# Patient Record
Sex: MALE | Race: WHITE | HISPANIC OR LATINO | Employment: OTHER | URBAN - METROPOLITAN AREA
[De-identification: names, ages, dates, MRNs, and addresses within clinical notes are randomized per-mention and may not be internally consistent; named-entity substitution may affect disease eponyms.]

---

## 2017-01-03 ENCOUNTER — TRANSFERRED RECORDS (OUTPATIENT)
Dept: CARDIOLOGY | Facility: CLINIC | Age: 72
End: 2017-01-03

## 2017-01-13 DIAGNOSIS — I25.10 CORONARY ARTERY DISEASE INVOLVING NATIVE CORONARY ARTERY OF NATIVE HEART WITHOUT ANGINA PECTORIS: ICD-10-CM

## 2017-01-13 DIAGNOSIS — I48.20 CHRONIC ATRIAL FIBRILLATION (H): Primary | ICD-10-CM

## 2017-01-13 DIAGNOSIS — I48.91 ATRIAL FIBRILLATION (H): ICD-10-CM

## 2017-01-13 RX ORDER — RAMIPRIL 2.5 MG/1
2.5 CAPSULE ORAL DAILY
Qty: 90 CAPSULE | Refills: 3 | Status: SHIPPED | OUTPATIENT
Start: 2017-01-13 | End: 2017-12-05

## 2017-01-13 RX ORDER — METOPROLOL SUCCINATE 100 MG/1
100 TABLET, EXTENDED RELEASE ORAL DAILY
Qty: 90 TABLET | Refills: 3 | Status: SHIPPED | OUTPATIENT
Start: 2017-01-13 | End: 2017-12-05

## 2017-11-28 ENCOUNTER — HOSPITAL ENCOUNTER (OUTPATIENT)
Dept: CARDIOLOGY | Facility: CLINIC | Age: 72
Discharge: HOME | End: 2017-11-28
Attending: INTERNAL MEDICINE | Admitting: INTERNAL MEDICINE
Payer: MEDICARE

## 2017-11-28 DIAGNOSIS — I25.10 CORONARY ARTERY DISEASE INVOLVING NATIVE CORONARY ARTERY OF NATIVE HEART WITHOUT ANGINA PECTORIS: ICD-10-CM

## 2017-11-28 DIAGNOSIS — E78.5 HYPERLIPIDEMIA LDL GOAL <100: ICD-10-CM

## 2017-11-28 DIAGNOSIS — I42.9 PRIMARY CARDIOMYOPATHY (H): ICD-10-CM

## 2017-11-28 DIAGNOSIS — I48.20 CHRONIC ATRIAL FIBRILLATION (H): ICD-10-CM

## 2017-11-28 LAB
CHOLEST SERPL-MCNC: 144 MG/DL
HDLC SERPL-MCNC: 44 MG/DL
LDLC SERPL CALC-MCNC: 77 MG/DL
NONHDLC SERPL-MCNC: 100 MG/DL
TRIGL SERPL-MCNC: 114 MG/DL

## 2017-11-28 PROCEDURE — 40000264 ECHO COMPLETE WITH LUMASON

## 2017-11-28 PROCEDURE — 25500064 ZZH RX 255 OP 636: Performed by: INTERNAL MEDICINE

## 2017-11-28 PROCEDURE — 36415 COLL VENOUS BLD VENIPUNCTURE: CPT | Performed by: INTERNAL MEDICINE

## 2017-11-28 PROCEDURE — 80061 LIPID PANEL: CPT | Performed by: INTERNAL MEDICINE

## 2017-11-28 PROCEDURE — 93306 TTE W/DOPPLER COMPLETE: CPT | Mod: 26 | Performed by: INTERNAL MEDICINE

## 2017-11-28 RX ADMIN — SULFUR HEXAFLUORIDE 2 ML: KIT at 08:14

## 2017-11-29 ENCOUNTER — PRE VISIT (OUTPATIENT)
Dept: CARDIOLOGY | Facility: CLINIC | Age: 72
End: 2017-11-29

## 2017-12-01 ENCOUNTER — CARE COORDINATION (OUTPATIENT)
Dept: CARDIOLOGY | Facility: CLINIC | Age: 72
End: 2017-12-01

## 2017-12-01 ENCOUNTER — OFFICE VISIT (OUTPATIENT)
Dept: CARDIOLOGY | Facility: CLINIC | Age: 72
End: 2017-12-01
Attending: INTERNAL MEDICINE
Payer: COMMERCIAL

## 2017-12-01 VITALS
HEART RATE: 72 BPM | SYSTOLIC BLOOD PRESSURE: 118 MMHG | BODY MASS INDEX: 37.63 KG/M2 | DIASTOLIC BLOOD PRESSURE: 78 MMHG | HEIGHT: 76 IN | WEIGHT: 309 LBS

## 2017-12-01 DIAGNOSIS — I42.9 PRIMARY CARDIOMYOPATHY (H): ICD-10-CM

## 2017-12-01 DIAGNOSIS — E78.5 HYPERLIPIDEMIA LDL GOAL <100: ICD-10-CM

## 2017-12-01 DIAGNOSIS — I48.20 CHRONIC ATRIAL FIBRILLATION (H): ICD-10-CM

## 2017-12-01 DIAGNOSIS — I25.10 CORONARY ARTERY DISEASE INVOLVING NATIVE CORONARY ARTERY OF NATIVE HEART WITHOUT ANGINA PECTORIS: ICD-10-CM

## 2017-12-01 PROCEDURE — 99213 OFFICE O/P EST LOW 20 MIN: CPT | Performed by: INTERNAL MEDICINE

## 2017-12-01 NOTE — LETTER
12/1/2017    Paresh Rodriguez MD  [facility]   [address]  [facility] [address]    RE: Ti Nickerson       Dear Colleague,    I had the pleasure of seeing Ti Nickerson in the [facility].    Mr. Nickerson is a delightful 72-year-old gentleman with history of idiopathic cardiomyopathy, previous ejection fraction of 40%, chronic atrial fibrillation who comes in feeling well today.  He is asymptomatic from a cardiac standpoint.  He has no chest pain, chest pressure, shortness of breath, heart racing, palpitations.  He has occasional lower extremity edema if he has been standing for too long a time, but it always readily clears by the next day.      Mr. Nickerson's echocardiogram shows stable moderate aortic root and ascending aorta enlargement.  His echocardiogram shows normal ejection fraction and a controlled atrial fibrillation.  He is on appropriate medical therapy.        He is wishing advice on new primary care is Dr. Rodriguez has retired and left no recommendations.  We will phone him with subsequent recommendations.  I will follow up with Mr. Nickerson in 1 year's time.  His cholesterol and blood pressure are at goal.  We talked mostly about him needing to lose weight.  We set a target goal of 250-260, perhaps joining a club and working out 5 times per week with decreasing his caloric intake and following a Mediterranean diet.      Again, thank you for allowing me to participate in the care of your patient.      Sincerely,    SUNNY WITT MD     [facility]

## 2017-12-01 NOTE — PROGRESS NOTES
Patient advised to establish care with a primary doctor.      appointment 12/8/2017  [facility] [place] -  [address] [place] [address]  9:30 am  Luis Carlos Lopez MD      Patient updated with this information. Allison Yin

## 2017-12-01 NOTE — MR AVS SNAPSHOT
After Visit Summary   12/1/2017    Ti Nickerson    MRN: [id number]           Patient Information     Date Of Birth          1945        Visit Information        Provider Department      12/1/2017 7:45 AM Devin Quarles MD [facility]        Today's Diagnoses     Hyperlipidemia LDL goal <100        Primary cardiomyopathy (H)        Coronary artery disease involving native coronary artery of native heart without angina pectoris        Chronic atrial fibrillation (H)           Follow-ups after your visit        Additional Services     Follow-Up with Cardiologist                 Your next 10 appointments already scheduled     Dec 08, 2017  9:30 AM CST   [facility] Visit with Quin Lopez MD   [facility]    [address] [address] [phone]           Bring a current list of meds and any records pertaining to this visit. For Physicals, please bring immunization records and any forms needing to be filled out. Please arrive 10 minutes early to complete paperwork.              Future tests that were ordered for you today     Open Future Orders        Priority Expected Expires Ordered    Follow-Up with Cardiologist Routine 12/1/2018 12/2/2018 12/1/2017            Who to contact     If you have questions or need follow up information about today's clinic visit or your schedule please contact [facility] directly at [phone].  Normal or non-critical lab and imaging results will be communicated to you by MyChart, letter or phone within 4 business days after the clinic has received the results. If you do not hear from us within 7 days, please contact the clinic through MyChart or phone. If you have a critical or abnormal lab result, we will notify you by phone as soon as possible.  Submit refill requests through [employer] or call your pharmacy and they will  "forward the refill request to us. Please allow 3 business days for your refill to be completed.          Additional Information About Your Visit        MyChart Information     Soundwave lets you send messages to your doctor, view your test results, renew your prescriptions, schedule appointments and more. To sign up, go to [email or web address] . Click on \"Log in\" on the left side of the screen, which will take you to the Welcome page. Then click on \"Sign up Now\" on the right side of the page.     You will be asked to enter the access code listed below, as well as some personal information. Please follow the directions to create your username and password.     Your access code is: [email or web address]  Expires: 3/1/2018  8:57 AM     Your access code will  in 90 days. If you need help or a new code, please call your [facility] clinic or [phone].        Care EveryWhere ID     This is your Care EveryWhere ID. This could be used by other organizations to access your [facility] medical records  [id number]        Your Vitals Were     Pulse Height BMI (Body Mass Index)             72 1.93 m (6' 4\") 37.61 kg/m2          Blood Pressure from Last 3 Encounters:   17 118/78   16 122/64   09/21/15 112/80    Weight from Last 3 Encounters:   17 (!) 140.2 kg (309 lb)   16 (!) 141.1 kg (311 lb)   09/21/15 (!) 139.6 kg (307 lb 11.2 oz)              We Performed the Following     Follow-Up with Cardiologist        Primary Care Provider Office Phone # Fax #    Paresh Rodriguez [phone]       [facility] INTERNAL MEDIC [address] [address]        Equal Access to Services     [facility] : Johnny jones Soestefani, waaxda luqadaha, qaybta kaalmada adelakeshiayaally, brenda fletcher. So [facility] [phone].    ATENCIÓN: Si habla español, tiene a arthur disposición servicios gratuitos de asistencia lingüística. Llame al [phone].    We comply with applicable " federal civil rights laws and [place] laws. We do not discriminate on the basis of race, color, national origin, age, disability, sex, sexual orientation, or gender identity.            Thank you!     Thank you for choosing [facility]  for your care. Our goal is always to provide you with excellent care. Hearing back from our patients is one way we can continue to improve our services. Please take a few minutes to complete the written survey that you may receive in the mail after your visit with us. Thank you!             Your Updated Medication List - Protect others around you: Learn how to safely use, store and throw away your medicines at www.disposemymeds.org.          This list is accurate as of: 12/1/17  8:57 AM.  Always use your most recent med list.                   Brand Name Dispense Instructions for use Diagnosis    apixaban ANTICOAGULANT 5 MG tablet    ELIQUIS    180 tablet    Take 1 tablet (5 mg) by mouth 2 times daily    Chronic atrial fibrillation (H)       metoprolol 100 MG 24 hr tablet    TOPROL-XL    90 tablet    Take 1 tablet (100 mg) by mouth daily    Atrial fibrillation (H)       ramipril 2.5 MG capsule    ALTACE    90 capsule    Take 1 capsule (2.5 mg) by mouth daily    Coronary artery disease involving native coronary artery of native heart without angina pectoris       rosuvastatin 10 MG tablet    CRESTOR    90 tablet    Take 1 tablet (10 mg) by mouth daily    Hyperlipidemia LDL goal <100

## 2017-12-01 NOTE — PROGRESS NOTES
Mr. Allen is a delightful 72-year-old gentleman with history of idiopathic cardiomyopathy, previous ejection fraction of 40%, chronic atrial fibrillation who comes in feeling well today.  He is asymptomatic from a cardiac standpoint.  He has no chest pain, chest pressure, shortness of breath, heart racing, palpitations.  He has occasional lower extremity edema if he has been standing for too long a time, but it always readily clears by the next day.      Mr. Allen's echocardiogram shows stable moderate aortic root and ascending aorta enlargement.  His echocardiogram shows normal ejection fraction and a controlled atrial fibrillation.  He is on appropriate medical therapy.        He is wishing advice on new primary care is Dr. Rodriguez has retired and left no recommendations.  We will phone him with subsequent recommendations.  I will follow up with Mr. Allen in 1 year's time.  His cholesterol and blood pressure are at goal.  We talked mostly about him needing to lose weight.  We set a target goal of 250-260, perhaps joining a club and working out 5 times per week with decreasing his caloric intake and following a Mediterranean diet.         SUNNY WITT MD [facility]             D: 2017 08:15   T: 2017 10:35   MT: JOHNNIE      Name:     JOHN ALLEN   MRN:      [id number]        Account:      [id number]   :      1945           Service Date: 2017      Document: [id number]

## 2017-12-05 DIAGNOSIS — E78.5 HYPERLIPIDEMIA LDL GOAL <100: ICD-10-CM

## 2017-12-05 DIAGNOSIS — I48.20 CHRONIC ATRIAL FIBRILLATION (H): ICD-10-CM

## 2017-12-05 DIAGNOSIS — I48.91 ATRIAL FIBRILLATION (H): ICD-10-CM

## 2017-12-05 DIAGNOSIS — I25.10 CORONARY ARTERY DISEASE INVOLVING NATIVE CORONARY ARTERY OF NATIVE HEART WITHOUT ANGINA PECTORIS: ICD-10-CM

## 2017-12-05 RX ORDER — METOPROLOL SUCCINATE 100 MG/1
100 TABLET, EXTENDED RELEASE ORAL DAILY
Qty: 90 TABLET | Refills: 3 | Status: SHIPPED | OUTPATIENT
Start: 2017-12-05 | End: 2018-11-30

## 2017-12-05 RX ORDER — ROSUVASTATIN CALCIUM 10 MG/1
10 TABLET, COATED ORAL DAILY
Qty: 90 TABLET | Refills: 3 | Status: SHIPPED | OUTPATIENT
Start: 2017-12-05 | End: 2018-11-30

## 2017-12-05 RX ORDER — RAMIPRIL 2.5 MG/1
2.5 CAPSULE ORAL DAILY
Qty: 90 CAPSULE | Refills: 3 | Status: SHIPPED | OUTPATIENT
Start: 2017-12-05 | End: 2018-11-30

## 2017-12-08 ENCOUNTER — OFFICE VISIT (OUTPATIENT)
Dept: FAMILY MEDICINE | Facility: CLINIC | Age: 72
End: 2017-12-08
Payer: COMMERCIAL

## 2017-12-08 VITALS
HEIGHT: 76 IN | WEIGHT: 310 LBS | DIASTOLIC BLOOD PRESSURE: 82 MMHG | RESPIRATION RATE: 14 BRPM | TEMPERATURE: 97.1 F | HEART RATE: 86 BPM | SYSTOLIC BLOOD PRESSURE: 113 MMHG | OXYGEN SATURATION: 95 % | BODY MASS INDEX: 37.75 KG/M2

## 2017-12-08 DIAGNOSIS — Z83.3 FAMILY HISTORY OF DIABETES MELLITUS: ICD-10-CM

## 2017-12-08 DIAGNOSIS — Z23 NEEDS FLU SHOT: ICD-10-CM

## 2017-12-08 DIAGNOSIS — E66.812 CLASS 2 OBESITY WITH BODY MASS INDEX (BMI) OF 37.0 TO 37.9 IN ADULT, UNSPECIFIED OBESITY TYPE, UNSPECIFIED WHETHER SERIOUS COMORBIDITY PRESENT: ICD-10-CM

## 2017-12-08 DIAGNOSIS — R73.01 IMPAIRED FASTING GLUCOSE: ICD-10-CM

## 2017-12-08 DIAGNOSIS — I48.20 CHRONIC ATRIAL FIBRILLATION (H): ICD-10-CM

## 2017-12-08 DIAGNOSIS — I42.9 IDIOPATHIC CARDIOMYOPATHY (H): ICD-10-CM

## 2017-12-08 DIAGNOSIS — Z12.5 SCREENING FOR PROSTATE CANCER: ICD-10-CM

## 2017-12-08 DIAGNOSIS — E78.2 MIXED HYPERLIPIDEMIA: ICD-10-CM

## 2017-12-08 DIAGNOSIS — Z76.89 ENCOUNTER TO ESTABLISH CARE WITH NEW DOCTOR: Primary | ICD-10-CM

## 2017-12-08 DIAGNOSIS — Z13.29 SCREENING FOR THYROID DISORDER: ICD-10-CM

## 2017-12-08 LAB
ALBUMIN SERPL-MCNC: 3.8 G/DL (ref 3.4–5)
ALP SERPL-CCNC: 60 U/L (ref 40–150)
ALT SERPL W P-5'-P-CCNC: 31 U/L (ref 0–70)
ANION GAP SERPL CALCULATED.3IONS-SCNC: 11 MMOL/L (ref 3–14)
AST SERPL W P-5'-P-CCNC: 25 U/L (ref 0–45)
BASOPHILS # BLD AUTO: 0 10E9/L (ref 0–0.2)
BASOPHILS NFR BLD AUTO: 0.3 %
BILIRUB SERPL-MCNC: 1.2 MG/DL (ref 0.2–1.3)
BUN SERPL-MCNC: 21 MG/DL (ref 7–30)
CALCIUM SERPL-MCNC: 8.9 MG/DL (ref 8.5–10.1)
CHLORIDE SERPL-SCNC: 107 MMOL/L (ref 94–109)
CO2 SERPL-SCNC: 22 MMOL/L (ref 20–32)
CREAT SERPL-MCNC: 1.1 MG/DL (ref 0.66–1.25)
DIFFERENTIAL METHOD BLD: NORMAL
EOSINOPHIL # BLD AUTO: 0.2 10E9/L (ref 0–0.7)
EOSINOPHIL NFR BLD AUTO: 2.5 %
ERYTHROCYTE [DISTWIDTH] IN BLOOD BY AUTOMATED COUNT: 13 % (ref 10–15)
GFR SERPL CREATININE-BSD FRML MDRD: 66 ML/MIN/1.7M2
GLUCOSE SERPL-MCNC: 106 MG/DL (ref 70–99)
HBA1C MFR BLD: 5.6 % (ref 4.3–6)
HCT VFR BLD AUTO: 47.1 % (ref 40–53)
HGB BLD-MCNC: 16.2 G/DL (ref 13.3–17.7)
LYMPHOCYTES # BLD AUTO: 1.6 10E9/L (ref 0.8–5.3)
LYMPHOCYTES NFR BLD AUTO: 21.7 %
MCH RBC QN AUTO: 32.7 PG (ref 26.5–33)
MCHC RBC AUTO-ENTMCNC: 34.4 G/DL (ref 31.5–36.5)
MCV RBC AUTO: 95 FL (ref 78–100)
MONOCYTES # BLD AUTO: 0.6 10E9/L (ref 0–1.3)
MONOCYTES NFR BLD AUTO: 7.3 %
NEUTROPHILS # BLD AUTO: 5.1 10E9/L (ref 1.6–8.3)
NEUTROPHILS NFR BLD AUTO: 68.2 %
PLATELET # BLD AUTO: 210 10E9/L (ref 150–450)
POTASSIUM SERPL-SCNC: 4.3 MMOL/L (ref 3.4–5.3)
PROT SERPL-MCNC: 7.4 G/DL (ref 6.8–8.8)
PSA SERPL-ACNC: 2.98 UG/L (ref 0–4)
RBC # BLD AUTO: 4.96 10E12/L (ref 4.4–5.9)
SODIUM SERPL-SCNC: 140 MMOL/L (ref 133–144)
T4 FREE SERPL-MCNC: 0.93 NG/DL (ref 0.76–1.46)
TSH SERPL DL<=0.005 MIU/L-ACNC: 4.07 MU/L (ref 0.4–4)
WBC # BLD AUTO: 7.5 10E9/L (ref 4–11)

## 2017-12-08 PROCEDURE — 99204 OFFICE O/P NEW MOD 45 MIN: CPT | Mod: 25 | Performed by: INTERNAL MEDICINE

## 2017-12-08 PROCEDURE — 90662 IIV NO PRSV INCREASED AG IM: CPT | Performed by: INTERNAL MEDICINE

## 2017-12-08 PROCEDURE — 80050 GENERAL HEALTH PANEL: CPT | Performed by: INTERNAL MEDICINE

## 2017-12-08 PROCEDURE — 36415 COLL VENOUS BLD VENIPUNCTURE: CPT | Performed by: INTERNAL MEDICINE

## 2017-12-08 PROCEDURE — 83036 HEMOGLOBIN GLYCOSYLATED A1C: CPT | Performed by: INTERNAL MEDICINE

## 2017-12-08 PROCEDURE — G0103 PSA SCREENING: HCPCS | Performed by: INTERNAL MEDICINE

## 2017-12-08 PROCEDURE — 84439 ASSAY OF FREE THYROXINE: CPT | Performed by: INTERNAL MEDICINE

## 2017-12-08 PROCEDURE — G0008 ADMIN INFLUENZA VIRUS VAC: HCPCS | Performed by: INTERNAL MEDICINE

## 2017-12-08 NOTE — PROGRESS NOTES
Chief Complaint:     New Patient/Transfer of Care  Establish care    HPI:   Patient Ti Nickerson is a very pleasant 72 year old male with history of atrial fibrillation who presents to Internal Medicine clinic today to establish care and for follow up of multiple concerns. Regarding the patient's atrial fibrilaltion, the patient reports that he is currently followed by outpatient cardiology specialist clinic and is compliant with his cardiac medications including metoprolol and Eliquis. Regarding the patient's hyperlipidemia, the patient is compliant with his Crestor cholesterol medication. Regarding the patient's hypertension, the patient's BP is currently well controlled on the Ramipril medication. Patient denies any current chest pain, headaches, fever or chills. Patient is also due for a routine flu vaccine at this time.           Current Medications:     Current Outpatient Prescriptions   Medication Sig Dispense Refill     rosuvastatin (CRESTOR) 10 MG tablet Take 1 tablet (10 mg) by mouth daily 90 tablet 3     metoprolol (TOPROL-XL) 100 MG 24 hr tablet Take 1 tablet (100 mg) by mouth daily 90 tablet 3     ramipril (ALTACE) 2.5 MG capsule Take 1 capsule (2.5 mg) by mouth daily 90 capsule 3     apixaban ANTICOAGULANT (ELIQUIS) 5 MG tablet Take 1 tablet (5 mg) by mouth 2 times daily 180 tablet 3         Allergies:    No Known Allergies         Past Medical History:     Past Medical History:   Diagnosis Date     Atrial fibrillation, unspecified 9/18/2015     CAD (coronary artery disease) 9/18/2015     Esophageal reflux 9/18/2015     History of cardioversion     [id number]     Idiopathic cardiomyopathy (H) 9/18/2015     Mixed hyperlipidemia 9/18/2015     Sleep apnea 9/18/2015         Past Surgical History:   History reviewed. No pertinent surgical history.      Family Medical History:     Family History   Problem Relation Age of Onset     Arrhythmia Mother      a-fib     Arrhythmia Father      a-fib  "        Social History:     Social History     Social History     Marital status:      Spouse name: N/A     Number of children: N/A     Years of education: N/A     Occupational History     Not on file.     Social History Main Topics     Smoking status: Never Smoker     Smokeless tobacco: Never Used     Alcohol use 0.0 oz/week     0 Standard drinks or equivalent per week      Comment: 3 drinks week     Drug use: No     Sexual activity: Yes     Partners: Female     Other Topics Concern     Caffeine Concern No     2 coffee in am, occas. soda     Sleep Concern No     Stress Concern No     Weight Concern Yes     Special Diet No     Exercise No     walking 3-4 days week     Seat Belt Yes     Social History Narrative           Review of System:     Constitutional: Negative for fever or chills  Skin: Negative for rashes  Ears/Nose/Throat: Negative for nasal congestion, sore throat  Respiratory: No shortness of breath, dyspnea on exertion, cough, or hemoptysis  Cardiovascular: Negative for chest pain. Positive for atrial fibrilaltion  Gastrointestinal: Negative for nausea, vomiting  Genitourinary: Negative for dysuria, hematuria  Musculoskeletal: Negative for myalgias  Neurologic: Negative for headaches  Psychiatric: Negative for depression, anxiety  Hematologic/Lymphatic/Immunologic: Negative  Endocrine: Positive for chronic obesity and history of impaired fasting glucose  Behavioral: Negative for tobacco use       Physical Exam:   /82  Pulse 86  Temp 97.1  F (36.2  C) (Oral)  Resp 14  Ht 6' 4\" (1.93 m)  Wt (!) 310 lb (140.6 kg)  SpO2 95%  BMI 37.73 kg/m2    GENERAL: healthy, alert and no distress  EYES: eyes grossly normal to inspection, and conjunctivae and sclerae normal  HENT: Normocephalic atraumatic. Nose and mouth without ulcers or lesions  NECK: supple  RESP: lungs clear to auscultation   CV: irregularly irregular, heart rate is currently well controlled  LYMPH: no peripheral edema   ABDOMEN: " obese  MS: no gross musculoskeletal defects noted  SKIN: no suspicious lesions or rashes  NEURO: Alert & Oriented x 3.   PSYCH: mentation appears normal, affect normal        Diagnostic Test Results:     Diagnostic Test Results:  Results for orders placed or performed in visit on 12/08/17   CBC with platelets and differential   Result Value Ref Range    WBC 7.5 4.0 - 11.0 10e9/L    RBC Count 4.96 4.4 - 5.9 10e12/L    Hemoglobin 16.2 13.3 - 17.7 g/dL    Hematocrit 47.1 40.0 - 53.0 %    MCV 95 78 - 100 fl    MCH 32.7 26.5 - 33.0 pg    MCHC 34.4 31.5 - 36.5 g/dL    RDW 13.0 10.0 - 15.0 %    Platelet Count 210 150 - 450 10e9/L    Diff Method Automated Method     % Neutrophils 68.2 %    % Lymphocytes 21.7 %    % Monocytes 7.3 %    % Eosinophils 2.5 %    % Basophils 0.3 %    Absolute Neutrophil 5.1 1.6 - 8.3 10e9/L    Absolute Lymphocytes 1.6 0.8 - 5.3 10e9/L    Absolute Monocytes 0.6 0.0 - 1.3 10e9/L    Absolute Eosinophils 0.2 0.0 - 0.7 10e9/L    Absolute Basophils 0.0 0.0 - 0.2 10e9/L   Hemoglobin A1c   Result Value Ref Range    Hemoglobin A1C 5.6 4.3 - 6.0 %       ASSESSMENT/PLAN:       (Z76.89) Encounter to establish care with new doctor  (primary encounter diagnosis)  (I48.2) Chronic atrial fibrillation (H)  Comment: Patient's heart rate is currently well controlled.   Plan: I have ordered labs for CBC with platelets and differential, Comprehensive metabolic panel (BMP + Alb, Alk Phos, ALT, AST, Total. Bili, TP) in clinic today.      (Z23) Needs flu shot  Comment: Patient is due for a flu vaccine.  Plan: I have ordered FLU VACCINE, INCREASED ANTIGEN, PRESV FREE in clinic today.      (I42.8) Idiopathic cardiomyopathy (H)  Comment: No signs of acute on chronic CHF exacerbation at this time.  Plan: continue outpatient cardiology clinic follow up. Continue current cardiac medications.      (E78.2) Mixed hyperlipidemia  Comment: Patient is compliant with his Crestor medication.  Plan: Continue Crestor medication for  hyperlipidemia treatment.      (Z12.5) Screening for prostate cancer  Comment: Patient is due for PSA lab for prostate cancer screening.  Plan: I have ordered lab for PSA, screen      (Z13.29) Screening for thyroid disorder  Comment: Patient is due for thyroid disorder screening.  Plan: I have ordered lab for TSH with free T4 reflex      (E66.9,  Z68.37) Class 2 obesity with body mass index (BMI) of 37.0 to 37.9 in adult, unspecified obesity type, unspecified whether serious comorbidity present  Comment: chronic obesity  Plan: I have advised patient to start a new weight loss program through diet and exercise going forward.      (R73.01) Impaired fasting glucose  (Z83.3) Family history of diabetes mellitus  Comment: Patient is due for Hgb a1c lab to follow up on previous diagnosis of impaired fasting glucose.  Plan: I have ordered lab for Hemoglobin A1c.            Follow Up Plan:     Patient is instructed to return to Internal Medicine clinic for follow-up visit in 1 year or sooner as needed.        Quin Lopez MD  Internal Medicine  [facility]

## 2017-12-08 NOTE — LETTER
[address] [facility]  [address]  [place]  [address]  Tel: [phone]    December 11, 2017    Ti Durandnemaribel  [address] [facility]  [place] [address]        Dear Mr. Nickerson,    Your recent lab results are looking ok.    If you have any further questions or problems, please contact our office.      Sincerely,    Luis Carlos Lopez MD/chandler           Results for orders placed or performed in visit on 12/08/17   CBC with platelets and differential   Result Value Ref Range    WBC 7.5 4.0 - 11.0 10e9/L    RBC Count 4.96 4.4 - 5.9 10e12/L    Hemoglobin 16.2 13.3 - 17.7 g/dL    Hematocrit 47.1 40.0 - 53.0 %    MCV 95 78 - 100 fl    MCH 32.7 26.5 - 33.0 pg    MCHC 34.4 31.5 - 36.5 g/dL    RDW 13.0 10.0 - 15.0 %    Platelet Count 210 150 - 450 10e9/L    Diff Method Automated Method     % Neutrophils 68.2 %    % Lymphocytes 21.7 %    % Monocytes 7.3 %    % Eosinophils 2.5 %    % Basophils 0.3 %    Absolute Neutrophil 5.1 1.6 - 8.3 10e9/L    Absolute Lymphocytes 1.6 0.8 - 5.3 10e9/L    Absolute Monocytes 0.6 0.0 - 1.3 10e9/L    Absolute Eosinophils 0.2 0.0 - 0.7 10e9/L    Absolute Basophils 0.0 0.0 - 0.2 10e9/L   Comprehensive metabolic panel (BMP + Alb, Alk Phos, ALT, AST, Total. Bili, TP)   Result Value Ref Range    Sodium 140 133 - 144 mmol/L    Potassium 4.3 3.4 - 5.3 mmol/L    Chloride 107 94 - 109 mmol/L    Carbon Dioxide 22 20 - 32 mmol/L    Anion Gap 11 3 - 14 mmol/L    Glucose 106 (H) 70 - 99 mg/dL    Urea Nitrogen 21 7 - 30 mg/dL    Creatinine 1.10 0.66 - 1.25 mg/dL    GFR Estimate 66 >60 mL/min/1.7m2    GFR Estimate If Black 80 >60 mL/min/1.7m2    Calcium 8.9 8.5 - 10.1 mg/dL    Bilirubin Total 1.2 0.2 - 1.3 mg/dL    Albumin 3.8 3.4 - 5.0 g/dL    Protein Total 7.4 6.8 - 8.8 g/dL    Alkaline Phosphatase 60 40 - 150 U/L    ALT 31 0 - 70 U/L    AST 25 0 - 45 U/L   TSH with free T4 reflex   Result Value Ref Range    TSH 4.07 (H) 0.40 - 4.00 mU/L   PSA, screen   Result Value Ref Range    PSA 2.98  0 - 4 ug/L   Hemoglobin A1c   Result Value Ref Range    Hemoglobin A1C 5.6 4.3 - 6.0 %   T4 free   Result Value Ref Range    T4 Free 0.93 0.76 - 1.46 ng/dL

## 2017-12-08 NOTE — MR AVS SNAPSHOT
After Visit Summary   12/8/2017    Ti Nickerson    MRN: [id number]           Patient Information     Date Of Birth          1945        Visit Information        Provider Department      12/8/2017 9:30 AM Quin Lopez MD [facility]        Today's Diagnoses     Encounter to establish care with new doctor    -  1    Needs flu shot        Chronic atrial fibrillation (H)        Idiopathic cardiomyopathy (H)        Mixed hyperlipidemia        Screening for prostate cancer        Screening for thyroid disorder        Class 2 obesity with body mass index (BMI) of 37.0 to 37.9 in adult, unspecified obesity type, unspecified whether serious comorbidity present        Family history of diabetes mellitus        Impaired fasting glucose           Follow-ups after your visit        Follow-up notes from your care team     Return in about 1 year (around 12/8/2018).      Who to contact     If you have questions or need follow up information about today's clinic visit or your schedule please contact [facility] directly at [phone].  Normal or non-critical lab and imaging results will be communicated to you by MyChart, letter or phone within 4 business days after the clinic has received the results. If you do not hear from us within 7 days, please contact the clinic through [employer] or phone. If you have a critical or abnormal lab result, we will notify you by phone as soon as possible.  Submit refill requests through [employer] or call your pharmacy and they will forward the refill request to us. Please allow 3 business days for your refill to be completed.          Additional Information About Your Visit        Care EveryWhere ID     This is your Care EveryWhere ID. This could be used by other organizations to access your [facility] medical records  [id number]        Your Vitals Were     Pulse Temperature Respirations Height Pulse Oximetry BMI (Body Mass Index)    86 97.1  F  "(36.2  C) (Oral) 14 6' 4\" (1.93 m) 95% 37.73 kg/m2       Blood Pressure from Last 3 Encounters:   12/08/17 113/82   12/01/17 118/78   11/28/16 122/64    Weight from Last 3 Encounters:   12/08/17 (!) 310 lb (140.6 kg)   12/01/17 (!) 309 lb (140.2 kg)   11/28/16 (!) 311 lb (141.1 kg)              We Performed the Following     CBC with platelets and differential     Comprehensive metabolic panel (BMP + Alb, Alk Phos, ALT, AST, Total. Bili, TP)     FLU VACCINE, INCREASED ANTIGEN, PRESV FREE     Hemoglobin A1c     PSA, screen     TSH with free T4 reflex        Primary Care Provider Office Phone # Fax #    Quin Luis Carlos Lopez [phone] [address]  [place] [address]        Equal Access to Services     [facility] : Hadii guerline collinso Soestefani, waaxda luqadaha, qaybta kaalmada adelakeshiayaally, brenda sen . So [facility] [phone].    ATENCIÓN: Si sangeeta moran, tiene a arthur disposición servicios gratuitos de asistencia lingüística. Llame al [phone].    We comply with applicable federal civil rights laws and [place] laws. We do not discriminate on the basis of race, color, national origin, age, disability, sex, sexual orientation, or gender identity.            Thank you!     Thank you for choosing [facility]  for your care. Our goal is always to provide you with excellent care. Hearing back from our patients is one way we can continue to improve our services. Please take a few minutes to complete the written survey that you may receive in the mail after your visit with us. Thank you!             Your Updated Medication List - Protect others around you: Learn how to safely use, store and throw away your medicines at www.disposemymeds.org.          This list is accurate as of: 12/8/17  2:52 PM.  Always use your most recent med list.                   Brand Name Dispense Instructions for use Diagnosis    apixaban ANTICOAGULANT 5 MG tablet    ELIQUIS    " 180 tablet    Take 1 tablet (5 mg) by mouth 2 times daily    Chronic atrial fibrillation (H)       metoprolol 100 MG 24 hr tablet    TOPROL-XL    90 tablet    Take 1 tablet (100 mg) by mouth daily    Atrial fibrillation (H)       ramipril 2.5 MG capsule    ALTACE    90 capsule    Take 1 capsule (2.5 mg) by mouth daily    Coronary artery disease involving native coronary artery of native heart without angina pectoris       rosuvastatin 10 MG tablet    CRESTOR    90 tablet    Take 1 tablet (10 mg) by mouth daily    Hyperlipidemia LDL goal <100

## 2017-12-08 NOTE — NURSING NOTE
"Chief Complaint   Patient presents with     Establish Care       Initial /82  Pulse 86  Temp 97.1  F (36.2  C) (Oral)  Resp 14  Ht 6' 4\" (1.93 m)  Wt (!) 310 lb (140.6 kg)  SpO2 95%  BMI 37.73 kg/m2 Estimated body mass index is 37.73 kg/(m^2) as calculated from the following:    Height as of this encounter: 6' 4\" (1.93 m).    Weight as of this encounter: 310 lb (140.6 kg).  BP completed using cuff size: large    Health Maintenance that is potentially due pending provider review:  Health Maintenance Due   Topic Date Due     HF ACTION PLAN Q3 YR  1945     ALT Q1 YR  11/11/1947     HEPATITIS C SCREENING  11/11/1964     ADVANCE DIRECTIVE PLANNING Q5 YRS  11/11/2001     FALL RISK ASSESSMENT  11/11/2011     PNEUMOCOCCAL (1 of 2 - PCV13) 11/11/2011     BMP Q6 MOS  06/11/2012     CBC Q1 YR  12/11/2012     INFLUENZA VACCINE (SYSTEM ASSIGNED)  09/01/2017         Fall risk done-flu vaccine ordered-pneumo 13 ordered  "

## 2018-07-07 ENCOUNTER — HOSPITAL ENCOUNTER (EMERGENCY)
Facility: CLINIC | Age: 73
Discharge: HOME | End: 2018-07-07
Attending: EMERGENCY MEDICINE | Admitting: EMERGENCY MEDICINE
Payer: MEDICARE

## 2018-07-07 VITALS
OXYGEN SATURATION: 96 % | BODY MASS INDEX: 35.43 KG/M2 | HEIGHT: 75 IN | TEMPERATURE: 97.5 F | WEIGHT: 285 LBS | DIASTOLIC BLOOD PRESSURE: 75 MMHG | HEART RATE: 83 BPM | SYSTOLIC BLOOD PRESSURE: 107 MMHG | RESPIRATION RATE: 16 BRPM

## 2018-07-07 DIAGNOSIS — R04.0 EPISTAXIS: ICD-10-CM

## 2018-07-07 PROCEDURE — 27210182 ZZH KIT NASAL PACKING

## 2018-07-07 PROCEDURE — A9270 NON-COVERED ITEM OR SERVICE: HCPCS | Mod: GY | Performed by: EMERGENCY MEDICINE

## 2018-07-07 PROCEDURE — 25000132 ZZH RX MED GY IP 250 OP 250 PS 637: Mod: GY | Performed by: EMERGENCY MEDICINE

## 2018-07-07 PROCEDURE — 25000125 ZZHC RX 250: Performed by: EMERGENCY MEDICINE

## 2018-07-07 PROCEDURE — 30903 CONTROL OF NOSEBLEED: CPT | Mod: LT

## 2018-07-07 PROCEDURE — 99284 EMERGENCY DEPT VISIT MOD MDM: CPT | Mod: 25

## 2018-07-07 RX ORDER — CEPHALEXIN 500 MG/1
500 CAPSULE ORAL 3 TIMES DAILY
Qty: 21 CAPSULE | Refills: 0 | Status: SHIPPED | OUTPATIENT
Start: 2018-07-07 | End: 2018-07-14

## 2018-07-07 RX ORDER — CEPHALEXIN 500 MG/1
500 CAPSULE ORAL ONCE
Status: COMPLETED | OUTPATIENT
Start: 2018-07-07 | End: 2018-07-07

## 2018-07-07 RX ORDER — TRANEXAMIC ACID 100 MG/ML
500 INJECTION, SOLUTION INTRAVENOUS ONCE
Status: COMPLETED | OUTPATIENT
Start: 2018-07-07 | End: 2018-07-07

## 2018-07-07 RX ADMIN — TRANEXAMIC ACID: 100 INJECTION, SOLUTION INTRAVENOUS at 06:08

## 2018-07-07 RX ADMIN — CEPHALEXIN 500 MG: 500 CAPSULE ORAL at 06:52

## 2018-07-07 ASSESSMENT — ENCOUNTER SYMPTOMS: HEADACHES: 0

## 2018-07-07 NOTE — ED NOTES
Bed: ED01  Expected date: 7/7/18  Expected time: 5:30 AM  Means of arrival: Ambulance  Comments:  [place] M2 72M nose bleed; controlled     Solitario Patterson RN  07/07/18 0539

## 2018-07-07 NOTE — ED NOTES
Patient has afrin and Packing in place, states it feels like it has stopped for now, no nausea reported.  Patient states he can't feel it running down the back of his throat anymore.

## 2018-07-07 NOTE — ED AVS SNAPSHOT
"  Emergency Department    [address] [facility] [address]    Phone:  [phone]    Fax:  [phone]                                       Ti Nickerson   MRN: [id number]    Department:   Emergency Department   Date of Visit:  7/7/2018           Patient Information     Date Of Birth          1945        Your diagnoses for this visit were:     Epistaxis        You were seen by Faustino Cuellar MD.      Follow-up Information     Schedule an appointment as soon as possible for a visit with Nico Mueller MD.    Specialty:  Otolaryngology    Contact information:    [facility]  [address] [address] [phone]          Follow up with  Emergency Department.    Specialty:  EMERGENCY MEDICINE    Why:  If symptoms worsen    Contact information:    [address] [facility] [id number] [phone]        Discharge Instructions       Discharge Instructions  Epistaxis    Today you were seen for a nosebleed.   Nosebleeds (the medical term is \"epistaxis\") are very common. Almost every person has had at least one in their lifetime.  Although the amount of blood loss can appear dramatic, nosebleeds rarely cause serious problems.  The most common causes are dry air or nose picking, but they also are common in people who have allergies, high blood pressure, or are on blood thinners (such as Coumadin, Aspirin or Plavix). If you or your child gets a nosebleed, the important thing is to know how to take care of it. With the right self-care, most nosebleeds will stop on their own.    Generally, every Emergency Department visit should have a follow-up clinic visit with either a primary or a specialty clinic/provider. Please follow-up as instructed by your emergency provider today.    Return to the Emergency Department if:    Your nose is bleeding a very large amount of blood and you are unable to stop it.    You get very pale, faint, or " tired.    You cannot get the bleeding to stop after following these instructions.    Treatment:    Your provider may tell you to use a decongestant nose spray, like Afrin  (oxymetazoline), in both nostrils in the morning and at night for the next three days. Do not use this medicine for more than three days at a time.  If you do, it will cause nasal congestion.     Use a moisturizer. A small amount of Vaseline  to the inside of your nostrils for moisture before bed is one option. There are nasal sprays available over-the-counter for this purpose as well.  Using a humidifier in your bedroom or home will help as well when the air is dry.    For the next three days, do not blow your nose or put anything in your nose. You may sniffle, or dab the outside of your nose.    Do not bend with your head below your waist for the next three days. Do not lift anything so heavy that you have to strain.     If you received nasal packing, please do not remove the packing until seen by an Ear, Nose, and Throat (ENT) specialist.  If antibiotics have been given with the packing, please take as directed.    If your nose starts to bleed again:    Blow your nose to get rid of some of the clots that have formed inside your nostrils. This may increase the bleeding temporarily, but that is okay.    Spray decongestant nose spray (like Afrin ) into both nostrils to constrict the blood vessels.    Sit or stand while bending forward slightly at the waist. Do not lie down or tilt your head back. This may cause you to swallow blood and can lead to vomiting.     the soft part of BOTH nostrils at the bottom of your nose and squeeze your nose closed for at least 5 minutes (for children) or 10 to 15 minutes (for adults). Use a clock to time yourself. Do not release the pressure every so often to check whether the bleeding has stopped. Many people hurt their chances of stopping the bleeding by releasing the pressure too soon or too often.    If you  follow the steps outlined above, and your nose continues to bleed, repeat all the steps once more. Apply pressure for a total of at least 30 minutes. If you continue to bleed even then, seek medical attention.  If you were given a prescription for medicine here today, be sure to read all of the information (including the package insert) that comes with your prescription.  This will include important information about the medicine, its side effects, and any warnings that you need to know about.  The pharmacist who fills the prescription can provide more information and answer questions you may have about the medicine.  If you have questions or concerns that the pharmacist cannot address, please call or return to the Emergency Department.   Remember that you can always come back to the Emergency Department if you are not able to see your regular provider in the amount of time listed above, if you get any new symptoms, or if there is anything that worries you.      24 Hour Appointment Hotline       To make an appointment at any [facility], call [phone] ([phone]). If you don't have a family doctor or clinic, we will help you find one. [facility] clinics are conveniently located to serve the needs of you and your family.             Review of your medicines      START taking        Dose / Directions Last dose taken    cephALEXin 500 MG capsule   Commonly known as:  KEFLEX   Dose:  500 mg   Quantity:  21 capsule        Take 1 capsule (500 mg) by mouth 3 times daily for 7 days   Refills:  0          Our records show that you are taking the medicines listed below. If these are incorrect, please call your family doctor or clinic.        Dose / Directions Last dose taken    apixaban ANTICOAGULANT 5 MG tablet   Commonly known as:  ELIQUIS   Dose:  5 mg   Quantity:  180 tablet        Take 1 tablet (5 mg) by mouth 2 times daily   Refills:  3        metoprolol succinate 100 MG 24 hr tablet   Commonly known as:   TOPROL-XL   Dose:  100 mg   Quantity:  90 tablet        Take 1 tablet (100 mg) by mouth daily   Refills:  3        ramipril 2.5 MG capsule   Commonly known as:  ALTACE   Dose:  2.5 mg   Quantity:  90 capsule        Take 1 capsule (2.5 mg) by mouth daily   Refills:  3        rosuvastatin 10 MG tablet   Commonly known as:  CRESTOR   Dose:  10 mg   Quantity:  90 tablet        Take 1 tablet (10 mg) by mouth daily   Refills:  3                Prescriptions were sent or printed at these locations (1 Prescription)                   Other Prescriptions                Printed at Department/Unit printer (1 of 1)         cephALEXin (KEFLEX) 500 MG capsule                Orders Needing Specimen Collection     None      Pending Results     No orders found from 7/5/2018 to 7/8/2018.            Pending Culture Results     No orders found from 7/5/2018 to 7/8/2018.            Pending Results Instructions     If you had any lab results that were not finalized at the time of your Discharge, you can call the ED Lab Result RN at [phone]. You will be contacted by this team for any positive Lab results or changes in treatment. The nurses are available 7 days a week from 10A to 6:30P.  You can leave a message 24 hours per day and they will return your call.        Test Results From Your Hospital Stay               Clinical Quality Measure: Blood Pressure Screening     Your blood pressure was checked while you were in the emergency department today. The last reading we obtained was  BP: 107/75 . Please read the guidelines below about what these numbers mean and what you should do about them.  If your systolic blood pressure (the top number) is less than 120 and your diastolic blood pressure (the bottom number) is less than 80, then your blood pressure is normal. There is nothing more that you need to do about it.  If your systolic blood pressure (the top number) is 120-139 or your diastolic blood pressure (the bottom number) is 80-89,  "your blood pressure may be higher than it should be. You should have your blood pressure rechecked within a year by a primary care provider.  If your systolic blood pressure (the top number) is 140 or greater or your diastolic blood pressure (the bottom number) is 90 or greater, you may have high blood pressure. High blood pressure is treatable, but if left untreated over time it can put you at risk for heart attack, stroke, or kidney failure. You should have your blood pressure rechecked by a primary care provider within the next 4 weeks.  If your provider in the emergency department today gave you specific instructions to follow-up with your doctor or provider even sooner than that, you should follow that instruction and not wait for up to 4 weeks for your follow-up visit.        Thank you for choosing [facility]       Thank you for choosing [facility] for your care. Our goal is always to provide you with excellent care. Hearing back from our patients is one way we can continue to improve our services. Please take a few minutes to complete the written survey that you may receive in the mail after you visit with us. Thank you!        Exo Information     Exo lets you send messages to your doctor, view your test results, renew your prescriptions, schedule appointments and more. To sign up, go to [email or web address] . Click on \"Log in\" on the left side of the screen, which will take you to the Welcome page. Then click on \"Sign up Now\" on the right side of the page.     You will be asked to enter the access code listed below, as well as some personal information. Please follow the directions to create your username and password.     Your access code is: [email or web address]  Expires: 10/5/2018  6:45 AM     Your access code will  in 90 days. If you need help or a new code, please call your [facility] clinic or [phone].        Care EveryWhere ID     This is your Care EveryWhere ID. This could be used by other " organizations to access your [place] medical records  [id number]        Equal Access to Services     [place] : Johnny Hernandez, braeden harrison, brenda laird. So [facility] [phone].    ATENCIÓN: Si habla español, tiene a arthur disposición servicios gratuitos de asistencia lingüística. Llame al [phone].    We comply with applicable federal civil rights laws and [place] laws. We do not discriminate on the basis of race, color, national origin, age, disability, sex, sexual orientation, or gender identity.            After Visit Summary       This is your record. Keep this with you and show to your community pharmacist(s) and doctor(s) at your next visit.

## 2018-07-07 NOTE — ED AVS SNAPSHOT
Emergency Department    [address] [place] [address]    Phone:  [phone]    Fax:  [phone]                                       Ti Nickerson   MRN: [id number]    Department:   Emergency Department   Date of Visit:  7/7/2018           After Visit Summary Signature Page     I have received my discharge instructions, and my questions have been answered. I have discussed any challenges I see with this plan with the nurse or doctor.    ..........................................................................................................................................  Patient/Patient Representative Signature      ..........................................................................................................................................  Patient Representative Print Name and Relationship to Patient    ..................................................               ................................................  Date                                            Time    ..........................................................................................................................................  Reviewed by Signature/Title    ...................................................              ..............................................  Date                                                            Time

## 2018-07-07 NOTE — ED PROVIDER NOTES
"  History     Chief Complaint:    Epistaxis     HPI   Ti Nickerson is a 72 year old male with a history of atrial fibrillation for which he takes Eliquis who presents to the ED via EMS for evaluation of epistaxis. The patient reports that his nose started bleeding about an hour prior to presenting to the ED. While on the way to the ED, EMS packed his nose with gauze, clamped his nose, and put an ice pack on his nose, as it was streaming with blood upon their arrival. He denies any trauma to his nose or headache. The patient has no other complaints at this time.    Allergies:  The patient has no known drug allergies.     Medications:    Eliquis  Toprol  Altace  Crestor     Past Medical History:    A-Fib  CAD  Esophageal reflux  Cardioversion  Idiopathic cardiomyopathy  Hyperlipidemia  Sleep apnea    Past Surgical History:    The patient does not have any pertinent past surgical history.    Family History:    Mother: A-fib  Father: A-fib    Social History:  Negative for tobacco use.  Positive for alcohol use.   Marital Status:   [2]       Review of Systems   HENT: Positive for nosebleeds.    Neurological: Negative for headaches.   All other systems reviewed and are negative.        Physical Exam   First Vitals:  BP: (!) 140/92  Pulse: 83  Temp: 97.5  F (36.4  C)  Resp: 18  Height: 190.5 cm (6' 3\")  Weight: 129.3 kg (285 lb)  SpO2: 98 %      Physical Exam  Nursing note and vitals reviewed.  Constitutional:  Oriented to person, place, and time. Cooperative.   HENT:   Nose:    Active bleeding from the anterior septum of the left nare.  Mouth/Throat:   Mucous membranes are normal.   Eyes:    Conjunctivae normal and EOM are normal.      Pupils are equal, round, and reactive to light.   Neck:    Trachea normal.   Cardiovascular:  Normal rate, regular rhythm, normal heart sounds and normal pulses. No murmur heard.  Pulmonary/Chest:  Effort normal and breath sounds normal.   Abdominal:   Soft. Normal appearance " and bowel sounds are normal.      There is no tenderness.      There is no rebound and no CVA tenderness.   Musculoskeletal:  Extremities atraumatic x 4.   Lymphadenopathy:  No cervical adenopathy.   Neurological:   Alert and oriented to person, place, and time. Normal strength.      No cranial nerve deficit or sensory deficit. GCS eye subscore is 4. GCS verbal subscore is 5. GCS motor subscore is 6.   Skin:    Skin is intact. No rash noted.   Psychiatric:   Normal mood and affect.      Emergency Department Course     Procedures:  PROCEDURE: Anterior Nasal Packing  PROCEDURE NOTE: The patient's left nare was prepped with Afrin and Lidocaine.  The patient's epistaxis could not be stopped with pressure, silver nitrate cauterization, or pledgets soaked in TXA. I subsequently packed his nose with a 5.5 cm Rapid Rhino. The patient tolerated the procedure well and there were no complications.  He was observed in the emergency department following the procedure and had no recurrence of his bleeding.  PATIENT STATUS:  Patient tolerated the procedure well. There were no complications.        Interventions:  0608 Cyklokapron 500 mg spray  Medications   tranexamic acid (CYKLOKAPRON) spray 500 mg ( Left nostril Given 7/7/18 0608)           Emergency Department Course:  Nursing notes and vitals reviewed. (0539) I performed an exam of the patient as documented above.     0546: Afrin and topical lidocaine administered.     Nasal packing procedure was performed per procedure not above.     Findings and plan explained to the Patient. Patient discharged home with instructions regarding supportive care, medications, and reasons to return. The importance of close follow-up was reviewed. The patient was prescribed Keflex    I personally reviewed the laboratory results with the Patient and answered all related questions prior to discharge.         Impression & Plan    Medical Decision Making:  This is a 72-year-old male on Eliquis who  came in for further evaluation of epistaxis.  I was able to visualize the source along the anterior septal region of the left nare.  Unfortunately, I was able to control the bleeding with silver nitrate cauterization or packing with TXA.  Therefore I subsequently placed a 5.5 cm Rapid Rhino, which subsequently controlled the bleeding.  He was provided Keflex here orally, and I will send him home with a prescription for Keflex as well.  He is to follow-up with the ENT physician I am providing to him.  He should return here with any concerns or worsening symptoms and specifically ongoing or uncontrolled bleeding.    Diagnosis:    ICD-10-CM    1. Epistaxis R04.0        Disposition:  Discharged to home    Discharge Medications:  New Prescriptions    CEPHALEXIN (KEFLEX) 500 MG CAPSULE    Take 1 capsule (500 mg) by mouth 3 times daily for 7 days     Scribe Disclosure:  I,  Davi Gavin, am serving as a scribe on 7/7/2018 at 5:39 AM to personally document services performed by Faustino Cuellar MD based on my observations and the provider's statements to me.          Davi Gavin  7/7/2018    EMERGENCY DEPARTMENT       Faustino Cuellar MD  07/07/18 0605

## 2018-07-09 ENCOUNTER — TELEPHONE (OUTPATIENT)
Dept: CARDIOLOGY | Facility: CLINIC | Age: 73
End: 2018-07-09

## 2018-07-09 NOTE — TELEPHONE ENCOUNTER
"Patient called to report he was in the ED this weekend (7/7/18 in [facility]) for a nose bleed.  The bleed is coming from the anterior septal region of the left nare. The physician was unable to stop the bleed with Silver Nitrate but was able to get the bleed to subside with a Rapid Rhino.  Patient was started on Keflex and referred to ENT. Patient is on eliquis for chronic afib.     Today the patient was seen by ENT.  ENT found that the anterior septum was still \"oozing\".  He recommended the patient hold the eliquis for for minimum of 5 days.  The patient has a planned trip to [place] leaving Sunday. He stated the ENT though if he resumed the eliqus by Friday 2nd dose he should be fine for his planned trip. The patient is unsure he should go to [place] until the bleed has completed and eliquis has been resumed for about a week or so.  Will speak with Dr. Quarles on his recommendations on the eliquis hold/restart.   "

## 2018-07-09 NOTE — TELEPHONE ENCOUNTER
Spoke to the patient. He is going to hold until 2nd dose on Friday.  He goes back to the ENT Friday to make sure the bleeding has stopped. He will call back at that time and we will discuss resuming eliquis or scheduling a watchman. Patient states his understanding.

## 2018-07-09 NOTE — TELEPHONE ENCOUNTER
Spoke to Dr. Quarles. He stated it is ok to hold the eliquis until Friday second dose. He stated just to watch for more bleeding and if it does return to schedule the patient for a watchman.      Called patient back and left a message to call back with the direct number.

## 2018-07-16 ENCOUNTER — TRANSFERRED RECORDS (OUTPATIENT)
Dept: HEALTH INFORMATION MANAGEMENT | Facility: CLINIC | Age: 73
End: 2018-07-16

## 2018-07-17 ENCOUNTER — TELEPHONE (OUTPATIENT)
Dept: CARDIOLOGY | Facility: CLINIC | Age: 73
End: 2018-07-17

## 2018-07-17 NOTE — TELEPHONE ENCOUNTER
ENT states OK to resume Eliquis.  His nosebleed is completely gone.  Will resume now.  Patient instructed to keep us posted regarding further epistaxis.  He maybe possible Watchman candidate per Dr. Quarles.

## 2018-09-24 ENCOUNTER — CARE COORDINATION (OUTPATIENT)
Dept: CARDIOLOGY | Facility: CLINIC | Age: 73
End: 2018-09-24

## 2018-09-24 DIAGNOSIS — E78.2 MIXED HYPERLIPIDEMIA: Primary | ICD-10-CM

## 2018-09-24 NOTE — PROGRESS NOTES
annual follow up - patient needs lab orders for his lipids. Orders placed in epic. Patient advised to call scheduling and set up labs prior to his annual visit. Patient agreeable to plan. Allison Yin

## 2018-11-28 ENCOUNTER — OFFICE VISIT (OUTPATIENT)
Dept: CARDIOLOGY | Facility: CLINIC | Age: 73
End: 2018-11-28
Attending: INTERNAL MEDICINE
Payer: COMMERCIAL

## 2018-11-28 VITALS
DIASTOLIC BLOOD PRESSURE: 88 MMHG | SYSTOLIC BLOOD PRESSURE: 127 MMHG | BODY MASS INDEX: 36 KG/M2 | HEART RATE: 76 BPM | WEIGHT: 295.6 LBS | HEIGHT: 76 IN

## 2018-11-28 DIAGNOSIS — I42.9 PRIMARY CARDIOMYOPATHY (H): ICD-10-CM

## 2018-11-28 DIAGNOSIS — I25.10 CORONARY ARTERY DISEASE INVOLVING NATIVE CORONARY ARTERY OF NATIVE HEART WITHOUT ANGINA PECTORIS: ICD-10-CM

## 2018-11-28 DIAGNOSIS — E78.5 HYPERLIPIDEMIA LDL GOAL <100: ICD-10-CM

## 2018-11-28 DIAGNOSIS — E78.2 MIXED HYPERLIPIDEMIA: ICD-10-CM

## 2018-11-28 DIAGNOSIS — I48.20 CHRONIC ATRIAL FIBRILLATION (H): ICD-10-CM

## 2018-11-28 LAB
ALT SERPL W P-5'-P-CCNC: <5 U/L (ref 5–30)
CHOLEST SERPL-MCNC: 131 MG/DL
HDLC SERPL-MCNC: 43 MG/DL
LDLC SERPL CALC-MCNC: 69 MG/DL
NONHDLC SERPL-MCNC: 88 MG/DL
TRIGL SERPL-MCNC: 97 MG/DL

## 2018-11-28 PROCEDURE — 80061 LIPID PANEL: CPT | Performed by: INTERNAL MEDICINE

## 2018-11-28 PROCEDURE — 99214 OFFICE O/P EST MOD 30 MIN: CPT | Performed by: INTERNAL MEDICINE

## 2018-11-28 PROCEDURE — 36415 COLL VENOUS BLD VENIPUNCTURE: CPT | Performed by: INTERNAL MEDICINE

## 2018-11-28 PROCEDURE — 84460 ALANINE AMINO (ALT) (SGPT): CPT | Performed by: INTERNAL MEDICINE

## 2018-11-28 NOTE — PROGRESS NOTES
HPI and Plan:   See dictation    Orders Placed This Encounter   Procedures     Lipid Profile     No orders of the defined types were placed in this encounter.    There are no discontinued medications.      Encounter Diagnoses   Name Primary?     Hyperlipidemia LDL goal <100      Primary cardiomyopathy (H)      Coronary artery disease involving native coronary artery of native heart without angina pectoris      Chronic atrial fibrillation (H)        CURRENT MEDICATIONS:  Current Outpatient Prescriptions   Medication Sig Dispense Refill     apixaban ANTICOAGULANT (ELIQUIS) 5 MG tablet Take 1 tablet (5 mg) by mouth 2 times daily 180 tablet 3     metoprolol (TOPROL-XL) 100 MG 24 hr tablet Take 1 tablet (100 mg) by mouth daily 90 tablet 3     ramipril (ALTACE) 2.5 MG capsule Take 1 capsule (2.5 mg) by mouth daily 90 capsule 3     rosuvastatin (CRESTOR) 10 MG tablet Take 1 tablet (10 mg) by mouth daily 90 tablet 3       ALLERGIES   No Known Allergies    PAST MEDICAL HISTORY:  Past Medical History:   Diagnosis Date     Atrial fibrillation, unspecified 9/18/2015     CAD (coronary artery disease) 9/18/2015     Esophageal reflux 9/18/2015     History of cardioversion     [id number]     Idiopathic cardiomyopathy (H) 9/18/2015     Mixed hyperlipidemia 9/18/2015     Sleep apnea 9/18/2015       PAST SURGICAL HISTORY:  No past surgical history on file.    FAMILY HISTORY:  Family History   Problem Relation Age of Onset     Arrhythmia Mother      a-fib     Arrhythmia Father      a-fib       SOCIAL HISTORY:  Social History     Social History     Marital status:      Spouse name: N/A     Number of children: N/A     Years of education: N/A     Social History Main Topics     Smoking status: Never Smoker     Smokeless tobacco: Never Used     Alcohol use 0.0 oz/week     0 Standard drinks or equivalent per week      Comment: 3 drinks week     Drug use: No     Sexual activity: Yes     Partners: Female     Other Topics Concern      "Caffeine Concern No     2 coffee in am, occas. soda     Sleep Concern No     Stress Concern No     Weight Concern Yes     Special Diet No     Exercise No     walking 3-4 days week     Seat Belt Yes     Social History Narrative       Review of Systems:  Skin:  Negative     Eyes:  Positive for glasses  ENT:  Negative nasal congestion  Respiratory:  Positive for dyspnea on exertion  Cardiovascular:  Negative;chest pain;palpitations;syncope or near-syncope;cyanosis;edema;exercise intolerance Positive for  Gastroenterology: Negative    Genitourinary:  Negative    Musculoskeletal:  Positive for joint stiffness  Neurologic:  Positive for numbness or tingling of hands;numbness or tingling of feet  Psychiatric:  Positive for depression  Heme/Lymph/Imm:  Positive for allergies  Endocrine:  Negative      Physical Exam:  Vitals: /88  Pulse 76  Ht 1.93 m (6' 4\")  Wt 134.1 kg (295 lb 9.6 oz)  BMI 35.98 kg/m2    Constitutional:  cooperative, alert and oriented, well developed, well nourished, in no acute distress morbidly obese      Skin:  warm and dry to the touch, no apparent skin lesions or masses noted          Head:  normocephalic, no masses or lesions        Eyes:           Lymph:      ENT:  no pallor or cyanosis, dentition good        Neck:           Respiratory:  normal breath sounds, clear to auscultation, normal A-P diameter, normal symmetry, normal respiratory excursion, no use of accessory muscles         Cardiac:   irregularly irregular rhythm   no presence of murmur          pulses full and equal, no bruits auscultated                                        GI:  abdomen soft, non-tender, BS normoactive, no mass, no HSM, no bruits        Extremities and Muscular Skeletal:  no deformities, clubbing, cyanosis, erythema observed              Neurological:           Psych:           Recent Lab Results:  LIPID RESULTS:  Lab Results   Component Value Date    CHOL 144 11/28/2017    HDL 44 11/28/2017    LDL 77 " 11/28/2017    TRIG 114 11/28/2017    CHOLHDLRATIO 2.8 09/17/2015       LIVER ENZYME RESULTS:  Lab Results   Component Value Date    AST 25 12/08/2017    ALT 31 12/08/2017       CBC RESULTS:  Lab Results   Component Value Date    WBC 7.5 12/08/2017    RBC 4.96 12/08/2017    HGB 16.2 12/08/2017    HCT 47.1 12/08/2017    MCV 95 12/08/2017    MCH 32.7 12/08/2017    MCHC 34.4 12/08/2017    RDW 13.0 12/08/2017     12/08/2017       BMP RESULTS:  Lab Results   Component Value Date     12/08/2017    POTASSIUM 4.3 12/08/2017    CHLORIDE 107 12/08/2017    CO2 22 12/08/2017    ANIONGAP 11 12/08/2017     (H) 12/08/2017    BUN 21 12/08/2017    CR 1.10 12/08/2017    GFRESTIMATED 66 12/08/2017    GFRESTBLACK 80 12/08/2017    DOMINICK 8.9 12/08/2017        A1C RESULTS:  Lab Results   Component Value Date    A1C 5.6 12/08/2017       INR RESULTS:  Lab Results   Component Value Date    INR 2.3 06/17/2013    INR 2.4 05/08/2013           CC  Devin Quarles MD  [address]  [place] [id number]

## 2018-11-28 NOTE — PROGRESS NOTES
Service Date: 2018      CARDIOLOGY CLINIC NOTE      HISTORY OF PRESENT ILLNESS:  Mr. Allen is a very pleasant 73-year-old gentleman who I have been following for over a decade with history of chronic atrial fibrillation, previous history of cardiomyopathy and minimal coronary artery disease, as performed by angiography in .  He has sleep apnea, hypertension, obesity and is here for an annual followup.  His echocardiogram from 1 year ago showed no evidence of LV dysfunction.  His EF was normal.  He continues to be on metoprolol, ramipril and rosuvastatin without any problems.  He did have some epistaxis earlier this year requiring a vein cauterization, coming temporarily off Eliquis.  He has had no recurrences.  He is asymptomatic.  He walks approximately 5 times a week and getting in roughly 9000-10,000 steps per day.  [place] frequent [place] and he just returned from [place] in October and plans on going back down there again in January.      ASSESSMENT AND PLAN:  I congratulated Mr. Allen on his weight loss, 15 pounds over the past year, and recommended he continue.  His blood pressure and heart rate are controlled.  We will check a fasting lipid profile from today.  We also have not assessed his coronary status in over 10 years and therefore given his risk factors, I think it reasonable to do that.  We will plan on doing that next year.  I will see him in 1 year's time.        ADDENDUM:  Mr. Allen would prefer to have followup changed to April, so will change it to 2020.      Devin Quarles MD, [facility]         DEVIN QUARLES MD [facility]             D: 2018   T: 2018   MT: BRENDA      Name:     JOHN ALLEN   MRN:      [id number]        Account:      [id number]   :      1945           Service Date: 2018      Document: [id number]

## 2018-11-28 NOTE — LETTER
11/28/2018      Quin Lopez MD  [address]  [facility] [address]      RE: Ti Allen       Dear Colleague,    I had the pleasure of seeing Ti Allen in the [facility].    Service Date: 11/28/2018      CARDIOLOGY CLINIC NOTE      HISTORY OF PRESENT ILLNESS:  Mr. Allen is a very pleasant 73-year-old gentleman who I have been following for over a decade with history of chronic atrial fibrillation, previous history of cardiomyopathy and minimal coronary artery disease, as performed by angiography in 2007.  He has sleep apnea, hypertension, obesity and is here for an annual followup.  His echocardiogram from 1 year ago showed no evidence of LV dysfunction.  His EF was normal.  He continues to be on metoprolol, ramipril and rosuvastatin without any problems.  He did have some epistaxis earlier this year requiring a vein cauterization, coming temporarily off Eliquis.  He has had no recurrences.  He is asymptomatic.  He walks approximately 5 times a week and getting in roughly 9000-10,000 steps per day.  They frequent [place] and he just returned from [place] in October and plans on going back down there again in January.      ASSESSMENT AND PLAN:  I congratulated Mr. Allen on his weight loss, 15 pounds over the past year, and recommended he continue.  His blood pressure and heart rate are controlled.  We will check a fasting lipid profile from today.  We also have not assessed his coronary status in over 10 years and therefore given his risk factors, I think it reasonable to do that.  We will plan on doing that next year.  I will see him in 1 year's time.        ADDENDUM:  Mr. Allen would prefer to have followup changed to April, so will change it to 04/2020.      Devin Witt MD, [facility]         DEVIN WITT MD [facility]             D: 11/28/2018   T: 11/28/2018   MT: BRENDA      Name:     TI ALLEN   MRN:      [id number]        Account:       [id number]   :      1945           Service Date: 2018      Document: [id number]         Outpatient Encounter Prescriptions as of 2018   Medication Sig Dispense Refill     [DISCONTINUED] apixaban ANTICOAGULANT (ELIQUIS) 5 MG tablet Take 1 tablet (5 mg) by mouth 2 times daily 180 tablet 3     [DISCONTINUED] metoprolol (TOPROL-XL) 100 MG 24 hr tablet Take 1 tablet (100 mg) by mouth daily 90 tablet 3     [DISCONTINUED] ramipril (ALTACE) 2.5 MG capsule Take 1 capsule (2.5 mg) by mouth daily 90 capsule 3     [DISCONTINUED] rosuvastatin (CRESTOR) 10 MG tablet Take 1 tablet (10 mg) by mouth daily 90 tablet 3     No facility-administered encounter medications on file as of 2018.        Again, thank you for allowing me to participate in the care of your patient.      Sincerely,    SUNNY WITT MD     [facility]

## 2018-11-28 NOTE — MR AVS SNAPSHOT
After Visit Summary   11/28/2018    Ti Nickerson    MRN: [id number]           Patient Information     Date Of Birth          1945        Visit Information        Provider Department      11/28/2018 11:15 AM Devin Quarles MD [facility]        Today's Diagnoses     Hyperlipidemia LDL goal <100        Primary cardiomyopathy (H)        Coronary artery disease involving native coronary artery of native heart without angina pectoris        Chronic atrial fibrillation (H)           Follow-ups after your visit        Additional Services     Follow-Up with Cardiologist                 Future tests that were ordered for you today     Open Future Orders        Priority Expected Expires Ordered    NM Lexiscan stress test (nuc card) Routine 12/5/2018 11/28/2019 11/28/2018    Follow-Up with Cardiologist Routine 11/28/2019 11/29/2019 11/28/2018    Lipid Profile Routine 11/28/2018 11/28/2019 11/28/2018            Who to contact     If you have questions or need follow up information about today's clinic visit or your schedule please contact [facility] directly at [phone].  Normal or non-critical lab and imaging results will be communicated to you by M[employer], letter or phone within 4 business days after the clinic has received the results. If you do not hear from us within 7 days, please contact the clinic through [email or web address] or phone. If you have a critical or abnormal lab result, we will notify you by phone as soon as possible.  Submit refill requests through [employer] or call your pharmacy and they will forward the refill request to us. Please allow 3 business days for your refill to be completed.          Additional Information About Your Visit        Magoosh[employer] Information     [employer] gives you secure access to your electronic health record. If you see a primary care provider, you can also send messages to your care team  "and make appointments. If you have questions, please call your primary care clinic.  If you do not have a primary care provider, please call [phone] and they will assist you.        Care EveryWhere ID     This is your Care EveryWhere ID. This could be used by other organizations to access your [facility] medical records  [id number]        Your Vitals Were     Pulse Height BMI (Body Mass Index)             76 1.93 m (6' 4\") 35.98 kg/m2          Blood Pressure from Last 3 Encounters:   11/28/18 127/88   07/07/18 107/75   12/08/17 113/82    Weight from Last 3 Encounters:   11/28/18 134.1 kg (295 lb 9.6 oz)   07/07/18 129.3 kg (285 lb)   12/08/17 (!) 140.6 kg (310 lb)              We Performed the Following     Follow-Up with Cardiologist        Primary Care Provider Office Phone # Fax #    Quin Luis Carlos Lopez [phone] [address] [address]        Equal Access to Services     [facility]: Hadii aad ku hadasho Soestefani, waaxda luqadaha, qaybta kaalmada adeluke, brenda sen . So [facility] [phone].    ATENCIÓN: Si habla español, tiene a arthur disposición servicios gratuitos de asistencia lingüística. Constantine al [phone].    We comply with applicable federal civil rights laws and [place] laws. We do not discriminate on the basis of race, color, national origin, age, disability, sex, sexual orientation, or gender identity.            Thank you!     Thank you for choosing [facility]  for your care. Our goal is always to provide you with excellent care. Hearing back from our patients is one way we can continue to improve our services. Please take a few minutes to complete the written survey that you may receive in the mail after your visit with us. Thank you!             Your Updated Medication List - Protect others around you: Learn how to safely use, store and throw away your medicines at " www.disposemymeds.org.          This list is accurate as of 11/28/18 11:33 AM.  Always use your most recent med list.                   Brand Name Dispense Instructions for use Diagnosis    apixaban ANTICOAGULANT 5 MG tablet    ELIQUIS    180 tablet    Take 1 tablet (5 mg) by mouth 2 times daily    Chronic atrial fibrillation (H)       metoprolol succinate 100 MG 24 hr tablet    TOPROL-XL    90 tablet    Take 1 tablet (100 mg) by mouth daily    Atrial fibrillation (H)       ramipril 2.5 MG capsule    ALTACE    90 capsule    Take 1 capsule (2.5 mg) by mouth daily    Coronary artery disease involving native coronary artery of native heart without angina pectoris       rosuvastatin 10 MG tablet    CRESTOR    90 tablet    Take 1 tablet (10 mg) by mouth daily    Hyperlipidemia LDL goal <100

## 2018-11-28 NOTE — LETTER
11/28/2018    Quin Lopez MD  [address] [facility] [address]  [facility] [address]    RE: Ti Nickerson       Dear Colleague,    I had the pleasure of seeing Ti Nickerson in the [facility].    HPI and Plan:   See dictation    Orders Placed This Encounter   Procedures     Lipid Profile     No orders of the defined types were placed in this encounter.    There are no discontinued medications.      Encounter Diagnoses   Name Primary?     Hyperlipidemia LDL goal <100      Primary cardiomyopathy (H)      Coronary artery disease involving native coronary artery of native heart without angina pectoris      Chronic atrial fibrillation (H)        CURRENT MEDICATIONS:  Current Outpatient Prescriptions   Medication Sig Dispense Refill     apixaban ANTICOAGULANT (ELIQUIS) 5 MG tablet Take 1 tablet (5 mg) by mouth 2 times daily 180 tablet 3     metoprolol (TOPROL-XL) 100 MG 24 hr tablet Take 1 tablet (100 mg) by mouth daily 90 tablet 3     ramipril (ALTACE) 2.5 MG capsule Take 1 capsule (2.5 mg) by mouth daily 90 capsule 3     rosuvastatin (CRESTOR) 10 MG tablet Take 1 tablet (10 mg) by mouth daily 90 tablet 3       ALLERGIES   No Known Allergies    PAST MEDICAL HISTORY:  Past Medical History:   Diagnosis Date     Atrial fibrillation, unspecified 9/18/2015     CAD (coronary artery disease) 9/18/2015     Esophageal reflux 9/18/2015     History of cardioversion     [id number]     Idiopathic cardiomyopathy (H) 9/18/2015     Mixed hyperlipidemia 9/18/2015     Sleep apnea 9/18/2015       PAST SURGICAL HISTORY:  No past surgical history on file.    FAMILY HISTORY:  Family History   Problem Relation Age of Onset     Arrhythmia Mother      a-fib     Arrhythmia Father      a-fib       SOCIAL HISTORY:  Social History     Social History     Marital status:      Spouse name: N/A     Number of children: N/A     Years of education: N/A     Social History Main Topics     Smoking status: Never Smoker      "Smokeless tobacco: Never Used     Alcohol use 0.0 oz/week     0 Standard drinks or equivalent per week      Comment: 3 drinks week     Drug use: No     Sexual activity: Yes     Partners: Female     Other Topics Concern     Caffeine Concern No     2 coffee in am, occas. soda     Sleep Concern No     Stress Concern No     Weight Concern Yes     Special Diet No     Exercise No     walking 3-4 days week     Seat Belt Yes     Social History Narrative       Review of Systems:  Skin:  Negative     Eyes:  Positive for glasses  ENT:  Negative nasal congestion  Respiratory:  Positive for dyspnea on exertion  Cardiovascular:  Negative;chest pain;palpitations;syncope or near-syncope;cyanosis;edema;exercise intolerance Positive for  Gastroenterology: Negative    Genitourinary:  Negative    Musculoskeletal:  Positive for joint stiffness  Neurologic:  Positive for numbness or tingling of hands;numbness or tingling of feet  Psychiatric:  Positive for depression  Heme/Lymph/Imm:  Positive for allergies  Endocrine:  Negative      Physical Exam:  Vitals: /88  Pulse 76  Ht 1.93 m (6' 4\")  Wt 134.1 kg (295 lb 9.6 oz)  BMI 35.98 kg/m2    Constitutional:  cooperative, alert and oriented, well developed, well nourished, in no acute distress morbidly obese      Skin:  warm and dry to the touch, no apparent skin lesions or masses noted          Head:  normocephalic, no masses or lesions        Eyes:           Lymph:      ENT:  no pallor or cyanosis, dentition good        Neck:           Respiratory:  normal breath sounds, clear to auscultation, normal A-P diameter, normal symmetry, normal respiratory excursion, no use of accessory muscles         Cardiac:   irregularly irregular rhythm   no presence of murmur          pulses full and equal, no bruits auscultated                                        GI:  abdomen soft, non-tender, BS normoactive, no mass, no HSM, no bruits        Extremities and Muscular Skeletal:  no deformities, " clubbing, cyanosis, erythema observed              Neurological:           Psych:           Recent Lab Results:  LIPID RESULTS:  Lab Results   Component Value Date    CHOL 144 11/28/2017    HDL 44 11/28/2017    LDL 77 11/28/2017    TRIG 114 11/28/2017    CHOLHDLRATIO 2.8 09/17/2015       LIVER ENZYME RESULTS:  Lab Results   Component Value Date    AST 25 12/08/2017    ALT 31 12/08/2017       CBC RESULTS:  Lab Results   Component Value Date    WBC 7.5 12/08/2017    RBC 4.96 12/08/2017    HGB 16.2 12/08/2017    HCT 47.1 12/08/2017    MCV 95 12/08/2017    MCH 32.7 12/08/2017    MCHC 34.4 12/08/2017    RDW 13.0 12/08/2017     12/08/2017       BMP RESULTS:  Lab Results   Component Value Date     12/08/2017    POTASSIUM 4.3 12/08/2017    CHLORIDE 107 12/08/2017    CO2 22 12/08/2017    ANIONGAP 11 12/08/2017     (H) 12/08/2017    BUN 21 12/08/2017    CR 1.10 12/08/2017    GFRESTIMATED 66 12/08/2017    GFRESTBLACK 80 12/08/2017    DOMINICK 8.9 12/08/2017        A1C RESULTS:  Lab Results   Component Value Date    A1C 5.6 12/08/2017       INR RESULTS:  Lab Results   Component Value Date    INR 2.3 06/17/2013    INR 2.4 05/08/2013           CC  Devin Witt MD  [address]  [place] [id number]                  Thank you for allowing me to participate in the care of your patient.      Sincerely,     DEVIN WITT MD     [facility]    cc:   Devin Witt MD  [address]  [place] [id number]

## 2018-11-30 DIAGNOSIS — I48.91 ATRIAL FIBRILLATION (H): ICD-10-CM

## 2018-11-30 DIAGNOSIS — E78.5 HYPERLIPIDEMIA LDL GOAL <100: ICD-10-CM

## 2018-11-30 DIAGNOSIS — I25.10 CORONARY ARTERY DISEASE INVOLVING NATIVE CORONARY ARTERY OF NATIVE HEART WITHOUT ANGINA PECTORIS: ICD-10-CM

## 2018-11-30 DIAGNOSIS — I48.20 CHRONIC ATRIAL FIBRILLATION (H): ICD-10-CM

## 2018-11-30 RX ORDER — ROSUVASTATIN CALCIUM 10 MG/1
10 TABLET, COATED ORAL DAILY
Qty: 90 TABLET | Refills: 5 | Status: SHIPPED | OUTPATIENT
Start: 2018-11-30 | End: 2019-12-30 | Stop reason: DRUGHIGH

## 2018-11-30 RX ORDER — RAMIPRIL 2.5 MG/1
2.5 CAPSULE ORAL DAILY
Qty: 90 CAPSULE | Refills: 5 | Status: SHIPPED | OUTPATIENT
Start: 2018-11-30 | End: 2019-10-23

## 2018-11-30 RX ORDER — METOPROLOL SUCCINATE 100 MG/1
100 TABLET, EXTENDED RELEASE ORAL DAILY
Qty: 90 TABLET | Refills: 5 | Status: SHIPPED | OUTPATIENT
Start: 2018-11-30 | End: 2019-04-15

## 2018-12-05 ENCOUNTER — CARE COORDINATION (OUTPATIENT)
Dept: CARDIOLOGY | Facility: CLINIC | Age: 73
End: 2018-12-05

## 2018-12-05 NOTE — PROGRESS NOTES
Called patient and left a VM informing him of normal lipid results.  Congratulated him with results and instructed him to continue his Crestor 10 mg daily.

## 2018-12-13 ENCOUNTER — HOSPITAL ENCOUNTER (OUTPATIENT)
Dept: CARDIOLOGY | Facility: CLINIC | Age: 73
Discharge: HOME | End: 2018-12-13
Attending: INTERNAL MEDICINE | Admitting: INTERNAL MEDICINE
Payer: MEDICARE

## 2018-12-13 DIAGNOSIS — E78.5 HYPERLIPIDEMIA LDL GOAL <100: ICD-10-CM

## 2018-12-13 DIAGNOSIS — I25.10 CORONARY ARTERY DISEASE INVOLVING NATIVE CORONARY ARTERY OF NATIVE HEART WITHOUT ANGINA PECTORIS: ICD-10-CM

## 2018-12-13 DIAGNOSIS — I42.9 PRIMARY CARDIOMYOPATHY (H): ICD-10-CM

## 2018-12-13 DIAGNOSIS — I48.20 CHRONIC ATRIAL FIBRILLATION (H): ICD-10-CM

## 2018-12-13 PROCEDURE — 93016 CV STRESS TEST SUPVJ ONLY: CPT | Performed by: INTERNAL MEDICINE

## 2018-12-13 PROCEDURE — 25000128 H RX IP 250 OP 636: Performed by: INTERNAL MEDICINE

## 2018-12-13 PROCEDURE — 78452 HT MUSCLE IMAGE SPECT MULT: CPT | Mod: 26 | Performed by: INTERNAL MEDICINE

## 2018-12-13 PROCEDURE — 93018 CV STRESS TEST I&R ONLY: CPT | Performed by: INTERNAL MEDICINE

## 2018-12-13 PROCEDURE — A9502 TC99M TETROFOSMIN: HCPCS | Performed by: INTERNAL MEDICINE

## 2018-12-13 PROCEDURE — 34300033 ZZH RX 343: Performed by: INTERNAL MEDICINE

## 2018-12-13 PROCEDURE — 93017 CV STRESS TEST TRACING ONLY: CPT

## 2018-12-13 RX ORDER — ACYCLOVIR 200 MG/1
0-1 CAPSULE ORAL
Status: DISCONTINUED | OUTPATIENT
Start: 2018-12-13 | End: 2018-12-14 | Stop reason: HOSPADM

## 2018-12-13 RX ORDER — AMINOPHYLLINE 25 MG/ML
50-100 INJECTION, SOLUTION INTRAVENOUS
Status: DISCONTINUED | OUTPATIENT
Start: 2018-12-13 | End: 2018-12-14 | Stop reason: HOSPADM

## 2018-12-13 RX ORDER — REGADENOSON 0.08 MG/ML
0.4 INJECTION, SOLUTION INTRAVENOUS ONCE
Status: COMPLETED | OUTPATIENT
Start: 2018-12-13 | End: 2018-12-13

## 2018-12-13 RX ORDER — ALBUTEROL SULFATE 90 UG/1
2 AEROSOL, METERED RESPIRATORY (INHALATION) EVERY 5 MIN PRN
Status: DISCONTINUED | OUTPATIENT
Start: 2018-12-13 | End: 2018-12-14 | Stop reason: HOSPADM

## 2018-12-13 RX ADMIN — REGADENOSON 0.4 MG: 0.08 INJECTION, SOLUTION INTRAVENOUS at 10:29

## 2018-12-13 RX ADMIN — TETROFOSMIN 29.1 MCI.: 1.38 INJECTION, POWDER, LYOPHILIZED, FOR SOLUTION INTRAVENOUS at 10:31

## 2018-12-13 RX ADMIN — TETROFOSMIN 9 MCI.: 1.38 INJECTION, POWDER, LYOPHILIZED, FOR SOLUTION INTRAVENOUS at 08:49

## 2018-12-18 ENCOUNTER — CARE COORDINATION (OUTPATIENT)
Dept: CARDIOLOGY | Facility: CLINIC | Age: 73
End: 2018-12-18

## 2018-12-18 NOTE — PROGRESS NOTES
Called patient with results of nuclear stress test results.  No ischemia or infarction.  EKG showing atrial fib.  On Eliquis.

## 2019-04-15 DIAGNOSIS — I48.91 ATRIAL FIBRILLATION (H): ICD-10-CM

## 2019-04-15 RX ORDER — METOPROLOL SUCCINATE 100 MG/1
100 TABLET, EXTENDED RELEASE ORAL DAILY
Qty: 90 TABLET | Refills: 3 | Status: SHIPPED | OUTPATIENT
Start: 2019-04-15 | End: 2019-10-23

## 2019-07-29 ENCOUNTER — OFFICE VISIT (OUTPATIENT)
Dept: FAMILY MEDICINE | Facility: CLINIC | Age: 74
End: 2019-07-29
Payer: MEDICARE

## 2019-07-29 VITALS
BODY MASS INDEX: 35.67 KG/M2 | WEIGHT: 293 LBS | SYSTOLIC BLOOD PRESSURE: 124 MMHG | TEMPERATURE: 98.2 F | OXYGEN SATURATION: 94 % | HEART RATE: 64 BPM | DIASTOLIC BLOOD PRESSURE: 82 MMHG

## 2019-07-29 DIAGNOSIS — E78.2 MIXED HYPERLIPIDEMIA: ICD-10-CM

## 2019-07-29 DIAGNOSIS — I71.20 THORACIC AORTIC ANEURYSM WITHOUT RUPTURE (H): ICD-10-CM

## 2019-07-29 DIAGNOSIS — I42.9 IDIOPATHIC CARDIOMYOPATHY (H): ICD-10-CM

## 2019-07-29 DIAGNOSIS — I25.10 CORONARY ARTERY DISEASE INVOLVING NATIVE CORONARY ARTERY OF NATIVE HEART WITHOUT ANGINA PECTORIS: ICD-10-CM

## 2019-07-29 DIAGNOSIS — I48.20 CHRONIC ATRIAL FIBRILLATION (H): ICD-10-CM

## 2019-07-29 DIAGNOSIS — N40.1 BENIGN PROSTATIC HYPERPLASIA WITH NOCTURIA: ICD-10-CM

## 2019-07-29 DIAGNOSIS — E66.01 MORBID OBESITY (H): ICD-10-CM

## 2019-07-29 DIAGNOSIS — I10 ESSENTIAL HYPERTENSION: ICD-10-CM

## 2019-07-29 DIAGNOSIS — Z23 NEED FOR VACCINATION: ICD-10-CM

## 2019-07-29 DIAGNOSIS — R79.89 ELEVATED SERUM CREATININE: ICD-10-CM

## 2019-07-29 DIAGNOSIS — Z00.00 ROUTINE GENERAL MEDICAL EXAMINATION AT A HEALTH CARE FACILITY: Primary | ICD-10-CM

## 2019-07-29 DIAGNOSIS — G47.33 OBSTRUCTIVE SLEEP APNEA SYNDROME: ICD-10-CM

## 2019-07-29 DIAGNOSIS — I83.93 VARICOSE VEINS OF BOTH LOWER EXTREMITIES, UNSPECIFIED WHETHER COMPLICATED: ICD-10-CM

## 2019-07-29 DIAGNOSIS — D12.6 TUBULAR ADENOMA OF COLON: ICD-10-CM

## 2019-07-29 DIAGNOSIS — R35.1 BENIGN PROSTATIC HYPERPLASIA WITH NOCTURIA: ICD-10-CM

## 2019-07-29 LAB
ALBUMIN SERPL-MCNC: 3.9 G/DL (ref 3.4–5)
ALP SERPL-CCNC: 65 U/L (ref 40–150)
ALT SERPL W P-5'-P-CCNC: 29 U/L (ref 0–70)
ANION GAP SERPL CALCULATED.3IONS-SCNC: 8 MMOL/L (ref 3–14)
AST SERPL W P-5'-P-CCNC: 24 U/L (ref 0–45)
BILIRUB SERPL-MCNC: 1.2 MG/DL (ref 0.2–1.3)
BUN SERPL-MCNC: 30 MG/DL (ref 7–30)
CALCIUM SERPL-MCNC: 8.9 MG/DL (ref 8.5–10.1)
CHLORIDE SERPL-SCNC: 109 MMOL/L (ref 94–109)
CHOLEST SERPL-MCNC: 97 MG/DL
CO2 SERPL-SCNC: 21 MMOL/L (ref 20–32)
CREAT SERPL-MCNC: 1.33 MG/DL (ref 0.66–1.25)
ERYTHROCYTE [DISTWIDTH] IN BLOOD BY AUTOMATED COUNT: 13.3 % (ref 10–15)
GFR SERPL CREATININE-BSD FRML MDRD: 53 ML/MIN/{1.73_M2}
GLUCOSE SERPL-MCNC: 109 MG/DL (ref 70–99)
HCT VFR BLD AUTO: 44.7 % (ref 40–53)
HDLC SERPL-MCNC: 41 MG/DL
HGB BLD-MCNC: 15 G/DL (ref 13.3–17.7)
LDLC SERPL CALC-MCNC: 42 MG/DL
MCH RBC QN AUTO: 31.8 PG (ref 26.5–33)
MCHC RBC AUTO-ENTMCNC: 33.6 G/DL (ref 31.5–36.5)
MCV RBC AUTO: 95 FL (ref 78–100)
NONHDLC SERPL-MCNC: 56 MG/DL
PLATELET # BLD AUTO: 188 10E9/L (ref 150–450)
POTASSIUM SERPL-SCNC: 4.2 MMOL/L (ref 3.4–5.3)
PROT SERPL-MCNC: 7.4 G/DL (ref 6.8–8.8)
RBC # BLD AUTO: 4.72 10E12/L (ref 4.4–5.9)
SODIUM SERPL-SCNC: 138 MMOL/L (ref 133–144)
T4 FREE SERPL-MCNC: 1.01 NG/DL (ref 0.76–1.46)
TRIGL SERPL-MCNC: 69 MG/DL
TSH SERPL DL<=0.005 MIU/L-ACNC: 4.94 MU/L (ref 0.4–4)
WBC # BLD AUTO: 6 10E9/L (ref 4–11)

## 2019-07-29 PROCEDURE — 85027 COMPLETE CBC AUTOMATED: CPT | Performed by: INTERNAL MEDICINE

## 2019-07-29 PROCEDURE — 90670 PCV13 VACCINE IM: CPT | Performed by: INTERNAL MEDICINE

## 2019-07-29 PROCEDURE — 36415 COLL VENOUS BLD VENIPUNCTURE: CPT | Performed by: INTERNAL MEDICINE

## 2019-07-29 PROCEDURE — 99213 OFFICE O/P EST LOW 20 MIN: CPT | Mod: 25 | Performed by: INTERNAL MEDICINE

## 2019-07-29 PROCEDURE — 80061 LIPID PANEL: CPT | Performed by: INTERNAL MEDICINE

## 2019-07-29 PROCEDURE — G0472 HEP C SCREEN HIGH RISK/OTHER: HCPCS | Performed by: INTERNAL MEDICINE

## 2019-07-29 PROCEDURE — 80053 COMPREHEN METABOLIC PANEL: CPT | Performed by: INTERNAL MEDICINE

## 2019-07-29 PROCEDURE — 84439 ASSAY OF FREE THYROXINE: CPT | Performed by: INTERNAL MEDICINE

## 2019-07-29 PROCEDURE — G0439 PPPS, SUBSEQ VISIT: HCPCS | Performed by: INTERNAL MEDICINE

## 2019-07-29 PROCEDURE — 84443 ASSAY THYROID STIM HORMONE: CPT | Performed by: INTERNAL MEDICINE

## 2019-07-29 PROCEDURE — G0009 ADMIN PNEUMOCOCCAL VACCINE: HCPCS | Performed by: INTERNAL MEDICINE

## 2019-07-29 RX ORDER — TAMSULOSIN HYDROCHLORIDE 0.4 MG/1
0.4 CAPSULE ORAL DAILY
Qty: 90 CAPSULE | Refills: 3 | Status: SHIPPED | OUTPATIENT
Start: 2019-07-29 | End: 2019-09-17 | Stop reason: SINTOL

## 2019-07-29 ASSESSMENT — ACTIVITIES OF DAILY LIVING (ADL): CURRENT_FUNCTION: NO ASSISTANCE NEEDED

## 2019-07-29 NOTE — PROGRESS NOTES
SUBJECTIVE:   Ti Nickerson is a 73 year old male who presents for Preventive Visit.    This is a new patient to me.  He presents for a physical.  Overall he is doing well but does have a few issues.    The patient has varicose veins which are not particularly bothersome but wonders if they need to be treated.    He has nocturia with a stream that can vary.  No burning or blood or discharge.  No issues during the day.    He has a bunion on his right foot that does not bother him.  He also gets stiff after sitting but he is fine after walking a few steps.    The patient is retired, has 3 grown kids and a grandkids.  He does exercise.  His weight is a significant issue.  He has sleep apnea and has not been using his CPAP.               Past Medical History:      Past Medical History:   Diagnosis Date     Atrial fibrillation, unspecified 9/18/2015     CAD (coronary artery disease) 09/18/2015    minimal by angio 2007, fu nuc est nl 2018     Esophageal reflux 9/18/2015     Essential hypertension      History of cardioversion     [id number]     Idiopathic cardiomyopathy (H) 09/18/2015    fu echo nl ef, nl nuclear est 11/18, Dr. Quarles     Mixed hyperlipidemia 9/18/2015     Sleep apnea 09/18/2015    does not use cpap as of 2019     Thoracic aortic aneurysm (H)     sinus of valsalva 4.5 and asc aorta 4.5 in 2017     Tubular adenoma of colon 01/2017    fu 3 years             Past Surgical History:      Past Surgical History:   Procedure Laterality Date     APPENDECTOMY  1964             Social History:     Social History     Socioeconomic History     Marital status:      Spouse name: Not on file     Number of children: 3     Years of education: Not on file     Highest education level: Not on file   Occupational History     Occupation: retired cpa   Social Needs     Financial resource strain: Not on file     Food insecurity:     Worry: Not on file     Inability: Not on file     Transportation needs:     Medical:  Not on file     Non-medical: Not on file   Tobacco Use     Smoking status: Never Smoker     Smokeless tobacco: Never Used   Substance and Sexual Activity     Alcohol use: Yes     Alcohol/week: 0.0 oz     Comment: 3 drinks week     Drug use: No     Sexual activity: Yes     Partners: Female   Lifestyle     Physical activity:     Days per week: Not on file     Minutes per session: Not on file     Stress: Not on file   Relationships     Social connections:     Talks on phone: Not on file     Gets together: Not on file     Attends Hinduism service: Not on file     Active member of club or organization: Not on file     Attends meetings of clubs or organizations: Not on file     Relationship status: Not on file     Intimate partner violence:     Fear of current or ex partner: Not on file     Emotionally abused: Not on file     Physically abused: Not on file     Forced sexual activity: Not on file   Other Topics Concern      Service Not Asked     Blood Transfusions Not Asked     Caffeine Concern No     Comment: 2 coffee in am, occas. soda     Occupational Exposure Not Asked     Hobby Hazards Not Asked     Sleep Concern No     Stress Concern No     Weight Concern Yes     Special Diet No     Back Care Not Asked     Exercise No     Comment: walking 3-4 days week     Bike Helmet Not Asked     Seat Belt Yes     Self-Exams Not Asked     Parent/sibling w/ CABG, MI or angioplasty before 65F 55M? Not Asked   Social History Narrative     Not on file             Family History:   reviewed         Allergies:   No Known Allergies          Medications:     Current Outpatient Medications   Medication Sig Dispense Refill     apixaban ANTICOAGULANT (ELIQUIS) 5 MG tablet Take 1 tablet (5 mg) by mouth 2 times daily 180 tablet 5     ASPIRIN NOT PRESCRIBED (INTENTIONAL) Please choose reason not prescribed, below       metoprolol succinate ER (TOPROL-XL) 100 MG 24 hr tablet Take 1 tablet (100 mg) by mouth daily 90 tablet 3     ramipril  (ALTACE) 2.5 MG capsule Take 1 capsule (2.5 mg) by mouth daily 90 capsule 5     rosuvastatin (CRESTOR) 10 MG tablet Take 1 tablet (10 mg) by mouth daily 90 tablet 5     tamsulosin (FLOMAX) 0.4 MG capsule Take 1 capsule (0.4 mg) by mouth daily 90 capsule 3               Review of Systems:   The 10 point Review of Systems is negative other than noted in the HPI           Physical Exam:   Blood pressure 124/82, pulse 64, temperature 98.2  F (36.8  C), temperature source Tympanic, weight 132.9 kg (293 lb), SpO2 94 %.    Exam:  Constitutional: healthy appearing, alert and in no distress  Heent: Normocephalic. Head without obvious masses or lesions. PERRLDC, EOMI. Mouth exam within normal limits: tongue, mucous membranes, posterior pharynx all normal, no lesions or abnormalities seen.  Tm's and canals within normal limits bilaterally. Neck supple, no nuchal rigidity or masses. No supraclavicular, or cervical adenopathy. Thyroid symmetric, no masses.  Cardiovascular: irregular rate and rhythm, no murmer, rub or gallops.  JVP not elevated, no edema.  Carotids within normal limits bilaterally, no bruits.  Respiratory: Normal respiratory effort.  Lungs clear, normal flow, no wheezing or crackles.  Breasts: Normal bilaterally.  No masses or lesions.  Nipples within normal limites.  No axillary lesions or nodes.  Gastrointestinal: Normal active bowel sounds.   Soft, not tender, no masses, guarding or rebound.  No hepatosplenomegaly.   Genitourinary: Rectal hard to feel, mod benign prostatic hypertrophy, smooth  Musculoskeletal: extremities normal, no gross deformities noted.  Skin: no suspicious lesions or rashes   Neurologic: Mental status within normal limits.  Speech fluent.  No gross motor abnormalities and gait intact.  Psychiatric: mentation appears normal and affect normal.  Has prominent vv of legs bilat, bunion right great toe         Data:   Labs sent        Assessment:   1. Normal complete physical exam  2. Benign  "prostatic hypertrophy with noct, will try flomax  3. Obesity,l discussed with patient weight loss  4. Bunion, no treatment  5. Vv, for now nothing, d/w patient risk and benefit of treatment  6. Cmyop, follow up echo normal ef  7. asc aortic aneurysm, monitor  8. Hypertension, controlled  9. Cad, minimal  10. Elevated cholesterol on statin  11. Chronic afib, on noac, rate fine  12. Randall, to follow up sleep clinic  13. Prior elevated tsh, follow up today  14. Health care maintenance  15. Colon polyp, follow up 2020         Plan:   prevnar  shingrix at pharm  Exercise, diet and weight loss  flomax 0.4mg, call if side effects  Sleep clinic  Weight loss  Follow up cards  Call if problems  Up to date colon      Tyrell Ybarra M.D.                Are you in the first 12 months of your Medicare coverage?  No    Healthy Habits:     In general, how would you rate your overall health?  Very good    Frequency of exercise:  4-5 days/week    Duration of exercise:  30-45 minutes    Do you usually eat at least 4 servings of fruit and vegetables a day, include whole grains    & fiber and avoid regularly eating high fat or \"junk\" foods?  Yes    Taking medications regularly:  Yes    Barriers to taking medications:  None    Medication side effects:  None    Ability to successfully perform activities of daily living:  No assistance needed    Home Safety:  No safety concerns identified    Hearing Impairment:  No hearing concerns    In the past 6 months, have you been bothered by leaking of urine?  No    In general, how would you rate your overall mental or emotional health?  Good      PHQ-2 Total Score: 0    Additional concerns today:  Yes    Do you feel safe in your environment? Yes    Do you have a Health Care Directive? No: Advance care planning reviewed with patient; information given to patient to review.      Fall risk  Fallen 2 or more times in the past year?: No  Any fall with injury in the past year?: No    Cognitive Screening "   1) Repeat 3 items (Leader, Season, Table)    2) Clock draw: NORMAL  3) 3 item recall: Recalls 3 objects  Results: 3 items recalled: COGNITIVE IMPAIRMENT LESS LIKELY    Mini-CogTM Copyright S Swathi. Licensed by the author for use in [facility]; reprinted with permission (jennifer@[facility]). All rights reserved.      Do you have sleep apnea, excessive snoring or daytime drowsiness?: yes , sleep apnea  Reviewed and updated as needed this visit by clinical staff         Reviewed and updated as needed this visit by Provider        Social History     Tobacco Use     Smoking status: Never Smoker     Smokeless tobacco: Never Used   Substance Use Topics     Alcohol use: Yes     Alcohol/week: 0.0 oz     Comment: 3 drinks week     If you drink alcohol do you typically have >3 drinks per day or >7 drinks per week? No    No flowsheet data found.            Current providers sharing in care for this patient include: cards  Patient Care Team:  Quin Lopez MD as PCP - General (Internal Medicine)  Quin Lopez MD as Assigned PCP    The following health maintenance items are reviewed in Epic and correct as of today:  Health Maintenance   Topic Date Due     HF ACTION PLAN  1945     HEPATITIS C SCREENING  1945     ADVANCE CARE PLANNING  1945     ZOSTER IMMUNIZATION (1 of 2) 11/11/1996     MEDICARE ANNUAL WELLNESS VISIT  11/11/2011     PNEUMOCOCCAL IMMUNIZATION 65+ LOW/MEDIUM RISK (1 of 2 - PCV13) 11/11/2011     BMP  06/08/2018     FALL RISK ASSESSMENT  12/08/2018     CBC  12/08/2018     PHQ-2  01/01/2019     INFLUENZA VACCINE (1) 09/01/2019     ALT  11/28/2019     LIPID  11/28/2019     DTAP/TDAP/TD IMMUNIZATION (2 - Td) 11/09/2022     COLONOSCOPY  01/03/2027     AORTIC ANEURYSM SCREENING (SYSTEM ASSIGNED)  Completed     IPV IMMUNIZATION  Aged Out     MENINGITIS IMMUNIZATION  Aged Out           Review of Systems      OBJECTIVE:   There were no vitals taken for this visit. Estimated body mass  "index is 35.98 kg/m  as calculated from the following:    Height as of 11/28/18: 1.93 m (6' 4\").    Weight as of 11/28/18: 134.1 kg (295 lb 9.6 oz).  Physical Exam          ASSESSMENT / PLAN:       End of Life Planning:  Patient currently has an advanced directive: none but working on it    COUNSELING:  Reviewed preventive health counseling, as reflected in patient instructions       Regular exercise       Healthy diet/nutrition    Estimated body mass index is 35.98 kg/m  as calculated from the following:    Height as of 11/28/18: 1.93 m (6' 4\").    Weight as of 11/28/18: 134.1 kg (295 lb 9.6 oz).    Weight management plan: Discussed healthy diet and exercise guidelines     reports that he has never smoked. He has never used smokeless tobacco.      Appropriate preventive services were discussed with this patient, including applicable screening as appropriate for cardiovascular disease, diabetes, osteopenia/osteoporosis, and glaucoma.  As appropriate for age/gender, discussed screening for colorectal cancer, prostate cancer, breast cancer, and cervical cancer. Checklist reviewing preventive services available has been given to the patient.    Reviewed patients plan of care and provided an AVS. The Basic Care Plan (routine screening as documented in Health Maintenance) for Ti meets the Care Plan requirement. This Care Plan has been established and reviewed with the Patient.    Counseling Resources:  ATP IV Guidelines  Pooled Cohorts Equation Calculator  Breast Cancer Risk Calculator  FRAX Risk Assessment  ICSI Preventive Guidelines  Dietary Guidelines for Americans, 2010  USDA's MyPlate  ASA Prophylaxis  Lung CA Screening    Tyrell Ybarra MD  [facility]    Identified Health Risks:  "

## 2019-07-29 NOTE — PATIENT INSTRUCTIONS
I would recommend getting the new shingles shot called shingrix, but I would do it at your pharmacy as they can check with the insurance company to see if it is paid for.    Tyrell Ybarra M.D.

## 2019-07-30 PROBLEM — R79.89 ELEVATED TSH: Status: ACTIVE | Noted: 2018-01-01

## 2019-07-30 LAB — HCV AB SERPL QL IA: NONREACTIVE

## 2019-07-30 NOTE — RESULT ENCOUNTER NOTE
Mr. Nickerson,    It was nice to meet you.  You should be able to review your test results which for the most part look very good.    Your CBC your complete blood count is normal with no signs of blood disorders.  Your sugar test for diabetes is normal.  Your blood salts, liver tests, proteins, and cholesterol are all super.    Your TSH or thyroid test is just slightly above normal.  The T4 free is normal so I do not believe there are any issues or concerns and we can check this next year.  There is nothing you need to do about this.    Your kidney test called the creatinine is just slightly above normal.  This test can fluctuate and I am not concerned.  However, to be prudent I would like to repeat it in 2 months.  We can check your urine at that time as well.  Please remember to call in 2 months or send a message to let us know you are coming in.  You do not need to fast or see me for this and I will send you the results.    I am happy to bring you this overall excellent report.  If you have any questions let me know.    Tyrell Ybarra M.D.

## 2019-07-31 ENCOUNTER — TRANSFERRED RECORDS (OUTPATIENT)
Dept: HEALTH INFORMATION MANAGEMENT | Facility: CLINIC | Age: 74
End: 2019-07-31

## 2019-08-05 ENCOUNTER — TRANSFERRED RECORDS (OUTPATIENT)
Dept: HEALTH INFORMATION MANAGEMENT | Facility: CLINIC | Age: 74
End: 2019-08-05

## 2019-08-21 ENCOUNTER — TRANSFERRED RECORDS (OUTPATIENT)
Dept: HEALTH INFORMATION MANAGEMENT | Facility: CLINIC | Age: 74
End: 2019-08-21

## 2019-09-17 ENCOUNTER — OFFICE VISIT (OUTPATIENT)
Dept: FAMILY MEDICINE | Facility: CLINIC | Age: 74
End: 2019-09-17
Payer: MEDICARE

## 2019-09-17 VITALS
HEIGHT: 76 IN | HEART RATE: 66 BPM | OXYGEN SATURATION: 96 % | SYSTOLIC BLOOD PRESSURE: 120 MMHG | TEMPERATURE: 98.2 F | DIASTOLIC BLOOD PRESSURE: 77 MMHG | WEIGHT: 295 LBS | BODY MASS INDEX: 35.92 KG/M2

## 2019-09-17 DIAGNOSIS — R31.0 GROSS HEMATURIA: Primary | ICD-10-CM

## 2019-09-17 LAB
ALBUMIN UR-MCNC: NEGATIVE MG/DL
APPEARANCE UR: CLEAR
BILIRUB UR QL STRIP: NEGATIVE
COLOR UR AUTO: YELLOW
ERYTHROCYTE [DISTWIDTH] IN BLOOD BY AUTOMATED COUNT: 13.1 % (ref 10–15)
GLUCOSE UR STRIP-MCNC: NEGATIVE MG/DL
HCT VFR BLD AUTO: 43.1 % (ref 40–53)
HGB BLD-MCNC: 14.5 G/DL (ref 13.3–17.7)
HGB UR QL STRIP: ABNORMAL
KETONES UR STRIP-MCNC: NEGATIVE MG/DL
LEUKOCYTE ESTERASE UR QL STRIP: NEGATIVE
MCH RBC QN AUTO: 32.4 PG (ref 26.5–33)
MCHC RBC AUTO-ENTMCNC: 33.6 G/DL (ref 31.5–36.5)
MCV RBC AUTO: 96 FL (ref 78–100)
NITRATE UR QL: NEGATIVE
NON-SQ EPI CELLS #/AREA URNS LPF: ABNORMAL /LPF
PH UR STRIP: 5 PH (ref 5–7)
PLATELET # BLD AUTO: 188 10E9/L (ref 150–450)
RBC # BLD AUTO: 4.47 10E12/L (ref 4.4–5.9)
RBC #/AREA URNS AUTO: ABNORMAL /HPF
SOURCE: ABNORMAL
SP GR UR STRIP: 1.02 (ref 1–1.03)
UROBILINOGEN UR STRIP-ACNC: 0.2 EU/DL (ref 0.2–1)
WBC # BLD AUTO: 7.3 10E9/L (ref 4–11)
WBC #/AREA URNS AUTO: ABNORMAL /HPF

## 2019-09-17 PROCEDURE — 81001 URINALYSIS AUTO W/SCOPE: CPT | Performed by: INTERNAL MEDICINE

## 2019-09-17 PROCEDURE — 85027 COMPLETE CBC AUTOMATED: CPT | Performed by: INTERNAL MEDICINE

## 2019-09-17 PROCEDURE — 36415 COLL VENOUS BLD VENIPUNCTURE: CPT | Performed by: INTERNAL MEDICINE

## 2019-09-17 PROCEDURE — 80048 BASIC METABOLIC PNL TOTAL CA: CPT | Performed by: INTERNAL MEDICINE

## 2019-09-17 PROCEDURE — 99214 OFFICE O/P EST MOD 30 MIN: CPT | Performed by: INTERNAL MEDICINE

## 2019-09-17 ASSESSMENT — MIFFLIN-ST. JEOR: SCORE: 2189.61

## 2019-09-17 NOTE — PATIENT INSTRUCTIONS
(R31.0) Gross hematuria  (primary encounter diagnosis)  Comment: urinalysis reviewed and confirms bleeding in the urine.  We will assess for possible causes including kidney stones, infection and malignancy.  I would recommend a CT abdomen and pelvis with contrast.  We'll check basic metabolic panel and complete blood counts and follow up consultation recommended in urology.  Continue Eliquis   Plan: UA reflex to Microscopic and Culture, Urine         Microscopic, Basic metabolic panel, CBC with         platelets, CT Abdomen Pelvis w Contrast,         UROLOGY ADULT REFERRAL

## 2019-09-17 NOTE — PROGRESS NOTES
"[facility]  CLINIC PROGRESS NOTE    Subjective:  Hematuria    Ti Nickerson first noticed blood in the urine last night at 5 pm and again in the evening.  He urinated about 3-4 times last night and ultimately the bleeding ceased.  No pain with urination.  No fevers, but one week ago he wasn't feeling well and had to quit golfing in the middle of the round.  No weight loss or weight gain.  No night sweats.  Takes Eliquis for stroke prevention.  He has taken motrin in the past, but not recently.     Past medical history, medications, allergies, social history, family history reviewed and updated in [facility] as of 9/17/2019 .    ROS  CONSTITUTIONAL: no fatigue, no unexpected change in weight  SKIN: no worrisome rashes, no worrisome moles, no worrisome lesions  EYES: no acute vision problems or changes  ENT: no ear problems, no mouth problems, no throat problems  RESP: no significant cough, occasional shortness of breath - using CPAP  CV: no chest pain, no palpitations, no new or worsening peripheral edema  GI: no nausea, no vomiting, no constipation, no diarrhea  : no frequency, no dysuria, - had been taking flomax but quit this one week ago as it wasn't helping much  MS: no claudication, no myalgias, no joint aches  PSYCHIATRIC: no changes in mood or affect      Objective:  Vitals  /77 (BP Location: Right arm, Cuff Size: Adult Large)   Pulse 66   Temp 98.2  F (36.8  C) (Oral)   Ht 1.93 m (6' 4\")   Wt 133.8 kg (295 lb)   SpO2 96%   BMI 35.91 kg/m    GEN: Alert Oriented x3 NAD  HEENT: Atraumatic, normocephalic, neck supple, no thyromegaly, negative cervical adenopathy  TM: TM bilaterally pearly and grey with normal light reflex  CV: RRR no murmurs or rubs  PULM: CTA no wheezes or crackles  ABD: Soft, nontender nondistended, no hepatosplenomegally  SKIN: No visible skin lesion or ulcerations  EXT:   Trace edema bilateral lower extremities  NEURO: Gait and station deferred, No focal neurologic " deficits  PSYCH: Mood good, affect mood congruent    No images are attached to the encounter.    No results found for this or any previous visit (from the past 24 hour(s)).    Assessment/Plan:  Patient Instructions   (R31.0) Gross hematuria  (primary encounter diagnosis)  Comment: urinalysis reviewed and confirms bleeding in the urine.  We will assess for possible causes including kidney stones, infection and malignancy.  I would recommend a CT abdomen and pelvis with contrast.  We'll check basic metabolic panel and complete blood counts and follow up consultation recommended in urology.  Continue Eliquis   Plan: UA reflex to Microscopic and Culture, Urine         Microscopic, Basic metabolic panel, CBC with         platelets, CT Abdomen Pelvis w Contrast,         UROLOGY ADULT REFERRAL               Follow up in urology    Disclaimer: This note consists of symbols derived from keyboarding, dictation and/or voice recognition software. As a result, there may be errors in the script that have gone undetected. Please consider this when interpreting information found in this chart.    Nico Retana MD  [phone]

## 2019-09-18 LAB
ANION GAP SERPL CALCULATED.3IONS-SCNC: 6 MMOL/L (ref 3–14)
BUN SERPL-MCNC: 27 MG/DL (ref 7–30)
CALCIUM SERPL-MCNC: 9.2 MG/DL (ref 8.5–10.1)
CHLORIDE SERPL-SCNC: 109 MMOL/L (ref 94–109)
CO2 SERPL-SCNC: 24 MMOL/L (ref 20–32)
CREAT SERPL-MCNC: 1.43 MG/DL (ref 0.66–1.25)
GFR SERPL CREATININE-BSD FRML MDRD: 48 ML/MIN/{1.73_M2}
GLUCOSE SERPL-MCNC: 88 MG/DL (ref 70–99)
POTASSIUM SERPL-SCNC: 4.6 MMOL/L (ref 3.4–5.3)
SODIUM SERPL-SCNC: 139 MMOL/L (ref 133–144)

## 2019-09-18 NOTE — RESULT ENCOUNTER NOTE
Abrahan Luke,    I have had the opportunity to review your recent results and an interpretation is as follows:  Your complete blood counts returned within normal limits      Sincerely,  Nico Retana MD

## 2019-09-21 NOTE — RESULT ENCOUNTER NOTE
Abrahan Luke,    I have had the opportunity to review your recent results and an interpretation is as follows:  Your follow up basic metabolic panel shows worsening renal function and I would recommend CT as we discussed and consider nephrology follow up in addition to urology  Follow-up pending CT scan     Sincerely,  Nico Retana MD

## 2019-09-24 ENCOUNTER — TELEPHONE (OUTPATIENT)
Dept: FAMILY MEDICINE | Facility: CLINIC | Age: 74
End: 2019-09-24

## 2019-09-24 ENCOUNTER — OFFICE VISIT (OUTPATIENT)
Dept: FAMILY MEDICINE | Facility: CLINIC | Age: 74
End: 2019-09-24
Payer: MEDICARE

## 2019-09-24 ENCOUNTER — HOSPITAL ENCOUNTER (OUTPATIENT)
Dept: CT IMAGING | Facility: CLINIC | Age: 74
Discharge: HOME | End: 2019-09-24
Attending: INTERNAL MEDICINE | Admitting: INTERNAL MEDICINE
Payer: MEDICARE

## 2019-09-24 VITALS
SYSTOLIC BLOOD PRESSURE: 126 MMHG | DIASTOLIC BLOOD PRESSURE: 84 MMHG | OXYGEN SATURATION: 94 % | HEART RATE: 78 BPM | HEIGHT: 76 IN | WEIGHT: 285 LBS | TEMPERATURE: 98.7 F | BODY MASS INDEX: 34.7 KG/M2

## 2019-09-24 DIAGNOSIS — N28.89 RENAL MASS: Primary | ICD-10-CM

## 2019-09-24 DIAGNOSIS — R31.9 HEMATURIA: Primary | ICD-10-CM

## 2019-09-24 DIAGNOSIS — R31.0 GROSS HEMATURIA: ICD-10-CM

## 2019-09-24 LAB — RADIOLOGIST FLAGS: NORMAL

## 2019-09-24 PROCEDURE — 25000128 H RX IP 250 OP 636: Performed by: INTERNAL MEDICINE

## 2019-09-24 PROCEDURE — 74178 CT ABD&PLV WO CNTR FLWD CNTR: CPT

## 2019-09-24 PROCEDURE — 99213 OFFICE O/P EST LOW 20 MIN: CPT | Performed by: INTERNAL MEDICINE

## 2019-09-24 PROCEDURE — 25000125 ZZHC RX 250: Performed by: INTERNAL MEDICINE

## 2019-09-24 RX ORDER — IOPAMIDOL 755 MG/ML
100 INJECTION, SOLUTION INTRAVASCULAR ONCE
Status: COMPLETED | OUTPATIENT
Start: 2019-09-24 | End: 2019-09-24

## 2019-09-24 RX ADMIN — IOPAMIDOL 100 ML: 755 INJECTION, SOLUTION INTRAVENOUS at 07:40

## 2019-09-24 RX ADMIN — SODIUM CHLORIDE 80 ML: 9 INJECTION, SOLUTION INTRAVENOUS at 07:40

## 2019-09-24 ASSESSMENT — MIFFLIN-ST. JEOR: SCORE: 2144.25

## 2019-09-24 NOTE — PATIENT INSTRUCTIONS
(N28.89) Renal mass  (primary encounter diagnosis)  Comment: We will plan for follow up in urology tomorrow.   Referral also placed  Plan: UROLOGY ADULT REFERRAL

## 2019-09-24 NOTE — PROGRESS NOTES
"[facility]  CLINIC PROGRESS NOTE    Subjective:  Renal mass    Ti Nickerson presented to the clinic today to review his recent MRI findings of renal mass.  He presented accompanied by his wife.  He had many questions which were answered to the best of my abilities.    Past medical history, medications, allergies, social history, family history reviewed and updated in [facility] as of 10/13/2019 .    ROS  CONSTITUTIONAL: no fatigue, no unexpected change in weight  SKIN: no worrisome rashes, no worrisome moles, no worrisome lesions  EYES: no acute vision problems or changes  ENT: no ear problems, no mouth problems, no throat problems  RESP: no significant cough, no shortness of breath  CV: no chest pain, no palpitations, no new or worsening peripheral edema  GI: no nausea, no vomiting, no constipation, no diarrhea  : urinary bleeding as above   MS: no claudication, no myalgias, no joint aches  PSYCHIATRIC: no changes in mood or affect      Objective:  Vitals  /84 (BP Location: Right arm, Patient Position: Sitting, Cuff Size: Adult Large)   Pulse 78   Temp 98.7  F (37.1  C) (Tympanic)   Ht 1.93 m (6' 4\")   Wt 129.3 kg (285 lb)   SpO2 94%   BMI 34.69 kg/m    GEN: Alert Oriented x3 NAD  PSYCH: Mood good, affect mood congruent    No images are attached to the encounter.    No results found for this or any previous visit (from the past 24 hour(s)).    Assessment/Plan:  Patient Instructions   (N28.89) Renal mass  (primary encounter diagnosis)  Comment: We will plan for follow up in urology tomorrow.   Referral also placed  Plan: UROLOGY ADULT REFERRAL               Follow up in urology    15 minutes spent with patient.  Over 50% of time counseling      Disclaimer: This note consists of symbols derived from keyboarding, dictation and/or voice recognition software. As a result, there may be errors in the script that have gone undetected. Please consider this when interpreting information found in this " chart.    Nico Retana MD  [phone]

## 2019-09-24 NOTE — TELEPHONE ENCOUNTER
Can we call Ti Nickerson and let him know that I would like to review his results with him today - if able to come into the clinic we can review the scan in person, but if not also OK as he will be seeing urology tomorrow.  I tried calling, but no answer    Nico Retana MD, MD

## 2019-09-25 ENCOUNTER — OFFICE VISIT (OUTPATIENT)
Dept: UROLOGY | Facility: CLINIC | Age: 74
End: 2019-09-25
Attending: INTERNAL MEDICINE
Payer: MEDICARE

## 2019-09-25 VITALS
SYSTOLIC BLOOD PRESSURE: 130 MMHG | HEART RATE: 78 BPM | HEIGHT: 76 IN | WEIGHT: 295 LBS | DIASTOLIC BLOOD PRESSURE: 80 MMHG | BODY MASS INDEX: 35.92 KG/M2

## 2019-09-25 DIAGNOSIS — I82.220 NEOPLASM OF RIGHT KIDNEY WITH THROMBUS OF INFERIOR VENA CAVA (H): ICD-10-CM

## 2019-09-25 DIAGNOSIS — E66.01 MORBID OBESITY (H): ICD-10-CM

## 2019-09-25 DIAGNOSIS — R31.0 GROSS HEMATURIA: ICD-10-CM

## 2019-09-25 DIAGNOSIS — C64.1 MALIGNANT NEOPLASM OF KIDNEY EXCLUDING RENAL PELVIS, RIGHT (H): Primary | ICD-10-CM

## 2019-09-25 DIAGNOSIS — D49.511 NEOPLASM OF RIGHT KIDNEY WITH THROMBUS OF INFERIOR VENA CAVA (H): ICD-10-CM

## 2019-09-25 DIAGNOSIS — N28.89 RIGHT RENAL MASS: Primary | ICD-10-CM

## 2019-09-25 LAB
ALBUMIN UR-MCNC: NEGATIVE MG/DL
APPEARANCE UR: CLEAR
BILIRUB UR QL STRIP: NEGATIVE
COLOR UR AUTO: YELLOW
GLUCOSE UR STRIP-MCNC: NEGATIVE MG/DL
HGB UR QL STRIP: ABNORMAL
KETONES UR STRIP-MCNC: NEGATIVE MG/DL
LEUKOCYTE ESTERASE UR QL STRIP: NEGATIVE
NITRATE UR QL: NEGATIVE
PH UR STRIP: 5 PH (ref 5–7)
SOURCE: ABNORMAL
SP GR UR STRIP: 1.02 (ref 1–1.03)
UROBILINOGEN UR STRIP-ACNC: 0.2 EU/DL (ref 0.2–1)

## 2019-09-25 PROCEDURE — 99205 OFFICE O/P NEW HI 60 MIN: CPT | Performed by: UROLOGY

## 2019-09-25 PROCEDURE — 81003 URINALYSIS AUTO W/O SCOPE: CPT | Performed by: UROLOGY

## 2019-09-25 RX ORDER — DIAZEPAM 10 MG
10 TABLET ORAL EVERY 6 HOURS PRN
Qty: 1 TABLET | Refills: 0 | Status: SHIPPED | OUTPATIENT
Start: 2019-09-25 | End: 2019-10-29

## 2019-09-25 ASSESSMENT — MIFFLIN-ST. JEOR: SCORE: 2189.61

## 2019-09-25 NOTE — RESULT ENCOUNTER NOTE
Hi Edgar,    Here is the result from the CT scan we discussed today.  Please call with any questions     Sincerely,  Nico Retana MD

## 2019-09-25 NOTE — LETTER
9/25/2019       RE: Ti Nickerson  [address]  [place] [address]     Dear Colleague,    Thank you for referring your patient, Ti Nickerson, to the [facility] [place] at [facility]. Please see a copy of my visit note below.    History: It is a great pleasure to see this very pleasant 73-year-old gentleman in initial consultation today at the request of Dr. Nico Retana  He is presented with gross painless hematuria which has occurred over the last 2 to 3 weeks.  There has been no pain.  He is on anticoagulants with Eliquis as a result of atrial fibrillation, does have a history of idiopathic cardiomyopathy and some dilatation also of the thoracic aorta.  He did have a cardioversion in 2007.  He is considerably obese with a weight of 295pounds.  CT scan has been performed with and without contrast  CT ABDOMEN AND PELVIS UROGRAM   9/24/2019 8:01 AM      HISTORY: Gross hematuria.     TECHNIQUE: CT of the abdomen and pelvis was performed without and  following the administration of 100 mL Isovue-370. 3-D reconstructions  of the images were performed. Radiation dose for this scan was reduced  using automated exposure control, adjustment of the mA and/or kV  according to patient size, or iterative reconstruction technique.     COMPARISON: None.     FINDINGS: There is a multilobulated enhancing mass arising from the  upper and midpole of the right kidney. Maximum in craniocaudad  dimensions of this mass are approximately 10.4 x 8.2 cm. The mass  appears to obliterate the intrarenal collecting system of the upper  pole of the right kidney. There is urothelial thickening of the right  renal pelvis and proximal right ureter. There appear to be 2 right  renal veins. The upper right renal vein is distended and appears to  have some hypodensity within it. This is suspicious for tumor  thrombus. There are multiple prominent veins about  the right kidney  coursing in the retroperitoneal fat.     There is a 4.9 cm cyst at the upper pole of the left kidney. There is  no left hydronephrosis. On the postcontrast enhanced images, the intra  and extrarenal collecting systems on the left side appear  unremarkable.     The prostate is enlarged and exudes mass effect at the base of the  bladder. The bladder otherwise appears grossly unremarkable.     There is no pathologic abdominal or pelvic lymph adenopathy based on  size criteria.     The remaining solid organs in the abdomen appear grossly unremarkable.     There are scattered colonic diverticuli. These are most concentrated  in the sigmoid colon. The bowel otherwise appears grossly  unremarkable.                                                                      IMPRESSION: Right renal mass as described above. This would be  worrisome for renal cell carcinoma. 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 Recommend urology consultation.     [Consider Follow Up: Right renal mass]     This report will be copied to the [facility] to ensure a  provider acknowledges the finding.      LAURA ROUSE MD    Past Medical History:   Diagnosis Date     Atrial fibrillation, unspecified 9/18/2015     CAD (coronary artery disease) 09/18/2015    minimal by angio 2007, fu nuc est nl 2018     Elevated TSH 2018     Esophageal reflux 9/18/2015     Essential hypertension      History of cardioversion     [id number]     Idiopathic cardiomyopathy (H) 09/18/2015    fu echo nl ef, nl nuclear est 11/18, Dr. Quarles     Mixed hyperlipidemia 9/18/2015     Mumps      Sleep apnea 09/18/2015    does not use cpap as of 2019     Thoracic aortic aneurysm (H)     sinus of valsalva 4.5 and asc aorta 4.5 in 2017     Tubular adenoma of colon 01/2017    fu 3 years       Past Surgical History:   Procedure Laterality Date     APPENDECTOMY  1964       Family History   Problem Relation Age of Onset     Arrhythmia Mother          a-fib     Cerebrovascular Disease Mother      Arrhythmia Father         a-fib     Colon Cancer Father      Diabetes Father      Cerebrovascular Disease Father      No Known Problems Brother        Social History     Socioeconomic History     Marital status:      Spouse name: Not on file     Number of children: 3     Years of education: Not on file     Highest education level: Not on file   Occupational History     Occupation: retired cpa   Social Needs     Financial resource strain: Not on file     Food insecurity:     Worry: Not on file     Inability: Not on file     Transportation needs:     Medical: Not on file     Non-medical: Not on file   Tobacco Use     Smoking status: Never Smoker     Smokeless tobacco: Never Used   Substance and Sexual Activity     Alcohol use: Yes     Alcohol/week: 0.0 standard drinks     Comment: 3 drinks week     Drug use: No     Sexual activity: Yes     Partners: Female   Lifestyle     Physical activity:     Days per week: Not on file     Minutes per session: Not on file     Stress: Not on file   Relationships     Social connections:     Talks on phone: Not on file     Gets together: Not on file     Attends Denominational service: Not on file     Active member of club or organization: Not on file     Attends meetings of clubs or organizations: Not on file     Relationship status: Not on file     Intimate partner violence:     Fear of current or ex partner: Not on file     Emotionally abused: Not on file     Physically abused: Not on file     Forced sexual activity: Not on file   Other Topics Concern      Service Not Asked     Blood Transfusions Not Asked     Caffeine Concern No     Comment: 2 coffee in am, occas. soda     Occupational Exposure Not Asked     Hobby Hazards Not Asked     Sleep Concern No     Stress Concern No     Weight Concern Yes     Special Diet No     Back Care Not Asked     Exercise No     Comment: walking 3-4 days week     Bike Helmet Not Asked      Seat Belt Yes     Self-Exams Not Asked     Parent/sibling w/ CABG, MI or angioplasty before 65F 55M? Not Asked   Social History Narrative     Not on file       Current Outpatient Medications   Medication Sig Dispense Refill     apixaban ANTICOAGULANT (ELIQUIS) 5 MG tablet Take 1 tablet (5 mg) by mouth 2 times daily 180 tablet 5     ASPIRIN NOT PRESCRIBED (INTENTIONAL) Please choose reason not prescribed, below       diazepam (VALIUM) 10 MG tablet Take 1 tablet (10 mg) by mouth every 6 hours as needed 1 tablet 0     metoprolol succinate ER (TOPROL-XL) 100 MG 24 hr tablet Take 1 tablet (100 mg) by mouth daily 90 tablet 3     ramipril (ALTACE) 2.5 MG capsule Take 1 capsule (2.5 mg) by mouth daily 90 capsule 5     rosuvastatin (CRESTOR) 10 MG tablet Take 1 tablet (10 mg) by mouth daily 90 tablet 5       Review Of Systems:  Skin: negative  Eyes: negative  Ears/Nose/Throat: negative  Respiratory: No shortness of breath, dyspnea on exertion, cough, or hemoptysis  Cardiovascular: Idiopathic cardiomyopathy.  Atrial fibrillation.  Dilatation of thoracic aorta.  Gastrointestinal: colon polyps.  Obesity  Genitourinary: hematuria  Musculoskeletal: negative  Neurologic: negative  Psychiatric: negative  Hematologic/Lymphatic/Immunologic: negative  Endocrine: negative    Results for SELENENEBILLIE JC (MRN [id number]) as of 9/25/2019 14:53   Ref. Range 12/8/2017 10:16 7/29/2019 08:28 9/17/2019 15:10   Creatinine Latest Ref Range: 0.66 - 1.25 mg/dL 1.10 1.33 (H) 1.43 (H)     Results for SELENENEBILLIE JC (MRN [id number]) as of 9/25/2019 14:53   Ref. Range 9/17/2019 15:10   WBC Latest Ref Range: 4.0 - 11.0 10e9/L 7.3   Hemoglobin Latest Ref Range: 13.3 - 17.7 g/dL 14.5   Hematocrit Latest Ref Range: 40.0 - 53.0 % 43.1   Platelet Count Latest Ref Range: 150 - 450 10e9/L 188   RBC Count Latest Ref Range: 4.4 - 5.9 10e12/L 4.47   MCV Latest Ref Range: 78 - 100 fl 96   MCH Latest Ref Range: 26.5 - 33.0 pg 32.4   MCHC Latest Ref Range: 31.5  "- 36.5 g/dL 33.6   RDW Latest Ref Range: 10.0 - 15.0 % 13.1   1990  Exam:  /80   Pulse 78   Ht 1.93 m (6' 4\")   Wt 133.8 kg (295 lb)   BMI 35.91 kg/m       General Impression: Very pleasant gentleman in no acute distress, well-oriented in time place and person.    Mental status.  Anxious    HEENT: There is no clinical evidence of jaundice on examination of conjunctiva.  Extraocular eye movements are unremarkable.  Mucous membranes are also unremarkable    Skin: Skin is otherwise normal to examination    Lymph Nodes: There is no cervical inguinal lymphadenopathy    Respiratory System: The respiratory cycle is normal and normal to both percussion and auscultation    Cardiovascular: There is no murmurs heard, however the pulse is irregular, peripheral pulses are palpable with there is mild peripheral pitting edema    Abdominal: The abdomen is considerably obese, there are no palpable masses, there are no areas of tenderness or guarding or rebound, there is no evidence of inguinal hernia    Extremities: Extremities are otherwise unremarkable    Back and Flank: There is no significant flank tenderness there is no evidence of kyphosis, scoliosis or lordosis and there is no tenderness in either renal angle    Genital: Both testes have descended into the scrotum.  The scrotum is otherwise unremarkable.  Penis is uncircumcised with normal size meatus    Rectal: Not performed    Neurologic: There are no focal abnormal clinical neurological signs and central, peripheral nervous systems    Impression: I went over the lab studies and radiological studies very carefully discussed in detail with the patient and his wife today.  There is a very large mass on the upper pole of the right kidney which is consistent with a renal cell cancer but it is extending into the inferior vena cava.  The precise extent at the moment is not completely clear about whether this is extending just below the diaphragm or above the " diaphragm.  He will need an MRI of the inferior vena cava in order to determine the exact extent of the disease in the inferior vena cava.  I do not see evidence however so far of any other metastatic disease i.e. in lymph nodes or other viscera.    However we will need to do a CT scan of the chest to determine if there is any evidence of metastatic disease in the lungs.  I then had an extended discussion with the patient and his wife about therapeutic management.  He is going to need a major open surgery after careful medical evaluation and after discontinuing oral anticoagulants for at least 5 days prior to surgery.  This will involve a major abdominal incision after minimum for removal of the kidney and possibly extended incision into the chest if the tumor in the inferior vena cava is extended above the diaphragm.  If the tumor is below the diaphragm only then this can likely be managed through an abdominal incision of the with the likely assistance of vascular surgeons.  If the tumor is extending above the diaphragm and possibly even into the heart, then the assistance of a cardiovascular surgeon would be needed with the patient on bypass and possibly also with the patient being cooled.  I did talk to him about these types of operations in detail and clearly this is a very major operation with significant potential complications that if there is no evidence of disease beyond the kidney and the inferior vena cava is a very good chance of this be completely curative.  What ever the situation very close follow-up will be required and we did have a discussion to about treatment of metastatic renal cell cancer some of the new modalities for treatment which have been encouraging including targeted therapy but this clearly would be in the hands of a medical oncologist should this situation arise.    First episode to do a CT scan of the chest and an MRI scan of the inferior vena cava, and I have had discussions with my  "colleague Dr. Russ about the entire situation    I did have a extensive and entire discussion with the patient and his wife in detail about all these issues.  I answered many questions    Plan: CT scan with and without of the chest.  MRI scan of the inferior vena cava.  Ongoing discussion and visit with Dr. Mariano Russ in the very near future to plan management depending on the results of the above tests.    Time: 60 minutes.  Greater than 50% in discussion and consultation  This is an exceedingly complicated issue as outlined above  \"This dictation was performed with voice recognition software and may contain errors,  omissions and inadvertent word substitution.\"      Again, thank you for allowing me to participate in the care of your patient.      Sincerely,    Jamal Gary MD      "

## 2019-09-25 NOTE — PROGRESS NOTES
History: It is a great pleasure to see this very pleasant 73-year-old gentleman in initial consultation today at the request of Dr. Nico Retana  He is presented with gross painless hematuria which has occurred over the last 2 to 3 weeks.  There has been no pain.  He is on anticoagulants with Eliquis as a result of atrial fibrillation, does have a history of idiopathic cardiomyopathy and some dilatation also of the thoracic aorta.  He did have a cardioversion in 2007.  He is considerably obese with a weight of 295pounds.  CT scan has been performed with and without contrast  CT ABDOMEN AND PELVIS UROGRAM   9/24/2019 8:01 AM      HISTORY: Gross hematuria.     TECHNIQUE: CT of the abdomen and pelvis was performed without and  following the administration of 100 mL Isovue-370. 3-D reconstructions  of the images were performed. Radiation dose for this scan was reduced  using automated exposure control, adjustment of the mA and/or kV  according to patient size, or iterative reconstruction technique.     COMPARISON: None.     FINDINGS: There is a multilobulated enhancing mass arising from the  upper and midpole of the right kidney. Maximum in craniocaudad  dimensions of this mass are approximately 10.4 x 8.2 cm. The mass  appears to obliterate the intrarenal collecting system of the upper  pole of the right kidney. There is urothelial thickening of the right  renal pelvis and proximal right ureter. There appear to be 2 right  renal veins. The upper right renal vein is distended and appears to  have some hypodensity within it. This is suspicious for tumor  thrombus. There are multiple prominent veins about the right kidney  coursing in the retroperitoneal fat.     There is a 4.9 cm cyst at the upper pole of the left kidney. There is  no left hydronephrosis. On the postcontrast enhanced images, the intra  and extrarenal collecting systems on the left side appear  unremarkable.     The prostate is enlarged and exudes mass  effect at the base of the  bladder. The bladder otherwise appears grossly unremarkable.     There is no pathologic abdominal or pelvic lymph adenopathy based on  size criteria.     The remaining solid organs in the abdomen appear grossly unremarkable.     There are scattered colonic diverticuli. These are most concentrated  in the sigmoid colon. The bowel otherwise appears grossly  unremarkable.                                                                      IMPRESSION: Right renal mass as described above. This would be  worrisome for renal cell carcinoma. 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 Recommend urology consultation.     [Consider Follow Up: Right renal mass]     This report will be copied to the [facility] to ensure a  provider acknowledges the finding.      LAURA ROUSE MD    Past Medical History:   Diagnosis Date     Atrial fibrillation, unspecified 9/18/2015     CAD (coronary artery disease) 09/18/2015    minimal by angio 2007, fu nuc est nl 2018     Elevated TSH 2018     Esophageal reflux 9/18/2015     Essential hypertension      History of cardioversion     [id number]     Idiopathic cardiomyopathy (H) 09/18/2015    fu echo nl ef, nl nuclear est 11/18, Dr. Quarles     Mixed hyperlipidemia 9/18/2015     Mumps      Sleep apnea 09/18/2015    does not use cpap as of 2019     Thoracic aortic aneurysm (H)     sinus of valsalva 4.5 and asc aorta 4.5 in 2017     Tubular adenoma of colon 01/2017    fu 3 years       Past Surgical History:   Procedure Laterality Date     APPENDECTOMY  1964       Family History   Problem Relation Age of Onset     Arrhythmia Mother         a-fib     Cerebrovascular Disease Mother      Arrhythmia Father         a-fib     Colon Cancer Father      Diabetes Father      Cerebrovascular Disease Father      No Known Problems Brother        Social History     Socioeconomic History     Marital status:      Spouse name: Not on file     Number of  children: 3     Years of education: Not on file     Highest education level: Not on file   Occupational History     Occupation: retired cpa   Social Needs     Financial resource strain: Not on file     Food insecurity:     Worry: Not on file     Inability: Not on file     Transportation needs:     Medical: Not on file     Non-medical: Not on file   Tobacco Use     Smoking status: Never Smoker     Smokeless tobacco: Never Used   Substance and Sexual Activity     Alcohol use: Yes     Alcohol/week: 0.0 standard drinks     Comment: 3 drinks week     Drug use: No     Sexual activity: Yes     Partners: Female   Lifestyle     Physical activity:     Days per week: Not on file     Minutes per session: Not on file     Stress: Not on file   Relationships     Social connections:     Talks on phone: Not on file     Gets together: Not on file     Attends Taoism service: Not on file     Active member of club or organization: Not on file     Attends meetings of clubs or organizations: Not on file     Relationship status: Not on file     Intimate partner violence:     Fear of current or ex partner: Not on file     Emotionally abused: Not on file     Physically abused: Not on file     Forced sexual activity: Not on file   Other Topics Concern      Service Not Asked     Blood Transfusions Not Asked     Caffeine Concern No     Comment: 2 coffee in am, occas. soda     Occupational Exposure Not Asked     Hobby Hazards Not Asked     Sleep Concern No     Stress Concern No     Weight Concern Yes     Special Diet No     Back Care Not Asked     Exercise No     Comment: walking 3-4 days week     Bike Helmet Not Asked     Seat Belt Yes     Self-Exams Not Asked     Parent/sibling w/ CABG, MI or angioplasty before 65F 55M? Not Asked   Social History Narrative     Not on file       Current Outpatient Medications   Medication Sig Dispense Refill     apixaban ANTICOAGULANT (ELIQUIS) 5 MG tablet Take 1 tablet (5 mg) by mouth 2 times daily  "180 tablet 5     ASPIRIN NOT PRESCRIBED (INTENTIONAL) Please choose reason not prescribed, below       diazepam (VALIUM) 10 MG tablet Take 1 tablet (10 mg) by mouth every 6 hours as needed 1 tablet 0     metoprolol succinate ER (TOPROL-XL) 100 MG 24 hr tablet Take 1 tablet (100 mg) by mouth daily 90 tablet 3     ramipril (ALTACE) 2.5 MG capsule Take 1 capsule (2.5 mg) by mouth daily 90 capsule 5     rosuvastatin (CRESTOR) 10 MG tablet Take 1 tablet (10 mg) by mouth daily 90 tablet 5       Review Of Systems:  Skin: negative  Eyes: negative  Ears/Nose/Throat: negative  Respiratory: No shortness of breath, dyspnea on exertion, cough, or hemoptysis  Cardiovascular: Idiopathic cardiomyopathy.  Atrial fibrillation.  Dilatation of thoracic aorta.  Gastrointestinal: colon polyps.  Obesity  Genitourinary: hematuria  Musculoskeletal: negative  Neurologic: negative  Psychiatric: negative  Hematologic/Lymphatic/Immunologic: negative  Endocrine: negative    Results for BILLIE ALLEN (MRN [id number]) as of 9/25/2019 14:53   Ref. Range 12/8/2017 10:16 7/29/2019 08:28 9/17/2019 15:10   Creatinine Latest Ref Range: 0.66 - 1.25 mg/dL 1.10 1.33 (H) 1.43 (H)     Results for BILLIE ALLEN (MRN [id number]) as of 9/25/2019 14:53   Ref. Range 9/17/2019 15:10   WBC Latest Ref Range: 4.0 - 11.0 10e9/L 7.3   Hemoglobin Latest Ref Range: 13.3 - 17.7 g/dL 14.5   Hematocrit Latest Ref Range: 40.0 - 53.0 % 43.1   Platelet Count Latest Ref Range: 150 - 450 10e9/L 188   RBC Count Latest Ref Range: 4.4 - 5.9 10e12/L 4.47   MCV Latest Ref Range: 78 - 100 fl 96   MCH Latest Ref Range: 26.5 - 33.0 pg 32.4   MCHC Latest Ref Range: 31.5 - 36.5 g/dL 33.6   RDW Latest Ref Range: 10.0 - 15.0 % 13.1   1990  Exam:  /80   Pulse 78   Ht 1.93 m (6' 4\")   Wt 133.8 kg (295 lb)   BMI 35.91 kg/m      General Impression: Very pleasant gentleman in no acute distress, well-oriented in time place and person.    Mental status.  Anxious    HEENT: There is " no clinical evidence of jaundice on examination of conjunctiva.  Extraocular eye movements are unremarkable.  Mucous membranes are also unremarkable    Skin: Skin is otherwise normal to examination    Lymph Nodes: There is no cervical inguinal lymphadenopathy    Respiratory System: The respiratory cycle is normal and normal to both percussion and auscultation    Cardiovascular: There is no murmurs heard, however the pulse is irregular, peripheral pulses are palpable with there is mild peripheral pitting edema    Abdominal: The abdomen is considerably obese, there are no palpable masses, there are no areas of tenderness or guarding or rebound, there is no evidence of inguinal hernia    Extremities: Extremities are otherwise unremarkable    Back and Flank: There is no significant flank tenderness there is no evidence of kyphosis, scoliosis or lordosis and there is no tenderness in either renal angle    Genital: Both testes have descended into the scrotum.  The scrotum is otherwise unremarkable.  Penis is uncircumcised with normal size meatus    Rectal: Not performed    Neurologic: There are no focal abnormal clinical neurological signs and central, peripheral nervous systems    Impression: I went over the lab studies and radiological studies very carefully discussed in detail with the patient and his wife today.  There is a very large mass on the upper pole of the right kidney which is consistent with a renal cell cancer but it is extending into the inferior vena cava.  The precise extent at the moment is not completely clear about whether this is extending just below the diaphragm or above the diaphragm.  He will need an MRI of the inferior vena cava in order to determine the exact extent of the disease in the inferior vena cava.  I do not see evidence however so far of any other metastatic disease i.e. in lymph nodes or other viscera.    However we will need to do a CT scan of the chest to determine if there is any  evidence of metastatic disease in the lungs.  I then had an extended discussion with the patient and his wife about therapeutic management.  He is going to need a major open surgery after careful medical evaluation and after discontinuing oral anticoagulants for at least 5 days prior to surgery.  This will involve a major abdominal incision after minimum for removal of the kidney and possibly extended incision into the chest if the tumor in the inferior vena cava is extended above the diaphragm.  If the tumor is below the diaphragm only then this can likely be managed through an abdominal incision of the with the likely assistance of vascular surgeons.  If the tumor is extending above the diaphragm and possibly even into the heart, then the assistance of a cardiovascular surgeon would be needed with the patient on bypass and possibly also with the patient being cooled.  I did talk to him about these types of operations in detail and clearly this is a very major operation with significant potential complications that if there is no evidence of disease beyond the kidney and the inferior vena cava is a very good chance of this be completely curative.  What ever the situation very close follow-up will be required and we did have a discussion to about treatment of metastatic renal cell cancer some of the new modalities for treatment which have been encouraging including targeted therapy but this clearly would be in the hands of a medical oncologist should this situation arise.    First episode to do a CT scan of the chest and an MRI scan of the inferior vena cava, and I have had discussions with my colleague Dr. Russ about the entire situation    I did have a extensive and entire discussion with the patient and his wife in detail about all these issues.  I answered many questions    Plan: CT scan with and without of the chest.  MRI scan of the inferior vena cava.  Ongoing discussion and visit with Dr. Mraiano Russ in the  "very near future to plan management depending on the results of the above tests.    Time: 60 minutes.  Greater than 50% in discussion and consultation  This is an exceedingly complicated issue as outlined above  \"This dictation was performed with voice recognition software and may contain errors,  omissions and inadvertent word substitution.\"    "

## 2019-10-02 ENCOUNTER — HEALTH MAINTENANCE LETTER (OUTPATIENT)
Age: 74
End: 2019-10-02

## 2019-10-02 ENCOUNTER — DOCUMENTATION ONLY (OUTPATIENT)
Dept: UROLOGY | Facility: CLINIC | Age: 74
End: 2019-10-02

## 2019-10-02 NOTE — NURSING NOTE
Patient's wife called to say that Mr. Pang is cancelling consult with Dr. Russ and will be having surgery at [facility].

## 2019-10-07 ENCOUNTER — TRANSFERRED RECORDS (OUTPATIENT)
Dept: HEALTH INFORMATION MANAGEMENT | Facility: CLINIC | Age: 74
End: 2019-10-07

## 2019-10-23 ENCOUNTER — TELEPHONE (OUTPATIENT)
Dept: CARDIOLOGY | Facility: CLINIC | Age: 74
End: 2019-10-23

## 2019-10-23 DIAGNOSIS — I25.10 CORONARY ARTERY DISEASE INVOLVING NATIVE CORONARY ARTERY OF NATIVE HEART WITHOUT ANGINA PECTORIS: ICD-10-CM

## 2019-10-23 DIAGNOSIS — E78.5 HYPERLIPIDEMIA LDL GOAL <100: Primary | ICD-10-CM

## 2019-10-23 DIAGNOSIS — I48.91 ATRIAL FIBRILLATION (H): ICD-10-CM

## 2019-10-23 RX ORDER — METOPROLOL SUCCINATE 100 MG/1
100 TABLET, EXTENDED RELEASE ORAL DAILY
Qty: 90 TABLET | Refills: 3 | COMMUNITY
Start: 2019-10-23 | End: 2020-02-24

## 2019-10-23 NOTE — TELEPHONE ENCOUNTER
Pt called to update us to changes after nephrectomy at [facility]. His BP medications were changed, I reviewed this with patient. He is no longer taking his Ramipril and only 50mg of Toprol XL. Pt states he was on the medications per Dr. Quarles and wanted his input. When asked if he has been checking his blood pressure, pt denies and does not have a way to check it at home. I recommended pt to check it at a drug store or even purchase an automatic cuff for at home use. Pt to call back with blood pressure readings. Pt also interested in seeing Dr. Quarles for his annual follow-up. Appt scheduled. Beverly Roberson RN

## 2019-10-29 ENCOUNTER — OFFICE VISIT (OUTPATIENT)
Dept: FAMILY MEDICINE | Facility: CLINIC | Age: 74
End: 2019-10-29
Payer: MEDICARE

## 2019-10-29 VITALS
SYSTOLIC BLOOD PRESSURE: 100 MMHG | OXYGEN SATURATION: 96 % | DIASTOLIC BLOOD PRESSURE: 68 MMHG | BODY MASS INDEX: 33.85 KG/M2 | HEIGHT: 76 IN | TEMPERATURE: 97.2 F | WEIGHT: 278 LBS | HEART RATE: 80 BPM

## 2019-10-29 DIAGNOSIS — C64.1 RENAL CELL CARCINOMA, RIGHT (H): ICD-10-CM

## 2019-10-29 DIAGNOSIS — I10 ESSENTIAL HYPERTENSION: ICD-10-CM

## 2019-10-29 DIAGNOSIS — G47.33 OBSTRUCTIVE SLEEP APNEA SYNDROME: Primary | ICD-10-CM

## 2019-10-29 LAB
ERYTHROCYTE [DISTWIDTH] IN BLOOD BY AUTOMATED COUNT: 13.7 % (ref 10–15)
HCT VFR BLD AUTO: 38.3 % (ref 40–53)
HGB BLD-MCNC: 12.3 G/DL (ref 13.3–17.7)
MCH RBC QN AUTO: 32 PG (ref 26.5–33)
MCHC RBC AUTO-ENTMCNC: 32.1 G/DL (ref 31.5–36.5)
MCV RBC AUTO: 100 FL (ref 78–100)
PLATELET # BLD AUTO: 346 10E9/L (ref 150–450)
RBC # BLD AUTO: 3.84 10E12/L (ref 4.4–5.9)
WBC # BLD AUTO: 7.9 10E9/L (ref 4–11)

## 2019-10-29 PROCEDURE — G0008 ADMIN INFLUENZA VIRUS VAC: HCPCS | Performed by: INTERNAL MEDICINE

## 2019-10-29 PROCEDURE — 80048 BASIC METABOLIC PNL TOTAL CA: CPT | Performed by: INTERNAL MEDICINE

## 2019-10-29 PROCEDURE — 36415 COLL VENOUS BLD VENIPUNCTURE: CPT | Performed by: INTERNAL MEDICINE

## 2019-10-29 PROCEDURE — 85027 COMPLETE CBC AUTOMATED: CPT | Performed by: INTERNAL MEDICINE

## 2019-10-29 PROCEDURE — 99214 OFFICE O/P EST MOD 30 MIN: CPT | Mod: 25 | Performed by: INTERNAL MEDICINE

## 2019-10-29 PROCEDURE — 90662 IIV NO PRSV INCREASED AG IM: CPT | Performed by: INTERNAL MEDICINE

## 2019-10-29 ASSESSMENT — MIFFLIN-ST. JEOR: SCORE: 2112.5

## 2019-10-29 NOTE — PATIENT INSTRUCTIONS
(G47.33) Obstructive sleep apnea syndrome  (primary encounter diagnosis)  Comment: I would recommend a follow up in sleep dentistry -     [email or web address]    [place] OFFICE ADDRESS:  [address], [address]  [place] [address]    [place] OFFICE ADDRESS:  [address] [place] [address]    PHONE: [phone]  FAX: [phone]  EMAIL: [email or web address]    Plan:     (I10) Essential hypertension  Comment: blood pressure is stable off of ramipril, but still low.  We will continue metoprolol at lower dose and follow up in December with cardiology to review need for ramipril.  Plan:     (C64.1) Renal cell carcinoma, right (H)  Comment: s/p nephrectomy.  Continue with follow up in urology.  We will check your renal function and hemoglobin today and continue to maintain hydration 8 x 8oz per day  Plan: CBC with platelets, Basic metabolic panel

## 2019-10-29 NOTE — PROGRESS NOTES
"[facility]  CLINIC PROGRESS NOTE    Subjective:  Kidney mass   Ti Nickerson recently had surgery for removal of kindey mass.  He is urinating now, but notices increased urinary frequency.  He notes strange pains.  Bowels are working as well and has been taking stool softeners on occasion.  He did take oxycodone once after surgery and then quit and pain has been well controlled.  Obstructive sleep apnea   He has difficulty tolerating his CPAP since the hospital as he feels claustrophobia.        Past medical history, medications, allergies, social history, family history reviewed and updated in [facility] as of 10/29/2019 .    ROS  CONSTITUTIONAL: no fatigue, no unexpected change in weight  SKIN: no worrisome rashes, no worrisome moles, no worrisome lesions  EYES: no acute vision problems or changes  ENT: no ear problems, no mouth problems, no throat problems  RESP: no significant cough, no shortness of breath  CV: no chest pain, no palpitations, no new or worsening peripheral edema  GI: no nausea, no vomiting, no constipation, no diarrhea  : as above   MS: no claudication, no myalgias, no joint aches  PSYCHIATRIC: feels that his state of mind is improved following the surgeyr      Objective:  Vitals  /68 (BP Location: Left arm, Patient Position: Sitting, Cuff Size: Adult Regular)   Pulse 80   Temp 97.2  F (36.2  C) (Tympanic)   Ht 1.93 m (6' 4\")   Wt 126.1 kg (278 lb)   SpO2 96%   BMI 33.84 kg/m    GEN: Alert Oriented x3 NAD  HEENT: Atraumatic, normocephalic, neck supple, no thyromegaly, negative cervical adenopathy  TM: TM bilaterally pearly and grey with normal light reflex  CV: RRR no murmurs or rubs  PULM: CTA no wheezes or crackles  ABD: Soft, nontender nondistended, no hepatosplenomegally  SKIN: No visible skin lesion or ulcerations  EXT:  No edema bilateral lower extremities  NEURO: Gait and station deferred, No focal neurologic deficits  PSYCH: Mood good, affect mood congruent    No " images are attached to the encounter.    No results found for this or any previous visit (from the past 24 hour(s)).    Assessment/Plan:  Patient Instructions   (G47.33) Obstructive sleep apnea syndrome  (primary encounter diagnosis)  Comment: I would recommend a follow up in sleep dentistry -     [email or web address]    [place] OFFICE ADDRESS:  [address], [address]  [place] [address]    [place] OFFICE ADDRESS:  [address] [place] [address]    PHONE: [phone]  FAX: [phone]  EMAIL: [email or web address]    Plan:     (I10) Essential hypertension  Comment: blood pressure is stable off of ramipril, but still low.  We will continue metoprolol at lower dose and follow up in December with cardiology to review need for ramipril.  Plan:     (C64.1) Renal cell carcinoma, right (H)  Comment: s/p nephrectomy.  Continue with follow up in urology.  We will check your renal function and hemoglobin today and continue to maintain hydration 8 x 8oz per day  Plan: CBC with platelets, Basic metabolic panel               Follow up in 3 months    Disclaimer: This note consists of symbols derived from keyboarding, dictation and/or voice recognition software. As a result, there may be errors in the script that have gone undetected. Please consider this when interpreting information found in this chart.    Nico Retana MD  [phone]

## 2019-10-30 LAB
ANION GAP SERPL CALCULATED.3IONS-SCNC: 8 MMOL/L (ref 3–14)
BUN SERPL-MCNC: 23 MG/DL (ref 7–30)
CALCIUM SERPL-MCNC: 8.5 MG/DL (ref 8.5–10.1)
CHLORIDE SERPL-SCNC: 105 MMOL/L (ref 94–109)
CO2 SERPL-SCNC: 24 MMOL/L (ref 20–32)
CREAT SERPL-MCNC: 1.56 MG/DL (ref 0.66–1.25)
GFR SERPL CREATININE-BSD FRML MDRD: 43 ML/MIN/{1.73_M2}
GLUCOSE SERPL-MCNC: 102 MG/DL (ref 70–99)
POTASSIUM SERPL-SCNC: 4.3 MMOL/L (ref 3.4–5.3)
SODIUM SERPL-SCNC: 137 MMOL/L (ref 133–144)

## 2019-10-31 ENCOUNTER — MYC MEDICAL ADVICE (OUTPATIENT)
Dept: FAMILY MEDICINE | Facility: CLINIC | Age: 74
End: 2019-10-31

## 2019-10-31 DIAGNOSIS — C64.1 RENAL CELL CARCINOMA, RIGHT (H): Primary | ICD-10-CM

## 2019-10-31 DIAGNOSIS — N18.30 CKD (CHRONIC KIDNEY DISEASE) STAGE 3, GFR 30-59 ML/MIN (H): ICD-10-CM

## 2019-10-31 NOTE — RESULT ENCOUNTER NOTE
Abrahan Luke,    I have had the opportunity to review your recent results and an interpretation is as follows:  Your basic metabolic panel and complete blood counts show slightly worsening renal function and hemoglobin     Sincerely,  Nico Retana MD

## 2019-10-31 NOTE — TELEPHONE ENCOUNTER
Dr. Retana, Pt asking for further instruction on if any follow up needed.  Looks like note may have been cut off.    Please advise      Here is your note to pt  Notes recorded by Nico Retana MD on 10/31/2019 at 11:51 AM CDT  Abrahan Luke,    I have had the opportunity to review your recent results and an interpretation is as follows:  Your basic metabolic panel and complete blood counts show slightly worsening renal function and hemoglobin     Sincerely,  Nico Retana MD

## 2019-11-01 NOTE — TELEPHONE ENCOUNTER
I spoke to Ti and informed him that his hemoglobin had actually improved as compared to postoperatively.  His glomerular filtration rate was a little bit worse as compared to what it was on the day of discharge, but relatively stable.  I explained that he likely had some lingering kidney injury following the surgery.  And that this may be a potential long-term concern.  I recommended that we recheck his labs again towards the end of the month and advised him to avoid any nephrotoxic medications including avoidance of nonsteroidal anti-inflammatories.  He will maintain hydration as best he can and continue to improve his nutrition    Nico Retana MD, MD

## 2019-12-17 DIAGNOSIS — E78.5 HYPERLIPIDEMIA LDL GOAL <100: ICD-10-CM

## 2019-12-17 DIAGNOSIS — I25.10 CORONARY ARTERY DISEASE INVOLVING NATIVE CORONARY ARTERY OF NATIVE HEART WITHOUT ANGINA PECTORIS: ICD-10-CM

## 2019-12-17 LAB
ANION GAP SERPL CALCULATED.3IONS-SCNC: 9.5 MMOL/L (ref 6–17)
BUN SERPL-MCNC: 29 MG/DL (ref 7–30)
CALCIUM SERPL-MCNC: 9.1 MG/DL (ref 8.5–10.5)
CHLORIDE SERPL-SCNC: 105 MMOL/L (ref 98–107)
CHOLEST SERPL-MCNC: 119 MG/DL
CO2 SERPL-SCNC: 28 MMOL/L (ref 23–29)
CREAT SERPL-MCNC: 1.68 MG/DL (ref 0.7–1.3)
GFR SERPL CREATININE-BSD FRML MDRD: 40 ML/MIN/{1.73_M2}
GLUCOSE SERPL-MCNC: 109 MG/DL (ref 70–105)
HDLC SERPL-MCNC: 46 MG/DL
LDLC SERPL CALC-MCNC: 51 MG/DL
NONHDLC SERPL-MCNC: 73 MG/DL
POTASSIUM SERPL-SCNC: 4.5 MMOL/L (ref 3.5–5.1)
SODIUM SERPL-SCNC: 138 MMOL/L (ref 136–145)
TRIGL SERPL-MCNC: 110 MG/DL

## 2019-12-17 PROCEDURE — 80048 BASIC METABOLIC PNL TOTAL CA: CPT | Performed by: INTERNAL MEDICINE

## 2019-12-17 PROCEDURE — 36415 COLL VENOUS BLD VENIPUNCTURE: CPT | Performed by: INTERNAL MEDICINE

## 2019-12-17 PROCEDURE — 80061 LIPID PANEL: CPT | Performed by: INTERNAL MEDICINE

## 2019-12-19 ENCOUNTER — OFFICE VISIT (OUTPATIENT)
Dept: CARDIOLOGY | Facility: CLINIC | Age: 74
End: 2019-12-19
Payer: MEDICARE

## 2019-12-19 VITALS
HEART RATE: 85 BPM | HEIGHT: 76 IN | SYSTOLIC BLOOD PRESSURE: 113 MMHG | DIASTOLIC BLOOD PRESSURE: 75 MMHG | BODY MASS INDEX: 35.56 KG/M2 | WEIGHT: 292 LBS

## 2019-12-19 DIAGNOSIS — I25.10 CORONARY ARTERY DISEASE INVOLVING NATIVE CORONARY ARTERY OF NATIVE HEART WITHOUT ANGINA PECTORIS: ICD-10-CM

## 2019-12-19 DIAGNOSIS — E78.5 HYPERLIPIDEMIA LDL GOAL <100: Primary | ICD-10-CM

## 2019-12-19 DIAGNOSIS — I42.9 PRIMARY CARDIOMYOPATHY (H): ICD-10-CM

## 2019-12-19 DIAGNOSIS — I48.19 PERSISTENT ATRIAL FIBRILLATION (H): ICD-10-CM

## 2019-12-19 PROCEDURE — 99214 OFFICE O/P EST MOD 30 MIN: CPT | Performed by: INTERNAL MEDICINE

## 2019-12-19 RX ORDER — TAMSULOSIN HYDROCHLORIDE 0.4 MG/1
0.4 CAPSULE ORAL DAILY
COMMUNITY
End: 2020-07-24

## 2019-12-19 RX ORDER — ROSUVASTATIN CALCIUM 5 MG/1
5 TABLET, COATED ORAL DAILY
Qty: 90 TABLET | Refills: 3 | Status: SHIPPED | OUTPATIENT
Start: 2019-12-19 | End: 2020-10-29

## 2019-12-19 ASSESSMENT — MIFFLIN-ST. JEOR: SCORE: 2171

## 2019-12-19 NOTE — PROGRESS NOTES
HPI and Plan:     Mr. Nickerson is a pleasant 74-year-old gentleman with history of idiopathic cardiomyopathy chronic atrial fibrillation.  He was diagnosed with right renal cell carcinoma and underwent nephrectomy at [facility] in 10/9/2019.  Because of low blood pressure postoperatively his metoprolol was cut in half and his Ramapril was discontinued.  He has had worsening GFR over the past 5 to 6 months.  Of time.  He denies any shortness of breath chest pain chest pressure or edema.  He has not had a echocardiogram in 2 years time.  He plans to leave to [place] in January.    I have recommended a follow-up echocardiogram he is due.  I recommended increasing his metoprolol back to 100 mg daily given his resting tachycardia.  He also has a significant history of idiopathic cardiomyopathy so higher dose beta-blocker would be better.  I am fine with leaving him off the ramipril prior tickly with his increasing renal insufficiency.  The renal insufficiency is unclear etiology and therefore I am decreasing his Crestor to 5 mg daily and ask him to follow-up with a BMP with Dr. Russell in 1 week's time.    No orders of the defined types were placed in this encounter.    Orders Placed This Encounter   Medications     tamsulosin (FLOMAX) 0.4 MG capsule     Sig: Take 0.4 mg by mouth daily     rosuvastatin (CRESTOR) 5 MG tablet     Sig: Take 1 tablet (5 mg) by mouth daily     Dispense:  90 tablet     Refill:  3     There are no discontinued medications.      Encounter Diagnoses   Name Primary?     Hyperlipidemia LDL goal <100 Yes     Coronary artery disease involving native coronary artery of native heart without angina pectoris      Persistent atrial fibrillation      Primary cardiomyopathy (H)        CURRENT MEDICATIONS:  Current Outpatient Medications   Medication Sig Dispense Refill     apixaban ANTICOAGULANT (ELIQUIS) 5 MG tablet Take 1 tablet (5 mg) by mouth 2 times daily 180 tablet 5     metoprolol succinate ER  (TOPROL-XL) 100 MG 24 hr tablet Take 100 mg by mouth daily 90 tablet 3     rosuvastatin (CRESTOR) 10 MG tablet Take 1 tablet (10 mg) by mouth daily 90 tablet 5     rosuvastatin (CRESTOR) 5 MG tablet Take 1 tablet (5 mg) by mouth daily 90 tablet 3     tamsulosin (FLOMAX) 0.4 MG capsule Take 0.4 mg by mouth daily       ASPIRIN NOT PRESCRIBED (INTENTIONAL) Please choose reason not prescribed, below (Patient not taking: Reported on 10/29/2019)         ALLERGIES     Allergies   Allergen Reactions     Oxycodone        PAST MEDICAL HISTORY:  Past Medical History:   Diagnosis Date     Atrial fibrillation, unspecified 9/18/2015     CAD (coronary artery disease) 09/18/2015    minimal by angio 2007, fu nuc est nl 2018     Elevated TSH 2018     Esophageal reflux 9/18/2015     Essential hypertension      History of cardioversion     [id number]     Idiopathic cardiomyopathy (H) 09/18/2015    fu echo nl ef, nl nuclear est 11/18, Dr. Quarles     Mixed hyperlipidemia 9/18/2015     Mumps      Sleep apnea 09/18/2015    does not use cpap as of 2019     Thoracic aortic aneurysm (H)     sinus of valsalva 4.5 and asc aorta 4.5 in 2017     Tubular adenoma of colon 01/2017    fu 3 years       PAST SURGICAL HISTORY:  Past Surgical History:   Procedure Laterality Date     APPENDECTOMY  1964       FAMILY HISTORY:  Family History   Problem Relation Age of Onset     Arrhythmia Mother         a-fib     Cerebrovascular Disease Mother      Arrhythmia Father         a-fib     Colon Cancer Father      Diabetes Father      Cerebrovascular Disease Father      No Known Problems Brother        SOCIAL HISTORY:  Social History     Socioeconomic History     Marital status:      Spouse name: None     Number of children: 3     Years of education: None     Highest education level: None   Occupational History     Occupation: retired cpa   Social Needs     Financial resource strain: None     Food insecurity:     Worry: None     Inability: None      "Transportation needs:     Medical: None     Non-medical: None   Tobacco Use     Smoking status: Never Smoker     Smokeless tobacco: Never Used   Substance and Sexual Activity     Alcohol use: Yes     Alcohol/week: 0.0 standard drinks     Comment: 3 drinks week     Drug use: No     Sexual activity: Yes     Partners: Female   Lifestyle     Physical activity:     Days per week: None     Minutes per session: None     Stress: None   Relationships     Social connections:     Talks on phone: None     Gets together: None     Attends Protestant service: None     Active member of club or organization: None     Attends meetings of clubs or organizations: None     Relationship status: None     Intimate partner violence:     Fear of current or ex partner: None     Emotionally abused: None     Physically abused: None     Forced sexual activity: None   Other Topics Concern      Service Not Asked     Blood Transfusions Not Asked     Caffeine Concern No     Comment: 2 coffee in am, occas. soda     Occupational Exposure Not Asked     Hobby Hazards Not Asked     Sleep Concern No     Stress Concern No     Weight Concern Yes     Special Diet No     Back Care Not Asked     Exercise No     Comment: walking 3-4 days week     Bike Helmet Not Asked     Seat Belt Yes     Self-Exams Not Asked     Parent/sibling w/ CABG, MI or angioplasty before 65F 55M? Not Asked   Social History Narrative     None       Review of Systems:  Skin:  Negative     Eyes:  Positive for glasses  ENT:  Negative    Respiratory:  Negative    Cardiovascular:    fatigue;Positive for;lightheadedness  Gastroenterology: Negative    Genitourinary:  not assessed    Musculoskeletal:  Positive for joint stiffness  Neurologic:  Positive for numbness or tingling of hands;numbness or tingling of feet  Psychiatric:  Positive for depression  Heme/Lymph/Imm:  Positive for allergies  Endocrine:  Negative      Physical Exam:  Vitals: /75   Pulse 85   Ht 1.93 m (6' 4\")   " Wt 132.5 kg (292 lb)   BMI 35.54 kg/m      Constitutional:  cooperative, alert and oriented, well developed, well nourished, in no acute distress morbidly obese      Skin:  warm and dry to the touch, no apparent skin lesions or masses noted          Head:  normocephalic, no masses or lesions        Eyes:           Lymph:      ENT:  no pallor or cyanosis, dentition good        Neck:           Respiratory:  normal breath sounds, clear to auscultation, normal A-P diameter, normal symmetry, normal respiratory excursion, no use of accessory muscles         Cardiac:   irregularly irregular rhythm   no presence of murmur          pulses full and equal, no bruits auscultated                                        GI:  abdomen soft, non-tender, BS normoactive, no mass, no HSM, no bruits        Extremities and Muscular Skeletal:  no deformities, clubbing, cyanosis, erythema observed              Neurological:           Psych:         Recent Lab Results:  LIPID RESULTS:  Lab Results   Component Value Date    CHOL 119 12/17/2019    HDL 46 12/17/2019    LDL 51 12/17/2019    TRIG 110 12/17/2019    CHOLHDLRATIO 2.8 09/17/2015       LIVER ENZYME RESULTS:  Lab Results   Component Value Date    AST 24 07/29/2019    ALT 29 07/29/2019       CBC RESULTS:  Lab Results   Component Value Date    WBC 7.9 10/29/2019    RBC 3.84 (L) 10/29/2019    HGB 12.3 (L) 10/29/2019    HCT 38.3 (L) 10/29/2019     10/29/2019    MCH 32.0 10/29/2019    MCHC 32.1 10/29/2019    RDW 13.7 10/29/2019     10/29/2019       BMP RESULTS:  Lab Results   Component Value Date     12/17/2019    POTASSIUM 4.5 12/17/2019    CHLORIDE 105 12/17/2019    CO2 28 12/17/2019    ANIONGAP 9.5 12/17/2019     (H) 12/17/2019    BUN 29 12/17/2019    CR 1.68 (H) 12/17/2019    GFRESTIMATED 40 (L) 12/17/2019    GFRESTBLACK 49 (L) 12/17/2019    DOMINICK 9.1 12/17/2019        A1C RESULTS:  Lab Results   Component Value Date    A1C 5.6 12/08/2017       INR  RESULTS:  Lab Results   Component Value Date    INR 2.3 06/17/2013    INR 2.4 05/08/2013           CC  No referring provider defined for this encounter.

## 2019-12-19 NOTE — LETTER
12/19/2019    Tyrell Ybarra MD  [address]  [place] [address]    RE: [place]       Dear Colleague,    I had the pleasure of seeing Ti Nickerson in the [facility].    HPI and Plan:     Mr. Nickerson is a pleasant 74-year-old gentleman with history of idiopathic cardiomyopathy chronic atrial fibrillation.  He was diagnosed with right renal cell carcinoma and underwent nephrectomy at [facility] in 10/9/2019.  Because of low blood pressure postoperatively his metoprolol was cut in half and his Ramapril was discontinued.  He has had worsening GFR over the past 5 to 6 months.  Of time.  He denies any shortness of breath chest pain chest pressure or edema.  He has not had a echocardiogram in 2 years time.  He plans to leave to [place] in January.    I have recommended a follow-up echocardiogram he is due.  I recommended increasing his metoprolol back to 100 mg daily given his resting tachycardia.  He also has a significant history of idiopathic cardiomyopathy so higher dose beta-blocker would be better.  I am fine with leaving him off the ramipril prior tickly with his increasing renal insufficiency.  The renal insufficiency is unclear etiology and therefore I am decreasing his Crestor to 5 mg daily and ask him to follow-up with a BMP with Dr. Russell in 1 week's time.    No orders of the defined types were placed in this encounter.    Orders Placed This Encounter   Medications     tamsulosin (FLOMAX) 0.4 MG capsule     Sig: Take 0.4 mg by mouth daily     rosuvastatin (CRESTOR) 5 MG tablet     Sig: Take 1 tablet (5 mg) by mouth daily     Dispense:  90 tablet     Refill:  3     There are no discontinued medications.      Encounter Diagnoses   Name Primary?     Hyperlipidemia LDL goal <100 Yes     Coronary artery disease involving native coronary artery of native heart without angina pectoris      Persistent atrial fibrillation      Primary cardiomyopathy (H)         CURRENT MEDICATIONS:  Current Outpatient Medications   Medication Sig Dispense Refill     apixaban ANTICOAGULANT (ELIQUIS) 5 MG tablet Take 1 tablet (5 mg) by mouth 2 times daily 180 tablet 5     metoprolol succinate ER (TOPROL-XL) 100 MG 24 hr tablet Take 100 mg by mouth daily 90 tablet 3     rosuvastatin (CRESTOR) 10 MG tablet Take 1 tablet (10 mg) by mouth daily 90 tablet 5     rosuvastatin (CRESTOR) 5 MG tablet Take 1 tablet (5 mg) by mouth daily 90 tablet 3     tamsulosin (FLOMAX) 0.4 MG capsule Take 0.4 mg by mouth daily       ASPIRIN NOT PRESCRIBED (INTENTIONAL) Please choose reason not prescribed, below (Patient not taking: Reported on 10/29/2019)         ALLERGIES     Allergies   Allergen Reactions     Oxycodone        PAST MEDICAL HISTORY:  Past Medical History:   Diagnosis Date     Atrial fibrillation, unspecified 9/18/2015     CAD (coronary artery disease) 09/18/2015    minimal by angio 2007, fu nuc est nl 2018     Elevated TSH 2018     Esophageal reflux 9/18/2015     Essential hypertension      History of cardioversion     [id number]     Idiopathic cardiomyopathy (H) 09/18/2015    fu echo nl ef, nl nuclear est 11/18, Dr. Quarles     Mixed hyperlipidemia 9/18/2015     Mumps      Sleep apnea 09/18/2015    does not use cpap as of 2019     Thoracic aortic aneurysm (H)     sinus of valsalva 4.5 and asc aorta 4.5 in 2017     Tubular adenoma of colon 01/2017    fu 3 years       PAST SURGICAL HISTORY:  Past Surgical History:   Procedure Laterality Date     APPENDECTOMY  1964       FAMILY HISTORY:  Family History   Problem Relation Age of Onset     Arrhythmia Mother         a-fib     Cerebrovascular Disease Mother      Arrhythmia Father         a-fib     Colon Cancer Father      Diabetes Father      Cerebrovascular Disease Father      No Known Problems Brother        SOCIAL HISTORY:  Social History     Socioeconomic History     Marital status:      Spouse name: None     Number of children: 3     Years  of education: None     Highest education level: None   Occupational History     Occupation: retired cpa   Social Needs     Financial resource strain: None     Food insecurity:     Worry: None     Inability: None     Transportation needs:     Medical: None     Non-medical: None   Tobacco Use     Smoking status: Never Smoker     Smokeless tobacco: Never Used   Substance and Sexual Activity     Alcohol use: Yes     Alcohol/week: 0.0 standard drinks     Comment: 3 drinks week     Drug use: No     Sexual activity: Yes     Partners: Female   Lifestyle     Physical activity:     Days per week: None     Minutes per session: None     Stress: None   Relationships     Social connections:     Talks on phone: None     Gets together: None     Attends Restorationist service: None     Active member of club or organization: None     Attends meetings of clubs or organizations: None     Relationship status: None     Intimate partner violence:     Fear of current or ex partner: None     Emotionally abused: None     Physically abused: None     Forced sexual activity: None   Other Topics Concern      Service Not Asked     Blood Transfusions Not Asked     Caffeine Concern No     Comment: 2 coffee in am, occas. soda     Occupational Exposure Not Asked     Hobby Hazards Not Asked     Sleep Concern No     Stress Concern No     Weight Concern Yes     Special Diet No     Back Care Not Asked     Exercise No     Comment: walking 3-4 days week     Bike Helmet Not Asked     Seat Belt Yes     Self-Exams Not Asked     Parent/sibling w/ CABG, MI or angioplasty before 65F 55M? Not Asked   Social History Narrative     None       Review of Systems:  Skin:  Negative     Eyes:  Positive for glasses  ENT:  Negative    Respiratory:  Negative    Cardiovascular:    fatigue;Positive for;lightheadedness  Gastroenterology: Negative    Genitourinary:  not assessed    Musculoskeletal:  Positive for joint stiffness  Neurologic:  Positive for numbness or tingling  "of hands;numbness or tingling of feet  Psychiatric:  Positive for depression  Heme/Lymph/Imm:  Positive for allergies  Endocrine:  Negative      Physical Exam:  Vitals: /75   Pulse 85   Ht 1.93 m (6' 4\")   Wt 132.5 kg (292 lb)   BMI 35.54 kg/m       Constitutional:  cooperative, alert and oriented, well developed, well nourished, in no acute distress morbidly obese      Skin:  warm and dry to the touch, no apparent skin lesions or masses noted          Head:  normocephalic, no masses or lesions        Eyes:           Lymph:      ENT:  no pallor or cyanosis, dentition good        Neck:           Respiratory:  normal breath sounds, clear to auscultation, normal A-P diameter, normal symmetry, normal respiratory excursion, no use of accessory muscles         Cardiac:   irregularly irregular rhythm   no presence of murmur          pulses full and equal, no bruits auscultated                                        GI:  abdomen soft, non-tender, BS normoactive, no mass, no HSM, no bruits        Extremities and Muscular Skeletal:  no deformities, clubbing, cyanosis, erythema observed              Neurological:           Psych:         Recent Lab Results:  LIPID RESULTS:  Lab Results   Component Value Date    CHOL 119 12/17/2019    HDL 46 12/17/2019    LDL 51 12/17/2019    TRIG 110 12/17/2019    CHOLHDLRATIO 2.8 09/17/2015       LIVER ENZYME RESULTS:  Lab Results   Component Value Date    AST 24 07/29/2019    ALT 29 07/29/2019       CBC RESULTS:  Lab Results   Component Value Date    WBC 7.9 10/29/2019    RBC 3.84 (L) 10/29/2019    HGB 12.3 (L) 10/29/2019    HCT 38.3 (L) 10/29/2019     10/29/2019    MCH 32.0 10/29/2019    MCHC 32.1 10/29/2019    RDW 13.7 10/29/2019     10/29/2019       BMP RESULTS:  Lab Results   Component Value Date     12/17/2019    POTASSIUM 4.5 12/17/2019    CHLORIDE 105 12/17/2019    CO2 28 12/17/2019    ANIONGAP 9.5 12/17/2019     (H) 12/17/2019    BUN 29 12/17/2019 "    CR 1.68 (H) 12/17/2019    GFRESTIMATED 40 (L) 12/17/2019    GFRESTBLACK 49 (L) 12/17/2019    DOMINICK 9.1 12/17/2019        A1C RESULTS:  Lab Results   Component Value Date    A1C 5.6 12/08/2017       INR RESULTS:  Lab Results   Component Value Date    INR 2.3 06/17/2013    INR 2.4 05/08/2013           CC  No referring provider defined for this encounter.                  Thank you for allowing me to participate in the care of your patient.      Sincerely,     SUNNY WITT MD     [facility]    cc:   No referring provider defined for this encounter.

## 2019-12-19 NOTE — LETTER
12/19/2019    Tyrell Ybarra MD  [address] [facility] [address]  [facility] [address]    RE: Ti Meadowsmel       Dear Colleague,    I had the pleasure of seeing Ti Nickerson in the [facility].    HPI and Plan:     Mr. Nickerson is a pleasant 74-year-old gentleman with history of idiopathic cardiomyopathy chronic atrial fibrillation.  He was diagnosed with right renal cell carcinoma and underwent nephrectomy at [facility] in 10/9/2019.  Because of low blood pressure postoperatively his metoprolol was cut in half and his Ramapril was discontinued.  He has had worsening GFR over the past 5 to 6 months.  Of time.  He denies any shortness of breath chest pain chest pressure or edema.  He has not had a echocardiogram in 2 years time.  He plans to leave to [place] in January.    I have recommended a follow-up echocardiogram he is due.  I recommended increasing his metoprolol back to 100 mg daily given his resting tachycardia.  He also has a significant history of idiopathic cardiomyopathy so higher dose beta-blocker would be better.  I am fine with leaving him off the ramipril prior tickly with his increasing renal insufficiency.  The renal insufficiency is unclear etiology and therefore I am decreasing his Crestor to 5 mg daily and ask him to follow-up with a BMP with Dr. Russell in 1 week's time.    No orders of the defined types were placed in this encounter.    Orders Placed This Encounter   Medications     tamsulosin (FLOMAX) 0.4 MG capsule     Sig: Take 0.4 mg by mouth daily     rosuvastatin (CRESTOR) 5 MG tablet     Sig: Take 1 tablet (5 mg) by mouth daily     Dispense:  90 tablet     Refill:  3     There are no discontinued medications.      Encounter Diagnoses   Name Primary?     Hyperlipidemia LDL goal <100 Yes     Coronary artery disease involving native coronary artery of native heart without angina pectoris      Persistent atrial fibrillation      Primary cardiomyopathy (H)         CURRENT MEDICATIONS:  Current Outpatient Medications   Medication Sig Dispense Refill     apixaban ANTICOAGULANT (ELIQUIS) 5 MG tablet Take 1 tablet (5 mg) by mouth 2 times daily 180 tablet 5     metoprolol succinate ER (TOPROL-XL) 100 MG 24 hr tablet Take 100 mg by mouth daily 90 tablet 3     rosuvastatin (CRESTOR) 10 MG tablet Take 1 tablet (10 mg) by mouth daily 90 tablet 5     rosuvastatin (CRESTOR) 5 MG tablet Take 1 tablet (5 mg) by mouth daily 90 tablet 3     tamsulosin (FLOMAX) 0.4 MG capsule Take 0.4 mg by mouth daily       ASPIRIN NOT PRESCRIBED (INTENTIONAL) Please choose reason not prescribed, below (Patient not taking: Reported on 10/29/2019)         ALLERGIES     Allergies   Allergen Reactions     Oxycodone        PAST MEDICAL HISTORY:  Past Medical History:   Diagnosis Date     Atrial fibrillation, unspecified 9/18/2015     CAD (coronary artery disease) 09/18/2015    minimal by angio 2007, fu nuc est nl 2018     Elevated TSH 2018     Esophageal reflux 9/18/2015     Essential hypertension      History of cardioversion     [id number]     Idiopathic cardiomyopathy (H) 09/18/2015    fu echo nl ef, nl nuclear est 11/18, Dr. Quarles     Mixed hyperlipidemia 9/18/2015     Mumps      Sleep apnea 09/18/2015    does not use cpap as of 2019     Thoracic aortic aneurysm (H)     sinus of valsalva 4.5 and asc aorta 4.5 in 2017     Tubular adenoma of colon 01/2017    fu 3 years       PAST SURGICAL HISTORY:  Past Surgical History:   Procedure Laterality Date     APPENDECTOMY  1964       FAMILY HISTORY:  Family History   Problem Relation Age of Onset     Arrhythmia Mother         a-fib     Cerebrovascular Disease Mother      Arrhythmia Father         a-fib     Colon Cancer Father      Diabetes Father      Cerebrovascular Disease Father      No Known Problems Brother        SOCIAL HISTORY:  Social History     Socioeconomic History     Marital status:      Spouse name: None     Number of children: 3     Years  of education: None     Highest education level: None   Occupational History     Occupation: retired cpa   Social Needs     Financial resource strain: None     Food insecurity:     Worry: None     Inability: None     Transportation needs:     Medical: None     Non-medical: None   Tobacco Use     Smoking status: Never Smoker     Smokeless tobacco: Never Used   Substance and Sexual Activity     Alcohol use: Yes     Alcohol/week: 0.0 standard drinks     Comment: 3 drinks week     Drug use: No     Sexual activity: Yes     Partners: Female   Lifestyle     Physical activity:     Days per week: None     Minutes per session: None     Stress: None   Relationships     Social connections:     Talks on phone: None     Gets together: None     Attends Temple service: None     Active member of club or organization: None     Attends meetings of clubs or organizations: None     Relationship status: None     Intimate partner violence:     Fear of current or ex partner: None     Emotionally abused: None     Physically abused: None     Forced sexual activity: None   Other Topics Concern      Service Not Asked     Blood Transfusions Not Asked     Caffeine Concern No     Comment: 2 coffee in am, occas. soda     Occupational Exposure Not Asked     Hobby Hazards Not Asked     Sleep Concern No     Stress Concern No     Weight Concern Yes     Special Diet No     Back Care Not Asked     Exercise No     Comment: walking 3-4 days week     Bike Helmet Not Asked     Seat Belt Yes     Self-Exams Not Asked     Parent/sibling w/ CABG, MI or angioplasty before 65F 55M? Not Asked   Social History Narrative     None       Review of Systems:  Skin:  Negative     Eyes:  Positive for glasses  ENT:  Negative    Respiratory:  Negative    Cardiovascular:    fatigue;Positive for;lightheadedness  Gastroenterology: Negative    Genitourinary:  not assessed    Musculoskeletal:  Positive for joint stiffness  Neurologic:  Positive for numbness or tingling  "of hands;numbness or tingling of feet  Psychiatric:  Positive for depression  Heme/Lymph/Imm:  Positive for allergies  Endocrine:  Negative      Physical Exam:  Vitals: /75   Pulse 85   Ht 1.93 m (6' 4\")   Wt 132.5 kg (292 lb)   BMI 35.54 kg/m       Constitutional:  cooperative, alert and oriented, well developed, well nourished, in no acute distress morbidly obese      Skin:  warm and dry to the touch, no apparent skin lesions or masses noted          Head:  normocephalic, no masses or lesions        Eyes:           Lymph:      ENT:  no pallor or cyanosis, dentition good        Neck:           Respiratory:  normal breath sounds, clear to auscultation, normal A-P diameter, normal symmetry, normal respiratory excursion, no use of accessory muscles         Cardiac:   irregularly irregular rhythm   no presence of murmur          pulses full and equal, no bruits auscultated                                        GI:  abdomen soft, non-tender, BS normoactive, no mass, no HSM, no bruits        Extremities and Muscular Skeletal:  no deformities, clubbing, cyanosis, erythema observed              Neurological:           Psych:         Recent Lab Results:  LIPID RESULTS:  Lab Results   Component Value Date    CHOL 119 12/17/2019    HDL 46 12/17/2019    LDL 51 12/17/2019    TRIG 110 12/17/2019    CHOLHDLRATIO 2.8 09/17/2015       LIVER ENZYME RESULTS:  Lab Results   Component Value Date    AST 24 07/29/2019    ALT 29 07/29/2019       CBC RESULTS:  Lab Results   Component Value Date    WBC 7.9 10/29/2019    RBC 3.84 (L) 10/29/2019    HGB 12.3 (L) 10/29/2019    HCT 38.3 (L) 10/29/2019     10/29/2019    MCH 32.0 10/29/2019    MCHC 32.1 10/29/2019    RDW 13.7 10/29/2019     10/29/2019       BMP RESULTS:  Lab Results   Component Value Date     12/17/2019    POTASSIUM 4.5 12/17/2019    CHLORIDE 105 12/17/2019    CO2 28 12/17/2019    ANIONGAP 9.5 12/17/2019     (H) 12/17/2019    BUN 29 12/17/2019 "    CR 1.68 (H) 12/17/2019    GFRESTIMATED 40 (L) 12/17/2019    GFRESTBLACK 49 (L) 12/17/2019    DOMINICK 9.1 12/17/2019        A1C RESULTS:  Lab Results   Component Value Date    A1C 5.6 12/08/2017       INR RESULTS:  Lab Results   Component Value Date    INR 2.3 06/17/2013    INR 2.4 05/08/2013         Thank you for allowing me to participate in the care of your patient.    Sincerely,     SUNNY WITT MD     [facility]

## 2019-12-26 DIAGNOSIS — C64.1 RENAL CELL CARCINOMA, RIGHT (H): ICD-10-CM

## 2019-12-26 DIAGNOSIS — D64.9 ANEMIA, UNSPECIFIED TYPE: ICD-10-CM

## 2019-12-26 DIAGNOSIS — N18.30 CKD (CHRONIC KIDNEY DISEASE) STAGE 3, GFR 30-59 ML/MIN (H): ICD-10-CM

## 2019-12-26 DIAGNOSIS — R79.89 ELEVATED SERUM CREATININE: ICD-10-CM

## 2019-12-26 LAB
ALBUMIN SERPL-MCNC: 3.5 G/DL (ref 3.4–5)
ALBUMIN UR-MCNC: NEGATIVE MG/DL
ALP SERPL-CCNC: 64 U/L (ref 40–150)
ALT SERPL W P-5'-P-CCNC: 26 U/L (ref 0–70)
ANION GAP SERPL CALCULATED.3IONS-SCNC: 6 MMOL/L (ref 3–14)
APPEARANCE UR: CLEAR
AST SERPL W P-5'-P-CCNC: 23 U/L (ref 0–45)
BASOPHILS # BLD AUTO: 0 10E9/L (ref 0–0.2)
BASOPHILS NFR BLD AUTO: 0.4 %
BILIRUB SERPL-MCNC: 0.6 MG/DL (ref 0.2–1.3)
BILIRUB UR QL STRIP: NEGATIVE
BUN SERPL-MCNC: 25 MG/DL (ref 7–30)
CALCIUM SERPL-MCNC: 8.6 MG/DL (ref 8.5–10.1)
CHLORIDE SERPL-SCNC: 107 MMOL/L (ref 94–109)
CO2 SERPL-SCNC: 25 MMOL/L (ref 20–32)
COLOR UR AUTO: YELLOW
COPATH REPORT: NORMAL
CREAT SERPL-MCNC: 1.59 MG/DL (ref 0.66–1.25)
DIFFERENTIAL METHOD BLD: NORMAL
EOSINOPHIL # BLD AUTO: 0.3 10E9/L (ref 0–0.7)
EOSINOPHIL NFR BLD AUTO: 4.1 %
ERYTHROCYTE [DISTWIDTH] IN BLOOD BY AUTOMATED COUNT: 13 % (ref 10–15)
FERRITIN SERPL-MCNC: 160 NG/ML (ref 26–388)
FOLATE SERPL-MCNC: 9.3 NG/ML
GFR SERPL CREATININE-BSD FRML MDRD: 42 ML/MIN/{1.73_M2}
GLUCOSE SERPL-MCNC: 107 MG/DL (ref 70–99)
GLUCOSE UR STRIP-MCNC: NEGATIVE MG/DL
HCT VFR BLD AUTO: 44 % (ref 40–53)
HGB BLD-MCNC: 14.3 G/DL (ref 13.3–17.7)
HGB UR QL STRIP: ABNORMAL
IRON SATN MFR SERPL: 20 % (ref 15–46)
IRON SERPL-MCNC: 60 UG/DL (ref 35–180)
KETONES UR STRIP-MCNC: NEGATIVE MG/DL
LEUKOCYTE ESTERASE UR QL STRIP: NEGATIVE
LYMPHOCYTES # BLD AUTO: 1.6 10E9/L (ref 0.8–5.3)
LYMPHOCYTES NFR BLD AUTO: 21.5 %
MCH RBC QN AUTO: 31 PG (ref 26.5–33)
MCHC RBC AUTO-ENTMCNC: 32.5 G/DL (ref 31.5–36.5)
MCV RBC AUTO: 95 FL (ref 78–100)
MONOCYTES # BLD AUTO: 0.6 10E9/L (ref 0–1.3)
MONOCYTES NFR BLD AUTO: 8.5 %
NEUTROPHILS # BLD AUTO: 4.9 10E9/L (ref 1.6–8.3)
NEUTROPHILS NFR BLD AUTO: 65.5 %
NITRATE UR QL: NEGATIVE
PH UR STRIP: 5 PH (ref 5–7)
PLATELET # BLD AUTO: 194 10E9/L (ref 150–450)
POTASSIUM SERPL-SCNC: 4.3 MMOL/L (ref 3.4–5.3)
PROT SERPL-MCNC: 7 G/DL (ref 6.8–8.8)
RBC # BLD AUTO: 4.62 10E12/L (ref 4.4–5.9)
RBC #/AREA URNS AUTO: NORMAL /HPF
RETICS # AUTO: 66.5 10E9/L (ref 25–95)
RETICS/RBC NFR AUTO: 1.4 % (ref 0.5–2)
SODIUM SERPL-SCNC: 138 MMOL/L (ref 133–144)
SOURCE: ABNORMAL
SP GR UR STRIP: 1.02 (ref 1–1.03)
TIBC SERPL-MCNC: 307 UG/DL (ref 240–430)
UROBILINOGEN UR STRIP-ACNC: 0.2 EU/DL (ref 0.2–1)
VIT B12 SERPL-MCNC: 370 PG/ML (ref 193–986)
WBC # BLD AUTO: 7.5 10E9/L (ref 4–11)
WBC #/AREA URNS AUTO: NORMAL /HPF

## 2019-12-26 PROCEDURE — 84165 PROTEIN E-PHORESIS SERUM: CPT | Performed by: INTERNAL MEDICINE

## 2019-12-26 PROCEDURE — 00000402 ZZHCL STATISTIC TOTAL PROTEIN: Performed by: INTERNAL MEDICINE

## 2019-12-26 PROCEDURE — 83550 IRON BINDING TEST: CPT | Performed by: INTERNAL MEDICINE

## 2019-12-26 PROCEDURE — 40000847 ZZHCL STATISTIC MORPHOLOGY W/INTERP HISTOLOGY TC 85060: Performed by: INTERNAL MEDICINE

## 2019-12-26 PROCEDURE — 82728 ASSAY OF FERRITIN: CPT | Performed by: INTERNAL MEDICINE

## 2019-12-26 PROCEDURE — 80053 COMPREHEN METABOLIC PANEL: CPT | Performed by: INTERNAL MEDICINE

## 2019-12-26 PROCEDURE — 85025 COMPLETE CBC W/AUTO DIFF WBC: CPT | Performed by: INTERNAL MEDICINE

## 2019-12-26 PROCEDURE — 81001 URINALYSIS AUTO W/SCOPE: CPT | Performed by: INTERNAL MEDICINE

## 2019-12-26 PROCEDURE — 82746 ASSAY OF FOLIC ACID SERUM: CPT | Performed by: INTERNAL MEDICINE

## 2019-12-26 PROCEDURE — 82607 VITAMIN B-12: CPT | Performed by: INTERNAL MEDICINE

## 2019-12-26 PROCEDURE — 85060 BLOOD SMEAR INTERPRETATION: CPT | Performed by: INTERNAL MEDICINE

## 2019-12-26 PROCEDURE — 85045 AUTOMATED RETICULOCYTE COUNT: CPT | Performed by: INTERNAL MEDICINE

## 2019-12-26 PROCEDURE — 83540 ASSAY OF IRON: CPT | Performed by: INTERNAL MEDICINE

## 2019-12-26 PROCEDURE — 36415 COLL VENOUS BLD VENIPUNCTURE: CPT | Performed by: INTERNAL MEDICINE

## 2019-12-27 LAB
ALBUMIN SERPL ELPH-MCNC: 3.8 G/DL (ref 3.7–5.1)
ALPHA1 GLOB SERPL ELPH-MCNC: 0.2 G/DL (ref 0.2–0.4)
ALPHA2 GLOB SERPL ELPH-MCNC: 0.7 G/DL (ref 0.5–0.9)
B-GLOBULIN SERPL ELPH-MCNC: 0.9 G/DL (ref 0.6–1)
GAMMA GLOB SERPL ELPH-MCNC: 1.1 G/DL (ref 0.7–1.6)
M PROTEIN SERPL ELPH-MCNC: 0 G/DL
PROT PATTERN SERPL ELPH-IMP: NORMAL

## 2019-12-30 ENCOUNTER — OFFICE VISIT (OUTPATIENT)
Dept: FAMILY MEDICINE | Facility: CLINIC | Age: 74
End: 2019-12-30
Payer: MEDICARE

## 2019-12-30 VITALS
DIASTOLIC BLOOD PRESSURE: 80 MMHG | OXYGEN SATURATION: 97 % | BODY MASS INDEX: 35.56 KG/M2 | HEART RATE: 78 BPM | HEIGHT: 76 IN | TEMPERATURE: 97.7 F | SYSTOLIC BLOOD PRESSURE: 104 MMHG | WEIGHT: 292 LBS

## 2019-12-30 DIAGNOSIS — F43.9 SITUATIONAL STRESS: Primary | ICD-10-CM

## 2019-12-30 DIAGNOSIS — N18.30 CKD (CHRONIC KIDNEY DISEASE) STAGE 3, GFR 30-59 ML/MIN (H): ICD-10-CM

## 2019-12-30 PROCEDURE — 99214 OFFICE O/P EST MOD 30 MIN: CPT | Performed by: INTERNAL MEDICINE

## 2019-12-30 RX ORDER — ESCITALOPRAM OXALATE 10 MG/1
10 TABLET ORAL DAILY
Qty: 90 TABLET | Refills: 3 | Status: SHIPPED | OUTPATIENT
Start: 2019-12-30 | End: 2021-01-02

## 2019-12-30 ASSESSMENT — MIFFLIN-ST. JEOR: SCORE: 2171

## 2019-12-30 NOTE — PROGRESS NOTES
"Subjective     Ti Nickerson is a 74 year old male who presents to clinic today for the following health issues:    [facility]  CLINIC PROGRESS NOTE    Subjective:  Chronic kidney disease   Ti Nickerson notes that his creatinine has been elevated and stable.  He had not been drinking a a large amount of fluids, and could improve this.  He is not taking any NSAIDs nor is he taking any additional nephrotoxic medications.   He is eating a balanced diet.     Past medical history, medications, allergies, social history, family history reviewed and updated in [facility] as of 12/30/2019 .    ROS  CONSTITUTIONAL: slight fatigue - walking every day, no unexpected change in weight  SKIN: subtle rash on chest   EYES: no acute vision problems or changes  ENT: no ear problems, no mouth problems, no throat problems  RESP: no significant cough, no shortness of breath  CV: no chest pain, no palpitations, no new or worsening peripheral edema  GI: no nausea, no vomiting, no constipation, no diarrhea  : no frequency, no dysuria, no hematuria  MS: no claudication, no myalgias, no joint aches  PSYCHIATRIC: mood has been down - would be interested in starting an selective serotonin reuptake inhibitor       Objective:  Vitals  /80 (BP Location: Left arm, Patient Position: Chair, Cuff Size: Adult Large)   Pulse 78   Temp 97.7  F (36.5  C) (Tympanic)   Ht 1.93 m (6' 4\")   Wt 132.5 kg (292 lb)   SpO2 97%   BMI 35.54 kg/m    GEN: Alert Oriented x3 NAD  CV: RRR no murmurs or rubs  PULM: CTA no wheezes or crackles  SKIN: No visible skin lesion or ulcerations  EXT:No edema bilateral lower extremities  NEURO: Gait and station deferred, No focal neurologic deficits  PSYCH: Mood down, affect mood congruent    No images are attached to the encounter.    No results found for this or any previous visit (from the past 24 hour(s)).    Assessment/Plan:  Patient Instructions   (F43.9) Situational stress  (primary encounter " diagnosis)  Comment: We will start a new medication of lexapro 10 mg daily and focus on improving mental health through diet, exercise and general copping  Plan: escitalopram (LEXAPRO) 10 MG tablet            (N18.3) CKD (chronic kidney disease) stage 3, GFR 30-59 ml/min (H)  Comment: noted creatinine has been stable.  We will avoid nephrotoxic medications and plan to follow up with urology at [facility] and consider nephrology consult  Plan:        Follow up in 3 months    25 minutes spent with patient.  Over 50% of time counseling      Disclaimer: This note consists of symbols derived from keyboarding, dictation and/or voice recognition software. As a result, there may be errors in the script that have gone undetected. Please consider this when interpreting information found in this chart.    Nico Retana MD  [phone]

## 2019-12-30 NOTE — PATIENT INSTRUCTIONS
(F43.9) Situational stress  (primary encounter diagnosis)  Comment: We will start a new medication of lexapro 10 mg daily and focus on improving mental health through diet, exercise and general copping  Plan: escitalopram (LEXAPRO) 10 MG tablet            (N18.3) CKD (chronic kidney disease) stage 3, GFR 30-59 ml/min (H)  Comment: noted creatinine has been stable.  We will avoid nephrotoxic medications and plan to follow up with urology at [facility] and consider nephrology consult  Plan:

## 2019-12-31 ENCOUNTER — TELEPHONE (OUTPATIENT)
Dept: CARDIOLOGY | Facility: CLINIC | Age: 74
End: 2019-12-31

## 2019-12-31 ENCOUNTER — HOSPITAL ENCOUNTER (OUTPATIENT)
Dept: CARDIOLOGY | Facility: CLINIC | Age: 74
Discharge: HOME | End: 2019-12-31
Attending: INTERNAL MEDICINE | Admitting: INTERNAL MEDICINE
Payer: MEDICARE

## 2019-12-31 DIAGNOSIS — I48.19 PERSISTENT ATRIAL FIBRILLATION (H): ICD-10-CM

## 2019-12-31 DIAGNOSIS — I42.9 PRIMARY CARDIOMYOPATHY (H): ICD-10-CM

## 2019-12-31 DIAGNOSIS — I25.10 CORONARY ARTERY DISEASE INVOLVING NATIVE CORONARY ARTERY OF NATIVE HEART WITHOUT ANGINA PECTORIS: ICD-10-CM

## 2019-12-31 DIAGNOSIS — E78.5 HYPERLIPIDEMIA LDL GOAL <100: ICD-10-CM

## 2019-12-31 PROCEDURE — 93306 TTE W/DOPPLER COMPLETE: CPT | Mod: 26 | Performed by: INTERNAL MEDICINE

## 2019-12-31 PROCEDURE — 25500064 ZZH RX 255 OP 636: Performed by: INTERNAL MEDICINE

## 2019-12-31 PROCEDURE — 93306 TTE W/DOPPLER COMPLETE: CPT

## 2019-12-31 RX ADMIN — HUMAN ALBUMIN MICROSPHERES AND PERFLUTREN 3 ML: 10; .22 INJECTION, SOLUTION INTRAVENOUS at 08:00

## 2019-12-31 NOTE — TELEPHONE ENCOUNTER
Echo results showing EF 55-60%.  No valve disease.  Aortic root continues to be dilated at 4.5 cm.  Compared to last echo done 2017 there are no significant changes.  This information was called to patient on cell phone and msg was left.  Instructing him to call back with any questions.

## 2020-01-24 DIAGNOSIS — I48.20 CHRONIC ATRIAL FIBRILLATION (H): ICD-10-CM

## 2020-02-24 DIAGNOSIS — I48.91 ATRIAL FIBRILLATION (H): ICD-10-CM

## 2020-02-24 RX ORDER — METOPROLOL SUCCINATE 100 MG/1
100 TABLET, EXTENDED RELEASE ORAL DAILY
Qty: 90 TABLET | Refills: 0 | Status: SHIPPED | OUTPATIENT
Start: 2020-02-24 | End: 2020-05-19

## 2020-04-21 DIAGNOSIS — I48.20 CHRONIC ATRIAL FIBRILLATION (H): ICD-10-CM

## 2020-05-19 DIAGNOSIS — I48.91 ATRIAL FIBRILLATION (H): ICD-10-CM

## 2020-05-19 RX ORDER — METOPROLOL SUCCINATE 100 MG/1
100 TABLET, EXTENDED RELEASE ORAL DAILY
Qty: 90 TABLET | Refills: 2 | Status: SHIPPED | OUTPATIENT
Start: 2020-05-19 | End: 2020-09-25

## 2020-07-24 DIAGNOSIS — R35.1 BENIGN PROSTATIC HYPERPLASIA WITH NOCTURIA: Primary | ICD-10-CM

## 2020-07-24 DIAGNOSIS — N40.1 BENIGN PROSTATIC HYPERPLASIA WITH NOCTURIA: Primary | ICD-10-CM

## 2020-07-24 RX ORDER — TAMSULOSIN HYDROCHLORIDE 0.4 MG/1
0.4 CAPSULE ORAL DAILY
Qty: 90 CAPSULE | Refills: 0 | Status: SHIPPED | OUTPATIENT
Start: 2020-07-24 | End: 2020-12-30

## 2020-07-24 NOTE — TELEPHONE ENCOUNTER
Historical in chart  LOV @ [facility] 7/22/20 indicates patient is taking tamsulosin still    Patient is due for physical, will send [employer] message to schedule after September    Pending Prescriptions:                       Disp   Refills    tamsulosin (FLOMAX) 0.4 MG capsule        90 cap*             Sig: Take 1 capsule (0.4 mg) by mouth daily          Last Written Prescription Date:  historical  Last Fill Quantity: -,   # refills: -  Last Office Visit: 12-30-19 [facility], 7-29-19 [facility]  Future Office visit:   None    Routing refill request to provider for review/approval because:  Medication is reported/historical    Belia Ponce RT (R)

## 2020-09-25 ENCOUNTER — VIRTUAL VISIT (OUTPATIENT)
Dept: FAMILY MEDICINE | Facility: CLINIC | Age: 75
End: 2020-09-25
Payer: MEDICARE

## 2020-09-25 DIAGNOSIS — I48.20 CHRONIC ATRIAL FIBRILLATION (H): ICD-10-CM

## 2020-09-25 DIAGNOSIS — C64.1 RENAL CELL CARCINOMA, RIGHT (H): Primary | ICD-10-CM

## 2020-09-25 DIAGNOSIS — K43.9 VENTRAL HERNIA WITHOUT OBSTRUCTION OR GANGRENE: ICD-10-CM

## 2020-09-25 PROCEDURE — 99214 OFFICE O/P EST MOD 30 MIN: CPT | Mod: 95 | Performed by: INTERNAL MEDICINE

## 2020-09-25 RX ORDER — METOPROLOL SUCCINATE 100 MG/1
50 TABLET, EXTENDED RELEASE ORAL DAILY
Qty: 90 TABLET | Refills: 2 | COMMUNITY
Start: 2020-09-25 | End: 2020-10-29

## 2020-09-25 NOTE — PROGRESS NOTES
"Ti Nickerson is a 74 year old male who is being evaluated via a billable video visit.      The patient has been notified of following:     \"This video visit will be conducted via a call between you and your physician/provider. We have found that certain health care needs can be provided without the need for an in-person physical exam.  This service lets us provide the care you need with a video conversation.  If a prescription is necessary we can send it directly to your pharmacy.  If lab work is needed we can place an order for that and you can then stop by our lab to have the test done at a later time.    Video visits are billed at different rates depending on your insurance coverage.  Please reach out to your insurance provider with any questions.    If during the course of the call the physician/provider feels a video visit is not appropriate, you will not be charged for this service.\"    Patient has given verbal consent for Video visit? Yes  How would you like to obtain your AVS? MyChart  If you are dropped from the video visit, the video invite should be resent to: Text to cell phone: [phone]  Will anyone else be joining your video visit? No    Subjective     Ti Nickerson is a 74 year old male who presents today via video visit for the following health issues:    HPI    Concerns about hernia after cancer surgery  Episodes of hypotension. Systolic blood pressure has been running under 100's. Average for the month of September was 106/68. Highest systolic 125 and lowest reading was at 75.  Average pulse 68    Video Start Time: 10:08 AM    Status post right nephrectomy   Ti Nickerson has had the onset of a hernia right sided following resection of the right kidney.  He has been experiencing a bit of soreness at the site of the hernia.  He has maintained activity with walking.  He is looking to speak to a physical therapist to see if there may be some exercises to help with this issue.  He " has a support brace.   Cataract   Plans to have cataract surgery next week  Chronic atrial fibrillation   He has had lower blood pressure since making changes to increase his blood pressure from 50 mg of metoprolol to 100 mg of metoprolol and heart rate has remained stable from 50-80's.    Review of Systems   Constitutional, HEENT, cardiovascular, pulmonary, GI, , musculoskeletal, neuro, skin, endocrine and psych systems are negative, except as otherwise noted.      Objective           Vitals:  No vitals were obtained today due to virtual visit.    Physical Exam     GENERAL: Healthy, alert and no distress  EYES: Eyes grossly normal to inspection.  No discharge or erythema, or obvious scleral/conjunctival abnormalities.  RESP: No audible wheeze, cough, or visible cyanosis.  No visible retractions or increased work of breathing.    SKIN: Visible skin clear. No significant rash, abnormal pigmentation or lesions.  NEURO: Cranial nerves grossly intact.  Mentation and speech appropriate for age.  PSYCH: Mentation appears normal, affect normal/bright, judgement and insight intact, normal speech and appearance well-groomed.      No results found for this or any previous visit (from the past 24 hour(s)).        Assessment & Plan     Renal cell carcinoma, right (H)  He will continue to follow-up with urology at [facility] as well as oncology  - [facility] REFERRAL; Future    Ventral hernia without obstruction or gangrene  Recommend continue use of abdominal brace and consider surgical repair.  In the meantime I did refer him to physical therapy to [facility] and can be offered to help with maintaining strength and flexibility of the abdomen  - [facility] REFERRAL; Future    Chronic atrial fibrillation (H)  We did discuss use of metoprolol.  In December last year his dose was increased from  to help control his heart rate.  Given the lower blood pressure, I recommended a trial of  "reducing his dose to 50 mg and to report back blood pressure and heart rate over the next 2 to 3 weeks.  - metoprolol succinate ER (TOPROL-XL) 100 MG 24 hr tablet; Take 0.5 tablets (50 mg) by mouth daily     BMI:   Estimated body mass index is 35.54 kg/m  as calculated from the following:    Height as of 12/30/19: 1.93 m (6' 4\").    Weight as of 12/30/19: 132.5 kg (292 lb).         No follow-ups on file.    Nico Retana MD, MD  [facility]      Video-Visit Details    Type of service:  Video Visit    Video End Time:10:29 AM    Originating Location (pt. Location): Home    Distant Location (provider location):  [facility]     Platform used for Video Visit: Pooja          "

## 2020-09-28 ENCOUNTER — OFFICE VISIT (OUTPATIENT)
Dept: FAMILY MEDICINE | Facility: CLINIC | Age: 75
End: 2020-09-28
Payer: MEDICARE

## 2020-09-28 VITALS
OXYGEN SATURATION: 96 % | BODY MASS INDEX: 35.68 KG/M2 | TEMPERATURE: 97.5 F | HEIGHT: 76 IN | HEART RATE: 62 BPM | DIASTOLIC BLOOD PRESSURE: 79 MMHG | SYSTOLIC BLOOD PRESSURE: 119 MMHG | WEIGHT: 293 LBS

## 2020-09-28 DIAGNOSIS — Z01.818 PREOP GENERAL PHYSICAL EXAM: Primary | ICD-10-CM

## 2020-09-28 DIAGNOSIS — H25.9 AGE-RELATED CATARACT OF BOTH EYES, UNSPECIFIED AGE-RELATED CATARACT TYPE: ICD-10-CM

## 2020-09-28 PROCEDURE — 99214 OFFICE O/P EST MOD 30 MIN: CPT | Performed by: INTERNAL MEDICINE

## 2020-09-28 ASSESSMENT — MIFFLIN-ST. JEOR: SCORE: 2175.54

## 2020-09-28 NOTE — PROGRESS NOTES
[address] [address]  [phone]  Dept: [phone]    PRE-OP EVALUATION:  Today's date: 2020    Ti Nickerson (: 1945) presents for pre-operative evaluation assessment as requested by Dr. Gomez.  He requires evaluation and anesthesia risk assessment prior to undergoing surgery/procedure for treatment of Rt/Lt Eye Cataract.    Fax number for surgical facility: [phone]  Primary Physician: Nico Retana  Type of Anesthesia Anticipated: to be determined    Preop Questionnnaire:  Pre-op Questionnaire 2020   Surgery Location: [facility]   Surgeon: Dr. Marco Antonio Gomez   Surgery/Procedure: Rt Cataract Removal/Lt Cataract removal   Surgery Date: 10/07/20 and 10/21/20   Time of Surgery: 6:30AM   Where patient plans to recover: At home with family   1. Have you ever had a heart attack or stroke? No   2. Have you ever had surgery on your heart or blood vessels, such as a stent placement, a coronary artery bypass, or surgery on an artery in your head, neck, heart, or legs? No   3. Do you have chest pain with activity? No   4. Do you have a history of  heart failure? No   5. Do you currently have a cold, bronchitis or symptoms of other infection? No   6. Do you have a cough, shortness of breath, or wheezing? No   7. Do you or anyone in your family have previous history of blood clots? No   8. Do you or does anyone in your family have a serious bleeding problem such as prolonged bleeding following surgeries or cuts? No   9. Have you ever had problems with anemia or been told to take iron pills? No   10. Have you had any abnormal blood loss such as black, tarry or bloody stools? YES - Last 2019 ( Due to Kidney CA)   11. Have you ever had a blood transfusion? No   Are you willing to have a blood transfusion if it is medically needed before, during, or after your surgery? Yes   13. Have you or any of your relatives ever had  problems with anesthesia? YES - Pt had problems last 10/2019   14. Do you have sleep apnea, excessive snoring or daytime drowsiness? YES - Sleep Apnea   14a. Do you have a CPAP machine? Yes   15. Do you have any artifical heart valves or other implanted medical devices like a pacemaker, defibrillator, or continuous glucose monitor? No   16. Do you have artificial joints? No   17. Are you allergic to latex? No         HPI:     HPI related to upcoming procedure: patient Ti Nickerson is a very pleasant 74 year old male with chronic bilateral cataracts who presents to internal medicine clinic for a pre op cardiac evaluation for upcoming ophthalmology surgery for bilateral eyes cataracts surgeries (right eye cataract on 10/7 and left on 10/21). Patient denies any known allergies to anesthesia agents. Patient denies any known CAD, CVA or Type 2 Diabetes. patient denies any chest pain, headaches, fever or chills. Patient takes Eliquis for atrial fibrillation.      A-FIB - Patient has a longstanding history of chronic A-fib currently on metoprolol for rate control. Current treatment regimen includes Eliquis for stroke prevention and denies significant symptoms of lightheadedness, palpitations or dyspnea.       MEDICAL HISTORY:     Patient Active Problem List    Diagnosis Date Noted     CKD (chronic kidney disease) stage 3, GFR 30-59 ml/min (H) 12/30/2019     Priority: Medium     Renal cell carcinoma, right (H) 10/29/2019     Priority: Medium     s/p nephrectomy       Obesity (BMI 35.0-39.9) with comorbidity (H) 07/29/2019     Priority: Medium     Thoracic aortic aneurysm without rupture (H) 07/29/2019     Priority: Medium     Essential hypertension      Priority: Medium     Elevated TSH 01/01/2018     Priority: Medium     Tubular adenoma of colon 01/01/2017     Priority: Medium     fu 3 years       Idiopathic cardiomyopathy (H) 09/18/2015     Priority: Medium     Coronary artery disease involving native coronary artery  of native heart without angina pectoris 09/18/2015     Priority: Medium     Esophageal reflux 09/18/2015     Priority: Medium     Mixed hyperlipidemia 09/18/2015     Priority: Medium     Sleep apnea 09/18/2015     Priority: Medium     Chronic atrial fibrillation (H) 01/06/2012     Priority: Medium      Past Medical History:   Diagnosis Date     Atrial fibrillation, unspecified 9/18/2015     CAD (coronary artery disease) 09/18/2015    minimal by angio 2007, fu nuc est nl 2018     Elevated TSH 2018     Esophageal reflux 9/18/2015     Essential hypertension      History of cardioversion     [id number]     Idiopathic cardiomyopathy (H) 09/18/2015    fu echo nl ef, nl nuclear est 11/18, Dr. Quarles     Mixed hyperlipidemia 9/18/2015     Mumps      Sleep apnea 09/18/2015    does not use cpap as of 2019     Thoracic aortic aneurysm (H)     sinus of valsalva 4.5 and asc aorta 4.5 in 2017     Tubular adenoma of colon 01/2017    fu 3 years     Past Surgical History:   Procedure Laterality Date     APPENDECTOMY  1964     Current Outpatient Medications   Medication Sig Dispense Refill     apixaban ANTICOAGULANT (ELIQUIS ANTICOAGULANT) 5 MG tablet Take 1 tablet (5 mg) by mouth 2 times daily 180 tablet 3     ASPIRIN NOT PRESCRIBED (INTENTIONAL) Please choose reason not prescribed, below       escitalopram (LEXAPRO) 10 MG tablet Take 1 tablet (10 mg) by mouth daily 90 tablet 3     metoprolol succinate ER (TOPROL-XL) 100 MG 24 hr tablet Take 0.5 tablets (50 mg) by mouth daily 90 tablet 2     rosuvastatin (CRESTOR) 5 MG tablet Take 1 tablet (5 mg) by mouth daily 90 tablet 3     tamsulosin (FLOMAX) 0.4 MG capsule Take 1 capsule (0.4 mg) by mouth daily 90 capsule 0     OTC products: None, except as noted above    Allergies   Allergen Reactions     Oxycodone Other (See Comments)     Hallucinations when given after surgery       Latex Allergy: NO    Social History     Tobacco Use     Smoking status: Never Smoker     Smokeless tobacco:  "Never Used   Substance Use Topics     Alcohol use: Yes     Alcohol/week: 0.0 standard drinks     Comment: 3 drinks week     History   Drug Use No       REVIEW OF SYSTEMS:   Constitutional, neuro, ENT, endocrine, pulmonary, cardiac, gastrointestinal, genitourinary, musculoskeletal, integument and psychiatric systems are negative, except as otherwise noted.    EXAM:   /79 (BP Location: Right arm, Patient Position: Sitting, Cuff Size: Adult Large)   Pulse 62   Temp 97.5  F (36.4  C) (Skin)   Ht 1.93 m (6' 4\")   Wt 132.9 kg (293 lb)   SpO2 96%   BMI 35.67 kg/m      GENERAL APPEARANCE: alert and no distress     EYES: EOMI,  PERRL     HENT: ear canals and TM's normal and nose and mouth without ulcers or lesions     NECK: no adenopathy, no asymmetry, masses, or scars and thyroid normal to palpation     RESP: lungs clear to auscultation - no rales, rhonchi or wheezes     CV: regular rates and rhythm, normal S1 S2, no S3 or S4 and no murmur, click or rub     ABDOMEN:  soft, nontender, no HSM or masses and bowel sounds normal     MS: extremities normal- no gross deformities noted, no evidence of inflammation in joints, FROM in all extremities.     SKIN: no suspicious lesions or rashes     NEURO: Normal strength and tone, sensory exam grossly normal, mentation intact and speech normal     PSYCH: mentation appears normal. and affect normal/bright     LYMPHATICS: No cervical adenopathy    DIAGNOSTICS:   No labs or EKG required for low risk surgery (cataract, skin procedure, breast biopsy, etc)    Recent Labs   Lab Test 12/26/19  0812 12/17/19  0812 10/29/19  1158  12/08/17  1016  06/17/13  0000 05/08/13  0000   HGB 14.3  --  12.3*   < > 16.2  --   --   --      --  346   < > 210  --   --   --    INR  --   --   --   --   --   --  2.3 2.4    138 137   < > 140  --   --   --    POTASSIUM 4.3 4.5 4.3   < > 4.3   < >  --   --    CR 1.59* 1.68* 1.56*   < > 1.10   < >  --   --    A1C  --   --   --   --  5.6  --   " --   --     < > = values in this interval not displayed.        IMPRESSION:   Reason for surgery/procedure: bilateral eyes cataracts  Diagnosis/reason for consult: pre op cardiac evaluation for upcoming ophthalmology surgery for bilateral eyes cataracts surgeries.    The proposed surgical procedure is considered LOW risk.    REVISED CARDIAC RISK INDEX  The patient has the following serious cardiovascular risks for perioperative complications such as (MI, PE, VFib and 3  AV Block):  No serious cardiac risks  INTERPRETATION: 0 risks: Class I (very low risk - 0.4% complication rate)    The patient has the following additional risks for perioperative complications:  No identified additional risks      ICD-10-CM    1. Preop general physical exam  Z01.818    2. Age-related cataract of both eyes, unspecified age-related cataract type  H25.9        RECOMMENDATIONS:       --Patient is to take all scheduled medications on the day of surgery EXCEPT for modifications listed below.    Anticoagulant or Antiplatelet Medication Use  ELIQUIS: There is no clinically significant risk of bleeding with this procedure and apixaban (Eliquis) may be continued in the perioperative period.        APPROVAL GIVEN to proceed with proposed procedure, without further diagnostic evaluation       Signed Electronically by: Quin Lopez MD    Copy of this evaluation report is provided to requesting physician.    [facility] Preop Guidelines    Revised Cardiac Risk Index

## 2020-10-06 DIAGNOSIS — I42.9 PRIMARY CARDIOMYOPATHY (H): Primary | ICD-10-CM

## 2020-10-14 ENCOUNTER — TELEPHONE (OUTPATIENT)
Dept: CARDIOLOGY | Facility: CLINIC | Age: 75
End: 2020-10-14

## 2020-10-14 DIAGNOSIS — E78.2 MIXED HYPERLIPIDEMIA: ICD-10-CM

## 2020-10-14 DIAGNOSIS — I25.10 CORONARY ARTERY DISEASE INVOLVING NATIVE CORONARY ARTERY OF NATIVE HEART WITHOUT ANGINA PECTORIS: Primary | ICD-10-CM

## 2020-10-16 ENCOUNTER — DOCUMENTATION ONLY (OUTPATIENT)
Dept: CARDIOLOGY | Facility: CLINIC | Age: 75
End: 2020-10-16

## 2020-10-16 NOTE — PROGRESS NOTES
Received a call from [employer] to get ok to hold eliquis for 3 day so that Edgar can have his colonoscopy. Spoke with Dr. Quarles and he is fine with this plane. The [facility] [employer] office was called and the ok given to Dory MONTGOMERY.Alfa Castellano RN

## 2020-10-26 ENCOUNTER — HOSPITAL ENCOUNTER (OUTPATIENT)
Dept: CARDIOLOGY | Facility: CLINIC | Age: 75
Discharge: HOME | End: 2020-10-26
Attending: INTERNAL MEDICINE | Admitting: INTERNAL MEDICINE
Payer: MEDICARE

## 2020-10-26 DIAGNOSIS — I25.10 CORONARY ARTERY DISEASE INVOLVING NATIVE CORONARY ARTERY OF NATIVE HEART WITHOUT ANGINA PECTORIS: ICD-10-CM

## 2020-10-26 DIAGNOSIS — E78.2 MIXED HYPERLIPIDEMIA: ICD-10-CM

## 2020-10-26 DIAGNOSIS — I42.9 PRIMARY CARDIOMYOPATHY (H): ICD-10-CM

## 2020-10-26 LAB
ALT SERPL W P-5'-P-CCNC: 5 U/L (ref 5–30)
ANION GAP SERPL CALCULATED.3IONS-SCNC: 10.1 MMOL/L (ref 6–17)
BUN SERPL-MCNC: 28 MG/DL (ref 7–30)
CALCIUM SERPL-MCNC: 9.2 MG/DL (ref 8.5–10.5)
CHLORIDE SERPL-SCNC: 105 MMOL/L (ref 98–107)
CHOLEST SERPL-MCNC: 138 MG/DL
CO2 SERPL-SCNC: 29 MMOL/L (ref 23–29)
CREAT SERPL-MCNC: 1.77 MG/DL (ref 0.7–1.3)
GFR SERPL CREATININE-BSD FRML MDRD: 38 ML/MIN/{1.73_M2}
GLUCOSE SERPL-MCNC: 113 MG/DL (ref 70–105)
HDLC SERPL-MCNC: 50 MG/DL
LDLC SERPL CALC-MCNC: 65 MG/DL
NONHDLC SERPL-MCNC: 88 MG/DL
POTASSIUM SERPL-SCNC: 5.1 MMOL/L (ref 3.5–5.1)
SODIUM SERPL-SCNC: 139 MMOL/L (ref 136–145)
TRIGL SERPL-MCNC: 114 MG/DL

## 2020-10-26 PROCEDURE — 84460 ALANINE AMINO (ALT) (SGPT): CPT | Performed by: INTERNAL MEDICINE

## 2020-10-26 PROCEDURE — 255N000002 HC RX 255 OP 636: Performed by: INTERNAL MEDICINE

## 2020-10-26 PROCEDURE — 93306 TTE W/DOPPLER COMPLETE: CPT | Mod: 26 | Performed by: INTERNAL MEDICINE

## 2020-10-26 PROCEDURE — 36415 COLL VENOUS BLD VENIPUNCTURE: CPT | Performed by: INTERNAL MEDICINE

## 2020-10-26 PROCEDURE — 80048 BASIC METABOLIC PNL TOTAL CA: CPT | Performed by: INTERNAL MEDICINE

## 2020-10-26 PROCEDURE — 93306 TTE W/DOPPLER COMPLETE: CPT

## 2020-10-26 PROCEDURE — 80061 LIPID PANEL: CPT | Performed by: INTERNAL MEDICINE

## 2020-10-26 RX ADMIN — HUMAN ALBUMIN MICROSPHERES AND PERFLUTREN 9 ML: 10; .22 INJECTION, SOLUTION INTRAVENOUS at 08:34

## 2020-10-29 ENCOUNTER — OFFICE VISIT (OUTPATIENT)
Dept: CARDIOLOGY | Facility: CLINIC | Age: 75
End: 2020-10-29
Payer: MEDICARE

## 2020-10-29 VITALS
BODY MASS INDEX: 35.68 KG/M2 | WEIGHT: 293 LBS | HEIGHT: 76 IN | DIASTOLIC BLOOD PRESSURE: 88 MMHG | SYSTOLIC BLOOD PRESSURE: 134 MMHG | HEART RATE: 61 BPM

## 2020-10-29 DIAGNOSIS — I48.20 CHRONIC ATRIAL FIBRILLATION (H): ICD-10-CM

## 2020-10-29 DIAGNOSIS — E78.5 HYPERLIPIDEMIA LDL GOAL <100: ICD-10-CM

## 2020-10-29 DIAGNOSIS — I42.9 PRIMARY CARDIOMYOPATHY (H): Primary | ICD-10-CM

## 2020-10-29 DIAGNOSIS — I48.19 PERSISTENT ATRIAL FIBRILLATION (H): ICD-10-CM

## 2020-10-29 DIAGNOSIS — I25.10 CORONARY ARTERY DISEASE INVOLVING NATIVE CORONARY ARTERY OF NATIVE HEART WITHOUT ANGINA PECTORIS: ICD-10-CM

## 2020-10-29 PROCEDURE — 99214 OFFICE O/P EST MOD 30 MIN: CPT | Performed by: INTERNAL MEDICINE

## 2020-10-29 RX ORDER — ROSUVASTATIN CALCIUM 5 MG/1
5 TABLET, COATED ORAL DAILY
Qty: 90 TABLET | Refills: 3 | Status: SHIPPED | OUTPATIENT
Start: 2020-10-29 | End: 2021-12-03

## 2020-10-29 RX ORDER — METOPROLOL SUCCINATE 100 MG/1
50 TABLET, EXTENDED RELEASE ORAL DAILY
Qty: 90 TABLET | Refills: 2 | Status: SHIPPED | OUTPATIENT
Start: 2020-10-29 | End: 2021-02-24

## 2020-10-29 ASSESSMENT — MIFFLIN-ST. JEOR: SCORE: 2175.54

## 2020-10-29 NOTE — PROGRESS NOTES
HPI and Plan:     Mr. Nickerson is a very pleasant 74-year-old I been following for years with a history of idiopathic cardiomyopathy hypertension obesity and atrial fibrillation.  He comes in with his wife to clinic today.  He is overall feeling well with no symptoms of chest pain chest pressure shortness of breath.  He was getting some lightheadedness and dizziness with standing his blood pressures were measuring around 100 systolic and therefore his metoprolol was cut back to 50 mg daily.  Since then he has had occasional blood pressure elevations over 120 but nothing over 130 and feels better.    We reviewed his lipid profile and echocardiogram showing preserved ejection fraction mild mitral dilatation and moderate dilatation of aortic root.    Assessment: Patient is stable from a cardiovascular standpoint without symptoms.  Blood pressure cholesterol control.  We talked about working on weight loss regular exercise and continued medical therapy.  I will follow back up with him in 1 years time or sooner symptoms warrant.  He has plans to travel to [place] for the winter after doctor appointments complete.      CURRENT MEDICATIONS:  Current Outpatient Medications   Medication Sig Dispense Refill     apixaban ANTICOAGULANT (ELIQUIS ANTICOAGULANT) 5 MG tablet Take 1 tablet (5 mg) by mouth 2 times daily 180 tablet 3     ASPIRIN NOT PRESCRIBED (INTENTIONAL) Please choose reason not prescribed, below       escitalopram (LEXAPRO) 10 MG tablet Take 1 tablet (10 mg) by mouth daily 90 tablet 3     metoprolol succinate ER (TOPROL-XL) 100 MG 24 hr tablet Take 0.5 tablets (50 mg) by mouth daily 90 tablet 2     rosuvastatin (CRESTOR) 5 MG tablet Take 1 tablet (5 mg) by mouth daily 90 tablet 3     tamsulosin (FLOMAX) 0.4 MG capsule Take 1 capsule (0.4 mg) by mouth daily 90 capsule 0       ALLERGIES     Allergies   Allergen Reactions     Oxycodone Other (See Comments)     Hallucinations when given after surgery        PAST  MEDICAL HISTORY:  Past Medical History:   Diagnosis Date     Atrial fibrillation, unspecified 9/18/2015     CAD (coronary artery disease) 09/18/2015    minimal by angio 2007, fu nuc est nl 2018     Elevated TSH 2018     Esophageal reflux 9/18/2015     Essential hypertension      History of cardioversion     [id number]     Idiopathic cardiomyopathy (H) 09/18/2015    fu echo nl ef, nl nuclear est 11/18, Dr. Quarles     Mixed hyperlipidemia 9/18/2015     Mumps      Sleep apnea 09/18/2015    does not use cpap as of 2019     Thoracic aortic aneurysm (H)     sinus of valsalva 4.5 and asc aorta 4.5 in 2017     Tubular adenoma of colon 01/2017    fu 3 years       PAST SURGICAL HISTORY:  Past Surgical History:   Procedure Laterality Date     APPENDECTOMY  1964       FAMILY HISTORY:  Family History   Problem Relation Age of Onset     Arrhythmia Mother         a-fib     Cerebrovascular Disease Mother      Arrhythmia Father         a-fib     Colon Cancer Father      Diabetes Father      Cerebrovascular Disease Father      No Known Problems Brother        SOCIAL HISTORY:  Social History     Socioeconomic History     Marital status:      Spouse name: None     Number of children: 3     Years of education: None     Highest education level: None   Occupational History     Occupation: retired cpa   Social Needs     Financial resource strain: None     Food insecurity     Worry: None     Inability: None     Transportation needs     Medical: None     Non-medical: None   Tobacco Use     Smoking status: Never Smoker     Smokeless tobacco: Never Used   Substance and Sexual Activity     Alcohol use: Yes     Alcohol/week: 0.0 standard drinks     Comment: 3 drinks week     Drug use: No     Sexual activity: Yes     Partners: Female   Lifestyle     Physical activity     Days per week: None     Minutes per session: None     Stress: None   Relationships     Social connections     Talks on phone: None     Gets together: None     Attends  "Church service: None     Active member of club or organization: None     Attends meetings of clubs or organizations: None     Relationship status: None     Intimate partner violence     Fear of current or ex partner: None     Emotionally abused: None     Physically abused: None     Forced sexual activity: None   Other Topics Concern      Service Not Asked     Blood Transfusions Not Asked     Caffeine Concern No     Comment: 2 coffee in am, occas. soda     Occupational Exposure Not Asked     Hobby Hazards Not Asked     Sleep Concern No     Stress Concern No     Weight Concern Yes     Special Diet No     Back Care Not Asked     Exercise No     Comment: walking 3-4 days week     Bike Helmet Not Asked     Seat Belt Yes     Self-Exams Not Asked     Parent/sibling w/ CABG, MI or angioplasty before 65F 55M? Not Asked   Social History Narrative     None       Review of Systems:  Skin:  Negative     Eyes:  Positive for glasses  ENT:  Negative    Respiratory:  Negative    Cardiovascular:    fatigue;Positive for;lightheadedness  Gastroenterology: Negative    Genitourinary:  not assessed    Musculoskeletal:  Positive for joint stiffness  Neurologic:  Positive for numbness or tingling of hands;numbness or tingling of feet  Psychiatric:  Positive for depression  Heme/Lymph/Imm:  Positive for allergies  Endocrine:  Negative      Physical Exam:  Vitals: /88   Pulse 61   Ht 1.93 m (6' 4\")   Wt 132.9 kg (293 lb)   BMI 35.67 kg/m      Constitutional:  cooperative, alert and oriented, well developed, well nourished, in no acute distress morbidly obese      Skin:  warm and dry to the touch, no apparent skin lesions or masses noted          Head:  normocephalic, no masses or lesions        Eyes:           Lymph:      ENT:  no pallor or cyanosis, dentition good        Neck:           Respiratory:  normal breath sounds, clear to auscultation, normal A-P diameter, normal symmetry, normal respiratory excursion, no use of " accessory muscles         Cardiac:   irregularly irregular rhythm   no presence of murmur          pulses full and equal, no bruits auscultated                                        GI:  abdomen soft, non-tender, BS normoactive, no mass, no HSM, no bruits        Extremities and Muscular Skeletal:  no deformities, clubbing, cyanosis, erythema observed              Neurological:           Psych:         Recent Lab Results:  LIPID RESULTS:  Lab Results   Component Value Date    CHOL 138 10/26/2020    HDL 50 10/26/2020    LDL 65 10/26/2020    TRIG 114 10/26/2020    CHOLHDLRATIO 2.8 09/17/2015       LIVER ENZYME RESULTS:  Lab Results   Component Value Date    AST 23 12/26/2019    ALT 5 10/26/2020       CBC RESULTS:  Lab Results   Component Value Date    WBC 7.5 12/26/2019    RBC 4.62 12/26/2019    HGB 14.3 12/26/2019    HCT 44.0 12/26/2019    MCV 95 12/26/2019    MCH 31.0 12/26/2019    MCHC 32.5 12/26/2019    RDW 13.0 12/26/2019     12/26/2019       BMP RESULTS:  Lab Results   Component Value Date     10/26/2020    POTASSIUM 5.1 10/26/2020    CHLORIDE 105 10/26/2020    CO2 29 10/26/2020    ANIONGAP 10.1 10/26/2020     (H) 10/26/2020    BUN 28 10/26/2020    CR 1.77 (H) 10/26/2020    GFRESTIMATED 38 (L) 10/26/2020    GFRESTBLACK 46 (L) 10/26/2020    DOMINICK 9.2 10/26/2020        A1C RESULTS:  Lab Results   Component Value Date    A1C 5.6 12/08/2017       INR RESULTS:  Lab Results   Component Value Date    INR 2.3 06/17/2013    INR 2.4 05/08/2013           CC  No referring provider defined for this encounter.

## 2020-10-29 NOTE — LETTER
10/29/2020    Nico Retnaa MD, MD  [address] [facility] [address]  [facility] [address]    RE: Ti Hayesnav       Dear Colleague,    I had the pleasure of seeing Ti Nickerson in the [facility].    HPI and Plan:     Mr. Nickerson is a very pleasant 74-year-old I been following for years with a history of idiopathic cardiomyopathy hypertension obesity and atrial fibrillation.  He comes in with his wife to clinic today.  He is overall feeling well with no symptoms of chest pain chest pressure shortness of breath.  He was getting some lightheadedness and dizziness with standing his blood pressures were measuring around 100 systolic and therefore his metoprolol was cut back to 50 mg daily.  Since then he has had occasional blood pressure elevations over 120 but nothing over 130 and feels better.    We reviewed his lipid profile and echocardiogram showing preserved ejection fraction mild mitral dilatation and moderate dilatation of aortic root.    Assessment: Patient is stable from a cardiovascular standpoint without symptoms.  Blood pressure cholesterol control.  We talked about working on weight loss regular exercise and continued medical therapy.  I will follow back up with him in 1 years time or sooner symptoms warrant.  He has plans to travel to [place] for the winter after doctor appointments complete.      CURRENT MEDICATIONS:  Current Outpatient Medications   Medication Sig Dispense Refill     apixaban ANTICOAGULANT (ELIQUIS ANTICOAGULANT) 5 MG tablet Take 1 tablet (5 mg) by mouth 2 times daily 180 tablet 3     ASPIRIN NOT PRESCRIBED (INTENTIONAL) Please choose reason not prescribed, below       escitalopram (LEXAPRO) 10 MG tablet Take 1 tablet (10 mg) by mouth daily 90 tablet 3     metoprolol succinate ER (TOPROL-XL) 100 MG 24 hr tablet Take 0.5 tablets (50 mg) by mouth daily 90 tablet 2     rosuvastatin (CRESTOR) 5 MG tablet Take 1 tablet (5 mg) by mouth daily 90 tablet  3     tamsulosin (FLOMAX) 0.4 MG capsule Take 1 capsule (0.4 mg) by mouth daily 90 capsule 0       ALLERGIES     Allergies   Allergen Reactions     Oxycodone Other (See Comments)     Hallucinations when given after surgery        PAST MEDICAL HISTORY:  Past Medical History:   Diagnosis Date     Atrial fibrillation, unspecified 9/18/2015     CAD (coronary artery disease) 09/18/2015    minimal by angio 2007, fu nuc est nl 2018     Elevated TSH 2018     Esophageal reflux 9/18/2015     Essential hypertension      History of cardioversion     [id number]     Idiopathic cardiomyopathy (H) 09/18/2015    fu echo nl ef, nl nuclear est 11/18, Dr. Quarles     Mixed hyperlipidemia 9/18/2015     Mumps      Sleep apnea 09/18/2015    does not use cpap as of 2019     Thoracic aortic aneurysm (H)     sinus of valsalva 4.5 and asc aorta 4.5 in 2017     Tubular adenoma of colon 01/2017    fu 3 years       PAST SURGICAL HISTORY:  Past Surgical History:   Procedure Laterality Date     APPENDECTOMY  1964       FAMILY HISTORY:  Family History   Problem Relation Age of Onset     Arrhythmia Mother         a-fib     Cerebrovascular Disease Mother      Arrhythmia Father         a-fib     Colon Cancer Father      Diabetes Father      Cerebrovascular Disease Father      No Known Problems Brother        SOCIAL HISTORY:  Social History     Socioeconomic History     Marital status:      Spouse name: None     Number of children: 3     Years of education: None     Highest education level: None   Occupational History     Occupation: retired cpa   Social Needs     Financial resource strain: None     Food insecurity     Worry: None     Inability: None     Transportation needs     Medical: None     Non-medical: None   Tobacco Use     Smoking status: Never Smoker     Smokeless tobacco: Never Used   Substance and Sexual Activity     Alcohol use: Yes     Alcohol/week: 0.0 standard drinks     Comment: 3 drinks week     Drug use: No     Sexual activity:  "Yes     Partners: Female   Lifestyle     Physical activity     Days per week: None     Minutes per session: None     Stress: None   Relationships     Social connections     Talks on phone: None     Gets together: None     Attends Quaker service: None     Active member of club or organization: None     Attends meetings of clubs or organizations: None     Relationship status: None     Intimate partner violence     Fear of current or ex partner: None     Emotionally abused: None     Physically abused: None     Forced sexual activity: None   Other Topics Concern      Service Not Asked     Blood Transfusions Not Asked     Caffeine Concern No     Comment: 2 coffee in am, occas. soda     Occupational Exposure Not Asked     Hobby Hazards Not Asked     Sleep Concern No     Stress Concern No     Weight Concern Yes     Special Diet No     Back Care Not Asked     Exercise No     Comment: walking 3-4 days week     Bike Helmet Not Asked     Seat Belt Yes     Self-Exams Not Asked     Parent/sibling w/ CABG, MI or angioplasty before 65F 55M? Not Asked   Social History Narrative     None       Review of Systems:  Skin:  Negative     Eyes:  Positive for glasses  ENT:  Negative    Respiratory:  Negative    Cardiovascular:    fatigue;Positive for;lightheadedness  Gastroenterology: Negative    Genitourinary:  not assessed    Musculoskeletal:  Positive for joint stiffness  Neurologic:  Positive for numbness or tingling of hands;numbness or tingling of feet  Psychiatric:  Positive for depression  Heme/Lymph/Imm:  Positive for allergies  Endocrine:  Negative      Physical Exam:  Vitals: /88   Pulse 61   Ht 1.93 m (6' 4\")   Wt 132.9 kg (293 lb)   BMI 35.67 kg/m      Constitutional:  cooperative, alert and oriented, well developed, well nourished, in no acute distress morbidly obese      Skin:  warm and dry to the touch, no apparent skin lesions or masses noted          Head:  normocephalic, no masses or lesions    "     Eyes:           Lymph:      ENT:  no pallor or cyanosis, dentition good        Neck:           Respiratory:  normal breath sounds, clear to auscultation, normal A-P diameter, normal symmetry, normal respiratory excursion, no use of accessory muscles         Cardiac:   irregularly irregular rhythm   no presence of murmur          pulses full and equal, no bruits auscultated                                        GI:  abdomen soft, non-tender, BS normoactive, no mass, no HSM, no bruits        Extremities and Muscular Skeletal:  no deformities, clubbing, cyanosis, erythema observed              Neurological:           Psych:         Recent Lab Results:  LIPID RESULTS:  Lab Results   Component Value Date    CHOL 138 10/26/2020    HDL 50 10/26/2020    LDL 65 10/26/2020    TRIG 114 10/26/2020    CHOLHDLRATIO 2.8 09/17/2015       LIVER ENZYME RESULTS:  Lab Results   Component Value Date    AST 23 12/26/2019    ALT 5 10/26/2020       CBC RESULTS:  Lab Results   Component Value Date    WBC 7.5 12/26/2019    RBC 4.62 12/26/2019    HGB 14.3 12/26/2019    HCT 44.0 12/26/2019    MCV 95 12/26/2019    MCH 31.0 12/26/2019    MCHC 32.5 12/26/2019    RDW 13.0 12/26/2019     12/26/2019       BMP RESULTS:  Lab Results   Component Value Date     10/26/2020    POTASSIUM 5.1 10/26/2020    CHLORIDE 105 10/26/2020    CO2 29 10/26/2020    ANIONGAP 10.1 10/26/2020     (H) 10/26/2020    BUN 28 10/26/2020    CR 1.77 (H) 10/26/2020    GFRESTIMATED 38 (L) 10/26/2020    GFRESTBLACK 46 (L) 10/26/2020    DOMINICK 9.2 10/26/2020        A1C RESULTS:  Lab Results   Component Value Date    A1C 5.6 12/08/2017       INR RESULTS:  Lab Results   Component Value Date    INR 2.3 06/17/2013    INR 2.4 05/08/2013           CC  No referring provider defined for this encounter.                    Thank you for allowing me to participate in the care of your patient.      Sincerely,     SUNNY WITT MD     [facility]  Care    cc:   No referring provider defined for this encounter.

## 2020-11-10 ENCOUNTER — TRANSFERRED RECORDS (OUTPATIENT)
Dept: HEALTH INFORMATION MANAGEMENT | Facility: CLINIC | Age: 75
End: 2020-11-10

## 2020-11-20 ENCOUNTER — TRANSFERRED RECORDS (OUTPATIENT)
Dept: HEALTH INFORMATION MANAGEMENT | Facility: CLINIC | Age: 75
End: 2020-11-20

## 2020-11-20 LAB
CREAT SERPL-MCNC: 1.87 MG/DL (ref 0.74–1.35)
GFR SERPL CREATININE-BSD FRML MDRD: 35 ML/MIN/BSA
GLUCOSE SERPL-MCNC: 98 MG/DL (ref 70–140)
POTASSIUM SERPL-SCNC: 4.5 MMOL/L (ref 3.6–5.2)

## 2020-12-08 ENCOUNTER — TRANSFERRED RECORDS (OUTPATIENT)
Dept: HEALTH INFORMATION MANAGEMENT | Facility: CLINIC | Age: 75
End: 2020-12-08

## 2020-12-16 ENCOUNTER — TRANSFERRED RECORDS (OUTPATIENT)
Dept: HEALTH INFORMATION MANAGEMENT | Facility: CLINIC | Age: 75
End: 2020-12-16

## 2020-12-29 DIAGNOSIS — N40.1 BENIGN PROSTATIC HYPERPLASIA WITH NOCTURIA: ICD-10-CM

## 2020-12-29 DIAGNOSIS — R35.1 BENIGN PROSTATIC HYPERPLASIA WITH NOCTURIA: ICD-10-CM

## 2020-12-30 RX ORDER — TAMSULOSIN HYDROCHLORIDE 0.4 MG/1
0.4 CAPSULE ORAL DAILY
Qty: 90 CAPSULE | Refills: 1 | Status: SHIPPED | OUTPATIENT
Start: 2020-12-30 | End: 2021-06-15

## 2020-12-30 NOTE — TELEPHONE ENCOUNTER
TAMSULOSIN 0.4 MG CAPSULE    Summary: Take 1 capsule (0.4 mg) by mouth daily, Disp-90 capsule,R-0, E-Prescribe   Dose, Route, Frequency: 0.4 mg, Oral, DAILY  Start: 7/24/2020  Ord/Sold: 7/24/2020

## 2020-12-31 ENCOUNTER — TRANSFERRED RECORDS (OUTPATIENT)
Dept: HEALTH INFORMATION MANAGEMENT | Facility: CLINIC | Age: 75
End: 2020-12-31

## 2020-12-31 DIAGNOSIS — F43.9 SITUATIONAL STRESS: ICD-10-CM

## 2021-01-02 RX ORDER — ESCITALOPRAM OXALATE 10 MG/1
TABLET ORAL
Qty: 90 TABLET | Refills: 2 | Status: SHIPPED | OUTPATIENT
Start: 2021-01-02 | End: 2021-06-15

## 2021-01-06 NOTE — PROGRESS NOTES
BP is 138/72  Continue current regimen  HPI and Plan:   See dictation    Orders Placed This Encounter   Procedures     Follow-Up with Cardiologist     No orders of the defined types were placed in this encounter.    There are no discontinued medications.      Encounter Diagnoses   Name Primary?     Hyperlipidemia LDL goal <100      Primary cardiomyopathy (H)      Coronary artery disease involving native coronary artery of native heart without angina pectoris      Chronic atrial fibrillation (H)        CURRENT MEDICATIONS:  Current Outpatient Prescriptions   Medication Sig Dispense Refill     apixaban ANTICOAGULANT (ELIQUIS) 5 MG tablet Take 1 tablet (5 mg) by mouth 2 times daily 180 tablet 3     metoprolol (TOPROL-XL) 100 MG 24 hr tablet Take 1 tablet (100 mg) by mouth daily 90 tablet 3     ramipril (ALTACE) 2.5 MG capsule Take 1 capsule (2.5 mg) by mouth daily 90 capsule 3     rosuvastatin (CRESTOR) 10 MG tablet Take 1 tablet (10 mg) by mouth daily 90 tablet 3       ALLERGIES   No Known Allergies    PAST MEDICAL HISTORY:  Past Medical History:   Diagnosis Date     Atrial fibrillation, unspecified 9/18/2015     CAD (coronary artery disease) 9/18/2015     Esophageal reflux 9/18/2015     History of cardioversion     [id number]     Idiopathic cardiomyopathy (H) 9/18/2015     Mixed hyperlipidemia 9/18/2015     Sleep apnea 9/18/2015       PAST SURGICAL HISTORY:  No past surgical history on file.    FAMILY HISTORY:  Family History   Problem Relation Age of Onset     Arrhythmia Mother      a-fib     Arrhythmia Father      a-fib       SOCIAL HISTORY:  Social History     Social History     Marital status:      Spouse name: N/A     Number of children: N/A     Years of education: N/A     Social History Main Topics     Smoking status: Never Smoker     Smokeless tobacco: Never Used     Alcohol use 0.0 oz/week     0 Standard drinks or equivalent per week      Comment: 3 drinks week     Drug use: No     Sexual activity: Yes     Partners: Female     Other Topics  "Concern     Caffeine Concern No     2 coffee in am, occas. soda     Sleep Concern No     Stress Concern No     Weight Concern Yes     Special Diet No     Exercise No     walking 3-4 days week     Seat Belt Yes     Social History Narrative       Review of Systems:  Skin:  Negative     Eyes:  Positive for glasses  ENT:  Negative nasal congestion  Respiratory:  Positive for dyspnea on exertion  Cardiovascular:  Negative;chest pain;palpitations;syncope or near-syncope;cyanosis;edema;exercise intolerance Positive for  Gastroenterology: Negative    Genitourinary:  Negative    Musculoskeletal:  Positive for joint stiffness  Neurologic:  Negative    Psychiatric:  Negative    Heme/Lymph/Imm:  Positive for allergies;easy bruising  Endocrine:  Negative      Physical Exam:  Vitals: /78  Pulse 72  Ht 1.93 m (6' 4\")  Wt (!) 140.2 kg (309 lb)  BMI 37.61 kg/m2    Constitutional:           Skin:             Head:           Eyes:           Lymph:      ENT:           Neck:           Respiratory:            Cardiac:                                                           GI:           Extremities and Muscular Skeletal:                 Neurological:           Psych:           Recent Lab Results:  LIPID RESULTS:  Lab Results   Component Value Date    CHOL 144 11/28/2017    HDL 44 11/28/2017    LDL 77 11/28/2017    TRIG 114 11/28/2017    CHOLHDLRATIO 2.8 09/17/2015       LIVER ENZYME RESULTS:  Lab Results   Component Value Date    ALT 15 06/18/2013       CBC RESULTS:  Lab Results   Component Value Date    WBC 12.5 (H) 12/11/2011    RBC 4.62 12/11/2011    HGB 14.6 12/11/2011    HCT 42.9 12/11/2011    MCV 93 12/11/2011    MCH 31.6 12/11/2011    MCHC 34.0 12/11/2011    RDW 12.7 12/11/2011     12/11/2011       BMP RESULTS:  Lab Results   Component Value Date     12/11/2011    POTASSIUM 3.9 12/11/2011    CHLORIDE 106 12/11/2011    CO2 26 12/11/2011    ANIONGAP 6 12/11/2011     (H) 12/11/2011    BUN 21 12/11/2011 "    CR 0.88 12/11/2011    GFRESTIMATED 87 12/11/2011    GFRESTBLACK >90 12/11/2011    DOMINICK 8.1 (L) 12/11/2011        A1C RESULTS:  No results found for: A1C    INR RESULTS:  Lab Results   Component Value Date    INR 2.3 06/17/2013    INR 2.4 05/08/2013           CC  Devin Quarles MD  [address]  [place] [id number]

## 2021-01-15 ENCOUNTER — HEALTH MAINTENANCE LETTER (OUTPATIENT)
Age: 76
End: 2021-01-15

## 2021-02-24 DIAGNOSIS — I48.20 CHRONIC ATRIAL FIBRILLATION (H): ICD-10-CM

## 2021-02-24 RX ORDER — METOPROLOL SUCCINATE 100 MG/1
50 TABLET, EXTENDED RELEASE ORAL DAILY
Qty: 45 TABLET | Refills: 2 | Status: SHIPPED | OUTPATIENT
Start: 2021-02-24 | End: 2021-03-01

## 2021-03-01 DIAGNOSIS — I48.20 CHRONIC ATRIAL FIBRILLATION (H): ICD-10-CM

## 2021-03-01 RX ORDER — METOPROLOL SUCCINATE 50 MG/1
50 TABLET, EXTENDED RELEASE ORAL DAILY
Qty: 90 TABLET | Refills: 3 | Status: SHIPPED | OUTPATIENT
Start: 2021-03-01 | End: 2021-06-16

## 2021-04-28 ENCOUNTER — TRANSFERRED RECORDS (OUTPATIENT)
Dept: HEALTH INFORMATION MANAGEMENT | Facility: CLINIC | Age: 76
End: 2021-04-28

## 2021-04-28 LAB
ALT SERPL-CCNC: 20 U/L (ref 7–55)
AST SERPL-CCNC: 25 U/L (ref 8–48)
CREAT SERPL-MCNC: 1.89 MG/DL (ref 0.74–1.35)
GFR SERPL CREATININE-BSD FRML MDRD: 34 ML/MIN/BSA
GLUCOSE SERPL-MCNC: 102 MG/DL (ref 70–140)
POTASSIUM SERPL-SCNC: 5.2 MMOL/L (ref 3.6–5.2)

## 2021-05-03 ENCOUNTER — TRANSFERRED RECORDS (OUTPATIENT)
Dept: HEALTH INFORMATION MANAGEMENT | Facility: CLINIC | Age: 76
End: 2021-05-03

## 2021-06-01 ENCOUNTER — TELEPHONE (OUTPATIENT)
Dept: FAMILY MEDICINE | Facility: CLINIC | Age: 76
End: 2021-06-01

## 2021-06-01 DIAGNOSIS — D64.9 ANEMIA, UNSPECIFIED TYPE: ICD-10-CM

## 2021-06-01 DIAGNOSIS — N18.30 STAGE 3 CHRONIC KIDNEY DISEASE, UNSPECIFIED WHETHER STAGE 3A OR 3B CKD (H): Primary | ICD-10-CM

## 2021-06-01 DIAGNOSIS — I25.10 CORONARY ARTERY DISEASE INVOLVING NATIVE CORONARY ARTERY OF NATIVE HEART WITHOUT ANGINA PECTORIS: ICD-10-CM

## 2021-06-01 NOTE — TELEPHONE ENCOUNTER
Reason for Call:  Other appointment    Detailed comments: patient is calling to request an sooner appt with  for physical and follow up kidney surgery before 6/14. Please call patient.     Phone Number Patient can be reached at: Cell number on file:    Telephone Information:   Mobile [phone]       Best Time: any    Can we leave a detailed message on this number? YES    Call taken on 6/1/2021 at 2:08 PM by Tracy Key

## 2021-06-03 NOTE — TELEPHONE ENCOUNTER
Dr Retana ---7am appointment no longer available.      Please advise.     Thank you,  Emily ZAMUDIO, RN      Joy 3, 2021  2:38 PM

## 2021-06-03 NOTE — TELEPHONE ENCOUNTER
Called patient and offered appointment for physical next Tuesday.   That day doesn't work for patient.     Patient states he's fine with keeping the 6/16/21 4pm physical with Dr Retana. Requesting fasting lab appointment prior to that appointment.     Scheduled fasting lab appointment next Thur 6/10/21.     Dr Retana please place future lab orders.   Pended CBC, CMP, Lipids.   Please change and/or add any other needed labs.         Thank you,  Emily ZAMUDIO, RN      Joy 3, 2021  3:18 PM

## 2021-06-10 DIAGNOSIS — I25.10 CORONARY ARTERY DISEASE INVOLVING NATIVE CORONARY ARTERY OF NATIVE HEART WITHOUT ANGINA PECTORIS: ICD-10-CM

## 2021-06-10 DIAGNOSIS — N18.30 STAGE 3 CHRONIC KIDNEY DISEASE, UNSPECIFIED WHETHER STAGE 3A OR 3B CKD (H): ICD-10-CM

## 2021-06-10 DIAGNOSIS — D64.9 ANEMIA, UNSPECIFIED TYPE: ICD-10-CM

## 2021-06-10 LAB
ALBUMIN SERPL-MCNC: 3.3 G/DL (ref 3.4–5)
ALP SERPL-CCNC: 57 U/L (ref 40–150)
ALT SERPL W P-5'-P-CCNC: 24 U/L (ref 0–70)
ANION GAP SERPL CALCULATED.3IONS-SCNC: 4 MMOL/L (ref 3–14)
AST SERPL W P-5'-P-CCNC: 19 U/L (ref 0–45)
BILIRUB SERPL-MCNC: 0.7 MG/DL (ref 0.2–1.3)
BUN SERPL-MCNC: 30 MG/DL (ref 7–30)
CALCIUM SERPL-MCNC: 8.5 MG/DL (ref 8.5–10.1)
CHLORIDE SERPL-SCNC: 111 MMOL/L (ref 94–109)
CHOLEST SERPL-MCNC: 116 MG/DL
CO2 SERPL-SCNC: 25 MMOL/L (ref 20–32)
CREAT SERPL-MCNC: 1.68 MG/DL (ref 0.66–1.25)
ERYTHROCYTE [DISTWIDTH] IN BLOOD BY AUTOMATED COUNT: 13.2 % (ref 10–15)
FERRITIN SERPL-MCNC: 250 NG/ML (ref 26–388)
GFR SERPL CREATININE-BSD FRML MDRD: 39 ML/MIN/{1.73_M2}
GLUCOSE SERPL-MCNC: 97 MG/DL (ref 70–99)
HCT VFR BLD AUTO: 42.1 % (ref 40–53)
HDLC SERPL-MCNC: 45 MG/DL
HGB BLD-MCNC: 14.4 G/DL (ref 13.3–17.7)
LDLC SERPL CALC-MCNC: 56 MG/DL
MCH RBC QN AUTO: 33.3 PG (ref 26.5–33)
MCHC RBC AUTO-ENTMCNC: 34.2 G/DL (ref 31.5–36.5)
MCV RBC AUTO: 98 FL (ref 78–100)
NONHDLC SERPL-MCNC: 71 MG/DL
PHOSPHATE SERPL-MCNC: 3.2 MG/DL (ref 2.5–4.5)
PLATELET # BLD AUTO: 141 10E9/L (ref 150–450)
POTASSIUM SERPL-SCNC: 4.5 MMOL/L (ref 3.4–5.3)
PROT SERPL-MCNC: 6.8 G/DL (ref 6.8–8.8)
RBC # BLD AUTO: 4.32 10E12/L (ref 4.4–5.9)
SODIUM SERPL-SCNC: 140 MMOL/L (ref 133–144)
TRIGL SERPL-MCNC: 74 MG/DL
WBC # BLD AUTO: 5.9 10E9/L (ref 4–11)

## 2021-06-10 PROCEDURE — 84100 ASSAY OF PHOSPHORUS: CPT | Performed by: INTERNAL MEDICINE

## 2021-06-10 PROCEDURE — 36415 COLL VENOUS BLD VENIPUNCTURE: CPT | Performed by: INTERNAL MEDICINE

## 2021-06-10 PROCEDURE — 80053 COMPREHEN METABOLIC PANEL: CPT | Performed by: INTERNAL MEDICINE

## 2021-06-10 PROCEDURE — 85027 COMPLETE CBC AUTOMATED: CPT | Performed by: INTERNAL MEDICINE

## 2021-06-10 PROCEDURE — 80061 LIPID PANEL: CPT | Performed by: INTERNAL MEDICINE

## 2021-06-10 PROCEDURE — 82728 ASSAY OF FERRITIN: CPT | Performed by: INTERNAL MEDICINE

## 2021-06-13 NOTE — RESULT ENCOUNTER NOTE
Abrahan Luke,    I had the opportunity to review your recent labs and a summary of your labs reads as follows:    Your complete blood counts show no sign of anemia, normal white blood cell count, however there was a decline in your blood count and we should recheck this again when you come for your visit  Your comprehensive metabolic panel showed stable renal function, normal liver function, and normal fasting blood glucose indicating no evidence of diabetes mellitus.  Your fasting lipid panel show  - normal HDL (good) cholesterol -as your goal is greater than 40  - low LDL (bad) cholesterol as your goal is less than 100  - normal triglyceride levels      Sincerely,  Nico Retana MD

## 2021-06-15 NOTE — PROGRESS NOTES
"SUBJECTIVE:   Ti Nickerson is a 75 year old male who presents for Preventive Visit.    {Split Bill scripting  The purpose of this visit is to discuss your medical history and prevent health problems before you are sick. You may be responsible for a co-pay, coinsurance, or deductible if your visit today includes services such as checking on a sore throat, having an x-ray or lab test, or treating and evaluating a new or existing condition :621383}  Patient has been advised of split billing requirements and indicates understanding: {Yes and No:438851}   Are you in the first 12 months of your Medicare coverage?  { :106444::\"No\"}    HPI  Do you feel safe in your environment? { :365575}    Have you ever done Advance Care Planning? (For example, a Health Directive, POLST, or a discussion with a medical provider or your loved ones about your wishes): { :231007}    {Hearing Test Done (Optional):506221}   Fall risk  { :786531}  {If any of the above assessments are answered yes, consider ordering appropriate referrals (Optional):652938::\"click delete button to remove this line now\"}  Cognitive Screening { :855119}    {Do you have sleep apnea, excessive snoring or daytime drowsiness? (Optional):514069}    Reviewed and updated as needed this visit by clinical staff                 Reviewed and updated as needed this visit by Provider                Social History     Tobacco Use     Smoking status: Never Smoker     Smokeless tobacco: Never Used   Substance Use Topics     Alcohol use: Yes     Alcohol/week: 0.0 standard drinks     Comment: 3 drinks week     {Rooming Staff- Complete this question if Prescreen response is not shown below for today's visit. If you drink alcohol do you typically have >3 drinks per day or >7 drinks per week? (Optional):734339}    Alcohol Use 7/29/2019   Prescreen: >3 drinks/day or >7 drinks/week? No   {add AUDIT responses (Optional) (A score of 7 for adult men is an indication of hazardous " "drinking; a score of 8 or more is an indication of an alcohol use disorder.  A score of 7 or more for adult women is an indication of hazardous drinking or an alchohol use disorder):379979}    {Outside tests to abstract? :266985}    {additional problems to add (Optional):004030}    Current providers sharing in care for this patient include: {Rooming staff:  Please update Care Team in Rooming Activity, refresh this note and then delete this statement}  Patient Care Team:  Nico Retana MD as PCP - General (Internal Medicine)  Nico Retana MD as Assigned PCP  Devin Quarles MD as Assigned Heart and Vascular Provider    The following health maintenance items are reviewed in Epic and correct as of today:  Health Maintenance Due   Topic Date Due     ANNUAL REVIEW OF  ORDERS  Never done     ADVANCE CARE PLANNING  Never done     ZOSTER IMMUNIZATION (1 of 2) Never done     MEDICARE ANNUAL WELLNESS VISIT  2020     Pneumococcal Vaccine: 65+ Years (2 of 2 - PPSV23) 2020     PHQ-2  2021     {Chronicprobdata (optional):918229}  {Decision Support (Optional):312764}        Review of Systems  {ROS COMP (Optional):937685}    OBJECTIVE:   There were no vitals taken for this visit. Estimated body mass index is 35.67 kg/m  as calculated from the following:    Height as of 10/29/20: 1.93 m (6' 4\").    Weight as of 10/29/20: 132.9 kg (293 lb).  Physical Exam  {Exam (Optional) :033027}    {Diagnostic Test Results (Optional):308490::\"Diagnostic Test Results:\",\"Labs reviewed in Epic\"}    ASSESSMENT / PLAN:   {Diag Picklist:287760}    Patient has been advised of split billing requirements and indicates understanding: {YES / NO:590871::\"Yes\"}  COUNSELING:  {Medicare Counselin}    Estimated body mass index is 35.67 kg/m  as calculated from the following:    Height as of 10/29/20: 1.93 m (6' 4\").    Weight as of 10/29/20: 132.9 kg (293 lb).    {Weight Management Plan (ACO) Complete if " BMI is abnormal-  Ages 18-64  BMI >24.9.  Age 65+ with BMI <23 or >30 (Optional):860501}    He reports that he has never smoked. He has never used smokeless tobacco.      Appropriate preventive services were discussed with this patient, including applicable screening as appropriate for cardiovascular disease, diabetes, osteopenia/osteoporosis, and glaucoma.  As appropriate for age/gender, discussed screening for colorectal cancer, prostate cancer, breast cancer, and cervical cancer. Checklist reviewing preventive services available has been given to the patient.    Reviewed patients plan of care and provided an AVS. The {CarePlan:321098} for Ti meets the Care Plan requirement. This Care Plan has been established and reviewed with the {PATIENT, FAMILY MEMBER, CAREGIVER:436064}.    Counseling Resources:  ATP IV Guidelines  Pooled Cohorts Equation Calculator  Breast Cancer Risk Calculator  Breast Cancer: Medication to Reduce Risk  FRAX Risk Assessment  ICSI Preventive Guidelines  Dietary Guidelines for Americans, 2010  AgeCheq's MyPlate  ASA Prophylaxis  Lung CA Screening    Nico Retana MD, MD  [facility]    Identified Health Risks:

## 2021-06-16 ENCOUNTER — TELEPHONE (OUTPATIENT)
Dept: FAMILY MEDICINE | Facility: CLINIC | Age: 76
End: 2021-06-16

## 2021-06-16 ENCOUNTER — OFFICE VISIT (OUTPATIENT)
Dept: FAMILY MEDICINE | Facility: CLINIC | Age: 76
End: 2021-06-16
Payer: MEDICARE

## 2021-06-16 VITALS
SYSTOLIC BLOOD PRESSURE: 116 MMHG | TEMPERATURE: 97.5 F | DIASTOLIC BLOOD PRESSURE: 78 MMHG | WEIGHT: 300.9 LBS | HEART RATE: 69 BPM | OXYGEN SATURATION: 97 % | BODY MASS INDEX: 36.63 KG/M2

## 2021-06-16 DIAGNOSIS — I82.220 NEOPLASM OF RIGHT KIDNEY WITH THROMBUS OF INFERIOR VENA CAVA (H): ICD-10-CM

## 2021-06-16 DIAGNOSIS — I48.20 CHRONIC ATRIAL FIBRILLATION (H): ICD-10-CM

## 2021-06-16 DIAGNOSIS — D69.6 THROMBOCYTOPENIA (H): Primary | ICD-10-CM

## 2021-06-16 DIAGNOSIS — C64.1 RENAL CELL CARCINOMA, RIGHT (H): ICD-10-CM

## 2021-06-16 DIAGNOSIS — N18.30 STAGE 3 CHRONIC KIDNEY DISEASE, UNSPECIFIED WHETHER STAGE 3A OR 3B CKD (H): ICD-10-CM

## 2021-06-16 DIAGNOSIS — F43.9 SITUATIONAL STRESS: ICD-10-CM

## 2021-06-16 DIAGNOSIS — R41.3 MEMORY LOSS: ICD-10-CM

## 2021-06-16 DIAGNOSIS — N40.1 BENIGN PROSTATIC HYPERPLASIA WITH NOCTURIA: ICD-10-CM

## 2021-06-16 DIAGNOSIS — D49.511 NEOPLASM OF RIGHT KIDNEY WITH THROMBUS OF INFERIOR VENA CAVA (H): ICD-10-CM

## 2021-06-16 DIAGNOSIS — E66.01 MORBID OBESITY (H): ICD-10-CM

## 2021-06-16 DIAGNOSIS — I42.9 PRIMARY CARDIOMYOPATHY (H): Primary | ICD-10-CM

## 2021-06-16 DIAGNOSIS — R35.1 BENIGN PROSTATIC HYPERPLASIA WITH NOCTURIA: ICD-10-CM

## 2021-06-16 LAB
ERYTHROCYTE [DISTWIDTH] IN BLOOD BY AUTOMATED COUNT: 12.9 % (ref 10–15)
HCT VFR BLD AUTO: 41.9 % (ref 40–53)
HGB BLD-MCNC: 14.2 G/DL (ref 13.3–17.7)
MCH RBC QN AUTO: 32.9 PG (ref 26.5–33)
MCHC RBC AUTO-ENTMCNC: 33.9 G/DL (ref 31.5–36.5)
MCV RBC AUTO: 97 FL (ref 78–100)
PLATELET # BLD AUTO: 142 10E9/L (ref 150–450)
RBC # BLD AUTO: 4.32 10E12/L (ref 4.4–5.9)
WBC # BLD AUTO: 5.8 10E9/L (ref 4–11)

## 2021-06-16 PROCEDURE — 36415 COLL VENOUS BLD VENIPUNCTURE: CPT | Performed by: INTERNAL MEDICINE

## 2021-06-16 PROCEDURE — 85027 COMPLETE CBC AUTOMATED: CPT | Performed by: INTERNAL MEDICINE

## 2021-06-16 PROCEDURE — 99214 OFFICE O/P EST MOD 30 MIN: CPT | Performed by: INTERNAL MEDICINE

## 2021-06-16 RX ORDER — ESCITALOPRAM OXALATE 10 MG/1
10 TABLET ORAL DAILY
Qty: 90 TABLET | Refills: 3 | Status: SHIPPED | OUTPATIENT
Start: 2021-06-16 | End: 2022-04-29

## 2021-06-16 RX ORDER — TAMSULOSIN HYDROCHLORIDE 0.4 MG/1
0.4 CAPSULE ORAL DAILY
Qty: 90 CAPSULE | Refills: 3 | Status: SHIPPED | OUTPATIENT
Start: 2021-06-16 | End: 2022-05-06

## 2021-06-16 RX ORDER — METOPROLOL SUCCINATE 25 MG/1
25 TABLET, EXTENDED RELEASE ORAL DAILY
Qty: 90 TABLET | Refills: 3 | Status: SHIPPED | OUTPATIENT
Start: 2021-06-16 | End: 2022-05-06

## 2021-06-16 NOTE — RESULT ENCOUNTER NOTE
Abrahan Luke,    I have had the opportunity to review your recent results and an interpretation is as follows:  Your follow up complete blood counts shows still low platelets - I would recommend a follow up abdominal ultrasound when you are able - [facility] phone #[phone]      Sincerely,  Nico Retana MD

## 2021-06-16 NOTE — PROGRESS NOTES
Subjective   Edgar is a 75 year old who presents for the following health issues     HPI     Chief Complaint   Patient presents with     RECHECK     Multiple concerns      Primary cardiomyopathy (H)   Notes that he has felt a bit deconditioned at this time.  Not taking ace inhibitor.  Is tolerating metoprolol but may be a bit fatigued.  Not sure what he is capable and what is safe from a cardiac perspective.  He has known atrial fibrillation and coronary artery disease, but not experiencing any anginal symptoms.  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    Chronic atrial fibrillation (H)   Managing with assistance of cardiology.  He notes that he is taking metoprolol for rate control.  He notes that he is also taking apixaban for stroke prevention and no bleeding issues  Morbid obesity (H)   Weight remains stable.    Stage 3 chronic kidney disease, unspecified whether stage 3a or 3b CHRONIC KIDNEY DISEASE   He is also following with nephrology, but would like to establish with a nephrologist in [place].  Situational stress   Taking lexapro 10 mg daily.  Wife notes he is feeling fine, but does get discouraged.  He is hopeful that he can improve his exercise tolerance and this will eventually help his mood and general wellbeing.  Benign prostatic hyperplasia with nocturia   Taking tamsulosin  Memory Loss   Has had difficulty with word finding difficulty.  He has noticed this change over the past year.  He has not had any stroke that he is aware of.  He has been slowly having more difficulty remembering certain names and specific words.  He has no other neurologic symptoms he is aware    Review of Systems   Constitutional, HEENT, cardiovascular- as above , pulmonary, GI, , musculoskeletal, neurology - as above , skin,  endocrine and psych systems are negative, except as otherwise noted.      Objective    /78 (BP Location: Right arm, Patient Position: Sitting, Cuff Size: Adult Large)   Pulse 69   Temp 97.5  F (36.4  C) (Temporal)   Wt 136.5 kg (300 lb 14.4 oz)   SpO2 97%   BMI 36.63 kg/m    Body mass index is 36.63 kg/m .  Physical Exam   GENERAL: healthy, alert and no distress  EYES: Eyes grossly normal to inspection l  HENT: ear canals and TM's normal   NECK: no adenopathy, no asymmetry, masses, or scars and thyroid normal to palpation  RESP: lungs clear to auscultation - no rales, rhonchi or wheezes  CV: irregular rate and rhythm, no murmur,    ABDOMEN: obese, soft, nontender, no hepatosplenomegaly,    MS: no gross musculoskeletal defects noted  SKIN: no suspicious lesions or rashes  NEURO: Normal strength and tone, mentation intact and speech normal, gait and station normal   PSYCH: mentation appears normal, affect normal/bright    Orders Only on 06/10/2021   Component Date Value Ref Range Status     Phosphorus 06/10/2021 3.2  2.5 - 4.5 mg/dL Final     Ferritin 06/10/2021 250  26 - 388 ng/mL Final     Sodium 06/10/2021 140  133 - 144 mmol/L Final     Potassium 06/10/2021 4.5  3.4 - 5.3 mmol/L Final     Chloride 06/10/2021 111* 94 - 109 mmol/L Final     Carbon Dioxide 06/10/2021 25  20 - 32 mmol/L Final     Anion Gap 06/10/2021 4  3 - 14 mmol/L Final     Glucose 06/10/2021 97  70 - 99 mg/dL Final    Fasting specimen     Urea Nitrogen 06/10/2021 30  7 - 30 mg/dL Final     Creatinine 06/10/2021 1.68* 0.66 - 1.25 mg/dL Final     GFR Estimate 06/10/2021 39* >60 mL/min/[1.73_m2] Final    Comment: Non  GFR Calc  Starting 12/18/2018, serum creatinine based estimated GFR (eGFR) will be   calculated using the Chronic Kidney Disease Epidemiology Collaboration   (CKD-EPI) equation.       GFR Estimate If Black 06/10/2021 46* >60 mL/min/[1.73_m2] Final    Comment:  GFR Calc  Starting 12/18/2018,  serum creatinine based estimated GFR (eGFR) will be   calculated using the Chronic Kidney Disease Epidemiology Collaboration   (CKD-EPI) equation.       Calcium 06/10/2021 8.5  8.5 - 10.1 mg/dL Final     Bilirubin Total 06/10/2021 0.7  0.2 - 1.3 mg/dL Final     Albumin 06/10/2021 3.3* 3.4 - 5.0 g/dL Final     Protein Total 06/10/2021 6.8  6.8 - 8.8 g/dL Final     Alkaline Phosphatase 06/10/2021 57  40 - 150 U/L Final     ALT 06/10/2021 24  0 - 70 U/L Final     AST 06/10/2021 19  0 - 45 U/L Final     WBC 06/10/2021 5.9  4.0 - 11.0 10e9/L Final     RBC Count 06/10/2021 4.32* 4.4 - 5.9 10e12/L Final     Hemoglobin 06/10/2021 14.4  13.3 - 17.7 g/dL Final     Hematocrit 06/10/2021 42.1  40.0 - 53.0 % Final     MCV 06/10/2021 98  78 - 100 fl Final     MCH 06/10/2021 33.3* 26.5 - 33.0 pg Final     MCHC 06/10/2021 34.2  31.5 - 36.5 g/dL Final     RDW 06/10/2021 13.2  10.0 - 15.0 % Final     Platelet Count 06/10/2021 141* 150 - 450 10e9/L Final     Cholesterol 06/10/2021 116  <200 mg/dL Final     Triglycerides 06/10/2021 74  <150 mg/dL Final    Fasting specimen     HDL Cholesterol 06/10/2021 45  >39 mg/dL Final     LDL Cholesterol Calculated 06/10/2021 56  <100 mg/dL Final    Desirable:       <100 mg/dl     Non HDL Cholesterol 06/10/2021 71  <130 mg/dL Final       Patient Instructions   (I42.8) Primary cardiomyopathy (H)  Comment: continue follow up in cardiology - and recommend start cardiac rehab to initiate supervised exercise program.  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  Metoprolol for rate control -does reduced to 25 mg   Plan:     (E66.01) Morbid obesity (H)  Comment: Continue healthy diet and exercise  Plan:     (N18.30)  Stage 3 chronic kidney disease, unspecified whether stage 3a or 3b CKD  Comment: stable - labs reviewed - referral to nephrology  Plan:     (F43.9) Situational stress  Comment: Continue Lexapro at current dose  Plan:     (N40.1,  R35.1) Benign prostatic hyperplasia with nocturia  Comment: Continue Tamsulosin  Plan:      Memory Concerns  Comment; recommend discussion with neurology and consider neuropsychometric testing

## 2021-06-16 NOTE — PATIENT INSTRUCTIONS
(I42.8) Primary cardiomyopathy (H)  Comment: continue follow up in cardiology - and recommend start cardiac rehab to initiate supervised exercise program.  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  Metoprolol for rate control -does reduced to 25 mg   Plan:     (E66.01) Morbid obesity (H)  Comment: Continue healthy diet and exercise  Plan:     (N18.30) Stage 3 chronic kidney disease, unspecified whether stage 3a or 3b CKD  Comment: stable - labs reviewed - referral to nephrology  Plan:     (F43.9) Situational stress  Comment: Continue Lexapro at current dose  Plan:     (N40.1,  R35.1) Benign prostatic hyperplasia with nocturia  Comment: Continue Tamsulosin  Plan:      Memory Concerns  Comment; recommend discussion with neurology and consider neuropsychometric testing

## 2021-06-16 NOTE — TELEPHONE ENCOUNTER
Can we call Ti Nickerson and let him know that     Abrahan Luke,    I have had the opportunity to review your recent results and an interpretation is as follows:  Your follow up complete blood counts shows still low platelets - I would recommend a follow up abdominal ultrasound when you are able - [facility] phone #[phone]      Sincerely,  Nico Retana MD

## 2021-06-21 ENCOUNTER — HOSPITAL ENCOUNTER (OUTPATIENT)
Dept: ULTRASOUND IMAGING | Facility: CLINIC | Age: 76
Discharge: HOME | End: 2021-06-21
Attending: INTERNAL MEDICINE | Admitting: INTERNAL MEDICINE
Payer: MEDICARE

## 2021-06-21 DIAGNOSIS — D69.6 THROMBOCYTOPENIA (H): ICD-10-CM

## 2021-06-21 PROCEDURE — 76700 US EXAM ABDOM COMPLETE: CPT

## 2021-06-22 ENCOUNTER — TELEPHONE (OUTPATIENT)
Dept: FAMILY MEDICINE | Facility: CLINIC | Age: 76
End: 2021-06-22

## 2021-06-22 DIAGNOSIS — N18.31 CHRONIC KIDNEY DISEASE, STAGE 3A (H): ICD-10-CM

## 2021-06-22 DIAGNOSIS — M25.562 ACUTE PAIN OF BOTH KNEES: Primary | ICD-10-CM

## 2021-06-22 DIAGNOSIS — M25.561 ACUTE PAIN OF BOTH KNEES: Primary | ICD-10-CM

## 2021-06-22 DIAGNOSIS — N18.30 STAGE 3 CHRONIC KIDNEY DISEASE, UNSPECIFIED WHETHER STAGE 3A OR 3B CKD (H): ICD-10-CM

## 2021-06-22 DIAGNOSIS — K76.9 LIVER DISEASE, CHRONIC: ICD-10-CM

## 2021-06-22 NOTE — RESULT ENCOUNTER NOTE
Abrahan Luke,    I have had the opportunity to review your recent results and an interpretation is as follows:    Your recent abdominal ultrasound showed evidence of questionable course liver architecture, which would be consistent with nonalcoholic fatty liver disease.   I would recommend repeating ultrasound in about 3 to 6 months    I encouraged you to continue to work more on diet and exercise.     Eeferral for nutrition as well as physical therapy to help with his knee pain and chronic kidney disease was placed today    I also did recommend a referral to [facility] nephrology department.    Sincerely,  Nico Retana MD

## 2021-06-22 NOTE — TELEPHONE ENCOUNTER
I spoke with Edgar with regards to his recent abdominal ultrasound which showed evidence of questionable course liver architecture, which would be consistent with nonalcoholic fatty liver disease.  I encouraged him to continue to work more on diet and exercise.  He requested referral for nutrition as well as physical therapy to help with his knee pain and chronic kidney disease.  He also noted that he was scheduled in nephrology locally, but unable to get in until October.  He was hoping to get in sooner.  I did recommend a referral to [facility] nephrology department.    Nico Retana MD, MD

## 2021-06-23 ENCOUNTER — TELEPHONE (OUTPATIENT)
Dept: FAMILY MEDICINE | Facility: CLINIC | Age: 76
End: 2021-06-23

## 2021-06-23 NOTE — TELEPHONE ENCOUNTER
Nutrition Education Scheduling Outreach #1:    Call to patient to schedule. Patient declined to schedule due to our schedule being too far out (August).    Beverly Anne  [place]  Diabetes and Nutrition Scheduling

## 2021-07-02 ENCOUNTER — THERAPY VISIT (OUTPATIENT)
Dept: PHYSICAL THERAPY | Facility: CLINIC | Age: 76
End: 2021-07-02
Attending: INTERNAL MEDICINE
Payer: MEDICARE

## 2021-07-02 DIAGNOSIS — M25.561 ACUTE PAIN OF BOTH KNEES: ICD-10-CM

## 2021-07-02 DIAGNOSIS — M25.562 ACUTE PAIN OF BOTH KNEES: ICD-10-CM

## 2021-07-02 DIAGNOSIS — M62.81 GENERALIZED MUSCLE WEAKNESS: ICD-10-CM

## 2021-07-02 PROCEDURE — 97110 THERAPEUTIC EXERCISES: CPT | Mod: GP | Performed by: PHYSICAL THERAPIST

## 2021-07-02 PROCEDURE — 97112 NEUROMUSCULAR REEDUCATION: CPT | Mod: GP | Performed by: PHYSICAL THERAPIST

## 2021-07-02 PROCEDURE — 97161 PT EVAL LOW COMPLEX 20 MIN: CPT | Mod: GP | Performed by: PHYSICAL THERAPIST

## 2021-07-02 NOTE — PROGRESS NOTES
Physical Therapy Initial Evaluation  Subjective:  In September 2019, pt reports that he had kidney cancer.  So he had the kidney removed on 10/1/2019 and was recovering from that.  In January 2020, he went down to [place] and was working with a  and he was making good progress on getting his strength back after recovering from cancer.  Then COVID hit and they came back to [place].  In the spring, he found out he had a hernia, so he wasn't able to do all the exercises he could do before.  So then he pretty much just did walking for his workout.  He doesn't feel like he has gotten his stamina back.  He also has fallen a couple of times recently due to weakness.  A week ago he fell trying to get into a fishing boat and he fell head first.  Last Wednesday night he was walking down 3 steps into a garage and he missed the last step and fell - this was after playing golf so he feels that it was likely he was just tired.  His goal is to regain strength and balance.    The history is provided by the patient. No  was used.   Patient Health History  Ti Nickerson being seen for B knee weakness.     Problem began: 6/22/2021 (MD order written).   Problem occurred: after surgeries   Pain is reported as 3/10 on pain scale.  General health as reported by patient is fair.  Pertinent medical history includes: cancer, heart problems, kidney disease, overweight and sleep disorder/apnea (Afib).   Red flags:  None as reported by patient.  Medical allergies: other.   Surgeries include:  Cancer surgery.    Current medications:  Heparin/coumadin and high blood pressure medication.    Current occupation is retired.                     Therapist Generated HPI Evaluation         Affected Side: overall weakness.    This is a chronic condition.  Condition occurred with:  Other reason (see note above).  Where condition occurred: other.    Pain quality: no pain. Pain frequency: no pain, just weakness.    Since onset  symptoms are gradually worsening (weakness/stamina/balance).  Associated symptoms:  Loss of strength. Exacerbated by: walking, weightbearing activities - 2 steps into house and a flight down to basement.  and relieved by rest.      Barriers include:  None as reported by patient.                        Objective:  System                                           Hip Evaluation    Hip Strength:    Flexion:   Left: 5/5   Pain:  Right: 5/5   Pain:                      Abduction:  Left: 4/5     Pain:Right: 4+/5    Pain:    Internal Rotation:  Left: 5/5    Pain:Right: 5/5   Pain:  External Rotation:  Left: 5/5   Pain:  Right: 5/5   Pain:  Knee Flexion:  Left: 5/5   Pain:Right: 5/5   Pain:  Knee Extension:  Left: 5/5   Pain:Right: 5/5    Pain:            Functional Testing:  Functional test hip: can tandem stance for 10 seconds, each LE.                       General     ROS    Assessment/Plan:    Patient is a 75 year old male with generalized weakness complaints.    Patient has the following significant findings with corresponding treatment plan.                Diagnosis 1:  Generalized weakness s/p cancer  Decreased strength - therapeutic exercise, therapeutic activities and home program  Impaired balance - neuro re-education, therapeutic activities and home program  Impaired muscle performance - neuro re-education and home program  Decreased function - therapeutic activities and home program    Therapy Evaluation Codes:   Cumulative Therapy Evaluation is: Low complexity.    Previous and current functional limitations:  (See Goal Flow Sheet for this information)    Short term and Long term goals: (See Goal Flow Sheet for this information)     Communication ability:  Patient appears to be able to clearly communicate and understand verbal and written communication and follow directions correctly.  Treatment Explanation - The following has been discussed with the patient:   RX ordered/plan of care  Anticipated  outcomes  Possible risks and side effects  This patient would benefit from PT intervention to resume normal activities.   Rehab potential is good.    Frequency:  2 X a month, once daily  Duration:  for 3 months  Discharge Plan:  Achieve all LTG.  Independent in home treatment program.  Reach maximal therapeutic benefit.    Please refer to the daily flowsheet for treatment today, total treatment time and time spent performing 1:1 timed codes.

## 2021-07-02 NOTE — LETTER
DEPARTMENT OF HEALTH AND HUMAN SERVICES  CENTERS FOR MEDICARE & MEDICAID SERVICES    PLAN/UPDATED PLAN OF PROGRESS FOR OUTPATIENT REHABILITATION                PATIENTS NAME:  Ti Nickerson     : 1945    PROVIDER NUMBER:    [id number]    HICN: [id number]    PROVIDER NAME: [facility]    MEDICAL RECORD NUMBER: [id number]     START OF CARE DATE:    21  TYPE:  PT    PRIMARY/TREATMENT DIAGNOSIS: (Pertinent Medical Diagnosis)     Acute pain of both knees  Generalized muscle weakness    VISITS FROM START OF CARE:  Rxs Used: 1     Physical Therapy Initial Evaluation    Subjective:    In 2019, pt reports that he had kidney cancer.  So he had the kidney removed on 10/1/2019 and was recovering from that.  In 2020, he went down to [place] and was working with a  and he was making good progress on getting his strength back after recovering from cancer.  Then COVID hit and they came back to [place].  In the spring, he found out he had a hernia, so he wasn't able to do all the exercises he could do before.  So then he pretty much just did walking for his workout.  He doesn't feel like he has gotten his stamina back.  He also has fallen a couple of times recently due to weakness.  A week ago he fell trying to get into a fishing boat and he fell head first.  Last Wednesday night he was walking down 3 steps into a garage and he missed the last step and fell - this was after playing golf so he feels that it was likely he was just tired.  His goal is to regain strength and balance.  The history is provided by the patient. No  was used.                   PATIENTS NAME:  Ti Nickerson     : 1945          Patient Health History  Ti Nickerson being seen for B knee weakness.   Problem occurred: after surgeries   Pain is reported as 3/10 on pain scale.  General health as reported by patient is fair.  Pertinent medical  history includes: cancer, heart problems, kidney disease, overweight and sleep disorder/apnea (Afib).   Red flags:  None as reported by patient.  Medical allergies: other.   Surgeries include:  Cancer surgery.    Current medications:  Heparin/coumadin and high blood pressure medication.    Current occupation is retired.        Therapist Generated HPI Evaluation         Affected Side: overall weakness.  This is a chronic condition.  Condition occurred with:  Other reason (see note above).  Where condition occurred: other.  Pain quality: no pain. Pain frequency: no pain, just weakness.  Since onset symptoms are gradually worsening (weakness/stamina/balance).  Associated symptoms:  Loss of strength. Exacerbated by: walking, weightbearing activities - 2 steps into house and a flight down to basement.  and relieved by rest.  Barriers include:  None as reported by patient.                 Objective:       Hip Evaluation  Hip Strength:    Flexion:   Left: 5/5   Pain:  Right: 5/5   Pain:         Abduction:  Left: 4/5     Pain:Right: 4+/5    Pain:  Internal Rotation:  Left: 5/5    Pain:Right: 5/5   Pain:  External Rotation:  Left: 5/5   Pain:  Right: 5/5   Pain:  Knee Flexion:  Left: 5/5   Pain:Right: 5/5   Pain:  Knee Extension:  Left: 5/5   Pain:Right: 5/5    Pain:  Functional Testing:  Functional test hip: can tandem stance for 10 seconds, each LE.                          PATIENTS NAME:  Ti Nickerson     : 1945      Assessment/Plan:    Patient is a 75 year old male with generalized weakness complaints.    Patient has the following significant findings with corresponding treatment plan.                Diagnosis 1:  Generalized weakness s/p cancer  Decreased strength - therapeutic exercise, therapeutic activities and home program  Impaired balance - neuro re-education, therapeutic activities and home program  Impaired muscle performance - neuro re-education and home program  Decreased function - therapeutic  "activities and home program    Therapy Evaluation Codes:   Cumulative Therapy Evaluation is: Low complexity.    Previous and current functional limitations:  (See Goal Flow Sheet for this information)    Short term and Long term goals: (See Goal Flow Sheet for this information)   Communication ability:  Patient appears to be able to clearly communicate and understand verbal and written communication and follow directions correctly.  Treatment Explanation - The following has been discussed with the patient:   RX ordered/plan of care  Anticipated outcomes  Possible risks and side effects  This patient would benefit from PT intervention to resume normal activities.   Rehab potential is good.  Frequency:  2 X a month, once daily  Duration:  for 3 months  Discharge Plan:  Achieve all LTG.  Independent in home treatment program.  Reach maximal therapeutic benefit.      Caregiver Signature/Credentials _____________________________ Date ________      Treating Provider: Chantel Jarrett DPT, Cert. MDT     I have reviewed and certified the need for these services and plan of treatment while under my care.        PHYSICIAN'S SIGNATURE:   _________________________________________  Date___________   Nico Retana MD    Certification period:  Beginning of Cert date period: 07/02/21 to  End of Cert period date: 09/29/21     Functional Level Progress Report: Please see attached \"Goal Flow sheet for Functional level.\"    ____X____ Continue Services or       ________ DC Services                Service dates: From 7/2/21 to present                         "

## 2021-07-14 ENCOUNTER — THERAPY VISIT (OUTPATIENT)
Dept: PHYSICAL THERAPY | Facility: CLINIC | Age: 76
End: 2021-07-14
Payer: MEDICARE

## 2021-07-14 DIAGNOSIS — M62.81 GENERALIZED MUSCLE WEAKNESS: ICD-10-CM

## 2021-07-14 PROCEDURE — 97110 THERAPEUTIC EXERCISES: CPT | Mod: GP | Performed by: PHYSICAL THERAPIST

## 2021-07-28 ENCOUNTER — THERAPY VISIT (OUTPATIENT)
Dept: PHYSICAL THERAPY | Facility: CLINIC | Age: 76
End: 2021-07-28
Payer: MEDICARE

## 2021-07-28 DIAGNOSIS — M62.81 GENERALIZED MUSCLE WEAKNESS: ICD-10-CM

## 2021-07-28 PROCEDURE — 97110 THERAPEUTIC EXERCISES: CPT | Mod: GP | Performed by: PHYSICAL THERAPIST

## 2021-07-28 PROCEDURE — 97530 THERAPEUTIC ACTIVITIES: CPT | Mod: GP | Performed by: PHYSICAL THERAPIST

## 2021-09-04 ENCOUNTER — HEALTH MAINTENANCE LETTER (OUTPATIENT)
Age: 76
End: 2021-09-04

## 2021-09-14 ENCOUNTER — TRANSFERRED RECORDS (OUTPATIENT)
Dept: HEALTH INFORMATION MANAGEMENT | Facility: CLINIC | Age: 76
End: 2021-09-14

## 2021-09-14 LAB
ALT SERPL-CCNC: 18 U/L (ref 7–55)
AST SERPL-CCNC: 23 U/L (ref 8–48)
CREATININE (EXTERNAL): 1.83 MG/DL (ref 0.74–1.35)
GFR ESTIMATED (EXTERNAL): 36 ML/MIN/BSA
GFR ESTIMATED (IF AFRICAN AMERICAN) (EXTERNAL): 41 ML/MIN/BSA
GLUCOSE (EXTERNAL): 90 MG/DL (ref 70–140)
POTASSIUM (EXTERNAL): 4.8 MMOL/L (ref 3.6–5.2)

## 2021-09-29 PROBLEM — M62.81 GENERALIZED MUSCLE WEAKNESS: Status: RESOLVED | Noted: 2021-07-02 | Resolved: 2021-09-29

## 2021-09-29 NOTE — PROGRESS NOTES
"DISCHARGE REPORT    Progress reporting period is from 7/2/2021 to 7/28/2021.       SUBJECTIVE  Subjective changes noted by patient:  As of last PT visit on 7/28/2021, pt reported that he just sold his house, so it's been busy.  He feels that his strength is doing fine, it's just his stamina that is lacking.  He has not returned for any additional PT since 7/28/2021, so current subjective changes are unknown.  Changes in function:  Yes (See Goal flowsheet attached for changes in current functional level)  Adverse reaction to treatment or activity: None    OBJECTIVE  Changes noted in objective findings:  The objective findings below are from DOS 7/28/2021.  Objective: tandem stance for 30\" withou use of UE's     ASSESSMENT/PLAN  STG/LTGs have been met or progress has been made towards goals:  Yes (See Goal flow sheet completed today.)  Assessment of Progress: The patient has not returned to therapy. Current status is unknown.  Self Management Plans:  Patient has been instructed in a home treatment program.    Recommendations:  This patient will be discharged from therapy.        "

## 2021-10-04 ENCOUNTER — TELEPHONE (OUTPATIENT)
Dept: FAMILY MEDICINE | Facility: CLINIC | Age: 76
End: 2021-10-04

## 2021-10-04 DIAGNOSIS — K76.0 NAFLD (NONALCOHOLIC FATTY LIVER DISEASE): Primary | ICD-10-CM

## 2021-10-04 NOTE — TELEPHONE ENCOUNTER
Can we call Ti Nickerson and let him know that     He is due to schedule a follow up abdominal ultrasound - [facility] phone #[phone]

## 2021-10-06 ENCOUNTER — TELEPHONE (OUTPATIENT)
Dept: GASTROENTEROLOGY | Facility: CLINIC | Age: 76
End: 2021-10-06

## 2021-10-06 DIAGNOSIS — E61.1 IRON DEFICIENCY: ICD-10-CM

## 2021-10-06 DIAGNOSIS — I10 ESSENTIAL HYPERTENSION: Primary | ICD-10-CM

## 2021-10-06 DIAGNOSIS — N18.30 STAGE 3 CHRONIC KIDNEY DISEASE, UNSPECIFIED WHETHER STAGE 3A OR 3B CKD (H): ICD-10-CM

## 2021-10-06 NOTE — TELEPHONE ENCOUNTER
Labs being ordered per protocol for 10/11 visit with Dr. Quintero on 10/13. Lab appointment already scheduled. Diagnosis: CKD 3.

## 2021-10-11 ENCOUNTER — LAB (OUTPATIENT)
Dept: LAB | Facility: CLINIC | Age: 76
End: 2021-10-11
Attending: INTERNAL MEDICINE
Payer: MEDICARE

## 2021-10-11 ENCOUNTER — HOSPITAL ENCOUNTER (OUTPATIENT)
Dept: ULTRASOUND IMAGING | Facility: CLINIC | Age: 76
End: 2021-10-11
Attending: INTERNAL MEDICINE
Payer: MEDICARE

## 2021-10-11 DIAGNOSIS — E78.5 HYPERLIPIDEMIA LDL GOAL <100: ICD-10-CM

## 2021-10-11 DIAGNOSIS — K76.0 NAFLD (NONALCOHOLIC FATTY LIVER DISEASE): ICD-10-CM

## 2021-10-11 DIAGNOSIS — I42.9 PRIMARY CARDIOMYOPATHY (H): ICD-10-CM

## 2021-10-11 DIAGNOSIS — E61.1 IRON DEFICIENCY: ICD-10-CM

## 2021-10-11 DIAGNOSIS — I48.19 PERSISTENT ATRIAL FIBRILLATION (H): ICD-10-CM

## 2021-10-11 DIAGNOSIS — I25.10 CORONARY ARTERY DISEASE INVOLVING NATIVE CORONARY ARTERY OF NATIVE HEART WITHOUT ANGINA PECTORIS: ICD-10-CM

## 2021-10-11 DIAGNOSIS — N18.30 STAGE 3 CHRONIC KIDNEY DISEASE, UNSPECIFIED WHETHER STAGE 3A OR 3B CKD (H): ICD-10-CM

## 2021-10-11 DIAGNOSIS — I10 ESSENTIAL HYPERTENSION: ICD-10-CM

## 2021-10-11 LAB
ALBUMIN SERPL-MCNC: 3.3 G/DL (ref 3.4–5)
ALBUMIN UR-MCNC: NEGATIVE MG/DL
ANION GAP SERPL CALCULATED.3IONS-SCNC: 6 MMOL/L (ref 3–14)
APPEARANCE UR: CLEAR
BILIRUB UR QL STRIP: NEGATIVE
BUN SERPL-MCNC: 27 MG/DL (ref 7–30)
CALCIUM SERPL-MCNC: 8.6 MG/DL (ref 8.5–10.1)
CHLORIDE BLD-SCNC: 111 MMOL/L (ref 94–109)
CHOLEST SERPL-MCNC: 135 MG/DL
CO2 SERPL-SCNC: 24 MMOL/L (ref 20–32)
COLOR UR AUTO: YELLOW
CREAT SERPL-MCNC: 1.67 MG/DL (ref 0.66–1.25)
DEPRECATED CALCIDIOL+CALCIFEROL SERPL-MC: 26 UG/L (ref 20–75)
FASTING STATUS PATIENT QL REPORTED: YES
FERRITIN SERPL-MCNC: 274 NG/ML (ref 26–388)
GFR SERPL CREATININE-BSD FRML MDRD: 40 ML/MIN/1.73M2
GLUCOSE BLD-MCNC: 105 MG/DL (ref 70–99)
GLUCOSE UR STRIP-MCNC: NEGATIVE MG/DL
HDLC SERPL-MCNC: 49 MG/DL
HGB BLD-MCNC: 14.4 G/DL (ref 13.3–17.7)
HGB UR QL STRIP: ABNORMAL
IRON SATN MFR SERPL: 32 % (ref 15–46)
IRON SERPL-MCNC: 97 UG/DL (ref 35–180)
KETONES UR STRIP-MCNC: NEGATIVE MG/DL
LDLC SERPL CALC-MCNC: 68 MG/DL
LEUKOCYTE ESTERASE UR QL STRIP: NEGATIVE
MUCOUS THREADS #/AREA URNS LPF: PRESENT /LPF
NITRATE UR QL: NEGATIVE
NONHDLC SERPL-MCNC: 86 MG/DL
PH UR STRIP: 5 [PH] (ref 5–7)
PHOSPHATE SERPL-MCNC: 3.2 MG/DL (ref 2.5–4.5)
POTASSIUM BLD-SCNC: 4.5 MMOL/L (ref 3.4–5.3)
PTH-INTACT SERPL-MCNC: 36 PG/ML (ref 18–80)
RBC URINE: 1 /HPF
SODIUM SERPL-SCNC: 141 MMOL/L (ref 133–144)
SP GR UR STRIP: 1.02 (ref 1–1.03)
TIBC SERPL-MCNC: 304 UG/DL (ref 240–430)
TRIGL SERPL-MCNC: 90 MG/DL
UROBILINOGEN UR STRIP-MCNC: NORMAL MG/DL
WBC URINE: <1 /HPF

## 2021-10-11 PROCEDURE — 36415 COLL VENOUS BLD VENIPUNCTURE: CPT

## 2021-10-11 PROCEDURE — 81003 URINALYSIS AUTO W/O SCOPE: CPT

## 2021-10-11 PROCEDURE — 80069 RENAL FUNCTION PANEL: CPT

## 2021-10-11 PROCEDURE — 82043 UR ALBUMIN QUANTITATIVE: CPT

## 2021-10-11 PROCEDURE — 83550 IRON BINDING TEST: CPT

## 2021-10-11 PROCEDURE — 80061 LIPID PANEL: CPT

## 2021-10-11 PROCEDURE — 82306 VITAMIN D 25 HYDROXY: CPT

## 2021-10-11 PROCEDURE — 83970 ASSAY OF PARATHORMONE: CPT

## 2021-10-11 PROCEDURE — 85018 HEMOGLOBIN: CPT

## 2021-10-11 PROCEDURE — 76700 US EXAM ABDOM COMPLETE: CPT

## 2021-10-11 PROCEDURE — 82728 ASSAY OF FERRITIN: CPT

## 2021-10-12 LAB
CREAT UR-MCNC: 191 MG/DL
MICROALBUMIN UR-MCNC: 9 MG/L
MICROALBUMIN/CREAT UR: 4.71 MG/G CR (ref 0–17)

## 2021-10-13 ENCOUNTER — OFFICE VISIT (OUTPATIENT)
Dept: NEPHROLOGY | Facility: CLINIC | Age: 76
End: 2021-10-13
Payer: MEDICARE

## 2021-10-13 ENCOUNTER — DOCUMENTATION ONLY (OUTPATIENT)
Dept: NEPHROLOGY | Facility: CLINIC | Age: 76
End: 2021-10-13

## 2021-10-13 VITALS
HEIGHT: 76 IN | DIASTOLIC BLOOD PRESSURE: 84 MMHG | OXYGEN SATURATION: 95 % | SYSTOLIC BLOOD PRESSURE: 124 MMHG | BODY MASS INDEX: 36.46 KG/M2 | TEMPERATURE: 98.4 F | HEART RATE: 78 BPM | WEIGHT: 299.4 LBS

## 2021-10-13 DIAGNOSIS — Z90.5 H/O RIGHT NEPHRECTOMY: ICD-10-CM

## 2021-10-13 DIAGNOSIS — N18.32 STAGE 3B CHRONIC KIDNEY DISEASE (H): Primary | ICD-10-CM

## 2021-10-13 DIAGNOSIS — I48.20 CHRONIC ATRIAL FIBRILLATION (H): ICD-10-CM

## 2021-10-13 DIAGNOSIS — C64.1 RENAL CELL CARCINOMA, RIGHT (H): ICD-10-CM

## 2021-10-13 PROCEDURE — 99204 OFFICE O/P NEW MOD 45 MIN: CPT | Performed by: INTERNAL MEDICINE

## 2021-10-13 ASSESSMENT — PAIN SCALES - GENERAL: PAINLEVEL: NO PAIN (0)

## 2021-10-13 ASSESSMENT — MIFFLIN-ST. JEOR: SCORE: 2199.57

## 2021-10-13 NOTE — PROGRESS NOTES
In review of patient recommendations following 10/13 visit with Dr. Quintero, patient needs follow-up appointment in 1 year. Will send scheduling post-dated reminder for several months from now.

## 2021-10-13 NOTE — PATIENT INSTRUCTIONS
It was a pleasure taking care of you today.  I've included a brief summary of our discussion and care plan from today's visit below.  Please review this information with your primary care provider.  _______________________________________________________________________    My recommendations are summarized as follows:  Follow a healthy diet    To schedule imaging please call [phone]     To schedule your lab appointment at [facility] 1st floor lab call [phone]      Return to Nephrology Clinic in one year to review your progress.    _______________________________________________________________________    Who do I call with any questions after my visit?    Please be in touch if there are any further questions that arise following today's visit.  There are multiple ways to contact your nephrology care team.      During business hours, you may reach your Nephrology RN Care Coordinator, Dayna, at [phone].      To schedule or reschedule an appointment, please call [phone].    You can always send a secure message through [employer].  [employer] messages are answered by your nurse or doctor typically within 24 hours.  Please allow extra time on weekends and holidays.      For urgent/emergent questions after business hours, you may reach the on-call Nephrology Fellow by contacting the [facility]  at [phone].     How will I get the results of any tests ordered?    You will receive all of your results.  If you have signed up for [employer], any tests ordered at your visit will be available to you after your physician reviews them.  Typically this takes 1-2 weeks.  If there are urgent results that require a change in your care plan, your physician or nurse will call you to discuss the next steps.      What is [employer]?  [employer] is a secure way for you to access all of your healthcare records from the [facility].  It is a web based computer program, so you can sign on to  it from any location.  It also allows you to send secure messages to your care team.  I recommend signing up for [employer] access if you have not already done so and are comfortable with using a computer.      How do I schedule a follow-up visit?  If you did not schedule a follow-up visit today, please call [phone] to schedule a follow-up office visit.        Sincerely,    Kayla Quintero MD  [facility]  Division of Nephrology and Hypertension

## 2021-10-13 NOTE — PROGRESS NOTES
Assessment and Plan:  75 year old male with history of renal cell carcinoma s/p right nephrectomy 2019, Scr 1.1 up to 1.6-1.7 post nephrectomy, with metastatic disease to lungs and new liver nodule noted on ultrasound. He presents to establish nephrology care  # CKD stage 3b- Scr 1.6-1.7, eGFR 35-40ml/min, stable since surgery in 2019.  He has no albuminuria, normal blood pressure with no medication.  He does have a new lesion in his liver and some small nodules in his lungs, thus may need more treatment in coming weeks to months  He is anxious as he just found out about liver lesion .  We discussed that a CT scan would likely be needed  - he will call his oncologist or we can order CT scan if appropriate  - we discussed general CKD information (avoid NSAIDs, hydrate, DASH diet)    # Hypertension: blood pressure 105-110s at home, sometimes higher in the office.  He monitors at home.  He had dizziness in the past, was on ACE-I but it was stopped after surgery.    # Not anemic  - Hgb: Stable, hgb 14-14.4    # Electrolytes:   - Potassium; level: Normal  - Bicarbonate; level: Normal    # Mineral Bone Disorder:   - Vitamin D; level is: Normal  - Calcium; level is:  Normal   - Phos 3.2    Assessment and plan was discussed with patient and he voiced his understanding and agreement.    Consult:  Ti Nickerson was seen in consultation at the request of Dr. Retana for CKD management.    Reason for Visit:  Ti Nickerson is a 75 year old male with CKD from nephrectomy, who presents for evaluation.    HPI:  He is a very pleasant male with history of renal cell carcinoma, diagnosed when he had olivia hematuria episode in 2019. He underwent right nephrectomy and has been monitored by dr Hernández at [facility].  He had CT scan in September that showed stable pulmonary lesions. He had an abdominal ultrasound a couple of days ago that showed a ?new 3.4cm nodule in his liver.  We discussed this and determined he will reach  out to his oncologist to see if CT scan is appropriate, sooner than December      They sold their home this summer and moved into assisted living where there is a gym  They usually eat healthy but were eating out as they were transitioning  He has atrial fibrillation and sometimes it kicks in and he has less energy  His blood pressure runs 105/70 -110s at home outside of recent activity.  He denies any shortness of breath or swelling. He feels fairly well except when he is in afib sometimes.  He sees Dr Quarles in cardiology who manages his afib  He is accompanied by his wife during the visit.   He denies family history of kidney failure, his parents lived to their 90s    Renal History:   Kidney Disease and Medical Hx:  Right nephrectomy    ROS:   A comprehensive review of systems was obtained and negative, except as noted in the HPI or PMH.    Active Medical Problems:  Patient Active Problem List   Diagnosis     Chronic atrial fibrillation (H)     Idiopathic cardiomyopathy (H)     Coronary artery disease involving native coronary artery of native heart without angina pectoris     Esophageal reflux     Mixed hyperlipidemia     Sleep apnea     Essential hypertension     Obesity (BMI 35.0-39.9) with comorbidity (H)     Thoracic aortic aneurysm without rupture (H)     Tubular adenoma of colon     Elevated TSH     Renal cell carcinoma, right (H)     CKD (chronic kidney disease) stage 3, GFR 30-59 ml/min (H)     PMH:   Medical record was reviewed and PMH was discussed with patient and noted below.  Past Medical History:   Diagnosis Date     Atrial fibrillation, unspecified 9/18/2015     CAD (coronary artery disease) 09/18/2015    minimal by angio 2007, fu nuc est nl 2018     Elevated TSH 2018     Esophageal reflux 9/18/2015     Essential hypertension      History of cardioversion     [id number]     Idiopathic cardiomyopathy (H) 09/18/2015    fu echo nl ef, nl nuclear est 11/18, Dr. Quarles     Mixed hyperlipidemia 9/18/2015  "    Mumps      Sleep apnea 09/18/2015    does not use cpap as of 2019     Thoracic aortic aneurysm (H)     sinus of valsalva 4.5 and asc aorta 4.5 in 2017     Tubular adenoma of colon 01/2017    fu 3 years     PSH:   Past Surgical History:   Procedure Laterality Date     APPENDECTOMY  1964       Family Hx:   Family History   Problem Relation Age of Onset     Arrhythmia Mother         a-fib     Cerebrovascular Disease Mother      Arrhythmia Father         a-fib     Colon Cancer Father      Diabetes Father      Cerebrovascular Disease Father      No Known Problems Brother      Personal Hx:   Social History     Tobacco Use     Smoking status: Never Smoker     Smokeless tobacco: Never Used   Substance Use Topics     Alcohol use: Yes     Alcohol/week: 0.0 standard drinks     Comment: 3 drinks week       Allergies:  Allergies   Allergen Reactions     Oxycodone Other (See Comments)     Hallucinations when given after surgery        Medications:  Current Outpatient Medications   Medication Sig     apixaban ANTICOAGULANT (ELIQUIS ANTICOAGULANT) 5 MG tablet Take 1 tablet (5 mg) by mouth 2 times daily     escitalopram (LEXAPRO) 10 MG tablet Take 1 tablet (10 mg) by mouth daily     metoprolol succinate ER (TOPROL-XL) 25 MG 24 hr tablet Take 1 tablet (25 mg) by mouth daily     rosuvastatin (CRESTOR) 5 MG tablet Take 1 tablet (5 mg) by mouth daily     tamsulosin (FLOMAX) 0.4 MG capsule Take 1 capsule (0.4 mg) by mouth daily     ASPIRIN NOT PRESCRIBED (INTENTIONAL) Please choose reason not prescribed, below (Patient not taking: Reported on 10/13/2021)     No current facility-administered medications for this visit.      Vitals:  /84   Pulse 78   Temp 98.4  F (36.9  C) (Oral)   Ht 1.93 m (6' 4\")   Wt 135.8 kg (299 lb 6.4 oz)   SpO2 95%   BMI 36.44 kg/m      Exam:  GENERAL APPEARANCE: alert and no distress  HENT: mouth without ulcers or lesions  LYMPHATICS: no cervical or supraclavicular nodes  RESP: lungs clear to " auscultation - no rales, rhonchi or wheezes  CV: irregular rhythm, normal rate, no rub, no murmur  EDEMA: no LE edema bilaterally  ABDOMEN: soft, nondistended, nontender, bowel sounds normal  MS: extremities normal - no gross deformities noted, no evidence of inflammation in joints, no muscle tenderness  SKIN: no rash    LABS:   CMP  Recent Labs   Lab Test 10/11/21  0942 06/10/21  0845 04/28/21  0000 11/20/20  0000 10/26/20  0936 10/26/20  0936 12/26/19  0812 12/26/19  0812    140  --   --   --  139  --  138   POTASSIUM 4.5 4.5 5.2 4.5   < > 5.1   < > 4.3   CHLORIDE 111* 111*  --   --   --  105  --  107   CO2 24 25  --   --   --  29  --  25   ANIONGAP 6 4  --   --   --  10.1  --  6   * 97 102 98   < > 113*   < > 107*   BUN 27 30  --   --   --  28  --  25   CR 1.67* 1.68* 1.89* 1.87*   < > 1.77*   < > 1.59*   GFRESTIMATED 40* 39* 34* 35*   < > 38*   < > 42*   GFRESTBLACK  --  46* 40* 40*  --  46*   < > 49*   DOMINICK 8.6 8.5  --   --   --  9.2  --  8.6    < > = values in this interval not displayed.     Recent Labs   Lab Test 06/10/21  0845 04/28/21  0000 10/26/20  0936 12/26/19  0812 07/29/19  0828 07/29/19  0828 11/28/18  1027 12/08/17  1016   BILITOTAL 0.7  --   --  0.6  --  1.2  --  1.2   ALKPHOS 57  --   --  64  --  65  --  60   ALT 24 20 5 26   < > 29   < > 31   AST 19 25  --  23  --  24  --  25    < > = values in this interval not displayed.     CBC  Recent Labs   Lab Test 10/11/21  0942 06/16/21  1327 06/10/21  0845 12/26/19  0812 10/29/19  1158 10/29/19  1158   HGB 14.4 14.2 14.4 14.3   < > 12.3*   WBC  --  5.8 5.9 7.5  --  7.9   RBC  --  4.32* 4.32* 4.62  --  3.84*   HCT  --  41.9 42.1 44.0  --  38.3*   MCV  --  97 98 95  --  100   MCH  --  32.9 33.3* 31.0  --  32.0   MCHC  --  33.9 34.2 32.5  --  32.1   RDW  --  12.9 13.2 13.0  --  13.7   PLT  --  142* 141* 194  --  346    < > = values in this interval not displayed.     URINE STUDIES  Recent Labs   Lab Test 10/11/21  0947 12/26/19  0812  09/25/19  0859 09/17/19  1442   COLOR Yellow Yellow Yellow Yellow   APPEARANCE Clear Clear Clear Clear   URINEGLC Negative Negative Negative Negative   URINEBILI Negative Negative Negative Negative   URINEKETONE Negative Negative Negative Negative   SG 1.023 1.020 1.020 1.025   UBLD Trace* Small* Large* Large*   URINEPH 5.0 5.0 5.0 5.0   PROTEIN Negative Negative Negative Negative   UROBILINOGEN  --  0.2 0.2 0.2   NITRITE Negative Negative Negative Negative   LEUKEST Negative Negative Negative Negative   RBCU 1 O - 2  --  5-10*   WBCU <1 0 - 5  --  0 - 5     No lab results found.  PTH  Recent Labs   Lab Test 10/11/21  0942   PTHI 36     IRON STUDIES  Recent Labs   Lab Test 10/11/21  0942 06/10/21  0845 12/26/19  0812   IRON 97  --  60     --  307   IRONSAT 32  --  20   TACHO 274 250 160         Kayla Narciso Quintero MD

## 2021-10-13 NOTE — NURSING NOTE
"Chief Complaint   Patient presents with     New Patient     Stage 3 kidney disease        Vitals:    10/13/21 0728   BP: 124/84   Pulse: 78   Temp: 98.4  F (36.9  C)   TempSrc: Oral   SpO2: 95%   Weight: 135.8 kg (299 lb 6.4 oz)   Height: 1.93 m (6' 4\")       Body mass index is 36.44 kg/m .      Aurora Templeton MA    "

## 2021-10-18 ENCOUNTER — VIRTUAL VISIT (OUTPATIENT)
Dept: FAMILY MEDICINE | Facility: CLINIC | Age: 76
End: 2021-10-18
Payer: MEDICARE

## 2021-10-18 DIAGNOSIS — C64.1 RENAL CELL CARCINOMA, RIGHT (H): Primary | ICD-10-CM

## 2021-10-18 DIAGNOSIS — K76.9 LIVER LESION: ICD-10-CM

## 2021-10-18 PROCEDURE — 99442 PR PHYSICIAN TELEPHONE EVALUATION 11-20 MIN: CPT | Mod: 95 | Performed by: INTERNAL MEDICINE

## 2021-10-18 NOTE — PROGRESS NOTES
Edgar is a 75 year old who is being evaluated via a billable telephone visit.      What phone number would you like to be contacted at?   How would you like to obtain your AVS? Erica De Jesus   Edgar is a 75 year old who presents for the following health issues     HPI        Renal cell carcinoma, right (H)  Liver lesion    I spoke with Edgar today over the phone with regards Wong spoke with time over the phone today with regards to his known renal cell carcinoma and recent abdominal ultrasound which showed a new liver lesion.  He was able to review this ultrasound with his treating providers at [facility], and upon secondary review of the ultrasound it was felt that the ultrasound did not show any concerning findings with regards to liver, and that the liver lesion which was felt to be new on this ultrasound was in fact a previously ablated liver lesion and that this is not of any concern at this time.  He is planning to continue to follow with the nephrologist and oncologist at [facility], but also has established with a nephrologist in [place] and wondering if he might also benefit from an oncologist in [place] as well.    Review of Systems   Constitutional, HEENT, cardiovascular, pulmonary, GI, , musculoskeletal, neuro, skin, endocrine and psych systems are negative, except as otherwise noted.      Objective           Vitals:  No vitals were obtained today due to virtual visit.    Physical Exam   healthy, alert and no distress  PSYCH: Alert and oriented times 3; coherent speech, normal   rate and volume, able to articulate logical thoughts, able   to abstract reason, no tangential thoughts, no hallucinations   or delusions  His affect is normal  RESP: No cough, no audible wheezing, able to talk in full sentences  Remainder of exam unable to be completed due to telephone visits      (C64.1) Renal cell carcinoma, right (H)  (primary encounter diagnosis)  Comment: We discussed options for management  and he will continue with following at [facility].  I did offer an opportunity to refer her to an oncologist in [place], and he will let me know if he wishes this  Plan:     (K76.9) Liver lesion  Comment: We discussed the findings on the ultrasound.  He is not interested in any further investigation of his liver lesion and will continue to follow-up with [facility] for this issue  Plan:            Phone call duration: 18 minutes

## 2021-10-20 ENCOUNTER — HOSPITAL ENCOUNTER (OUTPATIENT)
Dept: CARDIOLOGY | Facility: CLINIC | Age: 76
Discharge: HOME | End: 2021-10-20
Attending: INTERNAL MEDICINE | Admitting: INTERNAL MEDICINE
Payer: MEDICARE

## 2021-10-20 DIAGNOSIS — I25.10 CORONARY ARTERY DISEASE INVOLVING NATIVE CORONARY ARTERY OF NATIVE HEART WITHOUT ANGINA PECTORIS: ICD-10-CM

## 2021-10-20 DIAGNOSIS — I48.19 PERSISTENT ATRIAL FIBRILLATION (H): ICD-10-CM

## 2021-10-20 DIAGNOSIS — I42.9 PRIMARY CARDIOMYOPATHY (H): ICD-10-CM

## 2021-10-20 DIAGNOSIS — E78.5 HYPERLIPIDEMIA LDL GOAL <100: ICD-10-CM

## 2021-10-20 LAB — LVEF ECHO: NORMAL

## 2021-10-20 PROCEDURE — 93306 TTE W/DOPPLER COMPLETE: CPT

## 2021-10-20 PROCEDURE — 93306 TTE W/DOPPLER COMPLETE: CPT | Mod: 26 | Performed by: INTERNAL MEDICINE

## 2021-10-28 ENCOUNTER — OFFICE VISIT (OUTPATIENT)
Dept: CARDIOLOGY | Facility: CLINIC | Age: 76
End: 2021-10-28
Payer: MEDICARE

## 2021-10-28 VITALS
WEIGHT: 302 LBS | HEIGHT: 76 IN | SYSTOLIC BLOOD PRESSURE: 110 MMHG | BODY MASS INDEX: 36.77 KG/M2 | HEART RATE: 69 BPM | DIASTOLIC BLOOD PRESSURE: 76 MMHG

## 2021-10-28 DIAGNOSIS — I48.19 PERSISTENT ATRIAL FIBRILLATION (H): ICD-10-CM

## 2021-10-28 DIAGNOSIS — E78.5 HYPERLIPIDEMIA LDL GOAL <100: ICD-10-CM

## 2021-10-28 DIAGNOSIS — I42.9 PRIMARY CARDIOMYOPATHY (H): ICD-10-CM

## 2021-10-28 DIAGNOSIS — I25.10 CORONARY ARTERY DISEASE INVOLVING NATIVE CORONARY ARTERY OF NATIVE HEART WITHOUT ANGINA PECTORIS: ICD-10-CM

## 2021-10-28 PROCEDURE — 99214 OFFICE O/P EST MOD 30 MIN: CPT | Performed by: INTERNAL MEDICINE

## 2021-10-28 ASSESSMENT — MIFFLIN-ST. JEOR: SCORE: 2211.11

## 2021-10-28 NOTE — PROGRESS NOTES
HPI and Plan:     Time is a very pleasant 75-year-old who I been following for years with an idiopathic cardiomyopathy coronary disease mixed lipidemia obesity atrial fibrillation, history of nephrectomy with renal cell carcinoma and aortic root dilatation.    Edgar comes in the clinic today overall feeling well.  He denies any change in his shortness of breath with exertion.  He has no chest pain chest pressure palpitations heart racing syncope or edema.    I read personally reviewed the echocardiographic images showing normal ejection fraction.  His aortic root is unchanged at moderately dilated at 4.6.  There is no significant aortic insufficiency there is mild mitral regurgitation which I agree with.    Lipids were reviewed and LDL cholesterol 68 is at goal.  His renal function is stable creatinine 1.87.  Blood pressures well controlled.    Assessment: Stable cardiovascular status.  His aortic root is stable moderately dilated and not changing.  His left ventricular function is normalized.  His blood pressure heart rate and cholesterol are all well controlled.    Encounter Diagnoses   Name Primary?     Primary cardiomyopathy (H)      Persistent atrial fibrillation (H)      Hyperlipidemia LDL goal <100      Coronary artery disease involving native coronary artery of native heart without angina pectoris        CURRENT MEDICATIONS:  Current Outpatient Medications   Medication Sig Dispense Refill     apixaban ANTICOAGULANT (ELIQUIS ANTICOAGULANT) 5 MG tablet Take 1 tablet (5 mg) by mouth 2 times daily 180 tablet 3     ASPIRIN NOT PRESCRIBED (INTENTIONAL) Please choose reason not prescribed, below       escitalopram (LEXAPRO) 10 MG tablet Take 1 tablet (10 mg) by mouth daily 90 tablet 3     metoprolol succinate ER (TOPROL-XL) 25 MG 24 hr tablet Take 1 tablet (25 mg) by mouth daily 90 tablet 3     rosuvastatin (CRESTOR) 5 MG tablet Take 1 tablet (5 mg) by mouth daily 90 tablet 3     tamsulosin (FLOMAX) 0.4 MG capsule  Take 1 capsule (0.4 mg) by mouth daily 90 capsule 3       ALLERGIES     Allergies   Allergen Reactions     Oxycodone Other (See Comments)     Hallucinations when given after surgery        PAST MEDICAL HISTORY:  Past Medical History:   Diagnosis Date     Atrial fibrillation, unspecified 9/18/2015     CAD (coronary artery disease) 09/18/2015    minimal by angio 2007, fu nuc est nl 2018     Elevated TSH 2018     Esophageal reflux 9/18/2015     Essential hypertension      History of cardioversion     [id number]     Idiopathic cardiomyopathy (H) 09/18/2015    fu echo nl ef, nl nuclear est 11/18, Dr. Quarles     Mixed hyperlipidemia 9/18/2015     Mumps      Sleep apnea 09/18/2015    does not use cpap as of 2019     Sleep apnea      Thoracic aortic aneurysm (H)     sinus of valsalva 4.5 and asc aorta 4.5 in 2017     Tubular adenoma of colon 01/2017    fu 3 years       PAST SURGICAL HISTORY:  Past Surgical History:   Procedure Laterality Date     APPENDECTOMY  1964       FAMILY HISTORY:  Family History   Problem Relation Age of Onset     Arrhythmia Mother         a-fib     Cerebrovascular Disease Mother      Arrhythmia Father         a-fib     Colon Cancer Father      Diabetes Father      Cerebrovascular Disease Father      No Known Problems Brother        SOCIAL HISTORY:  Social History     Socioeconomic History     Marital status:      Spouse name: None     Number of children: 3     Years of education: None     Highest education level: None   Occupational History     Occupation: retired cpa   Tobacco Use     Smoking status: Never Smoker     Smokeless tobacco: Never Used   Substance and Sexual Activity     Alcohol use: Yes     Alcohol/week: 0.0 standard drinks     Comment: 3 drinks week     Drug use: No     Sexual activity: Yes     Partners: Female   Other Topics Concern      Service Not Asked     Blood Transfusions Not Asked     Caffeine Concern No     Comment: 2 coffee in am, occas. soda     Occupational  "Exposure Not Asked     Hobby Hazards Not Asked     Sleep Concern No     Stress Concern No     Weight Concern Yes     Special Diet No     Back Care Not Asked     Exercise No     Comment: walking 3-4 days week     Bike Helmet Not Asked     Seat Belt Yes     Self-Exams Not Asked     Parent/sibling w/ CABG, MI or angioplasty before 65F 55M? Not Asked   Social History Narrative     None     Social Determinants of Health     Financial Resource Strain:      Difficulty of Paying Living Expenses:    Food Insecurity:      Worried About Running Out of Food in the Last Year:      Ran Out of Food in the Last Year:    Transportation Needs:      Lack of Transportation (Medical):      Lack of Transportation (Non-Medical):    Physical Activity:      Days of Exercise per Week:      Minutes of Exercise per Session:    Stress:      Feeling of Stress :    Social Connections:      Frequency of Communication with Friends and Family:      Frequency of Social Gatherings with Friends and Family:      Attends Faith Services:      Active Member of Clubs or Organizations:      Attends Club or Organization Meetings:      Marital Status:    Intimate Partner Violence:      Fear of Current or Ex-Partner:      Emotionally Abused:      Physically Abused:      Sexually Abused:        Review of Systems:  Skin:  Negative     Eyes:  Positive for glasses  ENT:  Negative    Respiratory:  Negative    Cardiovascular:    fatigue;Positive for;lightheadedness  Gastroenterology: Negative    Genitourinary:  not assessed    Musculoskeletal:  Positive for joint stiffness  Neurologic:  Positive for numbness or tingling of hands;numbness or tingling of feet  Psychiatric:  Positive for depression  Heme/Lymph/Imm:  Positive for allergies  Endocrine:  Negative      Physical Exam:  Vitals: /76   Pulse 69   Ht 1.93 m (6' 3.98\")   Wt 137 kg (302 lb)   BMI 36.78 kg/m      Constitutional:             Recent Lab Results:  LIPID RESULTS:  Lab Results   Component " Value Date    CHOL 135 10/11/2021    CHOL 116 06/10/2021    HDL 49 10/11/2021    HDL 45 06/10/2021    LDL 68 10/11/2021    LDL 56 06/10/2021    TRIG 90 10/11/2021    TRIG 74 06/10/2021    CHOLHDLRATIO 2.8 09/17/2015       LIVER ENZYME RESULTS:  Lab Results   Component Value Date    AST 19 06/10/2021    ALT 24 06/10/2021       CBC RESULTS:  Lab Results   Component Value Date    WBC 5.8 06/16/2021    RBC 4.32 (L) 06/16/2021    HGB 14.4 10/11/2021    HGB 14.2 06/16/2021    HCT 41.9 06/16/2021    MCV 97 06/16/2021    MCH 32.9 06/16/2021    MCHC 33.9 06/16/2021    RDW 12.9 06/16/2021     (L) 06/16/2021       BMP RESULTS:  Lab Results   Component Value Date     10/11/2021     06/10/2021    POTASSIUM 4.5 10/11/2021    POTASSIUM 4.5 06/10/2021    CHLORIDE 111 (H) 10/11/2021    CHLORIDE 111 (H) 06/10/2021    CO2 24 10/11/2021    CO2 25 06/10/2021    ANIONGAP 6 10/11/2021    ANIONGAP 4 06/10/2021     (H) 10/11/2021    GLC 97 06/10/2021    BUN 27 10/11/2021    BUN 30 06/10/2021    CR 1.67 (H) 10/11/2021    CR 1.68 (H) 06/10/2021    GFRESTIMATED 40 (L) 10/11/2021    GFRESTIMATED 39 (L) 06/10/2021    GFRESTBLACK 46 (L) 06/10/2021    DOMINICK 8.6 10/11/2021    DOMINICK 8.5 06/10/2021        A1C RESULTS:  Lab Results   Component Value Date    A1C 5.6 12/08/2017       INR RESULTS:  Lab Results   Component Value Date    INR 2.3 06/17/2013    INR 2.4 05/08/2013           CC  Devin Quarles MD  [id number]  [place] [id number]

## 2021-10-28 NOTE — LETTER
10/28/2021    Nico Retana MD, MD  [address]  [facility] [address]    RE: Ti Nickerson       Dear Colleague,    I had the pleasure of seeing Ti Nickerson in the [facility].    HPI and Plan:     Time is a very pleasant 75-year-old who I been following for years with an idiopathic cardiomyopathy coronary disease mixed lipidemia obesity atrial fibrillation, history of nephrectomy with renal cell carcinoma and aortic root dilatation.    Edgar comes in the clinic today overall feeling well.  He denies any change in his shortness of breath with exertion.  He has no chest pain chest pressure palpitations heart racing syncope or edema.    I read personally reviewed the echocardiographic images showing normal ejection fraction.  His aortic root is unchanged at moderately dilated at 4.6.  There is no significant aortic insufficiency there is mild mitral regurgitation which I agree with.    Lipids were reviewed and LDL cholesterol 68 is at goal.  His renal function is stable creatinine 1.87.  Blood pressures well controlled.    Assessment: Stable cardiovascular status.  His aortic root is stable moderately dilated and not changing.  His left ventricular function is normalized.  His blood pressure heart rate and cholesterol are all well controlled.    Encounter Diagnoses   Name Primary?     Primary cardiomyopathy (H)      Persistent atrial fibrillation (H)      Hyperlipidemia LDL goal <100      Coronary artery disease involving native coronary artery of native heart without angina pectoris        CURRENT MEDICATIONS:  Current Outpatient Medications   Medication Sig Dispense Refill     apixaban ANTICOAGULANT (ELIQUIS ANTICOAGULANT) 5 MG tablet Take 1 tablet (5 mg) by mouth 2 times daily 180 tablet 3     ASPIRIN NOT PRESCRIBED (INTENTIONAL) Please choose reason not prescribed, below       escitalopram (LEXAPRO) 10 MG tablet Take 1 tablet (10  mg) by mouth daily 90 tablet 3     metoprolol succinate ER (TOPROL-XL) 25 MG 24 hr tablet Take 1 tablet (25 mg) by mouth daily 90 tablet 3     rosuvastatin (CRESTOR) 5 MG tablet Take 1 tablet (5 mg) by mouth daily 90 tablet 3     tamsulosin (FLOMAX) 0.4 MG capsule Take 1 capsule (0.4 mg) by mouth daily 90 capsule 3       ALLERGIES     Allergies   Allergen Reactions     Oxycodone Other (See Comments)     Hallucinations when given after surgery        PAST MEDICAL HISTORY:  Past Medical History:   Diagnosis Date     Atrial fibrillation, unspecified 9/18/2015     CAD (coronary artery disease) 09/18/2015    minimal by angio 2007, fu nuc est nl 2018     Elevated TSH 2018     Esophageal reflux 9/18/2015     Essential hypertension      History of cardioversion     [id number]     Idiopathic cardiomyopathy (H) 09/18/2015    fu echo nl ef, nl nuclear est 11/18, Dr. Quarles     Mixed hyperlipidemia 9/18/2015     Mumps      Sleep apnea 09/18/2015    does not use cpap as of 2019     Sleep apnea      Thoracic aortic aneurysm (H)     sinus of valsalva 4.5 and asc aorta 4.5 in 2017     Tubular adenoma of colon 01/2017    fu 3 years       PAST SURGICAL HISTORY:  Past Surgical History:   Procedure Laterality Date     APPENDECTOMY  1964       FAMILY HISTORY:  Family History   Problem Relation Age of Onset     Arrhythmia Mother         a-fib     Cerebrovascular Disease Mother      Arrhythmia Father         a-fib     Colon Cancer Father      Diabetes Father      Cerebrovascular Disease Father      No Known Problems Brother        SOCIAL HISTORY:  Social History     Socioeconomic History     Marital status:      Spouse name: None     Number of children: 3     Years of education: None     Highest education level: None   Occupational History     Occupation: retired cpa   Tobacco Use     Smoking status: Never Smoker     Smokeless tobacco: Never Used   Substance and Sexual Activity     Alcohol use: Yes     Alcohol/week: 0.0 standard  drinks     Comment: 3 drinks week     Drug use: No     Sexual activity: Yes     Partners: Female   Other Topics Concern      Service Not Asked     Blood Transfusions Not Asked     Caffeine Concern No     Comment: 2 coffee in am, occas. soda     Occupational Exposure Not Asked     Hobby Hazards Not Asked     Sleep Concern No     Stress Concern No     Weight Concern Yes     Special Diet No     Back Care Not Asked     Exercise No     Comment: walking 3-4 days week     Bike Helmet Not Asked     Seat Belt Yes     Self-Exams Not Asked     Parent/sibling w/ CABG, MI or angioplasty before 65F 55M? Not Asked   Social History Narrative     None     Social Determinants of Health     Financial Resource Strain:      Difficulty of Paying Living Expenses:    Food Insecurity:      Worried About Running Out of Food in the Last Year:      Ran Out of Food in the Last Year:    Transportation Needs:      Lack of Transportation (Medical):      Lack of Transportation (Non-Medical):    Physical Activity:      Days of Exercise per Week:      Minutes of Exercise per Session:    Stress:      Feeling of Stress :    Social Connections:      Frequency of Communication with Friends and Family:      Frequency of Social Gatherings with Friends and Family:      Attends [employer] Services:      Active Member of Clubs or Organizations:      Attends Club or Organization Meetings:      Marital Status:    Intimate Partner Violence:      Fear of Current or Ex-Partner:      Emotionally Abused:      Physically Abused:      Sexually Abused:        Review of Systems:  Skin:  Negative     Eyes:  Positive for glasses  ENT:  Negative    Respiratory:  Negative    Cardiovascular:    fatigue;Positive for;lightheadedness  Gastroenterology: Negative    Genitourinary:  not assessed    Musculoskeletal:  Positive for joint stiffness  Neurologic:  Positive for numbness or tingling of hands;numbness or tingling of feet  Psychiatric:  Positive for  "depression  Heme/Lymph/Imm:  Positive for allergies  Endocrine:  Negative      Physical Exam:  Vitals: /76   Pulse 69   Ht 1.93 m (6' 3.98\")   Wt 137 kg (302 lb)   BMI 36.78 kg/m      Constitutional:             Recent Lab Results:  LIPID RESULTS:  Lab Results   Component Value Date    CHOL 135 10/11/2021    CHOL 116 06/10/2021    HDL 49 10/11/2021    HDL 45 06/10/2021    LDL 68 10/11/2021    LDL 56 06/10/2021    TRIG 90 10/11/2021    TRIG 74 06/10/2021    CHOLHDLRATIO 2.8 09/17/2015       LIVER ENZYME RESULTS:  Lab Results   Component Value Date    AST 19 06/10/2021    ALT 24 06/10/2021       CBC RESULTS:  Lab Results   Component Value Date    WBC 5.8 06/16/2021    RBC 4.32 (L) 06/16/2021    HGB 14.4 10/11/2021    HGB 14.2 06/16/2021    HCT 41.9 06/16/2021    MCV 97 06/16/2021    MCH 32.9 06/16/2021    MCHC 33.9 06/16/2021    RDW 12.9 06/16/2021     (L) 06/16/2021       BMP RESULTS:  Lab Results   Component Value Date     10/11/2021     06/10/2021    POTASSIUM 4.5 10/11/2021    POTASSIUM 4.5 06/10/2021    CHLORIDE 111 (H) 10/11/2021    CHLORIDE 111 (H) 06/10/2021    CO2 24 10/11/2021    CO2 25 06/10/2021    ANIONGAP 6 10/11/2021    ANIONGAP 4 06/10/2021     (H) 10/11/2021    GLC 97 06/10/2021    BUN 27 10/11/2021    BUN 30 06/10/2021    CR 1.67 (H) 10/11/2021    CR 1.68 (H) 06/10/2021    GFRESTIMATED 40 (L) 10/11/2021    GFRESTIMATED 39 (L) 06/10/2021    GFRESTBLACK 46 (L) 06/10/2021    DOMINICK 8.6 10/11/2021    DOMINICK 8.5 06/10/2021        A1C RESULTS:  Lab Results   Component Value Date    A1C 5.6 12/08/2017       INR RESULTS:  Lab Results   Component Value Date    INR 2.3 06/17/2013    INR 2.4 05/08/2013           CC  Sunny Witt MD  [address]  [place] [address]                    Thank you for allowing me to participate in the care of your patient.      Sincerely,     SUNNY WITT MD     [facility] " [facility]  cc:   Devin Quarles MD  [address]  [place] [id number]

## 2021-11-18 DIAGNOSIS — I48.20 CHRONIC ATRIAL FIBRILLATION (H): ICD-10-CM

## 2021-12-03 DIAGNOSIS — I25.10 CORONARY ARTERY DISEASE INVOLVING NATIVE CORONARY ARTERY OF NATIVE HEART WITHOUT ANGINA PECTORIS: ICD-10-CM

## 2021-12-03 DIAGNOSIS — E78.5 HYPERLIPIDEMIA LDL GOAL <100: ICD-10-CM

## 2021-12-03 DIAGNOSIS — I42.9 PRIMARY CARDIOMYOPATHY (H): ICD-10-CM

## 2021-12-03 DIAGNOSIS — I48.19 PERSISTENT ATRIAL FIBRILLATION (H): ICD-10-CM

## 2021-12-03 RX ORDER — ROSUVASTATIN CALCIUM 5 MG/1
5 TABLET, COATED ORAL DAILY
Qty: 90 TABLET | Refills: 3 | Status: SHIPPED | OUTPATIENT
Start: 2021-12-03 | End: 2022-11-28

## 2021-12-08 ENCOUNTER — TRANSFERRED RECORDS (OUTPATIENT)
Dept: HEALTH INFORMATION MANAGEMENT | Facility: CLINIC | Age: 76
End: 2021-12-08
Payer: MEDICARE

## 2021-12-08 LAB
ALT SERPL-CCNC: 21 U/L (ref 7–55)
AST SERPL-CCNC: 28 U/L (ref 8–48)
CREATININE (EXTERNAL): 1.6 MG/DL (ref 0.7–1.4)
GFR ESTIMATED (EXTERNAL): 42 ML/MIN/BSA
GFR ESTIMATED (IF AFRICAN AMERICAN) (EXTERNAL): 48 ML/MIN/BSA
GLUCOSE (EXTERNAL): 103 MG/DL (ref 70–140)
POTASSIUM (EXTERNAL): 4.8 MMOL/L (ref 3.6–5.2)

## 2022-02-19 ENCOUNTER — HEALTH MAINTENANCE LETTER (OUTPATIENT)
Age: 77
End: 2022-02-19

## 2022-04-23 ENCOUNTER — TELEPHONE (OUTPATIENT)
Dept: FAMILY MEDICINE | Facility: CLINIC | Age: 77
End: 2022-04-23
Payer: MEDICARE

## 2022-04-23 DIAGNOSIS — F43.9 SITUATIONAL STRESS: ICD-10-CM

## 2022-04-29 RX ORDER — ESCITALOPRAM OXALATE 10 MG/1
TABLET ORAL
Qty: 90 TABLET | Refills: 3 | Status: SHIPPED | OUTPATIENT
Start: 2022-04-29 | End: 2022-05-06

## 2022-05-02 ENCOUNTER — TELEPHONE (OUTPATIENT)
Dept: NEPHROLOGY | Facility: CLINIC | Age: 77
End: 2022-05-02
Payer: MEDICARE

## 2022-05-02 NOTE — TELEPHONE ENCOUNTER
----- Message from Dayna Jarrell RN sent at 10/13/2021  2:00 PM CDT -----  Regarding: Follow up appointment with Leon Gauthier, please call patient to schedule a follow-up visit for CKD 3B with Dr. Quintero for sometime in mid Oct of 22. Thanks!

## 2022-05-02 NOTE — TELEPHONE ENCOUNTER
LVM for PT to call [phone] to schedule f/u with Dr. Quintero.  Labs need to be scheduled 7-10 days before Dr. Quintero appt.

## 2022-05-06 ENCOUNTER — VIRTUAL VISIT (OUTPATIENT)
Dept: FAMILY MEDICINE | Facility: CLINIC | Age: 77
End: 2022-05-06
Payer: MEDICARE

## 2022-05-06 DIAGNOSIS — R35.1 BENIGN PROSTATIC HYPERPLASIA WITH NOCTURIA: ICD-10-CM

## 2022-05-06 DIAGNOSIS — N40.1 BENIGN PROSTATIC HYPERPLASIA WITH NOCTURIA: ICD-10-CM

## 2022-05-06 DIAGNOSIS — I48.20 CHRONIC ATRIAL FIBRILLATION (H): ICD-10-CM

## 2022-05-06 DIAGNOSIS — F43.9 SITUATIONAL STRESS: ICD-10-CM

## 2022-05-06 PROCEDURE — 99443 PR PHYSICIAN TELEPHONE EVALUATION 21-30 MIN: CPT | Mod: 95 | Performed by: INTERNAL MEDICINE

## 2022-05-06 RX ORDER — ESCITALOPRAM OXALATE 10 MG/1
10 TABLET ORAL DAILY
Qty: 90 TABLET | Refills: 3 | Status: SHIPPED | OUTPATIENT
Start: 2022-05-06 | End: 2023-03-27

## 2022-05-06 RX ORDER — TAMSULOSIN HYDROCHLORIDE 0.4 MG/1
0.4 CAPSULE ORAL DAILY
Qty: 90 CAPSULE | Refills: 3 | Status: SHIPPED | OUTPATIENT
Start: 2022-05-06 | End: 2022-06-20

## 2022-05-06 RX ORDER — METOPROLOL SUCCINATE 25 MG/1
25 TABLET, EXTENDED RELEASE ORAL DAILY
Qty: 90 TABLET | Refills: 3 | Status: SHIPPED | OUTPATIENT
Start: 2022-05-06 | End: 2022-06-20

## 2022-05-06 ASSESSMENT — ANXIETY QUESTIONNAIRES
6. BECOMING EASILY ANNOYED OR IRRITABLE: SEVERAL DAYS
1. FEELING NERVOUS, ANXIOUS, OR ON EDGE: NOT AT ALL
3. WORRYING TOO MUCH ABOUT DIFFERENT THINGS: NOT AT ALL
IF YOU CHECKED OFF ANY PROBLEMS ON THIS QUESTIONNAIRE, HOW DIFFICULT HAVE THESE PROBLEMS MADE IT FOR YOU TO DO YOUR WORK, TAKE CARE OF THINGS AT HOME, OR GET ALONG WITH OTHER PEOPLE: NOT DIFFICULT AT ALL
5. BEING SO RESTLESS THAT IT IS HARD TO SIT STILL: NOT AT ALL
7. FEELING AFRAID AS IF SOMETHING AWFUL MIGHT HAPPEN: NOT AT ALL
GAD7 TOTAL SCORE: 1
2. NOT BEING ABLE TO STOP OR CONTROL WORRYING: NOT AT ALL

## 2022-05-06 ASSESSMENT — PATIENT HEALTH QUESTIONNAIRE - PHQ9
SUM OF ALL RESPONSES TO PHQ QUESTIONS 1-9: 5
5. POOR APPETITE OR OVEREATING: NOT AT ALL

## 2022-05-06 NOTE — PROGRESS NOTES
Edgar is a 76 year old who is being evaluated via a billable telephone visit.      What phone number would you like to be contacted at? [phone]  How would you like to obtain your AVS? Erica De Jesus   Edgar is a 76 year old who presents for the following health issues     HPI     Depression and Anxiety Follow-Up    How are you doing with your depression since your last visit? No change    How are you doing with your anxiety since your last visit?  No change    Are you having other symptoms that might be associated with depression or anxiety? No    Have you had a significant life event? No     Do you have any concerns with your use of alcohol or other drugs? No    Social History     Tobacco Use     Smoking status: Never Smoker     Smokeless tobacco: Never Used   Substance Use Topics     Alcohol use: Yes     Alcohol/week: 0.0 standard drinks     Comment: 3 drinks week     Drug use: No     PHQ 5/6/2022   PHQ-9 Total Score 5   Q9: Thoughts of better off dead/self-harm past 2 weeks Not at all     TAMERA-7 SCORE 5/6/2022   Total Score 1     Last PHQ-9 5/6/2022   1.  Little interest or pleasure in doing things 1   2.  Feeling down, depressed, or hopeless 1   3.  Trouble falling or staying asleep, or sleeping too much 0   4.  Feeling tired or having little energy 1   5.  Poor appetite or overeating 0   6.  Feeling bad about yourself 1   7.  Trouble concentrating 1   8.  Moving slowly or restless 0   Q9: Thoughts of better off dead/self-harm past 2 weeks 0   PHQ-9 Total Score 5   Difficulty at work, home, or with people Somewhat difficult     TAMERA-7  5/6/2022   1. Feeling nervous, anxious, or on edge 0   2. Not being able to stop or control worrying 0   3. Worrying too much about different things 0   4. Trouble relaxing 0   5. Being so restless that it is hard to sit still 0   6. Becoming easily annoyed or irritable 1   7. Feeling afraid, as if something awful might happen 0   TAMERA-7 Total Score 1   If you checked any  "problems, how difficult have they made it for you to do your work, take care of things at home, or get along with other people? Not difficult at all       Suicide Assessment Five-step Evaluation and Treatment (SAFE-T)    }       Situational stress  Chronic atrial fibrillation (H)   Ti NEWTON Liya has been dealing with atrial fibrillation.  He notes that he does feel light-headed when he stands up to quickly.  His wife notes that he is not drinking enough fluids regularly.  He was able to walk the golf course when he drinks more fluids.  He has only had one episde of light-headedness since returning back to [place].  He is taking metoprolol in the AM, and there was one episode in which his blood pressure was quite low and he almost passed out.    Benign prostatic hyperplasia with nocturia   Has been taking tamsulosin   Diarrhea   He has had new onset of diarrhea.  Has been taking imodium ad.  He does note that loose stools may be dietary ralated       Review of Systems   Constitutional, HEENT, cardiovascular, pulmonary, gi and gu systems are negative, except as otherwise noted.      Objective    Vitals - Patient Reported  Systolic (Patient Reported): 118  Diastolic (Patient Reported): 71  Weight (Patient Reported): 131.5 kg (290 lb)  Height (Patient Reported): 190.5 cm (6' 3\")  BMI (Based on Pt Reported Ht/Wt): 36.25  Pulse (Patient Reported): 72      Vitals:  No vitals were obtained today due to virtual visit.    Physical Exam   healthy, alert and no distress  PSYCH: Alert and oriented times 3; coherent speech, normal   rate and volume, able to articulate logical thoughts, able   to abstract reason, no tangential thoughts, no hallucinations   or delusions  His affect is normal  RESP: No cough, no audible wheezing, able to talk in full sentences  Remainder of exam unable to be completed due to telephone visits    (F43.9) Situational stress  Comment: OK to refill lexapro - discussed that selective serotonin " reuptake inhibitor can cause loose stools and OK to reduce to 5 mg to see if this helps - OK to take imodium   Plan: escitalopram (LEXAPRO) 10 MG tablet            (I48.20) Chronic atrial fibrillation (H)  Comment: Continue metoprolol and maintain hydration as best as able  Plan: metoprolol succinate ER (TOPROL XL) 25 MG 24 hr        tablet            (N40.1,  R35.1) Benign prostatic hyperplasia with nocturia  Comment: Continue flomax as well  Plan: tamsulosin (FLOMAX) 0.4 MG capsule                   Phone call duration: 21  minutes

## 2022-05-07 ASSESSMENT — ANXIETY QUESTIONNAIRES: GAD7 TOTAL SCORE: 1

## 2022-05-18 ENCOUNTER — TELEPHONE (OUTPATIENT)
Dept: CARDIOLOGY | Facility: CLINIC | Age: 77
End: 2022-05-18
Payer: MEDICARE

## 2022-05-18 NOTE — TELEPHONE ENCOUNTER
Health Call Center    Phone Message    May a detailed message be left on voicemail: yes     Reason for Call: Other: Pt's blood preassure is normal while he is walking around or being active but when he is sitting relazing it falls to around 80/55 causing difficulty for daily living.   Pt is also seeing a Kidney Dr. at [facility] and is being treated for Kidney Cancer.  PT now has just one kidney.  Pt wants Dr. Quarles to be able to see his records at [facility] this is his [facility] number [phone] for Dr. Quarles to see his notes.  Please call pt to discuss his concerns regarding the low BP and what can be done prior to his next appt w/Dr. Quarles on 10.4.22.  Also to discuss the kidney cancer and how it may be impacting pt's treatment for heart issues.    Action Taken: Message routed to:  [facility] ([facility] cardio    Travel Screening: Not Applicable

## 2022-05-18 NOTE — TELEPHONE ENCOUNTER
"Telephoned patient to follow-up on hypotension relayed in message. KATELYN requesting call back, direct contact information provided.    1530-Patient returned call. He is scheduled for repeat renal imaging at [facility] and may start immunotherapy. His wife is concerned about his blood pressure prior to starting immunotherapy. He reports having trouble with stamina and occasional low blood pressure. His Toprol XL was reduced from 50mg to 25mg daily in March and recently started taking it in the evenings to help with this occasional dizziness. He records his BP daily, states \"top number is rarely over 100\" and HR \"runs 70-80\". He states the dizziness is worse when moving from sitting to standing. Discussed orthostatic hypotension with patient and moving positions slowly. Requested he stay well hydrated. He will record BP and HR daily. Scheduled RICK visit next week to review his BP and symptoms -date, time and location reviewed with patient.    Beverly Roberson RN  [facility]    "

## 2022-05-25 ENCOUNTER — OFFICE VISIT (OUTPATIENT)
Dept: CARDIOLOGY | Facility: CLINIC | Age: 77
End: 2022-05-25
Payer: MEDICARE

## 2022-05-25 VITALS
HEART RATE: 77 BPM | DIASTOLIC BLOOD PRESSURE: 73 MMHG | HEIGHT: 75 IN | SYSTOLIC BLOOD PRESSURE: 113 MMHG | OXYGEN SATURATION: 95 % | BODY MASS INDEX: 37.05 KG/M2 | WEIGHT: 298 LBS

## 2022-05-25 DIAGNOSIS — I25.10 CORONARY ARTERY DISEASE INVOLVING NATIVE CORONARY ARTERY OF NATIVE HEART WITHOUT ANGINA PECTORIS: Primary | ICD-10-CM

## 2022-05-25 PROCEDURE — 93005 ELECTROCARDIOGRAM TRACING: CPT | Performed by: PHYSICIAN ASSISTANT

## 2022-05-25 PROCEDURE — 99215 OFFICE O/P EST HI 40 MIN: CPT | Performed by: PHYSICIAN ASSISTANT

## 2022-05-25 RX ORDER — LOPERAMIDE HCL 2 MG
2 CAPSULE ORAL 4 TIMES DAILY PRN
Status: ON HOLD | COMMUNITY
End: 2024-07-14

## 2022-05-25 RX ORDER — ACETAMINOPHEN 500 MG
500-1000 TABLET ORAL DAILY PRN
COMMUNITY

## 2022-05-25 NOTE — PATIENT INSTRUCTIONS
If you have questions or concerns please call my nurse team at [phone].     Scheduling phone number: [phone].  Reminder: Please bring in all current medications, over the counter supplements and vitamin bottles to your next appointment.    It was a pleasure seeing you today!     Martha Sparks PA-C  5/25/2022

## 2022-05-25 NOTE — PROGRESS NOTES
Primary Cardiologist: Dr. Quarles    Reason for Visit: Low blood pressure issues    History of Present Illness:   Edgar is a pleasant 76 year old male with past medical history notable for hx of idiopathic cardiomyopathy (LVEF has resolved; remains on low-dose BB only due to soft BP), chronic atrial fibrillation (rate control with low-dose BB; anticoagulation with apixaban), minimal non-obstructive CAD, KEENA, and GERD.     Edgar reports he was having low blood pressure during January when he was in [place]. His metoprolol dose was reduced to 25 mg and he eventually moved it to evening time. He also noticed that he was quite dehydrated with the heat and tried to drink more fluids. His blood pressure is much better now. He has also been following with nephrology oncology and underwent nephrectomy and is planned for immunotherapy. They want to make sure there is no issues from cardiac standpoint before going ahead with immunotherapy.     Assessment and Plan:  Edgar is a pleasant 76 year old male with past medical history notable for hx of idiopathic cardiomyopathy (LVEF has resolved; remains on low-dose BB only due to soft BP), chronic atrial fibrillation (rate control with low-dose BB; anticoagulation with apixaban), minimal non-obstructive CAD, KEENA, and GERD.     Edgar's blood pressure is much better. He has no symptoms. I believe dehydration was contributing to his low BP. He had recent labs through [facility] and reports his CBC is was OK. He checks his pulse regularly and it is always under good control. He does have some lightheadedness with positional changes but this has been an ongoing issue for him. If he gives himself sometime before moving quickly he has no significant issues.     He will return to our clinic in October 2022 with repeat echocardiogram.     45  minutes spent on the date of the encounter with chart review, patient visit, care coordination, and documentation.      This note was completed in part using Dragon  voice recognition software. Although reviewed after completion, some word and grammatical errors may occur.    Orders this Visit:  Orders Placed This Encounter   Procedures     EKG 12-lead complete w/read - Clinics (performed today)     Orders Placed This Encounter   Medications     loperamide (IMODIUM) 1 MG/5ML liquid     Sig: Take by mouth 4 times daily as needed for diarrhea     acetaminophen (TYLENOL) 500 MG tablet     Sig: Take 500-1,000 mg by mouth every 6 hours as needed for mild pain     There are no discontinued medications.      Encounter Diagnosis   Name Primary?     Coronary artery disease involving native coronary artery of native heart without angina pectoris Yes       CURRENT MEDICATIONS:  Current Outpatient Medications   Medication Sig Dispense Refill     acetaminophen (TYLENOL) 500 MG tablet Take 500-1,000 mg by mouth every 6 hours as needed for mild pain       apixaban ANTICOAGULANT (ELIQUIS ANTICOAGULANT) 5 MG tablet Take 1 tablet (5 mg) by mouth 2 times daily 180 tablet 3     escitalopram (LEXAPRO) 10 MG tablet Take 1 tablet (10 mg) by mouth daily 90 tablet 3     loperamide (IMODIUM) 1 MG/5ML liquid Take by mouth 4 times daily as needed for diarrhea       metoprolol succinate ER (TOPROL XL) 25 MG 24 hr tablet Take 1 tablet (25 mg) by mouth daily 90 tablet 3     rosuvastatin (CRESTOR) 5 MG tablet Take 1 tablet (5 mg) by mouth daily 90 tablet 3     tamsulosin (FLOMAX) 0.4 MG capsule Take 1 capsule (0.4 mg) by mouth daily 90 capsule 3     ASPIRIN NOT PRESCRIBED (INTENTIONAL) Please choose reason not prescribed, below (Patient not taking: Reported on 5/25/2022)         ALLERGIES     Allergies   Allergen Reactions     Oxycodone Other (See Comments)     Hallucinations when given after surgery        PAST MEDICAL HISTORY:  Past Medical History:   Diagnosis Date     Atrial fibrillation, unspecified 9/18/2015     CAD (coronary artery disease) 09/18/2015    minimal by angio 2007, fu nuc est nl 2018      Elevated TSH 2018     Esophageal reflux 9/18/2015     Essential hypertension      History of cardioversion     [id number]     Idiopathic cardiomyopathy (H) 09/18/2015    fu echo nl ef, nl nuclear est 11/18, Dr. Quarles     Mixed hyperlipidemia 9/18/2015     Mumps      Sleep apnea 09/18/2015    does not use cpap as of 2019     Sleep apnea      Thoracic aortic aneurysm (H)     sinus of valsalva 4.5 and asc aorta 4.5 in 2017     Tubular adenoma of colon 01/2017    fu 3 years       PAST SURGICAL HISTORY:  Past Surgical History:   Procedure Laterality Date     APPENDECTOMY  1964       FAMILY HISTORY:  Family History   Problem Relation Age of Onset     Arrhythmia Mother         a-fib     Cerebrovascular Disease Mother      Arrhythmia Father         a-fib     Colon Cancer Father      Diabetes Father      Cerebrovascular Disease Father      No Known Problems Brother        SOCIAL HISTORY:  Social History     Socioeconomic History     Marital status:      Spouse name: None     Number of children: 3     Years of education: None     Highest education level: None   Occupational History     Occupation: retired cpa   Tobacco Use     Smoking status: Never Smoker     Smokeless tobacco: Never Used   Vaping Use     Vaping Use: Never used   Substance and Sexual Activity     Alcohol use: Yes     Alcohol/week: 0.0 standard drinks     Comment: 3 drinks week     Drug use: No     Sexual activity: Yes     Partners: Female   Other Topics Concern     Caffeine Concern No     Comment: 2 coffee in am, occas. soda     Sleep Concern No     Stress Concern No     Weight Concern Yes     Special Diet No     Exercise No     Comment: walking 3-4 days week     Seat Belt Yes       Review of Systems:  Skin:  Negative     Eyes:  Positive for glasses  ENT:  Negative    Respiratory:  Negative    Cardiovascular:    fatigue;Positive for;lightheadedness  Gastroenterology: Negative    Genitourinary:  not assessed    Musculoskeletal:  Positive for joint  "stiffness  Neurologic:  Positive for numbness or tingling of hands;numbness or tingling of feet  Psychiatric:  Positive for depression  Heme/Lymph/Imm:  Positive for allergies  Endocrine:  Negative      Physical Exam:  Vitals: /73   Pulse 77   Ht 1.905 m (6' 3\")   Wt 135.2 kg (298 lb)   SpO2 95%   BMI 37.25 kg/m       GEN:  NAD  NECK: No JVD  C/V:  Regular rate and rhythm, no murmur, rub or gallop.  RESP: Clear to auscultation bilaterally without wheezing, rales, or rhonchi.  GI: Abdomen soft, nontender, nondistended.   EXTREM: 1+ pitting LE edema.   NEURO: Alert and oriented, cooperative. No obvious focal deficits.   PSYCH: Normal affect.  SKIN: Warm and dry.       Recent Lab Results:  LIPID RESULTS:  Lab Results   Component Value Date    CHOL 135 10/11/2021    CHOL 116 06/10/2021    HDL 49 10/11/2021    HDL 45 06/10/2021    LDL 68 10/11/2021    LDL 56 06/10/2021    TRIG 90 10/11/2021    TRIG 74 06/10/2021    CHOLHDLRATIO 2.8 09/17/2015       LIVER ENZYME RESULTS:  Lab Results   Component Value Date    AST 19 06/10/2021    ALT 24 06/10/2021       CBC RESULTS:  Lab Results   Component Value Date    WBC 5.8 06/16/2021    RBC 4.32 (L) 06/16/2021    HGB 14.4 10/11/2021    HGB 14.2 06/16/2021    HCT 41.9 06/16/2021    MCV 97 06/16/2021    MCH 32.9 06/16/2021    MCHC 33.9 06/16/2021    RDW 12.9 06/16/2021     (L) 06/16/2021       BMP RESULTS:  Lab Results   Component Value Date     10/11/2021     06/10/2021    POTASSIUM 4.5 10/11/2021    POTASSIUM 4.5 06/10/2021    CHLORIDE 111 (H) 10/11/2021    CHLORIDE 111 (H) 06/10/2021    CO2 24 10/11/2021    CO2 25 06/10/2021    ANIONGAP 6 10/11/2021    ANIONGAP 4 06/10/2021     (H) 10/11/2021    GLC 97 06/10/2021    BUN 27 10/11/2021    BUN 30 06/10/2021    CR 1.67 (H) 10/11/2021    CR 1.68 (H) 06/10/2021    GFRESTIMATED 40 (L) 10/11/2021    GFRESTIMATED 39 (L) 06/10/2021    GFRESTBLACK 46 (L) 06/10/2021    DOMINICK 8.6 10/11/2021    DOMINICK 8.5 " 06/10/2021        A1C RESULTS:  Lab Results   Component Value Date    A1C 5.6 12/08/2017       INR RESULTS:  Lab Results   Component Value Date    INR 2.3 06/17/2013    INR 2.4 05/08/2013           Martha Sparks PA-C  May 25, 2022

## 2022-05-25 NOTE — LETTER
5/25/2022    Nico Retana MD, MD  [address]  [facility] [address]    RE: Ti Nickerson       Dear Colleague,     I had the pleasure of seeing Ti Nickerson in the [facility].  Primary Cardiologist: Dr. Quarles    Reason for Visit: Low blood pressure issues    History of Present Illness:   Edgar is a pleasant 76 year old male with past medical history notable for hx of idiopathic cardiomyopathy (LVEF has resolved; remains on low-dose BB only due to soft BP), chronic atrial fibrillation (rate control with low-dose BB; anticoagulation with apixaban), minimal non-obstructive CAD, KEENA, and GERD.     Edgar reports he was having low blood pressure during January when he was in [place]. His metoprolol dose was reduced to 25 mg and he eventually moved it to evening time. He also noticed that he was quite dehydrated with the heat and tried to drink more fluids. His blood pressure is much better now. He has also been following with nephrology oncology and underwent nephrectomy and is planned for immunotherapy. They want to make sure there is no issues from cardiac standpoint before going ahead with immunotherapy.     Assessment and Plan:  Edgar is a pleasant 76 year old male with past medical history notable for hx of idiopathic cardiomyopathy (LVEF has resolved; remains on low-dose BB only due to soft BP), chronic atrial fibrillation (rate control with low-dose BB; anticoagulation with apixaban), minimal non-obstructive CAD, KEENA, and GERD.     Edgar's blood pressure is much better. He has no symptoms. I believe dehydration was contributing to his low BP. He had recent labs through [facility] and reports his CBC is was OK. He checks his pulse regularly and it is always under good control. He does have some lightheadedness with positional changes but this has been an ongoing issue for him. If he gives himself sometime before moving quickly he has no significant issues.     He will return to  our clinic in October 2022 with repeat echocardiogram.     45  minutes spent on the date of the encounter with chart review, patient visit, care coordination, and documentation.      This note was completed in part using Dragon voice recognition software. Although reviewed after completion, some word and grammatical errors may occur.    Orders this Visit:  Orders Placed This Encounter   Procedures     EKG 12-lead complete w/read - Clinics (performed today)     Orders Placed This Encounter   Medications     loperamide (IMODIUM) 1 MG/5ML liquid     Sig: Take by mouth 4 times daily as needed for diarrhea     acetaminophen (TYLENOL) 500 MG tablet     Sig: Take 500-1,000 mg by mouth every 6 hours as needed for mild pain     There are no discontinued medications.      Encounter Diagnosis   Name Primary?     Coronary artery disease involving native coronary artery of native heart without angina pectoris Yes       CURRENT MEDICATIONS:  Current Outpatient Medications   Medication Sig Dispense Refill     acetaminophen (TYLENOL) 500 MG tablet Take 500-1,000 mg by mouth every 6 hours as needed for mild pain       apixaban ANTICOAGULANT (ELIQUIS ANTICOAGULANT) 5 MG tablet Take 1 tablet (5 mg) by mouth 2 times daily 180 tablet 3     escitalopram (LEXAPRO) 10 MG tablet Take 1 tablet (10 mg) by mouth daily 90 tablet 3     loperamide (IMODIUM) 1 MG/5ML liquid Take by mouth 4 times daily as needed for diarrhea       metoprolol succinate ER (TOPROL XL) 25 MG 24 hr tablet Take 1 tablet (25 mg) by mouth daily 90 tablet 3     rosuvastatin (CRESTOR) 5 MG tablet Take 1 tablet (5 mg) by mouth daily 90 tablet 3     tamsulosin (FLOMAX) 0.4 MG capsule Take 1 capsule (0.4 mg) by mouth daily 90 capsule 3     ASPIRIN NOT PRESCRIBED (INTENTIONAL) Please choose reason not prescribed, below (Patient not taking: Reported on 5/25/2022)         ALLERGIES     Allergies   Allergen Reactions     Oxycodone Other (See Comments)     Hallucinations when given  after surgery        PAST MEDICAL HISTORY:  Past Medical History:   Diagnosis Date     Atrial fibrillation, unspecified 9/18/2015     CAD (coronary artery disease) 09/18/2015    minimal by angio 2007, fu nuc est nl 2018     Elevated TSH 2018     Esophageal reflux 9/18/2015     Essential hypertension      History of cardioversion     [id number]     Idiopathic cardiomyopathy (H) 09/18/2015    fu echo nl ef, nl nuclear est 11/18, Dr. Quarles     Mixed hyperlipidemia 9/18/2015     Mumps      Sleep apnea 09/18/2015    does not use cpap as of 2019     Sleep apnea      Thoracic aortic aneurysm (H)     sinus of valsalva 4.5 and asc aorta 4.5 in 2017     Tubular adenoma of colon 01/2017    fu 3 years       PAST SURGICAL HISTORY:  Past Surgical History:   Procedure Laterality Date     APPENDECTOMY  1964       FAMILY HISTORY:  Family History   Problem Relation Age of Onset     Arrhythmia Mother         a-fib     Cerebrovascular Disease Mother      Arrhythmia Father         a-fib     Colon Cancer Father      Diabetes Father      Cerebrovascular Disease Father      No Known Problems Brother        SOCIAL HISTORY:  Social History     Socioeconomic History     Marital status:      Spouse name: None     Number of children: 3     Years of education: None     Highest education level: None   Occupational History     Occupation: retired cpa   Tobacco Use     Smoking status: Never Smoker     Smokeless tobacco: Never Used   Vaping Use     Vaping Use: Never used   Substance and Sexual Activity     Alcohol use: Yes     Alcohol/week: 0.0 standard drinks     Comment: 3 drinks week     Drug use: No     Sexual activity: Yes     Partners: Female   Other Topics Concern     Caffeine Concern No     Comment: 2 coffee in am, occas. soda     Sleep Concern No     Stress Concern No     Weight Concern Yes     Special Diet No     Exercise No     Comment: walking 3-4 days week     Seat Belt Yes       Review of Systems:  Skin:  Negative     Eyes:   "Positive for glasses  ENT:  Negative    Respiratory:  Negative    Cardiovascular:    fatigue;Positive for;lightheadedness  Gastroenterology: Negative    Genitourinary:  not assessed    Musculoskeletal:  Positive for joint stiffness  Neurologic:  Positive for numbness or tingling of hands;numbness or tingling of feet  Psychiatric:  Positive for depression  Heme/Lymph/Imm:  Positive for allergies  Endocrine:  Negative      Physical Exam:  Vitals: /73   Pulse 77   Ht 1.905 m (6' 3\")   Wt 135.2 kg (298 lb)   SpO2 95%   BMI 37.25 kg/m       GEN:  NAD  NECK: No JVD  C/V:  Regular rate and rhythm, no murmur, rub or gallop.  RESP: Clear to auscultation bilaterally without wheezing, rales, or rhonchi.  GI: Abdomen soft, nontender, nondistended.   EXTREM: 1+ pitting LE edema.   NEURO: Alert and oriented, cooperative. No obvious focal deficits.   PSYCH: Normal affect.  SKIN: Warm and dry.       Recent Lab Results:  LIPID RESULTS:  Lab Results   Component Value Date    CHOL 135 10/11/2021    CHOL 116 06/10/2021    HDL 49 10/11/2021    HDL 45 06/10/2021    LDL 68 10/11/2021    LDL 56 06/10/2021    TRIG 90 10/11/2021    TRIG 74 06/10/2021    CHOLHDLRATIO 2.8 09/17/2015       LIVER ENZYME RESULTS:  Lab Results   Component Value Date    AST 19 06/10/2021    ALT 24 06/10/2021       CBC RESULTS:  Lab Results   Component Value Date    WBC 5.8 06/16/2021    RBC 4.32 (L) 06/16/2021    HGB 14.4 10/11/2021    HGB 14.2 06/16/2021    HCT 41.9 06/16/2021    MCV 97 06/16/2021    MCH 32.9 06/16/2021    MCHC 33.9 06/16/2021    RDW 12.9 06/16/2021     (L) 06/16/2021       BMP RESULTS:  Lab Results   Component Value Date     10/11/2021     06/10/2021    POTASSIUM 4.5 10/11/2021    POTASSIUM 4.5 06/10/2021    CHLORIDE 111 (H) 10/11/2021    CHLORIDE 111 (H) 06/10/2021    CO2 24 10/11/2021    CO2 25 06/10/2021    ANIONGAP 6 10/11/2021    ANIONGAP 4 06/10/2021     (H) 10/11/2021    GLC 97 06/10/2021    BUN 27 " 10/11/2021    BUN 30 06/10/2021    CR 1.67 (H) 10/11/2021    CR 1.68 (H) 06/10/2021    GFRESTIMATED 40 (L) 10/11/2021    GFRESTIMATED 39 (L) 06/10/2021    GFRESTBLACK 46 (L) 06/10/2021    DOMINICK 8.6 10/11/2021    DOMINICK 8.5 06/10/2021        A1C RESULTS:  Lab Results   Component Value Date    A1C 5.6 12/08/2017       INR RESULTS:  Lab Results   Component Value Date    INR 2.3 06/17/2013    INR 2.4 05/08/2013       Martha Sparks PA-C  May 25, 2022       Thank you for allowing me to participate in the care of your patient.      Sincerely,     Martha Sparks PA-C     [facility]  cc:   No referring provider defined for this encounter.

## 2022-06-18 DIAGNOSIS — N40.1 BENIGN PROSTATIC HYPERPLASIA WITH NOCTURIA: ICD-10-CM

## 2022-06-18 DIAGNOSIS — I48.20 CHRONIC ATRIAL FIBRILLATION (H): ICD-10-CM

## 2022-06-18 DIAGNOSIS — R35.1 BENIGN PROSTATIC HYPERPLASIA WITH NOCTURIA: ICD-10-CM

## 2022-06-22 RX ORDER — TAMSULOSIN HYDROCHLORIDE 0.4 MG/1
CAPSULE ORAL
Qty: 90 CAPSULE | Refills: 3 | Status: SHIPPED | OUTPATIENT
Start: 2022-06-22 | End: 2023-07-17

## 2022-06-22 RX ORDER — METOPROLOL SUCCINATE 25 MG/1
25 TABLET, EXTENDED RELEASE ORAL DAILY
Qty: 90 TABLET | Refills: 3 | Status: SHIPPED | OUTPATIENT
Start: 2022-06-22 | End: 2023-07-17

## 2022-06-22 NOTE — TELEPHONE ENCOUNTER
Prescription approved per [facility] Refill Protocol.    Linda SPEAR RN  EP Triage

## 2022-08-26 ENCOUNTER — VIRTUAL VISIT (OUTPATIENT)
Dept: FAMILY MEDICINE | Facility: CLINIC | Age: 77
End: 2022-08-26
Payer: MEDICARE

## 2022-08-26 DIAGNOSIS — U07.1 INFECTION DUE TO 2019 NOVEL CORONAVIRUS: Primary | ICD-10-CM

## 2022-08-26 DIAGNOSIS — E66.01 MORBID OBESITY (H): ICD-10-CM

## 2022-08-26 DIAGNOSIS — I77.810 THORACIC AORTIC ECTASIA (H): ICD-10-CM

## 2022-08-26 DIAGNOSIS — I42.9 PRIMARY CARDIOMYOPATHY (H): ICD-10-CM

## 2022-08-26 DIAGNOSIS — N18.32 STAGE 3B CHRONIC KIDNEY DISEASE (H): ICD-10-CM

## 2022-08-26 DIAGNOSIS — C64.1 RENAL CELL CARCINOMA, RIGHT (H): ICD-10-CM

## 2022-08-26 PROCEDURE — 99443 PR PHYSICIAN TELEPHONE EVALUATION 21-30 MIN: CPT | Mod: 95 | Performed by: FAMILY MEDICINE

## 2022-08-26 NOTE — PROGRESS NOTES
"Edgar is a 76 year old who is being evaluated via a billable telephone visit.      What phone number would you like to be contacted at? [phone]  How would you like to obtain your AVS? MyChart    Assessment & Plan     Primary cardiomyopathy (H)      Renal cell carcinoma, right (H)      Thoracic aortic ectasia (H)      Morbid obesity (H)      Stage 3b chronic kidney disease (H)      Infection due to 2019 novel coronavirus  Has been sick for 2 days and tested positive this morning   Will have him to start antiviral medicine   - molnupiravir (LAGEVRIO) 200 MG capsule; Take 4 capsules (800 mg) by mouth every 12 hours for 5 days         BMI:   Estimated body mass index is 37.25 kg/m  as calculated from the following:    Height as of 5/25/22: 1.905 m (6' 3\").    Weight as of 5/25/22: 135.2 kg (298 lb).       FUTURE APPOINTMENTS:      Return in about 1 week (around 9/2/2022) for with PCP if not improving.    Luis Peterson MD  [facility]    Subjective   Edgar is a 76 year old presenting for the following health issues:  Covid Concern      HPI       COVID-19 Symptom Review  How many days ago did these symptoms start? Yesterday; negative COVID test yesterday, POS test today    Are any of the following symptoms significant for you?    New or worsening difficulty breathing? No    Worsening cough? Yes, it's a dry cough.     Fever or chills? Yes, I felt feverish or had chills.    Headache: YES    Sore throat: YES    Chest pain: No    Diarrhea: No    Body aches? YES    What treatments has patient tried? none   Does patient live in a nursing home, group home, or shelter? No  Does patient have a way to get food/medications during quarantined? Yes, I have a friend or family member who can help me.              Review of Systems   Constitutional, HEENT, cardiovascular, pulmonary, gi and gu systems are negative, except as otherwise noted.      Objective           Vitals:  No vitals were obtained today due to " virtual visit.    Physical Exam   healthy, alert and no distress  PSYCH: Alert and oriented times 3; coherent speech, normal   rate and volume, able to articulate logical thoughts, able   to abstract reason, no tangential thoughts, no hallucinations   or delusions  His affect is normal  RESP: No cough, no audible wheezing, able to talk in full sentences  Remainder of exam unable to be completed due to telephone visits                Phone call duration: 25 minutes    .  ..

## 2022-09-13 DIAGNOSIS — N18.32 STAGE 3B CHRONIC KIDNEY DISEASE (H): Primary | ICD-10-CM

## 2022-09-21 ENCOUNTER — LAB (OUTPATIENT)
Dept: LAB | Facility: CLINIC | Age: 77
End: 2022-09-21
Payer: MEDICARE

## 2022-09-21 DIAGNOSIS — N18.32 STAGE 3B CHRONIC KIDNEY DISEASE (H): ICD-10-CM

## 2022-09-21 LAB
CREAT UR-MCNC: 100 MG/DL
HGB BLD-MCNC: 14.1 G/DL (ref 13.3–17.7)
MICROALBUMIN UR-MCNC: 6 MG/L
MICROALBUMIN/CREAT UR: 6 MG/G CR (ref 0–17)
PTH-INTACT SERPL-MCNC: 45 PG/ML (ref 15–65)

## 2022-09-21 PROCEDURE — 80069 RENAL FUNCTION PANEL: CPT

## 2022-09-21 PROCEDURE — 83970 ASSAY OF PARATHORMONE: CPT

## 2022-09-21 PROCEDURE — 85018 HEMOGLOBIN: CPT

## 2022-09-21 PROCEDURE — 82043 UR ALBUMIN QUANTITATIVE: CPT

## 2022-09-21 PROCEDURE — 36415 COLL VENOUS BLD VENIPUNCTURE: CPT

## 2022-09-22 LAB
ALBUMIN SERPL-MCNC: 3.3 G/DL (ref 3.4–5)
ANION GAP SERPL CALCULATED.3IONS-SCNC: 6 MMOL/L (ref 3–14)
BUN SERPL-MCNC: 27 MG/DL (ref 7–30)
CALCIUM SERPL-MCNC: 8.6 MG/DL (ref 8.5–10.1)
CHLORIDE BLD-SCNC: 109 MMOL/L (ref 94–109)
CO2 SERPL-SCNC: 25 MMOL/L (ref 20–32)
CREAT SERPL-MCNC: 1.68 MG/DL (ref 0.66–1.25)
GFR SERPL CREATININE-BSD FRML MDRD: 42 ML/MIN/1.73M2
GLUCOSE BLD-MCNC: 66 MG/DL (ref 70–99)
PHOSPHATE SERPL-MCNC: 2.5 MG/DL (ref 2.5–4.5)
POTASSIUM BLD-SCNC: 4.4 MMOL/L (ref 3.4–5.3)
SODIUM SERPL-SCNC: 140 MMOL/L (ref 133–144)

## 2022-09-28 ENCOUNTER — OFFICE VISIT (OUTPATIENT)
Dept: NEPHROLOGY | Facility: CLINIC | Age: 77
End: 2022-09-28
Payer: MEDICARE

## 2022-09-28 VITALS
WEIGHT: 285.9 LBS | BODY MASS INDEX: 35.55 KG/M2 | HEART RATE: 75 BPM | SYSTOLIC BLOOD PRESSURE: 102 MMHG | OXYGEN SATURATION: 96 % | DIASTOLIC BLOOD PRESSURE: 68 MMHG | HEIGHT: 75 IN

## 2022-09-28 DIAGNOSIS — I71.20 THORACIC AORTIC ANEURYSM WITHOUT RUPTURE (H): ICD-10-CM

## 2022-09-28 DIAGNOSIS — C64.1 RENAL CELL CARCINOMA, RIGHT (H): ICD-10-CM

## 2022-09-28 DIAGNOSIS — I95.9 HYPOTENSION, UNSPECIFIED HYPOTENSION TYPE: ICD-10-CM

## 2022-09-28 DIAGNOSIS — I48.20 CHRONIC ATRIAL FIBRILLATION (H): ICD-10-CM

## 2022-09-28 DIAGNOSIS — N18.32 STAGE 3B CHRONIC KIDNEY DISEASE (H): Primary | ICD-10-CM

## 2022-09-28 DIAGNOSIS — E66.01 MORBID OBESITY (H): ICD-10-CM

## 2022-09-28 PROCEDURE — 99215 OFFICE O/P EST HI 40 MIN: CPT | Performed by: INTERNAL MEDICINE

## 2022-09-28 ASSESSMENT — PAIN SCALES - GENERAL: PAINLEVEL: NO PAIN (0)

## 2022-09-28 NOTE — PATIENT INSTRUCTIONS
It was a pleasure taking care of you today.  I've included a brief summary of our discussion and care plan from today's visit below.  Please review this information with your primary care provider.  _______________________________________________________________________    My recommendations are summarized as follows:  Take tamsulosin at night  Hydrate  Compression stockings      To schedule imaging please call [phone]     To schedule your lab appointment at [facility] 1st floor lab call [phone]      Return to Nephrology Clinic in 4 months to review your progress.    _______________________________________________________________________    Who do I call with any questions after my visit?    Please be in touch if there are any further questions that arise following today's visit.  There are multiple ways to contact your nephrology care team.      During business hours, you may reach your Nephrology RN Care Coordinator, Maria Guadalupe, at [phone].      To schedule or reschedule an appointment, please call [phone].    You can always send a secure message through [employer]. [employer] messages are answered by your nurse or doctor typically within 24 hours.  Please allow extra time on weekends and holidays.      For urgent/emergent questions after business hours, you may reach the on-call Nephrology Fellow by contacting the [facility] at [phone].     How will I get the results of any tests ordered?    You will receive all of your results.  If you have signed up for [employer], any tests ordered at your visit will be available to you after your physician reviews them.  Typically this takes 1-2 weeks.  If there are urgent results that require a change in your care plan, your physician or nurse will call you to discuss the next steps.      What is [employer]?  MyChart is a secure way for you to access all of your healthcare records from the [facility].  It is a web based  computer program, so you can sign on to it from any location.  It also allows you to send secure messages to your care team.  I recommend signing up for [employer] access if you have not already done so and are comfortable with using a computer.      How do I schedule a follow-up visit?  If you did not schedule a follow-up visit today, please call [phone] to schedule a follow-up office visit.        Sincerely,    Kayla Quintero MD  [facility]  Division of Nephrology and Hypertension

## 2022-09-28 NOTE — PROGRESS NOTES
Assessment and Plan:  76 year old male with history of renal cell carcinoma s/p right nephrectomy 2019, Scr 1.1 up to 1.6-1.7 post nephrectomy, with metastatic disease to lungs and new liver nodule noted on ultrasound. He presents for followup of CKD, first see Fall 2021.   # CKD stage 3b- Scr 1.6-1.7, eGFR 35-40ml/min, stable since surgery in 2019.  He has no albuminuria, normal blood pressure with no medication. In fact he has low BP at this time.   He has been monitoring disease and he may be chemotherapy in the future if his nodules are enlarging.  - we reviewed diet and exercise and discussed blood pressure    # Hypertension: blood pressure was low 100s at home but now down to 80s at times and he has no energy to do things.  He is taking tamsulosin in the morning, so we will switch this at [facility]. Defer metoprolol to cardiology, but has dizziness, sometimes higher in the office.  He monitors at home.  He had dizziness in the past, was on ACE-I but it was stopped after surgery.    # Not anemic  - Hgb: Stable, hgb 14-14.4    # Electrolytes:   - Potassium; level: Normal  - Bicarbonate; level: Normal    # Mineral Bone Disorder:   - Vitamin D; level is: Normal  - Calcium; level is:  Normal   - Phos 3.2    Orthostatic hypotension- his BP is 100s systolic and goes down to 80s , this may be associated with tamsulosin    - he will take tamsulosin at night, hydrate a little more, can consider compression stockings or abdominal binder. He will see cardiology next week.   - can consider stopping tamsulosin, can do midodrine or fludrocortisone if necessary down the road  - low suspicion for adrenal insufficiency but will check cortisol.  Assessment and plan was discussed with patient and he voiced his understanding and agreement.  >60 minutes spent on day of service in review of records and coordination of care.     Reason for Visit:  Ti NEWTON Liya is a 76 year old male with CKD from nephrectomy, who presents for  evaluation.    HPI:  He is a very pleasant male with history of renal cell carcinoma, diagnosed when he had olivia hematuria episode in 2019. He underwent right nephrectomy and has been monitored by dr Henrández at [facility].  He had CT scan in September that showed stable pulmonary lesions. He had an abdominal ultrasound a couple of days ago that showed a ?new 3.4cm nodule in his liver.  We discussed this and determined he will reach out to his oncologist to see if CT scan is appropriate, sooner than December      They sold their home and moved into assisted living where there is a gym  They usually eat healthy   He has atrial fibrillation and on anticoagulation  His blood pressure runs 105/70 -110s but has been down to 80s at times and definitely feels it.  He denies any shortness of breath or swelling. He feels fairly well except when he is in afib sometimes.  He sees Dr Quarlse in cardiology who manages his afib  He is accompanied by his wife during the visit.   He denies family history of kidney failure, his parents lived to their 90s    He is taking tamsulosin in the morning and we discussed moving this to night time as it may be playing into orthostatic hypotension. We discussed increasing fluids, and considering compression stockings or abdominal binder, and lastly the option of medications (midodrine and fluorinef)      Renal History:   Kidney Disease and Medical Hx:  Right nephrectomy    ROS:   A comprehensive review of systems was obtained and negative, except as noted in the HPI or PMH.    Active Medical Problems:  Patient Active Problem List   Diagnosis     Chronic atrial fibrillation (H)     Idiopathic cardiomyopathy (H)     Coronary artery disease involving native coronary artery of native heart without angina pectoris     Esophageal reflux     Mixed hyperlipidemia     Sleep apnea     Essential hypertension     Obesity (BMI 35.0-39.9) with comorbidity (H)     Thoracic aortic aneurysm without rupture  (H)     Tubular adenoma of colon     Elevated TSH     Renal cell carcinoma, right (H)     CKD (chronic kidney disease) stage 3, GFR 30-59 ml/min (H)     PMH:   Medical record was reviewed and PMH was discussed with patient and noted below.  Past Medical History:   Diagnosis Date     Atrial fibrillation, unspecified 9/18/2015     CAD (coronary artery disease) 09/18/2015    minimal by angio 2007, fu nuc est nl 2018     Elevated TSH 2018     Esophageal reflux 9/18/2015     Essential hypertension      History of cardioversion     [id number]     Idiopathic cardiomyopathy (H) 09/18/2015    fu echo nl ef, nl nuclear est 11/18, Dr. Quarles     Mixed hyperlipidemia 9/18/2015     Mumps      Sleep apnea 09/18/2015    does not use cpap as of 2019     Sleep apnea      Thoracic aortic aneurysm (H)     sinus of valsalva 4.5 and asc aorta 4.5 in 2017     Tubular adenoma of colon 01/2017    fu 3 years     PSH:   Past Surgical History:   Procedure Laterality Date     APPENDECTOMY  1964       Family Hx:   Family History   Problem Relation Age of Onset     Arrhythmia Mother         a-fib     Cerebrovascular Disease Mother      Arrhythmia Father         a-fib     Colon Cancer Father      Diabetes Father      Cerebrovascular Disease Father      No Known Problems Brother      Personal Hx:   Social History     Tobacco Use     Smoking status: Never Smoker     Smokeless tobacco: Never Used   Substance Use Topics     Alcohol use: Yes     Alcohol/week: 0.0 standard drinks     Comment: 3 drinks week       Allergies:  Allergies   Allergen Reactions     Oxycodone Other (See Comments)     Hallucinations when given after surgery        Medications:  Current Outpatient Medications   Medication Sig     acetaminophen (TYLENOL) 500 MG tablet Take 500-1,000 mg by mouth every 6 hours as needed for mild pain     apixaban ANTICOAGULANT (ELIQUIS ANTICOAGULANT) 5 MG tablet Take 1 tablet (5 mg) by mouth 2 times daily     escitalopram (LEXAPRO) 10 MG tablet Take  "1 tablet (10 mg) by mouth daily     loperamide (IMODIUM) 1 MG/5ML liquid Take by mouth 4 times daily as needed for diarrhea     metoprolol succinate ER (TOPROL XL) 25 MG 24 hr tablet TAKE 1 TABLET (25 MG) BY MOUTH DAILY     rosuvastatin (CRESTOR) 5 MG tablet Take 1 tablet (5 mg) by mouth daily     tamsulosin (FLOMAX) 0.4 MG capsule TAKE 1 CAPSULE BY MOUTH EVERY DAY     ASPIRIN NOT PRESCRIBED (INTENTIONAL) Please choose reason not prescribed, below (Patient not taking: No sig reported)     No current facility-administered medications for this visit.      Vitals:  /68 (BP Location: Right arm, Patient Position: Sitting, Cuff Size: Adult Large)   Pulse 75   Ht 1.905 m (6' 3\")   Wt 129.7 kg (285 lb 14.4 oz)   SpO2 96%   BMI 35.74 kg/m      Exam:  GENERAL APPEARANCE: alert and no distress  HENT: mouth without ulcers or lesions  LYMPHATICS: no cervical or supraclavicular nodes  RESP: lungs clear to auscultation - no rales, rhonchi or wheezes  CV: irregular rhythm, normal rate, no rub, no murmur  EDEMA: no LE edema bilaterally  ABDOMEN: soft, nondistended, nontender, bowel sounds normal  MS: extremities normal - no gross deformities noted, no evidence of inflammation in joints, no muscle tenderness  SKIN: no rash    LABS:   CMP  Recent Labs   Lab Test 09/21/22  1102 10/11/21  0942 06/10/21  0845 04/28/21  0000 11/20/20  0000 10/26/20  0936    141 140  --   --  139   POTASSIUM 4.4 4.5 4.5 5.2 4.5 5.1   CHLORIDE 109 111* 111*  --   --  105   CO2 25 24 25  --   --  29   ANIONGAP 6 6 4  --   --  10.1   GLC 66* 105* 97 102 98 113*   BUN 27 27 30  --   --  28   CR 1.68* 1.67* 1.68* 1.89* 1.87* 1.77*   GFRESTIMATED 42* 40* 39* 34* 35* 38*   GFRESTBLACK  --   --  46* 40* 40* 46*   DOMINICK 8.6 8.6 8.5  --   --  9.2     Recent Labs   Lab Test 06/10/21  0845 04/28/21  0000 10/26/20  0936 12/26/19  0812 07/29/19  0828 11/28/18  1027 12/08/17  1016   BILITOTAL 0.7  --   --  0.6 1.2  --  1.2   ALKPHOS 57  --   --  64 65  --  " 60   ALT 24 20 5 26 29   < > 31   AST 19 25  --  23 24  --  25    < > = values in this interval not displayed.     CBC  Recent Labs   Lab Test 09/21/22  1102 10/11/21  0942 06/16/21  1327 06/10/21  0845 12/26/19  0812 10/29/19  1158   HGB 14.1 14.4 14.2 14.4 14.3 12.3*   WBC  --   --  5.8 5.9 7.5 7.9   RBC  --   --  4.32* 4.32* 4.62 3.84*   HCT  --   --  41.9 42.1 44.0 38.3*   MCV  --   --  97 98 95 100   MCH  --   --  32.9 33.3* 31.0 32.0   MCHC  --   --  33.9 34.2 32.5 32.1   RDW  --   --  12.9 13.2 13.0 13.7   PLT  --   --  142* 141* 194 346     URINE STUDIES  Recent Labs   Lab Test 10/11/21  0947 12/26/19  0812 09/25/19  0859 09/17/19  1442   COLOR Yellow Yellow Yellow Yellow   APPEARANCE Clear Clear Clear Clear   URINEGLC Negative Negative Negative Negative   URINEBILI Negative Negative Negative Negative   URINEKETONE Negative Negative Negative Negative   SG 1.023 1.020 1.020 1.025   UBLD Trace* Small* Large* Large*   URINEPH 5.0 5.0 5.0 5.0   PROTEIN Negative Negative Negative Negative   UROBILINOGEN  --  0.2 0.2 0.2   NITRITE Negative Negative Negative Negative   LEUKEST Negative Negative Negative Negative   RBCU 1 O - 2  --  5-10*   WBCU <1 0 - 5  --  0 - 5     No lab results found.  PTH  Recent Labs   Lab Test 09/21/22  1102 10/11/21  0942   PTHI 45 36     IRON STUDIES  Recent Labs   Lab Test 10/11/21  0942 06/10/21  0845 12/26/19  0812   IRON 97  --  60     --  307   IRONSAT 32  --  20   TACHO 274 250 160     Interpretation Summary     The left ventricle is borderline dilated.  There is mild concentric left ventricular hypertrophy.  The visual ejection fraction is 55-60%.  The right ventricle is normal in structure, function and size.  Normal left atrial size.  The right atrium is moderate to severely dilated.  There is mild (1+) mitral regurgitation.  There is trace tricuspid regurgitation.  The right ventricular systolic pressure is approximated at 24mmHg plus the  right atrial pressure.  There is  trace aortic regurgitation.  Mild aortic root dilatation. The aortic root and ascending aorta both measure  4.6 cm, There has been some variability in measured aortic size over previous  echoes, but overall likely no change since 2016.  ______________________________________________________________________________  Left Ventricle  The left ventricle is borderline dilated. There is mild concentric left  ventricular hypertrophy. The visual ejection fraction is 55-60%. Diastolic  function not assessed due to atrial fibrillation. Diastolic Doppler findings  (E/E' ratio and/or other parameters) suggest left ventricular filling  pressures are indeterminate. No regional wall motion abnormalities noted.     Right Ventricle  The right ventricle is normal in structure, function and size.     Atria  Normal left atrial size. The right atrium is moderate to severely dilated.  There is no atrial shunt seen.     Mitral Valve  The mitral valve leaflets appear normal. There is no evidence of stenosis,  fluttering, or prolapse. There is mild (1+) mitral regurgitation.     Tricuspid Valve  There is trace tricuspid regurgitation. The right ventricular systolic  pressure is approximated at 24mmHg plus the right atrial pressure. IVC  diameter and respiratory changes fall into an intermediate range suggesting an  RA pressure of 8 mmHg.     Aortic Valve  There is mild trileaflet aortic sclerosis. There is trace aortic  regurgitation.     Pulmonic Valve  There is mild (1+) pulmonic valvular regurgitation.     Vessels  Mild aortic root dilatation.     Pericardium  There is no pericardial effusion.     Rhythm  The rhythm was atrial fibrillation.  ______________________________________________________________________________  MMode/2D Measurements & Calculations    Kyala Quintero MD

## 2022-09-28 NOTE — NURSING NOTE
"Chief Complaint   Patient presents with     RECHECK     Stage 3b chronic kidney disease (H) +3 more       Vitals:    09/28/22 0824   BP: 102/68   BP Location: Right arm   Patient Position: Sitting   Cuff Size: Adult Large   Pulse: 75   SpO2: 96%   Weight: 129.7 kg (285 lb 14.4 oz)   Height: 1.905 m (6' 3\")       Body mass index is 35.74 kg/m .    Carrie Felton, ICVF    "

## 2022-10-03 ENCOUNTER — HOSPITAL ENCOUNTER (OUTPATIENT)
Dept: CARDIOLOGY | Facility: CLINIC | Age: 77
Discharge: HOME | End: 2022-10-03
Attending: INTERNAL MEDICINE | Admitting: INTERNAL MEDICINE
Payer: MEDICARE

## 2022-10-03 DIAGNOSIS — I25.10 CORONARY ARTERY DISEASE INVOLVING NATIVE CORONARY ARTERY OF NATIVE HEART WITHOUT ANGINA PECTORIS: ICD-10-CM

## 2022-10-03 DIAGNOSIS — I48.19 PERSISTENT ATRIAL FIBRILLATION (H): ICD-10-CM

## 2022-10-03 DIAGNOSIS — I42.9 PRIMARY CARDIOMYOPATHY (H): ICD-10-CM

## 2022-10-03 DIAGNOSIS — E78.5 HYPERLIPIDEMIA LDL GOAL <100: ICD-10-CM

## 2022-10-03 LAB — LVEF ECHO: NORMAL

## 2022-10-03 PROCEDURE — 93306 TTE W/DOPPLER COMPLETE: CPT | Mod: 26 | Performed by: INTERNAL MEDICINE

## 2022-10-03 PROCEDURE — 93306 TTE W/DOPPLER COMPLETE: CPT

## 2022-10-04 ENCOUNTER — OFFICE VISIT (OUTPATIENT)
Dept: CARDIOLOGY | Facility: CLINIC | Age: 77
End: 2022-10-04
Attending: INTERNAL MEDICINE
Payer: MEDICARE

## 2022-10-04 VITALS
BODY MASS INDEX: 35.56 KG/M2 | HEART RATE: 80 BPM | WEIGHT: 286 LBS | SYSTOLIC BLOOD PRESSURE: 96 MMHG | DIASTOLIC BLOOD PRESSURE: 68 MMHG | HEIGHT: 75 IN

## 2022-10-04 DIAGNOSIS — I42.9 PRIMARY CARDIOMYOPATHY (H): ICD-10-CM

## 2022-10-04 DIAGNOSIS — I48.19 PERSISTENT ATRIAL FIBRILLATION (H): ICD-10-CM

## 2022-10-04 DIAGNOSIS — E78.5 HYPERLIPIDEMIA LDL GOAL <100: ICD-10-CM

## 2022-10-04 DIAGNOSIS — I25.10 CORONARY ARTERY DISEASE INVOLVING NATIVE CORONARY ARTERY OF NATIVE HEART WITHOUT ANGINA PECTORIS: ICD-10-CM

## 2022-10-04 PROCEDURE — 99213 OFFICE O/P EST LOW 20 MIN: CPT | Performed by: INTERNAL MEDICINE

## 2022-10-04 NOTE — PROGRESS NOTES
HPI and Plan:       Edgar Nickerson is a pleasant 75 y/o male with a history of CHF, cardiomyopathy atrial fibrillation, is here for routine follow-up.  His primary complaint is that of lightheadedness or dizziness standing.  We thought this was due to dehydration and it improved however it returned again.  His blood pressures frequently low with standing and in the 80s to 90s.  We have cut back on his blood pressure medication heart medication to only Toprol 25 daily.  He is still having symptoms.  He follows with nephrology.  His wife is also concerned about mental tasking and feels that he is fallen off over the past year and is interested in neurology consultation.  He also had questions about his CPAP machine and questions about his sleep apnea.    Reviewed his echocardiogram showing low normal ejection fraction with mild valvular insufficiency.  This is not a cause of hypotension.  This is certainly not a cause of any mental changes.  His A. fib is well controlled.  I am recommending wrote a prescription for support stockings.  They are to call back if this is still ineffective and consider low-dose midodrine 2.5 twice daily starting out dosing.  And follow-up with an RICK as necessary.  Of course neurology and pulmonary consultation with sleep medicine would be appropriate but is out of outside the scope of my field.     Today's clinic visit entailed:  Review of the result(s) of each unique test - echocardiogram, ekg  The following tests were independently interpreted by me as noted in my documentation: echocardiogram  Prescription drug management  No LOS data to display   Time spent doing chart review, history and exam, documentation and further activities per the note  Provider  Link to [facility] Help Grid     The level of medical decision making during this visit was of moderate complexity.      No orders of the defined types were placed in this encounter.    No orders of the defined types were placed in this  encounter.    There are no discontinued medications.      Encounter Diagnoses   Name Primary?     Primary cardiomyopathy (H)      Persistent atrial fibrillation (H)      Hyperlipidemia LDL goal <100      Coronary artery disease involving native coronary artery of native heart without angina pectoris        CURRENT MEDICATIONS:  Current Outpatient Medications   Medication Sig Dispense Refill     acetaminophen (TYLENOL) 500 MG tablet Take 500-1,000 mg by mouth every 6 hours as needed for mild pain       apixaban ANTICOAGULANT (ELIQUIS ANTICOAGULANT) 5 MG tablet Take 1 tablet (5 mg) by mouth 2 times daily 180 tablet 3     escitalopram (LEXAPRO) 10 MG tablet Take 1 tablet (10 mg) by mouth daily 90 tablet 3     loperamide (IMODIUM) 1 MG/5ML liquid Take by mouth 4 times daily as needed for diarrhea       metoprolol succinate ER (TOPROL XL) 25 MG 24 hr tablet TAKE 1 TABLET (25 MG) BY MOUTH DAILY 90 tablet 3     rosuvastatin (CRESTOR) 5 MG tablet Take 1 tablet (5 mg) by mouth daily 90 tablet 3     tamsulosin (FLOMAX) 0.4 MG capsule TAKE 1 CAPSULE BY MOUTH EVERY DAY 90 capsule 3     ASPIRIN NOT PRESCRIBED (INTENTIONAL) Please choose reason not prescribed, below (Patient not taking: No sig reported)         ALLERGIES     Allergies   Allergen Reactions     Oxycodone Other (See Comments)     Hallucinations when given after surgery        PAST MEDICAL HISTORY:  Past Medical History:   Diagnosis Date     Atrial fibrillation, unspecified 9/18/2015     CAD (coronary artery disease) 09/18/2015    minimal by angio 2007, fu nuc est nl 2018     Elevated TSH 2018     Esophageal reflux 9/18/2015     Essential hypertension      History of cardioversion     [id number]     Idiopathic cardiomyopathy (H) 09/18/2015    fu echo nl ef, nl nuclear est 11/18, Dr. Quarles     Mixed hyperlipidemia 9/18/2015     Mumps      Sleep apnea 09/18/2015    does not use cpap as of 2019     Sleep apnea      Thoracic aortic aneurysm     sinus of valsalva 4.5 and  "asc aorta 4.5 in 2017     Tubular adenoma of colon 01/2017    fu 3 years       PAST SURGICAL HISTORY:  Past Surgical History:   Procedure Laterality Date     APPENDECTOMY  1964       FAMILY HISTORY:  Family History   Problem Relation Age of Onset     Arrhythmia Mother         a-fib     Cerebrovascular Disease Mother      Arrhythmia Father         a-fib     Colon Cancer Father      Diabetes Father      Cerebrovascular Disease Father      No Known Problems Brother        SOCIAL HISTORY:  Social History     Socioeconomic History     Marital status:      Spouse name: None     Number of children: 3     Years of education: None     Highest education level: None   Occupational History     Occupation: retired cpa   Tobacco Use     Smoking status: Never Smoker     Smokeless tobacco: Never Used   Vaping Use     Vaping Use: Never used   Substance and Sexual Activity     Alcohol use: Yes     Alcohol/week: 0.0 standard drinks     Comment: 3 drinks week     Drug use: No     Sexual activity: Yes     Partners: Female   Other Topics Concern     Caffeine Concern No     Comment: 2 coffee in am, occas. soda     Sleep Concern No     Stress Concern No     Weight Concern Yes     Special Diet No     Exercise No     Comment: walking 3-4 days week     Seat Belt Yes       Review of Systems:  Skin:  not assessed     Eyes:  not assessed    ENT:  not assessed    Respiratory:  Positive for sleep apnea  Cardiovascular:    Positive for;lightheadedness  Gastroenterology: not assessed    Genitourinary:  not assessed    Musculoskeletal:  not assessed    Neurologic:  not assessed    Psychiatric:  not assessed    Heme/Lymph/Imm:  Positive for allergies  Endocrine:  Negative      Physical Exam:  Vitals: BP 96/68 (BP Location: Left arm, Cuff Size: Adult Large)   Pulse 80   Ht 1.905 m (6' 3\")   Wt 129.7 kg (286 lb)   BMI 35.75 kg/m      Constitutional:           Skin:             Head:           Eyes:           Lymph:      ENT:           Neck:   "         Respiratory:            Cardiac:                                                           GI:           Extremities and Muscular Skeletal:                 Neurological:           Psych:         Recent Lab Results:  LIPID RESULTS:  Lab Results   Component Value Date    CHOL 135 10/11/2021    CHOL 116 06/10/2021    HDL 49 10/11/2021    HDL 45 06/10/2021    LDL 68 10/11/2021    LDL 56 06/10/2021    TRIG 90 10/11/2021    TRIG 74 06/10/2021    CHOLHDLRATIO 2.8 09/17/2015       LIVER ENZYME RESULTS:  Lab Results   Component Value Date    AST 19 06/10/2021    ALT 24 06/10/2021       CBC RESULTS:  Lab Results   Component Value Date    WBC 5.8 06/16/2021    RBC 4.32 (L) 06/16/2021    HGB 14.1 09/21/2022    HGB 14.2 06/16/2021    HCT 41.9 06/16/2021    MCV 97 06/16/2021    MCH 32.9 06/16/2021    MCHC 33.9 06/16/2021    RDW 12.9 06/16/2021     (L) 06/16/2021       BMP RESULTS:  Lab Results   Component Value Date     09/21/2022     06/10/2021    POTASSIUM 4.4 09/21/2022    POTASSIUM 4.5 06/10/2021    CHLORIDE 109 09/21/2022    CHLORIDE 111 (H) 06/10/2021    CO2 25 09/21/2022    CO2 25 06/10/2021    ANIONGAP 6 09/21/2022    ANIONGAP 4 06/10/2021    GLC 66 (L) 09/21/2022    GLC 97 06/10/2021    BUN 27 09/21/2022    BUN 30 06/10/2021    CR 1.68 (H) 09/21/2022    CR 1.68 (H) 06/10/2021    GFRESTIMATED 42 (L) 09/21/2022    GFRESTIMATED 39 (L) 06/10/2021    GFRESTBLACK 46 (L) 06/10/2021    DOMINICK 8.6 09/21/2022    DOMINICK 8.5 06/10/2021        A1C RESULTS:  Lab Results   Component Value Date    A1C 5.6 12/08/2017       INR RESULTS:  Lab Results   Component Value Date    INR 2.3 06/17/2013    INR 2.4 05/08/2013           CC  Devin Quarles MD  [id number]  [place] [id number]

## 2022-10-04 NOTE — LETTER
10/4/2022    Nico Retana MD, MD  [address]  [facility] [address]    RE: Ti Hayesnav       Dear Colleague,     I had the pleasure of seeing Ti Nickerson in the [facility].  HPI and Plan:       Edgar Nickerson is a pleasant 75 y/o male with a history of CHF, cardiomyopathy atrial fibrillation, is here for routine follow-up.  His primary complaint is that of lightheadedness or dizziness standing.  We thought this was due to dehydration and it improved however it returned again.  His blood pressures frequently low with standing and in the 80s to 90s.  We have cut back on his blood pressure medication heart medication to only Toprol 25 daily.  He is still having symptoms.  He follows with nephrology.  His wife is also concerned about mental tasking and feels that he is fallen off over the past year and is interested in neurology consultation.  He also had questions about his CPAP machine and questions about his sleep apnea.    Reviewed his echocardiogram showing low normal ejection fraction with mild valvular insufficiency.  This is not a cause of hypotension.  This is certainly not a cause of any mental changes.  His A. fib is well controlled.  I am recommending wrote a prescription for support stockings.  They are to call back if this is still ineffective and consider low-dose midodrine 2.5 twice daily starting out dosing.  And follow-up with an RICK as necessary.  Of course neurology and pulmonary consultation with sleep medicine would be appropriate but is out of outside the scope of my field.     Today's clinic visit entailed:  Review of the result(s) of each unique test - echocardiogram, ekg  The following tests were independently interpreted by me as noted in my documentation: echocardiogram  Prescription drug management  No LOS data to display   Time spent doing chart review, history and exam, documentation and further activities per the note  Provider  Link to  MDM Help Grid     The level of medical decision making during this visit was of moderate complexity.      No orders of the defined types were placed in this encounter.    No orders of the defined types were placed in this encounter.    There are no discontinued medications.      Encounter Diagnoses   Name Primary?     Primary cardiomyopathy (H)      Persistent atrial fibrillation (H)      Hyperlipidemia LDL goal <100      Coronary artery disease involving native coronary artery of native heart without angina pectoris        CURRENT MEDICATIONS:  Current Outpatient Medications   Medication Sig Dispense Refill     acetaminophen (TYLENOL) 500 MG tablet Take 500-1,000 mg by mouth every 6 hours as needed for mild pain       apixaban ANTICOAGULANT (ELIQUIS ANTICOAGULANT) 5 MG tablet Take 1 tablet (5 mg) by mouth 2 times daily 180 tablet 3     escitalopram (LEXAPRO) 10 MG tablet Take 1 tablet (10 mg) by mouth daily 90 tablet 3     loperamide (IMODIUM) 1 MG/5ML liquid Take by mouth 4 times daily as needed for diarrhea       metoprolol succinate ER (TOPROL XL) 25 MG 24 hr tablet TAKE 1 TABLET (25 MG) BY MOUTH DAILY 90 tablet 3     rosuvastatin (CRESTOR) 5 MG tablet Take 1 tablet (5 mg) by mouth daily 90 tablet 3     tamsulosin (FLOMAX) 0.4 MG capsule TAKE 1 CAPSULE BY MOUTH EVERY DAY 90 capsule 3     ASPIRIN NOT PRESCRIBED (INTENTIONAL) Please choose reason not prescribed, below (Patient not taking: No sig reported)         ALLERGIES     Allergies   Allergen Reactions     Oxycodone Other (See Comments)     Hallucinations when given after surgery        PAST MEDICAL HISTORY:  Past Medical History:   Diagnosis Date     Atrial fibrillation, unspecified 9/18/2015     CAD (coronary artery disease) 09/18/2015    minimal by angio 2007, fu nuc est nl 2018     Elevated TSH 2018     Esophageal reflux 9/18/2015     Essential hypertension      History of cardioversion     [id number]     Idiopathic cardiomyopathy (H) 09/18/2015    fu echo  nl ef, nl nuclear est 11/18, Dr. Quarles     Mixed hyperlipidemia 9/18/2015     Mumps      Sleep apnea 09/18/2015    does not use cpap as of 2019     Sleep apnea      Thoracic aortic aneurysm     sinus of valsalva 4.5 and asc aorta 4.5 in 2017     Tubular adenoma of colon 01/2017    fu 3 years       PAST SURGICAL HISTORY:  Past Surgical History:   Procedure Laterality Date     APPENDECTOMY  1964       FAMILY HISTORY:  Family History   Problem Relation Age of Onset     Arrhythmia Mother         a-fib     Cerebrovascular Disease Mother      Arrhythmia Father         a-fib     Colon Cancer Father      Diabetes Father      Cerebrovascular Disease Father      No Known Problems Brother        SOCIAL HISTORY:  Social History     Socioeconomic History     Marital status:      Spouse name: None     Number of children: 3     Years of education: None     Highest education level: None   Occupational History     Occupation: retired cpa   Tobacco Use     Smoking status: Never Smoker     Smokeless tobacco: Never Used   Vaping Use     Vaping Use: Never used   Substance and Sexual Activity     Alcohol use: Yes     Alcohol/week: 0.0 standard drinks     Comment: 3 drinks week     Drug use: No     Sexual activity: Yes     Partners: Female   Other Topics Concern     Caffeine Concern No     Comment: 2 coffee in am, occas. soda     Sleep Concern No     Stress Concern No     Weight Concern Yes     Special Diet No     Exercise No     Comment: walking 3-4 days week     Seat Belt Yes       Review of Systems:  Skin:  not assessed     Eyes:  not assessed    ENT:  not assessed    Respiratory:  Positive for sleep apnea  Cardiovascular:    Positive for;lightheadedness  Gastroenterology: not assessed    Genitourinary:  not assessed    Musculoskeletal:  not assessed    Neurologic:  not assessed    Psychiatric:  not assessed    Heme/Lymph/Imm:  Positive for allergies  Endocrine:  Negative      Physical Exam:  Vitals: BP 96/68 (BP Location: Left  "arm, Cuff Size: Adult Large)   Pulse 80   Ht 1.905 m (6' 3\")   Wt 129.7 kg (286 lb)   BMI 35.75 kg/m      Constitutional:           Skin:             Head:           Eyes:           Lymph:      ENT:           Neck:           Respiratory:            Cardiac:                                                           GI:           Extremities and Muscular Skeletal:                 Neurological:           Psych:         Recent Lab Results:  LIPID RESULTS:  Lab Results   Component Value Date    CHOL 135 10/11/2021    CHOL 116 06/10/2021    HDL 49 10/11/2021    HDL 45 06/10/2021    LDL 68 10/11/2021    LDL 56 06/10/2021    TRIG 90 10/11/2021    TRIG 74 06/10/2021    CHOLHDLRATIO 2.8 09/17/2015       LIVER ENZYME RESULTS:  Lab Results   Component Value Date    AST 19 06/10/2021    ALT 24 06/10/2021       CBC RESULTS:  Lab Results   Component Value Date    WBC 5.8 06/16/2021    RBC 4.32 (L) 06/16/2021    HGB 14.1 09/21/2022    HGB 14.2 06/16/2021    HCT 41.9 06/16/2021    MCV 97 06/16/2021    MCH 32.9 06/16/2021    MCHC 33.9 06/16/2021    RDW 12.9 06/16/2021     (L) 06/16/2021       BMP RESULTS:  Lab Results   Component Value Date     09/21/2022     06/10/2021    POTASSIUM 4.4 09/21/2022    POTASSIUM 4.5 06/10/2021    CHLORIDE 109 09/21/2022    CHLORIDE 111 (H) 06/10/2021    CO2 25 09/21/2022    CO2 25 06/10/2021    ANIONGAP 6 09/21/2022    ANIONGAP 4 06/10/2021    GLC 66 (L) 09/21/2022    GLC 97 06/10/2021    BUN 27 09/21/2022    BUN 30 06/10/2021    CR 1.68 (H) 09/21/2022    CR 1.68 (H) 06/10/2021    GFRESTIMATED 42 (L) 09/21/2022    GFRESTIMATED 39 (L) 06/10/2021    GFRESTBLACK 46 (L) 06/10/2021    DOMINICK 8.6 09/21/2022    DOMINICK 8.5 06/10/2021        A1C RESULTS:  Lab Results   Component Value Date    A1C 5.6 12/08/2017       INR RESULTS:  Lab Results   Component Value Date    INR 2.3 06/17/2013    INR 2.4 05/08/2013       CC  Devin Quarles MD  [address]  [place] " [id number]    Thank you for allowing me to participate in the care of your patient.      Sincerely,   SUNNY WITT MD   [facility]

## 2022-10-05 NOTE — LETTER
10/29/2020    Nico Retana MD, MD  [address] [facility] [address]  [facility] [address]    RE: Ti Hayesnav       Dear Colleague,    I had the pleasure of seeing Ti Nickerson in the [facility].    HPI and Plan:     Mr. Nickerson is a very pleasant 74-year-old I been following for years with a history of idiopathic cardiomyopathy hypertension obesity and atrial fibrillation.  He comes in with his wife to clinic today.  He is overall feeling well with no symptoms of chest pain chest pressure shortness of breath.  He was getting some lightheadedness and dizziness with standing his blood pressures were measuring around 100 systolic and therefore his metoprolol was cut back to 50 mg daily.  Since then he has had occasional blood pressure elevations over 120 but nothing over 130 and feels better.    We reviewed his lipid profile and echocardiogram showing preserved ejection fraction mild mitral dilatation and moderate dilatation of aortic root.    Assessment: Patient is stable from a cardiovascular standpoint without symptoms.  Blood pressure cholesterol control.  We talked about working on weight loss regular exercise and continued medical therapy.  I will follow back up with him in 1 years time or sooner symptoms warrant.  He has plans to travel to [place] for the winter after doctor appointments complete.      CURRENT MEDICATIONS:  Current Outpatient Medications   Medication Sig Dispense Refill     apixaban ANTICOAGULANT (ELIQUIS ANTICOAGULANT) 5 MG tablet Take 1 tablet (5 mg) by mouth 2 times daily 180 tablet 3     ASPIRIN NOT PRESCRIBED (INTENTIONAL) Please choose reason not prescribed, below       escitalopram (LEXAPRO) 10 MG tablet Take 1 tablet (10 mg) by mouth daily 90 tablet 3     metoprolol succinate ER (TOPROL-XL) 100 MG 24 hr tablet Take 0.5 tablets (50 mg) by mouth daily 90 tablet 2     rosuvastatin (CRESTOR) 5 MG tablet Take 1 tablet (5 mg) by mouth daily 90 tablet  3     tamsulosin (FLOMAX) 0.4 MG capsule Take 1 capsule (0.4 mg) by mouth daily 90 capsule 0       ALLERGIES     Allergies   Allergen Reactions     Oxycodone Other (See Comments)     Hallucinations when given after surgery        PAST MEDICAL HISTORY:  Past Medical History:   Diagnosis Date     Atrial fibrillation, unspecified 9/18/2015     CAD (coronary artery disease) 09/18/2015    minimal by angio 2007, fu nuc est nl 2018     Elevated TSH 2018     Esophageal reflux 9/18/2015     Essential hypertension      History of cardioversion     [id number]     Idiopathic cardiomyopathy (H) 09/18/2015    fu echo nl ef, nl nuclear est 11/18, Dr. Quarles     Mixed hyperlipidemia 9/18/2015     Mumps      Sleep apnea 09/18/2015    does not use cpap as of 2019     Thoracic aortic aneurysm (H)     sinus of valsalva 4.5 and asc aorta 4.5 in 2017     Tubular adenoma of colon 01/2017    fu 3 years       PAST SURGICAL HISTORY:  Past Surgical History:   Procedure Laterality Date     APPENDECTOMY  1964       FAMILY HISTORY:  Family History   Problem Relation Age of Onset     Arrhythmia Mother         a-fib     Cerebrovascular Disease Mother      Arrhythmia Father         a-fib     Colon Cancer Father      Diabetes Father      Cerebrovascular Disease Father      No Known Problems Brother        SOCIAL HISTORY:  Social History     Socioeconomic History     Marital status:      Spouse name: None     Number of children: 3     Years of education: None     Highest education level: None   Occupational History     Occupation: retired cpa   Social Needs     Financial resource strain: None     Food insecurity     Worry: None     Inability: None     Transportation needs     Medical: None     Non-medical: None   Tobacco Use     Smoking status: Never Smoker     Smokeless tobacco: Never Used   Substance and Sexual Activity     Alcohol use: Yes     Alcohol/week: 0.0 standard drinks     Comment: 3 drinks week     Drug use: No     Sexual activity:  [EKG obtained to assist in diagnosis and management of assessed problem(s)] : EKG obtained to assist in diagnosis and management of assessed problem(s) "Yes     Partners: Female   Lifestyle     Physical activity     Days per week: None     Minutes per session: None     Stress: None   Relationships     Social connections     Talks on phone: None     Gets together: None     Attends Jain service: None     Active member of club or organization: None     Attends meetings of clubs or organizations: None     Relationship status: None     Intimate partner violence     Fear of current or ex partner: None     Emotionally abused: None     Physically abused: None     Forced sexual activity: None   Other Topics Concern      Service Not Asked     Blood Transfusions Not Asked     Caffeine Concern No     Comment: 2 coffee in am, occas. soda     Occupational Exposure Not Asked     Hobby Hazards Not Asked     Sleep Concern No     Stress Concern No     Weight Concern Yes     Special Diet No     Back Care Not Asked     Exercise No     Comment: walking 3-4 days week     Bike Helmet Not Asked     Seat Belt Yes     Self-Exams Not Asked     Parent/sibling w/ CABG, MI or angioplasty before 65F 55M? Not Asked   Social History Narrative     None       Review of Systems:  Skin:  Negative     Eyes:  Positive for glasses  ENT:  Negative    Respiratory:  Negative    Cardiovascular:    fatigue;Positive for;lightheadedness  Gastroenterology: Negative    Genitourinary:  not assessed    Musculoskeletal:  Positive for joint stiffness  Neurologic:  Positive for numbness or tingling of hands;numbness or tingling of feet  Psychiatric:  Positive for depression  Heme/Lymph/Imm:  Positive for allergies  Endocrine:  Negative      Physical Exam:  Vitals: /88   Pulse 61   Ht 1.93 m (6' 4\")   Wt 132.9 kg (293 lb)   BMI 35.67 kg/m      Constitutional:  cooperative, alert and oriented, well developed, well nourished, in no acute distress morbidly obese      Skin:  warm and dry to the touch, no apparent skin lesions or masses noted          Head:  normocephalic, no masses or lesions    "     Eyes:           Lymph:      ENT:  no pallor or cyanosis, dentition good        Neck:           Respiratory:  normal breath sounds, clear to auscultation, normal A-P diameter, normal symmetry, normal respiratory excursion, no use of accessory muscles         Cardiac:   irregularly irregular rhythm   no presence of murmur          pulses full and equal, no bruits auscultated                                        GI:  abdomen soft, non-tender, BS normoactive, no mass, no HSM, no bruits        Extremities and Muscular Skeletal:  no deformities, clubbing, cyanosis, erythema observed              Neurological:           Psych:         Recent Lab Results:  LIPID RESULTS:  Lab Results   Component Value Date    CHOL 138 10/26/2020    HDL 50 10/26/2020    LDL 65 10/26/2020    TRIG 114 10/26/2020    CHOLHDLRATIO 2.8 09/17/2015       LIVER ENZYME RESULTS:  Lab Results   Component Value Date    AST 23 12/26/2019    ALT 5 10/26/2020       CBC RESULTS:  Lab Results   Component Value Date    WBC 7.5 12/26/2019    RBC 4.62 12/26/2019    HGB 14.3 12/26/2019    HCT 44.0 12/26/2019    MCV 95 12/26/2019    MCH 31.0 12/26/2019    MCHC 32.5 12/26/2019    RDW 13.0 12/26/2019     12/26/2019       BMP RESULTS:  Lab Results   Component Value Date     10/26/2020    POTASSIUM 5.1 10/26/2020    CHLORIDE 105 10/26/2020    CO2 29 10/26/2020    ANIONGAP 10.1 10/26/2020     (H) 10/26/2020    BUN 28 10/26/2020    CR 1.77 (H) 10/26/2020    GFRESTIMATED 38 (L) 10/26/2020    GFRESTBLACK 46 (L) 10/26/2020    DOMINICK 9.2 10/26/2020        A1C RESULTS:  Lab Results   Component Value Date    A1C 5.6 12/08/2017       INR RESULTS:  Lab Results   Component Value Date    INR 2.3 06/17/2013    INR 2.4 05/08/2013         Thank you for allowing me to participate in the care of your patient.    Sincerely,     SUNNY WITT MD     [facility]

## 2022-10-06 DIAGNOSIS — I48.20 CHRONIC ATRIAL FIBRILLATION (H): ICD-10-CM

## 2022-10-06 NOTE — TELEPHONE ENCOUNTER
Received request from [facility] pharmacy for Eliquis 5mg tab twice a day    Last OV  10\4\22  Dr. Salazar  Last labwork    9\21\22   Hgb 14.1    [facility] Refill Guideline reviewed.  Criteria met, refill approved.    Madhavi Fregoso RN on 10/6/2022 at 10:01 AM

## 2022-11-10 ENCOUNTER — NURSE TRIAGE (OUTPATIENT)
Dept: FAMILY MEDICINE | Facility: CLINIC | Age: 77
End: 2022-11-10

## 2022-11-10 NOTE — TELEPHONE ENCOUNTER
Appointments in Next Year    Nov 28, 2022  4:00 PM  (Arrive by 3:40 PM)  Provider Visit with Nico Retana MD  [facility] ([facility] ) [phone]   Feb 08, 2023  8:15 AM  Return Visit with Kayla Quintero MD  [facility] ([facility] ) [phone]        Called patient - ok with keeping visit as scheduled     Sherlyn Cantu RN  [facility]

## 2022-11-10 NOTE — TELEPHONE ENCOUNTER
"Nurse Triage SBAR    Is this a 2nd Level Triage? YES, LICENSED PRACTITIONER REVIEW IS REQUIRED    Situation:    Patient calling reporting dizziness     Background:     Patient states he has already discussed the dizziness with his cardiologist and oncologist who advised he follow up with PCP     Assessment:     DESCRIPTION: \"I get dizzy after I eat\"  LIGHTHEADED: feels lightheaded/dizzy after eating    VERTIGO: \"I have had in the vertigo in the past\" unable to clearly answer if symptoms felt like vertigo or not   SEVERITY: able to walk normally, still going for walks, mild   ONSET: months ago   AGGRAVATING FACTORS: upon standing, after eating   HEART RATE: denies any palpitations, heartbeat concerns   CAUSE: \"I am not sure if its related to vertigo or my heart or what\"  RECURRENT SYMPTOM: reports hx of vertigo although unable to answer if these symptoms are similar   OTHER SYMPTOMS: DENIES fever, chest pain, vomiting, diarrhea, bleeding    Protocol Recommended Disposition:   Discuss With PCP And Callback By Nurse Today     Recommendation:     Triaged per Epic protocol - protocol to discuss with PCP and callback by nurse today.    Patient is scheduled for future OV:    11/28/2022 4:00 PM (Arrive by 3:40 PM) Nico Retana MD [facility]     PCP please advise if symptoms can be addressed at this future visit or if patient needs to be seen prior to this date?    Patient states he is out of state and will not be back in [place] until 11/22 (patient did not inform writer he was out of state until after triaging)     Routed to provider    Does the patient meet one of the following criteria for ADS visit consideration? 17+ years old, with an [facility] PCP     Writer advised patient to go to local ER/UCC in the interim with new or worsening symptoms. Reviewed red flag symptoms with patient. Patient expressed verbal understanding and is agreeable.    Callback [phone] - ok to leave detailed VM "     Amanda Chris RN  [facility]      Reason for Disposition    Taking a medicine that could cause dizziness (e.g., blood pressure medications, diuretics)    Additional Information    Negative: SEVERE difficulty breathing (e.g., struggling for each breath, speaks in single words)    Negative: Shock suspected (e.g., cold/pale/clammy skin, too weak to stand, low BP, rapid pulse)    Negative: Difficult to awaken or acting confused (e.g., disoriented, slurred speech)    Negative: Fainted, and still feels dizzy afterwards    Negative: Overdose (accidental or intentional) of medications    Negative: New neurologic deficit that is present now: * Weakness of the face, arm, or leg on one side of the body * Numbness of the face, arm, or leg on one side of the body * Loss of speech or garbled speech    Negative: Heart beating < 50 beats per minute OR > 140 beats per minute    Negative: Sounds like a life-threatening emergency to the triager    Negative: Chest pain    Negative: Rectal bleeding, bloody stool, or tarry-black stool    Negative: Vomiting is main symptom    Negative: Diarrhea is main symptom    Negative: Headache is main symptom    Negative: Heat exhaustion suspected (i.e., dehydration from heat exposure)    Negative: Patient states that they are having an anxiety or panic attack    Negative: Dizziness from low blood sugar (i.e., < 60 mg/dl or 3.5 mmol/l)    Negative: SEVERE dizziness (e.g., unable to stand, requires support to walk, feels like passing out now)    Negative: SEVERE headache or neck pain    Negative: Spinning or tilting sensation (vertigo) present now and one or more stroke risk factors (i.e., hypertension, diabetes mellitus, prior stroke/TIA, heart attack, age over 60) (Exception: prior physician evaluation for this AND no different/worse than usual)    Negative: Neurologic deficit that was brief (now gone), ANY of the following:* Weakness of the face, arm, or leg on one side  of the body* Numbness of the face, arm, or leg on one side of the body* Loss of speech or garbled speech    Negative: Loss of vision or double vision  (Exception: Similar to previous migraines.)    Negative: Extra heart beats OR irregular heart beating (i.e., 'palpitations')    Negative: Difficulty breathing    Negative: Drinking very little and has signs of dehydration (e.g., no urine > 12 hours, very dry mouth, very lightheaded)    Negative: Follows bleeding (e.g., stomach, rectum, vagina)  (Exception: Became dizzy from sight of small amount blood.)    Negative: Patient sounds very sick or weak to the triager    Negative: Lightheadedness (dizziness) present now, after 2 hours of rest and fluids    Negative: Spinning or tilting sensation (vertigo) present now    Negative: Fever > 103 F (39.4 C)    Negative: Fever > 100.0 F (37.8 C) and has diabetes mellitus or a weak immune system (e.g., HIV positive, cancer chemotherapy, organ transplant, splenectomy, chronic steroids)    Negative: MODERATE dizziness (e.g., interferes with normal activities) (Exception: dizziness caused by heat exposure, sudden standing, or poor fluid intake)    Negative: Vomiting occurs with dizziness    Negative: Patient wants to be seen    Protocols used: DIZZINESS-A-OH

## 2022-11-28 ENCOUNTER — OFFICE VISIT (OUTPATIENT)
Dept: FAMILY MEDICINE | Facility: CLINIC | Age: 77
End: 2022-11-28
Payer: MEDICARE

## 2022-11-28 VITALS
TEMPERATURE: 98.2 F | HEART RATE: 73 BPM | BODY MASS INDEX: 35.81 KG/M2 | RESPIRATION RATE: 16 BRPM | WEIGHT: 288 LBS | OXYGEN SATURATION: 97 % | HEIGHT: 75 IN | DIASTOLIC BLOOD PRESSURE: 70 MMHG | SYSTOLIC BLOOD PRESSURE: 96 MMHG

## 2022-11-28 DIAGNOSIS — R41.3 MEMORY LOSS: ICD-10-CM

## 2022-11-28 DIAGNOSIS — F43.9 SITUATIONAL STRESS: ICD-10-CM

## 2022-11-28 DIAGNOSIS — I25.10 CORONARY ARTERY DISEASE INVOLVING NATIVE CORONARY ARTERY OF NATIVE HEART WITHOUT ANGINA PECTORIS: ICD-10-CM

## 2022-11-28 DIAGNOSIS — C64.1 RENAL CELL CARCINOMA, RIGHT (H): ICD-10-CM

## 2022-11-28 DIAGNOSIS — R35.1 BENIGN PROSTATIC HYPERPLASIA WITH NOCTURIA: ICD-10-CM

## 2022-11-28 DIAGNOSIS — N40.1 BENIGN PROSTATIC HYPERPLASIA WITH NOCTURIA: ICD-10-CM

## 2022-11-28 DIAGNOSIS — E78.5 HYPERLIPIDEMIA LDL GOAL <100: ICD-10-CM

## 2022-11-28 DIAGNOSIS — I10 ESSENTIAL HYPERTENSION: Primary | ICD-10-CM

## 2022-11-28 DIAGNOSIS — I48.19 PERSISTENT ATRIAL FIBRILLATION (H): ICD-10-CM

## 2022-11-28 DIAGNOSIS — I42.9 PRIMARY CARDIOMYOPATHY (H): ICD-10-CM

## 2022-11-28 DIAGNOSIS — N18.32 STAGE 3B CHRONIC KIDNEY DISEASE (H): ICD-10-CM

## 2022-11-28 DIAGNOSIS — Z13.220 SCREENING FOR HYPERLIPIDEMIA: ICD-10-CM

## 2022-11-28 PROCEDURE — 90732 PPSV23 VACC 2 YRS+ SUBQ/IM: CPT | Performed by: INTERNAL MEDICINE

## 2022-11-28 PROCEDURE — 90472 IMMUNIZATION ADMIN EACH ADD: CPT | Performed by: INTERNAL MEDICINE

## 2022-11-28 PROCEDURE — G0009 ADMIN PNEUMOCOCCAL VACCINE: HCPCS | Performed by: INTERNAL MEDICINE

## 2022-11-28 PROCEDURE — 90714 TD VACC NO PRESV 7 YRS+ IM: CPT | Performed by: INTERNAL MEDICINE

## 2022-11-28 PROCEDURE — 99214 OFFICE O/P EST MOD 30 MIN: CPT | Mod: 25 | Performed by: INTERNAL MEDICINE

## 2022-11-28 RX ORDER — ROSUVASTATIN CALCIUM 5 MG/1
5 TABLET, COATED ORAL DAILY
Qty: 90 TABLET | Refills: 3 | Status: SHIPPED | OUTPATIENT
Start: 2022-11-28 | End: 2023-05-17

## 2022-11-28 ASSESSMENT — PAIN SCALES - GENERAL: PAINLEVEL: NO PAIN (0)

## 2022-11-28 NOTE — PROGRESS NOTES
"      Subjective   Edgar is a 77 year old, presenting for the following health issues:  Dizziness (See triage note)      HPI        Screening for hyperlipidemia  Coronary artery disease involving native coronary artery of native heart without angina pectoris  Essential hypertension   Ti NEWTON Dorijoaquinnemaribel notes that since his surgery for renal cell carcinoma he had had lower blood pressure readings.  He has stopped ace inhibitor and reduced dose of metoprolol to 25 mg and has follow up in cardiology.  He is taking a low dose of metoprolol.  He did speak to his cardiologist recently and notes some difficulty with memory and cognitive function.  His wife if wondering if cognitive function may be declined due to lower blood pressure.  He is taking lexapro 10 mg daily for depression.    Renal cell carcinoma, right (H)   Noted - no recurrence seen on CT  Stage 3b chronic kidney disease (H)   Labs reviewed and stable     Review of Systems   Constitutional, HEENT, cardiovascular, pulmonary, GI, , musculoskeletal, neuro, skin, endocrine and psych systems are negative, except as otherwise noted.      Objective    BP 96/70 (BP Location: Left arm, Patient Position: Sitting, Cuff Size: Adult Large)   Pulse 73   Temp 98.2  F (36.8  C)   Resp 16   Ht 1.905 m (6' 3\")   Wt 130.6 kg (288 lb)   SpO2 97%   BMI 36.00 kg/m    Body mass index is 36 kg/m .  Physical Exam   GENERAL: healthy, alert and no distress  EYES: Eyes grossly normal to inspection, PERRL and conjunctivae and sclerae normal  HENT: ear canals and TM's normal, nose and mouth without ulcers or lesions  NECK: no adenopathy, no asymmetry, masses, or scars and thyroid normal to palpation  RESP: lungs clear to auscultation - no rales, rhonchi or wheezes  CV: regular rate and rhythm, normal S1 S2, no S3 or S4, no murmur, click or rub, no peripheral edema and peripheral pulses strong  ABDOMEN: soft, nontender, no hepatosplenomegaly, no masses and bowel sounds normal  MS: " no gross musculoskeletal defects noted, no edema  SKIN: no suspicious lesions or rashes  NEURO: Normal strength and tone, mentation intact and speech normal  PSYCH: mentation appears normal, affect normal/bright    Patient Instructions   (I10) Essential hypertension  (primary encounter diagnosis)  Comment: blood pressure is stable, but given lower blood pressure readings we could consider reducing medications to taper off of Lexapro as well as consider taper off of Flomax  Plan:     (Z13.220) Screening for hyperlipidemia  Comment: Check fasting lipid panel at [place]  Plan:     (I25.10) Coronary artery disease involving native coronary artery of native heart without angina pectoris  Comment:   Plan: rosuvastatin (CRESTOR) 5 MG tablet            (C64.1) Renal cell carcinoma, right (H)  Comment: Continue follow up in uirology   Plan:     (N18.32) Stage 3b chronic kidney disease (H)  Comment: check labs at [facility]   Plan:     (N40.1,  R35.1) Benign prostatic hyperplasia with nocturia  Comment:doing well urinary flow  Plan:     (E78.5) Hyperlipidemia LDL goal <100  Comment: check fasting lipid panel next year - continue rosuvastatin   Plan: rosuvastatin (CRESTOR) 5 MG tablet            (I48.19) Persistent atrial fibrillation (H)  Comment: Discussed recommendation to continue Apxiaban and metoprolol  Plan: rosuvastatin (CRESTOR) 5 MG tablet            (I42.9) Primary cardiomyopathy (H)  Comment: as above   Plan: rosuvastatin (CRESTOR) 5 MG tablet            (F43.9) Situational stress  Comment: OK to taper off of Lexapro - start by reducing to 5 mg x 2 weeks then stop.  Monitor for withdrawal or side effects of stopping Lexapro  Plan:     Memory Loss  Comment Referral to neurology placed

## 2022-11-28 NOTE — PATIENT INSTRUCTIONS
(I10) Essential hypertension  (primary encounter diagnosis)  Comment: blood pressure is stable, but given lower blood pressure readings we could consider reducing medications to taper off of Lexapro as well as consider taper off of Flomax  Plan:     (Z13.220) Screening for hyperlipidemia  Comment: Check fasting lipid panel at [place]  Plan:     (I25.10) Coronary artery disease involving native coronary artery of native heart without angina pectoris  Comment:   Plan: rosuvastatin (CRESTOR) 5 MG tablet            (C64.1) Renal cell carcinoma, right (H)  Comment: Continue follow up in uirology   Plan:     (N18.32) Stage 3b chronic kidney disease (H)  Comment: check labs at [facility]   Plan:     (N40.1,  R35.1) Benign prostatic hyperplasia with nocturia  Comment:doing well urinary flow  Plan:     (E78.5) Hyperlipidemia LDL goal <100  Comment: check fasting lipid panel next year - continue rosuvastatin   Plan: rosuvastatin (CRESTOR) 5 MG tablet            (I48.19) Persistent atrial fibrillation (H)  Comment: Discussed recommendation to continue Apxiaban and metoprolol  Plan: rosuvastatin (CRESTOR) 5 MG tablet            (I42.9) Primary cardiomyopathy (H)  Comment: as above   Plan: rosuvastatin (CRESTOR) 5 MG tablet            (F43.9) Situational stress  Comment: OK to taper off of Lexapro - start by reducing to 5 mg x 2 weeks then stop.  Monitor for withdrawal or side effects of stopping Lexapro  Plan:     Memory Loss  Comment Referral to neurology placed

## 2022-11-28 NOTE — PROGRESS NOTES
Prior to immunization administration, verified patients identity using patient s name and date of birth. Please see Immunization Activity for additional information.     Screening Questionnaire for Adult Immunization    Are you sick today?   No   Do you have allergies to medications, food, a vaccine component or latex?   No   Have you ever had a serious reaction after receiving a vaccination?   No   Do you have a long-term health problem with heart, lung, kidney, or metabolic disease (e.g., diabetes), asthma, a blood disorder, no spleen, complement component deficiency, a cochlear implant, or a spinal fluid leak?  Are you on long-term aspirin therapy?   No   Do you have cancer, leukemia, HIV/AIDS, or any other immune system problem?   No   Do you have a parent, brother, or sister with an immune system problem?   No   In the past 3 months, have you taken medications that affect  your immune system, such as prednisone, other steroids, or anticancer drugs; drugs for the treatment of rheumatoid arthritis, Crohn s disease, or psoriasis; or have you had radiation treatments?   No   Have you had a seizure, or a brain or other nervous system problem?   No   During the past year, have you received a transfusion of blood or blood    products, or been given immune (gamma) globulin or antiviral drug?   No   For women: Are you pregnant or is there a chance you could become       pregnant during the next month?   No   Have you received any vaccinations in the past 4 weeks?   No     Immunization questionnaire answers were all negative.        Per orders of Dr. Retana, injection of TD and Pneumovax 23 given by Katherine Buckley CMA. Patient instructed to remain in clinic for 15 minutes afterwards, and to report any adverse reaction to me immediately.       Screening performed by Katherine Buckley CMA on 11/28/2022 at 4:52 PM.

## 2023-01-26 DIAGNOSIS — N18.32 STAGE 3B CHRONIC KIDNEY DISEASE (H): Primary | ICD-10-CM

## 2023-01-26 DIAGNOSIS — D64.9 ANEMIA: ICD-10-CM

## 2023-02-28 ENCOUNTER — TELEPHONE (OUTPATIENT)
Dept: CARDIOLOGY | Facility: CLINIC | Age: 78
End: 2023-02-28
Payer: MEDICARE

## 2023-02-28 DIAGNOSIS — E78.5 HYPERLIPIDEMIA LDL GOAL <100: ICD-10-CM

## 2023-02-28 DIAGNOSIS — I48.20 CHRONIC ATRIAL FIBRILLATION (H): Primary | ICD-10-CM

## 2023-02-28 DIAGNOSIS — I42.9 PRIMARY CARDIOMYOPATHY (H): ICD-10-CM

## 2023-02-28 DIAGNOSIS — I25.10 CORONARY ARTERY DISEASE INVOLVING NATIVE CORONARY ARTERY OF NATIVE HEART WITHOUT ANGINA PECTORIS: ICD-10-CM

## 2023-02-28 NOTE — TELEPHONE ENCOUNTER
Chart reviewed:   Dr. Quarles stated follow up with RICK as necessary.     Tried to call patient back. No answer. Left detailed message informing patient there was an order for RICK visit if needed but if he is feeling well he can wait to follow up with Dr. Quarles for his usual annual follow up. If he has any questions or concerns we are happy to see him. Left call back number.     Placed order for annual follow up

## 2023-02-28 NOTE — TELEPHONE ENCOUNTER
Health Call Center    Phone Message    May a detailed message be left on voicemail: yes     Reason for Call: Other: patient received notice that he is due for 6 month follow up. He states that he has always just done a 1 year follow up and wonders if ths is necessary. Please call patient to discuss.       Action Taken: Other: cardiology    Travel Screening: Not Applicable   Thank you!  [facility]

## 2023-03-09 ENCOUNTER — TELEPHONE (OUTPATIENT)
Dept: FAMILY MEDICINE | Facility: CLINIC | Age: 78
End: 2023-03-09
Payer: MEDICARE

## 2023-03-09 NOTE — TELEPHONE ENCOUNTER
Patient is in [place]. He weaned off escitalopram after 11/28/22 appointment due to side effects of heart rate dropping and dizziness.    He states that he felt OK for a couple of months, but is now experiencing return of depression and anxiety. He denies any thoughts of harm of self or others.    Patient is asking if there is another medication for depression and anxiety that may not cause those same side effects.    Please advise. Triage to call the patient back.    Can we leave a detailed message on this number? YES  Phone number patient can be reached at: Cell number on file:    Telephone Information:   Mobile [phone]       Giselle Guadalupe, RN  [facility] Triage

## 2023-03-10 NOTE — TELEPHONE ENCOUNTER
Called patient. Scheduled.  Appointments in Next Year    Mar 14, 2023  5:00 PM  (Arrive by 4:40 PM)  Provider Visit with Nico Retana MD  [facility] ([facility] ) [phone]   May 02, 2023  8:00 AM  (Arrive by 7:45 AM)  New Visit with Fermin Milner MD  [facility] ([facility] ) [phone]   Sep 20, 2023 10:15 AM  LAB with CS LAB  [facility] ([facility] ) [phone]   Sep 27, 2023 10:00 AM  Return Visit with Kayla Quintero MD  [facility] ([facility] ) [phone]        Lynda Aragon RN on 3/10/2023 at 5:06 PM

## 2023-03-13 ENCOUNTER — TELEPHONE (OUTPATIENT)
Dept: FAMILY MEDICINE | Facility: CLINIC | Age: 78
End: 2023-03-13
Payer: MEDICARE

## 2023-03-13 NOTE — TELEPHONE ENCOUNTER
Canceled appointment regarding PCP request as patient is in [place].     Lynda Aragon RN on 3/13/2023 at 1:23 PM

## 2023-03-23 ENCOUNTER — NURSE TRIAGE (OUTPATIENT)
Dept: FAMILY MEDICINE | Facility: CLINIC | Age: 78
End: 2023-03-23
Payer: MEDICARE

## 2023-03-23 NOTE — TELEPHONE ENCOUNTER
Pt's wife called with pt on speaker phone. Pt has worsening mood symptoms. Pt is in [place] now. Wife denies suicidal thoughts or plans. Pt will be in [place] Monday for  and wants to discuss medication treatment options for anxiety and depression. Future VV was scheduled.     Reason for Disposition    Patient wants to be seen    Additional Information    Negative: SEVERE difficulty breathing (e.g., struggling for each breath, speaks in single words)    Negative: Bluish (or gray) lips or face    Negative: Difficult to awaken or acting confused (e.g., disoriented, slurred speech)    Negative: Hysterical or combative behavior    Negative: Sounds like a life-threatening emergency to the triager    Negative: Chest pain    Negative: Palpitations, skipped heart beat, or rapid heart beat    Negative: Cough is main symptom    Negative: Suicide thoughts, threats, attempts, or questions    Negative: Depression is main problem or symptom (e.g., feelings of sadness or hopelessness)    Negative: Substance use (drug use) or unhealthy alcohol use, known or suspected, and feeling very shaky (i.e., visible tremors of hands)    Negative: Patient sounds very sick or weak to the triager    Negative: Patient sounds very upset or troubled to the triager    Protocols used: ANXIETY AND PANIC ATTACK-A-OH

## 2023-03-27 ENCOUNTER — VIRTUAL VISIT (OUTPATIENT)
Dept: FAMILY MEDICINE | Facility: CLINIC | Age: 78
End: 2023-03-27
Payer: MEDICARE

## 2023-03-27 DIAGNOSIS — F34.1 DYSTHYMIA: ICD-10-CM

## 2023-03-27 DIAGNOSIS — F43.9 SITUATIONAL STRESS: Primary | ICD-10-CM

## 2023-03-27 PROCEDURE — 99443 PR PHYSICIAN TELEPHONE EVALUATION 21-30 MIN: CPT | Mod: 95 | Performed by: NURSE PRACTITIONER

## 2023-03-27 RX ORDER — DULOXETIN HYDROCHLORIDE 20 MG/1
20 CAPSULE, DELAYED RELEASE ORAL DAILY
Qty: 90 CAPSULE | Refills: 0 | Status: SHIPPED | OUTPATIENT
Start: 2023-03-27 | End: 2023-05-17

## 2023-03-27 NOTE — PROGRESS NOTES
Edgar is a 77 year old who is being evaluated via a billable telephone visit.      What phone number would you like to be contacted at? [phone]  How would you like to obtain your AVS? Erica  Distant Location (provider location):  On-site    Assessment & Plan   Problem List Items Addressed This Visit    None  Visit Diagnoses     Situational stress    -  Primary    Relevant Medications    DULoxetine (CYMBALTA) 20 MG capsule    Dysthymia        Relevant Medications    DULoxetine (CYMBALTA) 20 MG capsule           Depression like symptoms with lots of health issues that are stressful. Will start SNRI as he had terrible orthostatic hypotension with selective serotonin reuptake inhibitor. Will return in about 1 month to see PCP and to check sodium level.      31 minutes spent on the date of the encounter doing chart review, history and exam, documentation and further activities as noted above      NANCY Belle CNP  [facility]    Subjective   Edgar is a 77 year old, presenting with his wife for the following health issues:  Anxiety  No flowsheet data found.    HPI     Previously was on Lexapro that worked well. He went off because his BP and HR were very low, so bad he would almost pass out with bending over.   Lower mood recently  Some anxiety that comes and goes   Has a lot of health issues that are stressful for him       Review of Systems   Detailed as above         Objective    Vitals - Patient Reported  Pain Score: No Pain (0)        Physical Exam   healthy, alert and no distress  PSYCH: Alert and oriented times 3; coherent speech, normal   rate and volume, able to articulate logical thoughts, able   to abstract reason, no tangential thoughts, no hallucinations   or delusions  His affect is normal  RESP: No cough, no audible wheezing, able to talk in full sentences  Remainder of exam unable to be completed due to telephone visits            Phone call duration: 26 minutes

## 2023-04-01 ENCOUNTER — HEALTH MAINTENANCE LETTER (OUTPATIENT)
Age: 78
End: 2023-04-01

## 2023-04-10 ENCOUNTER — TELEPHONE (OUTPATIENT)
Dept: FAMILY MEDICINE | Facility: CLINIC | Age: 78
End: 2023-04-10
Payer: MEDICARE

## 2023-04-10 NOTE — TELEPHONE ENCOUNTER
Patient calling clinic to request appt with PCP to discuss various health concerns. Patient and spouse are currently in [place] right now but wished to schedule OV to discuss various health concerns. RN assisted patient to be scheduled with PCP 5/1/2023. Patient also wished to be scheduled for sooner evaluation when he returned from [place]. RN assisted patient to be scheduled with Same Day provider 4/17/2023.     Patient wished to discuss chronic depression symptoms as well as a chronic protrusion post kidney removal about 3 years ago. RN encouraged patient to be seen in ED/UC sooner if either chronic symptoms worsen, patient agreed with plan of care.

## 2023-04-17 ENCOUNTER — OFFICE VISIT (OUTPATIENT)
Dept: FAMILY MEDICINE | Facility: CLINIC | Age: 78
End: 2023-04-17
Payer: MEDICARE

## 2023-04-17 VITALS
WEIGHT: 289 LBS | BODY MASS INDEX: 35.93 KG/M2 | TEMPERATURE: 97.8 F | SYSTOLIC BLOOD PRESSURE: 127 MMHG | DIASTOLIC BLOOD PRESSURE: 83 MMHG | HEIGHT: 75 IN | OXYGEN SATURATION: 97 % | HEART RATE: 63 BPM | RESPIRATION RATE: 16 BRPM

## 2023-04-17 DIAGNOSIS — D49.511 NEOPLASM OF RIGHT KIDNEY WITH THROMBUS OF INFERIOR VENA CAVA (H): ICD-10-CM

## 2023-04-17 DIAGNOSIS — K40.90 UNILATERAL INGUINAL HERNIA WITHOUT OBSTRUCTION OR GANGRENE, RECURRENCE NOT SPECIFIED: Primary | ICD-10-CM

## 2023-04-17 DIAGNOSIS — F34.1 DYSTHYMIA: ICD-10-CM

## 2023-04-17 DIAGNOSIS — G47.33 OBSTRUCTIVE SLEEP APNEA SYNDROME: ICD-10-CM

## 2023-04-17 DIAGNOSIS — I82.220 NEOPLASM OF RIGHT KIDNEY WITH THROMBUS OF INFERIOR VENA CAVA (H): ICD-10-CM

## 2023-04-17 PROCEDURE — 99214 OFFICE O/P EST MOD 30 MIN: CPT | Performed by: NURSE PRACTITIONER

## 2023-04-17 ASSESSMENT — PAIN SCALES - GENERAL: PAINLEVEL: NO PAIN (0)

## 2023-04-17 NOTE — PROGRESS NOTES
Assessment & Plan         (K40.90) Unilateral inguinal hernia without obstruction or gangrene, recurrence not specified (primary encounter diagnosis)  Comment: since previous kidney surgery. referred to Gen Surg. 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 Recommend follow-up with sleep   Plan: Adult Sleep Eval & Management          Referral              (F34.1) Dysthymia  Comment: Has not yet started cymbalta. Its ok if they wait to see neuro first. If he does start it he will need his sodium checked. Also he may need follow-up with Dr Patsy Pope in the memory clinic if their main concern is just memory issues.  Plan:       Edgar was a little upset today that none of his issues are being addressed. We walked through his diagnoses and were able to identify that his chronic diagnoses have been adequately addressed up until now. They were frustrated that it takes a long time to get an appt. He requested a referral to [facility] but I struggled to understand what the referral would be for. They seemed in agreement with the plan today and I told them to let us know if they can't get an appt and there may be other options. He has follow-up scheduled with his PCP already scheduled.       34 minutes spent on the date of the encounter doing chart review, history and exam, documentation and further activities as noted above     NANCY Belle [facility]    Neal Hernández is a 77 year old, presenting for the following health issues:  RECHECK         View : No data to display.              HPI     Follow up on duloxetine  Hasn't started taking the duloxetine yet. Was concerned about side effects after  "reading the insert     He has concerns that none of his chronic issues are being address.    Had kidney removed, has kidney cancer with O5culvg scans. No growth last time. This is very stressful for him just waiting.    Hernia LLQ. Can't exercise. Saw gen surg awhile ago but had to deal with his kidney cancer first. He hasn't gone back to them for another consult yet.    Not doing anything for sleep apnea currenly. Not using CPAP. Gets up to urinate and doesn't put it back on.     Memory isn't as good lately so is going to neuro 5/2.         Review of Systems   Detailed as above         Objective    /83 (BP Location: Right arm, Patient Position: Sitting, Cuff Size: Adult Large)   Pulse 63   Temp 97.8  F (36.6  C)   Resp 16   Ht 1.905 m (6' 3\")   Wt 131.1 kg (289 lb)   SpO2 97%   BMI 36.12 kg/m    Body mass index is 36.12 kg/m .  Physical Exam  Constitutional:       Appearance: Normal appearance.   Pulmonary:      Effort: Pulmonary effort is normal.   Neurological:      Mental Status: He is alert.   Psychiatric:         Mood and Affect: Mood normal.                    "

## 2023-04-17 NOTE — PATIENT INSTRUCTIONS
[facility]   Address: [address], [place] [address]  Phone: [phone]

## 2023-04-18 ASSESSMENT — PATIENT HEALTH QUESTIONNAIRE - PHQ9: SUM OF ALL RESPONSES TO PHQ QUESTIONS 1-9: 10

## 2023-04-26 ENCOUNTER — OFFICE VISIT (OUTPATIENT)
Dept: SURGERY | Facility: CLINIC | Age: 78
End: 2023-04-26
Payer: MEDICARE

## 2023-04-26 VITALS
WEIGHT: 289 LBS | DIASTOLIC BLOOD PRESSURE: 80 MMHG | BODY MASS INDEX: 35.93 KG/M2 | HEIGHT: 75 IN | SYSTOLIC BLOOD PRESSURE: 120 MMHG | HEART RATE: 64 BPM

## 2023-04-26 DIAGNOSIS — K43.2 VENTRAL INCISIONAL HERNIA: Primary | ICD-10-CM

## 2023-04-26 PROCEDURE — 99203 OFFICE O/P NEW LOW 30 MIN: CPT | Performed by: STUDENT IN AN ORGANIZED HEALTH CARE EDUCATION/TRAINING PROGRAM

## 2023-04-26 NOTE — PROGRESS NOTES
[facility] General Surgery Clinic Consultation    CHIEF COMPLAINT:  Chief Complaint   Patient presents with     Consult     Ventral hernia        HISTORY OF PRESENT ILLNESS:  iT Nickerson is a 77 year old male who is seen in clinic with his spouse in consultation at the request of Clarence Martinez APRN CNP for evaluation of unilateral inguinal hernia.    Ti is a 77-year-old male who underwent an open right radical nephrectomy for stage IV renal cell carcinoma with IVC tumor thrombectomy on 10/9/2019 at [facility].  He reports that a year after his surgery after working out with a  in [place] he has noticed a bulge in his abdomen.  This bulge has not been painful but it has been getting bigger.  He denies any nausea, vomiting, trouble with bowel movements.  He is eating well.  He did see a surgeon at [facility] in 11/2020 where extensive discussion was had about hernia repair.  At that time he was instructed to lose weight to optimize his candidacy for successful repair.  His BMI at that time was 37. Since then he has been wearing an abdominal binder which often helps with his hernia.  He does like to walk a lot and golf a lot.      In regards to his renal cell carcinoma, per patient report, there is suspected to be some residual tumor left.  He has been undergoing yearly CT scan for surveillance and per their report has not been told that there is any enlargement of lymph nodes or recurrence of tumor. He is currently not undergoing any active treatments at this time.  I am unable to find the official note from the oncologist at this time.    Of note he does have CAD and A-fib and is on Eliquis.  He has a history of hypertension also.    REVIEW OF SYSTEMS:  Constitutional: No fevers or chills  Eyes: No blurred or double vision  HENT: Denies headaches, No rhinorrhea, No sore throat  Respiratory: No cough or shortness of breath  Cardiovascular: Denies chest pain or palpitations  Gastrointestinal: No  abdominal pain or nausea/vomiting  Genitourinary: No hematuria or dysuria  Musculoskeletal: Denies arthralgias or myalgias  Neurologic: No numbness or tingling  Integumentary: No skin rashes    Past Medical History:   Diagnosis Date     Atrial fibrillation, unspecified 9/18/2015     CAD (coronary artery disease) 09/18/2015    minimal by angio 2007, fu nuc est nl 2018     Elevated TSH 2018     Esophageal reflux 9/18/2015     Essential hypertension      History of cardioversion     [id number]     Idiopathic cardiomyopathy (H) 09/18/2015    fu echo nl ef, nl nuclear est 11/18, Dr. Quarles     Mixed hyperlipidemia 9/18/2015     Mumps      Sleep apnea 09/18/2015    does not use cpap as of 2019     Sleep apnea      Thoracic aortic aneurysm (H)     sinus of valsalva 4.5 and asc aorta 4.5 in 2017     Tubular adenoma of colon 01/2017    fu 3 years       Past Surgical History:   Procedure Laterality Date     APPENDECTOMY  1964       Family History   Problem Relation Age of Onset     Arrhythmia Mother         a-fib     Cerebrovascular Disease Mother      Arrhythmia Father         a-fib     Colon Cancer Father      Diabetes Father      Cerebrovascular Disease Father      No Known Problems Brother        Social History     Tobacco Use     Smoking status: Never     Smokeless tobacco: Never   Vaping Use     Vaping status: Never Used   Substance Use Topics     Alcohol use: Yes     Alcohol/week: 0.0 standard drinks of alcohol     Comment: 3 drinks week       Patient Active Problem List   Diagnosis     Chronic atrial fibrillation (H)     Idiopathic cardiomyopathy (H)     Coronary artery disease involving native coronary artery of native heart without angina pectoris     Esophageal reflux     Mixed hyperlipidemia     Sleep apnea     Essential hypertension     Obesity (BMI 35.0-39.9) with comorbidity (H)     Thoracic aortic aneurysm without rupture (H)     Tubular adenoma of colon     Elevated TSH     Renal cell carcinoma, right (H)      "CKD (chronic kidney disease) stage 3, GFR 30-59 ml/min (H)       Allergies   Allergen Reactions     Oxycodone Other (See Comments)     Hallucinations when given after surgery        Current Outpatient Medications   Medication Sig Dispense Refill     acetaminophen (TYLENOL) 500 MG tablet Take 500-1,000 mg by mouth every 6 hours as needed for mild pain       apixaban ANTICOAGULANT (ELIQUIS ANTICOAGULANT) 5 MG tablet Take 1 tablet (5 mg) by mouth 2 times daily 180 tablet 3     ASPIRIN NOT PRESCRIBED (INTENTIONAL) Please choose reason not prescribed, below       DULoxetine (CYMBALTA) 20 MG capsule Take 1 capsule (20 mg) by mouth daily 90 capsule 0     loperamide (IMODIUM) 1 MG/5ML liquid Take by mouth 4 times daily as needed for diarrhea       metoprolol succinate ER (TOPROL XL) 25 MG 24 hr tablet TAKE 1 TABLET (25 MG) BY MOUTH DAILY 90 tablet 3     rosuvastatin (CRESTOR) 5 MG tablet Take 1 tablet (5 mg) by mouth daily 90 tablet 3     tamsulosin (FLOMAX) 0.4 MG capsule TAKE 1 CAPSULE BY MOUTH EVERY DAY 90 capsule 3       Vitals: /80   Pulse 64   Ht 1.905 m (6' 3\")   Wt 131.1 kg (289 lb)   BMI 36.12 kg/m    BMI= Body mass index is 36.12 kg/m .    EXAM:  General: Vital signs reviewed, in no apparent distress  Eyes: Anicteric  HENT: Normocephalic, atraumatic, trachea midline   Respiratory: Breathing nonlabored  Cardiovascular: Regular rate and rhythm  GI: Abdomen obese, soft, nondistended, nontender.  Palpable supraumbilical fascial defect.  Obvious visible abdominal protrusion in the lower abdomen.  Palpable wide space between edges of rectus muscle in the lower abdomen.  On my exam measures to be about to 10 cm.  Soft, nontender, reducible.  Musculoskeletal: No gross deformities  Neurologic: Grossly nonfocal exam  Psychiatric: Normal mood, affect and insight  Integumentary: Warm and dry    All labs and imaging personally reviewed and significant for:   Was not able to actually review imaging but just " impressions.    Impression    1. Stable enhancing precaval nodules, which remain suspicious for tumor recurrence.   2. No new metastases in the abdomen or pelvis.      FINDINGS:  Postoperative changes right radical nephrectomy and IVC tumor thrombectomy. No evidence   of local recurrence in nephrectomy bed.     Left renal cysts. Left kidney otherwise normal. Both adrenal glands normal.     Conglomerate enhancing precaval nodules have changed little in size since 10/12/2022, now measuring   1.6 x 3.1 x 2.2 cm (series 3, image 95; series 5, image 63), previously 1.7 x 3.0 x 2.2 cm on   10/12/2022 and 2.4 x 3.2 x 2.6 cm on 06/22/2022. Stable 6 mm aortocaval node (series 3, image 69)   and paracaval lymph nodes. Stable 0.9 cm nodule in the right upper quadrant anterior to falciform   fissure (series 3, image 40).     Stable postoperative and post ablation changes in the posterior right hepatic lobe. No evidence of   recurrence in this location. No new hepatic lesions.     Gallbladder and spleen negative. Diffuse pancreatic atrophy with fatty changes. Nondilated small and   large intestine. Small duodenal diverticulum. Small esophageal hiatal hernia. Colonic   diverticulosis.     Unchanged ventral abdominal wall hernia contains nonobstructed small bowel loops. Supraumbilical   small fat-containing ventral abdominal wall hernias.     Atherosclerosis. Enlarged prostate. Degenerative changes lumbar spine. Stable, indeterminate lucency   T11 vertebral body.     This examination was performed in conjunction with CT of the chest, which is reported separately.    ASSESSMENT:  Ti Nickerson is a 77 year old who presents with incisional ventral hernia with likely multiple Swiss cheese like character versus significant diastases recti. We discussed watchful waiting versus surgical management.  Given the size of the hernia and his lack of symptomatology, I recommend he continue to try to lose weight to optimize his hernia  repair candidacy.  He is to repeat a CT scan on June 1.  I will have them forward these imaging to me so I can fully review his hernia defects.  We did extensively discuss perioperative course of hernia repair and postoperative care.  Given the concerning size on exam today he likely will require postop hospitalization for pain control overnight. Risk and benefits of the surgery include but is not limited to pain, bleeding, infection, seroma/hematoma formation, possible injury to surrounding structures and organs, and recurrence.  I also discussed with him that if I look into the abdomen and there is any evidence of carcinomatosis we will not proceed with completing the surgery. Patient was given ample amount of time to ask questions and all questions were answered appropriately.  He is going to go to [facility] at the end of the summer and would like to potentially plan for surgery after.  I am agreeable to this as it will allow him to work on his weight loss. Significant pertinent co-morbidities include: CAD, A-fib on Eliquis, obesity with BMI of 36, metastatic renal cell carcinoma s/p resection      PLAN:  Pending weight loss and CT scan -anticipate possible surgery in early fall.  We will follow-up with a CT scan completed on June 1 and personally review for surgical planning.      It was my pleasure to participate in the care of Ti Nickerson in clinic today. Thank you for this consultation.         Abraham Olivera MD    Please route or send letter to:  Primary Care Provider (PCP) and Referring Provider

## 2023-05-01 NOTE — PROGRESS NOTES
INITIAL NEUROLOGY CONSULTATION    DATE OF VISIT: 5/2/2023  CLINIC LOCATION: [facility]  MRN: [id number]  PATIENT NAME: Ti Nickerson  YOB: 1945    REASON FOR VISIT: No chief complaint on file.    HISTORY OF PRESENT ILLNESS:                                                    Mr. Ti Nickerson is 77 year old right handed male patient with past medical history of chronic atrial fibrillation (on apixaban), cardiomyopathy, hyperlipidemia, sleep apnea, hypertension, obesity, and right renal cell carcinoma, who was seen today for memory loss.    Per patient's report, ***.    Laboratory evaluation from September 2022 includes elevated creatinine of 1.68, low albumin of 3.3, low glucose of 66, normal hemoglobin of 14.1, and normal PTH (45).  Vitamin B12 was normal (370) in December 2019.  Folate at that time was also normal (9.3).  TSH was elevated in July 2019 (4.94), but free T4 was normal (1.01).    No prior brain imaging.    No additional useful information is available in Care Everywhere, which was reviewed.  PAST MEDICAL/SURGICAL HISTORY:                                                    I personally reviewed patient's past medical and surgical history with the patient at today's visit.  MEDICATIONS:                                                    I personally reviewed patient's medications and allergies with the patient at today's visit.  ALLERGIES:                                                      Allergies   Allergen Reactions     Oxycodone Other (See Comments)     Hallucinations when given after surgery      EXAM:                                                    VITAL SIGNS:   There were no vitals taken for this visit.  Newport Cognitive Assessment:          Newport Cognitive Assessment Score:   /30.     General: pt is in NAD, cooperative.  Skin: normal turgor, moist mucous membranes, no lesions/rashes noticed.  HEENT: ATNC, EOMI, PERRL, white sclera, normal  conjunctiva, no nystagmus or ptosis. No carotid bruits bilaterally.  Respiratory: lung sounds clear to auscultation bilaterally, no crackles, wheezes, rhonchi. Symmetric lung excursion, no accessory respiratory muscle use.  Cardiovascular: normal S1/S2, no murmurs/rubs/gallops.   Abdomen: Not distended.  : deferred.    Neurological:  Mental: alert, follows commands, MoCA as per above, no aphasia or dysarthria. Fund of knowledge is {MYAPPROPRIATE:[id number]}  Cranial Nerves:  CN II: visual acuity - able to accurately count fingers with each eye. Visual fields intact, fundi: discs sharp, no papilledema and normal vessels bilaterally.  CN III, IV, VI: EOM intact, pupils equal and reactive  CN V: facial sensation nl  CN VII: face symmetric, no facial droop  CN VIII: hearing normal  CN IX: palate elevation symmetric, uvula at midline  CN XI SCM normal, shoulder shrug nl  CN XII: tongue midline  Motor: Strength: 5/5 in all major groups of all extremities. Normal tone. No abnormal movements. No pronator drift b/l.  Reflexes: Triceps, biceps, brachioradialis, patellar, and achilles reflexes normal and symmetric. No clonus noted. Toes are down-going b/l.   Sensory: light touch, pinprick, and vibration intact. Romberg: negative.  Coordination: FNF and heel-shin tests intact b/l.   Gait:  Normal, able to tandem walk *** without difficulty.  DATA:   LABS/EEG/IMAGING/OTHER STUDIES: I reviewed pertinent medical records, as detailed in the history of present illness.  ASSESSMENT AND PLAN:      ASSESSMENT: Ti Nickerson is a 77 year old male patient with listed above past medical history, who presents with ***.    We had a detailed discussion with the patient regarding his presenting complaints.  The neurological exam today is ***.    DIAGNOSES:  No diagnosis found.  PLAN: There are no Patient Instructions on file for this visit.    Total Time: *** minutes spent on the date of the encounter doing chart review, history and exam,  documentation and further activities per the note.    Fermin Milner MD  [facility] Neurology  (Chart documentation was completed in part with Dragon voice-recognition software. Even though reviewed, some grammatical, spelling, and word errors may remain.)

## 2023-05-02 ENCOUNTER — OFFICE VISIT (OUTPATIENT)
Dept: NEUROLOGY | Facility: CLINIC | Age: 78
End: 2023-05-02
Payer: MEDICARE

## 2023-05-02 VITALS
DIASTOLIC BLOOD PRESSURE: 77 MMHG | HEIGHT: 75 IN | WEIGHT: 285 LBS | BODY MASS INDEX: 35.43 KG/M2 | OXYGEN SATURATION: 96 % | SYSTOLIC BLOOD PRESSURE: 113 MMHG | HEART RATE: 71 BPM

## 2023-05-02 DIAGNOSIS — R41.3 MEMORY LOSS: Primary | ICD-10-CM

## 2023-05-02 PROCEDURE — 99205 OFFICE O/P NEW HI 60 MIN: CPT | Performed by: PSYCHIATRY & NEUROLOGY

## 2023-05-02 ASSESSMENT — MONTREAL COGNITIVE ASSESSMENT (MOCA)
WHAT IS THE TOTAL SCORE (OUT OF 30): 20
4. NAME EACH OF THE THREE ANIMALS SHOWN: 2
WHAT LEVEL OF EDUCATION WAS ATTAINED: 0
9. REPEAT EACH SENTENCE: 2
10. [FLUENCY] NAME WORDS STARTING WITH DESIGNATED LETTER: 0
8. SERIAL SUBTRACTION OF 7S: 3
13. ORIENTATION SUBSCORE: 3
VISUOSPATIAL/EXECUTIVE SUBSCORE: 2
11. FOR EACH PAIR OF WORDS, WHAT CATEGORY DO THEY BELONG TO (OUT OF 2): 2
6. READ LIST OF DIGITS [FORWARD/BACKWARD]: 2
12. MEMORY INDEX SCORE: 3
7. [VIGILENCE] TAP WHEN HEARING DESIGNATED LETTER: 1

## 2023-05-02 NOTE — LETTER
5/2/2023         RE: Ti Nickerson  [address]  [place] [address]        Dear Colleague,    Thank you for referring your patient, Ti Nickerson, to the [facility] NEUROLOGY [facility]ICS [facility]. Please see a copy of my visit note below.    INITIAL NEUROLOGY CONSULTATION    DATE OF VISIT: 5/2/2023  CLINIC LOCATION: [facility]  MRN: [id number]  PATIENT NAME: Ti Nickerson  YOB: 1945    REASON FOR VISIT:   Chief Complaint   Patient presents with     Consult     Memory Concerns      HISTORY OF PRESENT ILLNESS:                                                    Mr. Ti Nickerson is 77 year old right handed male patient with past medical history of chronic atrial fibrillation (on Apixaban), cardiomyopathy, hyperlipidemia, sleep apnea, hypertension, obesity, and right renal cell carcinoma, who was seen today for memory concerns/word finding difficulty.  Accompanied by his wife.    Per patient's report, he struggles with word finding difficulties for approximately a year.  Overall, he feels that his memory is good, though frequently misplaces his belongings and has occasionally questions regarding date.  He takes his medications on time.  Over the winter he struggled with paying his bills because almost all of them are online, and it was difficult for him to manage payments that way.  He continues to drive without getting lost, recent car accidents or tickets.  He feels depressed and has prescription for Cymbalta that he did not start yet.  He denies any focal neurological symptoms, aside from transient binocular double vision following cataract surgery approximately 2 years ago.  No history of strokes, seizures, head injuries, or CNS infections.  He reports positive family history of strokes in both parents.    According to patient's wife, at times the patient has great memory, but tends to forget little things, like finding right key  fob, putting food away, closing cupboards, using the phone, or turning windshield wipers on with rain, etc.  He also struggles with keeping his focus and multitasking.  He has especial difficulties with finding right words.  He was Lexapro for depression, but it led to low blood pressure.  He is planning to start Cymbalta soon.  She has no additional concerns.    Laboratory evaluation from 2022 includes elevated creatinine of 1.68, low albumin of 3.3, low glucose of 66, normal hemoglobin of 14.1, and normal PTH (45).  Vitamin B12 was normal (370) in 2019.  Folate at that time was also normal (9.3).  TSH was elevated in 2019 (4.94), but free T4 was normal (1.01).    No prior brain imaging.    No additional useful information is available in Care Everywhere, which was reviewed.  PAST MEDICAL/SURGICAL HISTORY:                                                    I personally reviewed patient's past medical and surgical history with the patient at today's visit.  MEDICATIONS:                                                    I personally reviewed patient's medications and allergies with the patient at today's visit.  ALLERGIES:                                                      Allergies   Allergen Reactions     Oxycodone Other (See Comments)     Hallucinations when given after surgery      EXAM:                                                    VITAL SIGNS:   /77 (BP Location: Right arm, Patient Position: Sitting, Cuff Size: Adult Large)   Pulse 71   SpO2 96%   Faisal Cognitive Assessment:    Faisal Cognitive Assessment (MOCA)  Visuospatial/Executive : 2  Namin  Attention - Digits: 2  Attention - Letters: 1  Attention - Subtraction: 3  Language - Repeat: 2  Language - Fluency : 0  Abstraction: 2  Delayed Recall: 3  Orientation: 3  Education: 0  MOCA Score: 20  Administered by: : Verenice CHRISTIANSEN     McDonald Cognitive Assessment Score:  MOCA Score: 20/30.     General: pt is in NAD,  cooperative.  Skin: normal turgor, moist mucous membranes, no lesions/rashes noticed.  HEENT: ATNC, EOMI, PERRL, white sclera, normal conjunctiva, no nystagmus or ptosis. No carotid bruits bilaterally.  Respiratory: lung sounds clear to auscultation bilaterally, no crackles, wheezes, rhonchi. Symmetric lung excursion, no accessory respiratory muscle use.  Cardiovascular: normal S1/S2, no murmurs/rubs/gallops.   Abdomen: Not distended.  : deferred.    Neurological:  Mental: alert, follows commands, MoCA as per above, no aphasia or dysarthria. Fund of knowledge is diminished for age.  Cranial Nerves:  CN II: visual acuity - able to accurately count fingers with each eye. Visual fields intact, fundi: discs sharp, no papilledema and normal vessels bilaterally.  CN III, IV, VI: EOM intact, pupils equal and reactive  CN V: facial sensation nl  CN VII: face symmetric, no facial droop  CN VIII: hearing normal  CN IX: palate elevation symmetric, uvula at midline  CN XI SCM normal, shoulder shrug nl  CN XII: tongue midline  Motor: Strength: 5/5 in all major groups of all extremities. Normal tone. No abnormal movements. No pronator drift b/l.  Reflexes: Triceps, biceps, brachioradialis, and patellar reflexes normal and symmetric, achilles reflexes are significantly reduced bilaterally. No clonus noted. Toes are down-going b/l.   Sensory: light touch, pinprick, and vibration reduced in both feet. Romberg: negative.  Coordination: FNF and heel-shin tests intact b/l.   Gait:  Normal, able to tandem walk with mild difficulty.  DATA:   LABS/EEG/IMAGING/OTHER STUDIES: I reviewed pertinent medical records, as detailed in the history of present illness.  ASSESSMENT AND PLAN:      ASSESSMENT: Ti Nickerson is a 77 year old male patient with listed above past medical history, who presents with cognitive complaints/word finding difficulties for approximately 1 year in context of untreated depression.    We had a detailed  discussion with the patient and his wife regarding his presenting complaints.  MoCA is 20/30, consistent with mild cognitive impairment.  The neurological exam today is suggestive of peripheral polyneuropathy (likely chronic and unrelated to the main concern today).    We discussed that clinical presentation might be consistent with vitamin deficiencies, thyroid dysfunction, structural or vascular brain lesions, cognitive changes related to untreated mental health conditions, and neurodegenerative conditions.  For further diagnostic clarification, I ordered screening labs, CPT, brain MRI, and neuropsychology evaluation.    DIAGNOSES:    ICD-10-CM    1. Memory loss  R41.3 Adult Neurology  Referral        PLAN: At today's visit we thoroughly discussed various diagnostic possibilities for patient's symptoms, necessary evaluation, and the plan, which includes:  Orders Placed This Encounter   Procedures     MR Brain w/o Contrast     Vitamin B6     Vitamin B12     Vitamin B1 whole blood     TSH with free T4 reflex     Methylmalonic Acid     Adult Neuropsychology Referral     Occupational Therapy Referral     No new medications.    I advised the patient to stay physically and mentally active with particular emphasis on daily mentally stimulating activities of his choice (such as crosswords, puzzles, sudoku, etc.), stretching exercises, walking, and healthy eating.     Additional recommendations after the work-up.    Next follow-up appointment is in the next 6 months or earlier if needed.    Total Time: 61 minutes spent on the date of the encounter doing chart review, history and exam, documentation and further activities per the note.    Fermin Milner MD  [facility] Neurology  (Chart documentation was completed in part with Dragon voice-recognition software. Even though reviewed, some grammatical, spelling, and word errors may remain.)          Ti Nickerson is a 77 year old male who presents  "for:  Chief Complaint   Patient presents with     Consult     Memory Concerns         Initial Vitals:  /77 (BP Location: Right arm, Patient Position: Sitting, Cuff Size: Adult Large)   Pulse 71   Ht 1.905 m (6' 3\")   Wt 129.3 kg (285 lb)   SpO2 96%   BMI 35.62 kg/m   Estimated body mass index is 35.62 kg/m  as calculated from the following:    Height as of this encounter: 1.905 m (6' 3\").    Weight as of this encounter: 129.3 kg (285 lb).. Body surface area is 2.62 meters squared. BP completed using cuff size: large    MoCA 1= 20/30    Verenice Somers      Again, thank you for allowing me to participate in the care of your patient.        Sincerely,        Fermin Milner MD    "

## 2023-05-02 NOTE — PATIENT INSTRUCTIONS
AFTER VISIT SUMMARY (AVS):    At today's visit we thoroughly discussed various diagnostic possibilities for your symptoms, necessary evaluation, and the plan, which includes:  Orders Placed This Encounter   Procedures    MR Brain w/o Contrast    Vitamin B6    Vitamin B12    Vitamin B1 whole blood    TSH with free T4 reflex    Methylmalonic Acid    Adult Neuropsychology Referral    Occupational Therapy Referral     No new medications.    Stay physically and mentally active with particular emphasis on daily mentally stimulating activities of your choice (such as crosswords, puzzles, sudoku, etc.), stretching exercises, walking, and healthy eating.     Additional recommendations after the work-up.    Next follow-up appointment is in the next 6 months or earlier if needed.    Please do not hesitate to call me with any questions or concerns.    Thanks.

## 2023-05-02 NOTE — PROGRESS NOTES
"Ti Nickerson is a 77 year old male who presents for:  Chief Complaint   Patient presents with     Consult     Memory Concerns         Initial Vitals:  /77 (BP Location: Right arm, Patient Position: Sitting, Cuff Size: Adult Large)   Pulse 71   Ht 1.905 m (6' 3\")   Wt 129.3 kg (285 lb)   SpO2 96%   BMI 35.62 kg/m   Estimated body mass index is 35.62 kg/m  as calculated from the following:    Height as of this encounter: 1.905 m (6' 3\").    Weight as of this encounter: 129.3 kg (285 lb).. Body surface area is 2.62 meters squared. BP completed using cuff size: large    MoCA 1= 20/30    Verenice Somers  "

## 2023-05-02 NOTE — PROGRESS NOTES
INITIAL NEUROLOGY CONSULTATION    DATE OF VISIT: 5/2/2023  CLINIC LOCATION: [facility]  MRN: [id number]  PATIENT NAME: Ti Nickerson  YOB: 1945    REASON FOR VISIT:   Chief Complaint   Patient presents with     Consult     Memory Concerns      HISTORY OF PRESENT ILLNESS:                                                    Mr. Ti Nickerson is 77 year old right handed male patient with past medical history of chronic atrial fibrillation (on Apixaban), cardiomyopathy, hyperlipidemia, sleep apnea, hypertension, obesity, and right renal cell carcinoma, who was seen today for memory concerns/word finding difficulty.  Accompanied by his wife.    Per patient's report, he struggles with word finding difficulties for approximately a year.  Overall, he feels that his memory is good, though frequently misplaces his belongings and has occasionally questions regarding date.  He takes his medications on time.  Over the winter he struggled with paying his bills because almost all of them are online, and it was difficult for him to manage payments that way.  He continues to drive without getting lost, recent car accidents or tickets.  He feels depressed and has prescription for Cymbalta that he did not start yet.  He denies any focal neurological symptoms, aside from transient binocular double vision following cataract surgery approximately 2 years ago.  No history of strokes, seizures, head injuries, or CNS infections.  He reports positive family history of strokes in both parents.    According to patient's wife, at times the patient has great memory, but tends to forget little things, like finding right key fob, putting food away, closing cupboards, using the phone, or turning windshield wipers on with rain, etc.  He also struggles with keeping his focus and multitasking.  He has especial difficulties with finding right words.  He was Lexapro for depression, but it led to low blood  pressure.  He is planning to start Cymbalta soon.  She has no additional concerns.    Laboratory evaluation from 2022 includes elevated creatinine of 1.68, low albumin of 3.3, low glucose of 66, normal hemoglobin of 14.1, and normal PTH (45).  Vitamin B12 was normal (370) in 2019.  Folate at that time was also normal (9.3).  TSH was elevated in 2019 (4.94), but free T4 was normal (1.01).    No prior brain imaging.    No additional useful information is available in Care Everywhere, which was reviewed.  PAST MEDICAL/SURGICAL HISTORY:                                                    I personally reviewed patient's past medical and surgical history with the patient at today's visit.  MEDICATIONS:                                                    I personally reviewed patient's medications and allergies with the patient at today's visit.  ALLERGIES:                                                      Allergies   Allergen Reactions     Oxycodone Other (See Comments)     Hallucinations when given after surgery      EXAM:                                                    VITAL SIGNS:   /77 (BP Location: Right arm, Patient Position: Sitting, Cuff Size: Adult Large)   Pulse 71   SpO2 96%   Faisal Cognitive Assessment:    Dighton Cognitive Assessment (MOCA)  Visuospatial/Executive : 2  Namin  Attention - Digits: 2  Attention - Letters: 1  Attention - Subtraction: 3  Language - Repeat: 2  Language - Fluency : 0  Abstraction: 2  Delayed Recall: 3  Orientation: 3  Education: 0  MOCA Score: 20  Administered by: : Verenice CHRISTIANSEN     Dighton Cognitive Assessment Score:  MOCA Score: 20/30.     General: pt is in NAD, cooperative.  Skin: normal turgor, moist mucous membranes, no lesions/rashes noticed.  HEENT: ATNC, EOMI, PERRL, white sclera, normal conjunctiva, no nystagmus or ptosis. No carotid bruits bilaterally.  Respiratory: lung sounds clear to auscultation bilaterally, no crackles, wheezes,  rhonchi. Symmetric lung excursion, no accessory respiratory muscle use.  Cardiovascular: normal S1/S2, no murmurs/rubs/gallops.   Abdomen: Not distended.  : deferred.    Neurological:  Mental: alert, follows commands, MoCA as per above, no aphasia or dysarthria. Fund of knowledge is diminished for age.  Cranial Nerves:  CN II: visual acuity - able to accurately count fingers with each eye. Visual fields intact, fundi: discs sharp, no papilledema and normal vessels bilaterally.  CN III, IV, VI: EOM intact, pupils equal and reactive  CN V: facial sensation nl  CN VII: face symmetric, no facial droop  CN VIII: hearing normal  CN IX: palate elevation symmetric, uvula at midline  CN XI SCM normal, shoulder shrug nl  CN XII: tongue midline  Motor: Strength: 5/5 in all major groups of all extremities. Normal tone. No abnormal movements. No pronator drift b/l.  Reflexes: Triceps, biceps, brachioradialis, and patellar reflexes normal and symmetric, achilles reflexes are significantly reduced bilaterally. No clonus noted. Toes are down-going b/l.   Sensory: light touch, pinprick, and vibration reduced in both feet. Romberg: negative.  Coordination: FNF and heel-shin tests intact b/l.   Gait:  Normal, able to tandem walk with mild difficulty.  DATA:   LABS/EEG/IMAGING/OTHER STUDIES: I reviewed pertinent medical records, as detailed in the history of present illness.  ASSESSMENT AND PLAN:      ASSESSMENT: Ti Nickerson is a 77 year old male patient with listed above past medical history, who presents with cognitive complaints/word finding difficulties for approximately 1 year in context of untreated depression.    We had a detailed discussion with the patient and his wife regarding his presenting complaints.  MoCA is 20/30, consistent with mild cognitive impairment.  The neurological exam today is suggestive of peripheral polyneuropathy (likely chronic and unrelated to the main concern today).    We discussed that  clinical presentation might be consistent with vitamin deficiencies, thyroid dysfunction, structural or vascular brain lesions, cognitive changes related to untreated mental health conditions, and neurodegenerative conditions.  For further diagnostic clarification, I ordered screening labs, CPT, brain MRI, and neuropsychology evaluation.    DIAGNOSES:    ICD-10-CM    1. Memory loss  R41.3 Adult Neurology  Referral        PLAN: At today's visit we thoroughly discussed various diagnostic possibilities for patient's symptoms, necessary evaluation, and the plan, which includes:  Orders Placed This Encounter   Procedures     MR Brain w/o Contrast     Vitamin B6     Vitamin B12     Vitamin B1 whole blood     TSH with free T4 reflex     Methylmalonic Acid     Adult Neuropsychology Referral     Occupational Therapy Referral     No new medications.    I advised the patient to stay physically and mentally active with particular emphasis on daily mentally stimulating activities of his choice (such as crosswords, puzzles, sudoku, etc.), stretching exercises, walking, and healthy eating.     Additional recommendations after the work-up.    Next follow-up appointment is in the next 6 months or earlier if needed.    Total Time: 61 minutes spent on the date of the encounter doing chart review, history and exam, documentation and further activities per the note.    Fermin Milner MD  [facility] Neurology  (Chart documentation was completed in part with Dragon voice-recognition software. Even though reviewed, some grammatical, spelling, and word errors may remain.)

## 2023-05-04 ENCOUNTER — TELEPHONE (OUTPATIENT)
Dept: CARDIOLOGY | Facility: CLINIC | Age: 78
End: 2023-05-04
Payer: MEDICARE

## 2023-05-04 NOTE — TELEPHONE ENCOUNTER
Health Call Center    Phone Message    May a detailed message be left on voicemail: yes     Reason for Call: Medication Question or concern regarding medication   Prescription Clarification  Name of Medication: DULoxetine (CYMBALTA) 20 MG capsule     What on the order needs clarification? Patient called wanting to speak with someone on the care team. Patient is to be taking a new medication Duloxetine (Cymbalta) 20mg, but wanting to get clarifications if Ok to do so or if there may be side effects. Please call patient back to further discuss.     Action Taken: Other: Cardiology    Travel Screening: Not Applicable     Thank you!  [facility]

## 2023-05-04 NOTE — TELEPHONE ENCOUNTER
Patient states he was prescribed Cymbalta, PCP directed patient to reach out to cardiology to ok patient to take this.  Patient was changed from Lexapro to Cymbalta due to hypotension.  Patient is aware that Dr. Quarles is out of the office until 5/8/23 and will await call back.  Stefanie Phelan RN on 5/4/2023 at 4:54 PM

## 2023-05-09 NOTE — TELEPHONE ENCOUNTER
Devin Quarles MD Lidke, Jen M, RN  Caller: Unspecified (5 days ago, 12:57 PM)  That is fine.     [facility]    Message left for patient to return call to clinic to discuss.  Stefanie Phelan, ULISES on 5/9/2023 at 1:31 PM

## 2023-05-10 NOTE — TELEPHONE ENCOUNTER
Patient returned call to clinic and left message requesting call back.  RN returned call and advised on message below from Dr. Quarles.  Patient verbalized understanding.  Stefanie Phelan RN on 5/10/2023 at 12:04 PM

## 2023-05-11 ENCOUNTER — LAB (OUTPATIENT)
Dept: LAB | Facility: CLINIC | Age: 78
End: 2023-05-11
Payer: MEDICARE

## 2023-05-11 DIAGNOSIS — R41.3 MEMORY LOSS: ICD-10-CM

## 2023-05-11 LAB
TSH SERPL DL<=0.005 MIU/L-ACNC: 3.86 UIU/ML (ref 0.3–4.2)
VIT B12 SERPL-MCNC: 397 PG/ML (ref 232–1245)

## 2023-05-11 PROCEDURE — 84425 ASSAY OF VITAMIN B-1: CPT

## 2023-05-11 PROCEDURE — 83921 ORGANIC ACID SINGLE QUANT: CPT

## 2023-05-11 PROCEDURE — 84207 ASSAY OF VITAMIN B-6: CPT

## 2023-05-11 PROCEDURE — 84443 ASSAY THYROID STIM HORMONE: CPT

## 2023-05-11 PROCEDURE — 82607 VITAMIN B-12: CPT

## 2023-05-11 PROCEDURE — 36415 COLL VENOUS BLD VENIPUNCTURE: CPT

## 2023-05-14 LAB — PYRIDOXAL PHOS SERPL-SCNC: 35.9 NMOL/L

## 2023-05-15 LAB — VIT B1 PYROPHOSHATE BLD-SCNC: 112 NMOL/L

## 2023-05-16 LAB — METHYLMALONATE SERPL-SCNC: 0.35 UMOL/L (ref 0–0.4)

## 2023-05-17 ENCOUNTER — ANCILLARY PROCEDURE (OUTPATIENT)
Dept: MRI IMAGING | Facility: CLINIC | Age: 78
End: 2023-05-17
Attending: PSYCHIATRY & NEUROLOGY
Payer: MEDICARE

## 2023-05-17 ENCOUNTER — OFFICE VISIT (OUTPATIENT)
Dept: FAMILY MEDICINE | Facility: CLINIC | Age: 78
End: 2023-05-17
Payer: MEDICARE

## 2023-05-17 VITALS
WEIGHT: 283.4 LBS | HEIGHT: 75 IN | SYSTOLIC BLOOD PRESSURE: 118 MMHG | RESPIRATION RATE: 19 BRPM | TEMPERATURE: 97.8 F | OXYGEN SATURATION: 97 % | BODY MASS INDEX: 35.24 KG/M2 | HEART RATE: 69 BPM | DIASTOLIC BLOOD PRESSURE: 83 MMHG

## 2023-05-17 DIAGNOSIS — N18.32 STAGE 3B CHRONIC KIDNEY DISEASE (H): ICD-10-CM

## 2023-05-17 DIAGNOSIS — F34.1 DYSTHYMIA: ICD-10-CM

## 2023-05-17 DIAGNOSIS — I71.20 THORACIC AORTIC ANEURYSM WITHOUT RUPTURE, UNSPECIFIED PART (H): ICD-10-CM

## 2023-05-17 DIAGNOSIS — Z00.00 ROUTINE GENERAL MEDICAL EXAMINATION AT A HEALTH CARE FACILITY: Primary | ICD-10-CM

## 2023-05-17 DIAGNOSIS — I48.19 PERSISTENT ATRIAL FIBRILLATION (H): ICD-10-CM

## 2023-05-17 DIAGNOSIS — E78.5 HYPERLIPIDEMIA LDL GOAL <100: ICD-10-CM

## 2023-05-17 DIAGNOSIS — R41.3 MEMORY LOSS: ICD-10-CM

## 2023-05-17 DIAGNOSIS — R73.03 PREDIABETES: ICD-10-CM

## 2023-05-17 DIAGNOSIS — F43.9 SITUATIONAL STRESS: ICD-10-CM

## 2023-05-17 DIAGNOSIS — I42.9 PRIMARY CARDIOMYOPATHY (H): ICD-10-CM

## 2023-05-17 DIAGNOSIS — C64.1 RENAL CELL CARCINOMA, RIGHT (H): ICD-10-CM

## 2023-05-17 DIAGNOSIS — K40.90 NON-RECURRENT UNILATERAL INGUINAL HERNIA WITHOUT OBSTRUCTION OR GANGRENE: ICD-10-CM

## 2023-05-17 DIAGNOSIS — E78.2 MIXED HYPERLIPIDEMIA: ICD-10-CM

## 2023-05-17 DIAGNOSIS — I25.10 CORONARY ARTERY DISEASE INVOLVING NATIVE CORONARY ARTERY OF NATIVE HEART WITHOUT ANGINA PECTORIS: ICD-10-CM

## 2023-05-17 LAB
ERYTHROCYTE [DISTWIDTH] IN BLOOD BY AUTOMATED COUNT: 12.7 % (ref 10–15)
HBA1C MFR BLD: 5.5 % (ref 0–5.6)
HCT VFR BLD AUTO: 44.7 % (ref 40–53)
HGB BLD-MCNC: 14.9 G/DL (ref 13.3–17.7)
MCH RBC QN AUTO: 32 PG (ref 26.5–33)
MCHC RBC AUTO-ENTMCNC: 33.3 G/DL (ref 31.5–36.5)
MCV RBC AUTO: 96 FL (ref 78–100)
PLATELET # BLD AUTO: 175 10E3/UL (ref 150–450)
RBC # BLD AUTO: 4.66 10E6/UL (ref 4.4–5.9)
WBC # BLD AUTO: 6.2 10E3/UL (ref 4–11)

## 2023-05-17 PROCEDURE — 99214 OFFICE O/P EST MOD 30 MIN: CPT | Mod: 25 | Performed by: INTERNAL MEDICINE

## 2023-05-17 PROCEDURE — 80053 COMPREHEN METABOLIC PANEL: CPT | Performed by: INTERNAL MEDICINE

## 2023-05-17 PROCEDURE — G0439 PPPS, SUBSEQ VISIT: HCPCS | Performed by: INTERNAL MEDICINE

## 2023-05-17 PROCEDURE — 80061 LIPID PANEL: CPT | Performed by: INTERNAL MEDICINE

## 2023-05-17 PROCEDURE — 85027 COMPLETE CBC AUTOMATED: CPT | Performed by: INTERNAL MEDICINE

## 2023-05-17 PROCEDURE — 83036 HEMOGLOBIN GLYCOSYLATED A1C: CPT | Performed by: INTERNAL MEDICINE

## 2023-05-17 PROCEDURE — 36415 COLL VENOUS BLD VENIPUNCTURE: CPT | Performed by: INTERNAL MEDICINE

## 2023-05-17 PROCEDURE — 82550 ASSAY OF CK (CPK): CPT | Performed by: INTERNAL MEDICINE

## 2023-05-17 PROCEDURE — G1010 CDSM STANSON: HCPCS

## 2023-05-17 RX ORDER — PRAVASTATIN SODIUM 20 MG
20 TABLET ORAL DAILY
Qty: 90 TABLET | Refills: 3 | Status: SHIPPED | OUTPATIENT
Start: 2023-05-17 | End: 2023-08-21

## 2023-05-17 ASSESSMENT — ACTIVITIES OF DAILY LIVING (ADL): CURRENT_FUNCTION: NO ASSISTANCE NEEDED

## 2023-05-17 ASSESSMENT — PAIN SCALES - GENERAL: PAINLEVEL: NO PAIN (0)

## 2023-05-17 NOTE — PROGRESS NOTES
"SUBJECTIVE:   Edgar is a 77 year old who presents for Preventive Visit.       View : No data to display.            Patient has been advised of split billing requirements and indicates understanding: Yes  Are you in the first 12 months of your Medicare coverage?  No    Healthy Habits:    In general, how would you rate your overall health?  Fair    Frequency of exercise:  4-5 days/week    Duration of exercise:  15-30 minutes    Do you usually eat at least 4 servings of fruit and vegetables a day, include whole grains    & fiber and avoid regularly eating high fat or \"junk\" foods?  No    Taking medications regularly:  Yes    Barriers to taking medications:  None    Medication side effects:  Other    Ability to successfully perform activities of daily living:  No assistance needed    Home Safety:  No safety concerns identified    Hearing Impairment:  No hearing concerns    In the past 6 months, have you been bothered by leaking of urine?  No    In general, how would you rate your overall mental or emotional health?  Fair      PHQ-2 Total Score:    Additional concerns today:  Yes         Have you ever done Advance Care Planning? (For example, a Health Directive, POLST, or a discussion with a medical provider or your loved ones about your wishes): No, advance care planning information given to patient to review.  Patient plans to discuss their wishes with loved ones or provider.        Fall risk  Fallen 2 or more times in the past year?: No  Any fall with injury in the past year?: No  click delete button to remove this line now  Cognitive Screening   1) Repeat 3 items (Leader, Season, Table)    2) Clock draw: NORMAL  3) 3 item recall: Recalls 2 objects   Results: NORMAL clock, 1-2 items recalled: COGNITIVE IMPAIRMENT LESS LIKELY    Mini-CogTM Copyright VINOD Echevarria. Licensed by the author for use in [facility]; reprinted with permission (jennifer@.[facility]). All rights reserved.      Do you have sleep apnea, excessive " snoring or daytime drowsiness?: yes    Reviewed and updated as needed this visit by clinical staff                  Reviewed and updated as needed this visit by Provider                 Social History     Tobacco Use     Smoking status: Never     Smokeless tobacco: Never   Vaping Use     Vaping status: Never Used   Substance Use Topics     Alcohol use: Yes     Alcohol/week: 0.0 standard drinks of alcohol     Comment: 3 drinks week              View : No data to display.                   View : No data to display.              Do you have a current opioid prescription? No  Do you use any other controlled substances or medications that are not prescribed by a provider? None      Current providers sharing in care for this patient include:   Patient Care Team:  Nico Retana MD as PCP - General (Internal Medicine)  Nico Retana MD as Assigned PCP  Kayla Quintero MD as MD (Nephrology)  Kayla Quintero MD as Assigned Nephrology Provider  Nico Hernández (Medical Oncology)  Devin Quarles MD as Assigned Heart and Vascular Provider  Fermin Milner MD as MD (Neurology)  Abraham Olivera MD as Assigned Surgical Provider  Fermin Milner MD as Assigned Neuroscience Provider    The following health maintenance items are reviewed in Epic and correct as of today:  Health Maintenance   Topic Date Due     ADVANCE CARE PLANNING  Never done     DEPRESSION ACTION PLAN  Never done     ZOSTER IMMUNIZATION (1 of 2) Never done     HEPATITIS B IMMUNIZATION (1 of 3 - Risk 3-dose series) Never done     MEDICARE ANNUAL WELLNESS VISIT  07/29/2020     LIPID  10/11/2022     COVID-19 Vaccine (6 - Pfizer series) 02/28/2023     BMP  03/21/2023     MICROALBUMIN  09/21/2023     HEMOGLOBIN  09/21/2023     PHQ-9  10/17/2023     ANNUAL REVIEW OF HM ORDERS  11/28/2023     FALL RISK ASSESSMENT  11/28/2023     DTAP/TDAP/TD IMMUNIZATION (3 - Td or Tdap) 11/28/2032      HEPATITIS C SCREENING  Completed     INFLUENZA VACCINE  Completed     Pneumococcal Vaccine: 65+ Years  Completed     URINALYSIS  Completed     IPV IMMUNIZATION  Aged Out     MENINGITIS IMMUNIZATION  Aged Out     COLORECTAL CANCER SCREENING  Discontinued     Lab work is in process  Labs reviewed in EPIC       Coronary artery disease involving native coronary artery of native heart without angina pectoris  Thoracic aortic aneurysm without rupture, unspecified part (H)   Due to follow up in cardiology.  Continues on statin, beta-blocker.  Aspirin not prescribed due to taking apixaban  Renal cell carcinoma, right (H)  Stage 3b chronic kidney disease (H)   Has been following in nephrology at [facility].  Due for labs today   Non-recurrent unilateral inguinal hernia without obstruction or gangrene   Ti NEWTON Dorimel has a plan to follow up in general surgery, and pending upcoming surveillance scan for his renal cell carcinoma he may proceed with a surgical correction of his hernia  Memory Loss   Has been following closely with neurology for this issue.  Has plan for MRI today as ordered by neurology.  Memory ssems to be stable as compared to last year.  Wife is concerned that depression/anxiety may be clouding.   Was unable to tolerate selective serotonin reuptake inhibitor medications due to dizziness  Atrial fibrillation   Notes no symptoms usually, but franchesca have some times in which he has appreciated some difficulty with fatigue and shortness of breath which may be due to atrial fibrillation  Stamina   His most pressing concern is that his staimina is low.  He is unable to build this back despite efforts to try to improve walking distance, he is only able to walk 1 to 2 miles, but this is quite taxing.  He has no chest pain with exertion    Review of Systems  Constitutional, HEENT, cardiovascular - as above , pulmonary, GI, , musculoskeletal, neuro, skin, endocrine and psych systems are negative, except as  "otherwise noted.    OBJECTIVE:   /83 (BP Location: Right arm, Patient Position: Sitting, Cuff Size: Adult Large)   Pulse 69   Temp 97.8  F (36.6  C) (Tympanic)   Resp 19   Ht 1.905 m (6' 3\")   Wt 128.5 kg (283 lb 6.4 oz)   SpO2 97%   BMI 35.42 kg/m   Estimated body mass index is 35.42 kg/m  as calculated from the following:    Height as of this encounter: 1.905 m (6' 3\").    Weight as of this encounter: 128.5 kg (283 lb 6.4 oz).  Physical Exam  GENERAL: healthy, alert and no distress  EYES: Eyes grossly normal to inspection,    HENT:   nose and mouth without ulcers or lesions  NECK: no adenopathy, no asymmetry, masses   RESP: lungs clear to auscultation - no rales, rhonchi or wheezes  CV: regular rate and rhythm, normal S1 S2, no S3 or S4, no murmur, no edema  ABDOMEN: obese, soft, nontender, no hepatosplenomegaly,   MS: no gross musculoskeletal defects noted, no edema  SKIN: no suspicious lesions or rashes  NEURO: Normal strength and tone, mentation intact and speech normal  PSYCH: mentation appears normal, affect normal/bright    Diagnostic Test Results:  Labs reviewed in Epic    ASSESSMENT / PLAN:     Patient Instructions   Routine health examination  Comment; labs reviewed, will update blood counts and metabolic panel today.  Vaccinations offered and declined    (I25.10) Coronary artery disease involving native coronary artery of native heart without angina pectoris  (primary encounter diagnosis)  Comment: We will hold rosuvastatin given myalgia and monitor for improvement with intention to start pravastatin in about 2-3 weeks and reheck fasting lipid panel in 3 months  Plan: Lipid panel reflex to direct LDL Non-fasting            (I71.20) Thoracic aortic aneurysm without rupture, unspecified part (H)  Comment: echocardiogram reviewed - follow up in cardiology   Plan:     (C64.1) Renal cell carcinoma, right (H)  Comment: Follow-up closely in oncology- [facility]  Plan:     (N18.32) Stage 3b chronic " "kidney disease (H)  Comment: check hemoglobin today and follow up in nephrology to review and consider erythropoetin   Plan: CBC with platelets            (K40.90) Non-recurrent unilateral inguinal hernia without obstruction or gangrene  Comment: Plan for surgery  Plan:     (F43.9) Situational stress  Comment: Noted that duloxetine was not effective and we will discontinue this.    Plan:     (F34.1) Dysthymia  Comment: Referral to mental health placed   Plan: Adult Mental Health  Referral            (E78.2) Mixed hyperlipidemia  Comment: as above   Plan: CK total, Hemoglobin A1c, Comprehensive         metabolic panel (BMP + Alb, Alk Phos, ALT, AST,        Total. Bili, TP)            (R73.03) Prediabetes  Comment: check hemoglobin A1c today  Plan: Hemoglobin A1c            (E78.5) Hyperlipidemia LDL goal <100  Comment: as above   Plan: pravastatin (PRAVACHOL) 20 MG tablet            (I48.19) Persistent atrial fibrillation (H)  Comment: follow up in cardiology and continue metoprolol for rate control   Plan:     (I42.9) Primary cardiomyopathy (H)  Comment: as above - beta blocker continued   Plan:      Shingrix recommended as well as COVID follow up booster       Patient has been advised of split billing requirements and indicates understanding: Yes      COUNSELING:  Reviewed preventive health counseling, as reflected in patient instructions      BMI:   Estimated body mass index is 35.62 kg/m  as calculated from the following:    Height as of 5/2/23: 1.905 m (6' 3\").    Weight as of 5/2/23: 129.3 kg (285 lb).         He reports that he has never smoked. He has never used smokeless tobacco.      Appropriate preventive services were discussed with this patient, including applicable screening as appropriate for cardiovascular disease, diabetes, osteopenia/osteoporosis, and glaucoma.  As appropriate for age/gender, discussed screening for colorectal cancer, prostate cancer, breast cancer, and cervical cancer. " Checklist reviewing preventive services available has been given to the patient.    Reviewed patients plan of care and provided an AVS. The Basic Care Plan (routine screening as documented in Health Maintenance) for Ti meets the Care Plan requirement. This Care Plan has been established and reviewed with the Patient.      Nico Retana MD, MD  [facility]    Identified Health Risks:    I have reviewed Opioid Use Disorder and Substance Use Disorder risk factors and made any needed referrals.

## 2023-05-17 NOTE — PATIENT INSTRUCTIONS
Routine health examination  Comment; labs reviewed, will update blood counts and metabolic panel today.  Vaccinations offered and declined.    (I25.10) Coronary artery disease involving native coronary artery of native heart without angina pectoris  (primary encounter diagnosis)  Comment: We will hold rosuvastatin given myalgia and monitor for improvement with intention to start pravastatin in about 2-3 weeks and reheck fasting lipid panel in 3 months  Plan: Lipid panel reflex to direct LDL Non-fasting            (I71.20) Thoracic aortic aneurysm without rupture, unspecified part (H)  Comment: echocardiogram reviewed - follow up in cardiology   Plan:     (C64.1) Renal cell carcinoma, right (H)  Comment: Follow-up closely in oncology- [facility]  Plan:     (N18.32) Stage 3b chronic kidney disease (H)  Comment: check hemoglobin today and follow up in nephrology to review and consider erythropoetin   Plan: CBC with platelets            (K40.90) Non-recurrent unilateral inguinal hernia without obstruction or gangrene  Comment: Plan for surgery  Plan:     (F43.9) Situational stress  Comment: Noted that duloxetine was not effective and we will discontinue this.    Plan:     (F34.1) Dysthymia  Comment: Referral to mental health placed   Plan: Adult Mental Health  Referral            (E78.2) Mixed hyperlipidemia  Comment: as above   Plan: CK total, Hemoglobin A1c, Comprehensive         metabolic panel (BMP + Alb, Alk Phos, ALT, AST,        Total. Bili, TP)            (R73.03) Prediabetes  Comment: check hemoglobin A1c today  Plan: Hemoglobin A1c            (E78.5) Hyperlipidemia LDL goal <100  Comment: as above   Plan: pravastatin (PRAVACHOL) 20 MG tablet            (I48.19) Persistent atrial fibrillation (H)  Comment: follow up in cardiology and continue metoprolol for rate control   Plan:     (I42.9) Primary cardiomyopathy (H)  Comment: as above - beta blocker continued   Plan:      Shingrix recommended as well  as COVID follow up booster

## 2023-05-18 ENCOUNTER — TELEPHONE (OUTPATIENT)
Dept: MULTI SPECIALTY CLINIC | Facility: CLINIC | Age: 78
End: 2023-05-18
Payer: MEDICARE

## 2023-05-18 LAB
ALBUMIN SERPL BCG-MCNC: 4.1 G/DL (ref 3.5–5.2)
ALP SERPL-CCNC: 62 U/L (ref 40–129)
ALT SERPL W P-5'-P-CCNC: 20 U/L (ref 10–50)
ANION GAP SERPL CALCULATED.3IONS-SCNC: 10 MMOL/L (ref 7–15)
AST SERPL W P-5'-P-CCNC: 28 U/L (ref 10–50)
BILIRUB SERPL-MCNC: 1.1 MG/DL
BUN SERPL-MCNC: 30.2 MG/DL (ref 8–23)
CALCIUM SERPL-MCNC: 9.9 MG/DL (ref 8.8–10.2)
CHLORIDE SERPL-SCNC: 106 MMOL/L (ref 98–107)
CHOLEST SERPL-MCNC: 112 MG/DL
CK SERPL-CCNC: 59 U/L (ref 39–308)
CREAT SERPL-MCNC: 1.62 MG/DL (ref 0.67–1.17)
DEPRECATED HCO3 PLAS-SCNC: 24 MMOL/L (ref 22–29)
GFR SERPL CREATININE-BSD FRML MDRD: 44 ML/MIN/1.73M2
GLUCOSE SERPL-MCNC: 110 MG/DL (ref 70–99)
HDLC SERPL-MCNC: 46 MG/DL
LDLC SERPL CALC-MCNC: 53 MG/DL
NONHDLC SERPL-MCNC: 66 MG/DL
POTASSIUM SERPL-SCNC: 4.5 MMOL/L (ref 3.4–5.3)
PROT SERPL-MCNC: 7 G/DL (ref 6.4–8.3)
SODIUM SERPL-SCNC: 140 MMOL/L (ref 136–145)
TRIGL SERPL-MCNC: 65 MG/DL

## 2023-05-23 NOTE — RESULT ENCOUNTER NOTE
Abrahan Luke,    I had the opportunity to review your recent labs and a summary of your labs reads as follows:    Your complete blood counts show no sign of anemia, normal white blood cell count and platelets.  Your comprehensive metabolic panel showed stable renal function, normal liver function,   And your A1c shows excellent average blood glucose  Your fasting lipid panel show  - normal HDL (good) cholesterol -as your goal is greater than 40  - low LDL (bad) cholesterol as your goal is less than 100  - normal triglyceride levels  Your CK level returned low indicating no evidence of muscle breakdown secondary to statin    Congratulations on your excellent results      Sincerely,  Nico Retana MD

## 2023-05-25 NOTE — TELEPHONE ENCOUNTER
Pt called back to r/s his apt on 9/27 w/ provider - writer not able to schedule until 10/11 and pt states around the beginning of October is [place] and [place] is wanting an appointment before heading [place] - please call pt to further discuss, thanks!

## 2023-06-01 ENCOUNTER — TELEPHONE (OUTPATIENT)
Dept: NEUROPSYCHOLOGY | Facility: CLINIC | Age: 78
End: 2023-06-01
Payer: MEDICARE

## 2023-06-01 NOTE — TELEPHONE ENCOUNTER
Left Voicemail (1st Attempt) and Sent Rafat (1st Attempt) for the patient to call back and schedule the following:    Appointment type: neuropsychology evaluation referral  Provider: none  Return date: next available   Specialty phone number: [phone]  Additional appointment(s) needed: none  Additonal Notes: LVM, sent Gasper Sam on 6/1/2023 at 2:39 PM

## 2023-06-09 ENCOUNTER — THERAPY VISIT (OUTPATIENT)
Dept: OCCUPATIONAL THERAPY | Facility: CLINIC | Age: 78
End: 2023-06-09
Attending: PSYCHIATRY & NEUROLOGY
Payer: MEDICARE

## 2023-06-09 DIAGNOSIS — R41.3 MEMORY LOSS: ICD-10-CM

## 2023-06-09 PROCEDURE — 96125 COGNITIVE TEST BY HC PRO: CPT | Mod: GO

## 2023-06-09 NOTE — PROGRESS NOTES
"Cognitive Performance Test    SUMMARY OF TEST:    The Cognitive Performance Test (CPT) is a standardized performance-based assessment to measure working memory/executive function processing capacities that underlie functional performance. Subtasks include common basic and instrumental activities of daily living (ADL/IADL) which are rated based on the manner in which patients respond to task demands of varying complexity. The total CPT score describes a level of functioning that indicates how information is processed, implications for functional activities, potential safety risks and a recommended level of supervision or assist based on cognitive function. The highest total score on this test is in the range of 5.6 to 5.8.    DATE OF TESTIN2023    Ordering Provider: Fermin Milner MD    Order date: 23    Order Diagnosis: Memory loss      Occupational Profile (including diagnosis and medical history): Patient is a 77 year old male who was referred to outpatient occupational therapy for cognitive performance testing. Patient presented to clinic accompanied by his wife, who was present and contributed to history.  Per recent neurology note with Fermin Grady: \"Mr. Ti Nickerson is 77 year old right handed male patient with past medical history of chronic atrial fibrillation (on Apixaban), cardiomyopathy, hyperlipidemia, sleep apnea, hypertension, obesity, and right renal cell carcinoma, who was seen today for memory concerns/word finding difficulty.  Accompanied by his wife. Per patient's report, he struggles with word finding difficulties for approximately a year.  Overall, he feels that his memory is good, though frequently misplaces his belongings and has occasionally questions regarding date.  He takes his medications on time.  Over the winter he struggled with paying his bills because almost all of them are online, and it was difficult for him to manage " "payments that way.  He continues to drive without getting lost, recent car accidents or tickets.  He feels depressed and has prescription for Cymbalta that he did not start yet.  He denies any focal neurological symptoms, aside from transient binocular double vision following cataract surgery approximately 2 years ago.  No history of strokes, seizures, head injuries, or CNS infections.  He reports positive family history of strokes in both parents.According to patient's wife, at times the patient has great memory, but tends to forget little things, like finding right key fob, putting food away, closing cupboards, using the phone, or turning windshield wipers on with rain, etc.  He also struggles with keeping his focus and multitasking.  He has especial difficulties with finding right words.  He was Lexapro for depression, but it led to low blood pressure.  He is planning to start Cymbalta soon.  She has no additional concerns. We discussed that clinical presentation might be consistent with vitamin deficiencies, thyroid dysfunction, structural or vascular brain lesions, cognitive changes related to untreated mental health conditions, and neurodegenerative conditions.  For further diagnostic clarification, I ordered screening labs, CPT, brain MRI, and neuropsychology evaluation.\"       Previous cognitive screens completed in OT or by Physician and score: MoCA 20/30    Functional History Interview:    Informants: Patient and his wife, Valery Hernández s perception of memory/ thinking or functional difficulties: He states he has been struggling with word finding over the past new years. He and his wife also report difficulties with focus and attention, which impacts his memory. He describes memory including misplacing belongings, difficulties recalling what iron to use for a specific stroke in golf and this increases his anxiety. They state not been on mental health medications for 6 months due to side effects " including low blood pressure and dizziness which may be contributing to decreased cognitive performance.        Living situation: Independent living [facility] home      Home maintenance activities: Patient and wife share household tasks (cleaning, laundry) without reported concerns     Meal preparation and grocery shopping: Patient and wife share cooking tasks and he is described as a good cook. No noteable issues with cooking. Patient is able to go to grocery store independently and follow list provided by wife      Finances and paying bills: Patient states using computer has become more difficult and occasional difficulties with managing finances online. He utilizes online bill pay and pays 2 bills by mail      Medication management: Patient reports he dispenses his own pills and occasionally needs a cue to take evening medications      Driving and transportation: Patient continues to drive, denies getting lost or errors with driving     Leisure and routine activities: Patient enjoys golfing, but reports this is a struggle due to physical changes and focusing (recalling what club to retrieve for each stroke). He enjoys going for a walk, meeting up with friends, and family. They enjoy going to [place] over the winter and going to [place] in August     ADL/Mobility/Physical Functioning: Patient is completely independent in self-care routine and ambulates without use of an assistive device. Patient enjoys walking daily     Fall Risk Screen:   Has the patient fallen 2 or more times in the last year? Yes  - states decreased depth perception resulting in tripping        Has the patient fallen and had an injury in the past year? Yes - patient reports dislocated finger in [place]        Is the patient a fall risk? Yes, department fall risk interventions implemented       RESULTS OF TESTING:                                                                                         CPT Subtest Results    MEDBOX: 6/6  SHOP/GLOVES: 6/6 PHONE: 6/6   WASH:  5/5 TRAVEL: 5/6 TOAST: 5/5   DRESS: NT/5   TOTAL CPT SCORE:  33/34     Average CPT Score  5.5/5.6    INTERPRETATION OF TEST RESULTS:    Based on the Cognitive Performance Test, this patient scored at CPT Level 5.6.  See CPT Levels reference below.    Summary of functional cognitive status:   Normal functioning (absence of cognitive-functional disability - may have cognitive concerns) Independent in managing personal affairs, monitors and directs own behavior. Uses complex information to carry out daily activities with safety and accuracy.  Proficient with instrumental activities of daily living (IADL) and learning new activity. No changes to supervision or task set up, no functional safety concerns identified in the context of I/ADL tasks    Factors affecting performance:  No additional problems noted    Recommendations:    No changes to supervision or task set up, no functional safety concerns identified in the context of I/ADL tasks. Client instruction in memory compensation strategies including: pay attention, minimizing distractions, make mental connections, putting information in own words,restating information, highlight/rehearse details, keeping lists, keeping journals/planners, using external cues/reminders, 100% comprehension     TIME ADMINISTERING TEST: 40    TIME FOR INTERPRETATION AND PREPARATION OF REPORT: 50    TOTAL TIME: 90      CPT Levels Reference:    Patient's Average CPT Score:  5.6                                                                                                                                                  Individual scores range along a continuum as outlined below.  In addition to cognitive status, other factors may affect safety in a home environment.  Please refer to specific recommendations for this patient.    __5.6_5.6-5.8  Normal functioning (absence of cognitive-functional disability).  Independent in managing personal affairs,  "monitors and directs own behavior.  Uses complex information to carry out daily activities with safety and accuracy.    Proficient with instrumental activities of daily living (IADL) and learning new activity.  Problems are anticipated, errors are avoided, and consequences of actions are considered.      ___5.0   Mild cognitive-functional disability; deficits in working memory and executive thought processes. Difficulty using complex information. Problems may be observed with recent memory, judgment, reasoning and planning ahead. May be impulsive or have difficulty anticipating consequences.  Safety:  May require assistance to plan ahead; or to manage complex medication schedules, appointments or finances.  Hazardous activities may need to be monitored or limited.  ADL:  Mild functional decline.  Able to complete basic self-care and routine household tasks.  May have difficulty with complex daily tasks such as reading, writing, meal preparation, shopping or driving.   Learns through hands on teaching. Self-centered behavior or difficulty considering the needs of others may be seen related to trouble seeing the  whole picture\". Can appear disorganized or uninhibited.    ___4.5  Mild to moderate cognitive-functional disability. Significant deficits in working memory and executive thought processes. Judgment, reasoning and planning show obvious impairment.  Distractible with inability to shift attention/actions given competing stimuli.  Difficulty with problem solving and managing details. Complex daily tasks performed with inconsistency, difficulty, or error.     Safety:  Medications should be monitored, stove use may require supervision, and driving ability may be affected.  Impaired safety awareness with inability to anticipate potential problems.  May not recognize or respond to emergent situations. Requires frequent check-in support.   ADL:  Mild difficulty with simple everyday self-care tasks. Benefits from " structured, routine activity.  Will likely need reminders to complete tasks outside of the routine. Requires assistance with planning and IADL tasks like shopping and finances. Learns concrete tasks through repetition, but performance may not generalize. Tends to be impulsive with poor insight. Self centered behavior or inability to consider the needs of others is common.    ___4.0  Moderate cognitive-functional disability; abstract to concrete thought processes. Working memory and executive function impairments are obvious. Difficulty with planning and problem solving.  Behavior is goal-directed, but unable to follow multi-step directions, is easily distracted, and may not recognize mistakes.  Inability to anticipate hazards or understand precautions.  Safety:  Recommend 24-hour supervision for safety. Supervision needed for medication management and for hazardous activities. May not be able to follow a restricted diet. Can get lost in unfamiliar surroundings. Generally, persons functioning at level 4 should not be driving.   ADL:  Some decline in quality or frequency of ADL.  [place] enhanced by use of a routine, simple concrete directions, and caregiver set-up of needed items. Complex tasks such as money or home management typically requires assistance.  Relies heavily on vision to guide behavior; will ignore objects/hazards not in plain sight and can be distracted by irrelevant objects. Often has poor insight.  Able to carry out social conversation and may verbally  cover  for deficits leading caregivers to believe they are capable of functioning independently.       ___3.5  Moderate cognitive-functional disability; increased cues needed for task completion. Aware of concrete task steps but needs prompting or cues to initiate and complete simple tasks. Attention span is limited, simple directions may need to be repeated, and re-focus to a topic or task may be required.  Safety:  24-hour supervision required  for safety and for assistance with daily tasks. Assistance required with medications, and access to medication should be limited. Meals, nutrition and dietary restrictions need to be monitored.  All hazardous activities should be restricted or supervised. Should not drive. Prone to wandering and can become lost.  ADL:  Moderate functional decline. Familiar tasks usually requires set-up of supplies and directions to complete steps. May need objects handed to them for task initiation. Function best with a set schedule in familiar surroundings with familiar people. All complex tasks must be done by others. Vocabulary is diminished and speech often unfocused.

## 2023-06-14 ENCOUNTER — TELEPHONE (OUTPATIENT)
Dept: CARDIOLOGY | Facility: CLINIC | Age: 78
End: 2023-06-14
Payer: MEDICARE

## 2023-06-14 DIAGNOSIS — I42.9 PRIMARY CARDIOMYOPATHY (H): ICD-10-CM

## 2023-06-14 DIAGNOSIS — E78.5 HYPERLIPIDEMIA LDL GOAL <100: ICD-10-CM

## 2023-06-14 DIAGNOSIS — I48.20 CHRONIC ATRIAL FIBRILLATION (H): Primary | ICD-10-CM

## 2023-06-14 NOTE — TELEPHONE ENCOUNTER
[facility] Call Center    Phone Message    May a detailed message be left on voicemail: yes     Reason for Call: Other: Pt needs order for echo and labs, please place order and reach out to pt to get scheduled around f/u.     Action Taken: Other: Cardiology    Travel Screening: Not Applicable     Thank you!  [facility]

## 2023-06-19 NOTE — TELEPHONE ENCOUNTER
Orders entered. RN will route to scheduling to call patient to arrange.         Yes.  Echo please, BMP and lipids  Dr. Quarles

## 2023-07-17 ENCOUNTER — LAB (OUTPATIENT)
Dept: LAB | Facility: CLINIC | Age: 78
End: 2023-07-17
Payer: MEDICARE

## 2023-07-17 DIAGNOSIS — N18.32 STAGE 3B CHRONIC KIDNEY DISEASE (H): ICD-10-CM

## 2023-07-17 LAB
ANION GAP SERPL CALCULATED.3IONS-SCNC: 10 MMOL/L (ref 7–15)
BUN SERPL-MCNC: 20.1 MG/DL (ref 8–23)
CALCIUM SERPL-MCNC: 8.5 MG/DL (ref 8.8–10.2)
CHLORIDE SERPL-SCNC: 108 MMOL/L (ref 98–107)
CREAT SERPL-MCNC: 1.48 MG/DL (ref 0.67–1.17)
DEPRECATED HCO3 PLAS-SCNC: 23 MMOL/L (ref 22–29)
GFR SERPL CREATININE-BSD FRML MDRD: 49 ML/MIN/1.73M2
GLUCOSE SERPL-MCNC: 105 MG/DL (ref 70–99)
POTASSIUM SERPL-SCNC: 4.4 MMOL/L (ref 3.4–5.3)
SODIUM SERPL-SCNC: 141 MMOL/L (ref 136–145)

## 2023-07-17 PROCEDURE — 80048 BASIC METABOLIC PNL TOTAL CA: CPT

## 2023-07-17 PROCEDURE — 36415 COLL VENOUS BLD VENIPUNCTURE: CPT

## 2023-07-17 NOTE — RESULT ENCOUNTER NOTE
Abrahan Luke,    I have had the opportunity to review your recent results and an interpretation is as follows:  Your follow up basic metabolic panel shows again stable renal function and electrolytes.  Continue to stay hydrated     Sincerely,  Nico Retana MD

## 2023-08-21 ENCOUNTER — OFFICE VISIT (OUTPATIENT)
Dept: FAMILY MEDICINE | Facility: CLINIC | Age: 78
End: 2023-08-21
Payer: MEDICARE

## 2023-08-21 ENCOUNTER — TELEPHONE (OUTPATIENT)
Dept: NEUROPSYCHOLOGY | Facility: CLINIC | Age: 78
End: 2023-08-21

## 2023-08-21 VITALS
HEIGHT: 75 IN | DIASTOLIC BLOOD PRESSURE: 73 MMHG | HEART RATE: 70 BPM | SYSTOLIC BLOOD PRESSURE: 105 MMHG | TEMPERATURE: 98.7 F | WEIGHT: 282 LBS | BODY MASS INDEX: 35.06 KG/M2 | RESPIRATION RATE: 18 BRPM | OXYGEN SATURATION: 97 %

## 2023-08-21 DIAGNOSIS — E78.5 HYPERLIPIDEMIA LDL GOAL <100: ICD-10-CM

## 2023-08-21 DIAGNOSIS — N18.32 STAGE 3B CHRONIC KIDNEY DISEASE (H): ICD-10-CM

## 2023-08-21 DIAGNOSIS — I25.10 CORONARY ARTERY DISEASE INVOLVING NATIVE CORONARY ARTERY OF NATIVE HEART WITHOUT ANGINA PECTORIS: Primary | ICD-10-CM

## 2023-08-21 DIAGNOSIS — F43.9 SITUATIONAL STRESS: ICD-10-CM

## 2023-08-21 DIAGNOSIS — I42.9 IDIOPATHIC CARDIOMYOPATHY (H): ICD-10-CM

## 2023-08-21 DIAGNOSIS — K43.9 VENTRAL HERNIA WITHOUT OBSTRUCTION OR GANGRENE: ICD-10-CM

## 2023-08-21 DIAGNOSIS — C64.1 RENAL CELL CARCINOMA, RIGHT (H): ICD-10-CM

## 2023-08-21 PROCEDURE — 99214 OFFICE O/P EST MOD 30 MIN: CPT | Performed by: INTERNAL MEDICINE

## 2023-08-21 RX ORDER — PRAVASTATIN SODIUM 20 MG
20 TABLET ORAL DAILY
Qty: 90 TABLET | Refills: 3 | Status: SHIPPED | OUTPATIENT
Start: 2023-08-21 | End: 2024-08-07

## 2023-08-21 ASSESSMENT — PAIN SCALES - GENERAL: PAINLEVEL: NO PAIN (0)

## 2023-08-21 NOTE — TELEPHONE ENCOUNTER
[facility] Call Center    Phone Message    May a detailed message be left on voicemail: yes     Reason for Call: Other: Patient is calling to see if there are any openings to get in sooner for his neuropsych testing. He states that he is suppose to have this test done before he meets with Dr. Milner again. Please call patient back.      Action Taken: Message routed to:  [facility]    Travel Screening: Not Applicable

## 2023-08-21 NOTE — TELEPHONE ENCOUNTER
Left Voicemail (1st Attempt) for the patient to call back and schedule the following:    Appointment type: Neuropsych Eval  Provider: Ruthie Hunter  Return date: [facility]  Specialty phone number: [phone]  Additonal Notes: OK to schedule with Dr. Hunter in [facility] slot if in October    Received Teams message Edgar had called to see if he could get in sooner for this appt. We do not have anything sooner with Dr. Hendricks. If pt is comfortable scheduling with Dr. Hunter, we can get him scheduled this fall.    LVM with this information  Jaymie Butler on 8/21/2023 at 12:01 PM

## 2023-08-21 NOTE — TELEPHONE ENCOUNTER
Currently there are no openings before patient's visit with Dr. Milner.     Spoke with patient, relayed above. Patient verbalized understanding. Rescheduled to 11/2/2023 at 8:00am with Dr. Hendricks. Schedule video interview with Dr. Hendricks on 10/31/2023 at 9:00am. Added to wait list for all [facility] providers for sooner openings.    Frederic Batres on 8/21/2023 at 11:41 AM

## 2023-08-21 NOTE — PATIENT INSTRUCTIONS
(K43.9) Ventral hernia without obstruction or gangrene  (primary encounter diagnosis)  Comment: Plan to follow-up with general surgery    [address] [address]   Phone: [phone]        Plan:     (I25.10) Coronary artery disease involving native coronary artery of native heart without angina pectoris  Comment: Recommend resuming pravastatin as this may not cause muscle aches.  Recommend recheck lipid panel and liver function tests in the next 6 to 12 weeks  Plan:     (I42.9) Idiopathic cardiomyopathy (H)  Comment: Continue blood pressure control with metoprolol  Plan:     (C64.1) Renal cell carcinoma, right (H)  Comment: Follow-up with oncology at [facility].  Note that referral to radiation oncology was placed at your last office visit  Plan:     (N18.32) Stage 3b chronic kidney disease (H)  Comment: Labs reviewed showing stable renal function.  Continue to stay hydrated as best you can and follow up in nephrology  Plan:     (F43.9) Situational stress  Comment: I would recommend follow up in cognitive behavioural therapy and plan for follow up with neuropsychometric testing and consider a lower dose of lexapro 5 mg daily  Plan:      Shingrix vaccine is now available.  I would call your insurance to see if a shingles vaccine is covered and get this at your pharmacy

## 2023-08-21 NOTE — PROGRESS NOTES
"Subjective   Edgar is a 77 year old, presenting for the following health issues:  Follow Up      HPI        Ventral hernia without obstruction or gangrene   He has been advised to follow up in general surgery to review options for general surgery.   Having some abdominal discomfort.  Noticing normal bowel movements.  Occasional constipation.  Also, occasional loose stools for which he might take imodium.   Coronary artery disease involving native coronary artery of native heart without angina pectoris  Idiopathic cardiomyopathy (H)   No chest pain currently.  Able to walk a couple of miles.  Notes that he is not taking pravastatin.  He only took pravastatin for a few days before stopping.  Myalgias persist despite not taking statin.    Renal cell carcinoma, right (H)   Ti Almonte hs been followed by oncology and radiation oncology and recommended to monitor and recheck CT abdomen in October.  No abdominal pain  Stage 3b chronic kidney disease (H)    Labs reviewed and stable   He has been drinking plenty of fluids.  Will follow up in nephrology next month    Review of Systems   Constitutional, HEENT, cardiovascular, pulmonary, GI -as above ,  -as above - taking flomax  musculoskeletal, neuro, skin, endocrine and psych systems are negative, except as otherwise noted.      Objective    /73 (BP Location: Right arm, Patient Position: Sitting, Cuff Size: Adult Large)   Pulse 70   Temp 98.7  F (37.1  C) (Tympanic)   Resp 18   Ht 1.905 m (6' 3\")   Wt 127.9 kg (282 lb)   SpO2 97%   BMI 35.25 kg/m    Body mass index is 35.25 kg/m .  Physical Exam   GENERAL: healthy, alert and no distress  EYES: Eyes grossly normal to inspection   NECK: no adenopathy, no asymmetry, masses  RESP: lungs clear to auscultation - no rales, rhonchi or wheezes  CV: regular rate and rhythm, normal S1 S2, no S3 or S4, no murmur, click or rub, trace  peripheral edema  ABDOMEN: soft, nontender, no hepatosplenomegaly, + ventral " surgery  SKIN: no suspicious lesions or rashes  NEURO: Normal strength and tone, mentation intact and speech normal  PSYCH: mentation appears normal, affect normal/bright    Patient Instructions   (K43.9) Ventral hernia without obstruction or gangrene  (primary encounter diagnosis)  Comment: Plan to follow-up with general surgery    [address] [address]   Phone: [phone]        Plan:     (I25.10) Coronary artery disease involving native coronary artery of native heart without angina pectoris  Comment: Recommend resuming pravastatin as this may not cause muscle aches.  Recommend recheck lipid panel and liver function tests in the next 6 to 12 weeks  Plan:     (I42.9) Idiopathic cardiomyopathy (H)  Comment: Continue blood pressure control with metoprolol  Plan:     (C64.1) Renal cell carcinoma, right (H)  Comment: Follow-up with oncology at [facility] [place].  Note that referral to radiation oncology was placed at your last office visit  Plan:     (N18.32) Stage 3b chronic kidney disease (H)  Comment: Labs reviewed showing stable renal function.  Continue to stay hydrated as best you can and follow up in nephrology  Plan:     (F43.9) Situational stress  Comment: I would recommend follow up in cognitive behavioural therapy and plan for follow up with neuropsychometric testing and consider a lower dose of lexapro 5 mg daily  Plan:

## 2023-08-22 ENCOUNTER — TELEPHONE (OUTPATIENT)
Dept: NEUROLOGY | Facility: CLINIC | Age: 78
End: 2023-08-22
Payer: MEDICARE

## 2023-08-22 NOTE — TELEPHONE ENCOUNTER
Patient came into clinic to try to get his appointments rescheduled - specifically wanted to change his 10/10/23 appointment to 10/17/23 and told  to call another patient to request they 'swap' appointments. Patient states his other appointments with other clinics are not matching up. Writer was unable to satisfy patient as he had multiple questions that were advised would need to be discussed with care team. Patient was unhappy and requested writer's name and employee number.   Please call patient back to discuss what his next appointment with Dr. Milner entails per his request. Thank you~

## 2023-08-24 ENCOUNTER — TELEPHONE (OUTPATIENT)
Dept: NEUROLOGY | Facility: CLINIC | Age: 78
End: 2023-08-24

## 2023-08-24 NOTE — TELEPHONE ENCOUNTER
Patient has psych evaluation appt coming on 10/17 and his follow up with  is scheduled on 10/10 and patient would like to have his follow up with  after his psych evaluation appt, but unfortunately nothing is available. Patient requesting to be seen after the 10/17 so he can go over the results with  and asking if there is any way he can get in sooner than what is available

## 2023-09-01 DIAGNOSIS — N18.32 STAGE 3B CHRONIC KIDNEY DISEASE (H): Primary | ICD-10-CM

## 2023-09-05 ENCOUNTER — LAB (OUTPATIENT)
Dept: LAB | Facility: CLINIC | Age: 78
End: 2023-09-05
Payer: MEDICARE

## 2023-09-05 DIAGNOSIS — N18.32 STAGE 3B CHRONIC KIDNEY DISEASE (H): ICD-10-CM

## 2023-09-05 LAB
ALBUMIN SERPL BCG-MCNC: 3.9 G/DL (ref 3.5–5.2)
ALBUMIN UR-MCNC: NEGATIVE MG/DL
ANION GAP SERPL CALCULATED.3IONS-SCNC: 9 MMOL/L (ref 7–15)
APPEARANCE UR: CLEAR
BILIRUB UR QL STRIP: NEGATIVE
BUN SERPL-MCNC: 26.5 MG/DL (ref 8–23)
CALCIUM SERPL-MCNC: 8.9 MG/DL (ref 8.8–10.2)
CHLORIDE SERPL-SCNC: 106 MMOL/L (ref 98–107)
COLOR UR AUTO: YELLOW
CREAT SERPL-MCNC: 1.6 MG/DL (ref 0.67–1.17)
CREAT UR-MCNC: 119 MG/DL
DEPRECATED HCO3 PLAS-SCNC: 24 MMOL/L (ref 22–29)
GFR SERPL CREATININE-BSD FRML MDRD: 44 ML/MIN/1.73M2
GLUCOSE SERPL-MCNC: 100 MG/DL (ref 70–99)
GLUCOSE UR STRIP-MCNC: NEGATIVE MG/DL
HGB BLD-MCNC: 14.3 G/DL (ref 13.3–17.7)
HGB UR QL STRIP: ABNORMAL
KETONES UR STRIP-MCNC: NEGATIVE MG/DL
LEUKOCYTE ESTERASE UR QL STRIP: NEGATIVE
MICROALBUMIN UR-MCNC: <12 MG/L
MICROALBUMIN/CREAT UR: NORMAL MG/G{CREAT}
NITRATE UR QL: NEGATIVE
PH UR STRIP: 5.5 [PH] (ref 5–7)
PHOSPHATE SERPL-MCNC: 3 MG/DL (ref 2.5–4.5)
POTASSIUM SERPL-SCNC: 4.8 MMOL/L (ref 3.4–5.3)
RBC #/AREA URNS AUTO: NORMAL /HPF
SODIUM SERPL-SCNC: 139 MMOL/L (ref 136–145)
SP GR UR STRIP: 1.02 (ref 1–1.03)
UROBILINOGEN UR STRIP-ACNC: 1 E.U./DL
WBC #/AREA URNS AUTO: NORMAL /HPF

## 2023-09-05 PROCEDURE — 82043 UR ALBUMIN QUANTITATIVE: CPT

## 2023-09-05 PROCEDURE — 80069 RENAL FUNCTION PANEL: CPT

## 2023-09-05 PROCEDURE — 85018 HEMOGLOBIN: CPT

## 2023-09-05 PROCEDURE — 36415 COLL VENOUS BLD VENIPUNCTURE: CPT

## 2023-09-05 PROCEDURE — 81001 URINALYSIS AUTO W/SCOPE: CPT

## 2023-09-05 PROCEDURE — 82570 ASSAY OF URINE CREATININE: CPT

## 2023-09-08 ENCOUNTER — VIRTUAL VISIT (OUTPATIENT)
Dept: PSYCHOLOGY | Facility: CLINIC | Age: 78
End: 2023-09-08
Payer: MEDICARE

## 2023-09-08 DIAGNOSIS — F34.1 DYSTHYMIA: ICD-10-CM

## 2023-09-08 PROCEDURE — 90834 PSYTX W PT 45 MINUTES: CPT | Mod: 95 | Performed by: PSYCHOLOGIST

## 2023-09-08 ASSESSMENT — COLUMBIA-SUICIDE SEVERITY RATING SCALE - C-SSRS
4. HAVE YOU HAD THESE THOUGHTS AND HAD SOME INTENTION OF ACTING ON THEM?: NO
2. HAVE YOU ACTUALLY HAD ANY THOUGHTS OF KILLING YOURSELF IN THE PAST MONTH?: NO
5. HAVE YOU STARTED TO WORK OUT OR WORKED OUT THE DETAILS OF HOW TO KILL YOURSELF? DO YOU INTEND TO CARRY OUT THIS PLAN?: NO
6. HAVE YOU EVER DONE ANYTHING, STARTED TO DO ANYTHING, OR PREPARED TO DO ANYTHING TO END YOUR LIFE?: NO
1. IN THE PAST MONTH, HAVE YOU WISHED YOU WERE DEAD OR WISHED YOU COULD GO TO SLEEP AND NOT WAKE UP?: NO
3. HAVE YOU BEEN THINKING ABOUT HOW YOU MIGHT KILL YOURSELF?: NO

## 2023-09-08 NOTE — PROGRESS NOTES
"    [facility]   Mental Health & Addiction Services     Progress Note - Initial Visit    Patient  Name:  Ti Nickerson Date: 2023         Service Type: Individual     Visit Start Time: 10:00 AM  visit End Time: 10:45 AM    Visit #: 1    Attendees: Client attended alone    Service Modality:  Phone Visit:      Provider verified identity through the following two step process.  Patient provided:  Patient     Telephone Visit: The patient's condition can be safely assessed and treated via synchronous audio telemedicine encounter.      Reason for Audio Telemedicine Visit: Services only offered telehealth    Originating Site (Patient Location): Patient's home    Distant Site (Provider Location): Provider Remote Setting- Home Office    Consent:  The patient/guardian has verbally consented to:     1. The potential risks and benefits of telemedicine (telephone visit) versus in person care;    The patient has been notified of the following:      \"We have found that certain health care needs can be provided without the need for a face to face visit.  This service lets us provide the care you need with a phone conversation.       I will have full access to your [facility] medical record during this entire phone call.   I will be taking notes for your medical record.      Since this is like an office visit, we will bill your insurance company for this service.       There are potential benefits and risks of telephone visits (e.g. limits to patient confidentiality) that differ from in-person visits.?Confidentiality still applies for telephone services, and nobody will record the visit.  It is important to be in a quiet, private space that is free of distractions (including cell phone or other devices) during the visit.??      If during the course of the call I believe a telephone visit is not appropriate, you will not be charged for this service\"     Consent has been obtained for this service " by care team member: Yes        DATA:   Interactive Complexity: No   Crisis: No     Presenting Concerns/  Current Stressors:   AFIB, only one kidney, cancer in remaining kidney. Hernia from kidney removal. Gets a scan every 3 months, not grown in last 4 years.   Three grown children eight grandchildren all live close. Retired, hasn't been what he wanted it to be. Has resources, but medical issues and memory cause problems. Loves golf, but finding it hard to control his movements. Speech problems, trouble finding right word, memory, focus issue.       ASSESSMENT:  Mental Status Assessment:  Appearance:   Unable to assess   Eye Contact:   Unable to assess   Psychomotor Behavior: Unable to assess   Attitude:   Cooperative  Interested  Orientation:   All  Speech   Rate / Production: Normal/ Responsive   Volume:  Normal   Mood:    Sad   Affect:    Appropriate   Thought Content:  Clear  some difficulty finding words  Thought Form:  Coherent   Insight:    Good       Safety Issues and Plan for Safety and Risk Management:   [place] Suicide Severity Rating Scale (Short Version)      9/8/2023    10:00 AM   [place] Suicide Severity Rating (Short Version)   Q1 Wished to be Dead (Past Month) no   Q2 Suicidal Thoughts (Past Month) no   Q3 Suicidal Thought Method no   Q4 Suicidal Intent without Specific Plan no   Q5 Suicide Intent with Specific Plan no   Q6 Suicide Behavior (Lifetime) no   Level of Risk per Screen low risk     Patient denies current fears or concerns for personal safety.  Patient denies current or recent suicidal ideation or behaviors.  Patient denies current or recent homicidal ideation or behaviors.  Patient denies current or recent self injurious behavior or ideation.  Patient denies other safety concerns.  Recommended that patient call 911 or go to the local ED should there be a change in any of these risk factors.  Patient reports there are no firearms in the house.     Diagnostic Criteria:  Adjustment  Disorder  A. The development of emotional or behavioral symptoms in response to an identifiable stressor(s) occurring within 3 months of the onset of the stressor(s)  B. These symptoms or behaviors are clinically significant, as evidenced by one or both of the following:       - Significant impairment in social, occupational, or other important areas of functioning  C. The stress-related disturbance does not meet criteria for another disorder & is not not an exacerbation of another mental disorder  D. The symptoms do not represent normal bereavement  E. Once the stressor or its consequences have terminated, the symptoms do not persist for more than an additional 6 months       * Adjustment Disorder with Mixed Anxiety and Depressed Mood: The predominant manifestation is a combination of depression and anxiety      DSM5 Diagnoses: (Sustained by DSM5 Criteria Listed Above)  Diagnoses: Adjustment Disorders  309.28 (F43.23) With mixed anxiety and depressed mood  Psychosocial & Contextual Factors: Multiple medical issues  WHODAS 2.0 (12 item):        No data to display              Intervention:   Educated on treatment planning and started identifying goals and interventions for treatment plan  Collateral Reports Completed:  Not Applicable      PLAN: (Homework, other):  1. Provider will continue Diagnostic Assessment.  Patient was given the following to do until next session: Complete intake packet    2. Provider recommended the following referrals: None at this time.      3.  Suicide Risk and Safety Concerns were assessed for Ti Nickerson.    Patient meets the following risk assessment and triage: Patient denied any current/recent/lifetime history of suicidal ideation and/or behaviors.  No safety plan indicated at this time.       Jamal Venegas, PhD  September 8, 2023

## 2023-09-13 ENCOUNTER — VIRTUAL VISIT (OUTPATIENT)
Dept: NEPHROLOGY | Facility: CLINIC | Age: 78
End: 2023-09-13
Payer: MEDICARE

## 2023-09-13 ENCOUNTER — HOSPITAL ENCOUNTER (INPATIENT)
Facility: CLINIC | Age: 78
LOS: 1 days | Discharge: HOME | DRG: 375 | End: 2023-09-15
Attending: EMERGENCY MEDICINE | Admitting: STUDENT IN AN ORGANIZED HEALTH CARE EDUCATION/TRAINING PROGRAM
Payer: MEDICARE

## 2023-09-13 DIAGNOSIS — K92.1 MELENA: ICD-10-CM

## 2023-09-13 DIAGNOSIS — Z79.01 LONG TERM (CURRENT) USE OF ANTICOAGULANTS: ICD-10-CM

## 2023-09-13 DIAGNOSIS — N18.31 STAGE 3A CHRONIC KIDNEY DISEASE (H): Primary | ICD-10-CM

## 2023-09-13 LAB
ABO/RH(D): NORMAL
ALBUMIN SERPL BCG-MCNC: 3.8 G/DL (ref 3.5–5.2)
ALP SERPL-CCNC: 51 U/L (ref 40–129)
ALT SERPL W P-5'-P-CCNC: 17 U/L (ref 0–70)
ANION GAP SERPL CALCULATED.3IONS-SCNC: 11 MMOL/L (ref 7–15)
ANTIBODY SCREEN: NEGATIVE
AST SERPL W P-5'-P-CCNC: 21 U/L (ref 0–45)
BASOPHILS # BLD AUTO: 0 10E3/UL (ref 0–0.2)
BASOPHILS NFR BLD AUTO: 0 %
BILIRUB SERPL-MCNC: 0.7 MG/DL
BUN SERPL-MCNC: 42.2 MG/DL (ref 8–23)
CALCIUM SERPL-MCNC: 8.8 MG/DL (ref 8.8–10.2)
CHLORIDE SERPL-SCNC: 107 MMOL/L (ref 98–107)
CREAT SERPL-MCNC: 1.55 MG/DL (ref 0.67–1.17)
DEPRECATED HCO3 PLAS-SCNC: 23 MMOL/L (ref 22–29)
EGFRCR SERPLBLD CKD-EPI 2021: 46 ML/MIN/1.73M2
EOSINOPHIL # BLD AUTO: 0.2 10E3/UL (ref 0–0.7)
EOSINOPHIL NFR BLD AUTO: 2 %
ERYTHROCYTE [DISTWIDTH] IN BLOOD BY AUTOMATED COUNT: 13 % (ref 10–15)
GLUCOSE SERPL-MCNC: 106 MG/DL (ref 70–99)
HCT VFR BLD AUTO: 37.6 % (ref 40–53)
HGB BLD-MCNC: 12.4 G/DL (ref 13.3–17.7)
HOLD SPECIMEN: NORMAL
IMM GRANULOCYTES # BLD: 0.1 10E3/UL
IMM GRANULOCYTES NFR BLD: 1 %
LIPASE SERPL-CCNC: 34 U/L (ref 13–60)
LYMPHOCYTES # BLD AUTO: 2.2 10E3/UL (ref 0.8–5.3)
LYMPHOCYTES NFR BLD AUTO: 25 %
MCH RBC QN AUTO: 32 PG (ref 26.5–33)
MCHC RBC AUTO-ENTMCNC: 33 G/DL (ref 31.5–36.5)
MCV RBC AUTO: 97 FL (ref 78–100)
MONOCYTES # BLD AUTO: 0.6 10E3/UL (ref 0–1.3)
MONOCYTES NFR BLD AUTO: 7 %
NEUTROPHILS # BLD AUTO: 5.8 10E3/UL (ref 1.6–8.3)
NEUTROPHILS NFR BLD AUTO: 65 %
NRBC # BLD AUTO: 0 10E3/UL
NRBC BLD AUTO-RTO: 0 /100
PLATELET # BLD AUTO: 193 10E3/UL (ref 150–450)
POTASSIUM SERPL-SCNC: 4.1 MMOL/L (ref 3.4–5.3)
PROT SERPL-MCNC: 6.3 G/DL (ref 6.4–8.3)
RBC # BLD AUTO: 3.87 10E6/UL (ref 4.4–5.9)
SODIUM SERPL-SCNC: 141 MMOL/L (ref 136–145)
SPECIMEN EXPIRATION DATE: NORMAL
WBC # BLD AUTO: 8.9 10E3/UL (ref 4–11)

## 2023-09-13 PROCEDURE — 99442 PR PHYSICIAN TELEPHONE EVALUATION 11-20 MIN: CPT | Mod: 95 | Performed by: INTERNAL MEDICINE

## 2023-09-13 PROCEDURE — 86850 RBC ANTIBODY SCREEN: CPT | Performed by: EMERGENCY MEDICINE

## 2023-09-13 PROCEDURE — C9113 INJ PANTOPRAZOLE SODIUM, VIA: HCPCS | Mod: JZ | Performed by: EMERGENCY MEDICINE

## 2023-09-13 PROCEDURE — 36415 COLL VENOUS BLD VENIPUNCTURE: CPT | Performed by: EMERGENCY MEDICINE

## 2023-09-13 PROCEDURE — 250N000011 HC RX IP 250 OP 636: Mod: JZ | Performed by: EMERGENCY MEDICINE

## 2023-09-13 PROCEDURE — 85025 COMPLETE CBC W/AUTO DIFF WBC: CPT | Performed by: EMERGENCY MEDICINE

## 2023-09-13 PROCEDURE — 86901 BLOOD TYPING SEROLOGIC RH(D): CPT | Performed by: EMERGENCY MEDICINE

## 2023-09-13 PROCEDURE — 85610 PROTHROMBIN TIME: CPT | Performed by: STUDENT IN AN ORGANIZED HEALTH CARE EDUCATION/TRAINING PROGRAM

## 2023-09-13 PROCEDURE — 99285 EMERGENCY DEPT VISIT HI MDM: CPT | Mod: 25

## 2023-09-13 PROCEDURE — 83690 ASSAY OF LIPASE: CPT | Performed by: EMERGENCY MEDICINE

## 2023-09-13 PROCEDURE — 258N000003 HC RX IP 258 OP 636: Performed by: STUDENT IN AN ORGANIZED HEALTH CARE EDUCATION/TRAINING PROGRAM

## 2023-09-13 PROCEDURE — 99223 1ST HOSP IP/OBS HIGH 75: CPT | Mod: AI | Performed by: STUDENT IN AN ORGANIZED HEALTH CARE EDUCATION/TRAINING PROGRAM

## 2023-09-13 PROCEDURE — G0378 HOSPITAL OBSERVATION PER HR: HCPCS

## 2023-09-13 PROCEDURE — 96374 THER/PROPH/DIAG INJ IV PUSH: CPT

## 2023-09-13 PROCEDURE — 80053 COMPREHEN METABOLIC PANEL: CPT | Performed by: EMERGENCY MEDICINE

## 2023-09-13 RX ORDER — TAMSULOSIN HYDROCHLORIDE 0.4 MG/1
0.4 CAPSULE ORAL EVERY EVENING
Status: DISCONTINUED | OUTPATIENT
Start: 2023-09-14 | End: 2023-09-15 | Stop reason: HOSPADM

## 2023-09-13 RX ORDER — SENNOSIDES 8.6 MG
2 TABLET ORAL DAILY
Status: DISCONTINUED | OUTPATIENT
Start: 2023-09-13 | End: 2023-09-15 | Stop reason: HOSPADM

## 2023-09-13 RX ORDER — ONDANSETRON 8 MG/1
8 TABLET, FILM COATED ORAL EVERY 8 HOURS PRN
Status: DISCONTINUED | OUTPATIENT
Start: 2023-09-13 | End: 2023-09-14

## 2023-09-13 RX ORDER — ACETAMINOPHEN 650 MG/1
650 SUPPOSITORY RECTAL EVERY 4 HOURS PRN
Status: DISCONTINUED | OUTPATIENT
Start: 2023-09-13 | End: 2023-09-15 | Stop reason: HOSPADM

## 2023-09-13 RX ORDER — ONDANSETRON 4 MG/1
4 TABLET, ORALLY DISINTEGRATING ORAL EVERY 6 HOURS PRN
Status: DISCONTINUED | OUTPATIENT
Start: 2023-09-13 | End: 2023-09-15 | Stop reason: HOSPADM

## 2023-09-13 RX ORDER — SODIUM CHLORIDE 9 MG/ML
INJECTION, SOLUTION INTRAVENOUS CONTINUOUS
Status: DISCONTINUED | OUTPATIENT
Start: 2023-09-13 | End: 2023-09-15 | Stop reason: HOSPADM

## 2023-09-13 RX ORDER — PRAVASTATIN SODIUM 20 MG
20 TABLET ORAL EVERY EVENING
Status: DISCONTINUED | OUTPATIENT
Start: 2023-09-14 | End: 2023-09-15 | Stop reason: HOSPADM

## 2023-09-13 RX ORDER — ACETAMINOPHEN 325 MG/1
650 TABLET ORAL EVERY 4 HOURS PRN
Status: DISCONTINUED | OUTPATIENT
Start: 2023-09-13 | End: 2023-09-15 | Stop reason: HOSPADM

## 2023-09-13 RX ORDER — ONDANSETRON 2 MG/ML
4 INJECTION INTRAMUSCULAR; INTRAVENOUS EVERY 6 HOURS PRN
Status: DISCONTINUED | OUTPATIENT
Start: 2023-09-13 | End: 2023-09-15 | Stop reason: HOSPADM

## 2023-09-13 RX ORDER — METOPROLOL SUCCINATE 25 MG/1
25 TABLET, EXTENDED RELEASE ORAL EVERY EVENING
Status: DISCONTINUED | OUTPATIENT
Start: 2023-09-14 | End: 2023-09-15 | Stop reason: HOSPADM

## 2023-09-13 RX ORDER — POLYETHYLENE GLYCOL 3350 17 G/17G
17 POWDER, FOR SOLUTION ORAL DAILY
Status: DISCONTINUED | OUTPATIENT
Start: 2023-09-13 | End: 2023-09-15 | Stop reason: HOSPADM

## 2023-09-13 RX ADMIN — SODIUM CHLORIDE 500 ML: 9 INJECTION, SOLUTION INTRAVENOUS at 23:45

## 2023-09-13 RX ADMIN — PANTOPRAZOLE SODIUM 80 MG: 40 INJECTION, POWDER, FOR SOLUTION INTRAVENOUS at 22:17

## 2023-09-13 ASSESSMENT — PAIN SCALES - GENERAL: PAINLEVEL: NO PAIN (0)

## 2023-09-13 ASSESSMENT — ACTIVITIES OF DAILY LIVING (ADL): ADLS_ACUITY_SCORE: 35

## 2023-09-13 NOTE — PROGRESS NOTES
Virtual Visit Details    Type of service:  Telephone Visit   Phone call duration: 11 minutes     Assessment and Plan:  77 year old male with history of renal cell carcinoma s/p right nephrectomy 2019, Scr 1.1 up to 1.6-1.7 post nephrectomy, with metastatic disease to lungs and new liver nodule noted on ultrasound. He presents for followup of CKD, first see Fall 2021.   # CKD stage 3b- Scr 1.6-1.7, eGFR 35-40ml/min, stable since surgery in 2019.  He has no albuminuria, normal blood pressure with no medication. In fact he has low BP at this time.   He has been monitoring disease and he may be chemotherapy in the future if his nodules are enlarging.  - we will see him back in spring  - might go to [place] for winter  - labs every 3-4 months discussed.    # Hypertension: blood pressure was low 100s at home but now down to 80s at times and he has no energy to do things.  He is taking tamsulosin in the morning, so we will switch this at [facility]. Defer metoprolol to cardiology, but has dizziness, sometimes higher in the office.  He monitors at home.  He had dizziness in the past, was on ACE-I but it was stopped after surgery.  -  at home this AM    # Not anemic  - Hgb: Stable, hgb 14-14.4    # Electrolytes:   - Potassium; level: Normal  - Bicarbonate; level: Normal    # Mineral Bone Disorder:   - Vitamin D; level is: Normal  - Calcium; level is:  Normal   - Phos 3.2    Orthostatic hypotension- his BP is 100s systolic and goes down to 80s , this may be associated with tamsulosin    - he will take tamsulosin at night, hydrate a little more, can consider compression stockings or abdominal binder.   - can consider stopping tamsulosin, can do midodrine or fludrocortisone if necessary down the road    Assessment and plan was discussed with patient and he voiced his understanding and agreement.  15 minutes spent on day of service in review of records and coordination of care.     Reason for Visit:  Ti Nickerson is a 77  year old male with CKD from nephrectomy, who presents for followup    HPI:  He is a very pleasant male with history of renal cell carcinoma, diagnosed when he had olivia hematuria episode in 2019. He underwent right nephrectomy and has been monitored by Dr Hernández at [facility].  He had CT scan in September that showed stable pulmonary lesions. He had an abdominal ultrasound a couple of days ago that showed a ?new 3.4cm nodule in his liver.  We discussed this and determined he will reach out to his oncologist to see if CT scan is appropriate, sooner than December      They sold their home and moved into assisted living where there is a gym  They usually eat healthy   He has atrial fibrillation and on anticoagulation  His blood pressure runs 105/70 -110s but has been down to 80s at times and definitely feels it.  He denies any shortness of breath or swelling. He feels fairly well except when he is in afib sometimes.  He sees Dr Quarles in cardiology who manages his afib  He is accompanied by his wife during the visit.   He denies family history of kidney failure, his parents lived to their 90s      He is not feeling well today and had some emesis. He is having lightheadedness Thus we did a telephone visit today  He is keeping fluids down.   He has another scan coming up   He has a hernia and would like to do surgery, as long as his cancer is stable.  Awaiting the scans to see the progress.  It is annoying and painful when he coughs    Renal History:   Kidney Disease and Medical Hx:  Right nephrectomy    ROS:   A comprehensive review of systems was obtained and negative, except as noted in the HPI or PMH.    Active Medical Problems:  Patient Active Problem List   Diagnosis    Chronic atrial fibrillation (H)    Idiopathic cardiomyopathy (H)    Coronary artery disease involving native coronary artery of native heart without angina pectoris    Esophageal reflux    Mixed hyperlipidemia    Sleep apnea    Essential  hypertension    Obesity (BMI 35.0-39.9) with comorbidity (H)    Thoracic aortic aneurysm without rupture (H)    Tubular adenoma of colon    Elevated TSH    Renal cell carcinoma, right (H)    CKD (chronic kidney disease) stage 3, GFR 30-59 ml/min (H)    Ventral hernia without obstruction or gangrene     PMH:   Medical record was reviewed and PMH was discussed with patient and noted below.  Past Medical History:   Diagnosis Date    Atrial fibrillation, unspecified 9/18/2015    CAD (coronary artery disease) 09/18/2015    minimal by angio 2007, fu nuc est nl 2018    Elevated TSH 2018    Esophageal reflux 9/18/2015    Essential hypertension     History of cardioversion     [id number]    Idiopathic cardiomyopathy (H) 09/18/2015    fu echo nl ef, nl nuclear est 11/18, Dr. Quarles    Mixed hyperlipidemia 9/18/2015    Mumps     Sleep apnea 09/18/2015    does not use cpap as of 2019    Sleep apnea     Thoracic aortic aneurysm (H)     sinus of valsalva 4.5 and asc aorta 4.5 in 2017    Tubular adenoma of colon 01/2017    fu 3 years     PSH:   Past Surgical History:   Procedure Laterality Date    APPENDECTOMY  1964       Family Hx:   Family History   Problem Relation Age of Onset    Arrhythmia Mother         a-fib    Cerebrovascular Disease Mother     Arrhythmia Father         a-fib    Colon Cancer Father     Diabetes Father     Cerebrovascular Disease Father     No Known Problems Brother      Personal Hx:   Social History     Tobacco Use    Smoking status: Never    Smokeless tobacco: Never   Substance Use Topics    Alcohol use: Yes     Alcohol/week: 0.0 standard drinks of alcohol     Comment: 3 drinks week       Allergies:  Allergies   Allergen Reactions    Oxycodone Other (See Comments)     Hallucinations when given after surgery        Medications:  Current Outpatient Medications   Medication Sig    acetaminophen (TYLENOL) 500 MG tablet Take 500-1,000 mg by mouth every 6 hours as needed for mild pain    apixaban ANTICOAGULANT  (ELIQUIS ANTICOAGULANT) 5 MG tablet Take 1 tablet (5 mg) by mouth 2 times daily    loperamide (IMODIUM) 1 MG/5ML liquid Take by mouth 4 times daily as needed for diarrhea    metoprolol succinate ER (TOPROL XL) 25 MG 24 hr tablet TAKE 1 TABLET BY MOUTH EVERY DAY    pravastatin (PRAVACHOL) 20 MG tablet Take 1 tablet (20 mg) by mouth daily    tamsulosin (FLOMAX) 0.4 MG capsule TAKE 1 CAPSULE BY MOUTH EVERY DAY    ASPIRIN NOT PRESCRIBED (INTENTIONAL) Please choose reason not prescribed, below (Patient not taking: Reported on 8/21/2023)     No current facility-administered medications for this visit.      Vitals:  There were no vitals taken for this visit.    Exam:  GENERAL APPEARANCE: alert and no distress  HENT: mouth without ulcers or lesions  LYMPHATICS: no cervical or supraclavicular nodes  RESP: lungs clear to auscultation - no rales, rhonchi or wheezes  CV: irregular rhythm, normal rate, no rub, no murmur  EDEMA: no LE edema bilaterally  ABDOMEN: soft, nondistended, nontender, bowel sounds normal  MS: extremities normal - no gross deformities noted, no evidence of inflammation in joints, no muscle tenderness  SKIN: no rash    LABS:   CMP  Recent Labs   Lab Test 09/05/23  0925 07/17/23  0917 05/17/23  1144 09/21/22  1102 10/11/21  0942 06/10/21  0845 04/28/21  0000 11/20/20  0000 10/26/20  0936    141 140 140   < > 140  --   --  139   POTASSIUM 4.8 4.4 4.5 4.4   < > 4.5 5.2 4.5 5.1   CHLORIDE 106 108* 106 109   < > 111*  --   --  105   CO2 24 23 24 25   < > 25  --   --  29   ANIONGAP 9 10 10 6   < > 4  --   --  10.1   * 105* 110* 66*   < > 97 102 98 113*   BUN 26.5* 20.1 30.2* 27   < > 30  --   --  28   CR 1.60* 1.48* 1.62* 1.68*   < > 1.68* 1.89* 1.87* 1.77*   GFRESTIMATED 44* 49* 44* 42*   < > 39* 34* 35* 38*   GFRESTBLACK  --   --   --   --   --  46* 40* 40* 46*   DOMINICK 8.9 8.5* 9.9 8.6   < > 8.5  --   --  9.2    < > = values in this interval not displayed.     Recent Labs   Lab Test 05/17/23  5292  06/10/21  0845 04/28/21  0000 10/26/20  0936 12/26/19  0812 07/29/19  0828   BILITOTAL 1.1 0.7  --   --  0.6 1.2   ALKPHOS 62 57  --   --  64 65   ALT 20 24 20 5 26 29   AST 28 19 25  --  23 24     CBC  Recent Labs   Lab Test 09/05/23  0925 05/17/23  1144 09/21/22  1102 10/11/21  0942 06/16/21  1327 06/10/21  0845 12/26/19  0812   HGB 14.3 14.9 14.1 14.4 14.2 14.4 14.3   WBC  --  6.2  --   --  5.8 5.9 7.5   RBC  --  4.66  --   --  4.32* 4.32* 4.62   HCT  --  44.7  --   --  41.9 42.1 44.0   MCV  --  96  --   --  97 98 95   MCH  --  32.0  --   --  32.9 33.3* 31.0   MCHC  --  33.3  --   --  33.9 34.2 32.5   RDW  --  12.7  --   --  12.9 13.2 13.0   PLT  --  175  --   --  142* 141* 194     URINE STUDIES  Recent Labs   Lab Test 09/05/23  0938 10/11/21  0947 12/26/19  0812 09/25/19  0859 09/17/19  1442   COLOR Yellow Yellow Yellow Yellow Yellow   APPEARANCE Clear Clear Clear Clear Clear   URINEGLC Negative Negative Negative Negative Negative   URINEBILI Negative Negative Negative Negative Negative   URINEKETONE Negative Negative Negative Negative Negative   SG 1.025 1.023 1.020 1.020 1.025   UBLD Trace* Trace* Small* Large* Large*   URINEPH 5.5 5.0 5.0 5.0 5.0   PROTEIN Negative Negative Negative Negative Negative   UROBILINOGEN 1.0  --  0.2 0.2 0.2   NITRITE Negative Negative Negative Negative Negative   LEUKEST Negative Negative Negative Negative Negative   RBCU 0-2 1 O - 2  --  5-10*   WBCU 0-5 <1 0 - 5  --  0 - 5     No lab results found.  PTH  Recent Labs   Lab Test 09/21/22  1102 10/11/21  0942   PTHI 45 36     IRON STUDIES  Recent Labs   Lab Test 10/11/21  0942 06/10/21  0845 12/26/19  0812   IRON 97  --  60     --  307   IRONSAT 32  --  20   TACHO 274 250 160     Interpretation Summary     The left ventricle is borderline dilated.  There is mild concentric left ventricular hypertrophy.  The visual ejection fraction is 55-60%.  The right ventricle is normal in structure, function and size.  Normal left atrial  size.  The right atrium is moderate to severely dilated.  There is mild (1+) mitral regurgitation.  There is trace tricuspid regurgitation.  The right ventricular systolic pressure is approximated at 24mmHg plus the  right atrial pressure.  There is trace aortic regurgitation.  Mild aortic root dilatation. The aortic root and ascending aorta both measure  4.6 cm, There has been some variability in measured aortic size over previous  echoes, but overall likely no change since 2016.  ______________________________________________________________________________  Left Ventricle  The left ventricle is borderline dilated. There is mild concentric left  ventricular hypertrophy. The visual ejection fraction is 55-60%. Diastolic  function not assessed due to atrial fibrillation. Diastolic Doppler findings  (E/E' ratio and/or other parameters) suggest left ventricular filling  pressures are indeterminate. No regional wall motion abnormalities noted.     Right Ventricle  The right ventricle is normal in structure, function and size.     Atria  Normal left atrial size. The right atrium is moderate to severely dilated.  There is no atrial shunt seen.     Mitral Valve  The mitral valve leaflets appear normal. There is no evidence of stenosis,  fluttering, or prolapse. There is mild (1+) mitral regurgitation.     Tricuspid Valve  There is trace tricuspid regurgitation. The right ventricular systolic  pressure is approximated at 24mmHg plus the right atrial pressure. IVC  diameter and respiratory changes fall into an intermediate range suggesting an  RA pressure of 8 mmHg.     Aortic Valve  There is mild trileaflet aortic sclerosis. There is trace aortic  regurgitation.     Pulmonic Valve  There is mild (1+) pulmonic valvular regurgitation.     Vessels  Mild aortic root dilatation.     Pericardium  There is no pericardial effusion.     Rhythm  The rhythm was atrial  fibrillation.  ______________________________________________________________________________  MMode/2D Measurements & Calculations    Kayla Narciso Quintero MD

## 2023-09-13 NOTE — PROGRESS NOTES
"Virtual Visit Details    Type of service:  Video Visit   Video Start Time: {video visit start/end time for provider to select:355505}  Video End Time:{video visit start/end time for provider to select:250230}    Originating Location (pt. Location): {video visit patient location:963800::\"Home\"}  {PROVIDER LOCATION On-site should be selected for visits conducted from your clinic location or adjoining [facility] hospital, academic office, or other nearby [facility] building. Off-site should be selected for all other provider locations, including home:709209}  Distant Location (provider location):  {virtual location provider:103750}  Platform used for Video Visit: {Virtual Visit Platforms:293863::\"EyeNetra\"}  "

## 2023-09-14 LAB
APTT PPP: 31 SECONDS (ref 22–38)
ERYTHROCYTE [DISTWIDTH] IN BLOOD BY AUTOMATED COUNT: 13.1 % (ref 10–15)
HCT VFR BLD AUTO: 33.1 % (ref 40–53)
HGB BLD-MCNC: 10.7 G/DL (ref 13.3–17.7)
HGB BLD-MCNC: 11.1 G/DL (ref 13.3–17.7)
HGB BLD-MCNC: 11.3 G/DL (ref 13.3–17.7)
INR PPP: 1.22 (ref 0.85–1.15)
MCH RBC QN AUTO: 32.1 PG (ref 26.5–33)
MCHC RBC AUTO-ENTMCNC: 33.5 G/DL (ref 31.5–36.5)
MCV RBC AUTO: 96 FL (ref 78–100)
PLATELET # BLD AUTO: 175 10E3/UL (ref 150–450)
RBC # BLD AUTO: 3.46 10E6/UL (ref 4.4–5.9)
UPPER GI ENDOSCOPY: NORMAL
WBC # BLD AUTO: 7.6 10E3/UL (ref 4–11)

## 2023-09-14 PROCEDURE — C9113 INJ PANTOPRAZOLE SODIUM, VIA: HCPCS | Mod: JZ | Performed by: STUDENT IN AN ORGANIZED HEALTH CARE EDUCATION/TRAINING PROGRAM

## 2023-09-14 PROCEDURE — 250N000013 HC RX MED GY IP 250 OP 250 PS 637: Performed by: INTERNAL MEDICINE

## 2023-09-14 PROCEDURE — G0500 MOD SEDAT ENDO SERVICE >5YRS: HCPCS | Performed by: INTERNAL MEDICINE

## 2023-09-14 PROCEDURE — 258N000003 HC RX IP 258 OP 636: Performed by: STUDENT IN AN ORGANIZED HEALTH CARE EDUCATION/TRAINING PROGRAM

## 2023-09-14 PROCEDURE — 36415 COLL VENOUS BLD VENIPUNCTURE: CPT | Performed by: HOSPITALIST

## 2023-09-14 PROCEDURE — 43239 EGD BIOPSY SINGLE/MULTIPLE: CPT | Performed by: INTERNAL MEDICINE

## 2023-09-14 PROCEDURE — 96376 TX/PRO/DX INJ SAME DRUG ADON: CPT

## 2023-09-14 PROCEDURE — 43255 EGD CONTROL BLEEDING ANY: CPT | Performed by: INTERNAL MEDICINE

## 2023-09-14 PROCEDURE — G0378 HOSPITAL OBSERVATION PER HR: HCPCS

## 2023-09-14 PROCEDURE — 88305 TISSUE EXAM BY PATHOLOGIST: CPT | Mod: TC | Performed by: INTERNAL MEDICINE

## 2023-09-14 PROCEDURE — 0W3P8ZZ CONTROL BLEEDING IN GASTROINTESTINAL TRACT, VIA NATURAL OR ARTIFICIAL OPENING ENDOSCOPIC: ICD-10-PCS | Performed by: INTERNAL MEDICINE

## 2023-09-14 PROCEDURE — 250N000009 HC RX 250: Performed by: INTERNAL MEDICINE

## 2023-09-14 PROCEDURE — 250N000011 HC RX IP 250 OP 636: Performed by: INTERNAL MEDICINE

## 2023-09-14 PROCEDURE — 120N000001 HC R&B MED SURG/OB

## 2023-09-14 PROCEDURE — 85027 COMPLETE CBC AUTOMATED: CPT | Performed by: STUDENT IN AN ORGANIZED HEALTH CARE EDUCATION/TRAINING PROGRAM

## 2023-09-14 PROCEDURE — 99232 SBSQ HOSP IP/OBS MODERATE 35: CPT | Performed by: HOSPITALIST

## 2023-09-14 PROCEDURE — 36415 COLL VENOUS BLD VENIPUNCTURE: CPT | Performed by: STUDENT IN AN ORGANIZED HEALTH CARE EDUCATION/TRAINING PROGRAM

## 2023-09-14 PROCEDURE — 85018 HEMOGLOBIN: CPT | Performed by: HOSPITALIST

## 2023-09-14 PROCEDURE — 0DB98ZX EXCISION OF DUODENUM, VIA NATURAL OR ARTIFICIAL OPENING ENDOSCOPIC, DIAGNOSTIC: ICD-10-PCS | Performed by: INTERNAL MEDICINE

## 2023-09-14 PROCEDURE — 250N000011 HC RX IP 250 OP 636: Mod: JZ | Performed by: STUDENT IN AN ORGANIZED HEALTH CARE EDUCATION/TRAINING PROGRAM

## 2023-09-14 PROCEDURE — 250N000013 HC RX MED GY IP 250 OP 250 PS 637: Performed by: STUDENT IN AN ORGANIZED HEALTH CARE EDUCATION/TRAINING PROGRAM

## 2023-09-14 PROCEDURE — 85730 THROMBOPLASTIN TIME PARTIAL: CPT | Performed by: STUDENT IN AN ORGANIZED HEALTH CARE EDUCATION/TRAINING PROGRAM

## 2023-09-14 PROCEDURE — 272N000105 HC DEVICE CLIP QUICK: Performed by: INTERNAL MEDICINE

## 2023-09-14 RX ORDER — FLUMAZENIL 0.1 MG/ML
0.2 INJECTION, SOLUTION INTRAVENOUS
Status: CANCELLED | OUTPATIENT
Start: 2023-09-14 | End: 2023-09-15

## 2023-09-14 RX ORDER — NALOXONE HYDROCHLORIDE 0.4 MG/ML
0.2 INJECTION, SOLUTION INTRAMUSCULAR; INTRAVENOUS; SUBCUTANEOUS
Status: CANCELLED | OUTPATIENT
Start: 2023-09-14

## 2023-09-14 RX ORDER — NALOXONE HYDROCHLORIDE 0.4 MG/ML
0.4 INJECTION, SOLUTION INTRAMUSCULAR; INTRAVENOUS; SUBCUTANEOUS
Status: CANCELLED | OUTPATIENT
Start: 2023-09-14

## 2023-09-14 RX ORDER — FENTANYL CITRATE 50 UG/ML
INJECTION, SOLUTION INTRAMUSCULAR; INTRAVENOUS PRN
Status: DISCONTINUED | OUTPATIENT
Start: 2023-09-14 | End: 2023-09-14 | Stop reason: HOSPADM

## 2023-09-14 RX ORDER — EPINEPHRINE 0.1 MG/ML
INJECTION INTRAVENOUS PRN
Status: DISCONTINUED | OUTPATIENT
Start: 2023-09-14 | End: 2023-09-14 | Stop reason: HOSPADM

## 2023-09-14 RX ADMIN — PANTOPRAZOLE SODIUM 40 MG: 40 INJECTION, POWDER, FOR SOLUTION INTRAVENOUS at 21:03

## 2023-09-14 RX ADMIN — SODIUM CHLORIDE: 9 INJECTION, SOLUTION INTRAVENOUS at 02:12

## 2023-09-14 RX ADMIN — POLYETHYLENE GLYCOL 3350 17 G: 17 POWDER, FOR SOLUTION ORAL at 02:10

## 2023-09-14 RX ADMIN — PANTOPRAZOLE SODIUM 40 MG: 40 INJECTION, POWDER, FOR SOLUTION INTRAVENOUS at 07:55

## 2023-09-14 RX ADMIN — TAMSULOSIN HYDROCHLORIDE 0.4 MG: 0.4 CAPSULE ORAL at 21:03

## 2023-09-14 RX ADMIN — PRAVASTATIN SODIUM 20 MG: 20 TABLET ORAL at 02:10

## 2023-09-14 RX ADMIN — METOPROLOL SUCCINATE 25 MG: 25 TABLET, EXTENDED RELEASE ORAL at 02:10

## 2023-09-14 RX ADMIN — SENNOSIDES 2 TABLET: 8.6 TABLET, FILM COATED ORAL at 07:55

## 2023-09-14 RX ADMIN — SODIUM CHLORIDE: 9 INJECTION, SOLUTION INTRAVENOUS at 08:01

## 2023-09-14 RX ADMIN — TAMSULOSIN HYDROCHLORIDE 0.4 MG: 0.4 CAPSULE ORAL at 02:10

## 2023-09-14 RX ADMIN — METOPROLOL SUCCINATE 25 MG: 25 TABLET, EXTENDED RELEASE ORAL at 21:15

## 2023-09-14 RX ADMIN — PRAVASTATIN SODIUM 20 MG: 20 TABLET ORAL at 21:04

## 2023-09-14 ASSESSMENT — ACTIVITIES OF DAILY LIVING (ADL)
ADLS_ACUITY_SCORE: 33

## 2023-09-14 NOTE — H&P
Assessment/Plan  77M hx of Afib on eliquis, HTN, CAD, RCC s/p R nephrectomy, CKD, Inguinal hernia, BPH, KEENA, Dysthmia presenting with 2 episodes of melena with no other sx. Found to be vitally stable with mild anemia not requiring transfusions.     Acute Upper GI bleed  Acute Normocytic Anemic  Melena  New Onset Constipation  `On Eliquis for Afib  `2 episodes of melena in 1 week, not associated with abdominal pain, or neruo sx  `Hb 14.3>12.4, BUN 42  `Asymptomatic in ED, vitally stable  --PPIq12  --GI consult  --500cc bolus  --Senna/Miralax for new onset constipation (which is concerning given hx of cancer)  --Coag panel and CBC in AM  --NPO for now, IVF in place  --SCDs instead of eliquis  --Transfuse if Hb<7 or hemodynamically unstable bleeding occurs (which would also require STAT GI or IR consult)    Afib  --Hold eliquis, SCDs for now    HTN  CAD  Hx Orthostatic Hypotension  `Normotension on presentation  --Continue home 25mg metoprolol  --Continue home pravastatin    Renal cell carcinoma s/p right radical nephrectomy with negative margins (10/2019),   S/p ablation of presumed liver metastasis and cryoablation of peritoneal nodule (01/2021)   Microscopic Hematuria  `Follows with oncology as outpatient  `Has stable metastatic pulm nodules, and 1 new liver nodule  `Not on Chemo/radiation, plan is for observation and repeat CT in October for possible liver metastasis  `Microscopic   --Supportive care for now    CKD  `At baseline Cr of 4.5-1.6  --Renal diet once NPO states completed  --BP control    R inguinal hernia  `Reducible, no evidence of strangulation  --Tylenol    BPH  --continue home flomax    KEENA  `Not on NPPV  --Supportive care for now    Dysthymia  Adjustment Disorder  Mild Cognitive Impairment  `Follows with Psychologist  `mike Olmos  --Supportive care for now    Supportive Care  DVT ppx: Mechancial  Diet: NPO for now  Pain Control: Tylenol PRN  Upper GI ppx: PPI q12, zofran  Lower GI ppx:  Senna, Miralax  Lines/Catheters: R arm IV line  TTE/Dopplers: Not indicate  Contact Precautions: Not indicated  PT/OT/Social/Wound/Palliative Consults: not indicated  Code Status: Full    History of Present Illness  Subjective: Patient states that he had an episode of melena 3 days prior to presentation, and a 2nd episode today. No previous episodes of melena, states he is complaint with eliquis BID for afib. Patient states no abdominal pain, no worsening dizziness (has chronic orthostatin dizziness). States new oncset constipation for the past 60days.     ED course: 80mg PPI given    ROS: 10 point ROS neg other than the symptoms noted above in the HPI.     Past Medical Hx:   Past Medical History:   Diagnosis Date    Atrial fibrillation, unspecified 9/18/2015    CAD (coronary artery disease) 09/18/2015    minimal by angio 2007, fu nuc est nl 2018    Elevated TSH 2018    Esophageal reflux 9/18/2015    Essential hypertension     History of cardioversion     [id number]    Idiopathic cardiomyopathy (H) 09/18/2015    fu echo nl ef, nl nuclear est 11/18, Dr. Quarles    Mixed hyperlipidemia 9/18/2015    Mumps     Sleep apnea 09/18/2015    does not use cpap as of 2019    Sleep apnea     Thoracic aortic aneurysm (H)     sinus of valsalva 4.5 and asc aorta 4.5 in 2017    Tubular adenoma of colon 01/2017    fu 3 years        Home Medications: Present in Med Rec    Allergies:   Allergies   Allergen Reactions    Oxycodone Other (See Comments)     Hallucinations when given after surgery         Pertinent Family Hx:   Family History   Problem Relation Age of Onset    Arrhythmia Mother         a-fib    Cerebrovascular Disease Mother     Arrhythmia Father         a-fib    Colon Cancer Father     Diabetes Father     Cerebrovascular Disease Father     No Known Problems Brother         Surgical Hx:   Past Surgical History:   Procedure Laterality Date    APPENDECTOMY  1964        Substance Hx:   History   Drug Use No   ,  Social History     Substance and Sexual Activity      Alcohol use: Yes        Alcohol/week: 0.0 standard drinks of alcohol        Comment: 3 drinks week  ,   History   Smoking Status    Never   Smokeless Tobacco    Never   ,   History   Sexual Activity    Sexual activity: Yes    Partners: Female        Environment/ADLs: Independent      Imaging/Labs    Imaging: No results found.       Recent Labs   Lab Test 09/13/23 2051 09/05/23  0925    139   POTASSIUM 4.1 4.8   CHLORIDE 107 106   CO2 23 24   ANIONGAP 11 9   * 100*   BUN 42.2* 26.5*   CR 1.55* 1.60*   DOMINICK 8.8 8.9     CBC RESULTS:   Recent Labs   Lab Test 09/13/23 2051   WBC 8.9   RBC 3.87*   HGB 12.4*   HCT 37.6*   MCV 97   MCH 32.0   MCHC 33.0   RDW 13.0         Liver Function Studies -   Recent Labs   Lab Test 09/13/23 2051   PROTTOTAL 6.3*   ALBUMIN 3.8   BILITOTAL 0.7   ALKPHOS 51   AST 21   ALT 17      No results found for: TROPI     Physical Exam  /79   Pulse 88   Temp 97.7  F (36.5  C)   Resp 16   Wt 127 kg (280 lb)   SpO2 97%   BMI 35.00 kg/m      Constitutional: Alert and oriented to person, place and time; no apparent distress.   HEENT: Normocephalic, no scleral icterus  Abdomen: soft, no distention/tenderness/guarding, midline scar, R lower quandrant reducible hernia  Lungs: lungs clear to auscultation bilaterally  Heart: Regular s1s2, no evidence of murmurs  Extremities/Neuro:No focal strength/sensation deficits, no LE edema  Skin: No obvious signs of skin breakdown  Psychiatric: appropriate affect, insight and judgment    I have personally spent 77 minutes total time today in preparing to see the patient (eg, review of tests), obtaining and/or reviewing separately obtained history, performing a medically appropriate examination and/or evaluation, counseling and educating the patient/family/caregiver, ordering medications, tests, or procedures, referring and communicating with other health care professionals, documenting clinical  information in the electronic or other health record, independently interpreting results and communicating results to the patient/ family/caregiver and care coordination.

## 2023-09-14 NOTE — PHARMACY-ADMISSION MEDICATION HISTORY
Pharmacist Admission Medication History    Admission medication history is complete. The information provided in this note is only as accurate as the sources available at the time of the update.    Medication reconciliation/reorder completed by provider prior to medication history? No    Information Source(s): Patient via in-person    Pertinent Information: None    Changes made to PTA medication list:  Added: None  Deleted: None  Changed: None    Allergies reviewed with patient and updates made in EHR: yes    Medication History Completed By: Lynsey Ambrosio RPH 9/13/2023 10:59 PM    Prior to Admission medications    Medication Sig Last Dose Taking? Auth Provider Long Term End Date   acetaminophen (TYLENOL) 500 MG tablet Take 500-1,000 mg by mouth every 6 hours as needed for mild pain  at prn Yes Reported, Patient     apixaban ANTICOAGULANT (ELIQUIS ANTICOAGULANT) 5 MG tablet Take 1 tablet (5 mg) by mouth 2 times daily 9/12/2023 at pm Yes Devin Quarles MD  10/6/23   loperamide (IMODIUM) 2 MG capsule Take 2 mg by mouth 4 times daily as needed for diarrhea  at prn Yes Reported, Patient     metoprolol succinate ER (TOPROL XL) 25 MG 24 hr tablet TAKE 1 TABLET BY MOUTH EVERY DAY 9/12/2023 at pm Yes Nico Retana MD Yes    pravastatin (PRAVACHOL) 20 MG tablet Take 1 tablet (20 mg) by mouth daily 9/12/2023 at pm Yes Nico Retana MD Yes    tamsulosin (FLOMAX) 0.4 MG capsule TAKE 1 CAPSULE BY MOUTH EVERY DAY 9/12/2023 at pm Yes Nico Retana MD     ASPIRIN NOT PRESCRIBED (INTENTIONAL) Please choose reason not prescribed, below  Patient not taking: Reported on 8/21/2023   Tyrell Ybarra MD

## 2023-09-14 NOTE — ED NOTES
[facility]    ED Nurse Handoff Report    ED Chief complaint: Rectal Bleeding and Abdominal Pain      ED Diagnosis:   Final diagnoses:   Melena   Long term (current) use of anticoagulants       Code Status: Full Code    Allergies:   Allergies   Allergen Reactions    Oxycodone Other (See Comments)     Hallucinations when given after surgery        Patient Story:  Pt arrives to ER after having an episode today of black diarrhea. Pt reports he had an episode or two earlier this week too but then resolved. Earlier this morning the pt states he had a stomach ache with associated nausea which has since resolved. Pt also complains of dizziness when standing    Focused Assessment:    Pt is A&Ox4, normally independent, today complains of dizziness when standing, denies stomach pain. No  symptoms.     Labs Ordered and Resulted from Time of ED Arrival to Time of ED Departure   COMPREHENSIVE METABOLIC PANEL - Abnormal       Result Value    Sodium 141      Potassium 4.1      Chloride 107      Carbon Dioxide (CO2) 23      Anion Gap 11      Urea Nitrogen 42.2 (*)     Creatinine 1.55 (*)     Calcium 8.8      Glucose 106 (*)     Alkaline Phosphatase 51      AST 21      ALT 17      Protein Total 6.3 (*)     Albumin 3.8      Bilirubin Total 0.7      GFR Estimate 46 (*)    CBC WITH PLATELETS AND DIFFERENTIAL - Abnormal    WBC Count 8.9      RBC Count 3.87 (*)     Hemoglobin 12.4 (*)     Hematocrit 37.6 (*)     MCV 97      MCH 32.0      MCHC 33.0      RDW 13.0      Platelet Count 193      % Neutrophils 65      % Lymphocytes 25      % Monocytes 7      % Eosinophils 2      % Basophils 0      % Immature Granulocytes 1      NRBCs per 100 WBC 0      Absolute Neutrophils 5.8      Absolute Lymphocytes 2.2      Absolute Monocytes 0.6      Absolute Eosinophils 0.2      Absolute Basophils 0.0      Absolute Immature Granulocytes 0.1      Absolute NRBCs 0.0     LIPASE - Normal    Lipase 34     TYPE AND SCREEN, ADULT    ABO/RH(D) B  POS      Antibody Screen Negative      SPECIMEN EXPIRATION DATE [id number]     ABO/RH TYPE AND SCREEN       No orders to display         Treatments and/or interventions provided:      Medications   pantoprazole (PROTONIX) IV push injection 80 mg (80 mg Intravenous $Given 9/13/23 2217)       Patient's response to treatments and/or interventions:    Pt resting on bed    To be done/followed up on inpatient unit:   See any in-patient orders    Does this patient have any cognitive concerns?: none    Activity level - Baseline/Home:    Independent    Activity Level - Current:    Independent    Patient's Preferred language: English     Needed?: No    Isolation: None  Infection: Not Applicable  Patient tested for COVID 19 prior to admission: NO    Bariatric?: No    Vital Signs:   Vitals:    09/13/23 2047 09/13/23 2156   BP: 113/64 112/79   Pulse: 87 88   Resp: 16    Temp: 97.7  F (36.5  C)    SpO2: 95% 97%   Weight: 127 kg (280 lb)        Cardiac Rhythm:     Was the PSS-3 completed:   Yes  What interventions are required if any?               Family Comments: wife at bedside  OBS brochure/video discussed/provided to patient/family: Yes              Name of person given brochure if not patient:               Relationship to patient:     For the majority of the shift this patient's behavior was Green.  Behavioral interventions performed were .    ED NURSE PHONE NUMBER: [phone]

## 2023-09-14 NOTE — PROGRESS NOTES
[facility]    Medicine Progress Note - Hospitalist Service    Date of Admission:  9/13/2023    Assessment & Plan   77M hx of Afib on eliquis, HTN, CAD, RCC s/p R nephrectomy, CKD, Inguinal hernia, BPH, KEENA, Dysthmia presenting with 2 episodes of melena with no other sx. Found to be vitally stable with mild anemia not requiring transfusions.      Melena suspected to be due to an upper gastrointestinal hemorrhage   Acute blood loss anemia not requiring transfusion   The patient is on apixaban for atrial fibrillation.    2 episodes of melena in 1 week, not associated with abdominal pain.  Hb 14.3g=>12.4g =>11.1g;  BUN 42  Hemodynamically stable.   Continue proton pump inhibitor   NPO for esophagogastroduodenoscopy   Continue to monitor hemoglobin   Holding anticoagulation      Atrial fibrillation   Holding DOAC     Coronary artery disease   Hypertension   History of orthostatic hypotension  Normotensive on presentation.  Continue home 25mg metoprolol  Continue home pravastatin     Renal cell carcinoma s/p right radical nephrectomy with negative margins (10/2019),   S/p ablation of presumed liver metastasis and cryoablation of peritoneal nodule (01/2021)   Microscopic Hematuria  Follows with oncology as outpatient.  Has stable metastatic pulm nodules, and 1 new liver nodule.  Not on Chemo/radiation, plan is for observation and repeat CT in October for possible liver metastasis.     Chronic kidney disease   Creatinine   Date Value Ref Range Status   09/13/2023 1.55 (H) 0.67 - 1.17 mg/dL Final   06/10/2021 1.68 (H) 0.66 - 1.25 mg/dL Final   Stable, continue to monitor      Right inguinal hernia  Reducible, no evidence of strangulation  Continue to monitor      Benign prostatic hypertrophy   Continue home tamsulosin      Obstructive sleep apnea   Not on NPPV.     Dysthymia  Adjustment disorder  Mild cognitive impairment  Follows with a psychologist.  Stable.     Diet: NPO per Anesthesia Guidelines  "for Procedure/Surgery Except for: Meds    DVT Prophylaxis: Pneumatic Compression Devices  Dillard Catheter: Not present  Lines: None     Cardiac Monitoring: None  Code Status: Full Code      Clinically Significant Risk Factors Present on Admission               # Drug Induced Coagulation Defect: home medication list includes an anticoagulant medication    # Hypertension: Noted on problem list      # Obesity: Estimated body mass index is 33.46 kg/m  as calculated from the following:    Height as of this encounter: 1.92 m (6' 3.6\").    Weight as of this encounter: 123.4 kg (272 lb).              Disposition Plan      Expected Discharge Date: 09/15/2023        Discharge Comments: GI consult---EGD at 1330.   NPO          Christiano Fox MD  Hospitalist Service  [facility]  Securely message with [employer] (more info)  Text page via [email or web address] Paging/Directory   ______________________________________________________________________    Interval History   No melena this morning. No abdominal pain/heartburn.  Not using NSAID's. No history of GI bleeding or PUD.     Physical Exam   Vital Signs: Temp: 98.2  F (36.8  C) Temp src: Oral BP: 96/64 Pulse: 70   Resp: 18 SpO2: 94 % O2 Device: None (Room air)    Weight: 272 lbs 0 oz    Constitutional: awake, alert, cooperative, no apparent distress  Cardiovascular: regular rate and rhythm, normal S1 and S2, no S3 or S4, and no murmur noted  GI: normal bowel sounds, soft, non-distended, non-tender, no masses palpated  Neuropsychiatric: General: normal, calm and normal eye contact      Medical Decision Making       MANAGEMENT DISCUSSED with the following over the past 24 hours: patient   NOTE(S)/MEDICAL RECORDS REVIEWED over the past 24 hours: EPIC       Data     I have personally reviewed the following data over the past 24 hrs:    7.6  \   11.3 (L)   / 175     141 107 42.2 (H) /  106 (H)   4.1 23 1.55 (H) \     ALT: 17 AST: 21 AP: 51 TBILI: 0.7   ALB: 3.8 TOT " PROTEIN: 6.3 (L) LIPASE: 34     INR:  1.22 (H) PTT:  31   D-dimer:  N/A Fibrinogen:  N/A       Imaging results reviewed over the past 24 hrs:   No results found for this or any previous visit (from the past 24 hour(s)).

## 2023-09-14 NOTE — ED TRIAGE NOTES
Patient here  with rectal bleeding and abdominal pain which started today. He stated he had bleeding over the weekend  but it went away. Patient is on eliquis     Triage Assessment       Row Name 09/13/23 2047       Triage Assessment (Adult)    Airway WDL WDL       Respiratory WDL    Respiratory WDL WDL       Skin Circulation/Temperature WDL    Skin Circulation/Temperature WDL WDL       Cardiac WDL    Cardiac WDL WDL       Peripheral/Neurovascular WDL    Peripheral Neurovascular WDL WDL       Cognitive/Neuro/Behavioral WDL    Cognitive/Neuro/Behavioral WDL WDL

## 2023-09-14 NOTE — PROGRESS NOTES
Observation goals  PRIOR TO DISCHARGE       Comments: -diagnostic tests and consults completed and resulted- Not Met  -vital signs normal or at patient baseline- Met  -safe disposition plan has been identified -Not Met  Nurse to notify provider when observation goals have been met and patient is ready for discharge.

## 2023-09-14 NOTE — PROGRESS NOTES
Observation goals  PRIOR TO DISCHARGE       Comments:   -diagnostic tests and consults completed and resulted- Not Met     -vital signs normal or at patient baseline- Partially Met, soft BP     -safe disposition plan has been identified -Not Met     Nurse to notify provider when observation goals have been met and patient is ready for discharge.

## 2023-09-14 NOTE — CONSULTS
[facility]  Gastroenterology Consultation         Ti Nickerson  [address]  [place] [address]  77 year old male    Admission Date/Time: 9/13/2023  Primary Care Provider: Nico Retana  Referring / Attending Physician:  Dr. Kwabena Noland    We were asked to see the patient in consultation by Dr. Kwabena Noland for evaluation of melena and new constipation, in setting of eliquis use for afib, hx of renal cancer.      CC: Melena    HPI:  Ti Nickerson is a 77 year old male on Eliquis with a history of Afib, CAD, hypertension who presents with rectal bleeding. Patient reports that 3 days ago he had one episode of blood in his stool that cleared up after one episode. He woke up and complained of an upset stomach yesterday then began having multiple episodes of black/bloody stools again. Prior to onset, patient states that he has been dealing more with constipation, having one stool per day that required straining. Denies abdominal pain, emesis, chest pain, shortness of breath, fevers, chills and unintentional weight loss. Denies history of heavy alcohol, NSAID or tobacco use. Has had a colonoscopy but no endoscopy. History of right renal cell carcinoma s/p nephrectomy.     ROS: A comprehensive ten point review of systems was negative aside from those in mentioned in the HPI.      PAST MED HX:  I have reviewed this patient's medical history and updated it with pertinent information if needed.   Past Medical History:   Diagnosis Date    Atrial fibrillation, unspecified 9/18/2015    CAD (coronary artery disease) 09/18/2015    minimal by angio 2007, fu nuc est nl 2018    Elevated TSH 2018    Esophageal reflux 9/18/2015    Essential hypertension     History of cardioversion     [id number]    Idiopathic cardiomyopathy (H) 09/18/2015    fu echo nl ef, nl nuclear est 11/18, Dr. Quarles    Mixed hyperlipidemia 9/18/2015    Mumps     Sleep apnea  09/18/2015    does not use cpap as of 2019    Sleep apnea     Thoracic aortic aneurysm (H)     sinus of valsalva 4.5 and asc aorta 4.5 in 2017    Tubular adenoma of colon 01/2017    fu 3 years       MEDICATIONS:   Prior to Admission Medications   Prescriptions Last Dose Informant Patient Reported? Taking?   ASPIRIN NOT PRESCRIBED (INTENTIONAL)   No No   Sig: Please choose reason not prescribed, below   Patient not taking: Reported on 8/21/2023   acetaminophen (TYLENOL) 500 MG tablet  at prn  Yes Yes   Sig: Take 500-1,000 mg by mouth every 6 hours as needed for mild pain   apixaban ANTICOAGULANT (ELIQUIS ANTICOAGULANT) 5 MG tablet 9/12/2023 at pm  No Yes   Sig: Take 1 tablet (5 mg) by mouth 2 times daily   loperamide (IMODIUM) 2 MG capsule  at prn  Yes Yes   Sig: Take 2 mg by mouth 4 times daily as needed for diarrhea   metoprolol succinate ER (TOPROL XL) 25 MG 24 hr tablet 9/12/2023 at pm  No Yes   Sig: TAKE 1 TABLET BY MOUTH EVERY DAY   pravastatin (PRAVACHOL) 20 MG tablet 9/12/2023 at pm  No Yes   Sig: Take 1 tablet (20 mg) by mouth daily   tamsulosin (FLOMAX) 0.4 MG capsule 9/12/2023 at pm  No Yes   Sig: TAKE 1 CAPSULE BY MOUTH EVERY DAY      Facility-Administered Medications: None       ALLERGIES:   Allergies   Allergen Reactions    Oxycodone Other (See Comments)     Hallucinations when given after surgery        SOCIAL HISTORY:  Social History     Tobacco Use    Smoking status: Never    Smokeless tobacco: Never   Vaping Use    Vaping Use: Never used   Substance Use Topics    Alcohol use: Yes     Alcohol/week: 0.0 standard drinks of alcohol     Comment: 3 drinks week    Drug use: No       FAMILY HISTORY:  Family History   Problem Relation Age of Onset    Arrhythmia Mother         a-fib    Cerebrovascular Disease Mother     Arrhythmia Father         a-fib    Colon Cancer Father     Diabetes Father     Cerebrovascular Disease Father     No Known Problems Brother        PHYSICAL EXAM:   General: Patient is lying in  bed, comfortable and conversant  Vital Signs with Ranges  Temp: 97.7  F (36.5  C) Temp src: Oral BP: 99/64 Pulse: 72   Resp: 18 SpO2: 95 % O2 Device: None (Room air)    No intake/output data recorded.    Constitutional: Alert and no distress   Cardiovascular: RRR, no murmurs, clicks, gallops or rubs  Respiratory: CTAB, no increased work of breathing  Abdomen: Abdomen soft, no tenderness. BS normal. No masses, organomegaly      ADDITIONAL COMMENTS:   I reviewed the patient's new clinical lab test results.   Recent Labs   Lab Test 09/14/23 0623 09/13/23 2057 09/13/23 2051 09/05/23 0925 05/17/23  1144   WBC 7.6  --  8.9  --  6.2   HGB 11.1*  --  12.4* 14.3 14.9   MCV 96  --  97  --  96     --  193  --  175   INR  --  1.22*  --   --   --      Recent Labs   Lab Test 09/13/23 2051 09/05/23 0925 07/17/23  0917   POTASSIUM 4.1 4.8 4.4   CHLORIDE 107 106 108*   CO2 23 24 23   BUN 42.2* 26.5* 20.1   ANIONGAP 11 9 10     Recent Labs   Lab Test 09/13/23 2051 09/05/23  0938 09/05/23  0925 05/17/23  1144 09/21/22  1102 10/11/21  0947 10/11/21  0942 06/10/21  0845 10/26/20  0936 12/26/19  0812   ALBUMIN 3.8  --  3.9 4.1   < >  --    < > 3.3*  --  3.5   BILITOTAL 0.7  --   --  1.1  --   --   --  0.7  --  0.6   ALT 17  --   --  20  --   --   --  24   < > 26   AST 21  --   --  28  --   --   --  19   < > 23   PROTEIN  --  Negative  --   --   --  Negative  --   --   --  Negative   LIPASE 34  --   --   --   --   --   --   --   --   --     < > = values in this interval not displayed.       I reviewed the patient's new imaging results.        CONSULTATION ASSESSMENT AND PLAN:    77M hx of Afib on eliquis, HTN, CAD, RCC s/p R nephrectomy, CKD, inguinal hernia, BPH, KEENA, dysthymia presenting with 2 episodes of melena with no other sx. Found to be vitally stable with mild anemia not requiring transfusions.     Upper GI Bleed  Anemia  Melena  New Onset Constipation  --2 episodes of melena in 1 week without associated abdominal  pain   -On Eliquis for Afib  -History of right renal cell carcinoma s/p nephrectomy. Per CT report on 6/7/23, he has a growing precaval lymph node mass with close proximity to bowel. Other findings include diffuse pancreatic atrophy with fatty changes, duodenal diverticulum, small esophageal hiatal hernia, colonic diverticulosis, and ventral abdominal wall hernia.  -Hemoglobin 12.4->11.1  -Concern for upper GI bleed vs. lower GI bleed    --EGD today  --NPO  --Hold Eliquis  --Pantoprazole 40 mg IV BID  --Serial hemoglobin monitoring, transfuse as needed  --GI will continue to follow      [facility]  Office: [phone]  Cell: [phone] (Dr. Ash)  Cell: [phone] (Fabiola Borja PA-C)

## 2023-09-14 NOTE — PROGRESS NOTES
PRIMARY Concern: melena   SAFETY RISK Concerns: Fall      Tests/Procedures for NEXT shift:Hgb q6h, trending down,next will be 1800hr.  Consults? (Pending/following, signed-off?: GI following.   Where is patient from? (Home, TCU, etc.): Home  Other Important info for NEXT shift: EGD done, MD will come to explain results, pt family in the room.   Anticipated DC date & active delays: TBD  ____________________________________  SUMMARY NOTE:  Orientation/Cognitive: A&OX4  Observation Goals (Met/ Not Met): Not Met  Mobility Level/Assist Equipment: SBA/GB  Pain Management: Denies  Tele/VS/O2:Soft BP, other VSS on RA  ABNL Lab/BG: Hgb 11.3, creat 1.55  Diet: Clear liquid.  Bowel/Bladder: Continent, H/O BPH  Skin Concerns: Scattered bruises.  Drains/Devices: PIV infusing IVF as ordered.   Patient Stated Goal for Today:Rest

## 2023-09-14 NOTE — UTILIZATION REVIEW
Admission Status; Secondary Review Determination       Under the authority of the Utilization Management Committee, the utilization review process indicated a secondary review on the above patient. The review outcome is based on review of the medical records, discussions with staff, and applying clinical experience noted on the date of the review.     (x) Inpatient Status Appropriate - This patient's medical care is consistent with medical management for inpatient care and reasonable inpatient medical practice.     RATIONALE FOR DETERMINATION     Patient requires inpatient admission versus short stay observation or outpatient treatment for the following reasons:  77 year old male with pmh of afib on eliquis who was admitted with black stools and found to have declining anemia from 14.3 down to 11.1, Eliquis is held and the plan is for EGD to evaluate for bleeding. With the worsening anemia, continued bleeding, and plan for > 2 midnights meets CMS guidelines for an acute inpatient status. Dr. Fox contacted via [employer] and verified receipt.     The expected length of stay at the time of admission was more than 2 nights because of the severity of illness, intensity of service provided, and risk for adverse outcome. Inpatient admission is appropriate.         This document was produced using voice recognition software       The information on this document is developed by the utilization review team in order for the business office to ensure compliance. This only denotes the appropriateness of proper admission status and does not reflect the quality of care rendered.   The definitions of Inpatient Status and Observation Status used in making the determination above are those provided in the CMS Coverage Manual, Chapter 1 and Chapter 6, section 70.4.   Sincerely,   Javi Mcnamara DO  Physician Advisor  [facility].

## 2023-09-14 NOTE — PLAN OF CARE
Goal Outcome Evaluation:                        PRIMARY Concern: melena   SAFETY RISK Concerns: Fall                  (fall risk, behaviors, etc.)  Tests/Procedures for NEXT shift: Edoscopy  Consults? (Pending/following, signed-off?)   Where is patient from? (Home, TCU, etc.): Home  Other Important info for NEXT shift:   Anticipated DC date & active delays: TBD  _____________________________________________________________________________  SUMMARY NOTE:          Orientation/Cognitive: A&OX4  Observation Goals (Met/ Not Met): Not Met  Mobility Level/Assist Equipment: SBA/GB  Pain Management: Denies  Tele/VS/O2: VSS/RA Soft Bp  ABNL Lab/BG: See Chart  Diet: NPO From Midnight  Bowel/Bladder: Continent, H/O BPH  Skin Concerns: None  Drains/Devices: PIV/SL  Patient Stated Goal for Today: Sleep          Observation goals  PRIOR TO DISCHARGE       Comments: -diagnostic tests and consults completed and resulted- Not Met  -vital signs normal or at patient baseline- Met  -safe disposition plan has been identified -Not Met  Nurse to notify provider when observation goals have been met and patient is ready for discharge.

## 2023-09-14 NOTE — ED PROVIDER NOTES
"  History     Chief Complaint:  Rectal Bleeding and Abdominal Pain     HPI   Ti Nickerson is a 77 year old male on Eliquis with a history of a-fib, CAD, hypertension who presents to the ED with rectal bleeding and abdominal pain. Patient reports that on 3 days ago he had one episode of blood in his stool that cleared up after one episode. His wife reports that it was black. Wife says this morning he woke up and complained of an upset stomach then this evening he began having multiple episodes black/bloody stool again. Denies diarrhea, emesis, abdominal pain, chest pain, shortness of breath, loss of consciousness, fevers. Denies history of heavy alcohol or tobacco use. Has had a colonoscopy but no endoscopy.     Independent Historian:   Wife supplements as above.    Review of External Notes:   None     Medications:    Eliquis   Loperamide  Metoprolol   Pravastatin   Tamsulosin     Past Medical History:    Atrial fibrillation  CAD  Esophageal reflux   Hypertension   Idiopathic cardiomyopathy   Hyperlipidemia   Sleep apnea   Thoracic aortic aneurysm   Tubular adenoma of colon     Past Surgical History:    Appendectomy      Physical Exam   Patient Vitals for the past 24 hrs:   BP Temp Temp src Pulse Resp SpO2 Height Weight   09/14/23 0025 100/64 98.3  F (36.8  C) Oral 82 18 96 % 1.92 m (6' 3.6\") --   09/13/23 2346 93/70 -- -- 75 -- 97 % -- --   09/13/23 2156 112/79 -- -- 88 -- 97 % -- --   09/13/23 2047 113/64 97.7  F (36.5  C) -- 87 16 95 % -- 127 kg (280 lb)      Physical Exam  General: Alert, appears well-developed and well-nourished. Cooperative.     In mild distress  HEENT:  Head:  Atraumatic  Ears:  External ears are normal  Mouth/Throat:  Oropharynx is without erythema or exudate and mucous membranes are moist.   Eyes:   Conjunctivae normal and EOM are normal. No scleral icterus.  CV:  Normal rate, regular rhythm, normal heart sounds and radial pulses are 2+ and symmetric.    Resp:  Breath sounds are clear " bilaterally    Non-labored, no retractions or accessory muscle use  GI:  Abdomen is soft, no distension, no tenderness. No rebound or guarding.  No CVA tenderness bilaterally  Rectal: Black stool.  No hemorrhoids.    MS:  Normal range of motion. No edema.    Normal strength in all 4 extremities.     Back atraumatic.    No midline cervical, thoracic, or lumbar tenderness  Skin:  Warm and dry.  No rash or lesions noted.  Neuro:   Alert. Normal strength.  GCS: 15  Psych: Normal mood and affect.    Emergency Department Course   Laboratory:  Labs Ordered and Resulted from Time of ED Arrival to Time of ED Departure   COMPREHENSIVE METABOLIC PANEL - Abnormal       Result Value    Sodium 141      Potassium 4.1      Chloride 107      Carbon Dioxide (CO2) 23      Anion Gap 11      Urea Nitrogen 42.2 (*)     Creatinine 1.55 (*)     Calcium 8.8      Glucose 106 (*)     Alkaline Phosphatase 51      AST 21      ALT 17      Protein Total 6.3 (*)     Albumin 3.8      Bilirubin Total 0.7      GFR Estimate 46 (*)    CBC WITH PLATELETS AND DIFFERENTIAL - Abnormal    WBC Count 8.9      RBC Count 3.87 (*)     Hemoglobin 12.4 (*)     Hematocrit 37.6 (*)     MCV 97      MCH 32.0      MCHC 33.0      RDW 13.0      Platelet Count 193      % Neutrophils 65      % Lymphocytes 25      % Monocytes 7      % Eosinophils 2      % Basophils 0      % Immature Granulocytes 1      NRBCs per 100 WBC 0      Absolute Neutrophils 5.8      Absolute Lymphocytes 2.2      Absolute Monocytes 0.6      Absolute Eosinophils 0.2      Absolute Basophils 0.0      Absolute Immature Granulocytes 0.1      Absolute NRBCs 0.0     LIPASE - Normal    Lipase 34     TYPE AND SCREEN, ADULT    ABO/RH(D) B POS      Antibody Screen Negative      SPECIMEN EXPIRATION DATE [id number]     ABO/RH TYPE AND SCREEN      Emergency Department Course & Assessments:       Interventions:  Medications   melatonin tablet 1 mg (has no administration in time range)   ondansetron (ZOFRAN ODT)  ODT tab 4 mg (has no administration in time range)     Or   ondansetron (ZOFRAN) injection 4 mg (has no administration in time range)   pantoprazole (PROTONIX) IV push injection 40 mg (has no administration in time range)   sennosides (SENOKOT) tablet 2 tablet (2 tablets Oral Not Given 9/13/23 2346)   polyethylene glycol (MIRALAX) Packet 17 g (has no administration in time range)   tamsulosin (FLOMAX) capsule 0.4 mg (has no administration in time range)   pravastatin (PRAVACHOL) tablet 20 mg (has no administration in time range)   metoprolol succinate ER (TOPROL XL) 24 hr tablet 25 mg (has no administration in time range)   acetaminophen (TYLENOL) tablet 650 mg (has no administration in time range)     Or   acetaminophen (TYLENOL) Suppository 650 mg (has no administration in time range)   sodium chloride 0.9% infusion (has no administration in time range)   pantoprazole (PROTONIX) IV push injection 80 mg (80 mg Intravenous $Given 9/13/23 2217)   sodium chloride 0.9% BOLUS 500 mL (500 mLs Intravenous $New Bag 9/13/23 2345)      Assessments:  2200 I obtained history and examined the patient as noted above.     Independent Interpretation (X-rays, CTs, rhythm strip):  None    Consultations/Discussion of Management or Tests:  2228 I spoke to Dr. Noland of the hospitalist service who accepts the patient for admission.        Social Determinants of Health affecting care:   None    Disposition:  The patient was admitted to the hospital under the care of Dr. Noland.     Impression & Plan    CMS Diagnoses: None    Medical Decision Making:  Ti Nickerson is a 77 year old male who presents with melena.  This is consistent with an upper gastrointestinal bleed. Differential considered includes ulcer, gastritis, avm, tumor, esophagitis, variceal bleed, herrera-crump tear, ingestion, etc.  Patient is hemodynamically stable.   No history of chronic liver disease and INR is normal. Protonix given.  No indication to start  octreotide as my suspicion of variceal bleed is so low.    Given amount of bleeding and long term anticoagulant use, I would hospitalize patient at this time. GI was not consulted from the ED.  Admit to hospitalist for further cares.  Blood was not transfused.  Watched closely in ED for further drop in BP or HR elevation.      Diagnosis:    ICD-10-CM    1. Melena  K92.1       2. Long term (current) use of anticoagulants  Z79.01          Scribe Disclosure:  I, Guilherme Silva, am serving as a scribe at 10:08 PM on 9/13/2023 to document services personally performed by Herson Segovia MD based on my observations and the provider's statements to me.     9/13/2023   Herson Segovia MD White, Scott, MD  09/14/23 0413

## 2023-09-15 ENCOUNTER — APPOINTMENT (OUTPATIENT)
Dept: CT IMAGING | Facility: CLINIC | Age: 78
DRG: 375 | End: 2023-09-15
Attending: INTERNAL MEDICINE
Payer: MEDICARE

## 2023-09-15 VITALS
BODY MASS INDEX: 33.12 KG/M2 | RESPIRATION RATE: 18 BRPM | SYSTOLIC BLOOD PRESSURE: 103 MMHG | HEART RATE: 52 BPM | TEMPERATURE: 97.7 F | OXYGEN SATURATION: 96 % | WEIGHT: 272 LBS | DIASTOLIC BLOOD PRESSURE: 62 MMHG | HEIGHT: 76 IN

## 2023-09-15 LAB
HGB BLD-MCNC: 10.4 G/DL (ref 13.3–17.7)
HGB BLD-MCNC: 10.7 G/DL (ref 13.3–17.7)
HGB BLD-MCNC: 11.7 G/DL (ref 13.3–17.7)

## 2023-09-15 PROCEDURE — G1010 CDSM STANSON: HCPCS

## 2023-09-15 PROCEDURE — 85018 HEMOGLOBIN: CPT | Performed by: HOSPITALIST

## 2023-09-15 PROCEDURE — C9113 INJ PANTOPRAZOLE SODIUM, VIA: HCPCS | Mod: JZ | Performed by: STUDENT IN AN ORGANIZED HEALTH CARE EDUCATION/TRAINING PROGRAM

## 2023-09-15 PROCEDURE — 36415 COLL VENOUS BLD VENIPUNCTURE: CPT | Performed by: HOSPITALIST

## 2023-09-15 PROCEDURE — 250N000013 HC RX MED GY IP 250 OP 250 PS 637: Performed by: STUDENT IN AN ORGANIZED HEALTH CARE EDUCATION/TRAINING PROGRAM

## 2023-09-15 PROCEDURE — 250N000011 HC RX IP 250 OP 636: Mod: JZ | Performed by: STUDENT IN AN ORGANIZED HEALTH CARE EDUCATION/TRAINING PROGRAM

## 2023-09-15 PROCEDURE — 99239 HOSP IP/OBS DSCHRG MGMT >30: CPT | Performed by: HOSPITALIST

## 2023-09-15 PROCEDURE — 258N000003 HC RX IP 258 OP 636: Performed by: STUDENT IN AN ORGANIZED HEALTH CARE EDUCATION/TRAINING PROGRAM

## 2023-09-15 PROCEDURE — 74177 CT ABD & PELVIS W/CONTRAST: CPT | Mod: MG

## 2023-09-15 PROCEDURE — 250N000011 HC RX IP 250 OP 636: Performed by: HOSPITALIST

## 2023-09-15 PROCEDURE — 250N000009 HC RX 250: Performed by: HOSPITALIST

## 2023-09-15 RX ORDER — IOPAMIDOL 755 MG/ML
135 INJECTION, SOLUTION INTRAVASCULAR ONCE
Status: COMPLETED | OUTPATIENT
Start: 2023-09-15 | End: 2023-09-15

## 2023-09-15 RX ORDER — PANTOPRAZOLE SODIUM 40 MG/1
40 TABLET, DELAYED RELEASE ORAL
Qty: 60 TABLET | Refills: 0 | Status: SHIPPED | OUTPATIENT
Start: 2023-09-15

## 2023-09-15 RX ORDER — PANTOPRAZOLE SODIUM 40 MG/1
40 TABLET, DELAYED RELEASE ORAL
Status: DISCONTINUED | OUTPATIENT
Start: 2023-09-15 | End: 2023-09-15 | Stop reason: HOSPADM

## 2023-09-15 RX ADMIN — SODIUM CHLORIDE 80 ML: 9 INJECTION, SOLUTION INTRAVENOUS at 05:49

## 2023-09-15 RX ADMIN — SENNOSIDES 2 TABLET: 8.6 TABLET, FILM COATED ORAL at 08:39

## 2023-09-15 RX ADMIN — IOPAMIDOL 135 ML: 755 INJECTION, SOLUTION INTRAVENOUS at 05:47

## 2023-09-15 RX ADMIN — SODIUM CHLORIDE: 9 INJECTION, SOLUTION INTRAVENOUS at 01:06

## 2023-09-15 RX ADMIN — PANTOPRAZOLE SODIUM 40 MG: 40 INJECTION, POWDER, FOR SOLUTION INTRAVENOUS at 08:42

## 2023-09-15 RX ADMIN — POLYETHYLENE GLYCOL 3350 17 G: 17 POWDER, FOR SOLUTION ORAL at 08:39

## 2023-09-15 ASSESSMENT — ACTIVITIES OF DAILY LIVING (ADL)
ADLS_ACUITY_SCORE: 33
DIFFICULTY_EATING/SWALLOWING: NO
TOILETING_ISSUES: NO
FALL_HISTORY_WITHIN_LAST_SIX_MONTHS: YES
DRESSING/BATHING_DIFFICULTY: NO
ADLS_ACUITY_SCORE: 33
CHANGE_IN_FUNCTIONAL_STATUS_SINCE_ONSET_OF_CURRENT_ILLNESS/INJURY: NO
ADLS_ACUITY_SCORE: 33
ADLS_ACUITY_SCORE: 22
VISION_MANAGEMENT: GLASSES
CONCENTRATING,_REMEMBERING_OR_MAKING_DECISIONS_DIFFICULTY: NO
ADLS_ACUITY_SCORE: 33
ADLS_ACUITY_SCORE: 33
WALKING_OR_CLIMBING_STAIRS_DIFFICULTY: NO
DOING_ERRANDS_INDEPENDENTLY_DIFFICULTY: NO
ADLS_ACUITY_SCORE: 22
WEAR_GLASSES_OR_BLIND: YES

## 2023-09-15 NOTE — PLAN OF CARE
PRIMARY Concern: Black Stool   SAFETY RISK Concerns: Fall      Tests/Procedures for NEXT shift:Hgb q6h, trending down  Consults? (Pending/following, signed-off?: GI following.   Where is patient from? (Home, TCU, etc.): Home  Other Important info for NEXT shift: None  Anticipated DC date & active delays: TBD  ____________________________________  SUMMARY NOTE:  Orientation/Cognitive: A&OX4  Observation Goals (Met/ Not Met): Not Met  Mobility Level/Assist Equipment: SBA/GB  Pain Management: Denies  Tele/VS/O2:Soft BP, other VSS on RA  ABNL Lab/BG: Hgb 11.3, creat 1.55  Diet: Clear liquid.  Bowel/Bladder: Continent, H/O BPH  Skin Concerns: Scattered bruises.  Drains/Devices: PIV infusing IVF as ordered.   Patient Stated Goal for Today:Rest

## 2023-09-15 NOTE — DISCHARGE SUMMARY
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    Weight as of this encounter: 123.4 kg (272 lb).       Follow-ups Needed After Discharge   Follow-up Appointments     Follow-up and recommended labs and tests       Follow up with primary care provider, Nico Retana MD, within   7 days for hospital follow- up.  The following labs/tests are recommended:   basic metabolic profile and complete blood count.  Follow-up with Dr. Ash as scheduled. Follow up with primary oncologist   as scheduled on 10/3.            Unresulted Labs Ordered in the Past 30 Days of this Admission       Date and Time Order Name Status Description    9/14/2023  1:12 PM Surgical Pathology Exam In process         These results will be followed up by Dr. Ash    Discharge Disposition   Discharged to home  Condition at discharge: Stable    Hospital Course   77M hx of Afib on eliquis, HTN, CAD, RCC s/p R nephrectomy, CKD, Inguinal hernia, BPH, KEENA, Dysthmia presenting with 2 episodes of melena with no other sx. Found to be vitally stable with mild anemia not requiring transfusions.      Melena suspected to be due to an upper gastrointestinal hemorrhage   Acute blood loss anemia not requiring transfusion   The patient is on apixaban for atrial fibrillation.    2 episodes of melena in 1 week, not associated with abdominal pain.  Hb 14.3g=>12.4g =>11.1g;  BUN 42  Esophagogastroduodenoscopy   Normal stomach.   Normal duodenal bulb, " first portion of the duodenum and second portion of the duodenum.   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 Biopsied. Injected. Treated with argon plasma coagulation (APC). Clip (MR conditional) was placed.   LA Grade C reflux esophagitis with no bleeding.   Small hiatal hernia.   Benign-appearing esophageal stenosis.     No further overt bleeding. Mass could be metastatic lesion from his kidney cancer.   Discharge home on a proton pump inhibitor and follow up with Dr. Ash for pathology results   Keep appointment with primary oncologist at [place] in October     Atrial fibrillation   Holding DOAC due to bleeding     Coronary artery disease   Hypertension   History of orthostatic hypotension  Normotensive on presentation.  Continue home 25mg metoprolol  Continue home pravastatin     Renal cell carcinoma s/p right radical nephrectomy with negative margins (10/2019),   S/p ablation of presumed liver metastasis and cryoablation of peritoneal nodule (01/2021)   Microscopic Hematuria  Follows with oncology as outpatient.  Has stable metastatic pulm nodules, and 1 new liver nodule.  Not on Chemo/radiation, plan is for observation and repeat CT in October for possible liver metastasis.  Follow up 10/3 as planned to address possible duodenal mass     Chronic kidney disease   Creatinine   Date Value Ref Range Status   09/13/2023 1.55 (H) 0.67 - 1.17 mg/dL Final   06/10/2021 1.68 (H) 0.66 - 1.25 mg/dL Final   Stable, follow up with primary care provider      Right inguinal hernia  Reducible, no evidence of strangulation     Benign prostatic hypertrophy   Continue home tamsulosin      Obstructive sleep apnea   Not on NPPV.     Dysthymia  Adjustment disorder  Mild cognitive impairment  Follows with a psychologist.  Stable.    Consultations This Hospital Stay   GASTROENTEROLOGY IP CONSULT    Code Status   Full Code    Time Spent on this Encounter   I, Christiano Fox MD, personally saw the patient today and spent greater than 30  minutes discharging this patient.       Christiano Fox MD  [facility] EXTENDED RECOVERY AND SHORT STAY  [address] [address]  Phone: [phone]  ______________________________________________________________________    Physical Exam   Vital Signs: Temp: 97.7  F (36.5  C) Temp src: Oral BP: 103/62 Pulse: 52   Resp: 18 SpO2: 96 % O2 Device: None (Room air)    Weight: 272 lbs 0 oz  Awake, alert and oriented   Abdomen soft and nontender        Primary Care Physician   Nico Retana MD    Discharge Orders      Reason for your hospital stay    Bleeding from intestine     Follow-up and recommended labs and tests     Follow up with primary care provider, Nico Retana MD, within 7 days for hospital follow- up.  The following labs/tests are recommended: basic metabolic profile and complete blood count.  Follow-up with Dr. Ash as scheduled. Follow up with primary oncologist as scheduled on 10/3.     Activity    Your activity upon discharge: activity as tolerated     Diet    Follow this diet upon discharge:       Mechanical/Dental Soft Diet       Significant Results and Procedures   Most Recent 3 CBC's:  Recent Labs   Lab Test 09/15/23  1314 09/15/23  0717 09/15/23  0018 09/14/23  1139 09/14/23  0623 09/13/23 2051 09/05/23 0925 05/17/23  1144   WBC  --   --   --   --  7.6 8.9  --  6.2   HGB 11.7* 10.7* 10.4*   < > 11.1* 12.4*   < > 14.9   MCV  --   --   --   --  96 97  --  96   PLT  --   --   --   --  175 193  --  175    < > = values in this interval not displayed.     Most Recent 3 BMP's:  Recent Labs   Lab Test 09/13/23 2051 09/05/23 0925 07/17/23  0917    139 141   POTASSIUM 4.1 4.8 4.4   CHLORIDE 107 106 108*   CO2 23 24 23   BUN 42.2* 26.5* 20.1   CR 1.55* 1.60* 1.48*   ANIONGAP 11 9 10   DOMINICK 8.8 8.9 8.5*   * 100* 105*     Most Recent 2 LFT's:  Recent Labs   Lab Test 09/13/23 2051 05/17/23  1144   AST 21 28   ALT 17 20   ALKPHOS  51 62   BILITOTAL 0.7 1.1   ,   Results for orders placed or performed during the hospital encounter of 09/13/23   CT Abdomen Pelvis w Contrast    Narrative    EXAM: CT ABDOMEN PELVIS W CONTRAST  LOCATION: [facility]  DATE: 9/15/2023    INDICATION: duodenal lesion, H O renal cell CA  COMPARISON: 01/17/2023  TECHNIQUE: CT scan of the abdomen and pelvis was performed following injection of IV contrast. Multiplanar reformats were obtained. Dose reduction techniques were used.  CONTRAST: 135 mL Isovue   370    FINDINGS:   LOWER CHEST: Basilar bronchiectasis and mild fibroatelectasis. Right lower lobe cyst. Trace amount of pericardial fluid. Small hiatal hernia.    HEPATOBILIARY: 3 cm hypodensity right posterior liver with adjacent clips unchanged, presumably postsurgical port/post ablation change. The gallbladder is distended.    PANCREAS: Stable.    SPLEEN: Upper normal.    ADRENAL GLANDS: Normal.    KIDNEYS/BLADDER: Right nephrectomy. Left renal cortical cyst. Small parapelvic cyst left kidney.    BOWEL: Clip within the duodenum at site of biopsied mass. This is inseparable from adjacent precaval adenopathy.  Bowel is normal in caliber. Diverticulosis. Ventral abdominal hernia in the lower abdomen containing nondistended small bowel.    LYMPH NODES: Precaval adenopathy is again seen which is inseparable from the duodenum and the IVC. Largest conglomerate series 4 image 106, 3.2 x 2.1 cm. On the prior the cluster of nodes in the same region 2.7 x 1.4 cm.    VASCULATURE: IVC is mildly compressed by precaval adenopathy. Loss of fat plane between the lymph nodes and the IVC. Atherosclerotic vascular calcification. Ectatic iliac vasculature.    PELVIC ORGANS: Enlarged prostate.    MUSCULOSKELETAL: Fat-containing inguinal hernias. Degenerative change osseous structures. Small fat-containing ventral hernia midabdomen. The hernia containing nondistended small bowel lower abdomen.      Impression     IMPRESSION:   1.  Clip within the duodenum at site of biopsied mass. Difficult to discretely measure the mass. Duodenum at the clip is inseparable from adjacent precaval adenopathy.  2.  Precaval adenopathy increased compared to prior exam. There is mass effect on the IVC and loss of fat plane with the IVC. IVC invasion not excluded  3.  the gallbladder is distended.  4.  Ventral hernia containing nondistended small bowel.  5.  Diverticulosis.  6.  Stable appearance of right posterior liver.       Discharge Medications   Discharge Medication List as of 9/15/2023 12:56 PM        START taking these medications    Details   pantoprazole (PROTONIX) 40 MG EC tablet Take 1 tablet (40 mg) by mouth 2 times daily (before meals), Disp-60 tablet, R-0, E-Prescribe           CONTINUE these medications which have NOT CHANGED    Details   acetaminophen (TYLENOL) 500 MG tablet Take 500-1,000 mg by mouth every 6 hours as needed for mild pain, Historical      loperamide (IMODIUM) 2 MG capsule Take 2 mg by mouth 4 times daily as needed for diarrhea, Historical      metoprolol succinate ER (TOPROL XL) 25 MG 24 hr tablet TAKE 1 TABLET BY MOUTH EVERY DAY, Disp-90 tablet, R-1, E-Prescribe      pravastatin (PRAVACHOL) 20 MG tablet Take 1 tablet (20 mg) by mouth daily, Disp-90 tablet, R-3, E-PrescribeFor Profile Only - patient will call to fill      tamsulosin (FLOMAX) 0.4 MG capsule TAKE 1 CAPSULE BY MOUTH EVERY DAY, Disp-90 capsule, R-1, E-PrescribeDX Code Needed  .      ASPIRIN NOT PRESCRIBED (INTENTIONAL) Reason ASA was Not Prescribed: Current anticoagulant therapy (warfarin/enoxaparin)           STOP taking these medications       apixaban ANTICOAGULANT (ELIQUIS ANTICOAGULANT) 5 MG tablet Comments:   Reason for Stopping:             Allergies   Allergies   Allergen Reactions    Oxycodone Other (See Comments)     Hallucinations when given after surgery

## 2023-09-15 NOTE — PROGRESS NOTES
[facility]  Gastroenterology Progress Note     Ti Nickerson MRN# [id number]   YOB: 1945 Age: 77 year old          Assessment and Plan:     Ti Nickerson is a 77M hx of Afib on eliquis, HTN, CAD, RCC s/p R nephrectomy, CKD, inguinal hernia, BPH, KEENA, dysthymia presenting with 2 episodes of melena with no other sx. Found to be vitally stable with mild anemia not requiring transfusions.      Upper GI Bleed  Anemia  Melena  New Onset Constipation  --2 episodes of melena in 1 week without associated abdominal pain   -On Eliquis for Afib  -History of right renal cell carcinoma s/p nephrectomy. Per CT report on 6/7/23, he has a growing precaval lymph node mass with close proximity to bowel. Other findings include diffuse pancreatic atrophy with fatty changes, duodenal diverticulum, small esophageal hiatal hernia, colonic diverticulosis, and ventral abdominal wall hernia.  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 Biopsied. Injected and treated with APC.. Grad C reflux esophagitis with no bleeding. Benign appearing esophageal stenosis     --serial hemoglobin every 6 hours  --Hold Eliquis  --Pantoprazole 40 mg oral BID  - mechanical soft diet  - Ok with GI to discharge patient- will recommend hemoglobin check in one week and outpatient followup with our office in 1-2 weeks and with oncology               Interval History:     no new complaints and doing well; no cp, sob, n/v/d, or abd pain. No stools. Tolerating clear liquid diet              Review of Systems:     C: NEGATIVE for fever, chills, change in weight  E/M: NEGATIVE for ear, mouth and throat problems  R: NEGATIVE for significant cough or SOB  CV: NEGATIVE for chest pain, palpitations or peripheral edema             Medications:   I have reviewed this patient's current medications   metoprolol succinate ER  25 mg Oral QPM    pantoprazole  40 mg Intravenous Q12H     polyethylene glycol  17 g Oral Daily    pravastatin  20 mg Oral QPM    sennosides  2 tablet Oral Daily    tamsulosin  0.4 mg Oral QPM                  Physical Exam:   Vitals were reviewed  Vital Signs with Ranges  Temp:  [97.5  F (36.4  C)-98.5  F (36.9  C)] 97.5  F (36.4  C)  Pulse:  [52-86] 55  Resp:  [15-27] 18  BP: ()/() 105/56  SpO2:  [92 %-98 %] 96 %  I/O last 3 completed shifts:  In: 240 [P.O.:240]  Out: 500 [Urine:500]  Constitutional: healthy, alert, and no distress   Cardiovascular: negative, PMI normal. No lifts, heaves, or thrills. RRR. No murmurs, clicks gallops or rub  Respiratory: negative, Percussion normal. Good diaphragmatic excursion. Lungs clear  Abdomen: Abdomen soft, non-tender. BS normal. No masses, organomegaly             Data:   I reviewed the patient's new clinical lab test results.   Recent Labs   Lab Test 09/15/23  0717 09/15/23  0018 09/14/23  1823 09/14/23  1139 09/14/23  0623 09/13/23 2057 09/13/23 2051 09/05/23 0925 05/17/23  1144   WBC  --   --   --   --  7.6  --  8.9  --  6.2   HGB 10.7* 10.4* 10.7*   < > 11.1*  --  12.4*   < > 14.9   MCV  --   --   --   --  96  --  97  --  96   PLT  --   --   --   --  175  --  193  --  175   INR  --   --   --   --   --  1.22*  --   --   --     < > = values in this interval not displayed.     Recent Labs   Lab Test 09/13/23 2051 09/05/23 0925 07/17/23 0917   POTASSIUM 4.1 4.8 4.4   CHLORIDE 107 106 108*   CO2 23 24 23   BUN 42.2* 26.5* 20.1   ANIONGAP 11 9 10     Recent Labs   Lab Test 09/13/23 2051 09/05/23 0938 09/05/23 0925 05/17/23  1144 09/21/22  1102 10/11/21  0947 10/11/21  0942 06/10/21  0845 10/26/20  0936 12/26/19  0812   ALBUMIN 3.8  --  3.9 4.1   < >  --    < > 3.3*  --  3.5   BILITOTAL 0.7  --   --  1.1  --   --   --  0.7  --  0.6   ALT 17  --   --  20  --   --   --  24   < > 26   AST 21  --   --  28  --   --   --  19   < > 23   PROTEIN  --  Negative  --   --   --  Negative  --   --   --  Negative   LIPASE 34  --    --   --   --   --   --   --   --   --     < > = values in this interval not displayed.       I reviewed the patient's new imaging results.    All laboratory data reviewed  All imaging studies reviewed by me.    Lyssa Cota PA-C,  9/15/2023  [facility]  Office : [phone]  Cell: [phone] (Dr. Ash)  Cell: [phone] (Lyssa Cota PA-C)

## 2023-09-15 NOTE — PLAN OF CARE
Top portion must ALWAYS be completed  PRIMARY Concern: melena  SAFETY RISK Concerns (fall risk, behaviors, etc.): fall risk refuses bed alarm, family remains at bedside  Tests/Procedures for NEXT shift: no  Consults? (Pending/following, signed-off?) complete  Where is patient from? (Home, TCU, etc.): home  Other Important info for NEXT shift: na  Anticipated DC date & active delays: 9-15  _____________________________________________________________________________  SUMMARY NOTE:              (either paste note here OR complete the info below- RN to choose one- delete info below if not used)  Orientation/Cognitive: A&O ex dis to time. Slow. Word finding difficulty  Observation Goals (Met/ Not Met): inp  Mobility Level/Assist Equipment: SBA gb  Pain Management: denies  Tele/VS/O2: soft bp, jaspreet RA  ABNL Lab/BG: hgb 10.7  Diet: mech soft  Bowel/Bladder: cont  Skin Concerns: gregory BLE  Drains/Devices: no  Patient Stated Goal for Today: go home  AVS reviewed. Pt in agreement. Discharged home w family.     (4) walks frequently

## 2023-09-15 NOTE — PROVIDER NOTIFICATION
MD Notification    Notified Person: MD    Notified Person Name:Christiano Fox MD     Notification Date/Time:1137    Notification Interaction:vocera    Purpose of Notification:GI signed off. Advanced diet to [facility] soft. Family in room asking for disch plan.     Orders Received:    Comments:

## 2023-09-18 ENCOUNTER — PATIENT OUTREACH (OUTPATIENT)
Dept: FAMILY MEDICINE | Facility: CLINIC | Age: 78
End: 2023-09-18

## 2023-09-18 DIAGNOSIS — K92.1 MELENA: Primary | ICD-10-CM

## 2023-09-18 LAB
PATH REPORT.COMMENTS IMP SPEC: ABNORMAL
PATH REPORT.COMMENTS IMP SPEC: YES
PATH REPORT.FINAL DX SPEC: ABNORMAL
PATH REPORT.GROSS SPEC: ABNORMAL
PATH REPORT.MICROSCOPIC SPEC OTHER STN: ABNORMAL
PATH REPORT.MICROSCOPIC SPEC OTHER STN: ABNORMAL
PATH REPORT.RELEVANT HX SPEC: ABNORMAL
PHOTO IMAGE: ABNORMAL

## 2023-09-18 PROCEDURE — 88341 IMHCHEM/IMCYTCHM EA ADD ANTB: CPT | Mod: 26 | Performed by: PATHOLOGY

## 2023-09-18 PROCEDURE — 88305 TISSUE EXAM BY PATHOLOGIST: CPT | Mod: 26 | Performed by: PATHOLOGY

## 2023-09-18 PROCEDURE — 88342 IMHCHEM/IMCYTCHM 1ST ANTB: CPT | Mod: 26 | Performed by: PATHOLOGY

## 2023-09-18 NOTE — TELEPHONE ENCOUNTER
What type of discharge? Inpatient  Risk of Hospital admission or ED visit: 31%  Is a TCM episode required? No  When should the patient follow up with PCP? 14 days of discharge.    Amanda Chris RN  [facility]

## 2023-09-20 ENCOUNTER — LAB (OUTPATIENT)
Dept: LAB | Facility: CLINIC | Age: 78
End: 2023-09-20
Payer: MEDICARE

## 2023-09-20 DIAGNOSIS — K92.1 MELENA: ICD-10-CM

## 2023-09-20 LAB
ERYTHROCYTE [DISTWIDTH] IN BLOOD BY AUTOMATED COUNT: 13.2 % (ref 10–15)
HCT VFR BLD AUTO: 35.8 % (ref 40–53)
HGB BLD-MCNC: 11.6 G/DL (ref 13.3–17.7)
MCH RBC QN AUTO: 31.9 PG (ref 26.5–33)
MCHC RBC AUTO-ENTMCNC: 32.4 G/DL (ref 31.5–36.5)
MCV RBC AUTO: 98 FL (ref 78–100)
PLATELET # BLD AUTO: 189 10E3/UL (ref 150–450)
RBC # BLD AUTO: 3.64 10E6/UL (ref 4.4–5.9)
WBC # BLD AUTO: 6.1 10E3/UL (ref 4–11)

## 2023-09-20 PROCEDURE — 85027 COMPLETE CBC AUTOMATED: CPT

## 2023-09-20 PROCEDURE — 36415 COLL VENOUS BLD VENIPUNCTURE: CPT

## 2023-09-21 NOTE — TELEPHONE ENCOUNTER
BP has come up a little bit   Drank some water   Went up to 101 systolic     Will continue monitoring at home and follow up next week and call if any questions in meantime     Beverly SHETH, Triage RN  [facility]

## 2023-09-21 NOTE — RESULT ENCOUNTER NOTE
Abrahan Luke,    I have had the opportunity to review your recent results and an interpretation is as follows:  There has been no change in your hemoglobin.  We can check it again next week at your visit    Sincerely,  Nico Retana MD

## 2023-09-21 NOTE — TELEPHONE ENCOUNTER
"TO PCP:   Called patient for hospital follow up   Overall is doing okay, continues to have intermittent dizziness/lightheadedness   Has been holding off on Eliquis as advised   Will be seeing [facility] oncology on 10/3 for follow up on cancer   BPs have been running low - has been taking his 25mg daily Metoprolol (takes for a-fib). Most recent reading was in low 90s yesterday. Will continue to monitor and call back if BP drops further and/or develops symptoms with low reading. Is working on hydration and plans to follow up with you next week as scheduled     Beverly SHETH, Triage RN  [facility]       Appointments in Next Year      Sep 26, 2023  3:30 PM  (Arrive by 3:15 PM)  ED/Hospital Follow Up with Nico Retana MD  [facility] ([facility] ) [phone]     ED/Discharge Protocol    \"Hi, my name is Beverly Zavala RN, a registered nurse, and I am calling on behalf of Dr. Retana's office at [place].  I am calling to follow up and see how things are going for you after your recent visit.\"    \"I see that you were in the (ER/UC/IP) on 9/13-9/15.    How are you doing now that you are home?\" Slowly improving - continues to have dizziness. Unsure if related to medication or hospital recovery  BP readings have been lower. Runs lower anyway. Stopped his eliquis.     Lowest 83/67 HR 63 - 2 days ago   98/64   93/69 HR 59 - yesterday     At one point was on 100mg Metoprolol - has been decreased to 25mg at night. Taking for a-fib so they don't want to completley remove that med     Hydration? Working on staying hydrated     [facility] will see patient (oncology) on 10/3/23     Is patient experiencing symptoms that may require a hospital visit?  No     Discharge Instructions    \"Let's review your discharge instructions.  What is/are the follow-up recommendations?  Pt. Response: follow up with PCP     \"Were you instructed to make a follow-up " "appointment?\"  Pt. Response: Yes.  Has appointment been made?   Yes      \"When you see the provider, I would recommend that you bring your discharge instructions with you.    Medications    \"How many new medications are you on since your hospitalization/ED visit?\"    0-1  \"How many of your current medicines changed (dose, timing, name, etc.) while you were in the hospital/ED visit?\"   0-1  \"Do you have questions about your medications?\"   See above   \"Were you newly diagnosed with heart failure, COPD, diabetes or did you have a heart attack?\"   No  For patients on insulin: \"Did you start on insulin in the hospital or did you have your insulin dose changed?\"   No  Post Discharge Medication Reconciliation Status: discharge medications reconciled, continue medications without change.    Was MTM referral placed (*Make sure to put transitions as reason for referral)?   No    Call Summary    \"Do you have any questions or concerns about your condition or care plan at the moment?\"    No    Patient was in ER 2x in the past year (assess appropriateness of ER visits.)      \"If you have questions or things don't continue to improve, we encourage you contact us through the main clinic number,  ([phone]).  Even if the clinic is not open, triage nurses are available 24/7 to help you.     We would like you to know that our clinic has extended hours (provide information).  We also have urgent care (provide details on closest location and hours/contact info)\"    \"Thank you for your time and take care!\"  Beverly SHETH Triage ULISES  [facility]       "

## 2023-09-26 ENCOUNTER — OFFICE VISIT (OUTPATIENT)
Dept: FAMILY MEDICINE | Facility: CLINIC | Age: 78
End: 2023-09-26
Payer: MEDICARE

## 2023-09-26 VITALS
OXYGEN SATURATION: 99 % | DIASTOLIC BLOOD PRESSURE: 73 MMHG | TEMPERATURE: 98.1 F | BODY MASS INDEX: 34.8 KG/M2 | WEIGHT: 279.9 LBS | HEART RATE: 66 BPM | SYSTOLIC BLOOD PRESSURE: 105 MMHG | HEIGHT: 75 IN | RESPIRATION RATE: 10 BRPM

## 2023-09-26 DIAGNOSIS — K92.1 MELENA: ICD-10-CM

## 2023-09-26 DIAGNOSIS — I48.20 CHRONIC ATRIAL FIBRILLATION (H): Primary | ICD-10-CM

## 2023-09-26 DIAGNOSIS — C64.1 RENAL CELL CARCINOMA, RIGHT (H): ICD-10-CM

## 2023-09-26 LAB — HGB BLD-MCNC: 12 G/DL (ref 13.3–17.7)

## 2023-09-26 PROCEDURE — 36415 COLL VENOUS BLD VENIPUNCTURE: CPT | Performed by: INTERNAL MEDICINE

## 2023-09-26 PROCEDURE — 99214 OFFICE O/P EST MOD 30 MIN: CPT | Performed by: INTERNAL MEDICINE

## 2023-09-26 PROCEDURE — 85018 HEMOGLOBIN: CPT | Performed by: INTERNAL MEDICINE

## 2023-09-26 ASSESSMENT — PATIENT HEALTH QUESTIONNAIRE - PHQ9
SUM OF ALL RESPONSES TO PHQ QUESTIONS 1-9: 5
10. IF YOU CHECKED OFF ANY PROBLEMS, HOW DIFFICULT HAVE THESE PROBLEMS MADE IT FOR YOU TO DO YOUR WORK, TAKE CARE OF THINGS AT HOME, OR GET ALONG WITH OTHER PEOPLE: NOT DIFFICULT AT ALL
SUM OF ALL RESPONSES TO PHQ QUESTIONS 1-9: 5

## 2023-09-26 ASSESSMENT — PAIN SCALES - GENERAL: PAINLEVEL: NO PAIN (0)

## 2023-09-26 NOTE — PROGRESS NOTES
"  Neal Hernández is a 77 year old, presenting for the following health issues:  Hospital F/U      [facility]         Hospital Follow-up Visit:    Hospital/Nursing Home/IP Rehab Facility: [facility]  Date of Admission: 9/13/2023  Date of Discharge: 9/15/2023  Reason(s) for Admission: Long term use of anticoagulants , Acute blood loss anemia not requiring transfusion.     Was your hospitalization related to COVID-19? No   Problems taking medications regularly:  None  Medication changes since discharge: None  Problems adhering to non-medication therapy:  None    Summary of hospitalization:  [facility] discharge summary reviewed  Diagnostic Tests/Treatments reviewed.  Follow up needed: oncology, cardiology   Other Healthcare Providers Involved in Patient s Care:         None  Update since discharge: improved.         Plan of care communicated with patient           Chronic atrial fibrillation (H)   Mirella Nickerson has done well without bleeding since the hospitalization.  Has been taking pantoprazole 40 mg twice per day.  He has resumed a soft diet.  Not eating raw vegetables.  No abdominal pain.  Has not resumed apixaban yet.   Notes occasional dizziness when standing.  No racing heart.  No chest pain or shortness of breath.  Has had occasional lower blood pressure.  Taking metoprolol.  Trying to stay hydrated.        Review of Systems   Constitutional, HEENT, cardiovascular, pulmonary, GI - as above, , musculoskeletal, neuro, skin, endocrine and psych systems are negative, except as otherwise noted.      Objective    /73 (BP Location: Left arm, Patient Position: Sitting, Cuff Size: Adult Regular)   Pulse 66   Temp 98.1  F (36.7  C) (Tympanic)   Resp 10   Ht 1.905 m (6' 3\")   Wt 127 kg (279 lb 14.4 oz)   SpO2 99%   BMI 34.99 kg/m    Body mass index is 34.99 kg/m .  Physical Exam   GENERAL: healthy, alert and no distress  EYES: Eyes grossly normal to " inspection  NECK: no adenopathy, no asymmetry,    RESP: lungs clear to auscultation - no rales, rhonchi or wheezes  CV: regular rate and rhythm irregular rate and rhythm, no murmur, click or rub, trace peripheral edema   ABDOMEN: soft, nontender,    MS: no gross musculoskeletal defects noted, trace edema  SKIN: no suspicious lesions or rashes  NEURO: Normal strength and tone, mentation intact and speech normal  PSYCH: mentation appears normal, affect normal/bright    Patient Instructions   (I48.20) Chronic atrial fibrillation (H)  (primary encounter diagnosis)  Comment: We will continue off of apixaban for the time being. Continue metoprolol at current dose and continue to stay hydrated with more sodium and follow up in 2 weeks in cardiology.  Consider resuming apixaban pending discussion with oncology   Plan: Hemoglobin            (C64.1) Renal cell carcinoma, right (H)  Comment: follow up next week in oncology  Plan: Hemoglobin            (K92.1) Melena  Comment: We will recheck hemoglobin today and follow up accordingly   Plan: Hemoglobin                        Answers submitted by the patient for this visit:  Patient Health Questionnaire (Submitted on 9/26/2023)  If you checked off any problems, how difficult have these problems made it for you to do your work, take care of things at home, or get along with other people?: Not difficult at all  PHQ9 TOTAL SCORE: 5

## 2023-09-26 NOTE — PATIENT INSTRUCTIONS
(I48.20) Chronic atrial fibrillation (H)  (primary encounter diagnosis)  Comment: We will continue off of apixaban for the time being. Continue metoprolol at current dose and continue to stay hydrated with more sodium and follow up in 2 weeks in cardiology.  Consider resuming apixaban pending discussion with oncology   Plan: Hemoglobin            (C64.1) Renal cell carcinoma, right (H)  Comment: follow up next week in oncology  Plan: Hemoglobin            (K92.1) Melena  Comment: We will recheck hemoglobin today and follow up accordingly   Plan: Hemoglobin

## 2023-09-27 NOTE — RESULT ENCOUNTER NOTE
Abrahan Luke,    I have had the opportunity to review your recent results and an interpretation is as follows:  Your hemoglobin returned stable    Sincerely,  Nico Retana MD

## 2023-09-28 ENCOUNTER — TELEPHONE (OUTPATIENT)
Dept: NEUROLOGY | Facility: CLINIC | Age: 78
End: 2023-09-28
Payer: MEDICARE

## 2023-09-28 NOTE — TELEPHONE ENCOUNTER
Per Dr Milner patient is scheduled for follow up prior to 10/17/23 Neuropsych he would like me to reschedule the patient for 11/7 or later so we will have the results back to review.  Spoke with spouse Valery and will be sending them a [employer] message with my contact info and some alternate dates     Patient called and left voice mail to reschedule. Writer reached out and reschedule him for follow up on 11/2/23 at 9:30 am. He was upset saying he had come into the clinic nad was told by  staff they could not reschedule him or get someone to talk with him about his concerns as it violated HIPAA. I apologized that he had had issues and advised I would inform my supervisor so she could look into the matter. Unsure if he cam to [facility] or was as CSC and had an issue, but wife did say yesterday they were unable to be rescheduled and were told there were no appointments. Since central scheduling is unable to see appointments we have on hold and are not able to overbook or double book they most likely were unable to find a date that was satisfactory as patient wants to leave for [place] on Nov 1st. I wrote down patient info and passed it on to my supervisor Selene who will look into what happened and follow up with the patient

## 2023-10-04 NOTE — TELEPHONE ENCOUNTER
Returned voicemail from patient. He would like to schedule his echocardiogram prior to his appt with Dilma LINDSAY on 10/30/20. He would also like to schedule annual blood work. Will check on appt availability and call him back.    Per Dr. Quarles, to schedule BMP, ALT, lipids. Echo order already placed. Will check on availability prior to scheduled appt. Pt would also switch to Dr. Quarles if available. Will have scheduling call patient.    Beverly Roberson RN  [facility]    
Spoke to pt in conference room with NP. Staff reports pt in pleasant mood, cooperative, and slept good. Father will be picking pt up around 14:00. Stated that he was unaware of what he inhaled in the vape and said that he smoked it because he was bored. Educated pt on cessation of substances all together, pt agreed and said "I do not want to end up here again". Denies SI, HI, AVH, paranoia. Has no psychiatric complaints at this time, will discharge without meds. Will follow up with outpatient therapy (pt looking forward to it).

## 2023-10-06 ENCOUNTER — TELEPHONE (OUTPATIENT)
Dept: CARDIOLOGY | Facility: CLINIC | Age: 78
End: 2023-10-06
Payer: MEDICARE

## 2023-10-06 NOTE — TELEPHONE ENCOUNTER
Spoke with patient's wife. Patient's wife states that the patient has had some health issues over the last month. Patient was admitted mid September for a GI bleed, and his Eliquis was stopped at that time. Patient states that they found out this morning that the patient is having surgery on 10/11 next week at [place] to remove part of his small intestine, as his renal cell carcinoma has likely spread into the small intestine and caused the GI bleed. Patient has labs and echocardiogram scheduled for Monday 10/9 with our clinic. Patient's wife would like Dr. Quarles to review the echo results and give any thoughts or concerns on the echo prior to his surgery. Will set reminder to have Dr. Quarles review echo results on Monday prior to having surgery next week.

## 2023-10-06 NOTE — TELEPHONE ENCOUNTER
Patient left message on Team 2 requesting a call back today. A Patient of Dr. Quarles's.     Message sent to Team.

## 2023-10-09 ENCOUNTER — LAB (OUTPATIENT)
Dept: LAB | Facility: CLINIC | Age: 78
End: 2023-10-09
Payer: MEDICARE

## 2023-10-09 ENCOUNTER — HOSPITAL ENCOUNTER (OUTPATIENT)
Dept: CARDIOLOGY | Facility: CLINIC | Age: 78
Discharge: HOME | End: 2023-10-09
Attending: INTERNAL MEDICINE | Admitting: INTERNAL MEDICINE
Payer: MEDICARE

## 2023-10-09 DIAGNOSIS — I48.20 CHRONIC ATRIAL FIBRILLATION (H): ICD-10-CM

## 2023-10-09 DIAGNOSIS — E78.5 HYPERLIPIDEMIA LDL GOAL <100: ICD-10-CM

## 2023-10-09 DIAGNOSIS — I42.9 PRIMARY CARDIOMYOPATHY (H): ICD-10-CM

## 2023-10-09 LAB
ALT SERPL W P-5'-P-CCNC: 13 U/L (ref 0–70)
ANION GAP SERPL CALCULATED.3IONS-SCNC: 10 MMOL/L (ref 7–15)
BUN SERPL-MCNC: 23 MG/DL (ref 8–23)
CALCIUM SERPL-MCNC: 8.3 MG/DL (ref 8.8–10.2)
CHLORIDE SERPL-SCNC: 106 MMOL/L (ref 98–107)
CHOLEST SERPL-MCNC: 113 MG/DL
CREAT SERPL-MCNC: 1.77 MG/DL (ref 0.67–1.17)
DEPRECATED HCO3 PLAS-SCNC: 23 MMOL/L (ref 22–29)
EGFRCR SERPLBLD CKD-EPI 2021: 39 ML/MIN/1.73M2
GLUCOSE SERPL-MCNC: 115 MG/DL (ref 70–99)
HDLC SERPL-MCNC: 39 MG/DL
LDLC SERPL CALC-MCNC: 57 MG/DL
LVEF ECHO: NORMAL
NONHDLC SERPL-MCNC: 74 MG/DL
POTASSIUM SERPL-SCNC: 4.3 MMOL/L (ref 3.4–5.3)
SODIUM SERPL-SCNC: 139 MMOL/L (ref 135–145)
TRIGL SERPL-MCNC: 84 MG/DL

## 2023-10-09 PROCEDURE — 93306 TTE W/DOPPLER COMPLETE: CPT | Mod: 26 | Performed by: INTERNAL MEDICINE

## 2023-10-09 PROCEDURE — 36415 COLL VENOUS BLD VENIPUNCTURE: CPT | Performed by: INTERNAL MEDICINE

## 2023-10-09 PROCEDURE — 84460 ALANINE AMINO (ALT) (SGPT): CPT | Performed by: INTERNAL MEDICINE

## 2023-10-09 PROCEDURE — 80048 BASIC METABOLIC PNL TOTAL CA: CPT | Performed by: INTERNAL MEDICINE

## 2023-10-09 PROCEDURE — 80061 LIPID PANEL: CPT | Performed by: INTERNAL MEDICINE

## 2023-10-09 PROCEDURE — 93306 TTE W/DOPPLER COMPLETE: CPT

## 2023-10-09 NOTE — TELEPHONE ENCOUNTER
Devin Quarles MD  You36 minutes ago (2:59 PM)     [facility]  Looks fine.     Contacted patient to review echo results and Dr. Quarles's comments. Patient verbalized understanding and agreed with plan of care.

## 2023-10-09 NOTE — TELEPHONE ENCOUNTER
Echocardiogram results noted:    Interpretation Summary     The left ventricle is normal in size.  There is mild concentric left ventricular hypertrophy.  Left ventricular systolic function is normal. The visual ejection fraction is  55-60%.  No regional wall motion abnormalities noted.  Moderate aortic root dilatation, 4.6 cm  The ascending aorta is Moderately dilated, 4.6 cm  The rhythm was atrial fibrillation.  Compared to prior study, there is no significant change.    Will route to Dr. Quarles for review.

## 2023-10-24 ENCOUNTER — HOSPITAL ENCOUNTER (EMERGENCY)
Facility: CLINIC | Age: 78
Discharge: HOME | DRG: 394 | End: 2023-10-24
Attending: EMERGENCY MEDICINE | Admitting: EMERGENCY MEDICINE
Payer: MEDICARE

## 2023-10-24 ENCOUNTER — APPOINTMENT (OUTPATIENT)
Dept: CT IMAGING | Facility: CLINIC | Age: 78
DRG: 394 | End: 2023-10-24
Attending: EMERGENCY MEDICINE
Payer: MEDICARE

## 2023-10-24 VITALS
WEIGHT: 280.2 LBS | TEMPERATURE: 97.7 F | RESPIRATION RATE: 23 BRPM | OXYGEN SATURATION: 96 % | SYSTOLIC BLOOD PRESSURE: 128 MMHG | HEIGHT: 75 IN | DIASTOLIC BLOOD PRESSURE: 84 MMHG | HEART RATE: 89 BPM | BODY MASS INDEX: 34.84 KG/M2

## 2023-10-24 DIAGNOSIS — Z98.890 HISTORY OF VENTRAL HERNIA REPAIR: ICD-10-CM

## 2023-10-24 DIAGNOSIS — S30.1XXA ABDOMINAL WALL SEROMA, INITIAL ENCOUNTER: ICD-10-CM

## 2023-10-24 DIAGNOSIS — Z87.19 HISTORY OF VENTRAL HERNIA REPAIR: ICD-10-CM

## 2023-10-24 LAB
ABO/RH(D): NORMAL
ALBUMIN SERPL BCG-MCNC: 3.3 G/DL (ref 3.5–5.2)
ALP SERPL-CCNC: 67 U/L (ref 40–129)
ALT SERPL W P-5'-P-CCNC: 18 U/L (ref 0–70)
ANION GAP SERPL CALCULATED.3IONS-SCNC: 12 MMOL/L (ref 7–15)
ANTIBODY SCREEN: NEGATIVE
AST SERPL W P-5'-P-CCNC: 22 U/L (ref 0–45)
BASOPHILS # BLD AUTO: 0 10E3/UL (ref 0–0.2)
BASOPHILS NFR BLD AUTO: 0 %
BILIRUB SERPL-MCNC: 0.5 MG/DL
BUN SERPL-MCNC: 35.2 MG/DL (ref 8–23)
CALCIUM SERPL-MCNC: 8.5 MG/DL (ref 8.8–10.2)
CHLORIDE SERPL-SCNC: 101 MMOL/L (ref 98–107)
CREAT BLD-MCNC: 1.8 MG/DL (ref 0.7–1.3)
CREAT SERPL-MCNC: 1.69 MG/DL (ref 0.67–1.17)
DEPRECATED HCO3 PLAS-SCNC: 24 MMOL/L (ref 22–29)
EGFRCR SERPLBLD CKD-EPI 2021: 39 ML/MIN/1.73M2
EGFRCR SERPLBLD CKD-EPI 2021: 42 ML/MIN/1.73M2
EOSINOPHIL # BLD AUTO: 0.2 10E3/UL (ref 0–0.7)
EOSINOPHIL NFR BLD AUTO: 3 %
ERYTHROCYTE [DISTWIDTH] IN BLOOD BY AUTOMATED COUNT: 12.7 % (ref 10–15)
GLUCOSE SERPL-MCNC: 112 MG/DL (ref 70–99)
HCO3 BLDV-SCNC: 24 MMOL/L (ref 21–28)
HCT VFR BLD AUTO: 32.9 % (ref 40–53)
HGB BLD-MCNC: 10.4 G/DL (ref 13.3–17.7)
HOLD SPECIMEN: NORMAL
IMM GRANULOCYTES # BLD: 0 10E3/UL
IMM GRANULOCYTES NFR BLD: 1 %
LACTATE BLD-SCNC: 1 MMOL/L
LACTATE SERPL-SCNC: 1.2 MMOL/L (ref 0.7–2)
LYMPHOCYTES # BLD AUTO: 1.8 10E3/UL (ref 0.8–5.3)
LYMPHOCYTES NFR BLD AUTO: 23 %
MCH RBC QN AUTO: 31.1 PG (ref 26.5–33)
MCHC RBC AUTO-ENTMCNC: 31.6 G/DL (ref 31.5–36.5)
MCV RBC AUTO: 99 FL (ref 78–100)
MONOCYTES # BLD AUTO: 0.5 10E3/UL (ref 0–1.3)
MONOCYTES NFR BLD AUTO: 7 %
NEUTROPHILS # BLD AUTO: 5.3 10E3/UL (ref 1.6–8.3)
NEUTROPHILS NFR BLD AUTO: 66 %
NRBC # BLD AUTO: 0 10E3/UL
NRBC BLD AUTO-RTO: 0 /100
PCO2 BLDV: 40 MM HG (ref 40–50)
PH BLDV: 7.39 [PH] (ref 7.32–7.43)
PLATELET # BLD AUTO: 264 10E3/UL (ref 150–450)
PO2 BLDV: 23 MM HG (ref 25–47)
POTASSIUM SERPL-SCNC: 4.5 MMOL/L (ref 3.4–5.3)
PROT SERPL-MCNC: 6 G/DL (ref 6.4–8.3)
RBC # BLD AUTO: 3.34 10E6/UL (ref 4.4–5.9)
SAO2 % BLDV: 39 % (ref 94–100)
SODIUM SERPL-SCNC: 137 MMOL/L (ref 135–145)
SPECIMEN EXPIRATION DATE: NORMAL
WBC # BLD AUTO: 7.9 10E3/UL (ref 4–11)

## 2023-10-24 PROCEDURE — 85025 COMPLETE CBC W/AUTO DIFF WBC: CPT | Performed by: EMERGENCY MEDICINE

## 2023-10-24 PROCEDURE — 96360 HYDRATION IV INFUSION INIT: CPT | Mod: 59

## 2023-10-24 PROCEDURE — 83605 ASSAY OF LACTIC ACID: CPT | Performed by: EMERGENCY MEDICINE

## 2023-10-24 PROCEDURE — 36415 COLL VENOUS BLD VENIPUNCTURE: CPT | Performed by: EMERGENCY MEDICINE

## 2023-10-24 PROCEDURE — 250N000009 HC RX 250: Performed by: EMERGENCY MEDICINE

## 2023-10-24 PROCEDURE — 99285 EMERGENCY DEPT VISIT HI MDM: CPT | Mod: 25

## 2023-10-24 PROCEDURE — 82803 BLOOD GASES ANY COMBINATION: CPT

## 2023-10-24 PROCEDURE — 80053 COMPREHEN METABOLIC PANEL: CPT | Performed by: EMERGENCY MEDICINE

## 2023-10-24 PROCEDURE — 250N000011 HC RX IP 250 OP 636: Performed by: EMERGENCY MEDICINE

## 2023-10-24 PROCEDURE — 74177 CT ABD & PELVIS W/CONTRAST: CPT | Mod: MA

## 2023-10-24 PROCEDURE — 86901 BLOOD TYPING SEROLOGIC RH(D): CPT | Performed by: EMERGENCY MEDICINE

## 2023-10-24 PROCEDURE — 93005 ELECTROCARDIOGRAM TRACING: CPT

## 2023-10-24 PROCEDURE — 258N000003 HC RX IP 258 OP 636: Performed by: EMERGENCY MEDICINE

## 2023-10-24 PROCEDURE — 82565 ASSAY OF CREATININE: CPT

## 2023-10-24 RX ORDER — IOPAMIDOL 755 MG/ML
135 INJECTION, SOLUTION INTRAVASCULAR ONCE
Status: COMPLETED | OUTPATIENT
Start: 2023-10-24 | End: 2023-10-24

## 2023-10-24 RX ADMIN — SODIUM CHLORIDE 79 ML: 9 INJECTION, SOLUTION INTRAVENOUS at 16:30

## 2023-10-24 RX ADMIN — SODIUM CHLORIDE 500 ML: 9 INJECTION, SOLUTION INTRAVENOUS at 18:35

## 2023-10-24 RX ADMIN — IOPAMIDOL 135 ML: 755 INJECTION, SOLUTION INTRAVENOUS at 16:30

## 2023-10-24 ASSESSMENT — ACTIVITIES OF DAILY LIVING (ADL)
ADLS_ACUITY_SCORE: 35
ADLS_ACUITY_SCORE: 35

## 2023-10-24 NOTE — ED NOTES
Bed: ST03  Expected date:   Expected time:   Means of arrival:   Comments:  [id number] 77M New bleeding at surgical site.

## 2023-10-24 NOTE — ED PROVIDER NOTES
History     Chief Complaint:  Post-op Problem and Wound Dehiscence       HPI   Ti Nickerson is a 77 year old male past medical history significant for renal cancer status post partial nephrectomy with mets to the small bowel status post partial duodenectomy with hernia repair 2 weeks prior presenting for serous drainage from his wound as well as transiently low blood pressure.  He denies any fevers or chills, no redness or drainage, he does take Eliquis with his history of A-fib.      Independent Historian:   None - Patient Only    Review of External Notes:   I reviewed his operative note from 10/11/2023.      Medications:    acetaminophen (TYLENOL) 500 MG tablet  ASPIRIN NOT PRESCRIBED (INTENTIONAL)  loperamide (IMODIUM) 2 MG capsule  metoprolol succinate ER (TOPROL XL) 25 MG 24 hr tablet  pantoprazole (PROTONIX) 40 MG EC tablet  pravastatin (PRAVACHOL) 20 MG tablet  tamsulosin (FLOMAX) 0.4 MG capsule        Past Medical History:    Past Medical History:   Diagnosis Date    Atrial fibrillation, unspecified 9/18/2015    CAD (coronary artery disease) 09/18/2015    Elevated TSH 2018    Esophageal reflux 9/18/2015    Essential hypertension     History of cardioversion     Idiopathic cardiomyopathy (H) 09/18/2015    Mixed hyperlipidemia 9/18/2015    Mumps     Sleep apnea 09/18/2015    Sleep apnea     Thoracic aortic aneurysm (H)     Tubular adenoma of colon 01/2017       Past Surgical History:    Past Surgical History:   Procedure Laterality Date    APPENDECTOMY  1964    ESOPHAGOSCOPY, GASTROSCOPY, DUODENOSCOPY (EGD), COMBINED N/A 9/14/2023    Procedure: Esophagoscopy, gastroscopy, duodenoscopy (EGD), combined;  Surgeon: Chele Ash MD;  Location:  GI        Physical Exam   Patient Vitals for the past 24 hrs:   BP Temp Temp src Pulse Resp SpO2 Height Weight   10/24/23 1815 117/88 -- -- 76 23 99 % -- --   10/24/23 1803 (!) 119/90 -- -- -- -- -- -- --   10/24/23 1745 132/85 -- -- 80 16 95 % -- --  "  10/24/23 1645 (!) 142/90 -- -- -- 15 99 % -- --   10/24/23 1638 128/75 -- -- -- -- 99 % -- --   10/24/23 1622 -- 97.9  F (36.6  C) Temporal 79 12 98 % -- --   10/24/23 1615 136/87 -- -- 75 13 98 % 1.905 m (6' 3\") 127.1 kg (280 lb 3.3 oz)        Physical Exam  Constitutional: Alert, attentive, GCS 15   Eyes: EOM are normal, anicteric, conjugate gaze  CV: distal extremities warm, well perfused  Chest: Non-labored breathing on RA  GI: Large linear midline surgical incision is clean dry and intact, there is one punctate area of serous drainage at the end of the proximal third, non tender. No distension. No guarding or rebound.    Neurological: Alert, attentive, moving all extremities equally.   Skin: Skin is warm and dry.      Emergency Department Course     ECG results from 10/24/23   EKG 12 lead     Value    Systolic Blood Pressure     Diastolic Blood Pressure     Ventricular Rate 65    Atrial Rate     KY Interval     QRS Duration 102        QTc 428    P Axis     R AXIS -34    T Axis 37    Interpretation ECG      Atrial fibrillation  Left axis deviation  Nonspecific ST abnormality  Abnormal ECG  When compared with ECG of 11-DEC-2011 15:02,  No significant change was found           Imaging:  Abd/pelvis CT,  IV  contrast only TRAUMA / AAA   Final Result   IMPRESSION:    1.  Interval repair of a ventral wall hernia. There is a tubular 9.5 x 3.6 x 2.8 cm left paramedian subcutaneous fluid collection inferiorly compatible with seroma.      2.  Postsurgical changes in the horizontal duodenum. Stable indeterminate upper retroperitoneal lymphadenopathy.      3.  Stable hypodense solid-appearing 3.1 cm subcapsular nodule in the inferior right hepatic lobe.      4.  Prostatic enlargement.      5.  Multiple colonic diverticula without CT evidence for diverticulitis.             Laboratory:  Labs Ordered and Resulted from Time of ED Arrival to Time of ED Departure   COMPREHENSIVE METABOLIC PANEL - Abnormal       Result " Value    Sodium 137      Potassium 4.5      Carbon Dioxide (CO2) 24      Anion Gap 12      Urea Nitrogen 35.2 (*)     Creatinine 1.69 (*)     GFR Estimate 42 (*)     Calcium 8.5 (*)     Chloride 101      Glucose 112 (*)     Alkaline Phosphatase 67      AST 22      ALT 18      Protein Total 6.0 (*)     Albumin 3.3 (*)     Bilirubin Total 0.5     CBC WITH PLATELETS AND DIFFERENTIAL - Abnormal    WBC Count 7.9      RBC Count 3.34 (*)     Hemoglobin 10.4 (*)     Hematocrit 32.9 (*)     MCV 99      MCH 31.1      MCHC 31.6      RDW 12.7      Platelet Count 264      % Neutrophils 66      % Lymphocytes 23      % Monocytes 7      % Eosinophils 3      % Basophils 0      % Immature Granulocytes 1      NRBCs per 100 WBC 0      Absolute Neutrophils 5.3      Absolute Lymphocytes 1.8      Absolute Monocytes 0.5      Absolute Eosinophils 0.2      Absolute Basophils 0.0      Absolute Immature Granulocytes 0.0      Absolute NRBCs 0.0     ISTAT CREATININE POCT - Abnormal    Creatinine POCT 1.8 (*)     GFR, ESTIMATED POCT 39 (*)    ISTAT GASES LACTATE VENOUS POCT - Abnormal    Lactic Acid POCT 1.0      Bicarbonate Venous POCT 24      O2 Sat, Venous POCT 39 (*)     pCO2 Venous POCT 40      pH Venous POCT 7.39      pO2 Venous POCT 23 (*)    LACTIC ACID WHOLE BLOOD - Normal    Lactic Acid 1.2     ISTAT CREATININE POCT   TYPE AND SCREEN, ADULT    ABO/RH(D) B POS      Antibody Screen Negative      SPECIMEN EXPIRATION DATE [id number]     ABO/RH TYPE AND SCREEN          Emergency Department Course & Assessments:    Interventions:  Medications   iopamidol (ISOVUE-370) solution 135 mL (135 mLs Intravenous $Given 10/24/23 1630)   sodium chloride 0.9 % bag 500mL for CT scan flush use (79 mLs Intravenous $Given 10/24/23 1630)   sodium chloride 0.9% BOLUS 500 mL (500 mLs Intravenous $New Bag 10/24/23 3391)          Independent Interpretation (X-rays, CTs, rhythm strip):  None    Consultations/Discussion of Management or Tests:  [facility]  hepatobiliary team, I spoke with the on-call fellow who personally reviewed the pushed over CT images.       Social Determinants of Health affecting care:   None    Disposition:  The patient was discharged to home.     Impression & Plan      Medical Decision Makin-year-old male complicated past medical history including persistent A-fib on Eliquis, renal cancer with mets to his small bowel who underwent partial nephrectomy as well as partial duodenectomy most recently at [facility] 2 weeks ago presenting for serous drainage from his wound.  He had some transient low blood pressure which is reportedly normal for him with positional changes for which she has had slowly decreasing metoprolol dosing.  His hemoglobin here is only slightly down from preoperative state, he has no active bleeding, CT imaging is suspicious mostly for a seroma.  CT imaging was reviewed with on-call hepatobiliary fellow at [facility] who does not see any acute concerning findings.  They will plan to have him follow-up in clinic in the next few days.  He was noted to be profoundly orthostatic when standing however was not symptomatic and he reports that he typically runs low normal blood pressure and has a history of orthostatic hypotension.  He was given additional 500 cc fluid bolus, and was discharged with plan for [facility] follow-up.      Diagnosis:    ICD-10-CM    1. Abdominal wall seroma, initial encounter  S30.1XXA       2. History of ventral hernia repair  Z98.890     Z87.19            Jamal Caputo MD  [employer]  7:00 PM 10/24/23          Jamal Caputo MD  10/24/23 1900

## 2023-10-24 NOTE — ED TRIAGE NOTES
Patient BIBA from home after noticing some pinkish colored fluid coming from his incision in his abdomen. Patient had part of his small intestine removed and a hernia on 10/11 at [facility]. When EMS arrived patient had 2 PIVs placed, and 200cc of NS given.      Triage Assessment (Adult)       Row Name 10/24/23 1624          Triage Assessment    Airway WDL WDL        Respiratory WDL    Respiratory WDL WDL        Skin Circulation/Temperature WDL    Skin Circulation/Temperature WDL X  incision to abd        Cardiac WDL    Cardiac WDL WDL        Peripheral/Neurovascular WDL    Peripheral Neurovascular WDL WDL        Cognitive/Neuro/Behavioral WDL    Cognitive/Neuro/Behavioral WDL WDL

## 2023-10-24 NOTE — ED NOTES
[facility]  ED Nurse Handoff Report    ED Chief complaint: Post-op Problem and Wound Dehiscence      ED Diagnosis:   Final diagnoses:   None     Code Status: To be addressed by admitting provider    Allergies:   Allergies   Allergen Reactions    Oxycodone Other (See Comments)     Hallucinations when given after surgery        Patient Story: Patient presents to ED with recent partial duodenectomy due to malignant neoplasm. Surgical incision was same incision site where he had a previous surgery in past. Patient was hypotensive at home and had some serosanguinous drainage from incision site. Orthostatic BP positive in ED.  Focused Assessment:    Abd/pelvis CT,  IV  contrast only TRAUMA / AAA   Final Result   IMPRESSION:    1.  Interval repair of a ventral wall hernia. There is a tubular 9.5 x 3.6 x 2.8 cm left paramedian subcutaneous fluid collection inferiorly compatible with seroma.      2.  Postsurgical changes in the horizontal duodenum. Stable indeterminate upper retroperitoneal lymphadenopathy.      3.  Stable hypodense solid-appearing 3.1 cm subcapsular nodule in the inferior right hepatic lobe.      4.  Prostatic enlargement.      5.  Multiple colonic diverticula without CT evidence for diverticulitis.        Labs Ordered and Resulted from Time of ED Arrival to Time of ED Departure   COMPREHENSIVE METABOLIC PANEL - Abnormal       Result Value    Sodium 137      Potassium 4.5      Carbon Dioxide (CO2) 24      Anion Gap 12      Urea Nitrogen 35.2 (*)     Creatinine 1.69 (*)     GFR Estimate 42 (*)     Calcium 8.5 (*)     Chloride 101      Glucose 112 (*)     Alkaline Phosphatase 67      AST 22      ALT 18      Protein Total 6.0 (*)     Albumin 3.3 (*)     Bilirubin Total 0.5     CBC WITH PLATELETS AND DIFFERENTIAL - Abnormal    WBC Count 7.9      RBC Count 3.34 (*)     Hemoglobin 10.4 (*)     Hematocrit 32.9 (*)     MCV 99      MCH 31.1      MCHC 31.6      RDW 12.7      Platelet Count 264      %  Neutrophils 66      % Lymphocytes 23      % Monocytes 7      % Eosinophils 3      % Basophils 0      % Immature Granulocytes 1      NRBCs per 100 WBC 0      Absolute Neutrophils 5.3      Absolute Lymphocytes 1.8      Absolute Monocytes 0.5      Absolute Eosinophils 0.2      Absolute Basophils 0.0      Absolute Immature Granulocytes 0.0      Absolute NRBCs 0.0     ISTAT CREATININE POCT - Abnormal    Creatinine POCT 1.8 (*)     GFR, ESTIMATED POCT 39 (*)    ISTAT GASES LACTATE VENOUS POCT - Abnormal    Lactic Acid POCT 1.0      Bicarbonate Venous POCT 24      O2 Sat, Venous POCT 39 (*)     pCO2 Venous POCT 40      pH Venous POCT 7.39      pO2 Venous POCT 23 (*)    LACTIC ACID WHOLE BLOOD - Normal    Lactic Acid 1.2     ISTAT CREATININE POCT   TYPE AND SCREEN, ADULT    ABO/RH(D) B POS      Antibody Screen Negative      SPECIMEN EXPIRATION DATE [id number]     ABO/RH TYPE AND SCREEN     Treatments and/or interventions provided:   Medications   sodium chloride 0.9% BOLUS 1,000 mL (has no administration in time range)   iopamidol (ISOVUE-370) solution 135 mL (135 mLs Intravenous $Given 10/24/23 1630)   sodium chloride 0.9 % bag 500mL for CT scan flush use (79 mLs Intravenous $Given 10/24/23 1630)     Does this patient have any cognitive concerns?:  No    Activity level - Baseline/Home:  Independent  Activity Level - Current:   Unknown    Patient's Preferred language: English   Needed?: No    Isolation: None  Infection: Not Applicable  Patient tested for COVID 19 prior to admission: NO  Bariatric?: No    Vital Signs:   Vitals:    10/24/23 1638 10/24/23 1645 10/24/23 1745 10/24/23 1803   BP: 128/75 (!) 142/90 132/85 (!) 119/90   BP Location: Left arm      Pulse:   80    Resp:  15 16    Temp:       TempSrc:       SpO2: 99% 99% 95%    Weight:       Height:         Cardiac rhythm: Afib with HR [id number].     ED NURSE PHONE NUMBER: *[id number]

## 2023-10-24 NOTE — DISCHARGE INSTRUCTIONS
I recommend calling your surgeon first thing in the morning, they are aware that you were here.  You should transition from sitting to standing to walking slowly.  Return here should you develop high fever, surrounding redness to your wound or passout.  It is safe for you to continue to take your medications as previously prescribed.

## 2023-10-25 ENCOUNTER — HOSPITAL ENCOUNTER (INPATIENT)
Facility: CLINIC | Age: 78
LOS: 2 days | Discharge: HOME | DRG: 394 | End: 2023-10-27
Attending: EMERGENCY MEDICINE | Admitting: INTERNAL MEDICINE
Payer: MEDICARE

## 2023-10-25 ENCOUNTER — APPOINTMENT (OUTPATIENT)
Dept: CT IMAGING | Facility: CLINIC | Age: 78
DRG: 394 | End: 2023-10-25
Attending: EMERGENCY MEDICINE
Payer: MEDICARE

## 2023-10-25 DIAGNOSIS — K92.1 MELENA: ICD-10-CM

## 2023-10-25 DIAGNOSIS — I48.20 CHRONIC ATRIAL FIBRILLATION (H): Primary | ICD-10-CM

## 2023-10-25 DIAGNOSIS — K92.1 GASTROINTESTINAL HEMORRHAGE WITH MELENA: ICD-10-CM

## 2023-10-25 LAB
ABO/RH(D): NORMAL
ALBUMIN SERPL BCG-MCNC: 3.1 G/DL (ref 3.5–5.2)
ALP SERPL-CCNC: 60 U/L (ref 40–129)
ALT SERPL W P-5'-P-CCNC: 18 U/L (ref 0–70)
ANION GAP SERPL CALCULATED.3IONS-SCNC: 9 MMOL/L (ref 7–15)
ANTIBODY SCREEN: NEGATIVE
APTT PPP: 51 SECONDS (ref 22–38)
AST SERPL W P-5'-P-CCNC: 23 U/L (ref 0–45)
ATRIAL RATE - MUSE: NORMAL BPM
BASOPHILS # BLD AUTO: 0 10E3/UL (ref 0–0.2)
BASOPHILS NFR BLD AUTO: 1 %
BILIRUB SERPL-MCNC: 0.5 MG/DL
BLD PROD TYP BPU: NORMAL
BLD PROD TYP BPU: NORMAL
BLOOD COMPONENT TYPE: NORMAL
BLOOD COMPONENT TYPE: NORMAL
BUN SERPL-MCNC: 36.4 MG/DL (ref 8–23)
CALCIUM SERPL-MCNC: 8.2 MG/DL (ref 8.8–10.2)
CHLORIDE SERPL-SCNC: 109 MMOL/L (ref 98–107)
CODING SYSTEM: NORMAL
CODING SYSTEM: NORMAL
CREAT SERPL-MCNC: 1.54 MG/DL (ref 0.67–1.17)
CROSSMATCH: NORMAL
CROSSMATCH: NORMAL
DEPRECATED HCO3 PLAS-SCNC: 21 MMOL/L (ref 22–29)
DIASTOLIC BLOOD PRESSURE - MUSE: NORMAL MMHG
EGFRCR SERPLBLD CKD-EPI 2021: 46 ML/MIN/1.73M2
EOSINOPHIL # BLD AUTO: 0.1 10E3/UL (ref 0–0.7)
EOSINOPHIL NFR BLD AUTO: 1 %
ERYTHROCYTE [DISTWIDTH] IN BLOOD BY AUTOMATED COUNT: 12.8 % (ref 10–15)
GLUCOSE SERPL-MCNC: 102 MG/DL (ref 70–99)
HCT VFR BLD AUTO: 30.3 % (ref 40–53)
HGB BLD-MCNC: 9.2 G/DL (ref 13.3–17.7)
HGB BLD-MCNC: 9.7 G/DL (ref 13.3–17.7)
HGB BLD-MCNC: 9.7 G/DL (ref 13.3–17.7)
HOLD SPECIMEN: NORMAL
HOLD SPECIMEN: NORMAL
IMM GRANULOCYTES # BLD: 0.1 10E3/UL
IMM GRANULOCYTES NFR BLD: 1 %
INR PPP: 2.29 (ref 0.85–1.15)
INTERPRETATION ECG - MUSE: NORMAL
LYMPHOCYTES # BLD AUTO: 1.1 10E3/UL (ref 0.8–5.3)
LYMPHOCYTES NFR BLD AUTO: 13 %
MCH RBC QN AUTO: 31.6 PG (ref 26.5–33)
MCHC RBC AUTO-ENTMCNC: 32 G/DL (ref 31.5–36.5)
MCV RBC AUTO: 99 FL (ref 78–100)
MONOCYTES # BLD AUTO: 0.6 10E3/UL (ref 0–1.3)
MONOCYTES NFR BLD AUTO: 7 %
NEUTROPHILS # BLD AUTO: 6.7 10E3/UL (ref 1.6–8.3)
NEUTROPHILS NFR BLD AUTO: 77 %
NRBC # BLD AUTO: 0 10E3/UL
NRBC BLD AUTO-RTO: 0 /100
P AXIS - MUSE: NORMAL DEGREES
PLATELET # BLD AUTO: 251 10E3/UL (ref 150–450)
POTASSIUM SERPL-SCNC: 4.6 MMOL/L (ref 3.4–5.3)
PR INTERVAL - MUSE: NORMAL MS
PROT SERPL-MCNC: 5.7 G/DL (ref 6.4–8.3)
QRS DURATION - MUSE: 102 MS
QT - MUSE: 412 MS
QTC - MUSE: 428 MS
R AXIS - MUSE: -34 DEGREES
RBC # BLD AUTO: 3.07 10E6/UL (ref 4.4–5.9)
SODIUM SERPL-SCNC: 139 MMOL/L (ref 135–145)
SPECIMEN EXPIRATION DATE: NORMAL
SYSTOLIC BLOOD PRESSURE - MUSE: NORMAL MMHG
T AXIS - MUSE: 37 DEGREES
UNIT ABO/RH: NORMAL
UNIT ABO/RH: NORMAL
UNIT NUMBER: NORMAL
UNIT NUMBER: NORMAL
UNIT STATUS: NORMAL
UNIT STATUS: NORMAL
UNIT TYPE ISBT: 7300
UNIT TYPE ISBT: 7300
VENTRICULAR RATE- MUSE: 65 BPM
WBC # BLD AUTO: 8.6 10E3/UL (ref 4–11)

## 2023-10-25 PROCEDURE — 250N000011 HC RX IP 250 OP 636: Mod: JZ | Performed by: INTERNAL MEDICINE

## 2023-10-25 PROCEDURE — 85025 COMPLETE CBC W/AUTO DIFF WBC: CPT | Performed by: EMERGENCY MEDICINE

## 2023-10-25 PROCEDURE — 93005 ELECTROCARDIOGRAM TRACING: CPT

## 2023-10-25 PROCEDURE — 99223 1ST HOSP IP/OBS HIGH 75: CPT | Mod: A1 | Performed by: INTERNAL MEDICINE

## 2023-10-25 PROCEDURE — 99285 EMERGENCY DEPT VISIT HI MDM: CPT | Mod: 25

## 2023-10-25 PROCEDURE — 86923 COMPATIBILITY TEST ELECTRIC: CPT | Performed by: EMERGENCY MEDICINE

## 2023-10-25 PROCEDURE — 85730 THROMBOPLASTIN TIME PARTIAL: CPT | Performed by: EMERGENCY MEDICINE

## 2023-10-25 PROCEDURE — 120N000001 HC R&B MED SURG/OB

## 2023-10-25 PROCEDURE — 258N000003 HC RX IP 258 OP 636: Performed by: EMERGENCY MEDICINE

## 2023-10-25 PROCEDURE — 250N000009 HC RX 250: Performed by: EMERGENCY MEDICINE

## 2023-10-25 PROCEDURE — 74174 CTA ABD&PLVS W/CONTRAST: CPT | Mod: MA

## 2023-10-25 PROCEDURE — 96361 HYDRATE IV INFUSION ADD-ON: CPT

## 2023-10-25 PROCEDURE — 36415 COLL VENOUS BLD VENIPUNCTURE: CPT | Performed by: EMERGENCY MEDICINE

## 2023-10-25 PROCEDURE — 80053 COMPREHEN METABOLIC PANEL: CPT | Performed by: EMERGENCY MEDICINE

## 2023-10-25 PROCEDURE — C9113 INJ PANTOPRAZOLE SODIUM, VIA: HCPCS | Mod: JZ | Performed by: EMERGENCY MEDICINE

## 2023-10-25 PROCEDURE — 85018 HEMOGLOBIN: CPT | Performed by: EMERGENCY MEDICINE

## 2023-10-25 PROCEDURE — 85610 PROTHROMBIN TIME: CPT | Performed by: EMERGENCY MEDICINE

## 2023-10-25 PROCEDURE — 96374 THER/PROPH/DIAG INJ IV PUSH: CPT | Mod: 59

## 2023-10-25 PROCEDURE — 250N000011 HC RX IP 250 OP 636: Performed by: EMERGENCY MEDICINE

## 2023-10-25 PROCEDURE — 250N000011 HC RX IP 250 OP 636: Mod: JZ | Performed by: EMERGENCY MEDICINE

## 2023-10-25 PROCEDURE — 85018 HEMOGLOBIN: CPT | Performed by: INTERNAL MEDICINE

## 2023-10-25 PROCEDURE — C9113 INJ PANTOPRAZOLE SODIUM, VIA: HCPCS | Mod: JZ | Performed by: INTERNAL MEDICINE

## 2023-10-25 PROCEDURE — 36415 COLL VENOUS BLD VENIPUNCTURE: CPT | Performed by: INTERNAL MEDICINE

## 2023-10-25 PROCEDURE — 86901 BLOOD TYPING SEROLOGIC RH(D): CPT | Performed by: EMERGENCY MEDICINE

## 2023-10-25 PROCEDURE — 258N000003 HC RX IP 258 OP 636: Performed by: INTERNAL MEDICINE

## 2023-10-25 RX ORDER — METOPROLOL TARTRATE 1 MG/ML
2.5 INJECTION, SOLUTION INTRAVENOUS EVERY 4 HOURS PRN
Status: DISCONTINUED | OUTPATIENT
Start: 2023-10-25 | End: 2023-10-27 | Stop reason: HOSPADM

## 2023-10-25 RX ORDER — AMOXICILLIN 250 MG
1 CAPSULE ORAL 2 TIMES DAILY PRN
Status: DISCONTINUED | OUTPATIENT
Start: 2023-10-25 | End: 2023-10-27 | Stop reason: HOSPADM

## 2023-10-25 RX ORDER — IOPAMIDOL 755 MG/ML
135 INJECTION, SOLUTION INTRAVASCULAR ONCE
Status: COMPLETED | OUTPATIENT
Start: 2023-10-25 | End: 2023-10-25

## 2023-10-25 RX ORDER — MULTIPLE VITAMINS W/ MINERALS TAB 9MG-400MCG
1 TAB ORAL DAILY
Status: ON HOLD | COMMUNITY
End: 2024-07-14

## 2023-10-25 RX ORDER — SODIUM CHLORIDE 9 MG/ML
INJECTION, SOLUTION INTRAVENOUS CONTINUOUS
Status: DISCONTINUED | OUTPATIENT
Start: 2023-10-25 | End: 2023-10-26

## 2023-10-25 RX ORDER — ONDANSETRON 4 MG/1
4 TABLET, ORALLY DISINTEGRATING ORAL EVERY 6 HOURS PRN
Status: DISCONTINUED | OUTPATIENT
Start: 2023-10-25 | End: 2023-10-27 | Stop reason: HOSPADM

## 2023-10-25 RX ORDER — AMOXICILLIN 250 MG
2 CAPSULE ORAL 2 TIMES DAILY PRN
Status: DISCONTINUED | OUTPATIENT
Start: 2023-10-25 | End: 2023-10-27 | Stop reason: HOSPADM

## 2023-10-25 RX ORDER — POLYETHYLENE GLYCOL 3350 17 G/17G
17 POWDER, FOR SOLUTION ORAL DAILY PRN
Status: DISCONTINUED | OUTPATIENT
Start: 2023-10-25 | End: 2023-10-27 | Stop reason: HOSPADM

## 2023-10-25 RX ORDER — ONDANSETRON 2 MG/ML
4 INJECTION INTRAMUSCULAR; INTRAVENOUS EVERY 6 HOURS PRN
Status: DISCONTINUED | OUTPATIENT
Start: 2023-10-25 | End: 2023-10-27 | Stop reason: HOSPADM

## 2023-10-25 RX ORDER — BISACODYL 10 MG
10 SUPPOSITORY, RECTAL RECTAL DAILY PRN
Status: DISCONTINUED | OUTPATIENT
Start: 2023-10-25 | End: 2023-10-27 | Stop reason: HOSPADM

## 2023-10-25 RX ORDER — ACETAMINOPHEN 500 MG
500-1000 TABLET ORAL EVERY 6 HOURS PRN
Status: DISCONTINUED | OUTPATIENT
Start: 2023-10-25 | End: 2023-10-27 | Stop reason: HOSPADM

## 2023-10-25 RX ADMIN — SODIUM CHLORIDE 1000 ML: 9 INJECTION, SOLUTION INTRAVENOUS at 17:52

## 2023-10-25 RX ADMIN — SODIUM CHLORIDE 79 ML: 9 INJECTION, SOLUTION INTRAVENOUS at 10:08

## 2023-10-25 RX ADMIN — PANTOPRAZOLE SODIUM 80 MG: 40 INJECTION, POWDER, FOR SOLUTION INTRAVENOUS at 09:06

## 2023-10-25 RX ADMIN — SODIUM CHLORIDE 1000 ML: 9 INJECTION, SOLUTION INTRAVENOUS at 09:01

## 2023-10-25 RX ADMIN — SODIUM CHLORIDE, PRESERVATIVE FREE: 5 INJECTION INTRAVENOUS at 20:47

## 2023-10-25 RX ADMIN — PANTOPRAZOLE SODIUM 40 MG: 40 INJECTION, POWDER, FOR SOLUTION INTRAVENOUS at 21:41

## 2023-10-25 RX ADMIN — IOPAMIDOL 100 ML: 755 INJECTION, SOLUTION INTRAVENOUS at 10:08

## 2023-10-25 RX ADMIN — SODIUM CHLORIDE 1000 ML: 9 INJECTION, SOLUTION INTRAVENOUS at 14:57

## 2023-10-25 ASSESSMENT — ACTIVITIES OF DAILY LIVING (ADL)
ADLS_ACUITY_SCORE: 35
ADLS_ACUITY_SCORE: 20
ADLS_ACUITY_SCORE: 35

## 2023-10-25 NOTE — ED NOTES
[facility]  ED Nurse Handoff Report    ED Chief complaint: Dizziness and Melena      ED Diagnosis:   Final diagnoses:   None       Code Status: Per the admitting Provider       Allergies:   Allergies   Allergen Reactions    Fentanyl Confusion       Patient Story: Pt reports having   x 4 black tarry stool early this morning. According to pt and his wife Valery, pt had  hernia and bowel resection surgeries done at the [facility] on 10/11/23  due to cancer of the Kidney. Hx. Afib on Eliquis.     Focused Assessment:  A/o x 4, Abd large round and distended soft to palpate. + BS x all four quadrants. LLQ with surgical incision healing, observed to have 4x4 gauze dressing in placed. Scant amount of serosanguinous non foul drainage noted on dressing.       Treatments and/or interventions provided: Lab, EKG, Protonix, CT, Normal Saline x 1 liter   His initial Hgb results 4.3, No active bleed noted. STAT repeat hgb results are 9.7 and 9.2 respectively.   KCENTRA and blood currently on hold pending CT findings.   Results for orders placed or performed during the hospital encounter of 10/25/23   INR     Status: Abnormal   Result Value Ref Range    INR 2.29 (H) 0.85 - 1.15   Partial thromboplastin time     Status: Abnormal   Result Value Ref Range    aPTT 51 (H) 22 - 38 Seconds   Comprehensive metabolic panel     Status: Abnormal   Result Value Ref Range    Sodium 139 135 - 145 mmol/L    Potassium 4.6 3.4 - 5.3 mmol/L    Carbon Dioxide (CO2) 21 (L) 22 - 29 mmol/L    Anion Gap 9 7 - 15 mmol/L    Urea Nitrogen 36.4 (H) 8.0 - 23.0 mg/dL    Creatinine 1.54 (H) 0.67 - 1.17 mg/dL    GFR Estimate 46 (L) >60 mL/min/1.73m2    Calcium 8.2 (L) 8.8 - 10.2 mg/dL    Chloride 109 (H) 98 - 107 mmol/L    Glucose 102 (H) 70 - 99 mg/dL    Alkaline Phosphatase 60 40 - 129 U/L    AST 23 0 - 45 U/L    ALT 18 0 - 70 U/L    Protein Total 5.7 (L) 6.4 - 8.3 g/dL    Albumin 3.1 (L) 3.5 - 5.2 g/dL    Bilirubin Total 0.5 <=1.2 mg/dL   CBC  with platelets and differential     Status: Abnormal   Result Value Ref Range    WBC Count 8.6 4.0 - 11.0 10e3/uL    RBC Count 3.07 (L) 4.40 - 5.90 10e6/uL    Hemoglobin 9.7 (L) 13.3 - 17.7 g/dL    Hematocrit 30.3 (L) 40.0 - 53.0 %    MCV 99 78 - 100 fL    MCH 31.6 26.5 - 33.0 pg    MCHC 32.0 31.5 - 36.5 g/dL    RDW 12.8 10.0 - 15.0 %    Platelet Count 251 150 - 450 10e3/uL    % Neutrophils 77 %    % Lymphocytes 13 %    % Monocytes 7 %    % Eosinophils 1 %    % Basophils 1 %    % Immature Granulocytes 1 %    NRBCs per 100 WBC 0 <1 /100    Absolute Neutrophils 6.7 1.6 - 8.3 10e3/uL    Absolute Lymphocytes 1.1 0.8 - 5.3 10e3/uL    Absolute Monocytes 0.6 0.0 - 1.3 10e3/uL    Absolute Eosinophils 0.1 0.0 - 0.7 10e3/uL    Absolute Basophils 0.0 0.0 - 0.2 10e3/uL    Absolute Immature Granulocytes 0.1 <=0.4 10e3/uL    Absolute NRBCs 0.0 10e3/uL   Extra Tube     Status: None    Narrative    The following orders were created for panel order Extra Tube.  Procedure                               Abnormality         Status                     ---------                               -----------         ------                     Extra Red Top Tube[[id number]]                               Final result                 Please view results for these tests on the individual orders.   Extra Red Top Tube     Status: None   Result Value Ref Range    Hold Specimen [facility]    Hemoglobin     Status: Abnormal   Result Value Ref Range    Hemoglobin 9.2 (L) 13.3 - 17.7 g/dL   Extra Tube     Status: None (In process)    Narrative    The following orders were created for panel order Extra Tube.  Procedure                               Abnormality         Status                     ---------                               -----------         ------                     Extra Green Top (Lithium...[[id number]]                      In process                   Please view results for these tests on the individual orders.   EKG 12-lead, tracing only      Status: None (Preliminary result)   Result Value Ref Range    Systolic Blood Pressure  mmHg    Diastolic Blood Pressure  mmHg    Ventricular Rate 80 BPM    Atrial Rate  BPM    FL Interval  ms    QRS Duration 100 ms     ms    QTc 454 ms    P Axis  degrees    R AXIS -37 degrees    T Axis 40 degrees    Interpretation ECG       Atrial fibrillation  Left axis deviation  Low voltage QRS  Nonspecific ST abnormality  Abnormal ECG  When compared with ECG of 24-OCT-2023 16:11,  No significant change was found     Adult Type and Screen     Status: None   Result Value Ref Range    ABO/RH(D) B POS     Antibody Screen Negative Negative    SPECIMEN EXPIRATION DATE [id number]    Prepare red blood cells (unit)     Status: None (Preliminary result)   Result Value Ref Range    Blood Component Type Red Blood Cells     Product Code E2977K36     Unit Status Ready for issue     Unit Number [id number]     CROSSMATCH Compatible     CODING SYSTEM [id number]    Prepare red blood cells (unit)     Status: None (Preliminary result)   Result Value Ref Range    Blood Component Type Red Blood Cells     Product Code N4704O28     Unit Status Ready for issue     Unit Number [id number]     CROSSMATCH Compatible     CODING SYSTEM [id number]    CBC with platelets differential     Status: Abnormal    Narrative    The following orders were created for panel order CBC with platelets differential.  Procedure                               Abnormality         Status                     ---------                               -----------         ------                     CBC with platelets and d...[[id number]]  Abnormal            Final result                 Please view results for these tests on the individual orders.   ABO/Rh type and screen     Status: None    Narrative    The following orders were created for panel order ABO/Rh type and screen.  Procedure                               Abnormality         Status                     ---------                                -----------         ------                     Adult Type and Screen[[id number]]                            Final result                 Please view results for these tests on the individual orders.      Patient's response to treatments and/or interventions: Stable     To be done/followed up on inpatient unit:  See Orders     Does this patient have any cognitive concerns?:  No     Activity level - Baseline/Home:  Independent  Activity Level - Current:   Stand with assist x2    Patient's Preferred language: English   Needed?: No    Isolation: None  Infection: Not Applicable  Patient tested for COVID 19 prior to admission: YES  Bariatric?: No    Vital Signs:   Vitals:    10/25/23 1000 10/25/23 1020 10/25/23 1028 10/25/23 1030   BP: 117/71 121/70 112/80    Pulse: 84 79 80 81   Resp: 11 13     Temp:       TempSrc:       SpO2: 99% 98%     Weight:       Height:           Cardiac Rhythm:Cardiac Rhythm: Atrial fibrillation    Was the PSS-3 completed:   Yes  What interventions are required if any?               Family Comments: Wife Valery remains at the bedside   OBS brochure/video discussed/provided to patient/family: Yes              Name of person given brochure if not patient: N/A               Relationship to patient: N/A    For the majority of the shift this patient's behavior was Green.   Behavioral interventions performed were N/A .    ED NURSE PHONE NUMBER: *[id number]

## 2023-10-25 NOTE — ED TRIAGE NOTES
[facility] Per EMS pt with dark tarry stool this morning. Pt became lightheaded and was found laying on the floor upon EMS arrival at the scene. Pt denied falling or hitting his head. - 110 systolic, . IV 20 G placed and  ml given. Hx.  A/fib, on Eliquis,. Last dose taken yesterday morning. Kidney CA, recent surgery  at the [facility] on 10/11/23.        Triage Assessment (Adult)       Row Name 10/25/23 0847          Triage Assessment    Airway WDL WDL        Respiratory WDL    Respiratory WDL WDL        Skin Circulation/Temperature WDL    Skin Circulation/Temperature WDL WDL;X  Pale. Surgical incision LLQ with 4 x 4 dressing observed to have scant amount of seroussanguious, non foul smelling.        Cardiac WDL    Cardiac WDL WDL;X     Cardiac Rhythm Atrial fibrillation  Controlled A/Fib rate 80        Peripheral/Neurovascular WDL    Peripheral Neurovascular WDL WDL        Cognitive/Neuro/Behavioral WDL    Cognitive/Neuro/Behavioral WDL WDL

## 2023-10-25 NOTE — PHARMACY-ADMISSION MEDICATION HISTORY
Pharmacist Admission Medication History    Admission medication history is complete. The information provided in this note is only as accurate as the sources available at the time of the update.    Information Source(s): Patient, Family member, Hospital records, and CareEverywhere/SureScripts via in-person    Pertinent Information: None    Changes made to PTA medication list:  Added: Eliquis  Deleted: None  Changed: None    Medication Affordability:  Not including over the counter (OTC) medications, was there a time in the past 3 months when you did not take your medications as prescribed because of cost?: No    Allergies reviewed with patient and updates made in EHR: yes    Medication History Completed By: Trell Banegas [facility] 10/25/2023 11:32 AM    PTA Med List   Medication Sig Last Dose    acetaminophen (TYLENOL) 500 MG tablet Take 500-1,000 mg by mouth every 6 hours as needed for mild pain Unknown at prn    apixaban ANTICOAGULANT (ELIQUIS) 5 MG tablet Take 5 mg by mouth 2 times daily 10/24/2023 at AM    loperamide (IMODIUM) 2 MG capsule Take 2 mg by mouth 4 times daily as needed for diarrhea Unknown at prn    metoprolol succinate ER (TOPROL XL) 25 MG 24 hr tablet TAKE 1 TABLET BY MOUTH EVERY DAY 10/23/2023 at PM    pantoprazole (PROTONIX) 40 MG EC tablet Take 1 tablet (40 mg) by mouth 2 times daily (before meals) 10/24/2023 at 1200    pravastatin (PRAVACHOL) 20 MG tablet Take 1 tablet (20 mg) by mouth daily 10/24/2023 at HS    tamsulosin (FLOMAX) 0.4 MG capsule TAKE 1 CAPSULE BY MOUTH EVERY DAY 10/24/2023 at HS

## 2023-10-25 NOTE — ED NOTES
Bed: ED01  Expected date:   Expected time:   Means of arrival:   Comments:  [facility] [place]- [facility] - 77 M bloody stool eta 0892

## 2023-10-25 NOTE — CONSULTS
[facility]  Gastroenterology Consultation         Ti Nickerson  [address]  [place] [address]  77 year old male    Admission Date/Time: 10/25/2023  Primary Care Provider: Nico Retana  Referring / Attending Physician:  Dr. Herson segovia    We were asked to see the patient in consultation by Dr. Herson Segovia for evaluation of GI bleed.      CC: melena and lightheadedness    HPI:  Ti Nickerson is a 77 year old male with a history of renal carcinoma who presents with  melena that started acutely early very early this a.m. and had 4 episodes of black stools. Has had no bowel movement since presentation to ED. He states 2 weeks ago on 10/11/23  had a partial duodenectomy and hernia repair surgery for a renal carcinoma at [facility]. He had no immediate post op complications and restarted his Eliquis on 10/16/23. He presented to the ED yesterday for abdominal discomfort and melena.     He was brought in by EMS due to lightheadedness. Has had no syncope. Patient denies any chest pain, shortness of breath, or abdominal pain. His BP has remained fairly stable around  systolic. His hemoglobin was 10.4 yesterday and 9.7 on presentation today and last draw was 9.2.    ROS: A comprehensive ten point review of systems was negative aside from those in mentioned in the HPI.      PAST MED HX:  I have reviewed this patient's medical history and updated it with pertinent information if needed.   Past Medical History:   Diagnosis Date    Atrial fibrillation, unspecified 9/18/2015    CAD (coronary artery disease) 09/18/2015    minimal by angio 2007, fu nuc est nl 2018    Elevated TSH 2018    Esophageal reflux 9/18/2015    Essential hypertension     History of cardioversion     1400-1400    Idiopathic cardiomyopathy (H) 09/18/2015    fu echo nl ef, nl nuclear est 11/18, Dr. Quarles    Mixed hyperlipidemia 9/18/2015    Mumps     Sleep apnea 09/18/2015    does  not use cpap as of 2019    Sleep apnea     Thoracic aortic aneurysm (H)     sinus of valsalva 4.5 and asc aorta 4.5 in 2017    Tubular adenoma of colon 01/2017    fu 3 years       MEDICATIONS:   Prior to Admission Medications   Prescriptions Last Dose Informant Patient Reported? Taking?   ASPIRIN NOT PRESCRIBED (INTENTIONAL)   No No   Sig: Please choose reason not prescribed, below   Patient not taking: Reported on 8/21/2023   acetaminophen (TYLENOL) 500 MG tablet   Yes No   Sig: Take 500-1,000 mg by mouth every 6 hours as needed for mild pain   loperamide (IMODIUM) 2 MG capsule   Yes No   Sig: Take 2 mg by mouth 4 times daily as needed for diarrhea   metoprolol succinate ER (TOPROL XL) 25 MG 24 hr tablet   No No   Sig: TAKE 1 TABLET BY MOUTH EVERY DAY   pantoprazole (PROTONIX) 40 MG EC tablet   No No   Sig: Take 1 tablet (40 mg) by mouth 2 times daily (before meals)   pravastatin (PRAVACHOL) 20 MG tablet   No No   Sig: Take 1 tablet (20 mg) by mouth daily   tamsulosin (FLOMAX) 0.4 MG capsule   No No   Sig: TAKE 1 CAPSULE BY MOUTH EVERY DAY      Facility-Administered Medications: None       ALLERGIES:   Allergies   Allergen Reactions    Fentanyl Confusion       SOCIAL HISTORY:  Social History     Tobacco Use    Smoking status: Never    Smokeless tobacco: Never   Vaping Use    Vaping Use: Never used   Substance Use Topics    Alcohol use: Yes     Alcohol/week: 0.0 standard drinks of alcohol     Comment: 3 drinks week    Drug use: No       FAMILY HISTORY:  Family History   Problem Relation Age of Onset    Arrhythmia Mother         a-fib    Cerebrovascular Disease Mother     Arrhythmia Father         a-fib    Colon Cancer Father     Diabetes Father     Cerebrovascular Disease Father     No Known Problems Brother        PHYSICAL EXAM:   General  alert, oriented and comfortable  Vital Signs with Ranges  Temp: 98.3  F (36.8  C) Temp src: Oral BP: 112/80 Pulse: 81   Resp: 13 SpO2: 98 % O2 Device: None (Room air)    No  intake/output data recorded.    Constitutional: healthy, alert, and no distress   Cardiovascular: negative, PMI normal. No lifts, heaves, or thrills. RRR. No murmurs, clicks gallops or rub  Respiratory: negative, Percussion normal. Good diaphragmatic excursion. Lungs clear  Abdomen: Abdomen soft, non-tender. BS normal. No masses, organomegaly          ADDITIONAL COMMENTS:   I reviewed the patient's new clinical lab test results.   Recent Labs   Lab Test 10/25/23  1031 10/25/23  1002 10/25/23  0915 10/24/23  1613 09/26/23  1627 09/20/23  1010 09/14/23  0623 09/13/23 2057   WBC  --  8.6  --  7.9  --  6.1   < >  --    HGB 9.2* 9.7*  --  10.4*   < > 11.6*   < >  --    MCV  --  99  --  99  --  98   < >  --    PLT  --  251  --  264  --  189   < >  --    INR  --   --  2.29*  --   --   --   --  1.22*    < > = values in this interval not displayed.     Recent Labs   Lab Test 10/25/23  1002 10/24/23  1613 10/09/23  0820   POTASSIUM 4.6 4.5 4.3   CHLORIDE 109* 101 106   CO2 21* 24 23   BUN 36.4* 35.2* 23.0   ANIONGAP 9 12 10     Recent Labs   Lab Test 10/25/23  1002 10/24/23  1613 10/09/23  0820 09/13/23 2051 09/05/23  0938 09/21/22  1102 10/11/21  0947 10/26/20  0936 12/26/19  0812   ALBUMIN 3.1* 3.3*  --  3.8  --    < >  --    < > 3.5   BILITOTAL 0.5 0.5  --  0.7  --    < >  --    < > 0.6   ALT 18 18 13 17  --    < >  --    < > 26   AST 23 22  --  21  --    < >  --    < > 23   PROTEIN  --   --   --   --  Negative  --  Negative  --  Negative   LIPASE  --   --   --  34  --   --   --   --   --     < > = values in this interval not displayed.       I reviewed the patient's new imaging results.        CONSULTATION ASSESSMENT AND PLAN:    Ti Nickerson is a 77 year old male with a history of renal carcinoma who presents with melena.     Melena  S/p duodenectomy  Metastatic renal cell carcinoma  status post partial duodenectomy with hernia repair on 10/11 at [facility]  With no complications in immediate post op time  period  Taking Eliquis for h/o Afib  Developed acute onset melena   Hemoglobin stable 9.2 and trending down  Suspect bleed from incision site  Not a candidate for endoscopic intervention with recent duodenectomy, CTA ordered    - Await CTA results  - recommend surgical consult  - recommend contact [facility] team for recommendations and/or if transfer indicated  - serial hemoglobin and transfuse if hgb 7 or less  - Hold Eliquis  - IV pantoprazole 40 mg BID  - NPO      [facility].  Office: [phone]  Cell : [phone] (Dr. Ash)  Cell: [phone] (Lyssa Cota PA-C)

## 2023-10-25 NOTE — ED NOTES
Road test done-able to ambulate freely without dizziness and unsteady gait.  Abdominal wound was cleaned with saline and no noted infection.

## 2023-10-25 NOTE — H&P
[facility]    History and Physical - Hospitalist Service       Date of Admission:  10/25/2023    Assessment & Plan   Ti Nickerson is a 77 year old male with hx of chronic a-fib on chronic AC with apixaban, CHF, KEENA, CKD, and metastatic RCC s/p right radical nephrectomy and recent partial duodenectomy with hernia repair at [place] on 10/11/23 who was admitted on 10/25/2023 for evaluation of melena with acute blood loss anemia    # Acute upper GI bleed with acute blood loss anemia  # Metastatic renal cell carcinoma s/p right radical nephrectomy, partial duodenectomy with DJ anastamosis on 10/11/23  #Abdominal wall seroma  *Diagnosed with RCC in 2019. Follows at [facility]  *Subsequently found to have tumor involvement of the duodenum and underwent uncomplicated partial duodenectomy per General Surgery at [facility] on 10/11.   *10/24: Presented to the ED with increased serous drainage from his surgical incision. CT abd/pelvis showed seroma; CT was reviewed with hepatobiliary fellow at [facility] who had no concerns. Of note, he was found to be profoundly orthostatic, and received a bolus of IV fluid prior to discharge  *10/25: Presented on this admission with acute onset melena with associated pre-syncope. He is on apixaban which was held for surgery and subsequently resumed; last dose: morning of 10/24. VSS on arrival. Initial hemoglobin was erroneously read at 4.3 (10.4 just the day prior) which prompted emergent consults with GI, General Surgery, and patient's providers at [place]. ED provider subsequently received notification from the lab that the sample was likely hemolyzed, and repeat Hgb were 9.7 and 9.2, respectively.   *In the ED, he was initiated on an IV PPI   - GI hesitant to pursue EGD given recent surgery; will keep patient NPO with IV fluids, and monitor serial Hgb for now  - Orthostatics ordered daiy  - Cont IV PPI BID  - GI initially recommended Surgery consult, but given that  GI bleed is less complicated, will defer for now  - [facility] consulted for post-op wound care with underlying seroma  - [facility] following  Addendum: Positive orthostatics in the ED. 1 liter bolus of NS ordered    Chronic atrial fibrillation  *PTA regimen: metoprolol ER 25 mg daily  - Holding metoprolol due to borderline BPs  - Metoprolol 2.5 mg IV q4h prn for HR>120    Chronic HFpEF without exacerbation  - Appears compensated   - Low threshold to d/c IV fluids for respiratory distress    CKD stage IIIb  - Cr stable within baseline 1.5-1.7    Hypoalbuminemia  Albumin 3.1    Obesity  BMI 34    Other chronic and stable medical conditions  KEENA (not on CPAP)  BPH  Dyslipidemia    Diet: NPO, NS at 100 ml/h  DVT Prophylaxis: Pneumatic Compression Devices  Dillard Catheter: Not present  Code Status:  Full Code       Disposition: Expected discharge once Hgb stable and cleared by subspecialists    Shahnaz Salazar MD  [facility]  Contact information available via [facility] Paging/Directory    ______________________________________________________________________    Chief Complaint   Black stools    History is obtained from the patient, discussion with ED staff, and review of medical records    History of Present Illness   Ti Nickerson is a 77 year old male with hx of chronic a-fib on chronic AC with apixaban, CHF, HTN, KEENA, CKD, and metastatic RCC s/p right radical nephrectomy and recent partial duodenectomy with hernia repair at [facility] on 10/11/23 who presents with black stools and pre-syncope. The patient had surgery at [facility] 2 weeks ago, and was actually evaluated in the ED yesterday for increased serous drainage from his abdominal incision; CT abd/pelvis was obtained and showed abdominal wall seroma. CT was reviewed with hepatobiliary fellow at [facility] who was not particularly concerned. Surgical incision was redressed and patient was discharged home. Of note, he was noted to be profoundly  orthostatic at that visit, and was given a bolus of IV fluid prior to discharge from the ED.    This morning, the patient developed bloody diarrhea with associated dizziness/lightheadedness, and activated EMS. He denies syncope or falls. He denies cp/sob, abdominal pain, or nausea/vomiting. He is on apixaban for chronic a-fib which was held for surgery and subsequently resumed, but patient did not take a dose last night or this morning.    VSS on arrival. Initial hemoglobin was erroneously read at 4.3 (10.4 just the day prior) which prompted emergent consults with GI, General Surgery, and patient's providers at [facility], and CTA. ED provider subsequently received notification from the lab that the sample was likely hemolyzed, and repeat Hgb were 9.7 and 9.2, respectively. CTA results are currently pending.     Review of Systems    10 point Review of Systems was reviewed and pertinent positives and negatives are noted in the HPI    Past Medical History    I have reviewed this patient's medical history and updated it with pertinent information if needed.   Past Medical History:   Diagnosis Date    Atrial fibrillation, unspecified 9/18/2015    CAD (coronary artery disease) 09/18/2015    minimal by angio 2007, fu nuc est nl 2018    Elevated TSH 2018    Esophageal reflux 9/18/2015    Essential hypertension     History of cardioversion     [id number]    Idiopathic cardiomyopathy (H) 09/18/2015    fu echo nl ef, nl nuclear est 11/18, Dr. Quarles    Mixed hyperlipidemia 9/18/2015    Mumps     Sleep apnea 09/18/2015    does not use cpap as of 2019    Sleep apnea     Thoracic aortic aneurysm (H)     sinus of valsalva 4.5 and asc aorta 4.5 in 2017    Tubular adenoma of colon 01/2017    fu 3 years       Past Surgical History   I have reviewed this patient's surgical history and updated it with pertinent information if needed.  Past Surgical History:   Procedure Laterality Date    APPENDECTOMY  1964    ESOPHAGOSCOPY, GASTROSCOPY,  DUODENOSCOPY (EGD), COMBINED N/A 9/14/2023    Procedure: Esophagoscopy, gastroscopy, duodenoscopy (EGD), combined;  Surgeon: Chele Ash MD;  Location:  GI       Social History   Denies history of smoking. Reports minimal alcohol use. He is  and lives independently at an assisted living facility with his wife.    Family History   I have reviewed this patient's family history and updated it with pertinent information if needed.  Family History   Problem Relation Age of Onset    Arrhythmia Mother         a-fib    Cerebrovascular Disease Mother     Arrhythmia Father         a-fib    Colon Cancer Father     Diabetes Father     Cerebrovascular Disease Father     No Known Problems Brother        Prior to Admission Medications   Prior to Admission Medications   Prescriptions Last Dose Informant Patient Reported? Taking?   ASPIRIN NOT PRESCRIBED (INTENTIONAL)   No No   Sig: Please choose reason not prescribed, below   Patient not taking: Reported on 8/21/2023   acetaminophen (TYLENOL) 500 MG tablet Unknown at prn  Yes Yes   Sig: Take 500-1,000 mg by mouth every 6 hours as needed for mild pain   apixaban ANTICOAGULANT (ELIQUIS) 5 MG tablet 10/24/2023 at AM  Yes Yes   Sig: Take 5 mg by mouth 2 times daily   loperamide (IMODIUM) 2 MG capsule Unknown at prn  Yes Yes   Sig: Take 2 mg by mouth 4 times daily as needed for diarrhea   metoprolol succinate ER (TOPROL XL) 25 MG 24 hr tablet 10/23/2023 at PM  No Yes   Sig: TAKE 1 TABLET BY MOUTH EVERY DAY   multivitamin w/minerals (THERA-VIT-M) tablet 10/24/2023 at AM  Yes Yes   Sig: Take 1 tablet by mouth daily   pantoprazole (PROTONIX) 40 MG EC tablet 10/24/2023 at 1200  No Yes   Sig: Take 1 tablet (40 mg) by mouth 2 times daily (before meals)   pravastatin (PRAVACHOL) 20 MG tablet 10/24/2023 at HS  No Yes   Sig: Take 1 tablet (20 mg) by mouth daily   tamsulosin (FLOMAX) 0.4 MG capsule 10/24/2023 at HS  No Yes   Sig: TAKE 1 CAPSULE BY MOUTH EVERY DAY       Facility-Administered Medications: None     Allergies   Allergies   Allergen Reactions    Fentanyl Confusion       Physical Exam   Vital Signs: Temp: 98.3  F (36.8  C) Temp src: Oral BP: 103/76 Pulse: 80   Resp: 12 SpO2: 98 % O2 Device: None (Room air)    Weight: 275 lbs 0 oz    Constitutional: Resting in bed, NAD  HEENT: NC/AT, sclera white, conjunctiva clear, EOMI, MMM  Respiratory: Breathing non-labored. Lungs CTAB - no wheezes/crackles/rhonchi  Cardiovascular: Heart irregular rhythm, normal rate. Trace pedal edema  GI: +BS. Soft/NT. Dressing over abdominal wall incision - dry. Incision without surround erythema  Lymph/Hematologic: No cervical LAD  Genitourinary: Not examined  Skin: No rash.  Musculoskeletal: Normal muscle bulk and tone  Neurologic: Alert and appropriate. COOK  Psychiatric: Calm and cooperative     Data   Data reviewed today: I reviewed all medications, new labs and imaging results over the last 24 hours. I personally reviewed the EKG tracing showing a-fib with CVR .    Recent Labs   Lab 10/25/23  1031 10/25/23  1002 10/25/23  0915 10/24/23  1613   WBC  --  8.6  --  7.9   HGB 9.2* 9.7*  --  10.4*   MCV  --  99  --  99   PLT  --  251  --  264   INR  --   --  2.29*  --    NA  --  139  --  137   POTASSIUM  --  4.6  --  4.5   CHLORIDE  --  109*  --  101   CO2  --  21*  --  24   BUN  --  36.4*  --  35.2*   CR  --  1.54*  --  1.69*  1.8*   ANIONGAP  --  9  --  12   DOMINICK  --  8.2*  --  8.5*   GLC  --  102*  --  112*   ALBUMIN  --  3.1*  --  3.3*   PROTTOTAL  --  5.7*  --  6.0*   BILITOTAL  --  0.5  --  0.5   ALKPHOS  --  60  --  67   ALT  --  18  --  18   AST  --  23  --  22         Recent Results (from the past 24 hour(s))   Abd/pelvis CT,  IV  contrast only TRAUMA / AAA    Narrative    EXAM: CT ABDOMEN PELVIS W CONTRAST  LOCATION: [facility]  DATE: 10/24/2023    INDICATION: Status post partial bowel resection, hernia repair: Question wound dehiscence, bleeding.  COMPARISON:  09/15/2023  TECHNIQUE: CT scan of the abdomen and pelvis was performed following injection of IV contrast. Multiplanar reformats were obtained. Dose reduction techniques were used.  CONTRAST: 135 mL Isovue 370    FINDINGS:   LOWER CHEST: Mild bibasilar cylindrical bronchiectasis. Cardiac enlargement.    HEPATOBILIARY: 3.1 cm hypodense subcapsular nodule in the inferior right hepatic lobe is stable.    PANCREAS: Normal.    SPLEEN: Normal.    ADRENAL GLANDS: Normal.    KIDNEYS/BLADDER: Right kidney is absent.    BOWEL: Postsurgical changes in the duodenum with interval repair of ventral abdominal wall hernia. There is a tubular 9.5 x 3.6 x 2.8 cm subcutaneous fluid collection to the left of midline in the anterior pelvis lower abdomen compatible with seroma.    LYMPH NODES: Indeterminate precaval lymphadenopathy unchanged measuring up to 3.3 x 2.1 cm.    VASCULATURE: Unremarkable.    PELVIC ORGANS: Prostatic enlargement.    MUSCULOSKELETAL: Mild lumbar degenerative change.      Impression    IMPRESSION:   1.  Interval repair of a ventral wall hernia. There is a tubular 9.5 x 3.6 x 2.8 cm left paramedian subcutaneous fluid collection inferiorly compatible with seroma.    2.  Postsurgical changes in the horizontal duodenum. Stable indeterminate upper retroperitoneal lymphadenopathy.    3.  Stable hypodense solid-appearing 3.1 cm subcapsular nodule in the inferior right hepatic lobe.    4.  Prostatic enlargement.    5.  Multiple colonic diverticula without CT evidence for diverticulitis.

## 2023-10-25 NOTE — ED PROVIDER NOTES
History   Chief Complaint:  Dizziness and Melena     The history is provided by the patient and the EMS personnel.      Ti Nickerson is a 77 year old male with a history of renal carcinoma who presents with hematochezia and melena. EMS reports that about 1.5 weeks ago on 10/11/23 the patient had a small intestine resection and hernia repair surgery for a renal carcinoma at [facility], then presented to the ED yesterday for abdominal discomfort, then had onset of hematochezia and melena overnight, with about 3 bowel movements. EMS states that symptoms were accompanied by lightheadedness. The patient attempted to come in to the ED on his own this morning but became very lightheaded and was forced to call EMS. Patient had no syncope. EMS notes that the patient is on Eliquis, but it was stopped for a week prior to his surgery and was restarted a few days ago, although the patient notes he did not take any last night - his last dose was yesterday morning. Additionally, EMS notes Ti has atrial fibrillation at baseline, and has a systolic blood pressure between 100-110 and a blood sugar level of 128. Patient denies any chest pain, shortness of breath, or abdominal pain. He denies any history of GI bleeds, and notes he sees a doctor at [facility].     Independent Historian:    EMS - provided history    Review of External Notes:  I reviewed the patient's discharge summary from [facility] dated 10/15/23. 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    Medications:    Imodium  Metoprolol  Pantoprazole  Pravastatin  Flomax  Miralax  Dilaudid  Eliquis  Crestor    Past Medical History:    Atrial fibrillation  Atherosclerotic Heart Disease Of Native Coronary Artery Without Angina Pectoris   BPH  CAD  Cataract   CKD  Elevated TSH  GERD  Essential hypertension  History of cardioversion  Hyperlipidemia   Hypertension   Idiopathic cardiomyopathy  "  Kidney cancer, primary, with metastasis from kidney to other site, right  Secondary Malignant Neoplasm Small Intestine Duodenum   Lesion Liver   Mixed hyperlipidemia  Mumps  Obesity   Sleep apnea  Thoracic aortic aneurysm  Tubular adenoma of colon      Past Surgical History:    Appendectomy  Combined EGD  Cataract extraction  Cardioversion   Duodenectomy, partial  Block - TAP, bilateral  Flexible cystoscopy  Nephrectomy        Physical Exam   Patient Vitals for the past 24 hrs:   BP Temp Temp src Pulse Resp SpO2 Height Weight   10/25/23 1215 103/76 -- -- 80 12 98 % -- --   10/25/23 1130 100/78 -- -- 83 15 98 % -- --   10/25/23 1030 -- -- -- 81 -- -- -- --   10/25/23 1028 112/80 -- -- 80 -- -- -- --   10/25/23 1020 121/70 -- -- 79 13 98 % -- --   10/25/23 1000 117/71 -- -- 84 11 99 % -- --   10/25/23 0958 117/71 -- -- 81 18 96 % -- --   10/25/23 0945 112/89 -- -- 76 14 93 % -- --   10/25/23 0935 -- -- -- 79 13 98 % -- --   10/25/23 0930 114/73 -- -- 72 15 97 % -- --   10/25/23 0900 90/67 -- -- 83 11 98 % -- --   10/25/23 0848 -- 98.3  F (36.8  C) Oral -- 15 98 % 1.905 m (6' 3\") 124.7 kg (275 lb)   10/25/23 0847 -- -- -- 86 10 98 % -- --   10/25/23 0845 100/70 -- -- 86 -- -- -- --   10/25/23 0842 112/66 -- -- 80 11 98 % -- --      Physical Exam  General: Alert, appears well-developed and well-nourished. Cooperative.     In mild distress  HEENT:  Head:  Atraumatic  Ears:  External ears are normal  Mouth/Throat:  Oropharynx is without erythema or exudate and mucous membranes are dry.   Eyes:   Conjunctivae normal and EOM are normal. No scleral icterus.  CV:  Normal rate, regular rhythm, normal heart sounds and radial pulses are 2+ and symmetric.  No murmur.  Resp:  Breath sounds are clear bilaterally    Non-labored, no retractions or accessory muscle use  GI:  Abdomen is soft, no distension, no tenderness. No rebound or guarding.  No CVA tenderness bilaterally.  There is a midline abdominal wound that it continues to " heal.  Faint dehiscence near the umbilicus with some dried/crusting blood/serous fluid.  MS:  Normal range of motion. No edema.    Back atraumatic.    No midline cervical, thoracic, or lumbar tenderness  Skin:  Warm and dry.  No rash or lesions noted.  Neuro:   Alert. Normal strength.  GCS: 15  Psych: Normal mood and affect.    Emergency Department Course   ECG  ECG taken at 0856, ECG read at 0910  Atrial fibrillation   Left axis deviation  Low voltage QRS  Nonspecific ST abnormality  Abnormal ECG   When compared with prior ECG, dated 10/24/23, no significant change  Rate 80 bpm. VA interval * ms. QRS duration 100 ms. QT/QTc 394/454 ms. P-R-T axes * -37 40.    Imaging:  CTA Abdomen Pelvis with Contrast    (Results Pending)     Report per radiology    Laboratory:  Labs Ordered and Resulted from Time of ED Arrival to Time of ED Departure   INR - Abnormal       Result Value    INR 2.29 (*)    PARTIAL THROMBOPLASTIN TIME - Abnormal    aPTT 51 (*)    COMPREHENSIVE METABOLIC PANEL - Abnormal    Sodium 139      Potassium 4.6      Carbon Dioxide (CO2) 21 (*)     Anion Gap 9      Urea Nitrogen 36.4 (*)     Creatinine 1.54 (*)     GFR Estimate 46 (*)     Calcium 8.2 (*)     Chloride 109 (*)     Glucose 102 (*)     Alkaline Phosphatase 60      AST 23      ALT 18      Protein Total 5.7 (*)     Albumin 3.1 (*)     Bilirubin Total 0.5     CBC WITH PLATELETS AND DIFFERENTIAL - Abnormal    WBC Count 8.6      RBC Count 3.07 (*)     Hemoglobin 9.7 (*)     Hematocrit 30.3 (*)     MCV 99      MCH 31.6      MCHC 32.0      RDW 12.8      Platelet Count 251      % Neutrophils 77      % Lymphocytes 13      % Monocytes 7      % Eosinophils 1      % Basophils 1      % Immature Granulocytes 1      NRBCs per 100 WBC 0      Absolute Neutrophils 6.7      Absolute Lymphocytes 1.1      Absolute Monocytes 0.6      Absolute Eosinophils 0.1      Absolute Basophils 0.0      Absolute Immature Granulocytes 0.1      Absolute NRBCs 0.0     HEMOGLOBIN -  Abnormal    Hemoglobin 9.2 (*)    TYPE AND SCREEN, ADULT    ABO/RH(D) B POS      Antibody Screen Negative      SPECIMEN EXPIRATION DATE [id number]     PREPARE RED BLOOD CELLS (UNIT)    Blood Component Type Red Blood Cells      Product Code M3073B60      Unit Status Ready for issue      Unit Number [id number]      CROSSMATCH Compatible      CODING SYSTEM [id number]     PREPARE RED BLOOD CELLS (UNIT)    Blood Component Type Red Blood Cells      Product Code E6275O73      Unit Status Ready for issue      Unit Number [id number]      CROSSMATCH Compatible      CODING SYSTEM [id number]     ABO/RH TYPE AND SCREEN      Emergency Department Course & Assessments:  Interventions:  Medications   pantoprazole (PROTONIX) IV push injection 80 mg (80 mg Intravenous $Given 10/25/23 0906)   sodium chloride 0.9% BOLUS 1,000 mL (0 mLs Intravenous Stopped 10/25/23 0947)   pantoprazole (PROTONIX) IV push injection 40 mg (40 mg Intravenous Not Given 10/25/23 0908)   iopamidol (ISOVUE-370) solution 135 mL (100 mLs Intravenous $Given 10/25/23 1008)   Saline flush (79 mLs Intravenous $Given 10/25/23 1008)   prothrombin 4 factor complex concentrate (KCENTRA) infusion 4,941 Units (4,941 Units Intravenous Not Given 10/25/23 1159)      Assessments:  0839 I obtained history and examined the patient as noted above.  0946 I rechecked the patient and explained findings.  1023 I rechecked the patient and explained findings.  1116 I rechecked the patient and explained findings.    Independent Interpretation (X-rays, CTs, rhythm strip):  None    Consultations/Discussion of Management or Tests:  1015 I spoke with Dr. Ash GI, regarding the patient's history and presentation in the emergency department today.  1020 I spoke with Dr. Quezada, general surgery, regarding the patient's history and presentation in the emergency department today.  1024 I spoke with BRENDON Groves for Dr. Ceballos, general surgeon at [facility], regarding the patient's  history and presentation in the emergency department today.  1220 I spoke with Dr. Salazar of the hospitalist team regarding the patient, who accepted the patient for admission.    Disposition:  The patient was admitted to the hospital under the care of Dr. Salazar.     Impression & Plan    CMS Diagnoses: None    Medical Decision Making:  Patient is a 77-year-old male approximately 2 weeks out from recent duodenal resection secondary to malignancy from renal cell carcinoma at [facility] who presents with melena and lightheadedness.  Patient initially had stable vital signs on arrival and the initial blood work sent off was concerning for a low hemoglobin of approximately 4.3.  This was communicated verbally from lab although there was report that there may have been a hemolysis component to the result given abnormal potassium.  On initial recognition of a low hemoglobin I did consent the patient for transfusion, ordered 2 units of PRBCs and ordered Kcentra as well given the elevated INR in the setting of chronic Eliquis use.  Thankfully patient's last dose of Eliquis was yesterday morning.  Repeat lab work within nonhemolyzed specimen showed a hemoglobin of 9.7 which is significantly improved from the initial verbally reported hemoglobin result.  I elected to obtain a third hemoglobin value just to confirm and thankfully this did return at 9.2.  This is slightly decreased from the patient's hemoglobin yesterday at 10.4.  We did send the patient for CTA imaging of the abdomen pelvis to evaluate for potential actively extravasating blood vessel particularly near the surgical site.  I spoke with the patient's surgical team at [place] who had noted that part of this duodenal mass had infiltrated into the inferior vena cava and this was not ultimately removed but only the mass associated with the duodenum was successfully resected during his surgery on the 11th.  Patient has continued to remain hemodynamically stable  here in the emergency department.  He has received IV fluids and IV Protonix.  We ultimately did not provide Kcentra nor PRBCs in the ED given stability and relatively reassuring RBC and hemoglobin.  Patient's renal function/creatinine appears improving at 1.54.  Electrolytes appear normal.  I did also speak with the patient's gastroenterologist Dr. Ash who recommended keeping the patient n.p.o. and planning for admission for further evaluation.  Given the patient's recent operative intervention, hesitant to perform emergent endoscopy in this territory given the recent resection.  We will plan to continue serial hemoglobin checks and admission to the hospital for further management of melena and suspected upper GI bleed.  Patient case discussed with Dr. Shahnaz Salazar who agreed to inpatient admission.    Diagnosis:    ICD-10-CM    1. Melena  K92.1       2. Gastrointestinal hemorrhage with melena  K92.1            Scribe Disclosure:  Leah CARRERA, am serving as a scribe at 9:02 AM on 10/25/2023 to document services personally performed by Herson Segovia MD, based on my observations and the provider's statements to me.  10/25/2023   Herson Segovia MD White, Scott, MD  10/25/23 5893

## 2023-10-26 ENCOUNTER — APPOINTMENT (OUTPATIENT)
Dept: PHYSICAL THERAPY | Facility: CLINIC | Age: 78
DRG: 394 | End: 2023-10-26
Attending: INTERNAL MEDICINE
Payer: MEDICARE

## 2023-10-26 LAB
ANION GAP SERPL CALCULATED.3IONS-SCNC: 10 MMOL/L (ref 7–15)
ATRIAL RATE - MUSE: NORMAL BPM
BUN SERPL-MCNC: 23.4 MG/DL (ref 8–23)
CALCIUM SERPL-MCNC: 8.4 MG/DL (ref 8.8–10.2)
CHLORIDE SERPL-SCNC: 105 MMOL/L (ref 98–107)
CREAT SERPL-MCNC: 1.5 MG/DL (ref 0.67–1.17)
DEPRECATED HCO3 PLAS-SCNC: 22 MMOL/L (ref 22–29)
DIASTOLIC BLOOD PRESSURE - MUSE: NORMAL MMHG
EGFRCR SERPLBLD CKD-EPI 2021: 48 ML/MIN/1.73M2
ERYTHROCYTE [DISTWIDTH] IN BLOOD BY AUTOMATED COUNT: 12.9 % (ref 10–15)
GLUCOSE SERPL-MCNC: 95 MG/DL (ref 70–99)
HCT VFR BLD AUTO: 31.6 % (ref 40–53)
HGB BLD-MCNC: 9.2 G/DL (ref 13.3–17.7)
HGB BLD-MCNC: 9.9 G/DL (ref 13.3–17.7)
INTERPRETATION ECG - MUSE: NORMAL
MCH RBC QN AUTO: 31.1 PG (ref 26.5–33)
MCHC RBC AUTO-ENTMCNC: 31.3 G/DL (ref 31.5–36.5)
MCV RBC AUTO: 99 FL (ref 78–100)
P AXIS - MUSE: NORMAL DEGREES
PLATELET # BLD AUTO: 295 10E3/UL (ref 150–450)
POTASSIUM SERPL-SCNC: 3.8 MMOL/L (ref 3.4–5.3)
PR INTERVAL - MUSE: NORMAL MS
QRS DURATION - MUSE: 100 MS
QT - MUSE: 394 MS
QTC - MUSE: 454 MS
R AXIS - MUSE: -37 DEGREES
RBC # BLD AUTO: 3.18 10E6/UL (ref 4.4–5.9)
SODIUM SERPL-SCNC: 137 MMOL/L (ref 135–145)
SYSTOLIC BLOOD PRESSURE - MUSE: NORMAL MMHG
T AXIS - MUSE: 40 DEGREES
VENTRICULAR RATE- MUSE: 80 BPM
WBC # BLD AUTO: 8.3 10E3/UL (ref 4–11)

## 2023-10-26 PROCEDURE — 258N000003 HC RX IP 258 OP 636: Performed by: INTERNAL MEDICINE

## 2023-10-26 PROCEDURE — 85018 HEMOGLOBIN: CPT | Performed by: INTERNAL MEDICINE

## 2023-10-26 PROCEDURE — 97116 GAIT TRAINING THERAPY: CPT | Mod: GP | Performed by: PHYSICAL THERAPIST

## 2023-10-26 PROCEDURE — 250N000013 HC RX MED GY IP 250 OP 250 PS 637: Performed by: INTERNAL MEDICINE

## 2023-10-26 PROCEDURE — 97530 THERAPEUTIC ACTIVITIES: CPT | Mod: GP | Performed by: PHYSICAL THERAPIST

## 2023-10-26 PROCEDURE — 97161 PT EVAL LOW COMPLEX 20 MIN: CPT | Mod: GP | Performed by: PHYSICAL THERAPIST

## 2023-10-26 PROCEDURE — 250N000011 HC RX IP 250 OP 636: Mod: JZ | Performed by: INTERNAL MEDICINE

## 2023-10-26 PROCEDURE — 36415 COLL VENOUS BLD VENIPUNCTURE: CPT | Performed by: INTERNAL MEDICINE

## 2023-10-26 PROCEDURE — 80048 BASIC METABOLIC PNL TOTAL CA: CPT | Performed by: INTERNAL MEDICINE

## 2023-10-26 PROCEDURE — C9113 INJ PANTOPRAZOLE SODIUM, VIA: HCPCS | Mod: JZ | Performed by: INTERNAL MEDICINE

## 2023-10-26 PROCEDURE — 120N000001 HC R&B MED SURG/OB

## 2023-10-26 PROCEDURE — G0463 HOSPITAL OUTPT CLINIC VISIT: HCPCS

## 2023-10-26 PROCEDURE — 99233 SBSQ HOSP IP/OBS HIGH 50: CPT | Performed by: INTERNAL MEDICINE

## 2023-10-26 RX ORDER — TAMSULOSIN HYDROCHLORIDE 0.4 MG/1
0.4 CAPSULE ORAL DAILY
Status: DISCONTINUED | OUTPATIENT
Start: 2023-10-26 | End: 2023-10-27 | Stop reason: HOSPADM

## 2023-10-26 RX ORDER — ENOXAPARIN SODIUM 100 MG/ML
0.75 INJECTION SUBCUTANEOUS EVERY 12 HOURS
Status: DISCONTINUED | OUTPATIENT
Start: 2023-10-27 | End: 2023-10-27 | Stop reason: HOSPADM

## 2023-10-26 RX ORDER — ENOXAPARIN SODIUM 150 MG/ML
1 INJECTION SUBCUTANEOUS EVERY 12 HOURS
Status: DISCONTINUED | OUTPATIENT
Start: 2023-10-26 | End: 2023-10-26

## 2023-10-26 RX ORDER — PRAVASTATIN SODIUM 20 MG
20 TABLET ORAL DAILY
Status: DISCONTINUED | OUTPATIENT
Start: 2023-10-26 | End: 2023-10-27 | Stop reason: HOSPADM

## 2023-10-26 RX ORDER — PANTOPRAZOLE SODIUM 40 MG/1
40 TABLET, DELAYED RELEASE ORAL
Status: DISCONTINUED | OUTPATIENT
Start: 2023-10-26 | End: 2023-10-27 | Stop reason: HOSPADM

## 2023-10-26 RX ORDER — ENOXAPARIN SODIUM 100 MG/ML
0.75 INJECTION SUBCUTANEOUS EVERY 12 HOURS
Status: DISCONTINUED | OUTPATIENT
Start: 2023-10-26 | End: 2023-10-26

## 2023-10-26 RX ADMIN — SODIUM CHLORIDE, PRESERVATIVE FREE: 5 INJECTION INTRAVENOUS at 08:16

## 2023-10-26 RX ADMIN — PANTOPRAZOLE SODIUM 40 MG: 40 INJECTION, POWDER, FOR SOLUTION INTRAVENOUS at 08:30

## 2023-10-26 RX ADMIN — PANTOPRAZOLE SODIUM 40 MG: 40 TABLET, DELAYED RELEASE ORAL at 15:52

## 2023-10-26 RX ADMIN — PRAVASTATIN SODIUM 20 MG: 20 TABLET ORAL at 20:03

## 2023-10-26 RX ADMIN — TAMSULOSIN HYDROCHLORIDE 0.4 MG: 0.4 CAPSULE ORAL at 11:51

## 2023-10-26 RX ADMIN — SODIUM CHLORIDE, PRESERVATIVE FREE: 5 INJECTION INTRAVENOUS at 06:09

## 2023-10-26 ASSESSMENT — ACTIVITIES OF DAILY LIVING (ADL)
ADLS_ACUITY_SCORE: 28
ADLS_ACUITY_SCORE: 22
ADLS_ACUITY_SCORE: 28
ADLS_ACUITY_SCORE: 28
DEPENDENT_IADLS:: INDEPENDENT
ADLS_ACUITY_SCORE: 28

## 2023-10-26 NOTE — PROGRESS NOTES
"   10/26/23 1100   Appointment Info   Signing Clinician's Name / Credentials (PT) Isaiah Cid DPT       Present no   Living Environment   People in Home spouse   Current Living Arrangements apartment   Home Accessibility no concerns   Transportation Anticipated family or friend will provide   Living Environment Comments Pt lives in an apartment with his spouse. No stairs. Pt reports his spouse will pick him up upon discharge and provide assist as needed.   Self-Care   Usual Activity Tolerance good   Current Activity Tolerance good   Regular Exercise No   Equipment Currently Used at Home none   Fall history within last six months no   Activity/Exercise/Self-Care Comment Pt reports being IND at baseline with all ADLs. Pt ambulates w/o an AD at baseline but has a FWW if needed. Pt drives.   General Information   Onset of Illness/Injury or Date of Surgery 10/26/23   Referring Physician Shahnaz Salazar MD   Patient/Family Therapy Goals Statement (PT) \"To go home\"   Pertinent History of Current Problem (include personal factors and/or comorbidities that impact the POC) Per Chart: Ti Nickerson is a 77 year old male with PMH chronic a-fib on chronic AC with apixaban, CHF, KEENA, CKD, and metastatic RCC s/p right radical nephrectomy and recent partial duodenectomy with hernia repair at [place] on 10/11/23 who was admitted on 10/25/2023 for evaluation of melena with acute blood loss anemia.   Existing Precautions/Restrictions fall   Weight-Bearing Status - LLE full weight-bearing   Weight-Bearing Status - RLE full weight-bearing   Cognition   Orientation Status (Cognition) oriented x 4   Pain Assessment   Patient Currently in Pain No   Integumentary/Edema   Integumentary/Edema no deficits were identifed   Posture    Posture Forward head position;Protracted shoulders   Range of Motion (ROM)   Range of Motion ROM is WFL   Strength (Manual Muscle Testing)   Strength (Manual Muscle Testing) Able to " perform R SLR;Able to perform L SLR   Bed Mobility   Comment, (Bed Mobility) Supine>sit w/ IND   Transfers   Comment, (Transfers) Sit>stand w/o AD and SBA   Gait/Stairs (Locomotion)   [place] Level (Gait) supervision   Assistive Device (Gait)   (no AD)   Distance in Feet (Gait) 5'   Balance   Balance Comments Adequate static sitting balance; Pt ambulates w/o an AD   Sensory Examination   Sensory Perception patient reports no sensory changes   Clinical Impression   Criteria for Skilled Therapeutic Intervention Yes, treatment indicated   PT Diagnosis (PT) Impaired gait   Influenced by the following impairments Decreased balance   Functional limitations due to impairments Impaired functional mobility   Clinical Presentation (PT Evaluation Complexity) stable   Clinical Presentation Rationale Clinical judgement   Clinical Decision Making (Complexity) low complexity   Planned Therapy Interventions (PT) balance training;bed mobility training;gait training;patient/family education;strengthening;transfer training;progressive activity/exercise   Risk & Benefits of therapy have been explained evaluation/treatment results reviewed;risks/benefits reviewed;care plan/treatment goals reviewed;current/potential barriers reviewed;participants voiced agreement with care plan;participants included;patient   PT Total Evaluation Time   PT Eval, Low Complexity Minutes (45375) 10   Physical Therapy Goals   PT Frequency One time eval and treatment only   PT Predicted Duration/Target Date for Goal Attainment 10/29/23   PT Goals Bed Mobility;Transfers;Gait   PT: Bed Mobility Supervision/stand-by assist;Supine to/from sit;Goal Met   PT: Transfers Supervision/stand-by assist;Sit to/from stand;Goal Met   PT: Gait Supervision/stand-by assist;100 feet;Goal Met   Interventions   Interventions Quick Adds Gait Training;Therapeutic Activity   Therapeutic Activity   Therapeutic Activities: dynamic activities to improve functional performance  Minutes (00285) 8   Symptoms Noted During/After Treatment None   Treatment Detail/Skilled Intervention Greeted pt supine in bed, agreed to PT. VSS on RA throughout session. BP taken, pt positive for orthostatic hypotension, see VS flowsheets for details. Pt performed supine>sit w/ IND. Once in sitting, pt able to scoot self to EOB and sit unsupported without LOB. Pt performed sit>stand x 6 w/o AD and SBA, verbal cues for hand placement. After ambulation, pt returned to supine in bed w/ IND, demonstrating ability to safely lift BLEs back into bed and reposition self. Pt ended session supine in bed, with all needs met and call light within reach.   Gait Training   Gait Training Minutes (33350) 15   Symptoms Noted During/After Treatment (Gait Training) none   Treatment Detail/Skilled Intervention Pt ambulated w/o AD and SBA. Pt ambulated w/ decreased gait speed, downward gaze, and steady. Verbal cues for upright gaze and posture, and for pacing. PT introduced dynamic balance challenges to pt including horizontal/vertical head turns, changes in gait speed, stop/starts, retroambulation, sidestepping, and ambulation with eyes closed. Mild path deviations but no overt LOB.   Distance in Feet 400'   PT Discharge Planning   PT Plan DC   PT Discharge Recommendation (DC Rec) home with assist   PT Rationale for DC Rec Pt appears at/near baseline for functional mobility. Pt demonstrates safe and effective techniques with all transfers and ambulation w/o AD. Anticipate pt will be able to safely return home at discharge with assist from spouse.   PT Brief overview of current status SBA for all functional mobility without AD   Total Session Time   Timed Code Treatment Minutes 23   Total Session Time (sum of timed and untimed services) 33

## 2023-10-26 NOTE — PLAN OF CARE
Summary:  Dizziness/ GI bleed  DATE & TIME: 10/25/23 2100-10/26/23 0730  Cognitive Concerns/ Orientation : A&O x4   BEHAVIOR & AGGRESSION TOOL COLOR: Green  CIWA SCORE: NA   ABNL VS/O2: VSS RA, positive orthostatics in ED  MOBILITY: Ax1, gb to BSC; fall risk  PAIN MANAGMENT: Denies  DIET: NPO except for ice chips  BOWEL/BLADDER: Continent up to commode, black stools x2  ABNL LAB/BG: Hgb 9.2 down from 9.7 (Q 6 hr checks), cr 1.54, Alb 3.1  DRAIN/DEVICES: PIV x2 with NS infusing @ 100 mL/hr  TELEMETRY RHYTHM: A-fib CVR  SKIN: Healing scar on abd with dried drainage, site cleaned and new gauze placed.  TESTS/PROCEDURES: None scheduled  D/C DAY/GOALS/PLACE: Discharge pending clinical improvement  OTHER IMPORTANT INFO: Denies dizziness this shift.

## 2023-10-26 NOTE — PROGRESS NOTES
[facility]    Hospitalist Progress Note    Interval History   - Stools have improved, now more brown  - Patient feels well, has some mild orthostatic hypotension old unchanged  - Hemoglobin stable  - Wife Valery at bedside and updated  - Called [facility] Hepatobiliary Surgery and page sent, awaiting call back Addendum: Discussed with Dr. Ceballos's  team (Denis Hilda Benoit), given improved bleeding they recommend starting Lovenox this evening for one week then switching back to apixaban if no further bleeding  - Per GI advanced to clear diet  Addendum2: Discussed with Dr. Ash will start Lovenox in AM and monitor, possible discharge later that day or next day    Assessment & Plan   Summary: Ti Nickerson is a 77 year old male with PMH chronic a-fib on chronic AC with apixaban, CHF, KEENA, CKD, and metastatic RCC s/p right radical nephrectomy and recent partial duodenectomy with hernia repair at [facility] on 10/11/23 who was admitted on 10/25/2023 for evaluation of melena with acute blood loss anemia.    Acute upper GI bleed with acute blood loss anemia, likely anastamotic  Metastatic renal cell carcinoma s/p right radical nephrectomy, partial duodenectomy with DJ anastamosis on 10/11/23  Abdominal wall seroma  *Diagnosed with RCC in 2019. Follows at [facility]  *Subsequently found to have tumor involvement of the duodenum and underwent uncomplicated partial duodenectomy per General Surgery at [facility] on 10/11.   *10/24: Presented to the ED with increased serous drainage from his surgical incision. CT abd/pelvis showed seroma; CT was reviewed with hepatobiliary fellow at [facility] who had no concerns. Of note, he was found to be profoundly orthostatic, and received a bolus of IV fluid prior to discharge  *10/25: Presented on this admission with acute onset melena with associated pre-syncope. He is on apixaban which was held for surgery and subsequently resumed; last dose: morning of 10/24. VSS  "on arrival. Initial hemoglobin was erroneously read at 4.3 (10.4 just the day prior) which prompted emergent consults with GI, General Surgery, and patient's providers at [facility]. ED provider subsequently received notification from the lab that the sample was likely hemolyzed, and repeat Hgb were 9.7 and 9.2, respectively.    Hemoglobin stable 9s on 10/26, with improved melena.  - Appreciate GI consult   - Advanced diet  - Stop IVF  - PPI BID oral  - WOCN consulted for post-op wound care with underlying seroma  - Check orthostatics  - Hold PTA apixaban, restart pending discussions with GI and [facility] hepatobiliary surgery    Chronic atrial fibrillation  - Continue to hold metoprolol due to borderline BPs  - Metoprolol 2.5 mg IV q4h prn for HR>120    Chronic HFpEF without exacerbation  - Appears compensated   - Low threshold to d/c IV fluids for respiratory distress    CKD stage IIIb  - Cr stable within baseline 1.5-1.7    Hypoalbuminemia  Albumin 3.1    Obesity  BMI 34    Other chronic and stable medical conditions  KEENA (not on CPAP)  BPH  Dyslipidemia    Clinically Significant Risk Factors Present on Admission              # Hypoalbuminemia: Lowest albumin = 3.1 g/dL at 10/25/2023 10:02 AM, will monitor as appropriate  # Drug Induced Coagulation Defect: home medication list includes an anticoagulant medication    # Hypertension: Noted on problem list      # Obesity: Estimated body mass index is 33.59 kg/m  as calculated from the following:    Height as of this encounter: 1.905 m (6' 3\").    Weight as of this encounter: 121.9 kg (268 lb 11.9 oz).               PT/OT: not needed  Diet: Clear Liquid Diet    DVT Prophylaxis: PCDs  Dillard Catheter: Not present  Lines: None     Cardiac Monitoring: ACTIVE order. Indication: Tachyarrhythmias, acute (48 hours)  Code Status: Full Code    Disposition: Anticipated discharge 1-2 days    Roderick Gonzales MD  Hospitalist Service  [facility]  Securely " message with [employer] (more info)  Text page via [facility] Paging/Directory     - Total time spent on encounter is 45 minutes, which includes counseling, coordination of care, time spent discussing with family, and/or time spent discussing with consultants.    Data reviewed today: I reviewed all new labs and imaging results over the last 24 hours.    Physical Exam   Temp: 98.6  F (37  C) Temp src: Oral BP: 125/83 Pulse: 68   Resp: 18 SpO2: 99 % O2 Device: None (Room air)    Vitals:    10/25/23 0848 10/25/23 2111   Weight: 124.7 kg (275 lb) 121.9 kg (268 lb 11.9 oz)     Vital Signs with Ranges  Temp:  [97.9  F (36.6  C)-98.6  F (37  C)] 98.6  F (37  C)  Pulse:  [68-95] 68  Resp:  [8-29] 18  BP: ()/(41-91) 125/83  SpO2:  [90 %-99 %] 99 %  I/O last 3 completed shifts:  In: 1914 [I.V.:914; IV Piggyback:1000]  Out: 1125 [Urine:1125]  O2 requirements: none    Constitutional: Male in NAD  HEENT: Eyes nonicteric, oral mucosa moist  Cardiovascular: RRR, normal S1/2, no m/r/g  Respiratory: CTAB, no wheezing or crackles  Vascular: No LE pitting edema  GI: Normoactive bowel sounds, nontender, nondistended  Skin/Integumen: No rashes  Neuro/Psych: Appropriate affect and mood. A&Ox3, moves all extremities    Medications    sodium chloride 100 mL/hr at 10/26/23 0816      pantoprazole  40 mg Intravenous BID       Data   Recent Labs   Lab 10/26/23  0018 10/25/23  1800 10/25/23  1031 10/25/23  1002 10/25/23  0915 10/24/23  1613   WBC  --   --   --  8.6  --  7.9   HGB 9.2* 9.7* 9.2* 9.7*  --  10.4*   MCV  --   --   --  99  --  99   PLT  --   --   --  251  --  264   INR  --   --   --   --  2.29*  --    NA  --   --   --  139  --  137   POTASSIUM  --   --   --  4.6  --  4.5   CHLORIDE  --   --   --  109*  --  101   CO2  --   --   --  21*  --  24   BUN  --   --   --  36.4*  --  35.2*   CR  --   --   --  1.54*  --  1.69*  1.8*   ANIONGAP  --   --   --  9  --  12   DOMINICK  --   --   --  8.2*  --  8.5*   GLC  --   --   --  102*  --  112*    ALBUMIN  --   --   --  3.1*  --  3.3*   PROTTOTAL  --   --   --  5.7*  --  6.0*   BILITOTAL  --   --   --  0.5  --  0.5   ALKPHOS  --   --   --  60  --  67   ALT  --   --   --  18  --  18   AST  --   --   --  23  --  22       Imaging:   Recent Results (from the past 24 hour(s))   CTA Abdomen Pelvis with Contrast    Narrative    CTA ABDOMEN AND PELVIS WITH CONTRAST   10/25/2023 11:03 AM     HISTORY: Acute GI bleed, recent duodenal resection from malignant  renal cell carcinoma.    TECHNIQUE: CTA of the abdomen and pelvis without and with 100 mL  Isovue-370 IV in the arterial and portal venous phases. Radiation dose  for this scan was reduced using automated exposure control, adjustment  of the mA and/or kV according to patient size, or iterative  reconstruction technique.     COMPARISON: 10/24/2023    FINDINGS:   LUNG BASES: Lung bases are clear. No pleural effusion or pericardial  effusion.    VASCULATURE: The abdominal aorta is patent without evidence of  aneurysm, stenosis or dissection. The celiac axis, superior mesenteric  artery, two left renal arteries and the inferior mesenteric artery are  all patent. Bilateral common iliac, internal iliac, external iliac and  common femoral arteries are patent. The bilateral proximal superficial  femoral and proximal profunda femoral arteries are patent.    ABDOMEN: Evaluation of the solid organ parenchyma is limited secondary  to contrast bolus timing. Simple cyst is noted in the left kidney,  changes of right nephrectomy. 3.1 cm hypodense subcapsular nodule in  the inferior right hepatic lobe is unchanged. The spleen, adrenal  glands and gallbladder are all normal. Hepatic and portal veins are  patent. No intraperitoneal free air or free fluid.    PELVIS: No active GI bleeding. No dilated loops of small or large  bowel. Diverticulosis without evidence of diverticulitis.  Postoperative changes to the duodenum as well as repair of an  abdominal wall hernia. Seroma in the  subcutaneous tissues of the  abdominal wall is unchanged. Indeterminate precaval lymphadenopathy is  unchanged measuring up to 3.2 x 2.1 cm. The prostate is enlarged.  Bladder is normal.    MUSCULOSKELETAL: No suspicious bony lesions.      Impression    IMPRESSION:  1. No active GI bleeding.  2. Abdominal aorta, visceral vessels and iliac vessels are patent.  3. Changes of ventral wall hernia repair. There is a tubular left  paramedian subcutaneous fluid collection, consistent with seroma.  4. Stable hypodense 1.3 cm subcapsular nodule in the inferior right  hepatic lobe.  5. 3.3 cm indeterminate precaval lymph node, unchanged.  6. Diverticulosis without evidence of diverticulitis.  7. Changes of right nephrectomy.    JESUSITA DEL CASTILLO DO         SYSTEM ID:  [id number]

## 2023-10-26 NOTE — PLAN OF CARE
Physical Therapy Discharge Summary    Reason for therapy discharge:    All goals and outcomes met, no further needs identified.    Progress towards therapy goal(s). See goals on Care Plan in Epic electronic health record for goal details.  Goals met    Therapy recommendation(s):    Recommend pt continue ambulate with nursing staff throughout remainder of hospital stay.

## 2023-10-26 NOTE — CONSULTS
[facility]  WOC Nurse Inpatient Assessment     Consulted for:  Abdomen    Summary:  Pt is s/p abdominal surgery 10/11/23 at [facility].  Midline incision appears closed and healing well.  Light scabbing remains near umbilicus, no active drainage, no erythema.  No wound cares needed at this time; WOC will sign off.     Patient History (according to provider note(s):      Ti Nickerson is a 77 year old male with PMH chronic a-fib on chronic AC with apixaban, CHF, KEENA, CKD, and metastatic RCC s/p right radical nephrectomy and recent partial duodenectomy with hernia repair at [facility] on 10/11/23 who was admitted on 10/25/2023 for evaluation of melena with acute blood loss anemia.     Areas Assessed:      Areas visualized during today's visit: Abdomen    Wound location: Abdomen (midline)    Last photo: 10-26-23      Wound due to: Surgical Wound  Wound history/plan of care: pt is s/p small bowel resection with DJ anastomosis for management of metastatic renal cell carcinoma c/b GI bleed, 10-11-23 at [facility].  Per report there may be an underlying seroma.  No active drainage noted or expressed, no acute tenderness.  Small areas of induration noted along distal incision.   Wound base: intact closed healing incision with light scabbing, mostly near umbilicus.  Surgical glue remnants.      Palpation of the wound bed: normal and firm/indurated in areas distally     Drainage: none; report of scant serosang on admission     Measurements (length x width x depth, in cm): incision not measured; no open areas to measure  Periwound skin: Intact and clear      Color: normal and consistent with surrounding tissue      Temperature: normal   Odor: none  Pain:  minimal , tender  Pain interventions prior to dressing change: no significant pain present   Treatment goal: Heal   STATUS: healing       Treatment Plan:     No wound cares needed at this time.   Ok to leave incision area open to air.     Orders:   n/a    RECOMMEND PRIMARY TEAM ORDER: None, at this time  Education provided: plan of care  Discussed plan of care with: Patient, Family, and Nurse  [facility] nurse follow-up plan: signing off  Notify WO if wound(s) deteriorate.  Nursing to notify the Provider(s) and re-consult the [facility] Nurse if new skin concern.    DATA:     Current support surface: Standard  Standard gel/foam mattress (IsoFlex, Atmos air, etc)  Containment of urine/stool:  not assessed  BMI: Body mass index is 33.59 kg/m .   Active diet order: Orders Placed This Encounter      Clear Liquid Diet     Output: I/O last 3 completed shifts:  In: 1914 [I.V.:914; IV Piggyback:1000]  Out: 1125 [Urine:1125]     Labs:   Recent Labs   Lab 10/26/23  0018 10/25/23  1031 10/25/23  1002 10/25/23  0915   ALBUMIN  --   --  3.1*  --    HGB 9.2*   < > 9.7*  --    INR  --   --   --  2.29*   WBC  --   --  8.6  --     < > = values in this interval not displayed.     Pressure injury risk assessment:   Sensory Perception: 4-->no impairment  Moisture: 4-->rarely moist  Activity: 3-->walks occasionally  Mobility: 3-->slightly limited  Nutrition: 3-->adequate  Friction and Shear: 3-->no apparent problem  Carlos Alberto Score: 20    Kamini Penny RN CWOCN  -Securely message with [employer] [employer])   -[facility] Office Phone: [phone] (messages checked periodically Mon-Fri 8a-4p)       No

## 2023-10-26 NOTE — PLAN OF CARE
Goal Outcome Evaluation:  Cognitive Concerns/ Orientation : A&Ox4   BEHAVIOR & AGGRESSION TOOL COLOR: Green  CIWA SCORE: NA   ABNL VS/O2: VSS RA, positive orthostatics -improved from the ED  MOBILITY: steady of feet, up ind per PT. Ambulated in the halls  PAIN MANAGMENT: Denies  DIET: clears-tolerating  BOWEL/BLADDER: Continent up to commode, black stools x2  ABNL LAB/BG: Hgb 9.2  DRAIN/DEVICES: PIV x2  TELEMETRY RHYTHM: A-fib CVR  SKIN: clear approximated abd incision-open to air  TESTS/PROCEDURES: None scheduled  D/C DAY/GOALS/PLACE: Discharge pending clinical improvement  OTHER IMPORTANT INFO: 3 loose dark brown stools- no signs of active bleeding. Received IV protonix this am.

## 2023-10-26 NOTE — CONSULTS
Care Management Initial Consult    General Information  Assessment completed with: Patient,    Type of CM/SW Visit: Initial Assessment    Primary Care Provider verified and updated as needed: Yes   Readmission within the last 30 days: no previous admission in last 30 days         Advance Care Planning: Advance Care Planning Reviewed: no concerns identified          Communication Assessment  Patient's communication style: spoken language (English or Bilingual)    Hearing Difficulty or Deaf: no   Wear Glasses or Blind: yes    Cognitive  Cognitive/Neuro/Behavioral: WDL                      Living Environment:   People in home: spouse     Current living Arrangements: house      Able to return to prior arrangements: yes       Family/Social Support:  Care provided by: self  Provides care for: no one  Marital Status:   Wife          Description of Support System: Supportive    Support Assessment: Adequate family and caregiver support    Current Resources:   Patient receiving home care services: No     Community Resources:    Equipment currently used at home: none  Supplies currently used at home:      Employment/Financial:  Employment Status: retired        Financial Concerns:             Does the patient's insurance plan have a 3 day qualifying hospital stay waiver?  No    Lifestyle & Psychosocial Needs:  Social Determinants of Health     Food Insecurity: Not on file   Depression: Not at risk (9/26/2023)    PHQ-2     PHQ-2 Score: 2   Housing Stability: Not on file   Tobacco Use: Low Risk  (9/26/2023)    Patient History     Smoking Tobacco Use: Never     Smokeless Tobacco Use: Never     Passive Exposure: Not on file   Financial Resource Strain: Not on file   Alcohol Use: Not on file   Transportation Needs: Not on file   Physical Activity: Not on file   Interpersonal Safety: Not on file   Stress: Not on file   Social Connections: Not on file       Functional Status:  Prior to admission patient needed assistance:    Dependent ADLs:: Independent  Dependent IADLs:: Independent  Assesssment of Functional Status: At functional baseline    Mental Health Status:  Mental Health Status: No Current Concerns       Chemical Dependency Status:  Chemical Dependency Status: No Current Concerns             Values/Beliefs:  Spiritual, Cultural Beliefs, Worship Practices, Values that affect care: no               Additional Information:  Met with patient in room, introduced self and role in discharge planning.   Patient states he lives with spouse in an apartment. Patient reports independent at baseline . Spouse will be able to assit if needed.  Plan: Discharge to home when hgb stable.     Devon Moya RN [phone]

## 2023-10-26 NOTE — PROGRESS NOTES
[facility]  Gastroenterology Progress Note     Ti Nickerson MRN# [id number]   YOB: 1945 Age: 77 year old          Assessment and Plan:     77 year old male with a history of renal carcinoma who presents with melena that started acutely early very early this a.m. and had 4 episodes of black stools. Has had no bowel movement since presentation to ED. He states 2 weeks ago on 10/11/23  had a partial duodenectomy and hernia repair surgery for a renal carcinoma at [facility]. He had no immediate post op complications and restarted his Eliquis on 10/16/23. He presented to the ED yesterday for abdominal discomfort and melena.     Melena  S/p duodenectomy  Metastatic renal cell carcinoma  S/p partial duodenectomy with hernia repair on 10/11 at [place]  No complications in immediate post op time period  Taking Eliquis for h/o Afib  Developed acute onset melena   Hemoglobin stable 9.2 and trending down  Suspect bleed from incision site  Not a candidate for endoscopic intervention with recent duodenectomy, CTA ordered  CTA reviewed without evidence of active GI bleeding, seroma noted. CT was reviewed with hepatobiliary fellow at [facility] who had no concerns, per hospitalist note.  Hemoglobin reviewed and has remained reasonably stable - currently 9.7->9.2     - Defer endoscopic assessment at this time given stable hemoglobin w/ negative CTA  - Clear liquid diet  - Serial hemoglobin monitoring, transfuse if hgb 7 or less  - Hold Eliquis  - IV pantoprazole 40 mg BID                    Interval History:     Patient states that he had 2 large volume, loose, melanotic stools yesterday evening. Denies  lightheadedness, dizziness, SOB, chest pain, nausea, vomiting and abdominal pain. Currently NPO.              Review of Systems:     C: NEGATIVE for fever, chills, change in weight  E/M: NEGATIVE for ear, mouth and throat problems  R: NEGATIVE for significant cough or SOB  CV: NEGATIVE for  chest pain, palpitations or peripheral edema             Medications:   I have reviewed this patient's current medications   pantoprazole  40 mg Intravenous BID                  Physical Exam:   Vitals were reviewed  Vital Signs with Ranges  Temp:  [97.9  F (36.6  C)-98.6  F (37  C)] 98.6  F (37  C)  Pulse:  [68-95] 68  Resp:  [8-29] 18  BP: ()/(41-91) 125/83  SpO2:  [90 %-99 %] 99 %  I/O last 3 completed shifts:  In: 1914 [I.V.:914; IV Piggyback:1000]  Out: 1125 [Urine:1125]  Constitutional: healthy, alert, and no distress   Cardiovascular: RRR, no murmur noted  Respiratory: CTAB, no increased work of breathing  Abdomen: Abdomen soft, non-tender, non-distended, BS normal, large abdominal incision noted without serous drainage or evidence of infection           Data:   I reviewed the patient's new clinical lab test results.   Recent Labs   Lab Test 10/26/23  0018 10/25/23  1800 10/25/23  1031 10/25/23  1002 10/25/23  0915 10/24/23  1613 09/26/23  1627 09/20/23  1010 09/14/23  0623 09/13/23 2057   WBC  --   --   --  8.6  --  7.9  --  6.1   < >  --    HGB 9.2* 9.7* 9.2* 9.7*  --  10.4*   < > 11.6*   < >  --    MCV  --   --   --  99  --  99  --  98   < >  --    PLT  --   --   --  251  --  264  --  189   < >  --    INR  --   --   --   --  2.29*  --   --   --   --  1.22*    < > = values in this interval not displayed.     Recent Labs   Lab Test 10/25/23  1002 10/24/23  1613 10/09/23  0820   POTASSIUM 4.6 4.5 4.3   CHLORIDE 109* 101 106   CO2 21* 24 23   BUN 36.4* 35.2* 23.0   ANIONGAP 9 12 10     Recent Labs   Lab Test 10/25/23  1002 10/24/23  1613 10/09/23  0820 09/13/23 2051 09/05/23  0938 09/21/22  1102 10/11/21  0947 10/26/20  0936 12/26/19  0812   ALBUMIN 3.1* 3.3*  --  3.8  --    < >  --    < > 3.5   BILITOTAL 0.5 0.5  --  0.7  --    < >  --    < > 0.6   ALT 18 18 13 17  --    < >  --    < > 26   AST 23 22 -- 21  --    < >  --    < > 23   PROTEIN  --   --   --   --  Negative  --  Negative  --  Negative    LIPASE  --   --   --  34  --   --   --   --   --     < > = values in this interval not displayed.       I reviewed the patient's new imaging results.    All laboratory data reviewed  All imaging studies reviewed by me.    [facility]  Office: [phone]  Cell: [phone] (Dr. Ash)  Cell: [phone] (Fabiola Borja PA-C)

## 2023-10-26 NOTE — PLAN OF CARE
RECEIVING UNIT ED HANDOFF REVIEW    ED Nurse Handoff Report was reviewed by: Katherine Schaefer RN on October 25, 2023 at 8:41 PM

## 2023-10-26 NOTE — UTILIZATION REVIEW
Admission Status; Secondary Review Determination       Under the authority of the Utilization Management Committee, the utilization review process indicated a secondary review on the above patient. The review outcome is based on review of the medical records, discussions with staff, and applying clinical experience noted on the date of the review.     (x) Inpatient Status Appropriate - This patient's medical care is consistent with medical management for inpatient care and reasonable inpatient medical practice.     RATIONALE FOR DETERMINATION    77-year-old male with a complex medical history, including chronic atrial fibrillation on apixaban, congestive heart failure, obstructive sleep apnea, chronic kidney disease, and metastatic renal cell carcinoma status post right radical nephrectomy and recent partial duodenectomy with hernia repair on 10/11/23 at [facility], was admitted on 10/25/2023 for evaluation of melena and acute blood loss anemia. His presentation included acute onset melena, and an initial hemoglobin reading erroneously reported at 4.3, which was subsequently clarified to be hemolyzed, with repeat values of 9.7 and 9.2. Given his recent duodenectomy, a gastrointestinal (GI) bleed from the incision site was suspected. The patient was placed on IV proton pump inhibitor (PPI), kept NPO with IV fluids, and serial hemoglobin monitoring initiated. GI was cautious about performing an esophagogastroduodenoscopy (EGD) due to recent surgery and recommended surgical consultation. Orthostatic hypotension was noted, and a 1-liter bolus of normal saline was administered. Recommendations included awaiting computed tomography angiography (CTA) results, contacting the [facility] team for input or possible transfer, serial hemoglobin monitoring with consideration for transfusion if hemoglobin dropped to 7 or less, and holding apixaban. An IV pantoprazole regimen was initiated. The patient's complex medical history  necessitates a multidisciplinary approach to his care.  The expected length of stay at the time of admission was more than 2 nights because of the severity of illness, intensity of service provided, and risk for adverse outcome. Inpatient admission is appropriate.         This document was produced using voice recognition software       The information on this document is developed by the utilization review team in order for the business office to ensure compliance. This only denotes the appropriateness of proper admission status and does not reflect the quality of care rendered.   The definitions of Inpatient Status and Observation Status used in making the determination above are those provided in the CMS Coverage Manual, Chapter 1 and Chapter 6, section 70.4.   Sincerely,   MARSHA DAWN MD   System Medical Director   Utilization Management   [facility].

## 2023-10-27 VITALS
WEIGHT: 268.3 LBS | HEIGHT: 75 IN | TEMPERATURE: 97.8 F | RESPIRATION RATE: 18 BRPM | BODY MASS INDEX: 33.36 KG/M2 | DIASTOLIC BLOOD PRESSURE: 74 MMHG | SYSTOLIC BLOOD PRESSURE: 122 MMHG | OXYGEN SATURATION: 94 % | HEART RATE: 56 BPM

## 2023-10-27 LAB
ERYTHROCYTE [DISTWIDTH] IN BLOOD BY AUTOMATED COUNT: 12.8 % (ref 10–15)
HCT VFR BLD AUTO: 29.8 % (ref 40–53)
HGB BLD-MCNC: 9.6 G/DL (ref 13.3–17.7)
MCH RBC QN AUTO: 31.5 PG (ref 26.5–33)
MCHC RBC AUTO-ENTMCNC: 32.2 G/DL (ref 31.5–36.5)
MCV RBC AUTO: 98 FL (ref 78–100)
PLATELET # BLD AUTO: 261 10E3/UL (ref 150–450)
RBC # BLD AUTO: 3.05 10E6/UL (ref 4.4–5.9)
WBC # BLD AUTO: 7.3 10E3/UL (ref 4–11)

## 2023-10-27 PROCEDURE — 250N000013 HC RX MED GY IP 250 OP 250 PS 637: Performed by: INTERNAL MEDICINE

## 2023-10-27 PROCEDURE — 36415 COLL VENOUS BLD VENIPUNCTURE: CPT | Performed by: INTERNAL MEDICINE

## 2023-10-27 PROCEDURE — 99239 HOSP IP/OBS DSCHRG MGMT >30: CPT | Performed by: INTERNAL MEDICINE

## 2023-10-27 PROCEDURE — 85041 AUTOMATED RBC COUNT: CPT | Performed by: INTERNAL MEDICINE

## 2023-10-27 PROCEDURE — 85027 COMPLETE CBC AUTOMATED: CPT | Performed by: INTERNAL MEDICINE

## 2023-10-27 PROCEDURE — 250N000011 HC RX IP 250 OP 636: Performed by: INTERNAL MEDICINE

## 2023-10-27 RX ORDER — ENOXAPARIN SODIUM 100 MG/ML
90 INJECTION SUBCUTANEOUS EVERY 12 HOURS
Qty: 12.6 ML | Refills: 0 | Status: ON HOLD | OUTPATIENT
Start: 2023-10-27 | End: 2023-11-03

## 2023-10-27 RX ADMIN — ENOXAPARIN SODIUM 90 MG: 100 INJECTION SUBCUTANEOUS at 09:12

## 2023-10-27 RX ADMIN — TAMSULOSIN HYDROCHLORIDE 0.4 MG: 0.4 CAPSULE ORAL at 09:12

## 2023-10-27 RX ADMIN — PANTOPRAZOLE SODIUM 40 MG: 40 TABLET, DELAYED RELEASE ORAL at 06:13

## 2023-10-27 ASSESSMENT — ACTIVITIES OF DAILY LIVING (ADL)
ADLS_ACUITY_SCORE: 28

## 2023-10-27 NOTE — PROGRESS NOTES
[facility]  Gastroenterology Progress Note     Ti Nickerson MRN# [id number]   YOB: 1945 Age: 77 year old          Assessment and Plan:     77 year old male with a history of renal carcinoma who presents with melena that started acutely early very early this a.m. and had 4 episodes of black stools. Has had no bowel movement since presentation to ED. He states 2 weeks ago on 10/11/23  had a partial duodenectomy and hernia repair surgery for a renal carcinoma at [facility]. He had no immediate post op complications and restarted his Eliquis on 10/16/23. He presented to the ED yesterday for abdominal discomfort and melena.     Melena  S/p duodenectomy  Metastatic renal cell carcinoma  S/p partial duodenectomy with hernia repair on 10/11 at [facility]  No complications in immediate post op time period  Taking Eliquis for h/o Afib- currently held  Developed acute onset melena   Suspect bleed from surgical site  Not a candidate for endoscopic intervention with recent duodenectomy, CTA ordered  CTA reviewed without evidence of active GI bleeding, seroma noted. CT was reviewed with hepatobiliary fellow at [facility] who had no concerns, per hospitalist note.  Hemoglobin reviewed and has remained reasonably stable and improved to 9.9     - Defer endoscopic assessment at this time given stable hemoglobin w/ negative CTA  - Ok with GI to advance diet to soft diet  - daily CBC  - started Lovenox as bridge  - oral pantoprazole 40 mg BID  - Gi will sign off and recommend follow-up with [facility]           Interval History:     No stools overnight. Denies  lightheadedness, dizziness, SOB, chest pain, nausea, vomiting and abdominal pain.              Review of Systems:     C: NEGATIVE for fever, chills, change in weight  E/M: NEGATIVE for ear, mouth and throat problems  R: NEGATIVE for significant cough or SOB  CV: NEGATIVE for chest pain, palpitations or peripheral edema             Medications:   I  have reviewed this patient's current medications   enoxaparin ANTICOAGULANT  0.75 mg/kg Subcutaneous Q12H    pantoprazole  40 mg Oral BID AC    pravastatin  20 mg Oral Daily    tamsulosin  0.4 mg Oral Daily                  Physical Exam:   Vitals were reviewed  Vital Signs with Ranges  Temp:  [97.8  F (36.6  C)-98.4  F (36.9  C)] 97.8  F (36.6  C)  Pulse:  [56-87] 56  Resp:  [18] 18  BP: (113-142)/(77-86) 122/79  SpO2:  [94 %-98 %] 94 %  I/O last 3 completed shifts:  In: 360 [P.O.:360]  Out: -   Constitutional: healthy, alert, and no distress   Cardiovascular: RRR, no murmur noted  Respiratory: CTAB, no increased work of breathing  Abdomen: Abdomen soft, non-tender, non-distended, BS normal, large abdominal incision noted without serous drainage or evidence of infection           Data:   I reviewed the patient's new clinical lab test results.   Recent Labs   Lab Test 10/26/23  1500 10/26/23  0018 10/25/23  1800 10/25/23  1031 10/25/23  1002 10/25/23  0915 10/24/23  1613 09/14/23  0623 09/13/23  2057   WBC 8.3  --   --   --  8.6  --  7.9   < >  --    HGB 9.9* 9.2* 9.7*   < > 9.7*  --  10.4*   < >  --    MCV 99  --   --   --  99  --  99   < >  --      --   --   --  251  --  264   < >  --    INR  --   --   --   --   --  2.29*  --   --  1.22*    < > = values in this interval not displayed.     Recent Labs   Lab Test 10/26/23  1500 10/25/23  1002 10/24/23  1613   POTASSIUM 3.8 4.6 4.5   CHLORIDE 105 109* 101   CO2 22 21* 24   BUN 23.4* 36.4* 35.2*   ANIONGAP 10 9 12     Recent Labs   Lab Test 10/25/23  1002 10/24/23  1613 10/09/23  0820 09/13/23 2051 09/05/23  0938 09/21/22  1102 10/11/21  0947 10/26/20  0936 12/26/19  0812   ALBUMIN 3.1* 3.3*  --  3.8  --    < >  --    < > 3.5   BILITOTAL 0.5 0.5  --  0.7  --    < >  --    < > 0.6   ALT 18 18 13 17  --    < >  --    < > 26   AST 23 22  --  21  --    < >  --    < > 23   PROTEIN  --   --   --   --  Negative  --  Negative  --  Negative   LIPASE  --   --   --  34   --   --   --   --   --     < > = values in this interval not displayed.       I reviewed the patient's new imaging results.    All laboratory data reviewed  All imaging studies reviewed by me.    [facility] LILIA [facility]  Office: [phone]  Cell: [phone] (Dr. Ash)  Cell: [phone] (Fabiola Borja PA-C)

## 2023-10-27 NOTE — PROGRESS NOTES
Pt AO4.  Independent in the room.  VSS on RA.  Denied pain.  Stated he had a brown BM this shift, flushed before writer could see.  Tele A fib CVR.  Full liquid diet.  Pt slept in between cares.

## 2023-10-27 NOTE — PLAN OF CARE
Summary:  Dizziness/ GI bleed  DATE & TIME: 10/26/23 1748-1822  Cognitive Concerns/ Orientation : A&Ox4   BEHAVIOR & AGGRESSION TOOL COLOR: Green  CIWA SCORE: NA   ABNL VS/O2: VSS on RA, daily orthos  MOBILITY: Ind  PAIN MANAGMENT: Denies  DIET: advanced to full liquids, tolerated  BOWEL/BLADDER: Continent, brown soft-water stools x3  ABNL LAB/BG: Hgb 9.2  DRAIN/DEVICES: PIV x2  TELEMETRY RHYTHM: A-fib CVR  SKIN: clear approximated abd incision-open to air  TESTS/PROCEDURES: None scheduled  D/C DAY/GOALS/PLACE: Discharge pending clinical improvement  OTHER IMPORTANT INFO: WOC consulted, [facility] following, to start Lovenox tomorrow (10/27) morning

## 2023-10-27 NOTE — PLAN OF CARE
Goal Outcome Evaluation:Acute upper GI bleed with acute blood loss anemia   Metastatic renal cell carcinoma s/p right radical nephrectomy, partial duodenectomy with DJ anastamosis on 10/11/23  Abdominal wall seroma       Orientation: A/O x4 calm and pleasant, walked few times spouse,  Independently in room, no respiratory distress or abdominal discomfort have noticed, denies pain and no bloody stool have noticed     Vitals: VSS on R A on tele   with Tele A fib CVR.     IV Access/drains: PIV SL      Diet: Mechanical dental soft    Mobility: Independent in room    GI/: Continent     Wound/Skin: abdominal mid incision, DCI    Consults: GI follow up     Discharge Plan: discharge home with his spouse.      See Flow sheets for assessment

## 2023-10-27 NOTE — DISCHARGE SUMMARY
[facility]    Hospitalist Discharge Summary       Date of Admission:  10/25/2023  Date of Discharge:  10/27/2023  Discharging Provider: Roderick Gonzales MD      Discharge Diagnoses   Acute upper GI bleed, suspected anastamotic  Acute blood loss anemia  Metastatic renal cell carcinoma s/p right radical nephrectomy, partial duodenectomy with DJ anastamosis on 10/11/23  Abdominal wall seroma    Follow-ups Needed After Discharge   Follow-up Appointments     Follow-up and recommended labs and tests       Follow up with primary care provider, Nico Retana MD, within   7 days for hospital follow- up.  No follow up labs or test are needed.    - Follow up with [facility] as scheduled            Unresulted Labs Ordered in the Past 30 Days of this Admission       Date and Time Order Name Status Description    10/25/2023  9:45 AM Prepare red blood cells (unit) Preliminary     10/25/2023  9:45 AM Prepare red blood cells (unit) Preliminary         These results will be followed up by PCP    Hospital Course   Ti Nickerson is a 77 year old male with PMH chronic a-fib on apixaban, CHF, KEENA, CKD, and metastatic RCC s/p right radical nephrectomy and recent partial duodenectomy with hernia repair at [facility] on 10/11/23 who was admitted on 10/25/2023 for evaluation of melena with acute blood loss anemia.  On 10/24: Presented to the ED with increased serous drainage from his surgical incision. CT abd/pelvis showed seroma; CT was reviewed with hepatobiliary fellow at [facility] who had no concerns. Of note, he was found to be profoundly orthostatic, and received a bolus of IV fluid prior to discharge  On 10/25: Presented on this admission with acute onset melena with associated pre-syncope. He is on apixaban which was held for surgery and subsequently resumed; last dose: morning of 10/24. VSS on arrival. Initial hemoglobin was erroneously read at 4.3 (10.4 just the day prior) which prompted emergent  "consults with GI, General Surgery, and patient's providers at [place]. ED provider subsequently received notification from the lab that the sample was likely hemolyzed, and repeat Hgb were 9.7 and 9.2, respectively.   Patient thereafter stabilized, hemoglobin stable 9s on 10/26, melena resolved. Following discussion with GI and [facility] Hepatobiliary surgery, therapeutic Lovenox was started in AM of 10/27, for 1 week, before transition back to apixaban.  - Patient instructed on Lovenox to Eliquis transition and to stop taking Lovenox with any bleeding, melena  - Continue PTA PPI BID    Chronic atrial fibrillation  - Resume PTA metoprolol at discharge    Chronic HFpEF without exacerbation  Stable this admission    Clinically Significant Risk Factors              # Hypoalbuminemia: Lowest albumin = 3.1 g/dL at 10/25/2023 10:02 AM, will monitor as appropriate  # Coagulation Defect: INR = 2.29 (Ref range: 0.85 - 1.15) and/or PTT = 51 Seconds (Ref range: 22 - 38 Seconds), will monitor for bleeding    # Hypertension: Noted on problem list        # Obesity: Estimated body mass index is 33.54 kg/m  as calculated from the following:    Height as of this encounter: 1.905 m (6' 3\").    Weight as of this encounter: 121.7 kg (268 lb 4.8 oz)., PRESENT ON ADMISSION              Consultations This Hospital Stay   PHARMACY IP CONSULT  GASTROENTEROLOGY IP CONSULT  PHYSICAL THERAPY ADULT IP CONSULT  CARE MANAGEMENT / SOCIAL WORK IP CONSULT  WOUND OSTOMY CONTINENCE NURSE  IP CONSULT  SURGERY GENERAL IP CONSULT    Code Status   Full Code    Time Spent on this Encounter   I, Roderick Gonzales, personally saw the patient today and spent approximately 35 minutes discharging this patient.       Roderick Gonzales MD  [facility]  ______________________________________________________________________    Physical Exam   Vital Signs: Temp: 97.8  F (36.6  C) Temp src: Oral BP: 122/79 Pulse: 56   Resp: 18 SpO2: 94 % O2 " Device: None (Room air)    Weight: 268 lbs 4.8 oz  Constitutional: Male in NAD  HEENT: Eyes nonicteric, oral mucosa moist  Cardiovascular: RRR, normal S1/2, no m/r/g  Respiratory: CTAB, no wheezing or crackles  Vascular: No LE pitting edema  GI: Normoactive bowel sounds, nontender, nondistended  Skin/Integumen: No rashes  Neuro/Psych: Appropriate affect and mood. A&Ox3, moves all extremities       Primary Care Physician   Nico Retana MD    Discharge Disposition   Discharged to home  Condition at discharge: Stable    Significant Results and Procedures   Most Recent 3 CBC's:  Recent Labs   Lab Test 10/26/23  1500 10/26/23  0018 10/25/23  1800 10/25/23  1031 10/25/23  1002 10/24/23  1613   WBC 8.3  --   --   --  8.6 7.9   HGB 9.9* 9.2* 9.7*   < > 9.7* 10.4*   MCV 99  --   --   --  99 99     --   --   --  251 264    < > = values in this interval not displayed.     Most Recent 3 BMP's:  Recent Labs   Lab Test 10/26/23  1500 10/25/23  1002 10/24/23  1613    139 137   POTASSIUM 3.8 4.6 4.5   CHLORIDE 105 109* 101   CO2 22 21* 24   BUN 23.4* 36.4* 35.2*   CR 1.50* 1.54* 1.69*  1.8*   ANIONGAP 10 9 12   DOMINICK 8.4* 8.2* 8.5*   GLC 95 102* 112*     Most Recent 2 LFT's:  Recent Labs   Lab Test 10/25/23  1002 10/24/23  1613   AST 23 22   ALT 18 18   ALKPHOS 60 67   BILITOTAL 0.5 0.5     Most Recent 3 INR's:  Recent Labs   Lab Test 10/25/23  0915 09/13/23  2057   INR 2.29* 1.22*     Most Recent 3 Troponin's:No lab results found.  Most Recent 3 BNP's:No lab results found.  Most Recent D-dimer:No lab results found.  Most Recent Cholesterol Panel:  Recent Labs   Lab Test 10/09/23  0820   CHOL 113   LDL 57   HDL 39*   TRIG 84       Results for orders placed or performed during the hospital encounter of 10/25/23   CTA Abdomen Pelvis with Contrast    Narrative    CTA ABDOMEN AND PELVIS WITH CONTRAST   10/25/2023 11:03 AM     HISTORY: Acute GI bleed, recent duodenal resection from malignant  renal cell  carcinoma.    TECHNIQUE: CTA of the abdomen and pelvis without and with 100 mL  Isovue-370 IV in the arterial and portal venous phases. Radiation dose  for this scan was reduced using automated exposure control, adjustment  of the mA and/or kV according to patient size, or iterative  reconstruction technique.     COMPARISON: 10/24/2023    FINDINGS:   LUNG BASES: Lung bases are clear. No pleural effusion or pericardial  effusion.    VASCULATURE: The abdominal aorta is patent without evidence of  aneurysm, stenosis or dissection. The celiac axis, superior mesenteric  artery, two left renal arteries and the inferior mesenteric artery are  all patent. Bilateral common iliac, internal iliac, external iliac and  common femoral arteries are patent. The bilateral proximal superficial  femoral and proximal profunda femoral arteries are patent.    ABDOMEN: Evaluation of the solid organ parenchyma is limited secondary  to contrast bolus timing. Simple cyst is noted in the left kidney,  changes of right nephrectomy. 3.1 cm hypodense subcapsular nodule in  the inferior right hepatic lobe is unchanged. The spleen, adrenal  glands and gallbladder are all normal. Hepatic and portal veins are  patent. No intraperitoneal free air or free fluid.    PELVIS: No active GI bleeding. No dilated loops of small or large  bowel. Diverticulosis without evidence of diverticulitis.  Postoperative changes to the duodenum as well as repair of an  abdominal wall hernia. Seroma in the subcutaneous tissues of the  abdominal wall is unchanged. Indeterminate precaval lymphadenopathy is  unchanged measuring up to 3.2 x 2.1 cm. The prostate is enlarged.  Bladder is normal.    MUSCULOSKELETAL: No suspicious bony lesions.      Impression    IMPRESSION:  1. No active GI bleeding.  2. Abdominal aorta, visceral vessels and iliac vessels are patent.  3. Changes of ventral wall hernia repair. There is a tubular left  paramedian subcutaneous fluid collection,  consistent with seroma.  4. Stable hypodense 1.3 cm subcapsular nodule in the inferior right  hepatic lobe.  5. 3.3 cm indeterminate precaval lymph node, unchanged.  6. Diverticulosis without evidence of diverticulitis.  7. Changes of right nephrectomy.    JESUSITA DEL CASTILLO DO         SYSTEM ID:  [id number]       Discharge Orders      Reason for your hospital stay    You were hospitalized for GI bleeding, likely post-surgical in the setting of Eliquis.     Follow-up and recommended labs and tests     Follow up with primary care provider, Nico Retana MD, within 7 days for hospital follow- up.  No follow up labs or test are needed.    - Follow up with [facility] as scheduled     Activity    Your activity upon discharge: activity as tolerated     Monitor and record    Monitor for lightheadedness, dizziness, and black stools. If you have any black stools, stop taking Lovenox and metoprolol, and call your PCP. If severe bleeding, go directly to the emergency department.     Diet    Follow this diet upon discharge:       Mechanical/Dental Soft Diet     Discharge Medications   Current Discharge Medication List        START taking these medications    Details   enoxaparin ANTICOAGULANT (LOVENOX) 100 MG/ML syringe Inject 0.9 mLs (90 mg) Subcutaneous every 12 hours for 7 days  Qty: 12.6 mL, Refills: 0    Associated Diagnoses: Chronic atrial fibrillation (H)           CONTINUE these medications which have CHANGED    Details   apixaban ANTICOAGULANT (ELIQUIS) 5 MG tablet Take 1 tablet (5 mg) by mouth 2 times daily Restart this medication after completing Lovenox    Associated Diagnoses: Chronic atrial fibrillation (H)           CONTINUE these medications which have NOT CHANGED    Details   acetaminophen (TYLENOL) 500 MG tablet Take 500-1,000 mg by mouth every 6 hours as needed for mild pain      loperamide (IMODIUM) 2 MG capsule Take 2 mg by mouth 4 times daily as needed for diarrhea      metoprolol succinate ER  (TOPROL XL) 25 MG 24 hr tablet TAKE 1 TABLET BY MOUTH EVERY DAY  Qty: 90 tablet, Refills: 1    Associated Diagnoses: Chronic atrial fibrillation (H)      multivitamin w/minerals (THERA-VIT-M) tablet Take 1 tablet by mouth daily      pantoprazole (PROTONIX) 40 MG EC tablet Take 1 tablet (40 mg) by mouth 2 times daily (before meals)  Qty: 60 tablet, Refills: 0    Associated Diagnoses: Melena      pravastatin (PRAVACHOL) 20 MG tablet Take 1 tablet (20 mg) by mouth daily  Qty: 90 tablet, Refills: 3    Comments: For Profile Only - patient will call to fill  Associated Diagnoses: Hyperlipidemia LDL goal <100      tamsulosin (FLOMAX) 0.4 MG capsule TAKE 1 CAPSULE BY MOUTH EVERY DAY  Qty: 90 capsule, Refills: 1    Comments: DX Code Needed  .  Associated Diagnoses: Benign prostatic hyperplasia with nocturia      ASPIRIN NOT PRESCRIBED (INTENTIONAL) Please choose reason not prescribed, below    Associated Diagnoses: Coronary artery disease involving native coronary artery of native heart without angina pectoris           Allergies   Allergies   Allergen Reactions    Fentanyl Confusion

## 2023-10-29 ENCOUNTER — PATIENT OUTREACH (OUTPATIENT)
Dept: CARE COORDINATION | Facility: CLINIC | Age: 78
End: 2023-10-29
Payer: MEDICARE

## 2023-10-29 NOTE — PROGRESS NOTES
[facility]    Background: Transitional Care Management program identified per system criteria and reviewed by [facility] team for possible outreach.    Assessment: Upon chart review, CCR Team member will not proceed with patient outreach related to this episode of Transitional Care Management program due to reason below:    Patient has active communication with a nurse, provider or care team for reason of post-hospital follow up plan.  Outreach call by CCR team not indicated to minimize duplicative efforts.     Plan: Transitional Care Management episode addressed appropriately per reason noted above.      Jaymie Rivas RN  [facility] Resource [place], [facility]    *Connected Care Resource Team does NOT follow patient ongoing. Referrals are identified based on internal discharge reports and the outreach is to ensure patient has an understanding of their discharge instructions.

## 2023-10-30 ENCOUNTER — NURSE TRIAGE (OUTPATIENT)
Dept: NURSING | Facility: CLINIC | Age: 78
End: 2023-10-30
Payer: MEDICARE

## 2023-10-31 ENCOUNTER — APPOINTMENT (OUTPATIENT)
Dept: CT IMAGING | Facility: CLINIC | Age: 78
DRG: 862 | End: 2023-10-31
Attending: EMERGENCY MEDICINE
Payer: MEDICARE

## 2023-10-31 ENCOUNTER — HOSPITAL ENCOUNTER (INPATIENT)
Facility: CLINIC | Age: 78
LOS: 3 days | Discharge: HOME | DRG: 862 | End: 2023-11-03
Attending: EMERGENCY MEDICINE | Admitting: HOSPITALIST
Payer: MEDICARE

## 2023-10-31 ENCOUNTER — APPOINTMENT (OUTPATIENT)
Dept: GENERAL RADIOLOGY | Facility: CLINIC | Age: 78
DRG: 862 | End: 2023-10-31
Attending: EMERGENCY MEDICINE
Payer: MEDICARE

## 2023-10-31 ENCOUNTER — APPOINTMENT (OUTPATIENT)
Dept: ULTRASOUND IMAGING | Facility: CLINIC | Age: 78
DRG: 862 | End: 2023-10-31
Attending: RADIOLOGY
Payer: MEDICARE

## 2023-10-31 DIAGNOSIS — L03.311 CELLULITIS OF ABDOMINAL WALL: ICD-10-CM

## 2023-10-31 DIAGNOSIS — I48.20 CHRONIC ATRIAL FIBRILLATION (H): ICD-10-CM

## 2023-10-31 LAB
ALBUMIN SERPL BCG-MCNC: 3.2 G/DL (ref 3.5–5.2)
ALBUMIN UR-MCNC: NEGATIVE MG/DL
ALP SERPL-CCNC: 67 U/L (ref 40–129)
ALT SERPL W P-5'-P-CCNC: 19 U/L (ref 0–70)
ANION GAP SERPL CALCULATED.3IONS-SCNC: 12 MMOL/L (ref 7–15)
APPEARANCE UR: CLEAR
AST SERPL W P-5'-P-CCNC: 26 U/L (ref 0–45)
BASOPHILS # BLD AUTO: 0 10E3/UL (ref 0–0.2)
BASOPHILS NFR BLD AUTO: 0 %
BILIRUB SERPL-MCNC: 0.6 MG/DL
BILIRUB UR QL STRIP: NEGATIVE
BUN SERPL-MCNC: 19.2 MG/DL (ref 8–23)
CALCIUM SERPL-MCNC: 8.3 MG/DL (ref 8.8–10.2)
CHLORIDE SERPL-SCNC: 102 MMOL/L (ref 98–107)
COLOR UR AUTO: YELLOW
CREAT SERPL-MCNC: 1.56 MG/DL (ref 0.67–1.17)
DEPRECATED HCO3 PLAS-SCNC: 23 MMOL/L (ref 22–29)
EGFRCR SERPLBLD CKD-EPI 2021: 46 ML/MIN/1.73M2
EOSINOPHIL # BLD AUTO: 0.1 10E3/UL (ref 0–0.7)
EOSINOPHIL NFR BLD AUTO: 0 %
ERYTHROCYTE [DISTWIDTH] IN BLOOD BY AUTOMATED COUNT: 12.8 % (ref 10–15)
FLUAV RNA SPEC QL NAA+PROBE: NEGATIVE
FLUBV RNA RESP QL NAA+PROBE: NEGATIVE
GLUCOSE SERPL-MCNC: 130 MG/DL (ref 70–99)
GLUCOSE UR STRIP-MCNC: NEGATIVE MG/DL
HCO3 BLDV-SCNC: 22 MMOL/L (ref 21–28)
HCT VFR BLD AUTO: 29.9 % (ref 40–53)
HGB BLD-MCNC: 9.8 G/DL (ref 13.3–17.7)
HGB UR QL STRIP: NEGATIVE
HYALINE CASTS: 12 /LPF
IMM GRANULOCYTES # BLD: 0.1 10E3/UL
IMM GRANULOCYTES NFR BLD: 1 %
KETONES UR STRIP-MCNC: NEGATIVE MG/DL
LACTATE BLD-SCNC: 1.6 MMOL/L
LEUKOCYTE ESTERASE UR QL STRIP: NEGATIVE
LYMPHOCYTES # BLD AUTO: 0.7 10E3/UL (ref 0.8–5.3)
LYMPHOCYTES NFR BLD AUTO: 6 %
MCH RBC QN AUTO: 31.6 PG (ref 26.5–33)
MCHC RBC AUTO-ENTMCNC: 32.8 G/DL (ref 31.5–36.5)
MCV RBC AUTO: 97 FL (ref 78–100)
MONOCYTES # BLD AUTO: 0.9 10E3/UL (ref 0–1.3)
MONOCYTES NFR BLD AUTO: 7 %
MRSA DNA SPEC QL NAA+PROBE: NEGATIVE
MUCOUS THREADS #/AREA URNS LPF: PRESENT /LPF
NEUTROPHILS # BLD AUTO: 10.2 10E3/UL (ref 1.6–8.3)
NEUTROPHILS NFR BLD AUTO: 86 %
NITRATE UR QL: NEGATIVE
NRBC # BLD AUTO: 0 10E3/UL
NRBC BLD AUTO-RTO: 0 /100
PCO2 BLDV: 33 MM HG (ref 40–50)
PH BLDV: 7.43 [PH] (ref 7.32–7.43)
PH UR STRIP: 5.5 [PH] (ref 5–7)
PLATELET # BLD AUTO: 240 10E3/UL (ref 150–450)
PO2 BLDV: 29 MM HG (ref 25–47)
POTASSIUM SERPL-SCNC: 3.9 MMOL/L (ref 3.4–5.3)
PROT SERPL-MCNC: 5.9 G/DL (ref 6.4–8.3)
RBC # BLD AUTO: 3.1 10E6/UL (ref 4.4–5.9)
RBC URINE: 1 /HPF
RSV RNA SPEC NAA+PROBE: NEGATIVE
SA TARGET DNA: NEGATIVE
SAO2 % BLDV: 59 % (ref 94–100)
SARS-COV-2 RNA RESP QL NAA+PROBE: NEGATIVE
SODIUM SERPL-SCNC: 137 MMOL/L (ref 135–145)
SP GR UR STRIP: 1.02 (ref 1–1.03)
UROBILINOGEN UR STRIP-MCNC: NORMAL MG/DL
WBC # BLD AUTO: 11.9 10E3/UL (ref 4–11)
WBC URINE: 2 /HPF

## 2023-10-31 PROCEDURE — 0W9F30Z DRAINAGE OF ABDOMINAL WALL WITH DRAINAGE DEVICE, PERCUTANEOUS APPROACH: ICD-10-PCS | Performed by: RADIOLOGY

## 2023-10-31 PROCEDURE — 250N000009 HC RX 250: Performed by: RADIOLOGY

## 2023-10-31 PROCEDURE — 250N000011 HC RX IP 250 OP 636: Mod: JZ | Performed by: EMERGENCY MEDICINE

## 2023-10-31 PROCEDURE — 81001 URINALYSIS AUTO W/SCOPE: CPT | Performed by: EMERGENCY MEDICINE

## 2023-10-31 PROCEDURE — 250N000011 HC RX IP 250 OP 636

## 2023-10-31 PROCEDURE — 87086 URINE CULTURE/COLONY COUNT: CPT | Performed by: EMERGENCY MEDICINE

## 2023-10-31 PROCEDURE — 250N000013 HC RX MED GY IP 250 OP 250 PS 637: Performed by: EMERGENCY MEDICINE

## 2023-10-31 PROCEDURE — 82803 BLOOD GASES ANY COMBINATION: CPT

## 2023-10-31 PROCEDURE — 250N000011 HC RX IP 250 OP 636: Mod: JZ | Performed by: HOSPITALIST

## 2023-10-31 PROCEDURE — 99285 EMERGENCY DEPT VISIT HI MDM: CPT | Mod: 25

## 2023-10-31 PROCEDURE — 80053 COMPREHEN METABOLIC PANEL: CPT | Performed by: EMERGENCY MEDICINE

## 2023-10-31 PROCEDURE — 258N000003 HC RX IP 258 OP 636: Performed by: INTERNAL MEDICINE

## 2023-10-31 PROCEDURE — 96367 TX/PROPH/DG ADDL SEQ IV INF: CPT

## 2023-10-31 PROCEDURE — 71046 X-RAY EXAM CHEST 2 VIEWS: CPT

## 2023-10-31 PROCEDURE — 87070 CULTURE OTHR SPECIMN AEROBIC: CPT | Performed by: HOSPITALIST

## 2023-10-31 PROCEDURE — 99223 1ST HOSP IP/OBS HIGH 75: CPT | Mod: A1 | Performed by: HOSPITALIST

## 2023-10-31 PROCEDURE — 87637 SARSCOV2&INF A&B&RSV AMP PRB: CPT | Performed by: EMERGENCY MEDICINE

## 2023-10-31 PROCEDURE — 87075 CULTR BACTERIA EXCEPT BLOOD: CPT | Performed by: HOSPITALIST

## 2023-10-31 PROCEDURE — 85025 COMPLETE CBC W/AUTO DIFF WBC: CPT | Performed by: EMERGENCY MEDICINE

## 2023-10-31 PROCEDURE — 96365 THER/PROPH/DIAG IV INF INIT: CPT

## 2023-10-31 PROCEDURE — 87040 BLOOD CULTURE FOR BACTERIA: CPT | Performed by: EMERGENCY MEDICINE

## 2023-10-31 PROCEDURE — 258N000003 HC RX IP 258 OP 636

## 2023-10-31 PROCEDURE — 74176 CT ABD & PELVIS W/O CONTRAST: CPT | Mod: MA

## 2023-10-31 PROCEDURE — 87641 MR-STAPH DNA AMP PROBE: CPT | Performed by: HOSPITALIST

## 2023-10-31 PROCEDURE — 272N000431 US ASPIRATION OF SEROMA/HEMATOMA/ABSCESS/CYST

## 2023-10-31 PROCEDURE — 96366 THER/PROPH/DIAG IV INF ADDON: CPT

## 2023-10-31 PROCEDURE — 120N000001 HC R&B MED SURG/OB

## 2023-10-31 PROCEDURE — 36415 COLL VENOUS BLD VENIPUNCTURE: CPT | Performed by: EMERGENCY MEDICINE

## 2023-10-31 PROCEDURE — 250N000013 HC RX MED GY IP 250 OP 250 PS 637: Performed by: HOSPITALIST

## 2023-10-31 PROCEDURE — 83605 ASSAY OF LACTIC ACID: CPT

## 2023-10-31 PROCEDURE — 250N000011 HC RX IP 250 OP 636: Performed by: INTERNAL MEDICINE

## 2023-10-31 RX ORDER — PIPERACILLIN SODIUM, TAZOBACTAM SODIUM 4; .5 G/20ML; G/20ML
4.5 INJECTION, POWDER, LYOPHILIZED, FOR SOLUTION INTRAVENOUS ONCE
Status: DISCONTINUED | OUTPATIENT
Start: 2023-10-31 | End: 2023-10-31

## 2023-10-31 RX ORDER — ACETAMINOPHEN 325 MG/1
650 TABLET ORAL ONCE
Status: COMPLETED | OUTPATIENT
Start: 2023-10-31 | End: 2023-10-31

## 2023-10-31 RX ORDER — ONDANSETRON 2 MG/ML
4 INJECTION INTRAMUSCULAR; INTRAVENOUS EVERY 6 HOURS PRN
Status: DISCONTINUED | OUTPATIENT
Start: 2023-10-31 | End: 2023-11-03 | Stop reason: HOSPADM

## 2023-10-31 RX ORDER — NALOXONE HYDROCHLORIDE 0.4 MG/ML
0.2 INJECTION, SOLUTION INTRAMUSCULAR; INTRAVENOUS; SUBCUTANEOUS
Status: DISCONTINUED | OUTPATIENT
Start: 2023-10-31 | End: 2023-11-03 | Stop reason: HOSPADM

## 2023-10-31 RX ORDER — ACETAMINOPHEN 325 MG/1
650 TABLET ORAL EVERY 6 HOURS PRN
Status: DISCONTINUED | OUTPATIENT
Start: 2023-10-31 | End: 2023-10-31

## 2023-10-31 RX ORDER — NALOXONE HYDROCHLORIDE 0.4 MG/ML
0.4 INJECTION, SOLUTION INTRAMUSCULAR; INTRAVENOUS; SUBCUTANEOUS
Status: DISCONTINUED | OUTPATIENT
Start: 2023-10-31 | End: 2023-11-03 | Stop reason: HOSPADM

## 2023-10-31 RX ORDER — LIDOCAINE HYDROCHLORIDE 10 MG/ML
10 INJECTION, SOLUTION EPIDURAL; INFILTRATION; INTRACAUDAL; PERINEURAL ONCE
Status: COMPLETED | OUTPATIENT
Start: 2023-10-31 | End: 2023-10-31

## 2023-10-31 RX ORDER — CEFAZOLIN SODIUM 2 G/100ML
2 INJECTION, SOLUTION INTRAVENOUS ONCE
Status: COMPLETED | OUTPATIENT
Start: 2023-10-31 | End: 2023-10-31

## 2023-10-31 RX ORDER — HYDROMORPHONE HCL IN WATER/PF 6 MG/30 ML
0.4 PATIENT CONTROLLED ANALGESIA SYRINGE INTRAVENOUS
Status: DISCONTINUED | OUTPATIENT
Start: 2023-10-31 | End: 2023-11-03 | Stop reason: HOSPADM

## 2023-10-31 RX ORDER — ACETAMINOPHEN 650 MG/1
650 SUPPOSITORY RECTAL EVERY 6 HOURS PRN
Status: DISCONTINUED | OUTPATIENT
Start: 2023-10-31 | End: 2023-10-31

## 2023-10-31 RX ORDER — ENOXAPARIN SODIUM 100 MG/ML
90 INJECTION SUBCUTANEOUS EVERY 12 HOURS
Status: DISCONTINUED | OUTPATIENT
Start: 2023-10-31 | End: 2023-11-03 | Stop reason: HOSPADM

## 2023-10-31 RX ORDER — CEFAZOLIN SODIUM 2 G/100ML
2 INJECTION, SOLUTION INTRAVENOUS EVERY 8 HOURS
Status: DISCONTINUED | OUTPATIENT
Start: 2023-10-31 | End: 2023-11-03 | Stop reason: HOSPADM

## 2023-10-31 RX ORDER — ACETAMINOPHEN 325 MG/1
650 TABLET ORAL EVERY 4 HOURS PRN
Status: DISCONTINUED | OUTPATIENT
Start: 2023-10-31 | End: 2023-11-03 | Stop reason: HOSPADM

## 2023-10-31 RX ORDER — PANTOPRAZOLE SODIUM 40 MG/1
40 TABLET, DELAYED RELEASE ORAL
Status: DISCONTINUED | OUTPATIENT
Start: 2023-10-31 | End: 2023-11-03 | Stop reason: HOSPADM

## 2023-10-31 RX ORDER — LANOLIN ALCOHOL/MO/W.PET/CERES
3 CREAM (GRAM) TOPICAL
Status: DISCONTINUED | OUTPATIENT
Start: 2023-10-31 | End: 2023-11-03 | Stop reason: HOSPADM

## 2023-10-31 RX ORDER — ONDANSETRON 4 MG/1
4 TABLET, ORALLY DISINTEGRATING ORAL EVERY 6 HOURS PRN
Status: DISCONTINUED | OUTPATIENT
Start: 2023-10-31 | End: 2023-11-03 | Stop reason: HOSPADM

## 2023-10-31 RX ORDER — TAMSULOSIN HYDROCHLORIDE 0.4 MG/1
0.4 CAPSULE ORAL DAILY
Status: DISCONTINUED | OUTPATIENT
Start: 2023-10-31 | End: 2023-11-03 | Stop reason: HOSPADM

## 2023-10-31 RX ORDER — HYDROMORPHONE HCL IN WATER/PF 6 MG/30 ML
0.2 PATIENT CONTROLLED ANALGESIA SYRINGE INTRAVENOUS
Status: DISCONTINUED | OUTPATIENT
Start: 2023-10-31 | End: 2023-11-03 | Stop reason: HOSPADM

## 2023-10-31 RX ADMIN — SODIUM CHLORIDE 1000 ML: 9 INJECTION, SOLUTION INTRAVENOUS at 08:21

## 2023-10-31 RX ADMIN — ACETAMINOPHEN 650 MG: 325 TABLET, FILM COATED ORAL at 11:40

## 2023-10-31 RX ADMIN — ACETAMINOPHEN 650 MG: 325 TABLET, FILM COATED ORAL at 01:41

## 2023-10-31 RX ADMIN — CEFAZOLIN SODIUM 2 G: 2 INJECTION, SOLUTION INTRAVENOUS at 19:56

## 2023-10-31 RX ADMIN — PANTOPRAZOLE SODIUM 40 MG: 40 TABLET, DELAYED RELEASE ORAL at 16:33

## 2023-10-31 RX ADMIN — ENOXAPARIN SODIUM 90 MG: 100 INJECTION SUBCUTANEOUS at 19:54

## 2023-10-31 RX ADMIN — VANCOMYCIN HYDROCHLORIDE 2500 MG: 10 INJECTION, POWDER, LYOPHILIZED, FOR SOLUTION INTRAVENOUS at 03:40

## 2023-10-31 RX ADMIN — LIDOCAINE HYDROCHLORIDE 10 ML: 10 INJECTION, SOLUTION EPIDURAL; INFILTRATION; INTRACAUDAL; PERINEURAL at 12:59

## 2023-10-31 RX ADMIN — CEFAZOLIN SODIUM 2 G: 2 INJECTION, SOLUTION INTRAVENOUS at 11:22

## 2023-10-31 RX ADMIN — CEFAZOLIN SODIUM 2 G: 2 INJECTION, SOLUTION INTRAVENOUS at 03:13

## 2023-10-31 ASSESSMENT — ACTIVITIES OF DAILY LIVING (ADL)
ADLS_ACUITY_SCORE: 35
ADLS_ACUITY_SCORE: 37
ADLS_ACUITY_SCORE: 35
ADLS_ACUITY_SCORE: 35
ADLS_ACUITY_SCORE: 37
ADLS_ACUITY_SCORE: 35

## 2023-10-31 NOTE — PROVIDER NOTIFICATION
Paged Dr miller for IR consult order and to clarify patient order. Dr Miller Ordered IR consult and requested to put verbal order for mechanical soft diet.

## 2023-10-31 NOTE — ED NOTES
"[facility]  ED Nurse Handoff Report    ED Chief complaint: Fever (Post op)      ED Diagnosis:   Final diagnoses:   None       Code Status: Full Code    Allergies:   Allergies   Allergen Reactions    Fentanyl Confusion       Patient Story: pt arrives via EMS from home w c/o a fever that began 10/30/23. Pt states he had an abdominal surgery 10/11/23- hernia repair and bowel resection. Pt typically goes to [facility] advised pt to be seen today. Redness and scant bleeding at site. Pt A&Ox4.     Focused Assessment:  some redness to site. About 10in incision on abd.     Treatments and/or interventions provided: 18G IV L AC, antibiotics started  Patient's response to treatments and/or interventions: tolerated well    To be done/followed up on inpatient unit:  see chart    Does this patient have any cognitive concerns?:  n/a    Activity level - Baseline/Home:  Independent  Activity Level - Current:   Independent    Patient's Preferred language: English   Needed?: No    Isolation: None  Infection: Not Applicable  Patient tested for COVID 19 prior to admission: YES  Bariatric?: No    Vital Signs:   Vitals:    10/31/23 0026 10/31/23 0314   BP: 109/75 108/69   Pulse: 87 78   Resp: 18 18   Temp: (!) 102.3  F (39.1  C)    TempSrc: Temporal    SpO2: 99% 96%   Weight: 121.1 kg (267 lb)    Height: 1.905 m (6' 3\")        Cardiac Rhythm:     Was the PSS-3 completed:   Yes  What interventions are required if any?               Family Comments: wife at bedside  OBS brochure/video discussed/provided to patient/family: No              Name of person given brochure if not patient:                 Relationship to patient:       For the majority of the shift this patient's behavior was Green.   Behavioral interventions performed were   .    ED NURSE PHONE NUMBER: [phone]         "

## 2023-10-31 NOTE — PROVIDER NOTIFICATION
Patient JUDY drain not suctioning. Seems like threr is a leakage on the tube. Paged oncall IR, waiting for call back.  Dr Aquino (IR) called back and said to connect the tube to a bag and also to ask the hospitalist to place IR consult order for tube replacement tomorrow.

## 2023-10-31 NOTE — PHARMACY-VANCOMYCIN DOSING SERVICE
Pharmacy Vancomycin Initial Note  Date of Service 2023  Patient's  1945  77 year old, male    Indication: Sepsis    Current estimated CrCl = Estimated Creatinine Clearance: 56.5 mL/min (A) (based on SCr of 1.56 mg/dL (H)).    Creatinine for last 3 days  10/31/2023:  1:02 AM Creatinine 1.56 mg/dL    Recent Vancomycin Level(s) for last 3 days  No results found for requested labs within last 3 days.      Vancomycin IV Administrations (past 72 hours)                     vancomycin (VANCOCIN) 2,500 mg in 0.9% NaCl 500 mL intermittent infusion (mg) 2,500 mg New Bag 10/31/23 0340                    Nephrotoxins and other renal medications (From now, onward)      Start     Dose/Rate Route Frequency Ordered Stop    23 0400  vancomycin (VANCOCIN) 1,250 mg in 0.9% NaCl 250 mL intermittent infusion         1,250 mg  over 90 Minutes Intravenous EVERY 24 HOURS 10/31/23 0947              Contrast Orders - past 72 hours (72h ago, onward)      None            [employer] Prediction of Planned Initial Vancomycin Regimen  Loading dose: N/A  Regimen: 1250 mg IV every 24 hours.  Start time: 15:40 on 10/31/2023  Exposure target: AUC24 (range)400-600 mg/L.hr   AUC24,ss: 437 mg/L.hr  Probability of AUC24 > 400: 60 %  Ctrough,ss: 13.9 mg/L  Probability of Ctrough,ss > 20: 21 %  Probability of nephrotoxicity (Lodise JAVIER ): 9 %        Plan:  Start vancomycin  2500 mg IV once then 1250 mg Q24H.   Vancomycin monitoring method: AUC  Vancomycin therapeutic monitoring goal: 400-600 mg*h/L  Pharmacy will check vancomycin levels as appropriate in 1-3 Days.    Serum creatinine levels will be ordered daily for the first week of therapy and at least twice weekly for subsequent weeks.      Lonnie Rudolph [facility]

## 2023-10-31 NOTE — ED PROVIDER NOTES
"  History     Chief Complaint:  Fever (Post op)       HPI   Ti Nickerson is a 77 year old male anticoagulated on Lovenox with a history of atrial fibrillation, hypertension, CAD who presents to the ED with a fever. He had a fever starting Sunday night, 2 nights ago at 101.8 degrees. Patient had a bowel resection on 10/18/23 at [facility]. He reports having drainage from his surgery site. Reports feeling overall weak and run down. Denies chest pain, shortness of breath, headache, cough, abdominal pain, dysuria.    Independent Historian:   None - Patient Only    Review of External Notes:   None     Medications:    Aspirin   Lovenox   Metoprolol   Pantoprazole  Pravastatin  Tamsulosin     Past Medical History:    Atrial fibrillation   CAD  Elevated TSH  Esophageal reflux   Hypertension   Cardioversion  Idiopathic cardiomyopathy   Hyperlipidemia   Mumps  Sleep apnea  Thoracic aortic aneurysm  Tubular adenoma of colon  Malignant neoplasm of kidney  Renal disease  Polyp colon     Past Surgical History:    Appendectomy   EGD  Bowel resection  Cystoscopy flexible  Radical open nephrectomy- with IVC tumor thrombectomy level 2   Cataract extraction   Duodenectomy   Block - transversus abdominis plane (TAP)    Physical Exam   Patient Vitals for the past 24 hrs:   BP Temp Temp src Pulse Resp SpO2 Height Weight   10/31/23 0606 102/60 -- -- 80 11 98 % -- --   10/31/23 0343 -- 99.1  F (37.3  C) Tympanic -- -- -- -- --   10/31/23 0314 108/69 -- -- 78 18 96 % -- --   10/31/23 0026 109/75 (!) 102.3  F (39.1  C) Temporal 87 18 99 % 1.905 m (6' 3\") 121.1 kg (267 lb)      Physical Exam  Eyes:  The pupils are equal and round    Conjunctivae and sclerae are normal  ENT:    The nose is normal    Pinnae are normal  CV:  Regular rate and rhythm     No edema  Resp:  Lungs are clear    Non-labored    No rales    No wheezing   GI:  Abdomen is soft without focal tenderness, there is no rigidity    No distension    No rebound tenderness "   MS:  Normal muscular tone    No asymmetric leg swelling  Skin:  Erythema of the midline abdominal incision. No drainage. Generalized skin warmth  Neuro:   Awake, alert.  Poor short-term memory    Speech is normal and fluent.    Face is symmetric.     Moves all extremities    Emergency Department Course   Imaging:  XR Chest 2 Views   Final Result   IMPRESSION: Cardiac silhouette at upper limits normal for AP technique. Diffusely tortuous thoracic aorta. No focal airspace infiltrate. No visible pneumothorax.      CT Abdomen Pelvis w/o Contrast   Final Result   IMPRESSION:    1.  The gallbladder is mildly distended. No definite gallstones are seen. Liver and biliary tree unremarkable.        2.  Colonic diverticula without evidence for diverticulitis. Nonspecific nonobstructive bowel gas pattern. Appendix not identified.       3.  Stable elongated 9.5 x 3.6 x 2.8 cm subcutaneous fluid collection to the left of midline in the anterior pelvis lower abdomen compatible with seroma.        4.  Right kidney is surgically absent. No significant left renal solid mass, stones, or hydronephrosis. There are simple or benign cysts. No follow up is needed.      5.  Mild prostatic enlargement.      6.  Moderate vascular calcifications abdomen.         Laboratory:  Labs Ordered and Resulted from Time of ED Arrival to Time of ED Departure   COMPREHENSIVE METABOLIC PANEL - Abnormal       Result Value    Sodium 137      Potassium 3.9      Carbon Dioxide (CO2) 23      Anion Gap 12      Urea Nitrogen 19.2      Creatinine 1.56 (*)     GFR Estimate 46 (*)     Calcium 8.3 (*)     Chloride 102      Glucose 130 (*)     Alkaline Phosphatase 67      AST 26      ALT 19      Protein Total 5.9 (*)     Albumin 3.2 (*)     Bilirubin Total 0.6     ROUTINE UA WITH MICROSCOPIC - Abnormal    Color Urine Yellow      Appearance Urine Clear      Glucose Urine Negative      Bilirubin Urine Negative      Ketones Urine Negative      Specific Gravity Urine  1.021      Blood Urine Negative      pH Urine 5.5      Protein Albumin Urine Negative      Urobilinogen Urine Normal      Nitrite Urine Negative      Leukocyte Esterase Urine Negative      Mucus Urine Present (*)     RBC Urine 1      WBC Urine 2      Hyaline Casts Urine 12 (*)    CBC WITH PLATELETS AND DIFFERENTIAL - Abnormal    WBC Count 11.9 (*)     RBC Count 3.10 (*)     Hemoglobin 9.8 (*)     Hematocrit 29.9 (*)     MCV 97      MCH 31.6      MCHC 32.8      RDW 12.8      Platelet Count 240      % Neutrophils 86      % Lymphocytes 6      % Monocytes 7      % Eosinophils 0      % Basophils 0      % Immature Granulocytes 1      NRBCs per 100 WBC 0      Absolute Neutrophils 10.2 (*)     Absolute Lymphocytes 0.7 (*)     Absolute Monocytes 0.9      Absolute Eosinophils 0.1      Absolute Basophils 0.0      Absolute Immature Granulocytes 0.1      Absolute NRBCs 0.0     ISTAT GASES LACTATE VENOUS POCT - Abnormal    Lactic Acid POCT 1.6      Bicarbonate Venous POCT 22      O2 Sat, Venous POCT 59 (*)     pCO2 Venous POCT 33 (*)     pH Venous POCT 7.43      pO2 Venous POCT 29     INFLUENZA A/B, RSV, & SARS-COV2 PCR - Normal    Influenza A PCR Negative      Influenza B PCR Negative      RSV PCR Negative      SARS CoV2 PCR Negative     BLOOD CULTURE   BLOOD CULTURE   URINE CULTURE   MRSA MSSA PCR, NASAL SWAB        Procedures   None    Emergency Department Course & Assessments:       Interventions:  Medications   sodium chloride (PF) 0.9% PF flush 3 mL (has no administration in time range)   sodium chloride (PF) 0.9% PF flush 3 mL (has no administration in time range)   pantoprazole (PROTONIX) EC tablet 40 mg (has no administration in time range)   tamsulosin (FLOMAX) capsule 0.4 mg (has no administration in time range)   melatonin tablet 3 mg (has no administration in time range)   acetaminophen (TYLENOL) tablet 650 mg (has no administration in time range)     Or   acetaminophen (TYLENOL) Suppository 650 mg (has no  administration in time range)   HYDROmorphone (DILAUDID) injection 0.2 mg (has no administration in time range)   HYDROmorphone (DILAUDID) injection 0.4 mg (has no administration in time range)   ondansetron (ZOFRAN ODT) ODT tab 4 mg (has no administration in time range)     Or   ondansetron (ZOFRAN) injection 4 mg (has no administration in time range)   ceFAZolin (ANCEF) 2 g in 100 mL D5W intermittent infusion (has no administration in time range)   acetaminophen (TYLENOL) tablet 650 mg (650 mg Oral $Given 10/31/23 0141)   ceFAZolin (ANCEF) 2 g in 100 mL D5W intermittent infusion (0 g Intravenous Stopped 10/31/23 0340)   vancomycin (VANCOCIN) 2,500 mg in 0.9% NaCl 500 mL intermittent infusion (0 mg Intravenous Stopped 10/31/23 0608)        Assessments:  0030 I obtained history and examined the patient as noted above.   0114 I rechecked the patient and talked to the wife.    Consultations/Discussion of Management or Tests:  Dr. Barth, Hospitalist        Social Determinants of Health affecting care:   None    Disposition:  The patient was admitted to the hospital under the care of Dr. Barth.     Impression & Plan    CMS Diagnoses: None    Medical Decision Making:  Ti Nickerson is a 77 year old male who presents to the emergency department with concerns about fever.  He had surgery on his abdomen for a bowel resection recently.  Wife noted today that he had some erythema surrounding his surgical incision site and he developed a fever.  Denies significant abdominal pain.  He has not had any cough or dysuria.  No other source of infection identified.  CT scan of his abdomen pelvis did not reveal a clear cause of infection.  He does have a seroma in the area of his incision site.  Given his fever and leukocytosis, age and postoperative status recommended admission for treatment.  He is given Ancef and vancomycin.  Discussed patient with the hospitalist agreed to accept him as an admission.    Lactic acid  level was normal.  No tachycardia or hypotension.  No evidence of severe sepsis or septic shock.    Diagnosis:    ICD-10-CM    1. Cellulitis of abdominal wall  L03.311            Discharge Medications:  New Prescriptions    No medications on file      Scribe Disclosure:  Guilherme CARRERA, am serving as a scribe at 12:42 AM on 10/31/2023 to document services personally performed by Ronnie Eagle MD based on my observations and the provider's statements to me.     10/31/2023   Ronnie Eagle MD [place], Aaron Joseph, MD  10/31/23 0696

## 2023-10-31 NOTE — TELEPHONE ENCOUNTER
Call received from spouse Valery  Verbal Consent from pt to speak with spouse.    Ti has developed a fever today  - Temp = 101.4  orally  - 10/11/23 - Partial Duodenectomy & Hernia repair - done at [facility].  - Not feeling well today  - Dizziness - not steady on his feet    This past week, he was transferred Twice via ambulance to the ER:  - Once due to Seroma fluid leaking from his abdominal incision  - Next to to Black Diarrhea - he was then admitted for 3 days.    Advised contacting on-call surgeon with [facility] re: new onset of Fever post operatively.    Neisha Boles, RN  [facility]      Reason for Disposition   [1] Follow-up call from patient regarding patient's clinical status AND [2] information urgent    Protocols used: PCP Call - No Triage-A-

## 2023-10-31 NOTE — ED NOTES
Bed: ED  Expected date: 10/31/23  Expected time: 12:15 AM  Means of arrival: Ambulance  Comments:  HEMS 449 77M fever; poss. Surgical site inf.

## 2023-10-31 NOTE — PROGRESS NOTES
Hospitalist update note    Patient admitted earlier this morning by Dr. Lary Luke AMAN Nickerson is a 77 year old male with complex PMHx including chronic a-fib currently on AC with enoxaparin, CHF, KEENA, CKD, metastatic RCC s/p right radical nephrectomy and recent partial duodenectomy with hernia repair at [facility] on 10/11/23, and recent hospitalization at [facility] (10/25-10/27/23) for GI bleed who was admitted on 10/25/2023 for sepsis due to post-op wound infection    Tmax 99.1, other VSS. Offers no complaints. Case discussed with Dr. Ceballos's team at [facility] - recommended medical management for sepsis and aspiration of seroma.    - IR consulted, plan US-guided aspiration today, 10/31  - d/c IV vanco, cont IV cefazolin alone  - Will follow cultures  - PT consult tomorrow    Wife updated at bedside    TRU Salazar MD  Hospitalist  [phone]

## 2023-10-31 NOTE — ED NOTES
Pt's BP is low at times but I believe it is falsely lower due to pt often repositioning.  Pt has a bolus of normal saline running, wife is bedside- he  is alert and orientated.

## 2023-10-31 NOTE — PROGRESS NOTES
RADIOLOGY PROCEDURE NOTE  Patient name: Ti Nickerson  MRN: [id number]  : 1945    Pre-procedure diagnosis: Seroma  Post-procedure diagnosis: Same    Procedure Date/Time: 2023  3:14 PM  Procedure: Percutaneous Drain Placement  Estimated blood loss: None  Specimen(s) collected with description: 42 mL serosanguinous fluid  The patient tolerated the procedure well with no immediate complications.  I determined this patient to be an appropriate candidate for the planned sedation and procedure and reassessed the patient IMMEDIATELY PRIOR to sedation and procedure.    See imaging dictation for procedural details and findings.    Provider name: Jamal Christie MD  Assistant(s):None

## 2023-10-31 NOTE — H&P
[facility]    History and Physical  Hospitalist     Date of Admission:  10/31/2023    Assessment & Plan   Ti Nickerson is a 77 year old male with a past medical history of renal cell carcinoma s/p right radical nephrectomy with mets to the small bowel s/p resection on 10/11/23 presents to the hospital with sepsis.     Sepsis  Surgical wound infection  Patient presented with Fevers, leukocytosis in setting of redness at surgical incision concerning for sepsis due to a surgical site infection. Lactic acid wnl. No drainage noted on my exam, but the patient has been experiencing serosanginous drainage from his abdominal wound as recently as one day prior to presentation and has a known abdominal seroma. Infection is at risk for tracking into the abdominal wound and seroma. Started on vancomycin and cefazolin in the er  -c/w vancomycin and cefazolin  -follow up mrsa nasal swab  -follow up blood culture  -would contact [place] hepatobiliary and pancrease surgery team in the morning to discuss findings and plan.     Anemia  Chronic and stable.    CKD stage III  Renal function is stable and at his baseline.     Chronic atrial fibrillation  Rate is controlled during my exam.   -c/w pta metoprolol and lovenox once med rec is complete    Heart failure with preserved ejection fraction  Appears euvolemic on exam.   -Strict I's and O's, daily weights  -c/w asa    Metastatic renal cell carcinoma status post right radical nephrectomy and partial duodenectomy with hernia repair on October 11, 2023  Abdominal wall seroma    Chronic medical conditions:   KEENA-Does not use CPAP  BPH- tamsulosin  Dyslipidemia-pravastatin    DVT ppx: lovenox sc  Expected length of stay greater than than 2 days  Code Status: Full code      Clinically Significant Risk Factors Present on Admission          # Hypocalcemia: Lowest Ca = 8.3 mg/dL in last 2 days, will monitor and replace as appropriate     # Hypoalbuminemia: Lowest  "albumin = 3.2 g/dL at 10/31/2023  1:02 AM, will monitor as appropriate  # Drug Induced Coagulation Defect: home medication list includes an anticoagulant medication    # Hypertension: Noted on problem list      # Obesity: Estimated body mass index is 33.37 kg/m  as calculated from the following:    Height as of this encounter: 1.905 m (6' 3\").    Weight as of this encounter: 121.1 kg (267 lb).                  Primary Care Physician   Nico Retana MD    Chief Complaint   Fever (Post op)    History obtained from the patient and the medical chart    History of Present Illness   Ti Nickerson is a 77 year old male with a past medical history significant for renal cell carcinoma status post radical right nephrectomy with recent mets to the small intestine status post resection on October 11, 2023 at the [facility] presents to hospital for fevers.  The patient's postop course was complicated by a GI bleed for which the patient was hospitalized at [facility] from October 25 to October 27.  The patient was discharged on Lovenox.  During that stay the patient was noted to have an abdominal wall seroma with drainage through his surgical abdominal wound, findings were discussed with surgical fellow at [facility] who recommended local wound care.  Since discharge the patient's wife has been changing his Band-Aids and cleaning the surgical wound site.  He has been continuing to have serosanguineous drainage from the wound as recently as 1 day prior to presentation.  On Sunday the patient started experiencing subjective chills and his wife checked his temperature and found that he had a fever 101.8  F.  His symptoms continued on Monday prompting them to present to the [facility] emergency room.    Past Medical History    I have reviewed this patient's medical history and updated it with pertinent information if needed.   Past Medical History:   Diagnosis Date    Atrial fibrillation, unspecified 9/18/2015 "    CAD (coronary artery disease) 09/18/2015    minimal by angio 2007, fu nuc est nl 2018    Elevated TSH 2018    Esophageal reflux 9/18/2015    Essential hypertension     History of cardioversion     [id number]    Idiopathic cardiomyopathy (H) 09/18/2015    fu echo nl ef, nl nuclear est 11/18, Dr. Quarles    Mixed hyperlipidemia 9/18/2015    Mumps     Sleep apnea 09/18/2015    does not use cpap as of 2019    Sleep apnea     Thoracic aortic aneurysm (H)     sinus of valsalva 4.5 and asc aorta 4.5 in 2017    Tubular adenoma of colon 01/2017    fu 3 years       Past Surgical History   I have reviewed this patient's surgical history and updated it with pertinent information if needed.  Past Surgical History:   Procedure Laterality Date    APPENDECTOMY  1964    ESOPHAGOSCOPY, GASTROSCOPY, DUODENOSCOPY (EGD), COMBINED N/A 9/14/2023    Procedure: Esophagoscopy, gastroscopy, duodenoscopy (EGD), combined;  Surgeon: Chele Ash MD;  Location:  GI       Allergies   Allergies   Allergen Reactions    Fentanyl Confusion       Social History   I have reviewed this patient's social history and updated it with pertinent information if needed. Ti NEWTON Dorijoaquinnemaribel  reports that he has never smoked. He has never used smokeless tobacco. He reports current alcohol use. He reports that he does not use drugs.    Family History   I have reviewed this patient's family history and updated it with pertinent information if needed.   Family History   Problem Relation Age of Onset    Arrhythmia Mother         a-fib    Cerebrovascular Disease Mother     Arrhythmia Father         a-fib    Colon Cancer Father     Diabetes Father     Cerebrovascular Disease Father     No Known Problems Brother        Review of Systems   The 10 point Review of Systems is negative other than noted in the HPI or here.     Physical Exam   Temp: (!) 102.3  F (39.1  C) Temp src: Temporal BP: 109/75 Pulse: 87   Resp: 18 SpO2: 99 % O2 Device: None (Room air)     Vital Signs with Ranges  Temp:  [102.3  F (39.1  C)] 102.3  F (39.1  C)  Pulse:  [87] 87  Resp:  [18] 18  BP: (109)/(75) 109/75  SpO2:  [99 %] 99 %  267 lbs 0 oz  Physical Exam  Vitals reviewed.   Constitutional:       Appearance: Normal appearance.      Comments: Very pleasant man seen resting in bed comfortably in no apparent distress. He is accompanied by his wife who is bedside. He appears his stated age.    HENT:      Mouth/Throat:      Mouth: Mucous membranes are moist.      Pharynx: Oropharynx is clear.   Eyes:      Conjunctiva/sclera: Conjunctivae normal.      Pupils: Pupils are equal, round, and reactive to light.   Cardiovascular:      Rate and Rhythm: Normal rate and regular rhythm.      Heart sounds: Normal heart sounds. No murmur heard.  Pulmonary:      Effort: Pulmonary effort is normal.      Breath sounds: Normal breath sounds. No wheezing.   Abdominal:      General: Bowel sounds are normal. There is no distension.      Palpations: Abdomen is soft.      Tenderness: There is no abdominal tenderness.      Comments: Midline vertical surgical scar with approximately 2cm of erythema of bilateral sides  above the belly button. No drainage noted.    Musculoskeletal:         General: No swelling. Normal range of motion.      Cervical back: Normal range of motion and neck supple.   Neurological:      General: No focal deficit present.      Mental Status: He is alert and oriented to person, place, and time. Mental status is at baseline.           Medical Decision Making       75 MINUTES SPENT BY ME on the date of service doing chart review, history, exam, documentation & further activities per the note.

## 2023-10-31 NOTE — ED TRIAGE NOTES
BIBA from home; pt had fever of 101.8 at home tonight; he did not take anything prior to coming in. Pt had surgery on Oct. 11 and MD at [facility] told pt to come in. EMS reports there is some redness and some scant bleeding at site.      Triage Assessment (Adult)       Row Name 10/31/23 0027          Triage Assessment    Airway WDL WDL        Respiratory WDL    Respiratory WDL WDL        Skin Circulation/Temperature WDL    Skin Circulation/Temperature WDL WDL        Cardiac WDL    Cardiac WDL WDL        Peripheral/Neurovascular WDL    Peripheral Neurovascular WDL WDL        Cognitive/Neuro/Behavioral WDL    Cognitive/Neuro/Behavioral WDL WDL

## 2023-10-31 NOTE — PHARMACY-ADMISSION MEDICATION HISTORY
Pharmacist Admission Medication History    Admission medication history is complete. The information provided in this note is only as accurate as the sources available at the time of the update.    Information Source(s): Family member and CareEverywhere/SureScripts via in-person    Pertinent Information: med hx obtained from patient's spouse    Changes made to PTA medication list:  Added: None  Deleted: None  Changed: None    Medication Affordability:       Allergies reviewed with patient and updates made in EHR: yes    Medication History Completed By: Citlali Echevarria RPH 10/31/2023 8:23 AM    PTA Med List   Medication Sig Note Last Dose    acetaminophen (TYLENOL) 500 MG tablet Take 500-1,000 mg by mouth every 6 hours as needed for mild pain  Unknown at PRN    [START ON 11/3/2023] apixaban ANTICOAGULANT (ELIQUIS) 5 MG tablet Take 1 tablet (5 mg) by mouth 2 times daily Restart this medication after completing Lovenox 10/31/2023: Holding until done with enoxaparin     enoxaparin ANTICOAGULANT (LOVENOX) 100 MG/ML syringe Inject 0.9 mLs (90 mg) Subcutaneous every 12 hours for 7 days  10/30/2023 at 2130    loperamide (IMODIUM) 2 MG capsule Take 2 mg by mouth 4 times daily as needed for diarrhea  Unknown at prn    metoprolol succinate ER (TOPROL XL) 25 MG 24 hr tablet TAKE 1 TABLET BY MOUTH EVERY DAY  10/30/2023    multivitamin w/minerals (THERA-VIT-M) tablet Take 1 tablet by mouth daily  10/30/2023    pantoprazole (PROTONIX) 40 MG EC tablet Take 1 tablet (40 mg) by mouth 2 times daily (before meals)  10/30/2023    pravastatin (PRAVACHOL) 20 MG tablet Take 1 tablet (20 mg) by mouth daily  10/30/2023    tamsulosin (FLOMAX) 0.4 MG capsule TAKE 1 CAPSULE BY MOUTH EVERY DAY  10/30/2023

## 2023-11-01 ENCOUNTER — APPOINTMENT (OUTPATIENT)
Dept: PHYSICAL THERAPY | Facility: CLINIC | Age: 78
DRG: 862 | End: 2023-11-01
Attending: INTERNAL MEDICINE
Payer: MEDICARE

## 2023-11-01 ENCOUNTER — APPOINTMENT (OUTPATIENT)
Dept: ULTRASOUND IMAGING | Facility: CLINIC | Age: 78
DRG: 862 | End: 2023-11-01
Attending: PHYSICIAN ASSISTANT
Payer: MEDICARE

## 2023-11-01 LAB
ANION GAP SERPL CALCULATED.3IONS-SCNC: 10 MMOL/L (ref 7–15)
BACTERIA UR CULT: NORMAL
BUN SERPL-MCNC: 16.8 MG/DL (ref 8–23)
CALCIUM SERPL-MCNC: 8.1 MG/DL (ref 8.8–10.2)
CHLORIDE SERPL-SCNC: 104 MMOL/L (ref 98–107)
CREAT SERPL-MCNC: 1.38 MG/DL (ref 0.67–1.17)
DEPRECATED HCO3 PLAS-SCNC: 23 MMOL/L (ref 22–29)
EGFRCR SERPLBLD CKD-EPI 2021: 53 ML/MIN/1.73M2
ERYTHROCYTE [DISTWIDTH] IN BLOOD BY AUTOMATED COUNT: 12.9 % (ref 10–15)
GLUCOSE SERPL-MCNC: 99 MG/DL (ref 70–99)
HCT VFR BLD AUTO: 29.1 % (ref 40–53)
HGB BLD-MCNC: 9.5 G/DL (ref 13.3–17.7)
MCH RBC QN AUTO: 31.7 PG (ref 26.5–33)
MCHC RBC AUTO-ENTMCNC: 32.6 G/DL (ref 31.5–36.5)
MCV RBC AUTO: 97 FL (ref 78–100)
PLATELET # BLD AUTO: 236 10E3/UL (ref 150–450)
POTASSIUM SERPL-SCNC: 3.7 MMOL/L (ref 3.4–5.3)
RBC # BLD AUTO: 3 10E6/UL (ref 4.4–5.9)
SODIUM SERPL-SCNC: 137 MMOL/L (ref 135–145)
WBC # BLD AUTO: 8.3 10E3/UL (ref 4–11)

## 2023-11-01 PROCEDURE — 250N000013 HC RX MED GY IP 250 OP 250 PS 637: Performed by: INTERNAL MEDICINE

## 2023-11-01 PROCEDURE — 97161 PT EVAL LOW COMPLEX 20 MIN: CPT | Mod: GP

## 2023-11-01 PROCEDURE — 272N000431 US ASPIRATION OF SEROMA/HEMATOMA/ABSCESS/CYST

## 2023-11-01 PROCEDURE — 250N000011 HC RX IP 250 OP 636: Mod: JZ | Performed by: HOSPITALIST

## 2023-11-01 PROCEDURE — 80048 BASIC METABOLIC PNL TOTAL CA: CPT | Performed by: HOSPITALIST

## 2023-11-01 PROCEDURE — 36415 COLL VENOUS BLD VENIPUNCTURE: CPT | Performed by: HOSPITALIST

## 2023-11-01 PROCEDURE — 97530 THERAPEUTIC ACTIVITIES: CPT | Mod: GP

## 2023-11-01 PROCEDURE — 97116 GAIT TRAINING THERAPY: CPT | Mod: GP

## 2023-11-01 PROCEDURE — 85027 COMPLETE CBC AUTOMATED: CPT | Performed by: HOSPITALIST

## 2023-11-01 PROCEDURE — 120N000001 HC R&B MED SURG/OB

## 2023-11-01 PROCEDURE — 250N000011 HC RX IP 250 OP 636: Performed by: INTERNAL MEDICINE

## 2023-11-01 PROCEDURE — 250N000013 HC RX MED GY IP 250 OP 250 PS 637: Performed by: HOSPITALIST

## 2023-11-01 PROCEDURE — 250N000009 HC RX 250: Performed by: RADIOLOGY

## 2023-11-01 PROCEDURE — 99233 SBSQ HOSP IP/OBS HIGH 50: CPT | Performed by: INTERNAL MEDICINE

## 2023-11-01 RX ORDER — LIDOCAINE HYDROCHLORIDE 10 MG/ML
10 INJECTION, SOLUTION EPIDURAL; INFILTRATION; INTRACAUDAL; PERINEURAL ONCE
Status: COMPLETED | OUTPATIENT
Start: 2023-11-01 | End: 2023-11-01

## 2023-11-01 RX ORDER — PRAVASTATIN SODIUM 20 MG
20 TABLET ORAL DAILY
Status: DISCONTINUED | OUTPATIENT
Start: 2023-11-01 | End: 2023-11-03 | Stop reason: HOSPADM

## 2023-11-01 RX ADMIN — CEFAZOLIN SODIUM 2 G: 2 INJECTION, SOLUTION INTRAVENOUS at 12:10

## 2023-11-01 RX ADMIN — ENOXAPARIN SODIUM 90 MG: 100 INJECTION SUBCUTANEOUS at 19:57

## 2023-11-01 RX ADMIN — PANTOPRAZOLE SODIUM 40 MG: 40 TABLET, DELAYED RELEASE ORAL at 16:51

## 2023-11-01 RX ADMIN — CEFAZOLIN SODIUM 2 G: 2 INJECTION, SOLUTION INTRAVENOUS at 19:57

## 2023-11-01 RX ADMIN — LIDOCAINE HYDROCHLORIDE 10 ML: 10 INJECTION, SOLUTION EPIDURAL; INFILTRATION; INTRACAUDAL; PERINEURAL at 15:39

## 2023-11-01 RX ADMIN — ENOXAPARIN SODIUM 90 MG: 100 INJECTION SUBCUTANEOUS at 08:17

## 2023-11-01 RX ADMIN — TAMSULOSIN HYDROCHLORIDE 0.4 MG: 0.4 CAPSULE ORAL at 08:17

## 2023-11-01 RX ADMIN — PRAVASTATIN SODIUM 20 MG: 20 TABLET ORAL at 14:42

## 2023-11-01 RX ADMIN — PANTOPRAZOLE SODIUM 40 MG: 40 TABLET, DELAYED RELEASE ORAL at 06:38

## 2023-11-01 RX ADMIN — CEFAZOLIN SODIUM 2 G: 2 INJECTION, SOLUTION INTRAVENOUS at 03:11

## 2023-11-01 ASSESSMENT — ACTIVITIES OF DAILY LIVING (ADL)
ADLS_ACUITY_SCORE: 22
ADLS_ACUITY_SCORE: 20
ADLS_ACUITY_SCORE: 22
ADLS_ACUITY_SCORE: 20
ADLS_ACUITY_SCORE: 22

## 2023-11-01 NOTE — PLAN OF CARE
Goal Outcome Evaluation:      Plan of Care Reviewed With: spouse     Summary:  Sepsis due to post op wound infection.  DATE & TIME: 11/1/2023, 1676-4679   Cognitive Concerns/ Orientation : A&O x 4   BEHAVIOR & AGGRESSION TOOL COLOR: Green   ABNL VS/O2: VSS on RA  MOBILITY: Independent  PAIN MANAGMENT: Denies.  DIET: Regular diet  BOWEL/BLADDER: Continent and had a BM x1.  ABNL LAB/BG: Cr 1.38, Calcium 8.1, Hgb 9.5  DRAIN/DEVICES: PIV SL w/ intermittent abx; L abdominal JUDY drain-site is CDI  SKIN: Old scar on abdomen; Old redness on mid abdomen marked; Dressing on left abdominal incision site.  TESTS/PROCEDURES: JUDY drain tube exchanged today.  D/C DAY/GOALS/PLACE: Pending.  OTHER IMPORTANT INFO: Patient is no longer a fall risk and is able to ambulate independently.

## 2023-11-01 NOTE — PLAN OF CARE
Goal Outcome Evaluation:      Plan of Care Reviewed With: patient    Overall Patient Progress: improvingOverall Patient Progress: improving         Summary:  Sepsis due to post op wound infection.  DATE & TIME: 10/31/23, pm shift    Cognitive Concerns/ Orientation : A&O x 4   BEHAVIOR & AGGRESSION TOOL COLOR: Green  CIWA SCORE: NA   ABNL VS/O2: soft BP 95/67. On RA  MOBILITY: SBA  PAIN MANAGMENT: Denies  DIET: Mechanical soft det.  BOWEL/BLADDER: Continent. Had large formed tan stool.  ABNL LAB/BG: Cr 1.56, Calcium 8.3, Albumin 3.2, Protein Total 5.9, WBC 11.9, Hgb 9.8  DRAIN/DEVICES: PIV SL. Left abdominal side JUDY drain( JUDY drain 70cc serosanguinous drainage. This is output emptied immediately patient came to the unit. Last output at 2230, 2.5cc). JUDY drain not suctioning due to leakage in tube. Tape applied on tube still not suctioning. Charge nurse also came and tried to fix it with no positive result and charge nurse placed JUDY drain on gravity ; MD and oncall IR aware.  TELEMETRY RHYTHM: NA  SKIN: Old scar on abdomen. Old redness on mid abdomen. Dressing on left abdominal incision site CDI.  TESTS/PROCEDURES: US guided aspiration of seroma done today. Cultures result pending. IR consulted for exchange of JUDY drain tube tomorrow. NPO from midnight.  D/C DAY/GOALS/PLACE: pending improvement  OTHER IMPORTANT INFO: on iv Ancef.

## 2023-11-01 NOTE — PROGRESS NOTES
"   11/01/23 1615   Appointment Info   Signing Clinician's Name / Credentials (PT) Michael Coburn, PT, DPT       Present no   Living Environment   People in Home spouse   Current Living Arrangements apartment   Home Accessibility no concerns   Transportation Anticipated family or friend will provide;car, drives self   Living Environment Comments Pt lives in an apartment with spouse. No ABDI. Elevator access.   Self-Care   Usual Activity Tolerance good   Current Activity Tolerance good   Regular Exercise Yes   Activity/Exercise Type walking   Exercise Amount/Frequency daily   Equipment Currently Used at Home none   Fall history within last six months no   Activity/Exercise/Self-Care Comment Pt reporting IND at baseline without use of AD. Owns FWW. Walks daily with spouse for exercise   General Information   Onset of Illness/Injury or Date of Surgery 10/31/23   Referring Physician Shahnaz Salazar MD   Patient/Family Therapy Goals Statement (PT) return to full strength   Pertinent History of Current Problem (include personal factors and/or comorbidities that impact the POC) Per chart review, \"Ti Nickerson is a 77 year old male with  complex PMHx including chronic a-fib currently on AC with enoxaparin, CHF, KEENA, CKD, metastatic RCC s/p right radical nephrectomy and recent partial duodenectomy with hernia repair at [place] on 10/11/23, and recent hospitalization at [facility] (10/25-10/27/23) for GI bleed who was admitted on 10/31/2023 for sepsis due to post-op wound infection.\"   Cognition   Affect/Mental Status (Cognition) WNL   Orientation Status (Cognition) oriented x 4   Follows Commands (Cognition) WNL   Integumentary/Edema   Integumentary/Edema no deficits were identifed   Posture    Posture Forward head position;Protracted shoulders   Range of Motion (ROM)   Range of Motion ROM is WFL   Strength (Manual Muscle Testing)   Strength (Manual Muscle Testing) Deficits observed during " functional mobility   Strength Comments mild functional strength deficit   Bed Mobility   Comment, (Bed Mobility) IND supine>sit   Transfers   Comment, (Transfers) SBA sit>stand   Gait/Stairs (Locomotion)   Distance in Feet (Gait) 10' eval   Comment, (Gait/Stairs) pt ambulated 10' w/ no AD and SBA for eval   Balance   Balance Comments mild dynamic balance deficit   Sensory Examination   Sensory Perception patient reports no sensory changes   Clinical Impression   Criteria for Skilled Therapeutic Intervention Yes, treatment indicated   PT Diagnosis (PT) impaired functional mobility   Influenced by the following impairments impaired strength and dynamic balance   Functional limitations due to impairments limited IND with mobility   Clinical Presentation (PT Evaluation Complexity) stable   Clinical Presentation Rationale Clinical judgement   Clinical Decision Making (Complexity) low complexity   Planned Therapy Interventions (PT) balance training;bed mobility training;gait training;patient/family education;strengthening;transfer training;progressive activity/exercise;home exercise program;home program guidelines   Risk & Benefits of therapy have been explained evaluation/treatment results reviewed;risks/benefits reviewed;care plan/treatment goals reviewed;current/potential barriers reviewed;participants voiced agreement with care plan;participants included;patient   PT Total Evaluation Time   PT Eval, Low Complexity Minutes (65125) 6   Physical Therapy Goals   PT Frequency One time eval and treatment only   PT Predicted Duration/Target Date for Goal Attainment 11/01/23   PT Goals Bed Mobility;Transfers;Gait   PT: Bed Mobility Independent;Supine to/from sit;Goal Met;Completed   PT: Transfers Independent;Sit to/from stand;Bed to/from chair;Goal Met;Completed   PT: Gait Supervision/stand-by assist;Greater than 200 feet;Goal Met;Completed   Therapeutic Activity   Therapeutic Activities: dynamic activities to improve  functional performance Minutes (75865) 9   Symptoms Noted During/After Treatment Fatigue   Treatment Detail/Skilled Intervention Greeted pt upon arrival to room. Pt agreeable to working with PT. Supine>sit IND. Sit>stand from edge of bed w/ SBA. Cueing for safe and efficient sit<>stand procedure including scooting to the front edge of the chair, to lean forward with nose over toes, and hand and foot placement. Following ambulation, pt performed repeat sit<>stands x10 repetitions, initially with CGA, progressing to IND without use of upper extremities. Education provided regarding use of ambulation program and functional strengthening upon returning home to maintain and improve activity tolerance. Pt demonstrating good understanding. Stand>sit>supine IND. Pt left with all needs met and call light within reach.   Gait Training   Gait Training Minutes (45294) 11   Symptoms Noted During/After Treatment (Gait Training) fatigue   Treatment Detail/Skilled Intervention Pt ambulated 150' w/ SEC and SBA, progressing to mod I throughout. Verbal cueing for efficient use of SEC in RUE and advancing LLE forward together. Improved sequencing and efficiency following cueing. Pt ambulated 500' w/ no AD and SBA, progressing to IND throughout. Pt performed horizontal and vertical head turns and changes in gait speed with continued stability and without loss of balance. Minimal lateral path deviations.   Distance in Feet 500' & 150'   PT Discharge Planning   PT Plan inpatient goals met, d/c   PT Discharge Recommendation (DC Rec) home with assist   PT Rationale for DC Rec Pt appears at/near baseline for functional mobility. Pt ambulating independence while ambulating 100's of feet. Patient appreciates the increased stability with use of a cane with ambulation, may benefit from short term use of cane with mobility. Recommend ambulation program and continued assist from spouse with higher level IADLs upon returning home while continuing  to recover from surgery.   PT Brief overview of current status IND   Total Session Time   Timed Code Treatment Minutes 20   Total Session Time (sum of timed and untimed services) 26

## 2023-11-01 NOTE — PLAN OF CARE
Physical Therapy Discharge Summary    Reason for therapy discharge:    All goals and outcomes met, no further needs identified.    Progress towards therapy goal(s). See goals on Care Plan in Epic electronic health record for goal details.  Goals met    Therapy recommendation(s):    Pt appears at/near baseline for functional mobility. Pt ambulating independence while ambulating 100's of feet. Patient appreciates the increased stability with use of a cane with ambulation, may benefit from short term use of cane with mobility. Recommend ambulation program and continued assist from spouse with higher level IADLs upon returning home while continuing to recover from surgery.

## 2023-11-01 NOTE — PROGRESS NOTES
[facility]    Medicine Progress Note - Hospitalist Service       Date of Admission:  10/31/2023    Assessment & Plan   Ti Nickerson is a 77 year old male with  complex PMHx including chronic a-fib currently on AC with enoxaparin, CHF, KEENA, CKD, metastatic RCC s/p right radical nephrectomy and recent partial duodenectomy with hernia repair at [facility] on 10/11/23, and recent hospitalization at [facility] (10/25-10/27/23) for GI bleed who was admitted on 10/31/2023 for sepsis due to post-op wound infection.     Summary of recent events:   *Diagnosed with RCC in 2019. Follows at [facility]  *Subsequently found to have tumor involvement of the duodenum and underwent uncomplicated partial duodenectomy per General Surgery (Dr. Ceballos) at [facility] on 10/11.   *10/24: Presented to the [facility] ED with increased serous drainage from his surgical incision. CT abd/pelvis showed seroma; CT was reviewed with hepatobiliary fellow at [facility] who had no concerns. Discharged from ED  *10/25-10/27: Returned to the [facility] ED with melena felt to be anastamotic bleed. EGD was not pursued due to recent surgery. Melena resolved upon holding his apixaban, and patient was discharged on enoxaparin with plan to transition back to apixaban after 1 week.   *10/31: Returned to [facility] ED with fevers, chills, and increased redness surrounding abdominal surgical incision. Fever to 102.3 and leukocytosis (11.9) present on arrival.  Lactic acid wnl. Has a known abdominal seroma following surgery which appeared stable on CT    # Sepsis due to post-op wound infection: IMPROVED  # Abdominal seroma s/p percutaneous drain placement (10/31/2023)  # Metastatic renal cell carcinoma s/p right radical nephrectomy, partial duodenectomy with DJ anastamosis on 10/11/23  *Initiated on IV vancomycin and cefazolin in the ED; IV vanco d/c'd after 1 dose  *Due to risk for infection tracking into seroma, IR was consulted and patient  underwent perc drain placement by IR on 10/31  - Due to malfunction, plan to replace JUDY tubing by IR today, 11/1  - Tmax 100.4, WBC normal. Conts on IV cefazolin  - 10/31 seroma culture pending  - 10/31 blood cultures x2 NGTD  - PT consult  - Plan to follow up with his surgeon at [facility] (Dr. Ceballos) next week    Recent acute blood loss anemia due to acute GI bleed  - Hgb stable on   - Conts on PTA PPI BID     Chronic atrial fibrillation  *PTA regimen: metoprolol ER 25 mg daily, enoxaparin 90 mg BID  - Holding metoprolol due to borderline BPs  - Conts on enoxaparin; plan to transition to apixaban on 11/3     Chronic HFpEF without exacerbation  Ascending aorta dilatation  TTE (10/9/23): EF 55-60%, moderate aortic root and ascending aorta dilatation: 4.6 cm.   - Appears compensated. Not on diuretics at home  - Cont outpatient surveillance echos     CKD stage IIIb  - Cr stable within baseline 1.5-1.7     Hypoalbuminemia  Albumin 3.2     Obesity  BMI 34     Other chronic and stable medical conditions  KEENA (not on CPAP)  BPH  Dyslipidemia     Diet: Regular Diet Adult    DVT Prophylaxis: Enoxaparin (Lovenox) SQ  Dillard Catheter: Not present  Code Status: Full Code         Disposition: Expected discharge pending ongoing improvement in sepsis, 1-3 more days    The patient's care was discussed with the Bedside Nurse, Patient, and Patient's Family.    Shahnaz Salazar MD  Hospitalist Service  [facility]  Contact information available via [facility] Paging/Directory    ______________________________________________________________________    Interval History   Due to JUDY drain tubing malfunction, it needs to be replaced by IR today. Pt is very frustrated with his experiences during his recent hospitalizations; feels that he is not being cared for. Offered empathetic listening. Discussed with IR - will replace JUDY drain tubing as soon as possible after a few other emergent cases. Otherwise, incisional redness  is improved    Medical Decision Making   55 MINUTES SPENT BY ME on the date of service doing chart review, history, exam, documentation & further activities per the note.      Data reviewed today: I reviewed all medications, new labs and imaging results over the last 24 hours. I personally reviewed no images or EKG's today.    Physical Exam   Vital Signs: Temp: 98  F (36.7  C) Temp src: Oral BP: 109/71 Pulse: 73   Resp: 16 SpO2: 98 % O2 Device: None (Room air)    Weight: 267 lbs 0 oz  Constitutional: Resting in bed, NAD  HEENT: Sclera white, MMM  Respiratory: Breathing non-labored. Lungs CTAB - no wheezes, crackles, or rhonchi  Cardiovascular: Heart irregular rhythm, normal rate. No pedal edema  GI: +BS, abd soft/NT. Incision with minimal surrounding erythema  Skin/Integument: No rash  Musculoskeletal: Normal muscle bulk and tone  Neuro: Alert and appropriate, COOK  Psych: Calm and cooperative    Data   Recent Labs   Lab 11/01/23  1055 10/31/23  0102 10/27/23  1213 10/26/23  1500   WBC 8.3 11.9* 7.3 8.3   HGB 9.5* 9.8* 9.6* 9.9*   MCV 97 97 98 99    240 261 295    137  --  137   POTASSIUM 3.7 3.9  --  3.8   CHLORIDE 104 102  --  105   CO2 23 23  --  22   BUN 16.8 19.2  --  23.4*   CR 1.38* 1.56*  --  1.50*   ANIONGAP 10 12  --  10   DOMINICK 8.1* 8.3*  --  8.4*   GLC 99 130*  --  95   ALBUMIN  --  3.2*  --   --    PROTTOTAL  --  5.9*  --   --    BILITOTAL  --  0.6  --   --    ALKPHOS  --  67  --   --    ALT  --  19  --   --    AST  --  26  --   --          Recent Results (from the past 24 hour(s))   US Drainage Seroma/Hematoma Abscess/Cyst    Narrative    ULTRASOUND SEROMA DRAINAGE October 31, 2023 1:33 PM     HISTORY: Surgical closure fluid collection.    COMPARISON: None.    FINDINGS: After written and oral informed consent was obtained, and  pause for the cause correctly identified the patient and procedure,  the patient was scanned in supine position for procedural planning.  The subcutaneous tissues  overlying the fluid collection over the  abdomen near the surgical incision was identified, marked, prepped and  draped in the usual sterile fashion, and anesthetized with 10 mL of 1%  subcutaneous lidocaine. The fluid collection was accessed with a 8  Haitian catheter, and serosanguinous fluid was aspirated. The fluid  collection was maximally drained and verified with postprocedural  imaging. There were no immediate complications and patient left the  ultrasound suite in satisfactory condition. Ultrasound images were  permanently stored.      Impression    IMPRESSION: Technically successful ultrasound guided seroma drain  placement.    CHESTER GARRETT MD         SYSTEM ID:  [id number]       Medications      ceFAZolin  2 g Intravenous Q8H    enoxaparin ANTICOAGULANT  90 mg Subcutaneous Q12H    pantoprazole  40 mg Oral BID AC    sodium chloride (PF)  3 mL Intracatheter Q8H    tamsulosin  0.4 mg Oral Daily

## 2023-11-01 NOTE — PLAN OF CARE
Summary:  Sepsis due to post op wound infection.  DATE & TIME: 10/31-11/01/23 Night shift   Cognitive Concerns/ Orientation : A&O x 4   BEHAVIOR & AGGRESSION TOOL COLOR: Green  CIWA SCORE: NA   ABNL VS/O2: WDL On RA  MOBILITY: SBA/Ind  PAIN MANAGMENT: Denies  DIET: NPO  BOWEL/BLADDER: Continent. Had large formed tan stool in evening  ABNL LAB/BG: Cr 1.56, Calcium 8.3, Albumin 3.2, Protein Total 5.9, WBC 11.9, Hgb 9.8  DRAIN/DEVICES: PIV SL; L abdominal JUDY drain; JUDY drain not suctioning due to leakage in tube; MD and oncall IR aware- will see today  TELEMETRY RHYTHM: NA  SKIN: Old scar on abdomen; Old redness on mid abdomen marked; Dressing on left abdominal incision site CDI.  TESTS/PROCEDURES: US guided aspiration of seroma done;  Cultures result pending. IR consulted for exchange of JUDY drain tube today-. NPO from midnight.  D/C DAY/GOALS/PLACE: Pending improvement  OTHER IMPORTANT INFO: on IV Ancef; Slept well

## 2023-11-01 NOTE — PROGRESS NOTES
"  Interventional Radiology - Progress Note  Inpatient - [facility]  11/1/2023    S: Pt found resting in bed. Pt tolerating the drain well. No output since it lost suction overnight. No leaking, minimal pain/tenderness at site. Tolerated dressing change and attempt at manipulation well.     Flushing: Flush the drain with 10cc saline Qshift. Please make sure to subtract the volume of the flush prior to recording output  Antibiotic: Ancef per IM  Culture:  pending      O:  /74 (BP Location: Right arm, Patient Position: Semi-Penaloza's)   Pulse 81   Temp 98.6  F (37  C) (Oral)   Resp 16   Ht 1.905 m (6' 3\")   Wt 121.1 kg (267 lb)   SpO2 97%   BMI 33.37 kg/m    General:  Stable.  In no acute distress.    Neuro:  A&O x 3. Moves all extremities equally.  Resp:  breathing unlabored on room air.  Abdomen:  Soft, non-distended, non-tender, midline incision from prior healing well, drain in LLQ- dressing removed, site C/D/I without leaking; sidehole on drain outside the body and drain unable to be adjusted.   MSK:  No gross motor weakness.  Sensation intact.  Proprioception intact.    IMAGING:  ULTRASOUND SEROMA DRAINAGE October 31, 2023 1:33 PM      HISTORY: Surgical closure fluid collection.     COMPARISON: None.     FINDINGS: After written and oral informed consent was obtained, and  pause for the cause correctly identified the patient and procedure,  the patient was scanned in supine position for procedural planning.  The subcutaneous tissues overlying the fluid collection over the  abdomen near the surgical incision was identified, marked, prepped and  draped in the usual sterile fashion, and anesthetized with 10 mL of 1%  subcutaneous lidocaine. The fluid collection was accessed with a 8  Chinese catheter, and serosanguinous fluid was aspirated. The fluid  collection was maximally drained and verified with postprocedural  imaging. There were no immediate complications and patient left the  ultrasound " suite in satisfactory condition. Ultrasound images were  permanently stored.                                                                      IMPRESSION: Technically successful ultrasound guided seroma drain  placement.     CHESTER GARRETT MD     LABS:  CBC RESULTS:   Recent Labs   Lab Test 10/31/23  0102   WBC 11.9*   RBC 3.10*   HGB 9.8*   HCT 29.9*   MCV 97   MCH 31.6   MCHC 32.8   RDW 12.8         Drain Outputs (in mL):  10/31 112   11/1 0*                   A:   77 year old male with a past medical history of renal cell carcinoma s/p right radical nephrectomy with mets to the small bowel s/p resection on 10/11/23 with Dr. Ceballos at [facility]. He was admitted to the hospitalist service at [facility] 10/31 for sepsis due to a surgical site infection. Imaging showed a superficial fluid collection-likely a seroma, so IR was consulted for drain placement S/P abdominal LLQ drain placement 10/31 by Dr. Garrett    P:    -Will plan to assess and likely re-adjust the drain later today or tomorrow pending radiology availability  --No need to keep NPO for this- will do with just local anesthesia  IR following drain  -Discussed with pt and Hospitalist MD    Addendum: will assess in ultrasound this afternoon per discussion with Dr. Barth    Total time spent on the date of the encounter is 30 minutes, including time spent counseling the patient, performing a medically appropriate evaluation, reviewing prior medical history, ordering medications and tests, documenting clinical information in the medical record, and communication of results.      Hazel Chu PA-C  Interventional Radiology  [facility] IR PA desk phone *[phone]

## 2023-11-02 LAB
ANION GAP SERPL CALCULATED.3IONS-SCNC: 10 MMOL/L (ref 7–15)
BUN SERPL-MCNC: 17.6 MG/DL (ref 8–23)
CALCIUM SERPL-MCNC: 8.1 MG/DL (ref 8.8–10.2)
CHLORIDE SERPL-SCNC: 105 MMOL/L (ref 98–107)
CREAT SERPL-MCNC: 1.39 MG/DL (ref 0.67–1.17)
DEPRECATED HCO3 PLAS-SCNC: 23 MMOL/L (ref 22–29)
EGFRCR SERPLBLD CKD-EPI 2021: 53 ML/MIN/1.73M2
ERYTHROCYTE [DISTWIDTH] IN BLOOD BY AUTOMATED COUNT: 12.9 % (ref 10–15)
GLUCOSE SERPL-MCNC: 119 MG/DL (ref 70–99)
HCT VFR BLD AUTO: 28.4 % (ref 40–53)
HGB BLD-MCNC: 9.1 G/DL (ref 13.3–17.7)
MCH RBC QN AUTO: 31.2 PG (ref 26.5–33)
MCHC RBC AUTO-ENTMCNC: 32 G/DL (ref 31.5–36.5)
MCV RBC AUTO: 97 FL (ref 78–100)
PLATELET # BLD AUTO: 234 10E3/UL (ref 150–450)
POTASSIUM SERPL-SCNC: 3.5 MMOL/L (ref 3.4–5.3)
RBC # BLD AUTO: 2.92 10E6/UL (ref 4.4–5.9)
SODIUM SERPL-SCNC: 138 MMOL/L (ref 135–145)
WBC # BLD AUTO: 5.4 10E3/UL (ref 4–11)

## 2023-11-02 PROCEDURE — 120N000001 HC R&B MED SURG/OB

## 2023-11-02 PROCEDURE — 80048 BASIC METABOLIC PNL TOTAL CA: CPT | Performed by: INTERNAL MEDICINE

## 2023-11-02 PROCEDURE — 85027 COMPLETE CBC AUTOMATED: CPT | Performed by: INTERNAL MEDICINE

## 2023-11-02 PROCEDURE — 250N000013 HC RX MED GY IP 250 OP 250 PS 637: Performed by: RADIOLOGY

## 2023-11-02 PROCEDURE — 250N000013 HC RX MED GY IP 250 OP 250 PS 637: Performed by: HOSPITALIST

## 2023-11-02 PROCEDURE — 99232 SBSQ HOSP IP/OBS MODERATE 35: CPT | Performed by: INTERNAL MEDICINE

## 2023-11-02 PROCEDURE — 250N000013 HC RX MED GY IP 250 OP 250 PS 637: Performed by: INTERNAL MEDICINE

## 2023-11-02 PROCEDURE — 250N000011 HC RX IP 250 OP 636: Performed by: INTERNAL MEDICINE

## 2023-11-02 PROCEDURE — 36415 COLL VENOUS BLD VENIPUNCTURE: CPT | Performed by: INTERNAL MEDICINE

## 2023-11-02 PROCEDURE — 250N000011 HC RX IP 250 OP 636: Mod: JZ | Performed by: HOSPITALIST

## 2023-11-02 RX ADMIN — CEFAZOLIN SODIUM 2 G: 2 INJECTION, SOLUTION INTRAVENOUS at 19:04

## 2023-11-02 RX ADMIN — CEFAZOLIN SODIUM 2 G: 2 INJECTION, SOLUTION INTRAVENOUS at 03:28

## 2023-11-02 RX ADMIN — CEFAZOLIN SODIUM 2 G: 2 INJECTION, SOLUTION INTRAVENOUS at 11:02

## 2023-11-02 RX ADMIN — PANTOPRAZOLE SODIUM 40 MG: 40 TABLET, DELAYED RELEASE ORAL at 16:43

## 2023-11-02 RX ADMIN — PRAVASTATIN SODIUM 20 MG: 20 TABLET ORAL at 08:04

## 2023-11-02 RX ADMIN — PANTOPRAZOLE SODIUM 40 MG: 40 TABLET, DELAYED RELEASE ORAL at 08:05

## 2023-11-02 RX ADMIN — TAMSULOSIN HYDROCHLORIDE 0.4 MG: 0.4 CAPSULE ORAL at 08:05

## 2023-11-02 RX ADMIN — ENOXAPARIN SODIUM 90 MG: 100 INJECTION SUBCUTANEOUS at 19:04

## 2023-11-02 RX ADMIN — ENOXAPARIN SODIUM 90 MG: 100 INJECTION SUBCUTANEOUS at 08:06

## 2023-11-02 RX ADMIN — ACETAMINOPHEN 650 MG: 325 TABLET, FILM COATED ORAL at 00:05

## 2023-11-02 ASSESSMENT — ACTIVITIES OF DAILY LIVING (ADL)
ADLS_ACUITY_SCORE: 20

## 2023-11-02 NOTE — PROGRESS NOTES
[facility]    Medicine Progress Note - Hospitalist Service       Date of Admission:  10/31/2023    Assessment & Plan   Ti Nickerson is a 77 year old male with  complex PMHx including chronic a-fib currently on AC with enoxaparin, CHF, KEENA, CKD, metastatic RCC s/p right radical nephrectomy and recent partial duodenectomy with hernia repair at [place] on 10/11/23, and recent hospitalization at [facility] (10/25-10/27/23) for GI bleed who was admitted on 10/31/2023 for sepsis due to post-op wound infection.     Summary of recent events:   *Diagnosed with RCC in 2019. Follows at [facility]  *Subsequently found to have tumor involvement of the duodenum and underwent uncomplicated partial duodenectomy per General Surgery (Dr. Ceballos) at [facility] on 10/11.   *10/24: Presented to the [facility] ED with increased serous drainage from his surgical incision. CT abd/pelvis showed seroma; CT was reviewed with hepatobiliary fellow at [place] who had no concerns. Discharged from ED  *10/25-10/27: Returned to the [facility] ED with melena felt to be anastamotic bleed. EGD was not pursued due to recent surgery. Melena resolved upon holding his apixaban, and patient was discharged on enoxaparin with plan to transition back to apixaban after 1 week.   *10/31: Returned to [facility] ED with fevers, chills, and increased redness surrounding abdominal surgical incision. Fever to 102.3 and leukocytosis (11.9) present on arrival.  Lactic acid wnl. Has a known abdominal seroma following surgery which appeared stable on CT    # Sepsis due to post-op wound infection: RESOLVING  # Abdominal seroma s/p percutaneous drain placement (10/31/2023)  # Metastatic renal cell carcinoma s/p right radical nephrectomy, partial duodenectomy with DJ anastamosis on 10/11/23  *Initiated on IV vancomycin and cefazolin in the ED; IV vanco d/c'd after 1 dose  *Due to risk for infection tracking into seroma, IR was consulted and patient  underwent perc drain placement by IR on 10/31  - AF, WBC normal  - Conts on IV cefazolin; cephalexin at discharge  - 10/31 seroma culture NGTD  - 10/31 blood cultures x2 NGTD  - He has been cleared by PT for home  - Plan to follow up with his surgeon at [place] (Dr. Ceballos) next week    Recent acute blood loss anemia due to acute GI bleed  - Hgb stable in mid 9 range  - Conts on PTA PPI BID     Chronic atrial fibrillation  *PTA regimen: metoprolol ER 25 mg daily, enoxaparin 90 mg BID  - Holding metoprolol due to borderline BPs; remains rate-controlled  - Conts on enoxaparin; plan to transition to apixaban on 11/4     Chronic HFpEF without exacerbation  Ascending aorta dilatation  TTE (10/9/23): EF 55-60%, moderate aortic root and ascending aorta dilatation: 4.6 cm.   - Appears compensated. Not on diuretics at home  - Cont outpatient surveillance echos     CKD stage IIIb  - Cr stable within baseline 1.5-1.7     Hypoalbuminemia  Albumin 3.2     Obesity  BMI 34     Other chronic and stable medical conditions  KEENA (not on CPAP)  BPH  Dyslipidemia     Diet: Regular Diet Adult    DVT Prophylaxis: Enoxaparin (Lovenox) SQ  Dillard Catheter: Not present  Code Status: Full Code         Disposition: Expected discharge home tomorrow. 1 more day of IV abx    The patient's care was discussed with the Bedside Nurse, Patient, and Patient's Family.    Shahnaz Salazar MD  Hospitalist Service  [facility]  Contact information available via [facility] Paging/Directory    ______________________________________________________________________    Interval History   No acute events overnight. Continues to feel better. Incisional redness resolving. BP borderline low. Denies cp/sob, dizziness/lightheadedness. Wife updated at bedside    Data reviewed today: I reviewed all medications, new labs and imaging results over the last 24 hours. I personally reviewed no images or EKG's today.    Physical Exam   Vital Signs: Temp:  98.5  F (36.9  C) Temp src: Oral BP: 99/64 Pulse: 68   Resp: 16 SpO2: 96 % O2 Device: None (Room air)    Weight: 290 lbs 2.01 oz  Constitutional: Resting in bed, NAD  HEENT: Sclera white, MMM  Respiratory: Breathing non-labored. Lungs CTAB - no wheezes, crackles, or rhonchi  Cardiovascular: Heart irregular rhythm, normal rate. No pedal edema  GI: +BS, abd soft/NT. Incision healing  Skin/Integument: No rash  Musculoskeletal: Normal muscle bulk and tone  Neuro: Alert and appropriate, COOK  Psych: Calm and cooperative    Data   Recent Labs   Lab 11/01/23  1055 10/31/23  0102 10/27/23  1213 10/26/23  1500   WBC 8.3 11.9* 7.3 8.3   HGB 9.5* 9.8* 9.6* 9.9*   MCV 97 97 98 99    240 261 295    137  --  137   POTASSIUM 3.7 3.9  --  3.8   CHLORIDE 104 102  --  105   CO2 23 23  --  22   BUN 16.8 19.2  --  23.4*   CR 1.38* 1.56*  --  1.50*   ANIONGAP 10 12  --  10   DOMINICK 8.1* 8.3*  --  8.4*   GLC 99 130*  --  95   ALBUMIN  --  3.2*  --   --    PROTTOTAL  --  5.9*  --   --    BILITOTAL  --  0.6  --   --    ALKPHOS  --  67  --   --    ALT  --  19  --   --    AST  --  26  --   --          Recent Results (from the past 24 hour(s))   US Drainage Seroma/Hematoma Abscess/Cyst    Narrative    US ASPIRATION OF SEROMA/HEMATOMA/ABSCESS/CYST 11/1/2023 4:07 PM    HISTORY: assess drain site for fluid,     COMPARISON: CT and ultrasound yesterday    FINDINGS: The previously placed catheter is partially outside the  abdominal wall fluid collection. Redemonstration of a fluid collection  underlying the incision. The superior portion of the collection  measures 8.0 x 3.2 x 3.3 cm and the inferior portion measures 8.1 x  3.4 x 3.7 cm.    TECHNIQUE: Written and oral informed consent was obtained.  Preprocedure pause performed. The procedure was performed with 1%  lidocaine, ultrasound guidance and sterile technique.    A 5 Welsh straight catheter was advanced into the inferior fluid  collection adjacent to the umbilicus and 35 cc of  dark brown fluid was  aspirated and discarded.    An 8 Montserratian locking pigtail drainage catheter was placed into the  superior fluid collection using a trocar technique. 20 cc of pink  fluid was aspirated. This catheter was attached to bulb suction. No  immediate competitions.      Impression    IMPRESSION:  1. Prior anterior abdominal wall drain was removed. A new 8 Montserratian  percutaneous drain was placed in the superior portion of the abdominal  wall fluid collection. Flush and aspirate this catheter with 10 cc of  sterile saline. Catheter to bulb suction.  2. A second abdominal wall collection adjacent to the umbilicus was  aspirated and 35 cc of dark brown fluid was removed.    JENNIFER KERR MD         SYSTEM ID:  [id number]       Medications      ceFAZolin  2 g Intravenous Q8H    enoxaparin ANTICOAGULANT  90 mg Subcutaneous Q12H    pantoprazole  40 mg Oral BID AC    pravastatin  20 mg Oral Daily    sodium chloride (PF)  3 mL Intracatheter Q8H    tamsulosin  0.4 mg Oral Daily

## 2023-11-02 NOTE — PLAN OF CARE
Goal Outcome Evaluation:  Summary:  Sepsis due to post op wound infection.  DATE & TIME: 11/1/2023, 7341-1070  Cognitive Concerns/ Orientation : A&O x 4   BEHAVIOR & AGGRESSION TOOL COLOR: Green   ABNL VS/O2: VSS on RA  MOBILITY: Independent  PAIN MANAGMENT: Reports mild discomfort at JUDY site. Rates 1/10. Managed with PRN tylenol.   DIET: Regular diet  BOWEL/BLADDER: Continent  ABNL LAB/BG: Cr 1.38, Calcium 8.1, Hgb 9.5  DRAIN/DEVICES: PIV SL w/ intermittent abx; L abdominal JUDY drain to suction with serosanguineous output   TELE: NA  SKIN: Old scar on abdomen; Dressing to JUDY is CDI  TESTS/PROCEDURES:   D/C DAY/GOALS/PLACE: Pending.  OTHER IMPORTANT INFO: Patient is no longer a fall risk and is able to ambulate independently.

## 2023-11-03 VITALS
SYSTOLIC BLOOD PRESSURE: 114 MMHG | HEART RATE: 81 BPM | HEIGHT: 75 IN | OXYGEN SATURATION: 98 % | BODY MASS INDEX: 36.07 KG/M2 | RESPIRATION RATE: 18 BRPM | WEIGHT: 290.13 LBS | TEMPERATURE: 97.8 F | DIASTOLIC BLOOD PRESSURE: 76 MMHG

## 2023-11-03 PROCEDURE — 99239 HOSP IP/OBS DSCHRG MGMT >30: CPT | Performed by: INTERNAL MEDICINE

## 2023-11-03 PROCEDURE — 250N000013 HC RX MED GY IP 250 OP 250 PS 637: Performed by: INTERNAL MEDICINE

## 2023-11-03 PROCEDURE — 250N000011 HC RX IP 250 OP 636: Mod: JZ | Performed by: HOSPITALIST

## 2023-11-03 PROCEDURE — 250N000013 HC RX MED GY IP 250 OP 250 PS 637: Performed by: HOSPITALIST

## 2023-11-03 PROCEDURE — 250N000011 HC RX IP 250 OP 636: Performed by: INTERNAL MEDICINE

## 2023-11-03 RX ORDER — CEPHALEXIN 500 MG/1
1000 CAPSULE ORAL 3 TIMES DAILY
Qty: 42 CAPSULE | Refills: 0 | Status: SHIPPED | OUTPATIENT
Start: 2023-11-03 | End: 2023-11-10

## 2023-11-03 RX ORDER — ENOXAPARIN SODIUM 100 MG/ML
90 INJECTION SUBCUTANEOUS EVERY 12 HOURS
Start: 2023-11-03 | End: 2023-11-04

## 2023-11-03 RX ADMIN — PANTOPRAZOLE SODIUM 40 MG: 40 TABLET, DELAYED RELEASE ORAL at 06:46

## 2023-11-03 RX ADMIN — CEFAZOLIN SODIUM 2 G: 2 INJECTION, SOLUTION INTRAVENOUS at 10:25

## 2023-11-03 RX ADMIN — TAMSULOSIN HYDROCHLORIDE 0.4 MG: 0.4 CAPSULE ORAL at 10:19

## 2023-11-03 RX ADMIN — PRAVASTATIN SODIUM 20 MG: 20 TABLET ORAL at 10:19

## 2023-11-03 RX ADMIN — ENOXAPARIN SODIUM 90 MG: 100 INJECTION SUBCUTANEOUS at 10:19

## 2023-11-03 RX ADMIN — CEFAZOLIN SODIUM 2 G: 2 INJECTION, SOLUTION INTRAVENOUS at 03:18

## 2023-11-03 ASSESSMENT — ACTIVITIES OF DAILY LIVING (ADL)
ADLS_ACUITY_SCORE: 20

## 2023-11-03 NOTE — PROGRESS NOTES
"  (Late charting, started Friday 11/3 at 1500, computer kept shutting down, finished 11/7/23)     Edgar Nickerson was seen today for drain evaluation. He was seen with his wife. He has a history that is significant for \"metastatic RCC s/p right radical nephrectomy and recent partial duodenectomy with hernia repair at [place] on 10/11/23, and recent hospitalization at [facility] (10/25-10/27/23) for GI bleed who was admitted on 10/31/2023 for sepsis due to post-op wound infection.\"  He has had a seroma since the surgery.     Edgar is s/p US guided drain placement 11/1/23. Outputs had decreased since drain placement along with some leaking at the site with flushing. The drain and seroma are very superficial. IR was thinking the drain could come out but when he was seen on Friday 11/3 there was some thick serosanguinous drainage in the JUDY, so I elected to keep the drain in.     Wife and patient were instructed on how to care for the drain, change dressings, empty the JUDY, measure and monitor outputs. Discharge instructions were entered and numbers to call (Eda and Yas MICHAEL RN Clinician's) were available if needed. Edgra is going to see his surgeon at [place] the next week and can get further instructions on drain care at that time.     Total time: 30 minutes    Thanks, Emma [facility] Interventional Radiology CNP ([phone]) (phone [phone])       "

## 2023-11-03 NOTE — PLAN OF CARE
Summary:  Sepsis due to post op wound infection.    DATE & TIME: 11/2/23 9022-6818    Cognitive Concerns/ Orientation : A&O x 4   BEHAVIOR & AGGRESSION TOOL COLOR: Green   ABNL VS/O2: VSS on RA  MOBILITY: Independent  PAIN MANAGMENT: Denies  DIET: Regular  BOWEL/BLADDER: Continent  ABNL LAB/BG: Hgb 9.1  DRAIN/DEVICES: Left PIV SL w/ intermittent abx; Left abdominal JUDY drain to suction with 15cc serosanguineous output   TELE: NA  SKIN: Old scar on abdomen; Dressing to JUDY is c/d/i  TESTS/PROCEDURES: None  D/C DAY/GOALS/PLACE: Likely home tomorrow, 11/3  OTHER IMPORTANT INFO: Patient is no longer a fall risk and is able to ambulate independently.

## 2023-11-03 NOTE — PROGRESS NOTES
"MD Notification    Notified Person: MD    Notified Person Name: Shahnaz Salazar MD    Notification Date/Time: 11/3/23 1440    Notification Interaction: [employer] messaging    Purpose of Notification: \"Microbiology lab called to report the anaerobic culture from the body fluid sample taken 10/31 @ 1320 showed: 1+ Clostridium Perfringens.     Orders Received: Ok, no intervention. That's a contaminant.    Comments:   "

## 2023-11-03 NOTE — PLAN OF CARE
Summary:  Sepsis due to post op wound infection.  DATE & TIME: 11/2/23 NOC  Cognitive Concerns/ Orientation : A&O x 4   BEHAVIOR & AGGRESSION TOOL COLOR: Green   ABNL VS/O2: VSS on RA  MOBILITY: Independent  PAIN MANAGMENT: Denies  DIET: Regular  BOWEL/BLADDER: Continent  ABNL LAB/BG: Hgb 9.1  DRAIN/DEVICES: Left PIV SL w/ intermittent abx; Left abdominal JUDY drain to suction with 15cc serosanguineous output   TELE: NA  SKIN: Old scar on abdomen; Dressing to JUDY is c/d/i  TESTS/PROCEDURES: None  D/C DAY/GOALS/PLACE: Likely home tomorrow, 11/3  OTHER IMPORTANT INFO: Patient is no longer a fall risk and is able to ambulate independently.

## 2023-11-03 NOTE — DISCHARGE INSTRUCTIONS
Start cephalexin (antibiotic) this evening with dinner, then start 3 times daily tomorrow, Nov 4  Take last dose of Lovenox this evening  Hold of on metoprolol until you follow up with Cardiology on Monday    Midline Abdominal Drain Discharge Instructions    1) Change the gauze and tape dressing every 2-3 days. Wash the skin with soap and water and allow to air dry before re dressing  -Please cover with plastic (saran wrap) prior to showering and change it the dressing gets wet.     2) Empty, measure and record the outputs daily.     Please call Yas MICHAEL RN clinician at  or Emma IR NP at  for questions or concerns about the tube. You can leave a voicemail. We work Monday through Friday [phone].     An alternative number to call for questions is Interventional Radiology at

## 2023-11-03 NOTE — DISCHARGE SUMMARY
[facility]  Hospitalist Discharge Summary      Date of Admission:  10/31/2023  Date of Discharge:  11/3/2023  Discharging Provider: Shahnaz Salazar MD    Discharge Diagnoses   Sepsis due to post-op wound infection: RESOLVING  Abdominal seroma s/p percutaneous drain placement (10/31/2023)  Metastatic renal cell carcinoma s/p right radical nephrectomy, partial  Recent acute blood loss anemia due to acute GI bleed  Chronic atrial fibrillation  Chronic HFpEF without exacerbation  Ascending aorta dilatation  CKD stage IIIb  Hypoalbuminemia  Obesity  KEENA (not on CPAP)  BPH  Dyslipidemia     Follow-ups Needed After Discharge   Follow-up Appointments     Follow-up and recommended labs and tests       Follow up with Dr. Ceballos next week as scheduled          Unresulted Labs Ordered in the Past 30 Days of this Admission       Date and Time Order Name Status Description    10/31/2023  1:20 PM Anaerobic Bacterial Culture Routine Preliminary     10/31/2023  1:20 PM Body fluid, unsp Aerobic Bacterial Culture Routine Preliminary     10/31/2023 12:41 AM Blood Culture Peripheral Blood Preliminary     10/31/2023 12:41 AM Blood Culture Peripheral Blood Preliminary     10/25/2023  9:45 AM Prepare red blood cells (unit) Preliminary     10/25/2023  9:45 AM Prepare red blood cells (unit) Preliminary         These results will be followed up by me    Discharge Disposition   Discharged to home  Condition at discharge: Stable    Hospital Course   Ti Nickerson is a 77 year old male with  complex PMHx including chronic a-fib currently on AC with enoxaparin, CHF, KEENA, CKD, metastatic RCC s/p right radical nephrectomy and recent partial duodenectomy with hernia repair at [place] on 10/11/23, and recent hospitalization at [facility] (10/25-10/27/23) for GI bleed who was admitted on 10/31/2023 for sepsis due to post-op wound infection.     Summary of recent events:   *Diagnosed with RCC in 2019. 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 Ceballos) at [facility] on 10/11.   *10/24: Presented to the [facility] ED with increased serous drainage from his surgical incision. CT abd/pelvis showed seroma; CT was reviewed with hepatobiliary fellow at [facility] who had no concerns. Discharged from ED  *10/25-10/27: Returned to the [facility] ED with melena felt to be anastamotic bleed. EGD was not pursued due to recent surgery. Melena resolved upon holding his apixaban, and patient was discharged on enoxaparin with plan to transition back to apixaban after 1 week.   *10/31: Returned to [facility] ED with fevers, chills, and increased redness surrounding abdominal surgical incision. Fever to 102.3 and leukocytosis (11.9) present on arrival.  Lactic acid wnl. Has a known abdominal seroma following surgery which appeared stable on CT    # Sepsis due to post-op wound infection: RESOLVING  # Abdominal seroma s/p percutaneous drain placement (10/31/2023)  # Metastatic renal cell carcinoma s/p right radical nephrectomy, partial duodenectomy with DJ anastamosis on 10/11/23  *Initiated on IV vancomycin and cefazolin in the ED; IV vanco d/c'd after 1 dose  *Due to risk for infection tracking into seroma, IR was consulted and patient underwent perc drain placement by IR on 10/31  *AF, WBC normal, wound appearance improving at the time of discharge  - He will be discharged with cephalexin to complete a 10-day course of antibiotics  - 10/31 seroma culture grew Clostridium perfringens, a contaminant  - 10/31 blood cultures x2 NGTD  - He has been cleared by PT for home  - Plans to follow up with his surgeon at [facility] (Dr. Ceballos) next week    Recent acute blood loss anemia due to acute GI bleed  - Hgb stable in mid 9 range  - Conts on PTA PPI BID     Chronic atrial fibrillation  *PTA regimen: metoprolol ER 25 mg daily, enoxaparin 90 mg BID  -  Holding metoprolol due to borderline BPs; remains rate-controlled  - Will continue enoxaparin for today; plan to resume apixaban on 11/4     Chronic HFpEF without exacerbation  Ascending aorta dilatation  TTE (10/9/23): EF 55-60%, moderate aortic root and ascending aorta dilatation: 4.6 cm.   - Appears compensated. Not on diuretics at home  - Cont outpatient surveillance echos     CKD stage IIIb  - Cr stable within baseline 1.5-1.7     Hypoalbuminemia  Albumin 3.2     Obesity  BMI 34     Other chronic and stable medical conditions  KEENA (not on CPAP)  BPH  Dyslipidemia    Consultations This Hospital Stay   PHARMACY TO DOSE VANCO  INTERVENTIONAL RADIOLOGY ADULT/PEDS IP CONSULT  PHYSICAL THERAPY ADULT IP CONSULT  CARE MANAGEMENT / SOCIAL WORK IP CONSULT    Code Status   Full Code    Time Spent on this Encounter   I, Shahnaz Salazar MD, personally saw the patient today and spent 45 minutes discharging this patient.       Shahnaz Salazar MD  [place] [address] [facility]  [address] [place] [address]  Phone: [phone]  ______________________________________________________________________    Physical Exam   Vital Signs: Temp: 97.8  F (36.6  C) Temp src: Oral BP: 114/76 Pulse: 81   Resp: 18 SpO2: 98 % O2 Device: None (Room air)    Weight: 290 lbs 2.01 oz    Constitutional: Appears comfortable, NAD  HEENT: Sclera white, conjunctiva clear, EOMI, MMM  Respiratory: Breathing non-labored. Lungs CTAB - no wheezes/crackles/rhonchi  Cardiovascular: Heart irregular rhythm, normal rate. No pedal edema.   GI: +BS. Abd soft/NT. Abdominal incision healing without surrounding redness or swelling  Skin: Warm and dry. No rash.  Musculoskeletal: Normal muscle bulk and tone  Neurologic: Alert and appropriate. COOK  Psychiatric: Calm and cooperative    Primary Care Physician   Nico Retana MD    Discharge Orders      Reason for your hospital stay    Wound infection which is improving  with antibiotics     Follow-up and recommended labs and tests     Follow up with Dr. Ceballos next week as scheduled     Activity    Your activity upon discharge: activity as tolerated     Diet    Follow this diet upon discharge: Low fiber diet       Significant Results and Procedures   Most Recent 3 CBC's:  Recent Labs   Lab Test 11/02/23  1140 11/01/23  1055 10/31/23  0102   WBC 5.4 8.3 11.9*   HGB 9.1* 9.5* 9.8*   MCV 97 97 97    236 240     Most Recent 3 BMP's:  Recent Labs   Lab Test 11/02/23  1140 11/01/23  1055 10/31/23  0102    137 137   POTASSIUM 3.5 3.7 3.9   CHLORIDE 105 104 102   CO2 23 23 23   BUN 17.6 16.8 19.2   CR 1.39* 1.38* 1.56*   ANIONGAP 10 10 12   DOMINICK 8.1* 8.1* 8.3*   * 99 130*   ,   Results for orders placed or performed during the hospital encounter of 10/31/23   XR Chest 2 Views    Narrative    EXAM: XR CHEST 2 VIEWS  LOCATION: [facility]  DATE: 10/31/2023    INDICATION: Fever  COMPARISON: 01/17/2023      Impression    IMPRESSION: Cardiac silhouette at upper limits normal for AP technique. Diffusely tortuous thoracic aorta. No focal airspace infiltrate. No visible pneumothorax.   CT Abdomen Pelvis w/o Contrast    Narrative    EXAM: CT ABDOMEN PELVIS W/O CONTRAST  LOCATION: [facility]  DATE: 10/31/2023    INDICATION: Fever, possible cellulitis of surgical site.  COMPARISON: 10/25/2023.  TECHNIQUE: CT scan of the abdomen and pelvis was performed without IV contrast. Multiplanar reformats were obtained. Dose reduction techniques were used.  CONTRAST: None.    FINDINGS:   LOWER CHEST: Mild bibasilar cylindrical bronchiectasis. Cardiac enlargement.    HEPATOBILIARY: The gallbladder is mildly distended. No definite gallstones are seen. Liver and biliary tree unremarkable.    PANCREAS: Normal.    SPLEEN: Normal.    ADRENAL GLANDS: Normal.    KIDNEYS/BLADDER: Right kidney is surgically absent. No significant left renal solid mass,  stones, or hydronephrosis. There are simple or benign cysts. No follow up is needed.    BOWEL: Colonic diverticula without evidence for diverticulitis. Nonspecific nonobstructive bowel gas pattern. Appendix not identified. Stable elongated 9.5 x 3.6 x 2.8 cm subcutaneous fluid collection to the left of midline in the anterior pelvis lower   abdomen compatible with seroma.    LYMPH NODES: Indeterminate precaval lymphadenopathy unchanged measuring up to 3.3 x 2.1 cm. Stable.    VASCULATURE: No abdominal aortic aneurysm. Moderate vascular calcifications abdomen and pelvis.    PELVIC ORGANS: Mild prostatic enlargement stable.    MUSCULOSKELETAL: Mild-to-moderate lumbar spondylosis.      Impression    IMPRESSION:   1.  The gallbladder is mildly distended. No definite gallstones are seen. Liver and biliary tree unremarkable.      2.  Colonic diverticula without evidence for diverticulitis. Nonspecific nonobstructive bowel gas pattern. Appendix not identified.     3.  Stable elongated 9.5 x 3.6 x 2.8 cm subcutaneous fluid collection to the left of midline in the anterior pelvis lower abdomen compatible with seroma.      4.  Right kidney is surgically absent. No significant left renal solid mass, stones, or hydronephrosis. There are simple or benign cysts. No follow up is needed.    5.  Mild prostatic enlargement.    6.  Moderate vascular calcifications abdomen.   US Drainage Seroma/Hematoma Abscess/Cyst    Narrative    ULTRASOUND SEROMA DRAINAGE October 31, 2023 1:33 PM     HISTORY: Surgical closure fluid collection.    COMPARISON: None.    FINDINGS: After written and oral informed consent was obtained, and  pause for the cause correctly identified the patient and procedure,  the patient was scanned in supine position for procedural planning.  The subcutaneous tissues overlying the fluid collection over the  abdomen near the surgical incision was identified, marked, prepped and  draped in the usual sterile fashion, and  anesthetized with 10 mL of 1%  subcutaneous lidocaine. The fluid collection was accessed with a 8  Jamaican catheter, and serosanguinous fluid was aspirated. The fluid  collection was maximally drained and verified with postprocedural  imaging. There were no immediate complications and patient left the  ultrasound suite in satisfactory condition. Ultrasound images were  permanently stored.      Impression    IMPRESSION: Technically successful ultrasound guided seroma drain  placement.    CHESTER GARRETT MD         SYSTEM ID:  [id number]   US Drainage Seroma/Hematoma Abscess/Cyst    Narrative    US ASPIRATION OF SEROMA/HEMATOMA/ABSCESS/CYST 11/1/2023 4:07 PM    HISTORY: assess drain site for fluid,     COMPARISON: CT and ultrasound yesterday    FINDINGS: The previously placed catheter is partially outside the  abdominal wall fluid collection. Redemonstration of a fluid collection  underlying the incision. The superior portion of the collection  measures 8.0 x 3.2 x 3.3 cm and the inferior portion measures 8.1 x  3.4 x 3.7 cm.    TECHNIQUE: Written and oral informed consent was obtained.  Preprocedure pause performed. The procedure was performed with 1%  lidocaine, ultrasound guidance and sterile technique.    A 5 Jamaican straight catheter was advanced into the inferior fluid  collection adjacent to the umbilicus and 35 cc of dark brown fluid was  aspirated and discarded.    An 8 Jamaican locking pigtail drainage catheter was placed into the  superior fluid collection using a trocar technique. 20 cc of pink  fluid was aspirated. This catheter was attached to bulb suction. No  immediate competitions.      Impression    IMPRESSION:  1. Prior anterior abdominal wall drain was removed. A new 8 Jamaican  percutaneous drain was placed in the superior portion of the abdominal  wall fluid collection. Flush and aspirate this catheter with 10 cc of  sterile saline. Catheter to bulb suction.  2. A second abdominal wall collection adjacent  to the umbilicus was  aspirated and 35 cc of dark brown fluid was removed.    JENNIFER KERR MD         SYSTEM ID:  [id number]       Discharge Medications   Current Discharge Medication List        START taking these medications    Details   cephALEXin (KEFLEX) 500 MG capsule Take 2 capsules (1,000 mg) by mouth 3 times daily for 7 days  Qty: 42 capsule, Refills: 0    Associated Diagnoses: Cellulitis of abdominal wall           CONTINUE these medications which have CHANGED    Details   apixaban ANTICOAGULANT (ELIQUIS) 5 MG tablet Take 1 tablet (5 mg) by mouth 2 times daily Restart this medication after completing Lovenox    Associated Diagnoses: Chronic atrial fibrillation (H)      enoxaparin ANTICOAGULANT (LOVENOX) 100 MG/ML syringe Inject 0.9 mLs (90 mg) Subcutaneous every 12 hours for 1 dose    Associated Diagnoses: Chronic atrial fibrillation (H)           CONTINUE these medications which have NOT CHANGED    Details   acetaminophen (TYLENOL) 500 MG tablet Take 500-1,000 mg by mouth every 6 hours as needed for mild pain      loperamide (IMODIUM) 2 MG capsule Take 2 mg by mouth 4 times daily as needed for diarrhea      multivitamin w/minerals (THERA-VIT-M) tablet Take 1 tablet by mouth daily      pantoprazole (PROTONIX) 40 MG EC tablet Take 1 tablet (40 mg) by mouth 2 times daily (before meals)  Qty: 60 tablet, Refills: 0    Associated Diagnoses: Melena      pravastatin (PRAVACHOL) 20 MG tablet Take 1 tablet (20 mg) by mouth daily  Qty: 90 tablet, Refills: 3    Comments: For Profile Only - patient will call to fill  Associated Diagnoses: Hyperlipidemia LDL goal <100      tamsulosin (FLOMAX) 0.4 MG capsule TAKE 1 CAPSULE BY MOUTH EVERY DAY  Qty: 90 capsule, Refills: 1    Comments: DX Code Needed  .  Associated Diagnoses: Benign prostatic hyperplasia with nocturia      ASPIRIN NOT PRESCRIBED (INTENTIONAL) Please choose reason not prescribed, below    Associated Diagnoses: Coronary artery disease involving native  coronary artery of native heart without angina pectoris           STOP taking these medications       metoprolol succinate ER (TOPROL XL) 25 MG 24 hr tablet Comments:   Reason for Stopping:             Allergies   Allergies   Allergen Reactions    Fentanyl Confusion

## 2023-11-03 NOTE — PLAN OF CARE
Goal Outcome Evaluation:      Pt discharged home in stable condition at 1510 via wife with all belongings and medications. PIV removed and AVS discharge instructions reviewed with patient and wife, no questions/comments/concerns at this time.

## 2023-11-05 LAB
BACTERIA BLD CULT: NO GROWTH
BACTERIA BLD CULT: NO GROWTH
BACTERIA FLD CULT: NO GROWTH

## 2023-11-06 ENCOUNTER — OFFICE VISIT (OUTPATIENT)
Dept: CARDIOLOGY | Facility: CLINIC | Age: 78
End: 2023-11-06
Payer: MEDICARE

## 2023-11-06 ENCOUNTER — PATIENT OUTREACH (OUTPATIENT)
Dept: CARE COORDINATION | Facility: CLINIC | Age: 78
End: 2023-11-06

## 2023-11-06 VITALS — BODY MASS INDEX: 33.26 KG/M2 | OXYGEN SATURATION: 96 % | WEIGHT: 267.5 LBS | HEIGHT: 75 IN

## 2023-11-06 DIAGNOSIS — I48.20 CHRONIC ATRIAL FIBRILLATION (H): ICD-10-CM

## 2023-11-06 DIAGNOSIS — C64.1 RENAL CELL CARCINOMA, RIGHT (H): ICD-10-CM

## 2023-11-06 DIAGNOSIS — E66.01 MORBID OBESITY (H): ICD-10-CM

## 2023-11-06 DIAGNOSIS — I25.10 CORONARY ARTERY DISEASE INVOLVING NATIVE CORONARY ARTERY OF NATIVE HEART WITHOUT ANGINA PECTORIS: ICD-10-CM

## 2023-11-06 DIAGNOSIS — N18.32 STAGE 3B CHRONIC KIDNEY DISEASE (H): ICD-10-CM

## 2023-11-06 DIAGNOSIS — I42.9 PRIMARY CARDIOMYOPATHY (H): Primary | ICD-10-CM

## 2023-11-06 DIAGNOSIS — E78.5 HYPERLIPIDEMIA LDL GOAL <100: ICD-10-CM

## 2023-11-06 PROCEDURE — 99214 OFFICE O/P EST MOD 30 MIN: CPT | Performed by: NURSE PRACTITIONER

## 2023-11-06 RX ORDER — MIDODRINE HYDROCHLORIDE 2.5 MG/1
2.5 TABLET ORAL 2 TIMES DAILY
Qty: 60 TABLET | Refills: 3 | Status: SHIPPED | OUTPATIENT
Start: 2023-11-06 | End: 2024-03-01

## 2023-11-06 NOTE — PROGRESS NOTES
~Cardiology Clinic Visit~    Primary Cardiologist: Dr. Quarles  Reason for visit: Hospital follow-up; symptom follow-up    History of Present Illness    Ti Nickerson is a very pleasant 77 year old male with a past medical history notable for  idiopathic cardiomyopathy (LVEF has resolved; remains on low-dose BB only due to soft BP), chronic atrial fibrillation (rate control with low-dose BB; anticoagulation with apixaban), minimal non-obstructive CAD, RCC s/p partial right nephrectomy, AscA dilation, CKD3, KEENA, and GERD.  He follows with nephrology.    In summary, he is followed with Dr. Quarles for many years for concern of lightheadedness and dizziness when standing.  He has had documented blood pressures as low as 80s to 90s when standing.  In the past, we have cut back his blood pressure medication and heart medications to the point where he is only taking Toprol-XL 25 mg daily.  Unfortunately he continued to have symptoms with this.    In past, echocardiograms have been stable showing low normal EF with mild valvular insufficiency.  This was thought to not be the cause of his hypotension, and certainly not a cause of his mental status changes.  His atrial fibrillation is well controlled.  He was recommended support stockings for symptom management.    He was most recently admitted at [facility] on 10/31/2023 for sepsis due to postoperative wound infection.  He had multiple other ER visits preceding his most recent admission.  Infection followed his surgery for a tumor that was found in his duodenum, and ultimately removed via partial duodenectomy with general surgery at [facility] on 10/11/2023.    Diagnostics:  10/9/23 echocardiogram shows stable EF 55-60%, no regional WMA identified.    11/2/23 BMP with stable Cr 1.39, Na 138, K 3.5; Hgb 9.1    Interval 11/06/23    He continues to struggle with fatigue, weakness, LH and dizziness upon standing.  This is easily multifactorial given the  many venkatesh conditions the patient is facing.  Orthostatics in clinic today positive showing significant BP drop with standing.  He notes he felt some improvement last year when he was wearing the compression stockings, but has not been wearing them lately.  __________________________________________________________________         Assessment and Impression:     Persistent dizziness and lightheadedness  Metastatic renal cell carcinoma, s/p right radical nephrectomy, partial  Recent abdominal seroma, s/p percutaneous drain placement (10/31/2023)  Recent acute blood loss anemia 2/2 acute GI bleed  Ongoing LH and dizziness with standing.  No olivia syncope or falls.  Asymptomatic at rest.  Positive orthostatic BP in clinic today.  Recommended compression stocking use at last visit.  Hgb stable at 9.1.    Chronic atrial fibrillation  Chronic HFpEF without exacerbation  Ascending aorta dilatation  CKD stage IIIb, stable  Obesity  KEENA (not on CPAP)  Dyslipidemia         Recommendations and Plan:     Start Midodrine 2.5 mg twice daily.  Education and counseling provided on use.  Restart compression stocking use daily.  Counseled on safe ways to change position and standing slowly to reduce symptoms/falls.  Maintain adequate hydration.  Follow up with RICK in 3-months to reassess symptom improvement.  __________________________________________________________________    Thank you for the opportunity to participate in this pleasant patient's care.    We would be happy to see this patient sooner for any concerns in the meantime.    NANCY Gray, CNP   Nurse Practitioner  [facility]    Today's clinic visit entailed:  Review of the result(s) of each unique test - hospital records and testing, ER notes, cardiac testing, cardiac imaging, labs and repeat labs  Prescription drug management  Outside records from [facility] reviewed.    The level of medical decision making during this visit was of  moderate complexity.    Orders this Visit:  Orders Placed This Encounter   Procedures    Follow-Up with Cardiology- RICK     Orders Placed This Encounter   Medications    midodrine (PROAMATINE) 2.5 MG tablet     Sig: Take 1 tablet (2.5 mg) by mouth 2 times daily     Dispense:  60 tablet     Refill:  3     There are no discontinued medications.  Encounter Diagnoses   Name Primary?    Chronic atrial fibrillation (H)     Primary cardiomyopathy (H) Yes    Hyperlipidemia LDL goal <100     Coronary artery disease involving native coronary artery of native heart without angina pectoris     Obesity (BMI 35.0-39.9) with comorbidity (H)     Stage 3b chronic kidney disease (H)     Renal cell carcinoma, right (H)      CURRENT MEDICATIONS:  Current Outpatient Medications   Medication Sig Dispense Refill    acetaminophen (TYLENOL) 500 MG tablet Take 500-1,000 mg by mouth every 6 hours as needed for mild pain      apixaban ANTICOAGULANT (ELIQUIS) 5 MG tablet Take 1 tablet (5 mg) by mouth 2 times daily Restart this medication after completing Lovenox      cephALEXin (KEFLEX) 500 MG capsule Take 2 capsules (1,000 mg) by mouth 3 times daily for 7 days 42 capsule 0    loperamide (IMODIUM) 2 MG capsule Take 2 mg by mouth 4 times daily as needed for diarrhea      midodrine (PROAMATINE) 2.5 MG tablet Take 1 tablet (2.5 mg) by mouth 2 times daily 60 tablet 3    multivitamin w/minerals (THERA-VIT-M) tablet Take 1 tablet by mouth daily      pantoprazole (PROTONIX) 40 MG EC tablet Take 1 tablet (40 mg) by mouth 2 times daily (before meals) 60 tablet 0    pravastatin (PRAVACHOL) 20 MG tablet Take 1 tablet (20 mg) by mouth daily 90 tablet 3    tamsulosin (FLOMAX) 0.4 MG capsule TAKE 1 CAPSULE BY MOUTH EVERY DAY 90 capsule 1    ASPIRIN NOT PRESCRIBED (INTENTIONAL) Please choose reason not prescribed, below (Patient not taking: Reported on 8/21/2023)       ALLERGIES     Allergies   Allergen Reactions    Fentanyl Confusion     PAST MEDICAL,  "SURGICAL, FAMILY, SOCIAL HISTORY:  History was reviewed and updated as needed, see medical record.    Review of Systems:  A 10-point Review Of Systems is otherwise normal except for that which is noted in the HPI and interval summary.    Physical Exam:    Vitals: Ht 1.905 m (6' 3\")   Wt 121.3 kg (267 lb 8 oz)   SpO2 96%   BMI 33.44 kg/m    Constitutional: Appears stated age, well nourished, NAD.    Neck: Supple. Carotid pulses full and equal.   Respiratory: Non-labored. Lungs clear, diminished posterior bases  Cardiovascular: IRR, normal S1 and S2. No M/G/R.  2+ LE edema.  GI: Soft, non-distended, non-tender; postoperative drain in place.  Musculoskeletal/Extremities: Symmetrical movement to all extremities.  Neurologic: No gross focal deficits. Alert, awake, and oriented to all spheres.  Psychiatric: Affect appropriate. Mentation normal.    Recent Lab Results:  LIPID RESULTS:  Lab Results   Component Value Date    CHOL 113 10/09/2023    CHOL 116 06/10/2021    HDL 39 (L) 10/09/2023    HDL 45 06/10/2021    LDL 57 10/09/2023    LDL 56 06/10/2021    TRIG 84 10/09/2023    TRIG 74 06/10/2021    CHOLHDLRATIO 2.8 09/17/2015     LIVER ENZYME RESULTS:  Lab Results   Component Value Date    AST 26 10/31/2023    AST 19 06/10/2021    ALT 19 10/31/2023    ALT 24 06/10/2021     CBC RESULTS:  Lab Results   Component Value Date    WBC 5.4 11/02/2023    WBC 5.8 06/16/2021    RBC 2.92 (L) 11/02/2023    RBC 4.32 (L) 06/16/2021    HGB 9.1 (L) 11/02/2023    HGB 14.2 06/16/2021    HCT 28.4 (L) 11/02/2023    HCT 41.9 06/16/2021    MCV 97 11/02/2023    MCV 97 06/16/2021    MCH 31.2 11/02/2023    MCH 32.9 06/16/2021    MCHC 32.0 11/02/2023    MCHC 33.9 06/16/2021    RDW 12.9 11/02/2023    RDW 12.9 06/16/2021     11/02/2023     (L) 06/16/2021     BMP RESULTS:  Lab Results   Component Value Date     11/02/2023     06/10/2021    POTASSIUM 3.5 11/02/2023    POTASSIUM 4.4 09/21/2022    POTASSIUM 4.5 06/10/2021    " CHLORIDE 105 11/02/2023    CHLORIDE 109 09/21/2022    CHLORIDE 111 (H) 06/10/2021    CO2 23 11/02/2023    CO2 25 09/21/2022    CO2 25 06/10/2021    ANIONGAP 10 11/02/2023    ANIONGAP 6 09/21/2022    ANIONGAP 4 06/10/2021     (H) 11/02/2023    GLC 66 (L) 09/21/2022    GLC 97 06/10/2021    BUN 17.6 11/02/2023    BUN 27 09/21/2022    BUN 30 06/10/2021    CR 1.39 (H) 11/02/2023    CR 1.68 (H) 06/10/2021    GFRESTIMATED 53 (L) 11/02/2023    GFRESTIMATED 39 (L) 10/24/2023    GFRESTIMATED 39 (L) 06/10/2021    GFRESTBLACK 46 (L) 06/10/2021    DOMINICK 8.1 (L) 11/02/2023    DOMINICK 8.5 06/10/2021      A1C RESULTS:  Lab Results   Component Value Date    A1C 5.5 05/17/2023    A1C 5.6 12/08/2017     INR RESULTS:  Lab Results   Component Value Date    INR 2.29 (H) 10/25/2023    INR 1.22 (H) 09/13/2023    INR 2.3 06/17/2013    INR 2.4 05/08/2013

## 2023-11-06 NOTE — PROGRESS NOTES
"Clinic Care Coordination Contact  [facility]: Post-Discharge Note  SITUATION                                                      Admission:    Admission Date: 10/31/23   Reason for Admission: Fever (Post op)  Discharge:   Discharge Date: 11/03/23  Discharge Diagnosis: Sepsis due to post-op wound infection    BACKGROUND                                                      Per hospital discharge summary and inpatient provider notes:Ti Nickerson is a 77 year old male with  complex PMHx including chronic a-fib currently on AC with enoxaparin, CHF, KEENA, CKD, metastatic RCC s/p right radical nephrectomy and recent partial duodenectomy with hernia repair at [facility] on 10/11/23, and recent hospitalization at [facility] (10/25-10/27/23) for GI bleed who was admitted on 10/31/2023 for sepsis due to post-op wound infection       ASSESSMENT           Discharge Assessment  How are you doing now that you are home?: \" I am feeling a little better, I go see the dr on Wednesday \"  How are your symptoms? (Red Flag symptoms escalate to triage hotline per guidelines): Improved  Do you feel your condition is stable enough to be safe at home until your provider visit?: Yes  Does the patient have their discharge instructions? : Yes  Does the patient have questions regarding their discharge instructions? : No  Were you started on any new medications or were there changes to any of your previous medications? : Yes  Does the patient have all of their medications?: Yes  Do you have questions regarding any of your medications? : No  Do you have all of your needed medical supplies or equipment (DME)?  (i.e. oxygen tank, CPAP, cane, etc.): Yes  Discharge follow-up appointment scheduled within 14 calendar days? : Yes  Discharge Follow Up Appointment Date: 11/08/23  Discharge Follow Up Appointment Scheduled with?: Specialty Care Provider    Post-op ([facility] CTA Only)  If the patient had a surgery or procedure, do they have any questions for " a nurse?: No           PLAN                                                      Outpatient Plan:Follow-up Appointments     Follow-up and recommended labs and tests       Follow up with Dr. Ceballos next week as scheduled      Future Appointments   Date Time Provider Department Center   11/6/2023  3:30 PM Mamie Gaston APRN CNP [facility]   12/19/2023  8:30 AM Nico Retana MD [facility]    5/22/2024  9:30 AM Nico Retana MD [facility]          For any urgent concerns, please contact our 24 hour nurse triage line: [phone])         FUAD Wharton

## 2023-11-06 NOTE — PATIENT INSTRUCTIONS
Thank you for your visit with the [facility] today.    Today's Summary     Start taking Midodrine 2.5 mg twice daily.  Do not take within 4 hours of going to bed.  Hold the medication if your blood pressure is >140/90  Wear compression stockings daily to help reduce dizziness.  Stay hydrated.  Follow up in 3-months to reassess symptoms.    If you have questions or concerns, please do not hesitate to call my nursing support team at [phone].    Scheduling phone number: [phone]  [facility] Clinic Number: [phone]    It was a pleasure seeing you today.     NANCY Gray, CNP  Nurse Practitioner  [facility]  November 6, 2023  ________________________________________________________

## 2023-11-06 NOTE — LETTER
11/6/2023    Nico Retana MD, MD  [address]  [facility] [address]    RE: Ti Nickerson       Dear Colleague,     I had the pleasure of seeing Ti Nickerson in the [facility].              ~Cardiology Clinic Visit~    Primary Cardiologist: Dr. Quarles  Reason for visit: Hospital follow-up; symptom follow-up    History of Present Illness    Ti Nickerson is a very pleasant 77 year old male with a past medical history notable for  idiopathic cardiomyopathy (LVEF has resolved; remains on low-dose BB only due to soft BP), chronic atrial fibrillation (rate control with low-dose BB; anticoagulation with apixaban), minimal non-obstructive CAD, RCC s/p partial right nephrectomy, AscA dilation, CKD3, KEENA, and GERD.  He follows with nephrology.    In summary, he is followed with Dr. Quarles for many years for concern of lightheadedness and dizziness when standing.  He has had documented blood pressures as low as 80s to 90s when standing.  In the past, we have cut back his blood pressure medication and heart medications to the point where he is only taking Toprol-XL 25 mg daily.  Unfortunately he continued to have symptoms with this.    In past, echocardiograms have been stable showing low normal EF with mild valvular insufficiency.  This was thought to not be the cause of his hypotension, and certainly not a cause of his mental status changes.  His atrial fibrillation is well controlled.  He was recommended support stockings for symptom management.    He was most recently admitted at [facility] on 10/31/2023 for sepsis due to postoperative wound infection.  He had multiple other ER visits preceding his most recent admission.  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    Diagnostics:  10/9/23 echocardiogram shows stable EF 55-60%, no  regional WMA identified.    11/2/23 BMP with stable Cr 1.39, Na 138, K 3.5; Hgb 9.1    Interval 11/06/23    He continues to struggle with fatigue, weakness, LH and dizziness upon standing.  This is easily multifactorial given the many venkatesh conditions the patient is facing.  Orthostatics in clinic today positive showing significant BP drop with standing.  He notes he felt some improvement last year when he was wearing the compression stockings, but has not been wearing them lately.  __________________________________________________________________         Assessment and Impression:     Persistent dizziness and lightheadedness  Metastatic renal cell carcinoma, s/p right radical nephrectomy, partial  Recent abdominal seroma, s/p percutaneous drain placement (10/31/2023)  Recent acute blood loss anemia 2/2 acute GI bleed  Ongoing LH and dizziness with standing.  No olivia syncope or falls.  Asymptomatic at rest.  Positive orthostatic BP in clinic today.  Recommended compression stocking use at last visit.  Hgb stable at 9.1.    Chronic atrial fibrillation  Chronic HFpEF without exacerbation  Ascending aorta dilatation  CKD stage IIIb, stable  Obesity  KEENA (not on CPAP)  Dyslipidemia         Recommendations and Plan:     Start Midodrine 2.5 mg twice daily.  Education and counseling provided on use.  Restart compression stocking use daily.  Counseled on safe ways to change position and standing slowly to reduce symptoms/falls.  Maintain adequate hydration.  Follow up with RICK in 3-months to reassess symptom improvement.  __________________________________________________________________    Thank you for the opportunity to participate in this pleasant patient's care.    We would be happy to see this patient sooner for any concerns in the meantime.    Mamie Gaston APRN, CNP   Nurse Practitioner  [facility]    Today's clinic visit entailed:  Review of the result(s) of each unique test - hospital  records and testing, ER notes, cardiac testing, cardiac imaging, labs and repeat labs  Prescription drug management  Outside records from [facility] reviewed.    The level of medical decision making during this visit was of moderate complexity.    Orders this Visit:  Orders Placed This Encounter   Procedures    Follow-Up with Cardiology- RICK     Orders Placed This Encounter   Medications    midodrine (PROAMATINE) 2.5 MG tablet     Sig: Take 1 tablet (2.5 mg) by mouth 2 times daily     Dispense:  60 tablet     Refill:  3     There are no discontinued medications.  Encounter Diagnoses   Name Primary?    Chronic atrial fibrillation (H)     Primary cardiomyopathy (H) Yes    Hyperlipidemia LDL goal <100     Coronary artery disease involving native coronary artery of native heart without angina pectoris     Obesity (BMI 35.0-39.9) with comorbidity (H)     Stage 3b chronic kidney disease (H)     Renal cell carcinoma, right (H)      CURRENT MEDICATIONS:  Current Outpatient Medications   Medication Sig Dispense Refill    acetaminophen (TYLENOL) 500 MG tablet Take 500-1,000 mg by mouth every 6 hours as needed for mild pain      apixaban ANTICOAGULANT (ELIQUIS) 5 MG tablet Take 1 tablet (5 mg) by mouth 2 times daily Restart this medication after completing Lovenox      cephALEXin (KEFLEX) 500 MG capsule Take 2 capsules (1,000 mg) by mouth 3 times daily for 7 days 42 capsule 0    loperamide (IMODIUM) 2 MG capsule Take 2 mg by mouth 4 times daily as needed for diarrhea      midodrine (PROAMATINE) 2.5 MG tablet Take 1 tablet (2.5 mg) by mouth 2 times daily 60 tablet 3    multivitamin w/minerals (THERA-VIT-M) tablet Take 1 tablet by mouth daily      pantoprazole (PROTONIX) 40 MG EC tablet Take 1 tablet (40 mg) by mouth 2 times daily (before meals) 60 tablet 0    pravastatin (PRAVACHOL) 20 MG tablet Take 1 tablet (20 mg) by mouth daily 90 tablet 3    tamsulosin (FLOMAX) 0.4 MG capsule TAKE 1 CAPSULE BY MOUTH EVERY DAY 90 capsule 1  "   ASPIRIN NOT PRESCRIBED (INTENTIONAL) Please choose reason not prescribed, below (Patient not taking: Reported on 8/21/2023)       ALLERGIES     Allergies   Allergen Reactions    Fentanyl Confusion     PAST MEDICAL, SURGICAL, FAMILY, SOCIAL HISTORY:  History was reviewed and updated as needed, see medical record.    Review of Systems:  A 10-point Review Of Systems is otherwise normal except for that which is noted in the HPI and interval summary.    Physical Exam:    Vitals: Ht 1.905 m (6' 3\")   Wt 121.3 kg (267 lb 8 oz)   SpO2 96%   BMI 33.44 kg/m    Constitutional: Appears stated age, well nourished, NAD.    Neck: Supple. Carotid pulses full and equal.   Respiratory: Non-labored. Lungs clear, diminished posterior bases  Cardiovascular: IRR, normal S1 and S2. No M/G/R.  2+ LE edema.  GI: Soft, non-distended, non-tender; postoperative drain in place.  Musculoskeletal/Extremities: Symmetrical movement to all extremities.  Neurologic: No gross focal deficits. Alert, awake, and oriented to all spheres.  Psychiatric: Affect appropriate. Mentation normal.    Recent Lab Results:  LIPID RESULTS:  Lab Results   Component Value Date    CHOL 113 10/09/2023    CHOL 116 06/10/2021    HDL 39 (L) 10/09/2023    HDL 45 06/10/2021    LDL 57 10/09/2023    LDL 56 06/10/2021    TRIG 84 10/09/2023    TRIG 74 06/10/2021    CHOLHDLRATIO 2.8 09/17/2015     LIVER ENZYME RESULTS:  Lab Results   Component Value Date    AST 26 10/31/2023    AST 19 06/10/2021    ALT 19 10/31/2023    ALT 24 06/10/2021     CBC RESULTS:  Lab Results   Component Value Date    WBC 5.4 11/02/2023    WBC 5.8 06/16/2021    RBC 2.92 (L) 11/02/2023    RBC 4.32 (L) 06/16/2021    HGB 9.1 (L) 11/02/2023    HGB 14.2 06/16/2021    HCT 28.4 (L) 11/02/2023    HCT 41.9 06/16/2021    MCV 97 11/02/2023    MCV 97 06/16/2021    MCH 31.2 11/02/2023    MCH 32.9 06/16/2021    MCHC 32.0 11/02/2023    MCHC 33.9 06/16/2021    RDW 12.9 11/02/2023    RDW 12.9 06/16/2021     " 11/02/2023     (L) 06/16/2021     BMP RESULTS:  Lab Results   Component Value Date     11/02/2023     06/10/2021    POTASSIUM 3.5 11/02/2023    POTASSIUM 4.4 09/21/2022    POTASSIUM 4.5 06/10/2021    CHLORIDE 105 11/02/2023    CHLORIDE 109 09/21/2022    CHLORIDE 111 (H) 06/10/2021    CO2 23 11/02/2023    CO2 25 09/21/2022    CO2 25 06/10/2021    ANIONGAP 10 11/02/2023    ANIONGAP 6 09/21/2022    ANIONGAP 4 06/10/2021     (H) 11/02/2023    GLC 66 (L) 09/21/2022    GLC 97 06/10/2021    BUN 17.6 11/02/2023    BUN 27 09/21/2022    BUN 30 06/10/2021    CR 1.39 (H) 11/02/2023    CR 1.68 (H) 06/10/2021    GFRESTIMATED 53 (L) 11/02/2023    GFRESTIMATED 39 (L) 10/24/2023    GFRESTIMATED 39 (L) 06/10/2021    GFRESTBLACK 46 (L) 06/10/2021    DOMINICK 8.1 (L) 11/02/2023    DOMINICK 8.5 06/10/2021      A1C RESULTS:  Lab Results   Component Value Date    A1C 5.5 05/17/2023    A1C 5.6 12/08/2017     INR RESULTS:  Lab Results   Component Value Date    INR 2.29 (H) 10/25/2023    INR 1.22 (H) 09/13/2023    INR 2.3 06/17/2013    INR 2.4 05/08/2013       Thank you for allowing me to participate in the care of your patient.      Sincerely,     NANCY Gray [facility]  cc:   Devin Quarles MD  [address]  [place] [id number]

## 2023-11-07 LAB — BACTERIA FLD CULT: ABNORMAL

## 2023-11-27 ENCOUNTER — TELEPHONE (OUTPATIENT)
Dept: OTHER | Facility: CLINIC | Age: 78
End: 2023-11-27
Payer: MEDICARE

## 2023-11-27 NOTE — TELEPHONE ENCOUNTER
Spoke with patient, both drains have been pulled with [place] physician.  Yas Argueta RN  IR nurse clinician  [phone]

## 2023-12-04 DIAGNOSIS — I48.20 CHRONIC ATRIAL FIBRILLATION (H): ICD-10-CM

## 2023-12-19 ENCOUNTER — OFFICE VISIT (OUTPATIENT)
Dept: FAMILY MEDICINE | Facility: CLINIC | Age: 78
End: 2023-12-19
Payer: MEDICARE

## 2023-12-19 VITALS
RESPIRATION RATE: 16 BRPM | BODY MASS INDEX: 34.44 KG/M2 | WEIGHT: 277 LBS | OXYGEN SATURATION: 96 % | HEIGHT: 75 IN | SYSTOLIC BLOOD PRESSURE: 101 MMHG | DIASTOLIC BLOOD PRESSURE: 69 MMHG | HEART RATE: 73 BPM | TEMPERATURE: 98.2 F

## 2023-12-19 DIAGNOSIS — I48.20 CHRONIC ATRIAL FIBRILLATION (H): ICD-10-CM

## 2023-12-19 DIAGNOSIS — C64.1 RENAL CELL CARCINOMA, RIGHT (H): Primary | ICD-10-CM

## 2023-12-19 DIAGNOSIS — I25.10 CORONARY ARTERY DISEASE INVOLVING NATIVE CORONARY ARTERY OF NATIVE HEART WITHOUT ANGINA PECTORIS: ICD-10-CM

## 2023-12-19 PROCEDURE — 99213 OFFICE O/P EST LOW 20 MIN: CPT | Performed by: INTERNAL MEDICINE

## 2023-12-19 ASSESSMENT — PAIN SCALES - GENERAL: PAINLEVEL: NO PAIN (0)

## 2023-12-19 NOTE — PATIENT INSTRUCTIONS
(C64.1) Renal cell carcinoma, right (H)  (primary encounter diagnosis)  Comment: We will follow up for labs with oncology tomorrow and discuss upcoming imaging studies  Plan:     (I48.20) Chronic atrial fibrillation (H)  Comment: Recommend resume apixaban and follow up cardiology.   Plan:     (I25.10) Coronary artery disease involving native coronary artery of native heart without angina pectoris  Comment: Plan to resume use of compression stockings, midodrine and maintain hydration as best you are able and check labs tomorrow  Plan:      Shingrix vaccine is now available.  I would call your insurance to see if a shingles vaccine is covered and get this at your pharmacy

## 2023-12-19 NOTE — PROGRESS NOTES
"  Neal Hernández is a 78 year old, presenting for the following health issues:  Follow Up        12/19/2023     8:26 AM   Additional Questions   Accompanied by wife Valery       History of Present Illness       Reason for visit:  Follow up    He eats 0-1 servings of fruits and vegetables daily.He consumes 0 sweetened beverage(s) daily.He exercises with enough effort to increase his heart rate 20 to 29 minutes per day.  He exercises with enough effort to increase his heart rate 5 days per week.   He is taking medications regularly.        Chronic atrial fibrillation (H)  Renal cell carcinoma, right (H)  Coronary artery disease involving native coronary artery of native heart without angina pectoris    Ti Nickerson has stopped midodrine as he was not noticing any benefit from this medication.  He did resume apixaban and no further gastroenterology bleeding.  No chest pain. Heart rate is normal and not too fast.  He notes some good days/and some bad days with fatigue and dizziness.  Today he did have a slight episode of dizziness.      Review of Systems   Constitutional, HEENT, cardiovascular, pulmonary, GI, , musculoskeletal, neuro, skin, endocrine and psych systems are negative, except as otherwise noted.      Objective    /69 (BP Location: Right arm, Patient Position: Chair, Cuff Size: Adult Large)   Pulse 73   Temp 98.2  F (36.8  C) (Temporal)   Resp 16   Ht 1.905 m (6' 3\")   Wt 125.6 kg (277 lb)   SpO2 96%   BMI 34.62 kg/m    Body mass index is 34.62 kg/m .  Physical Exam   GENERAL: healthy, alert and no distress  EYES: Eyes grossly normal to inspection,    NECK: no adenopathy, no asymmetry,   RESP: lungs clear to auscultation - no rales, rhonchi or wheezes  CV: regular rate and rhythm, normal S1 S2, no S3 or S4, no murmur,   ABDOMEN: obese, soft, nontender, no hepatosplenomegaly   MS: no gross musculoskeletal defects noted, trace edema  SKIN: no suspicious lesions or rashes  NEURO: Normal " strength and tone, mentation intact and speech normal  PSYCH: mentation appears normal, affect normal/bright    Patient Instructions   (C64.1) Renal cell carcinoma, right (H)  (primary encounter diagnosis)  Comment: We will follow up for labs with oncology tomorrow and discuss upcoming imaging studies  Plan:     (I48.20) Chronic atrial fibrillation (H)  Comment: Recommend resume apixaban and follow up cardiology.   Plan:     (I25.10) Coronary artery disease involving native coronary artery of native heart without angina pectoris  Comment: Plan to resume use of compression stockings, midodrine and maintain hydration as best you are able and check labs tomorrow  Plan:               20 minutes spent with patient.  Over 50% of time counseling

## 2023-12-26 ENCOUNTER — TELEPHONE (OUTPATIENT)
Dept: FAMILY MEDICINE | Facility: CLINIC | Age: 78
End: 2023-12-26
Payer: MEDICARE

## 2023-12-26 DIAGNOSIS — R79.9 ABNORMAL FINDING OF BLOOD CHEMISTRY, UNSPECIFIED: ICD-10-CM

## 2023-12-26 DIAGNOSIS — E66.01 MORBID OBESITY (H): ICD-10-CM

## 2023-12-26 DIAGNOSIS — N18.32 STAGE 3B CHRONIC KIDNEY DISEASE (H): Primary | ICD-10-CM

## 2023-12-26 DIAGNOSIS — K92.1 MELENA: ICD-10-CM

## 2023-12-26 NOTE — TELEPHONE ENCOUNTER
Order/Referral Request    Who is requesting: Edgar Hernandez and Oncologist    Orders being requested: Hemoglobin and A1C lab    Reason service is needed/diagnosis: Had labs down at [facility] last week and hemoglobin had went from 11 down to 9     When are orders needed by: This week if possible    Has this been discussed with Provider: Yes    Does patient have a preference on a Group/Provider/Facility? [facility] in [place]    Does patient have an appointment scheduled?: No    Where to send orders: Place orders within Epic    Could we send this information to you in [facility] or would you prefer to receive a phone call?:   Patient would prefer a phone call   Okay to leave a detailed message?: Yes at Home number on file [phone] (home)

## 2023-12-27 NOTE — TELEPHONE ENCOUNTER
Spoke with patient's spouse Valery (on C2C) to assist with scheduling Lab Only visit. RN assisted patient to be scheduled for Lab Only visit 12/29/2023.     Valery is also requesting patient have Hemoglobin A1C draw as patient has never had A1C drawn and they would like to rule this out as a possible cause for patient's dizziness.     Lab order pended. Routing to PCP for review.

## 2023-12-28 NOTE — TELEPHONE ENCOUNTER
OK to recheck hemoglobin A1c    Last results were as follows    Lab Results   Component Value Date    A1C 5.5 05/17/2023    A1C 5.6 12/08/2017

## 2023-12-29 ENCOUNTER — LAB (OUTPATIENT)
Dept: LAB | Facility: CLINIC | Age: 78
End: 2023-12-29
Payer: MEDICARE

## 2023-12-29 DIAGNOSIS — E66.01 MORBID OBESITY (H): ICD-10-CM

## 2023-12-29 DIAGNOSIS — K92.1 MELENA: ICD-10-CM

## 2023-12-29 DIAGNOSIS — N18.32 STAGE 3B CHRONIC KIDNEY DISEASE (H): ICD-10-CM

## 2023-12-29 DIAGNOSIS — R79.9 ABNORMAL FINDING OF BLOOD CHEMISTRY, UNSPECIFIED: ICD-10-CM

## 2023-12-29 LAB
ERYTHROCYTE [DISTWIDTH] IN BLOOD BY AUTOMATED COUNT: 13.1 % (ref 10–15)
HBA1C MFR BLD: 5.3 % (ref 0–5.6)
HCT VFR BLD AUTO: 31.6 % (ref 40–53)
HGB BLD-MCNC: 9.6 G/DL (ref 13.3–17.7)
MCH RBC QN AUTO: 27.3 PG (ref 26.5–33)
MCHC RBC AUTO-ENTMCNC: 30.4 G/DL (ref 31.5–36.5)
MCV RBC AUTO: 90 FL (ref 78–100)
PLATELET # BLD AUTO: 246 10E3/UL (ref 150–450)
RBC # BLD AUTO: 3.52 10E6/UL (ref 4.4–5.9)
WBC # BLD AUTO: 6.3 10E3/UL (ref 4–11)

## 2023-12-29 PROCEDURE — 83036 HEMOGLOBIN GLYCOSYLATED A1C: CPT

## 2023-12-29 PROCEDURE — 85027 COMPLETE CBC AUTOMATED: CPT

## 2023-12-29 PROCEDURE — 36415 COLL VENOUS BLD VENIPUNCTURE: CPT

## 2023-12-29 NOTE — TELEPHONE ENCOUNTER
Called Valery and left detailed message per ok to leave message from message below. Left number to call to schedule labs.

## 2024-01-01 NOTE — RESULT ENCOUNTER NOTE
Abrahan Luke,    I have had the opportunity to review your recent results and an interpretation is as follows:  Your follow-up hemoglobin remained stable    Sincerely,  Nico Retana MD

## 2024-01-28 DIAGNOSIS — N40.1 BENIGN PROSTATIC HYPERPLASIA WITH NOCTURIA: ICD-10-CM

## 2024-01-28 DIAGNOSIS — R35.1 BENIGN PROSTATIC HYPERPLASIA WITH NOCTURIA: ICD-10-CM

## 2024-01-29 RX ORDER — TAMSULOSIN HYDROCHLORIDE 0.4 MG/1
CAPSULE ORAL
Qty: 90 CAPSULE | Refills: 1 | Status: SHIPPED | OUTPATIENT
Start: 2024-01-29 | End: 2024-08-07

## 2024-01-29 NOTE — TELEPHONE ENCOUNTER
Prescription approved per [facility] Refill Protocol.  Day Liriano RN  [facility]

## 2024-02-09 ENCOUNTER — NURSE TRIAGE (OUTPATIENT)
Dept: FAMILY MEDICINE | Facility: CLINIC | Age: 79
End: 2024-02-09
Payer: MEDICARE

## 2024-02-09 NOTE — TELEPHONE ENCOUNTER
Not sure what she told this patient, there is really nothing that I see triage.  If he has ongoing lightheadedness he should go to his urgent care today right away.    Tyrell Ybarra M.D.

## 2024-02-09 NOTE — TELEPHONE ENCOUNTER
Previous Sessions Given: 1  Giselle Guadalupe RN Fri Feb 09, 2024 10:26 AM  GO TO OFFICE NOW: * You need to be examined. Come into the office right now. * IF NO AVAILABLE APPOINTMENTS:  You need to be seen in an Urgent Care Center. At end of triage call patient states that he is in [place]. Advised the patient to seek care now.    Original Version: GO TO OFFICE NOW:        * You need to be examined. Come into the office right now.         * IF NO AVAILABLE APPOINTMENTS: You need t     Wife called back and was given message below from Dr. Ybarra.   Wife states that patient is not dizzy today, but if dizziness returns she will bring him in. States that she is getting him an appointment in [place].  Wife states that when they go in and he's not dizzy, they don't do anything for him. She states that she understands the message that he should go in today.     States that he has good days and bad days. States that this has been going on for 4 years. Today is a good day. Giselle Guadalupe RN

## 2024-02-09 NOTE — TELEPHONE ENCOUNTER
Reason for Disposition   Lightheadedness (dizziness) present now, after 2 hours of rest and fluids    Additional Information   Negative: SEVERE difficulty breathing (e.g., struggling for each breath, speaks in single words)   Negative: Shock suspected (e.g., cold/pale/clammy skin, too weak to stand, low BP, rapid pulse)   Negative: Difficult to awaken or acting confused (e.g., disoriented, slurred speech)   Negative: Fainted, and still feels dizzy afterwards   Negative: Overdose (accidental or intentional) of medications   Negative: New neurologic deficit that is present now: * Weakness of the face, arm, or leg on one side of the body * Numbness of the face, arm, or leg on one side of the body * Loss of speech or garbled speech   Negative: Heart beating < 50 beats per minute OR > 140 beats per minute   Negative: Sounds like a life-threatening emergency to the triager   Negative: Chest pain   Negative: Rectal bleeding, bloody stool, or tarry-black stool   Negative: Vomiting is main symptom   Negative: Diarrhea is main symptom   Negative: Headache is main symptom   Negative: Heat exhaustion suspected (i.e., dehydration from heat exposure)   Negative: Patient states that they are having an anxiety or panic attack   Negative: Dizziness from low blood sugar (i.e., < 60 mg/dl or 3.5 mmol/l)   Negative: SEVERE dizziness (e.g., unable to stand, requires support to walk, feels like passing out now)   Negative: SEVERE headache or neck pain   Negative: Spinning or tilting sensation (vertigo) present now and one or more stroke risk factors (i.e., hypertension, diabetes mellitus, prior stroke/TIA, heart attack, age over 60) (Exception: Prior physician evaluation for this AND no different/worse than usual.)   Negative: Neurologic deficit that was brief (now gone), ANY of the following:* Weakness of the face, arm, or leg on one side of the body* Numbness of the face, arm, or leg on one side of the body* Loss of speech or garbled  "speech   Negative: Loss of vision or double vision  (Exception: Similar to previous migraines.)   Negative: Extra heartbeats, irregular heart beating, or heart is beating very fast (i.e., 'palpitations')   Negative: Difficulty breathing   Negative: Drinking very little and dehydration suspected (e.g., no urine > 12 hours, very dry mouth, very lightheaded)   Negative: Follows bleeding (e.g., stomach, rectum, vagina)  (Exception: Became dizzy from sight of small amount blood.)   Negative: Patient sounds very sick or weak to the triager    Answer Assessment - Initial Assessment Questions  1. DESCRIPTION: \"Describe your dizziness.\"      Patient feels light headed in the morning when he wakes up in the morning.   2. LIGHTHEADED: \"Do you feel lightheaded?\" (e.g., somewhat faint, woozy, weak upon standing)      States that he feels light headed when stands quickly.   3. VERTIGO: \"Do you feel like either you or the room is spinning or tilting?\" (i.e. vertigo)      no  4. SEVERITY: \"How bad is it?\"  \"Do you feel like you are going to faint?\" \"Can you stand and walk?\"    - MILD: Feels slightly dizzy, but walking normally.    - MODERATE: Feels unsteady when walking, but not falling; interferes with normal activities (e.g., school, work).    - SEVERE: Unable to walk without falling, or requires assistance to walk without falling; feels like passing out now.       Was severe this past Sunday. [place]. Wife states that he passed out for a minute. States that she and another man were able to lower down to the ground so no injury. Patient was only unconscious a moment and then was alert and oriented.   5. ONSET:  \"When did the dizziness begin?\"      Ongoing every day. Patient has history of kidney cancer. Takes midodrine for his low blood pressure.   6. AGGRAVATING FACTORS: \"Does anything make it worse?\" (e.g., standing, change in head position)      Worse when stands up to quickly.   7. HEART RATE: \"Can you tell " "me your heart rate?\" \"How many beats in 15 seconds?\"  (Note: not all patients can do this)        55-68 range  8. CAUSE: \"What do you think is causing the dizziness?\"      Blood pressure drops low. Has orthostatic blood pressure.   9. RECURRENT SYMPTOM: \"Have you had dizziness before?\" If Yes, ask: \"When was the last time?\" \"What happened that time?\"      September had a bleed. October removed part of intestine. In and out of hospital so many times. A lot of things weaker and less able.   10. OTHER SYMPTOMS: \"Do you have any other symptoms?\" (e.g., fever, chest pain, vomiting, diarrhea, bleeding)        none  11. PREGNANCY: \"Is there any chance you are pregnant?\" \"When was your last menstrual period?\"        na    Protocols used: Dizziness-A-OH  Giselle Guadalupe RN    "

## 2024-02-09 NOTE — TELEPHONE ENCOUNTER
Patient Contact    Attempt # 1    Was call answered? No.    Left message for patient to call triage back.      Lynda Larios RN

## 2024-02-15 ENCOUNTER — TELEPHONE (OUTPATIENT)
Dept: FAMILY MEDICINE | Facility: CLINIC | Age: 79
End: 2024-02-15
Payer: MEDICARE

## 2024-02-15 NOTE — TELEPHONE ENCOUNTER
Reason for Call:  Appointment Request    Patient requesting this type of appt: Chronic Diease Management/Medication/Follow-Up    Requested provider: Nico Retana    Reason patient unable to be scheduled: Not within requested timeframe    When does patient want to be seen/preferred time:  Anytime after March 19    Comments: Pt is looking for an appt sometimes after March 19, 2024. Pt will be back in [place] and pt wants to be seen as soon as possible for issues with dizzyness, pt passed out the other day, and maybe a possible referral. PCP soonest appt is in June and that is too far out for the pt. Pt is currently also seeing a doctor in [place] and they are not happy with the service and will be coming back to see his PCP. Pt also declined nurse line because they are out of state and they know that the nurse will not talk to them.     Could we send this information to you in [facility] or would you prefer to receive a phone call?:   Patient would prefer a phone call   Okay to leave a detailed message?: Yes at Cell number on file:    Telephone Information:   Mobile [phone]       Call taken on 2/15/2024 at 9:16 AM by Candida Rivas

## 2024-02-15 NOTE — TELEPHONE ENCOUNTER
"    Edgar is having dizziness (unable to triage- out of state) . Patient has been seen in [place] but the wife said \"it was unsatisfactory\". They want him to be seen as soon as he gets home which is on March 18th and after. Routing to PCP for permission to use a slot around that time. In addition, nurse made sure that if patient having any emergent issues. That they seek care in [place] where they are located.       ULISES Jordan  [facility] Triage       "

## 2024-02-16 NOTE — TELEPHONE ENCOUNTER
Called and spoke with Edgar & his wife in the background. Patient scheduled appointment with Dr. Retana for Friday 3/22/2024 at 8:40am(9:00 slot)

## 2024-03-01 DIAGNOSIS — I25.10 CORONARY ARTERY DISEASE INVOLVING NATIVE CORONARY ARTERY OF NATIVE HEART WITHOUT ANGINA PECTORIS: ICD-10-CM

## 2024-03-01 RX ORDER — MIDODRINE HYDROCHLORIDE 2.5 MG/1
2.5 TABLET ORAL 2 TIMES DAILY
Qty: 60 TABLET | Refills: 0 | Status: SHIPPED | OUTPATIENT
Start: 2024-03-01 | End: 2024-03-18

## 2024-03-17 NOTE — PROGRESS NOTES
~Cardiology Clinic Visit~    Primary Cardiologist: Dr. Quarles  Reason for visit: Symptom follow-up     History of Present Illness     Ti Nickerson is a very pleasant 77 year old male with a past medical history notable for  idiopathic cardiomyopathy (LVEF has resolved; remains on low-dose BB only due to soft BP), chronic atrial fibrillation (rate control with low-dose BB; anticoagulation with apixaban), minimal non-obstructive CAD, RCC s/p partial right nephrectomy, AscA dilation, CKD3, KEENA, and GERD.  He follows with nephrology.     In summary, he is followed with Dr. Quarles for many years for concern of lightheadedness and dizziness when standing.  He has had documented blood pressures as low as 80s to 90s when standing.  In the past, we have cut back his blood pressure medication and heart medications to the point where he is only taking Toprol-XL 25 mg daily.  Unfortunately he continued to have symptoms with this.     In past, echocardiograms have been stable showing low normal EF with mild valvular insufficiency.  This was thought to not be the cause of his hypotension, and certainly not a cause of his mental status changes.  His atrial fibrillation is well controlled.  He was recommended support stockings for symptom management.     He was admitted at [facility] on 10/31/2023 for sepsis due to post-operative wound infection.  He had multiple other ER visits preceding his most recent admission.  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  Since then, he has continued to struggle with fatigue, weakness, lightheadedness and dizziness.  He does have orthostatic hypotension confirmed in clinic follow-up.  He was recommended compression stocking use and started on low-dose midodrine.     Diagnostics:  10/9/23 echocardiogram shows stable EF 55-60%, no regional  WMA identified.    11/2/23 BMP with stable Cr 1.39, Na 138, K 3.5; Hgb 9.1     Interval 03/18/24    Blood pressure continues to remain soft.  He had an episode of passing out while in [place] this January - likely multifactorial as he has had multiple hospitalizations and testing.  Understandably the heat may have contributed, and he does have documented orthostasis.  He wonders if there is an element of vertigo at play as well - he is scheduled to see his PCP later this week to discuss.  Pt and his wife really hoping to continue working to find answers for his symptoms.  __________________________________________________________________          Assessment and Impression:      Persistent dizziness and lightheadedness  Orthostatic hypotension  Started on low-dose midodrine --> BP remains soft.  Symptoms recently c/b GI bleed with anemia, now resolved.  Metastatic renal cell carcinoma, s/p right radical nephrectomy, partial  Recent abdominal seroma, s/p percutaneous drain placement (10/31/2023)  Chronic atrial fibrillation  Chronic HFpEF without exacerbation  Ascending aorta dilatation  CKD stage IIIb, stable  Obesity  KEENA (not on CPAP)  Dyslipidemia          Recommendations and Plan:      Increase Midodrine to 5 mg BID.  Complete venous insufficiency testing for BLE edema and orthostasis.  Complete Zio patch heart monitor to assess AF rate control and r/o other arrhythmias.  Counseled on safe ways to change position and standing slowly to reduce symptoms/falls.  Maintain adequate hydration.  Follow up with [facility] in 3-months to reassess symptom improvement.  __________________________________________________________________    Thank you for the opportunity to participate in this pleasant patient's care.    We would be happy to see this patient sooner for any concerns in the meantime.    NANCY Gray, CNP   Nurse Practitioner  [facility]    Today's clinic visit entailed:  Review of the  result(s) of each unique test - cardiac testing, cardiac imaging, other imaging, hospital records, labs  Ordering of each unique test  Prescription drug management    The level of medical decision making during this visit was of moderate complexity.    Orders this Visit:  Orders Placed This Encounter   Procedures    US Venous Competency Bilateral    Follow-Up with Cardiology- [facility]    Physical Therapy  Referral     Orders Placed This Encounter   Medications    midodrine (PROAMATINE) 5 MG tablet     Sig: Take 1 tablet (5 mg) by mouth 2 times daily     Dispense:  60 tablet     Refill:  4    Ferrous Sulfate (IRON PO)     Sig: Take by mouth daily     Medications Discontinued During This Encounter   Medication Reason    midodrine (PROAMATINE) 2.5 MG tablet Reorder (No AVS)     Encounter Diagnoses   Name Primary?    Coronary artery disease involving native coronary artery of native heart without angina pectoris     Chronic atrial fibrillation (H)     Hyperlipidemia LDL goal <100     Obesity (BMI 35.0-39.9) with comorbidity (H)     Renal cell carcinoma, right (H)     Stage 3b chronic kidney disease (H)     Orthostatic hypotension Yes    Bilateral leg edema     Physical deconditioning     Dizziness      CURRENT MEDICATIONS:  Current Outpatient Medications   Medication Sig Dispense Refill    acetaminophen (TYLENOL) 500 MG tablet Take 500-1,000 mg by mouth every 6 hours as needed for mild pain      apixaban ANTICOAGULANT (ELIQUIS) 5 MG tablet Take 1 tablet (5 mg) by mouth 2 times daily 180 tablet 3    Ferrous Sulfate (IRON PO) Take by mouth daily      loperamide (IMODIUM) 2 MG capsule Take 2 mg by mouth 4 times daily as needed for diarrhea      midodrine (PROAMATINE) 5 MG tablet Take 1 tablet (5 mg) by mouth 2 times daily 60 tablet 4    multivitamin w/minerals (THERA-VIT-M) tablet Take 1 tablet by mouth daily      pantoprazole (PROTONIX) 40 MG EC tablet Take 1 tablet (40 mg) by mouth 2 times daily (before meals)  (Patient taking differently: Take 40 mg by mouth daily) 60 tablet 0    pravastatin (PRAVACHOL) 20 MG tablet Take 1 tablet (20 mg) by mouth daily 90 tablet 3    tamsulosin (FLOMAX) 0.4 MG capsule TAKE 1 CAPSULE BY MOUTH EVERY DAY 90 capsule 1    ASPIRIN NOT PRESCRIBED (INTENTIONAL) Please choose reason not prescribed, below (Patient not taking: Reported on 8/21/2023)       ALLERGIES     Allergies   Allergen Reactions    Fentanyl Confusion     PAST MEDICAL, SURGICAL, FAMILY, SOCIAL HISTORY:  History was reviewed and updated as needed, see medical record.    Review of Systems:  A 10-point Review Of Systems is otherwise normal except for that which is noted in the HPI and interval summary.    Physical Exam:    Vitals: BP (!) 88/62 (BP Location: Left arm, Patient Position: Sitting)   Pulse 80   Wt 124.8 kg (275 lb 1.6 oz)   SpO2 96%   BMI 34.39 kg/m    Constitutional: Appears stated age, well nourished, NAD.  Neck: Supple. Carotid pulses full and equal.   Respiratory: Non-labored. Lungs CTAB.  Cardiovascular: IRR, normal S1 and S2. No M/G/R.  1+ BLE ankle/shin edema.  GI: Soft, non-distended, non-tender.  Skin: Warm and dry.   Musculoskeletal/Extremities: Symmetrical movement.  Neurologic: No gross focal deficits. Alert, awake.  Psychiatric: Affect appropriate. Mentation normal.    Recent Lab Results:  LIPID RESULTS:  Lab Results   Component Value Date    CHOL 113 10/09/2023    CHOL 116 06/10/2021    HDL 39 (L) 10/09/2023    HDL 45 06/10/2021    LDL 57 10/09/2023    LDL 56 06/10/2021    TRIG 84 10/09/2023    TRIG 74 06/10/2021    CHOLHDLRATIO 2.8 09/17/2015     LIVER ENZYME RESULTS:  Lab Results   Component Value Date    AST 26 10/31/2023    AST 19 06/10/2021    ALT 19 10/31/2023    ALT 24 06/10/2021     CBC RESULTS:  Lab Results   Component Value Date    WBC 6.3 12/29/2023    WBC 5.8 06/16/2021    RBC 3.52 (L) 12/29/2023    RBC 4.32 (L) 06/16/2021    HGB 9.6 (L) 12/29/2023    HGB 14.2 06/16/2021    HCT 31.6 (L)  12/29/2023    HCT 41.9 06/16/2021    MCV 90 12/29/2023    MCV 97 06/16/2021    MCH 27.3 12/29/2023    MCH 32.9 06/16/2021    MCHC 30.4 (L) 12/29/2023    MCHC 33.9 06/16/2021    RDW 13.1 12/29/2023    RDW 12.9 06/16/2021     12/29/2023     (L) 06/16/2021     BMP RESULTS:  Lab Results   Component Value Date     11/02/2023     06/10/2021    POTASSIUM 3.5 11/02/2023    POTASSIUM 4.4 09/21/2022    POTASSIUM 4.5 06/10/2021    CHLORIDE 105 11/02/2023    CHLORIDE 109 09/21/2022    CHLORIDE 111 (H) 06/10/2021    CO2 23 11/02/2023    CO2 25 09/21/2022    CO2 25 06/10/2021    ANIONGAP 10 11/02/2023    ANIONGAP 6 09/21/2022    ANIONGAP 4 06/10/2021     (H) 11/02/2023    GLC 66 (L) 09/21/2022    GLC 97 06/10/2021    BUN 17.6 11/02/2023    BUN 27 09/21/2022    BUN 30 06/10/2021    CR 1.39 (H) 11/02/2023    CR 1.68 (H) 06/10/2021    GFRESTIMATED 53 (L) 11/02/2023    GFRESTIMATED 39 (L) 10/24/2023    GFRESTIMATED 39 (L) 06/10/2021    GFRESTBLACK 46 (L) 06/10/2021    DOMINICK 8.1 (L) 11/02/2023    DOMINICK 8.5 06/10/2021      A1C RESULTS:  Lab Results   Component Value Date    A1C 5.3 12/29/2023    A1C 5.6 12/08/2017     INR RESULTS:  Lab Results   Component Value Date    INR 2.29 (H) 10/25/2023    INR 1.22 (H) 09/13/2023    INR 2.3 06/17/2013    INR 2.4 05/08/2013

## 2024-03-18 ENCOUNTER — OFFICE VISIT (OUTPATIENT)
Dept: CARDIOLOGY | Facility: CLINIC | Age: 79
End: 2024-03-18
Payer: MEDICARE

## 2024-03-18 ENCOUNTER — ORDERS ONLY (AUTO-RELEASED) (OUTPATIENT)
Dept: CARDIOLOGY | Facility: CLINIC | Age: 79
End: 2024-03-18

## 2024-03-18 VITALS
BODY MASS INDEX: 34.39 KG/M2 | OXYGEN SATURATION: 96 % | DIASTOLIC BLOOD PRESSURE: 62 MMHG | SYSTOLIC BLOOD PRESSURE: 88 MMHG | HEART RATE: 80 BPM | WEIGHT: 275.1 LBS

## 2024-03-18 DIAGNOSIS — R53.81 PHYSICAL DECONDITIONING: ICD-10-CM

## 2024-03-18 DIAGNOSIS — I48.20 CHRONIC ATRIAL FIBRILLATION (H): ICD-10-CM

## 2024-03-18 DIAGNOSIS — E78.5 HYPERLIPIDEMIA LDL GOAL <100: ICD-10-CM

## 2024-03-18 DIAGNOSIS — I25.10 CORONARY ARTERY DISEASE INVOLVING NATIVE CORONARY ARTERY OF NATIVE HEART WITHOUT ANGINA PECTORIS: ICD-10-CM

## 2024-03-18 DIAGNOSIS — R60.0 BILATERAL LEG EDEMA: ICD-10-CM

## 2024-03-18 DIAGNOSIS — H81.10 BENIGN PAROXYSMAL POSITIONAL VERTIGO, UNSPECIFIED LATERALITY: ICD-10-CM

## 2024-03-18 DIAGNOSIS — R42 DIZZINESS: ICD-10-CM

## 2024-03-18 DIAGNOSIS — C64.1 RENAL CELL CARCINOMA, RIGHT (H): ICD-10-CM

## 2024-03-18 DIAGNOSIS — E66.01 MORBID OBESITY (H): ICD-10-CM

## 2024-03-18 DIAGNOSIS — N18.32 STAGE 3B CHRONIC KIDNEY DISEASE (H): ICD-10-CM

## 2024-03-18 DIAGNOSIS — I95.1 ORTHOSTATIC HYPOTENSION: Primary | ICD-10-CM

## 2024-03-18 PROCEDURE — 99214 OFFICE O/P EST MOD 30 MIN: CPT | Performed by: NURSE PRACTITIONER

## 2024-03-18 RX ORDER — MIDODRINE HYDROCHLORIDE 5 MG/1
5 TABLET ORAL 2 TIMES DAILY
Qty: 60 TABLET | Refills: 4 | Status: SHIPPED | OUTPATIENT
Start: 2024-03-18 | End: 2024-08-19

## 2024-03-18 NOTE — LETTER
3/18/2024    Nico Retana MD, MD  [address]  [facility] [address]    RE: Ti Durandnemaribel       Dear Colleague,     I had the pleasure of seeing Ti Nickerson in the [facility].              ~Cardiology Clinic Visit~    Primary Cardiologist: Dr. Quarles  Reason for visit: Symptom follow-up     History of Present Illness     Ti Nickerson is a very pleasant 77 year old male with a past medical history notable for  idiopathic cardiomyopathy (LVEF has resolved; remains on low-dose BB only due to soft BP), chronic atrial fibrillation (rate control with low-dose BB; anticoagulation with apixaban), minimal non-obstructive CAD, RCC s/p partial right nephrectomy, AscA dilation, CKD3, KEENA, and GERD.  He follows with nephrology.     In summary, he is followed with Dr. Quarles for many years for concern of lightheadedness and dizziness when standing.  He has had documented blood pressures as low as 80s to 90s when standing.  In the past, we have cut back his blood pressure medication and heart medications to the point where he is only taking Toprol-XL 25 mg daily.  Unfortunately he continued to have symptoms with this.     In past, echocardiograms have been stable showing low normal EF with mild valvular insufficiency.  This was thought to not be the cause of his hypotension, and certainly not a cause of his mental status changes.  His atrial fibrillation is well controlled.  He was recommended support stockings for symptom management.     He was admitted at [facility] on 10/31/2023 for sepsis due to post-operative wound infection.  He had multiple other ER visits preceding his most recent admission.  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  Since then, he has continued to struggle with fatigue, weakness, lightheadedness and dizziness.   He does have orthostatic hypotension confirmed in clinic follow-up.  He was recommended compression stocking use and started on low-dose midodrine.     Diagnostics:  10/9/23 echocardiogram shows stable EF 55-60%, no regional WMA identified.    11/2/23 BMP with stable Cr 1.39, Na 138, K 3.5; Hgb 9.1     Interval 03/18/24    Blood pressure continues to remain soft.  He had an episode of passing out while in [place] this January - likely multifactorial as he has had multiple hospitalizations and testing.  Understandably the heat may have contributed, and he does have documented orthostasis.  He wonders if there is an element of vertigo at play as well - he is scheduled to see his PCP later this week to discuss.  Pt and his wife really hoping to continue working to find answers for his symptoms.  __________________________________________________________________          Assessment and Impression:      Persistent dizziness and lightheadedness  Orthostatic hypotension  Started on low-dose midodrine --> BP remains soft.  Symptoms recently c/b GI bleed with anemia, now resolved.  Metastatic renal cell carcinoma, s/p right radical nephrectomy, partial  Recent abdominal seroma, s/p percutaneous drain placement (10/31/2023)  Chronic atrial fibrillation  Chronic HFpEF without exacerbation  Ascending aorta dilatation  CKD stage IIIb, stable  Obesity  KEENA (not on CPAP)  Dyslipidemia          Recommendations and Plan:      Increase Midodrine to 5 mg BID.  Complete venous insufficiency testing for BLE edema and orthostasis.  Complete Zio patch heart monitor to assess AF rate control and r/o other arrhythmias.  Counseled on safe ways to change position and standing slowly to reduce symptoms/falls.  Maintain adequate hydration.  Follow up with RICK in 3-months to reassess symptom improvement.  __________________________________________________________________    Thank you for the opportunity to participate in this pleasant patient's  care.    We would be happy to see this patient sooner for any concerns in the meantime.    NANCY Gray, CNP   Nurse Practitioner  [facility]    Today's clinic visit entailed:  Review of the result(s) of each unique test - cardiac testing, cardiac imaging, other imaging, hospital records, labs  Ordering of each unique test  Prescription drug management    The level of medical decision making during this visit was of moderate complexity.    Orders this Visit:  Orders Placed This Encounter   Procedures    US Venous Competency Bilateral    Follow-Up with Cardiology- RICK    Physical Therapy  Referral     Orders Placed This Encounter   Medications    midodrine (PROAMATINE) 5 MG tablet     Sig: Take 1 tablet (5 mg) by mouth 2 times daily     Dispense:  60 tablet     Refill:  4    Ferrous Sulfate (IRON PO)     Sig: Take by mouth daily     Medications Discontinued During This Encounter   Medication Reason    midodrine (PROAMATINE) 2.5 MG tablet Reorder (No AVS)     Encounter Diagnoses   Name Primary?    Coronary artery disease involving native coronary artery of native heart without angina pectoris     Chronic atrial fibrillation (H)     Hyperlipidemia LDL goal <100     Obesity (BMI 35.0-39.9) with comorbidity (H)     Renal cell carcinoma, right (H)     Stage 3b chronic kidney disease (H)     Orthostatic hypotension Yes    Bilateral leg edema     Physical deconditioning     Dizziness      CURRENT MEDICATIONS:  Current Outpatient Medications   Medication Sig Dispense Refill    acetaminophen (TYLENOL) 500 MG tablet Take 500-1,000 mg by mouth every 6 hours as needed for mild pain      apixaban ANTICOAGULANT (ELIQUIS) 5 MG tablet Take 1 tablet (5 mg) by mouth 2 times daily 180 tablet 3    Ferrous Sulfate (IRON PO) Take by mouth daily      loperamide (IMODIUM) 2 MG capsule Take 2 mg by mouth 4 times daily as needed for diarrhea      midodrine (PROAMATINE) 5 MG tablet Take 1 tablet (5 mg)  by mouth 2 times daily 60 tablet 4    multivitamin w/minerals (THERA-VIT-M) tablet Take 1 tablet by mouth daily      pantoprazole (PROTONIX) 40 MG EC tablet Take 1 tablet (40 mg) by mouth 2 times daily (before meals) (Patient taking differently: Take 40 mg by mouth daily) 60 tablet 0    pravastatin (PRAVACHOL) 20 MG tablet Take 1 tablet (20 mg) by mouth daily 90 tablet 3    tamsulosin (FLOMAX) 0.4 MG capsule TAKE 1 CAPSULE BY MOUTH EVERY DAY 90 capsule 1    ASPIRIN NOT PRESCRIBED (INTENTIONAL) Please choose reason not prescribed, below (Patient not taking: Reported on 8/21/2023)       ALLERGIES     Allergies   Allergen Reactions    Fentanyl Confusion     PAST MEDICAL, SURGICAL, FAMILY, SOCIAL HISTORY:  History was reviewed and updated as needed, see medical record.    Review of Systems:  A 10-point Review Of Systems is otherwise normal except for that which is noted in the HPI and interval summary.    Physical Exam:    Vitals: BP (!) 88/62 (BP Location: Left arm, Patient Position: Sitting)   Pulse 80   Wt 124.8 kg (275 lb 1.6 oz)   SpO2 96%   BMI 34.39 kg/m    Constitutional: Appears stated age, well nourished, NAD.  Neck: Supple. Carotid pulses full and equal.   Respiratory: Non-labored. Lungs CTAB.  Cardiovascular: IRR, normal S1 and S2. No M/G/R.  1+ BLE ankle/shin edema.  GI: Soft, non-distended, non-tender.  Skin: Warm and dry.   Musculoskeletal/Extremities: Symmetrical movement.  Neurologic: No gross focal deficits. Alert, awake.  Psychiatric: Affect appropriate. Mentation normal.    Recent Lab Results:  LIPID RESULTS:  Lab Results   Component Value Date    CHOL 113 10/09/2023    CHOL 116 06/10/2021    HDL 39 (L) 10/09/2023    HDL 45 06/10/2021    LDL 57 10/09/2023    LDL 56 06/10/2021    TRIG 84 10/09/2023    TRIG 74 06/10/2021    CHOLHDLRATIO 2.8 09/17/2015     LIVER ENZYME RESULTS:  Lab Results   Component Value Date    AST 26 10/31/2023    AST 19 06/10/2021    ALT 19 10/31/2023    ALT 24 06/10/2021      CBC RESULTS:  Lab Results   Component Value Date    WBC 6.3 12/29/2023    WBC 5.8 06/16/2021    RBC 3.52 (L) 12/29/2023    RBC 4.32 (L) 06/16/2021    HGB 9.6 (L) 12/29/2023    HGB 14.2 06/16/2021    HCT 31.6 (L) 12/29/2023    HCT 41.9 06/16/2021    MCV 90 12/29/2023    MCV 97 06/16/2021    MCH 27.3 12/29/2023    MCH 32.9 06/16/2021    MCHC 30.4 (L) 12/29/2023    MCHC 33.9 06/16/2021    RDW 13.1 12/29/2023    RDW 12.9 06/16/2021     12/29/2023     (L) 06/16/2021     BMP RESULTS:  Lab Results   Component Value Date     11/02/2023     06/10/2021    POTASSIUM 3.5 11/02/2023    POTASSIUM 4.4 09/21/2022    POTASSIUM 4.5 06/10/2021    CHLORIDE 105 11/02/2023    CHLORIDE 109 09/21/2022    CHLORIDE 111 (H) 06/10/2021    CO2 23 11/02/2023    CO2 25 09/21/2022    CO2 25 06/10/2021    ANIONGAP 10 11/02/2023    ANIONGAP 6 09/21/2022    ANIONGAP 4 06/10/2021     (H) 11/02/2023    GLC 66 (L) 09/21/2022    GLC 97 06/10/2021    BUN 17.6 11/02/2023    BUN 27 09/21/2022    BUN 30 06/10/2021    CR 1.39 (H) 11/02/2023    CR 1.68 (H) 06/10/2021    GFRESTIMATED 53 (L) 11/02/2023    GFRESTIMATED 39 (L) 10/24/2023    GFRESTIMATED 39 (L) 06/10/2021    GFRESTBLACK 46 (L) 06/10/2021    DOMINICK 8.1 (L) 11/02/2023    DOMINICK 8.5 06/10/2021      A1C RESULTS:  Lab Results   Component Value Date    A1C 5.3 12/29/2023    A1C 5.6 12/08/2017     INR RESULTS:  Lab Results   Component Value Date    INR 2.29 (H) 10/25/2023    INR 1.22 (H) 09/13/2023    INR 2.3 06/17/2013    INR 2.4 05/08/2013           Thank you for allowing me to participate in the care of your patient.      Sincerely,     NANCY Gray CNP     [facility]  cc:   NANCY Gray CNP  [address]  [place] [address]

## 2024-03-18 NOTE — PATIENT INSTRUCTIONS
Thank you for your visit with the [facility] today.    Today's Summary     Complete vein testing.  Complete a heart monitor.  Increase Midodrine to 5 mg twice daily.  Follow up in 3-months to review testing.    If you have questions or concerns, please do not hesitate to call my nursing support team at [phone].    Scheduling phone number: [phone]    It was a pleasure seeing you today.     NANCY Gray, CNP  Nurse Practitioner  [facility]  March 18, 2024  ________________________________________________________

## 2024-03-21 ENCOUNTER — THERAPY VISIT (OUTPATIENT)
Dept: PHYSICAL THERAPY | Facility: CLINIC | Age: 79
End: 2024-03-21
Attending: NURSE PRACTITIONER
Payer: MEDICARE

## 2024-03-21 DIAGNOSIS — R53.81 PHYSICAL DECONDITIONING: ICD-10-CM

## 2024-03-21 DIAGNOSIS — I95.1 ORTHOSTATIC HYPOTENSION: ICD-10-CM

## 2024-03-21 DIAGNOSIS — R42 DIZZINESS: ICD-10-CM

## 2024-03-21 PROCEDURE — 97110 THERAPEUTIC EXERCISES: CPT | Mod: GP | Performed by: PHYSICAL THERAPIST

## 2024-03-21 PROCEDURE — 97162 PT EVAL MOD COMPLEX 30 MIN: CPT | Mod: GP | Performed by: PHYSICAL THERAPIST

## 2024-03-21 NOTE — PROGRESS NOTES
PHYSICAL THERAPY EVALUATION  Type of Visit: Evaluation    See electronic medical record for Abuse and Falls Screening details.    Subjective       Presenting condition or subjective complaint:    Ti presents with dizziness and lightheadedness. History of BPS in 80s and 90s.  He was admitted at [facility] on 10/31/2023 for sepsis due to post-operative wound infection.  He had multiple other ER visits preceding his most recent admission.  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  Since then, he has continued to struggle with fatigue, weakness, lightheadedness and dizziness.  He does have orthostatic hypotension confirmed in clinic follow-up.   Edgar reports that he has a history of vertigo 20-30 years ago.   Feels weak, lightheaded. Notes some clicking in his inner ear that is painful. Happens every few months. Has had a few falls (about 1/month)- both catching foot and also due to BP.   Has been trying to walk for exercise but feels that he is significantly weaker. Did have some PT in [place] after abdominal surgery but reports that it was likely too aggressive and ended in a hernia that was repaired this past fall.   Date of onset: 10/11/23    Relevant medical history:     Past Medical History:   Diagnosis Date    Atrial fibrillation, unspecified 9/18/2015    CAD (coronary artery disease) 09/18/2015    minimal by angio 2007, fu nuc est nl 2018    Elevated TSH 2018    Esophageal reflux 9/18/2015    Essential hypertension     History of cardioversion     [id number]    Idiopathic cardiomyopathy (H) 09/18/2015    fu echo nl ef, nl nuclear est 11/18, Dr. Quarles    Mixed hyperlipidemia 9/18/2015    Mumps     Sleep apnea 09/18/2015    does not use cpap as of 2019    Sleep apnea     Thoracic aortic aneurysm (H24)     sinus of valsalva 4.5 and asc aorta 4.5 in 2017    Tubular adenoma  of colon 01/2017    fu 3 years       Dates & types of surgery:    Past Surgical History:   Procedure Laterality Date    APPENDECTOMY  1964    ESOPHAGOSCOPY, GASTROSCOPY, DUODENOSCOPY (EGD), COMBINED N/A 9/14/2023    Procedure: Esophagoscopy, gastroscopy, duodenoscopy (EGD), combined;  Surgeon: Chele Ash MD;  Location:  GI         Prior diagnostic imaging/testing results:       Prior therapy history for the same diagnosis, illness or injury:        Prior Level of Function  Transfers: Independent  Ambulation: Independent  Hobbies/Interests:  enjoys going to grandchildrens sporting events    Patient goals for therapy:  Improve strength    Pain assessment: back and neck pain/stiffness present     Objective   Cognitive Status Examination  Orientation: Oriented to person, place and time   Level of Consciousness: Alert      POSTURE: forward head, protracted shoulders      STRENGTH: not formally assessed today but demonstrates functional weakness with tranfers, hip drop during gait, core instability with balance tasks    BED MOBILITY: independent but slow    TRANSFERS: requires UE support, needing to stay standing prior to ambulating secondary to lightheadedness    GAIT:   Gait Description: mild decreased stride length, hip drop B,     BALANCE:     SPECIAL TESTS  Functional Gait Assessment (FGA)      Dynamic Gait Index (DGI)  TBA   Modified CTSIB Conditions (sec) Cond 1: 30  Cond 2: 30  But with increased sway         SENSATION:  neuropathy present      VESTIBULAR  Cervicogenic Screen    Neck ROM Stiffness with R and L rotation and extension     Oculomotor Screen    Ocular ROM Normal   Smooth Pursuit Abnormal   Saccades Normal   VOR Normal   VOR Cancellation NA   Head Impulse Test NA due to neck stiffness   Convergence Testing NA        Infrared Goggle Exam Vestibular Suppressant in Last 24 Hours? No  Exam Completed With: Infrared goggles   Spontaneous Nystagmus Negative   Gaze Evoked Nystagmus Horizontal R,  Horizontal L   Head Shake Horizontal Nystagmus NA   Supine Head-Hanging Test NA    Left Right   [place]-Hallpike Nystagmus present, difficulty seeing if upbeating or R beating, pt noting mild symptoms, lasting >1 min Negative   HSCC Supine Roll Test Negative Negative   HSCC Forward Roll Test NA NA      BPPV Canal(s): L Posterior  BPPV Type: Cupulolithasis      Assessment & Plan   CLINICAL IMPRESSIONS  Medical Diagnosis: Orthostatic hypotension  Physical deconditioning  Dizziness    Treatment Diagnosis: Impaired strength and balance   Impression/Assessment: Patient is a 78 year old male with dizziness and weakness complaints.  The following significant findings have been identified: Pain, Decreased ROM/flexibility, Decreased strength, Impaired balance, Decreased proprioception, Impaired sensation, Impaired gait, Impaired muscle performance, Decreased activity tolerance, Impaired posture, Instability, Dizziness, and Disequilibrium . These impairments interfere with their ability to perform self care tasks, recreational activities, household chores, household mobility, community mobility, and increased fall risk  as compared to previous level of function.     Clinical Decision Making (Complexity):  Clinical Presentation: Evolving/Changing  Clinical Presentation Rationale: based on medical and personal factors listed in PT evaluation  Clinical Decision Making (Complexity): Moderate complexity    PLAN OF CARE  Treatment Interventions:  Interventions: Gait Training, Manual Therapy, Neuromuscular Re-education, Therapeutic Activity, Therapeutic Exercise, Self-Care/Home Management, Canalith Repositioning(requesting order for BPPV)    Long Term Goals     PT Goal 1  Goal Identifier: BPPV  Goal Description: The pt will test negative for BPPV with positional testing , improving ability to complete bed mobiltiy and bending tasks  Target Date: 06/18/24  PT Goal 2  Goal Identifier: HEP  Goal Description: The pt will be independent with  an HEP in order to improve self management of symptoms  Target Date: 06/18/24  PT Goal 3  Goal Identifier: DGI  Goal Description: The pt will improve DGI score to >19/24 indicating a reduced fall risk  Goal Progress: baseline: NA  Target Date: 06/18/24      Frequency of Treatment: 1x/week  Duration of Treatment: 90 days      Education Assessment:   Learner/Method: Patient  Education Comments: PT role, POC    Risks and benefits of evaluation/treatment have been explained.   Patient/Family/caregiver agrees with Plan of Care.     Evaluation Time:     PT Eval, Moderate Complexity Minutes (85293): 30       Signing Clinician: Sandra Gomez, PT      [facility]                                                                                   OUTPATIENT PHYSICAL THERAPY      PLAN OF TREATMENT FOR OUTPATIENT REHABILITATION   Patient's Last Name, First Name, Ti Quan YOB: 1945   Provider's Name   [facility]   Medical Record No.  [id number]     Onset Date: 10/11/23  Start of Care Date: 03/21/24     Medical Diagnosis:  Orthostatic hypotension  Physical deconditioning  Dizziness      PT Treatment Diagnosis:  Impaired strength and balance Plan of Treatment  Frequency/Duration: 1x/week/ 90 days    Certification date from 03/21/24 to 06/18/24         See note for plan of treatment details and functional goals     Sandra Gomez, PT                         I CERTIFY THE NEED FOR THESE SERVICES FURNISHED UNDER        THIS PLAN OF TREATMENT AND WHILE UNDER MY CARE     (Physician attestation of this document indicates review and certification of the therapy plan).              Referring Provider:  Mamie Gaston    Initial Assessment  See Epic Evaluation- Start of Care Date: 03/21/24

## 2024-03-22 ENCOUNTER — TELEPHONE (OUTPATIENT)
Dept: FAMILY MEDICINE | Facility: CLINIC | Age: 79
End: 2024-03-22

## 2024-03-22 ENCOUNTER — OFFICE VISIT (OUTPATIENT)
Dept: FAMILY MEDICINE | Facility: CLINIC | Age: 79
End: 2024-03-22
Payer: MEDICARE

## 2024-03-22 VITALS
HEART RATE: 71 BPM | OXYGEN SATURATION: 99 % | SYSTOLIC BLOOD PRESSURE: 97 MMHG | BODY MASS INDEX: 34.62 KG/M2 | TEMPERATURE: 97.9 F | DIASTOLIC BLOOD PRESSURE: 66 MMHG | HEIGHT: 75 IN | WEIGHT: 278.4 LBS | RESPIRATION RATE: 10 BRPM

## 2024-03-22 DIAGNOSIS — D63.8 ANEMIA OF CHRONIC DISEASE: ICD-10-CM

## 2024-03-22 DIAGNOSIS — I95.1 ORTHOSTATIC HYPOTENSION: ICD-10-CM

## 2024-03-22 DIAGNOSIS — I25.10 CORONARY ARTERY DISEASE INVOLVING NATIVE CORONARY ARTERY OF NATIVE HEART WITHOUT ANGINA PECTORIS: ICD-10-CM

## 2024-03-22 DIAGNOSIS — I48.20 CHRONIC ATRIAL FIBRILLATION (H): ICD-10-CM

## 2024-03-22 DIAGNOSIS — C64.9 METASTATIC RENAL CELL CARCINOMA, UNSPECIFIED LATERALITY (H): Primary | ICD-10-CM

## 2024-03-22 DIAGNOSIS — K92.1 MELENA: ICD-10-CM

## 2024-03-22 LAB
ANION GAP SERPL CALCULATED.3IONS-SCNC: 11 MMOL/L (ref 7–15)
BUN SERPL-MCNC: 33.3 MG/DL (ref 8–23)
CALCIUM SERPL-MCNC: 8.8 MG/DL (ref 8.8–10.2)
CHLORIDE SERPL-SCNC: 107 MMOL/L (ref 98–107)
CREAT SERPL-MCNC: 1.65 MG/DL (ref 0.67–1.17)
DEPRECATED HCO3 PLAS-SCNC: 20 MMOL/L (ref 22–29)
EGFRCR SERPLBLD CKD-EPI 2021: 43 ML/MIN/1.73M2
ERYTHROCYTE [DISTWIDTH] IN BLOOD BY AUTOMATED COUNT: 18.2 % (ref 10–15)
GLUCOSE SERPL-MCNC: 101 MG/DL (ref 70–99)
HCT VFR BLD AUTO: 27.6 % (ref 40–53)
HGB BLD-MCNC: 8.1 G/DL (ref 13.3–17.7)
MCH RBC QN AUTO: 24.7 PG (ref 26.5–33)
MCHC RBC AUTO-ENTMCNC: 29.3 G/DL (ref 31.5–36.5)
MCV RBC AUTO: 84 FL (ref 78–100)
PLATELET # BLD AUTO: 256 10E3/UL (ref 150–450)
POTASSIUM SERPL-SCNC: 4.6 MMOL/L (ref 3.4–5.3)
RBC # BLD AUTO: 3.28 10E6/UL (ref 4.4–5.9)
SODIUM SERPL-SCNC: 138 MMOL/L (ref 135–145)
WBC # BLD AUTO: 6.4 10E3/UL (ref 4–11)

## 2024-03-22 PROCEDURE — 99214 OFFICE O/P EST MOD 30 MIN: CPT | Performed by: INTERNAL MEDICINE

## 2024-03-22 PROCEDURE — 36415 COLL VENOUS BLD VENIPUNCTURE: CPT | Performed by: INTERNAL MEDICINE

## 2024-03-22 PROCEDURE — 85027 COMPLETE CBC AUTOMATED: CPT | Performed by: INTERNAL MEDICINE

## 2024-03-22 PROCEDURE — 80048 BASIC METABOLIC PNL TOTAL CA: CPT | Performed by: INTERNAL MEDICINE

## 2024-03-22 ASSESSMENT — PAIN SCALES - GENERAL: PAINLEVEL: NO PAIN (0)

## 2024-03-22 NOTE — PROGRESS NOTES
"      Subjective   Edgar is a 78 year old, presenting for the following health issues:  Dizziness (See tele encounter. )    History of Present Illness       Reason for visit:  Dizziness    He eats 0-1 servings of fruits and vegetables daily.He consumes 0 sweetened beverage(s) daily.   He is taking medications regularly.       Orthostatic hypotension   Ti Nickerson presents to clinic today for follow-up of dizziness in the setting of orthostatic hypotension.  He continues to struggle with fatigue, weakness and lightheadedness.  He was recently seen in cardiology earlier this week.  At which time he discussed his recent fainting episode while in [place].  He remains on midodrine to help improve blood pressure, and dose was increased to 5 mg twice daily.. He is due for recheck of his hemoglobin.  He has been maintaining hydration as best he can.  There is some concern for a possible vertigo as well.    He is having some dark stools, and did have a GI bleed.  He did have an upper endoscopy this past January at [facility].  Findings did show a 15 mm medium sized polypoid noncircumferential mass with some oozing bleeding but no stigmata of any recent bleeding.  The mass was resected and identified as metastatic renal cell carcinoma.  He continues on apixaban for stroke prevention given history of atrial fibrillation.  Also continues on iron.  Last hemoglobin was 9.6 checked in December.  He notes that melena has been present over the past 4 days.  No normal stools in that time.  No loose stools.  He did stop apixaban ysterday      Review of Systems  Constitutional, neuro, ENT, endocrine, pulmonary, cardiac, gastrointestinal, genitourinary, musculoskeletal, integument and psychiatric systems are negative, except as otherwise noted.      Objective    BP 97/66 (BP Location: Left arm, Patient Position: Sitting, Cuff Size: Adult Large)   Pulse 71   Temp 97.9  F (36.6  C) (Tympanic)   Resp 10   Ht 1.905 m (6' 3\")   Wt " 126.3 kg (278 lb 6.4 oz)   SpO2 99%   BMI 34.80 kg/m    Body mass index is 34.8 kg/m .  Physical Exam   GENERAL: alert and no distress  EYES: Eyes grossly normal to inspection   NECK: no adenopathy, no asymmetry, masses, or scars  RESP: lungs clear to auscultation - no rales, rhonchi or wheezes  CV: regular rate and rhythm, normal S1 S2, no S3 or S4, no murmur, trace edema  ABDOMEN: soft, mild tenderness, epigastrium  MS: no gross musculoskeletal defects noted, no edema  SKIN: no suspicious lesions or rashes  NEURO: Normal strength and tone   PSYCH: mentation appears normal, affect normal/bright    Results for orders placed or performed in visit on 03/22/24   CBC with platelets     Status: Abnormal   Result Value Ref Range    WBC Count 6.4 4.0 - 11.0 10e3/uL    RBC Count 3.28 (L) 4.40 - 5.90 10e6/uL    Hemoglobin 8.1 (L) 13.3 - 17.7 g/dL    Hematocrit 27.6 (L) 40.0 - 53.0 %    MCV 84 78 - 100 fL    MCH 24.7 (L) 26.5 - 33.0 pg    MCHC 29.3 (L) 31.5 - 36.5 g/dL    RDW 18.2 (H) 10.0 - 15.0 %    Platelet Count 256 150 - 450 10e3/uL   Basic metabolic panel  (Ca, Cl, CO2, Creat, Gluc, K, Na, BUN)     Status: Abnormal   Result Value Ref Range    Sodium 138 135 - 145 mmol/L    Potassium 4.6 3.4 - 5.3 mmol/L    Chloride 107 98 - 107 mmol/L    Carbon Dioxide (CO2) 20 (L) 22 - 29 mmol/L    Anion Gap 11 7 - 15 mmol/L    Urea Nitrogen 33.3 (H) 8.0 - 23.0 mg/dL    Creatinine 1.65 (H) 0.67 - 1.17 mg/dL    GFR Estimate 43 (L) >60 mL/min/1.73m2    Calcium 8.8 8.8 - 10.2 mg/dL    Glucose 101 (H) 70 - 99 mg/dL        Patient Instructions   (C64.9) Metastatic renal cell carcinoma, unspecified laterality (H)  (primary encounter diagnosis)  Comment: We will plan to obtain an emergent upper endoscopy and check labs today.  Will continue follow up in oncology  Plan: CBC with platelets, Basic metabolic panel  (Ca,        Cl, CO2, Creat, Gluc, K, Na, BUN), Adult GI          Referral - Procedure Only            (I25.10) Coronary  artery disease involving native coronary artery of native heart without angina pectoris  Comment: Maintain hydration.  No blood pressure medications needed at th is time  Plan:     (I48.20) Chronic atrial fibrillation (H)  Comment: Hold apixaban for ongoing bleeding and planned upper endoscopy   Plan:     (I95.1) Orthostatic hypotension  Comment: Continue hydration  Plan:     (K92.1) Melena  Comment: Increase pantoprazole to 40 mg twice per day until upper endoscopy   Plan: Adult GI  Referral - Procedure Only            (D63.8) Anemia of chronic disease  Comment: recehck complete blood counts today   Plan: Adult GI  Referral - Procedure Only                  30 minutes spent with patient.  Over 50% of time counseling      Signed Electronically by: Nico Retana MD, MD

## 2024-03-22 NOTE — RESULT ENCOUNTER NOTE
Abrahan Luke,    I have had the opportunity to review your recent results and an interpretation is as follows:  There has been a slight drop in your hemoglobin and hematocrit consistent with your recent episode.  If your dark stools persist over the weekend, I would recommend going to the emergency room for evaluation.  Otherwise, we will wait for the endoscopy which is scheduled emergently for Monday    Sincerely,  Nico Retana MD

## 2024-03-24 NOTE — RESULT ENCOUNTER NOTE
Abrahan Luke,    I have had the opportunity to review your recent results and an interpretation is as follows:  Your basic metabolic panel shows stable impaired renal function.   Continue to stay hydrated as best you are able    Sincerely,  Nico Retana MD

## 2024-03-25 ENCOUNTER — TELEPHONE (OUTPATIENT)
Dept: FAMILY MEDICINE | Facility: CLINIC | Age: 79
End: 2024-03-25
Payer: MEDICARE

## 2024-03-25 NOTE — TELEPHONE ENCOUNTER
I spoke to Dr. Ash and preliminary notes show a new lesion and now back on carafate and protonix.  Continues off of Apixaban.  We will follow up tomorrow for lab recheck and review.

## 2024-03-26 ENCOUNTER — OFFICE VISIT (OUTPATIENT)
Dept: FAMILY MEDICINE | Facility: CLINIC | Age: 79
End: 2024-03-26
Payer: MEDICARE

## 2024-03-26 VITALS
OXYGEN SATURATION: 98 % | HEART RATE: 80 BPM | WEIGHT: 274 LBS | BODY MASS INDEX: 34.07 KG/M2 | HEIGHT: 75 IN | DIASTOLIC BLOOD PRESSURE: 63 MMHG | SYSTOLIC BLOOD PRESSURE: 97 MMHG | TEMPERATURE: 97.3 F | RESPIRATION RATE: 16 BRPM

## 2024-03-26 DIAGNOSIS — K26.4 GASTROINTESTINAL HEMORRHAGE ASSOCIATED WITH DUODENAL ULCER: ICD-10-CM

## 2024-03-26 DIAGNOSIS — C64.1 RENAL CELL CARCINOMA, RIGHT (H): Primary | ICD-10-CM

## 2024-03-26 DIAGNOSIS — D63.8 ANEMIA OF CHRONIC DISEASE: ICD-10-CM

## 2024-03-26 DIAGNOSIS — Z79.01 LONG TERM (CURRENT) USE OF ANTICOAGULANTS: ICD-10-CM

## 2024-03-26 LAB
ERYTHROCYTE [DISTWIDTH] IN BLOOD BY AUTOMATED COUNT: 17.8 % (ref 10–15)
HCT VFR BLD AUTO: 26.4 % (ref 40–53)
HGB BLD-MCNC: 7.9 G/DL (ref 13.3–17.7)
MCH RBC QN AUTO: 24.9 PG (ref 26.5–33)
MCHC RBC AUTO-ENTMCNC: 29.9 G/DL (ref 31.5–36.5)
MCV RBC AUTO: 83 FL (ref 78–100)
PLATELET # BLD AUTO: 248 10E3/UL (ref 150–450)
RBC # BLD AUTO: 3.17 10E6/UL (ref 4.4–5.9)
WBC # BLD AUTO: 5.2 10E3/UL (ref 4–11)

## 2024-03-26 PROCEDURE — 85027 COMPLETE CBC AUTOMATED: CPT | Performed by: INTERNAL MEDICINE

## 2024-03-26 PROCEDURE — 36415 COLL VENOUS BLD VENIPUNCTURE: CPT | Performed by: INTERNAL MEDICINE

## 2024-03-26 PROCEDURE — 99214 OFFICE O/P EST MOD 30 MIN: CPT | Performed by: INTERNAL MEDICINE

## 2024-03-26 PROCEDURE — 80053 COMPREHEN METABOLIC PANEL: CPT | Performed by: INTERNAL MEDICINE

## 2024-03-26 RX ORDER — SUCRALFATE 1 G/1
1 TABLET ORAL 4 TIMES DAILY
COMMUNITY
Start: 2024-03-25 | End: 2024-06-18

## 2024-03-26 ASSESSMENT — PATIENT HEALTH QUESTIONNAIRE - PHQ9: SUM OF ALL RESPONSES TO PHQ QUESTIONS 1-9: 8

## 2024-03-26 ASSESSMENT — PAIN SCALES - GENERAL: PAINLEVEL: NO PAIN (0)

## 2024-03-26 NOTE — PROGRESS NOTES
"      Subjective   Edgar is a 78 year old, presenting for the following health issues:  Follow Up (1 week )        3/26/2024    12:55 PM   Additional Questions   Roomed by Faby   Accompanied by wife Valery     History of Present Illness       Reason for visit:  Dizziness    He eats 0-1 servings of fruits and vegetables daily.He consumes 0 sweetened beverage(s) daily.   He is taking medications regularly.        Renal cell carcinoma, right (H)  Long term (current) use of anticoagulants  Gastrointestinal hemorrhage associated with duodenal ulcer   Ti Nickerson returns to the clinic today having had upper endoscopy with reported concern for possible recurrence of metastatic renal cell carcinoma.   He is not having any additional bleeding.   Stool was dark yesterday, but not as dark.  He is feeling a bit constipated.  He did start carafate four times daily and continued on pantoprazole twice per day.  Apixaban has been held.  Has not schedule a follow up in oncology yet.        Review of Systems  Constitutional + weakness, HEENT, cardiovascular, pulmonary, GI - as above, , musculoskeletal, neuro, skin, endocrine and psych systems are negative, except as otherwise noted.      Objective    BP 97/63 (BP Location: Right arm, Patient Position: Chair, Cuff Size: Adult Large)   Pulse 80   Temp 97.3  F (36.3  C) (Temporal)   Resp 16   Ht 1.905 m (6' 3\")   Wt 124.3 kg (274 lb)   SpO2 98%   BMI 34.25 kg/m    There is no height or weight on file to calculate BMI.  Physical Exam   GENERAL: alert and no distress  EYES: Eyes grossly normal to inspection    NECK: no adenopathy, no asymmetry, masses, or scars  RESP: lungs clear to auscultation - no rales, rhonchi or wheezes  CV: regular rate and rhythm, normal S1 S2, no S3 or S4, no murmur,   ABDOMEN: soft, nontender,    MS: no gross musculoskeletal defects noted, no edema  SKIN: no suspicious lesions or rashes  NEURO: Normal strength and tone,   PSYCH: mentation appears " normal, affect normal/bright    Results for orders placed or performed in visit on 03/26/24   CBC with platelets     Status: Abnormal   Result Value Ref Range    WBC Count 5.2 4.0 - 11.0 10e3/uL    RBC Count 3.17 (L) 4.40 - 5.90 10e6/uL    Hemoglobin 7.9 (LL) 13.3 - 17.7 g/dL    Hematocrit 26.4 (L) 40.0 - 53.0 %    MCV 83 78 - 100 fL    MCH 24.9 (L) 26.5 - 33.0 pg    MCHC 29.9 (L) 31.5 - 36.5 g/dL    RDW 17.8 (H) 10.0 - 15.0 %    Platelet Count 248 150 - 450 10e3/uL       Patient Instructions   (C64.1) Renal cell carcinoma, right (H)  (primary encounter diagnosis)  Comment: We did discuss plan of care with gastroenterology as well as oncology and plan to follow up in oncology and possibly general surgery at [facility] this week if possible  Plan: CBC with platelets            (Z79.01) Long term (current) use of anticoagulants  Comment: Hold apixaban for now, and we can resume depending on ongoing bleeding  Plan: CBC with platelets            (K26.4) Gastrointestinal hemorrhage associated with duodenal ulcer  Comment: Continue pantoprazole twice per day and new addition of Carafate   Plan: CBC with platelets            (D63.8) Anemia of chronic disease  Comment: recehck hemoglobin and consider blood transfusion depending on reults  Plan: CBC with platelets                  Addendum  Comment; hemoglobin 7.9.  Will not transfuse today.  He does have a follow up appointment scheduled on April 3 (1 week).  I recommended recheck hemoglobin on Friday.  He will plan to restart apixaban tomorrow as recommended by his gastroenterologist, unless instructed otherwise by his hematologist    Signed Electronically by: Nico Retana MD, MD

## 2024-03-26 NOTE — PATIENT INSTRUCTIONS
(C64.1) Renal cell carcinoma, right (H)  (primary encounter diagnosis)  Comment: We did discuss plan of care with gastroenterology as well as oncology and plan to follow up in oncology and possibly general surgery at [facility] this week if possible  Plan: CBC with platelets            (Z79.01) Long term (current) use of anticoagulants  Comment: Hold apixaban for now, and we can resume depending on ongoing bleeding  Plan: CBC with platelets            (K26.4) Gastrointestinal hemorrhage associated with duodenal ulcer  Comment: Continue pantoprazole twice per day and new addition of Carafate   Plan: CBC with platelets            (D63.8) Anemia of chronic disease  Comment: recehck hemoglobin and consider blood transfusion depending on reults  Plan: CBC with platelets

## 2024-03-27 LAB
ALBUMIN SERPL BCG-MCNC: 3.6 G/DL (ref 3.5–5.2)
ALP SERPL-CCNC: 70 U/L (ref 40–150)
ALT SERPL W P-5'-P-CCNC: 14 U/L (ref 0–70)
ANION GAP SERPL CALCULATED.3IONS-SCNC: 12 MMOL/L (ref 7–15)
AST SERPL W P-5'-P-CCNC: 19 U/L (ref 0–45)
BILIRUB SERPL-MCNC: 0.5 MG/DL
BUN SERPL-MCNC: 34.1 MG/DL (ref 8–23)
CALCIUM SERPL-MCNC: 8.6 MG/DL (ref 8.8–10.2)
CHLORIDE SERPL-SCNC: 106 MMOL/L (ref 98–107)
CREAT SERPL-MCNC: 1.96 MG/DL (ref 0.67–1.17)
DEPRECATED HCO3 PLAS-SCNC: 21 MMOL/L (ref 22–29)
EGFRCR SERPLBLD CKD-EPI 2021: 35 ML/MIN/1.73M2
GLUCOSE SERPL-MCNC: 101 MG/DL (ref 70–99)
POTASSIUM SERPL-SCNC: 4.6 MMOL/L (ref 3.4–5.3)
PROT SERPL-MCNC: 6.4 G/DL (ref 6.4–8.3)
SODIUM SERPL-SCNC: 139 MMOL/L (ref 135–145)

## 2024-03-28 NOTE — RESULT ENCOUNTER NOTE
Abrahan Luke,    I have had the opportunity to review your recent results and an interpretation is as follows:  Your complete blood counts shows stable hemoglobin  Your comprehensive metabolic panel shows a rise in your creatinine - I would recommend continue to push fluids 8 x 8 oz = 64 oz water and follow up in oncology as planned.     Sincerely,  Nico Retana MD

## 2024-03-29 ENCOUNTER — TELEPHONE (OUTPATIENT)
Dept: FAMILY MEDICINE | Facility: CLINIC | Age: 79
End: 2024-03-29

## 2024-03-29 ENCOUNTER — LAB (OUTPATIENT)
Dept: LAB | Facility: CLINIC | Age: 79
End: 2024-03-29
Payer: MEDICARE

## 2024-03-29 DIAGNOSIS — K26.4 GASTROINTESTINAL HEMORRHAGE ASSOCIATED WITH DUODENAL ULCER: Primary | ICD-10-CM

## 2024-03-29 DIAGNOSIS — C64.1 RENAL CELL CARCINOMA, RIGHT (H): ICD-10-CM

## 2024-03-29 LAB
ERYTHROCYTE [DISTWIDTH] IN BLOOD BY AUTOMATED COUNT: 17.4 % (ref 10–15)
HCT VFR BLD AUTO: 26.2 % (ref 40–53)
HGB BLD-MCNC: 7.6 G/DL (ref 13.3–17.7)
MCH RBC QN AUTO: 24.3 PG (ref 26.5–33)
MCHC RBC AUTO-ENTMCNC: 29 G/DL (ref 31.5–36.5)
MCV RBC AUTO: 84 FL (ref 78–100)
PLATELET # BLD AUTO: 241 10E3/UL (ref 150–450)
RBC # BLD AUTO: 3.13 10E6/UL (ref 4.4–5.9)
WBC # BLD AUTO: 6 10E3/UL (ref 4–11)

## 2024-03-29 PROCEDURE — 36415 COLL VENOUS BLD VENIPUNCTURE: CPT

## 2024-03-29 PROCEDURE — 85027 COMPLETE CBC AUTOMATED: CPT

## 2024-03-29 NOTE — TELEPHONE ENCOUNTER
Hold Eliquis again  Repeat hgb tomorrow (order entered).  If below 7 should go to ER  If above 7 repeat on Monday as per initial recommendation.  Reza Hunt MD on 3/29/2024 at 2:51 PM

## 2024-03-29 NOTE — TELEPHONE ENCOUNTER
Please call patient.    GI bleed with possible recurrent renal cell carcinoma.  Getting serial CBCs. His hemoglobin today is a little less than 3 days ago.  Any blood in stool or black tarry stools since Tuesday?  Did he restart Eliquis?  Repeat CBC Monday morning (order entered)  To ER over the weekend with melena or BRBPR or worsening weakness, light headed, syncope.      Reza Hunt MD on 3/29/2024 at 12:15 PM

## 2024-03-29 NOTE — TELEPHONE ENCOUNTER
Called and spoke with pt and pts wife (c2c). Relayed Dr Hunt's message from below.   1) Yes, pt has been having darker stools since Tuesday.   2) Yes, he restarted eliquis yesterday.   4) Pt has been having continuous worsening weakness.     Pt/pts wife would also like to know if they can get an order for pts blood type? If pt needs a transfusion they would like his daughter to donate the blood to him if compatible.     They are also wondering if pt can take a laxative? Pt has been taking miralax 1x/day for a couple days with not a lot of stool output.     Does pt need to go to ER given this information?     Routing to provider to review and advise.     Please call pt back with providers recommendations.

## 2024-03-29 NOTE — RESULT ENCOUNTER NOTE
This is Dr Hunt covering for Dr Retana who is out of the office today.  Your hemoglobin has trended down a touch.  I  understand you recently restarted your blood thinner. I would suggest a repeat hemoglobin on Monday. One of our nurses will call you also.

## 2024-03-29 NOTE — TELEPHONE ENCOUNTER
Called and spoke with pt and pts wife. Relayed message from below. Assisted with scheduling labs. Pt and pts wife agree to plan.

## 2024-03-30 ENCOUNTER — LAB (OUTPATIENT)
Dept: LAB | Facility: CLINIC | Age: 79
End: 2024-03-30
Payer: MEDICARE

## 2024-03-30 DIAGNOSIS — K26.4 GASTROINTESTINAL HEMORRHAGE ASSOCIATED WITH DUODENAL ULCER: ICD-10-CM

## 2024-03-30 LAB
BASOPHILS # BLD AUTO: 0 10E3/UL (ref 0–0.2)
BASOPHILS NFR BLD AUTO: 0 %
EOSINOPHIL # BLD AUTO: 0.2 10E3/UL (ref 0–0.7)
EOSINOPHIL NFR BLD AUTO: 3 %
ERYTHROCYTE [DISTWIDTH] IN BLOOD BY AUTOMATED COUNT: 17.5 % (ref 10–15)
HCT VFR BLD AUTO: 24.8 % (ref 40–53)
HGB BLD-MCNC: 7.3 G/DL (ref 13.3–17.7)
IMM GRANULOCYTES # BLD: 0 10E3/UL
IMM GRANULOCYTES NFR BLD: 0 %
LYMPHOCYTES # BLD AUTO: 1.5 10E3/UL (ref 0.8–5.3)
LYMPHOCYTES NFR BLD AUTO: 21 %
MCH RBC QN AUTO: 24.5 PG (ref 26.5–33)
MCHC RBC AUTO-ENTMCNC: 29.4 G/DL (ref 31.5–36.5)
MCV RBC AUTO: 83 FL (ref 78–100)
MONOCYTES # BLD AUTO: 0.6 10E3/UL (ref 0–1.3)
MONOCYTES NFR BLD AUTO: 9 %
NEUTROPHILS # BLD AUTO: 4.6 10E3/UL (ref 1.6–8.3)
NEUTROPHILS NFR BLD AUTO: 67 %
PLATELET # BLD AUTO: 231 10E3/UL (ref 150–450)
RBC # BLD AUTO: 2.98 10E6/UL (ref 4.4–5.9)
WBC # BLD AUTO: 6.9 10E3/UL (ref 4–11)

## 2024-03-30 PROCEDURE — 36415 COLL VENOUS BLD VENIPUNCTURE: CPT

## 2024-03-30 PROCEDURE — 85025 COMPLETE CBC W/AUTO DIFF WBC: CPT

## 2024-04-01 ENCOUNTER — LAB (OUTPATIENT)
Dept: LAB | Facility: CLINIC | Age: 79
End: 2024-04-01
Payer: MEDICARE

## 2024-04-01 DIAGNOSIS — K26.4 GASTROINTESTINAL HEMORRHAGE ASSOCIATED WITH DUODENAL ULCER: Primary | ICD-10-CM

## 2024-04-01 DIAGNOSIS — K26.4: ICD-10-CM

## 2024-04-01 LAB
BASOPHILS # BLD AUTO: 0 10E3/UL (ref 0–0.2)
BASOPHILS NFR BLD AUTO: 0 %
EOSINOPHIL # BLD AUTO: 0.1 10E3/UL (ref 0–0.7)
EOSINOPHIL NFR BLD AUTO: 2 %
ERYTHROCYTE [DISTWIDTH] IN BLOOD BY AUTOMATED COUNT: 17.2 % (ref 10–15)
HCT VFR BLD AUTO: 24.8 % (ref 40–53)
HGB BLD-MCNC: 7.3 G/DL (ref 13.3–17.7)
IMM GRANULOCYTES # BLD: 0 10E3/UL
IMM GRANULOCYTES NFR BLD: 0 %
LYMPHOCYTES # BLD AUTO: 1.2 10E3/UL (ref 0.8–5.3)
LYMPHOCYTES NFR BLD AUTO: 18 %
MCH RBC QN AUTO: 24.3 PG (ref 26.5–33)
MCHC RBC AUTO-ENTMCNC: 29.4 G/DL (ref 31.5–36.5)
MCV RBC AUTO: 82 FL (ref 78–100)
MONOCYTES # BLD AUTO: 0.5 10E3/UL (ref 0–1.3)
MONOCYTES NFR BLD AUTO: 8 %
NEUTROPHILS # BLD AUTO: 5 10E3/UL (ref 1.6–8.3)
NEUTROPHILS NFR BLD AUTO: 72 %
PLATELET # BLD AUTO: 244 10E3/UL (ref 150–450)
RBC # BLD AUTO: 3.01 10E6/UL (ref 4.4–5.9)
WBC # BLD AUTO: 6.9 10E3/UL (ref 4–11)

## 2024-04-01 PROCEDURE — 36415 COLL VENOUS BLD VENIPUNCTURE: CPT

## 2024-04-01 PROCEDURE — 85025 COMPLETE CBC W/AUTO DIFF WBC: CPT

## 2024-04-04 ENCOUNTER — TELEPHONE (OUTPATIENT)
Dept: FAMILY MEDICINE | Facility: CLINIC | Age: 79
End: 2024-04-04
Payer: MEDICARE

## 2024-04-04 DIAGNOSIS — D63.8 ANEMIA OF CHRONIC DISEASE: Primary | ICD-10-CM

## 2024-04-04 NOTE — TELEPHONE ENCOUNTER
Patient Contact    Attempt # 1    Was call answered?  Yes. Writer relayed covering PCP's message below, patient expressed verbal understanding. Appt made:  4/5/2024 9:30 AM CS LAB [facility] PIPPA Rosen RN  [facility]

## 2024-04-04 NOTE — TELEPHONE ENCOUNTER
Pt's wife called the clinic with pt present.     The pt had a recent blood transfusion at [facility], and they are requesting to have his hemoglobin rechecked after the infusion to ensure he is stable. Order pended for review.   Pt has an appt with PCP for an OV on 4/16, but they are requesting for him to be seen sooner as he is going to be potentially starting cancer treatment soon, and they would like to discuss care with PCP as soon as possible.     Routing to PCP HP as pt is hoping to have labs done today or tomorrow.    Can we leave a detailed message on this number? YES  Phone number patient can be reached at: Other phone number: [phone]     Lavonne Mario RN  [facility] Triage

## 2024-04-05 ENCOUNTER — LAB (OUTPATIENT)
Dept: LAB | Facility: CLINIC | Age: 79
End: 2024-04-05
Payer: MEDICARE

## 2024-04-05 DIAGNOSIS — D63.8 ANEMIA OF CHRONIC DISEASE: ICD-10-CM

## 2024-04-05 DIAGNOSIS — K26.4 GASTROINTESTINAL HEMORRHAGE ASSOCIATED WITH DUODENAL ULCER: ICD-10-CM

## 2024-04-05 DIAGNOSIS — D63.8 ANEMIA OF CHRONIC DISEASE: Primary | ICD-10-CM

## 2024-04-05 LAB
BASOPHILS # BLD AUTO: 0 10E3/UL (ref 0–0.2)
BASOPHILS NFR BLD AUTO: 0 %
EOSINOPHIL # BLD AUTO: 0.2 10E3/UL (ref 0–0.7)
EOSINOPHIL NFR BLD AUTO: 4 %
ERYTHROCYTE [DISTWIDTH] IN BLOOD BY AUTOMATED COUNT: 17 % (ref 10–15)
HCT VFR BLD AUTO: 26.1 % (ref 40–53)
HGB BLD-MCNC: 7.7 G/DL (ref 13.3–17.7)
IMM GRANULOCYTES # BLD: 0 10E3/UL
IMM GRANULOCYTES NFR BLD: 0 %
LYMPHOCYTES # BLD AUTO: 1.3 10E3/UL (ref 0.8–5.3)
LYMPHOCYTES NFR BLD AUTO: 20 %
MCH RBC QN AUTO: 24.4 PG (ref 26.5–33)
MCHC RBC AUTO-ENTMCNC: 29.5 G/DL (ref 31.5–36.5)
MCV RBC AUTO: 83 FL (ref 78–100)
MONOCYTES # BLD AUTO: 0.4 10E3/UL (ref 0–1.3)
MONOCYTES NFR BLD AUTO: 7 %
NEUTROPHILS # BLD AUTO: 4.5 10E3/UL (ref 1.6–8.3)
NEUTROPHILS NFR BLD AUTO: 70 %
PLATELET # BLD AUTO: 236 10E3/UL (ref 150–450)
RBC # BLD AUTO: 3.15 10E6/UL (ref 4.4–5.9)
WBC # BLD AUTO: 6.4 10E3/UL (ref 4–11)

## 2024-04-05 PROCEDURE — 85025 COMPLETE CBC W/AUTO DIFF WBC: CPT

## 2024-04-05 PROCEDURE — 36415 COLL VENOUS BLD VENIPUNCTURE: CPT

## 2024-04-08 ENCOUNTER — LAB (OUTPATIENT)
Dept: LAB | Facility: CLINIC | Age: 79
End: 2024-04-08
Payer: MEDICARE

## 2024-04-08 ENCOUNTER — MYC MEDICAL ADVICE (OUTPATIENT)
Dept: FAMILY MEDICINE | Facility: CLINIC | Age: 79
End: 2024-04-08

## 2024-04-08 DIAGNOSIS — D63.8 ANEMIA OF CHRONIC DISEASE: ICD-10-CM

## 2024-04-08 DIAGNOSIS — K26.4 GASTROINTESTINAL HEMORRHAGE ASSOCIATED WITH DUODENAL ULCER: ICD-10-CM

## 2024-04-08 LAB
BASOPHILS # BLD AUTO: 0 10E3/UL (ref 0–0.2)
BASOPHILS NFR BLD AUTO: 0 %
EOSINOPHIL # BLD AUTO: 0.3 10E3/UL (ref 0–0.7)
EOSINOPHIL NFR BLD AUTO: 5 %
ERYTHROCYTE [DISTWIDTH] IN BLOOD BY AUTOMATED COUNT: 16.7 % (ref 10–15)
HCT VFR BLD AUTO: 25.3 % (ref 40–53)
HGB BLD-MCNC: 7.4 G/DL (ref 13.3–17.7)
IMM GRANULOCYTES # BLD: 0 10E3/UL
IMM GRANULOCYTES NFR BLD: 0 %
LYMPHOCYTES # BLD AUTO: 1.2 10E3/UL (ref 0.8–5.3)
LYMPHOCYTES NFR BLD AUTO: 19 %
MCH RBC QN AUTO: 24.3 PG (ref 26.5–33)
MCHC RBC AUTO-ENTMCNC: 29.2 G/DL (ref 31.5–36.5)
MCV RBC AUTO: 83 FL (ref 78–100)
MONOCYTES # BLD AUTO: 0.5 10E3/UL (ref 0–1.3)
MONOCYTES NFR BLD AUTO: 8 %
NEUTROPHILS # BLD AUTO: 4.2 10E3/UL (ref 1.6–8.3)
NEUTROPHILS NFR BLD AUTO: 68 %
PLATELET # BLD AUTO: 212 10E3/UL (ref 150–450)
RBC # BLD AUTO: 3.05 10E6/UL (ref 4.4–5.9)
WBC # BLD AUTO: 6.2 10E3/UL (ref 4–11)

## 2024-04-08 PROCEDURE — 85025 COMPLETE CBC W/AUTO DIFF WBC: CPT

## 2024-04-08 PROCEDURE — 36415 COLL VENOUS BLD VENIPUNCTURE: CPT

## 2024-04-09 NOTE — TELEPHONE ENCOUNTER
Good day Dr. Retana,      Please review patient's MyChart message and advise.    Micaela CANALES MA on 4/9/2024 at 4:37 PM

## 2024-04-12 ENCOUNTER — INFUSION THERAPY VISIT (OUTPATIENT)
Dept: INFUSION THERAPY | Facility: CLINIC | Age: 79
End: 2024-04-12
Attending: INTERNAL MEDICINE
Payer: MEDICARE

## 2024-04-12 ENCOUNTER — LAB (OUTPATIENT)
Dept: LAB | Facility: CLINIC | Age: 79
End: 2024-04-12
Payer: MEDICARE

## 2024-04-12 ENCOUNTER — TELEPHONE (OUTPATIENT)
Dept: FAMILY MEDICINE | Facility: CLINIC | Age: 79
End: 2024-04-12

## 2024-04-12 VITALS
DIASTOLIC BLOOD PRESSURE: 76 MMHG | TEMPERATURE: 97.7 F | HEART RATE: 63 BPM | OXYGEN SATURATION: 99 % | RESPIRATION RATE: 16 BRPM | SYSTOLIC BLOOD PRESSURE: 116 MMHG

## 2024-04-12 DIAGNOSIS — D63.8 ANEMIA OF CHRONIC DISEASE: ICD-10-CM

## 2024-04-12 DIAGNOSIS — C64.1 RENAL CELL CARCINOMA, RIGHT (H): Primary | ICD-10-CM

## 2024-04-12 DIAGNOSIS — K26.4 GASTROINTESTINAL HEMORRHAGE ASSOCIATED WITH DUODENAL ULCER: ICD-10-CM

## 2024-04-12 LAB
ABO/RH(D): NORMAL
ANTIBODY SCREEN: NEGATIVE
BASOPHILS # BLD AUTO: 0 10E3/UL (ref 0–0.2)
BASOPHILS NFR BLD AUTO: 0 %
BLD PROD TYP BPU: NORMAL
BLOOD COMPONENT TYPE: NORMAL
CODING SYSTEM: NORMAL
CROSSMATCH: NORMAL
EOSINOPHIL # BLD AUTO: 0.2 10E3/UL (ref 0–0.7)
EOSINOPHIL NFR BLD AUTO: 4 %
ERYTHROCYTE [DISTWIDTH] IN BLOOD BY AUTOMATED COUNT: 16.9 % (ref 10–15)
HCT VFR BLD AUTO: 24.4 % (ref 40–53)
HGB BLD-MCNC: 7.1 G/DL (ref 13.3–17.7)
IMM GRANULOCYTES # BLD: 0 10E3/UL
IMM GRANULOCYTES NFR BLD: 0 %
ISSUE DATE AND TIME: NORMAL
LYMPHOCYTES # BLD AUTO: 1.3 10E3/UL (ref 0.8–5.3)
LYMPHOCYTES NFR BLD AUTO: 22 %
MCH RBC QN AUTO: 24 PG (ref 26.5–33)
MCHC RBC AUTO-ENTMCNC: 29.1 G/DL (ref 31.5–36.5)
MCV RBC AUTO: 82 FL (ref 78–100)
MONOCYTES # BLD AUTO: 0.6 10E3/UL (ref 0–1.3)
MONOCYTES NFR BLD AUTO: 10 %
NEUTROPHILS # BLD AUTO: 3.8 10E3/UL (ref 1.6–8.3)
NEUTROPHILS NFR BLD AUTO: 64 %
PLATELET # BLD AUTO: 225 10E3/UL (ref 150–450)
RBC # BLD AUTO: 2.96 10E6/UL (ref 4.4–5.9)
SPECIMEN EXPIRATION DATE: NORMAL
UNIT ABO/RH: NORMAL
UNIT NUMBER: NORMAL
UNIT STATUS: NORMAL
UNIT TYPE ISBT: 7300
WBC # BLD AUTO: 5.9 10E3/UL (ref 4–11)

## 2024-04-12 PROCEDURE — 86923 COMPATIBILITY TEST ELECTRIC: CPT | Performed by: INTERNAL MEDICINE

## 2024-04-12 PROCEDURE — 36415 COLL VENOUS BLD VENIPUNCTURE: CPT

## 2024-04-12 PROCEDURE — 36415 COLL VENOUS BLD VENIPUNCTURE: CPT | Performed by: INTERNAL MEDICINE

## 2024-04-12 PROCEDURE — 36430 TRANSFUSION BLD/BLD COMPNT: CPT

## 2024-04-12 PROCEDURE — 86900 BLOOD TYPING SEROLOGIC ABO: CPT | Performed by: INTERNAL MEDICINE

## 2024-04-12 PROCEDURE — P9016 RBC LEUKOCYTES REDUCED: HCPCS | Performed by: INTERNAL MEDICINE

## 2024-04-12 PROCEDURE — 85025 COMPLETE CBC W/AUTO DIFF WBC: CPT

## 2024-04-12 RX ORDER — EPINEPHRINE 1 MG/ML
0.3 INJECTION, SOLUTION INTRAMUSCULAR; SUBCUTANEOUS EVERY 5 MIN PRN
OUTPATIENT
Start: 2024-04-12

## 2024-04-12 RX ORDER — HEPARIN SODIUM,PORCINE 10 UNIT/ML
5-20 VIAL (ML) INTRAVENOUS DAILY PRN
Status: DISCONTINUED | OUTPATIENT
Start: 2024-04-12 | End: 2024-04-12 | Stop reason: HOSPADM

## 2024-04-12 RX ORDER — EPINEPHRINE 1 MG/ML
0.3 INJECTION, SOLUTION, CONCENTRATE INTRAVENOUS EVERY 5 MIN PRN
Status: CANCELLED | OUTPATIENT
Start: 2024-04-12

## 2024-04-12 RX ORDER — DIPHENHYDRAMINE HYDROCHLORIDE 50 MG/ML
50 INJECTION INTRAMUSCULAR; INTRAVENOUS
Status: CANCELLED
Start: 2024-04-12

## 2024-04-12 RX ORDER — HEPARIN SODIUM,PORCINE 10 UNIT/ML
5-20 VIAL (ML) INTRAVENOUS DAILY PRN
Status: CANCELLED | OUTPATIENT
Start: 2024-04-12

## 2024-04-12 RX ORDER — HEPARIN SODIUM (PORCINE) LOCK FLUSH IV SOLN 100 UNIT/ML 100 UNIT/ML
5 SOLUTION INTRAVENOUS
Status: CANCELLED | OUTPATIENT
Start: 2024-04-12

## 2024-04-12 RX ORDER — HEPARIN SODIUM (PORCINE) LOCK FLUSH IV SOLN 100 UNIT/ML 100 UNIT/ML
5 SOLUTION INTRAVENOUS
Status: DISCONTINUED | OUTPATIENT
Start: 2024-04-12 | End: 2024-04-12 | Stop reason: HOSPADM

## 2024-04-12 RX ORDER — DIPHENHYDRAMINE HYDROCHLORIDE 50 MG/ML
50 INJECTION INTRAMUSCULAR; INTRAVENOUS
Start: 2024-04-12

## 2024-04-12 NOTE — TELEPHONE ENCOUNTER
I spoke to Ti Nickerson and wife and would like to proceed with blood transfusion 1 unit given hemoglobin of 7.1.  Can we help him to coordinate it today?    Nico Retana MD, MD

## 2024-04-12 NOTE — ADDENDUM NOTE
Partha Rizo  : 1991  MRN: [id number]  CSN: [id number]    History and Physical  CC: scheduled elective IOL  Subjective   Partha Rizo is a 30 y.o. year old  with an Estimated Date of Delivery: 22 presenting for induction of labor for elective at term per patient request.    Risks of labor induction including prolongation of labor, increased risks for both  section and operative vaginal birth have been discussed at length.     Prenatal care has been with  Dr. Smith.  It has been complicated by GBS carrier and diagnosis of left buttock HSV lesion at ~ 36-37 weeks.    OB History    Para Term  AB Living   6 2 2 0 3 2   SAB IAB Ectopic Molar Multiple Live Births   1 0 1 0 0 2      # Outcome Date GA Lbr Aaron/2nd Weight Sex Delivery Anes PTL Lv   6 Current            5 Term 10/21/20 39w0d  3160 g (6 lb 15.5 oz) F Vag-Spont EPI N KAYLENE      Name: KVNG RIZORASGIRL      Apgar1: 3  Apgar5: 9   4 SAB 2019 5w5d          3 AB 2018 11w0d    SAB      2 Ectopic 2018           1 Term 11   3175 g (7 lb) F Vag-Spont EPI  KAYLENE      Name: Aniahyia      Obstetric Comments   G1- FOB#1   G2 - Ectopic - methotrexate, followed hcg appropriately. With Dr. Molina   G3 - SAB - took misoprostol?, no d and c   G4 - SAB   G5 - Ghariya   Same FOB for G2-G5   Reports history of trichomonas. Denies any others.         Past Medical History:   Diagnosis Date   • Anxiety    • Asthma    • Blighted ovum 2019   • Ectopic pregnancy, tubal    • Urogenital trichomoniasis     not during this pregnancy     Past Surgical History:   Procedure Laterality Date   • VAGINAL DELIVERY     • WISDOM TOOTH EXTRACTION         Current Outpatient Medications:   •  acetaminophen (TYLENOL) 500 MG tablet, Take 1 tablet by mouth Every 4 (Four) Hours As Needed for Mild Pain  or Fever., Disp: 20 tablet, Rfl: 0  •  albuterol sulfate  (90 Base) MCG/ACT inhaler, Inhale 2 puffs Every 4 (Four) Hours As Needed  Addended by: ONIEL LOPEZ on: 4/12/2024 03:24 PM     Modules accepted: Orders     for Wheezing or Shortness of Air., Disp: 18 g, Rfl: 0  •  ondansetron (Zofran) 4 MG tablet, Take 1 tablet by mouth Every 8 (Eight) Hours As Needed for Nausea or Vomiting., Disp: 20 tablet, Rfl: 1  •  Prenatal Vit-Fe Fumarate-FA (prenatal vitamin 27-0.8) 27-0.8 MG tablet tablet, Take 1 tablet by mouth Daily., Disp: 30 tablet, Rfl: 11  •  sertraline (Zoloft) 50 MG tablet, Take 1 tablet by mouth Daily., Disp: 30 tablet, Rfl: 11  •  valACYclovir (Valtrex) 1000 MG tablet, Take 1 tablet by mouth 2 (Two) Times a Day., Disp: 20 tablet, Rfl: 0  •  valACYclovir (Valtrex) 1000 MG tablet, Take 1 tablet by mouth Daily., Disp: 30 tablet, Rfl: 12    No Known Allergies  Social History    Tobacco Use      Smoking status: Never Smoker      Smokeless tobacco: Never Used    Review of Systems   Constitutional: Negative for chills and fever.   HENT: Negative for congestion and sore throat.    Respiratory: Negative for shortness of breath and wheezing.    Cardiovascular: Negative for chest pain and palpitations.   Gastrointestinal: Negative for abdominal pain, nausea and vomiting.   Genitourinary: Negative for frequency, vaginal bleeding and vaginal pain.   Musculoskeletal: Negative for back pain and myalgias.   Neurological: Negative for dizziness, light-headedness and headaches.   Psychiatric/Behavioral: Negative for confusion. The patient is not nervous/anxious.          Objective   LMP 07/08/2021     General: well developed; well nourished  no acute distress   Abdomen: soft, non-tender; no masses  no umbilical or inguinal hernias are present  no hepato-splenomegaly   Cervix: At last prenatal visit - 2 cm / 60 % / -2 / soft / middle   Presentation: At last prenatal visit - cephalic     Prenatal Labs  Lab Results   Component Value Date    HGB 12.0 01/19/2022    RUBELLAABIGG 2.40 09/16/2021    HEPBSAG Non-Reactive 09/16/2021    ABSCRN Negative 09/16/2021    GDD0XXM3 Non-Reactive 09/16/2021    HEPCVIRUSABY Non-Reactive 09/16/2021    GCT  115 01/19/2022    STREPGPB Positive 03/16/2022    URINECX Final report (A) 12/07/2021    CHLAMNAA Negative 09/10/2021    NGONORRHON Negative 09/10/2021       Current Labs Reviewed   No data reviewed       Assessment   1. IUP with an Estimated Date of Delivery: 4/14/22  2. Induction of labor because of elective at term per patient request  3. Group B strep status: positive  4. History of left HSV buttock lesion at 36-37 weeks since treated and healed and on ongoing valtrex      Plan   1. Cervical singletary and Pitocin induction  2. Will need speculum exam to rule out any further HSV lesiosn   3. Amniotomy with descent of presenting part  4. PCN for GBS ppx    Anne Smith MD

## 2024-04-12 NOTE — PROGRESS NOTES
Infusion Nursing Note:  Ti Nickerson presents today for 1 Unit PRBC.    Patient seen by provider today: No   present during visit today: Not Applicable.    Note:  Patient reports is feeling well today.  Patient denies fevers, chills or signs of infection.  Patient denies black, tarry or sticky stools.    HGB = 7.1    1 Unit PRBC started at 120mls/hr x 10 minutes, then increased to 270mls/hr until completion.      Intravenous Access:  Peripheral IV placed.  PIV site C/D/I.  PIV flushes well + excellent blood return.    Treatment Conditions:  Blood transfusion consent signed on 10/25/2023.      Post Infusion Assessment:  Patient tolerated infusion without incident.  Blood return noted pre and post infusion.  Site patent and intact, free from redness, edema or discomfort.  No evidence of extravasations.  Access discontinued per protocol.       Discharge Plan:   Discharge instructions reviewed with: Patient.  Patient and/or family verbalized understanding of discharge instructions and all questions answered.  Patient discharged in stable condition accompanied by: wife.  Departure Mode: Ambulatory with walker.  No future appointments scheduled at our clinic.    Dilma Diaz, RN, BSN, OCN on 4/12/2024 at 2:58 PM

## 2024-04-12 NOTE — TELEPHONE ENCOUNTER
Infusion Center Contact    Attempt # 1    Was call answered?  Yes. [facility] was not able to get patient in today,  was not sure how patient could get transfusion before the weekend but advised calling to other infusion locations.     Writer called to [facility]. Charge nurse gave ok for patient to have 1 unit transfusion today, as orders were correct. Charge nurse reported that patient still required Type and Screen, and that patient must arrive by noon today. Patient was added to [facility] schedule.    Writer called back to patient and spoke to patient's wife Valery (C2C) who states that the patient will leave home now and arrive to [facility] by noon today.    Luna Rosen RN  -[facility]

## 2024-04-16 ENCOUNTER — OFFICE VISIT (OUTPATIENT)
Dept: FAMILY MEDICINE | Facility: CLINIC | Age: 79
End: 2024-04-16
Payer: MEDICARE

## 2024-04-16 VITALS
RESPIRATION RATE: 10 BRPM | SYSTOLIC BLOOD PRESSURE: 109 MMHG | WEIGHT: 274.9 LBS | DIASTOLIC BLOOD PRESSURE: 72 MMHG | OXYGEN SATURATION: 98 % | BODY MASS INDEX: 34.18 KG/M2 | HEIGHT: 75 IN | TEMPERATURE: 97.2 F | HEART RATE: 62 BPM

## 2024-04-16 DIAGNOSIS — C64.1 RENAL CELL CARCINOMA, RIGHT (H): ICD-10-CM

## 2024-04-16 DIAGNOSIS — K92.1 MELENA: Primary | ICD-10-CM

## 2024-04-16 LAB
ERYTHROCYTE [DISTWIDTH] IN BLOOD BY AUTOMATED COUNT: 16.6 % (ref 10–15)
HCT VFR BLD AUTO: 26.6 % (ref 40–53)
HGB BLD-MCNC: 7.8 G/DL (ref 13.3–17.7)
MCH RBC QN AUTO: 24.1 PG (ref 26.5–33)
MCHC RBC AUTO-ENTMCNC: 29.3 G/DL (ref 31.5–36.5)
MCV RBC AUTO: 82 FL (ref 78–100)
PLATELET # BLD AUTO: 230 10E3/UL (ref 150–450)
RBC # BLD AUTO: 3.24 10E6/UL (ref 4.4–5.9)
WBC # BLD AUTO: 6.1 10E3/UL (ref 4–11)

## 2024-04-16 PROCEDURE — 36415 COLL VENOUS BLD VENIPUNCTURE: CPT | Performed by: INTERNAL MEDICINE

## 2024-04-16 PROCEDURE — 99000 SPECIMEN HANDLING OFFICE-LAB: CPT | Performed by: INTERNAL MEDICINE

## 2024-04-16 PROCEDURE — 83540 ASSAY OF IRON: CPT | Performed by: INTERNAL MEDICINE

## 2024-04-16 PROCEDURE — 85027 COMPLETE CBC AUTOMATED: CPT | Performed by: INTERNAL MEDICINE

## 2024-04-16 PROCEDURE — 82728 ASSAY OF FERRITIN: CPT | Performed by: INTERNAL MEDICINE

## 2024-04-16 PROCEDURE — 82668 ASSAY OF ERYTHROPOIETIN: CPT | Mod: 90 | Performed by: INTERNAL MEDICINE

## 2024-04-16 PROCEDURE — 83550 IRON BINDING TEST: CPT | Performed by: INTERNAL MEDICINE

## 2024-04-16 PROCEDURE — 99213 OFFICE O/P EST LOW 20 MIN: CPT | Performed by: INTERNAL MEDICINE

## 2024-04-16 ASSESSMENT — PAIN SCALES - GENERAL: PAINLEVEL: NO PAIN (0)

## 2024-04-16 NOTE — PROGRESS NOTES
"[facility]  CLINIC PROGRESS NOTE    Subjective:   Melena  Renal cell carcinoma, right (H)   Ti Nickerson has not had follow up in oncology in [place].  He is not noticing any further bleeding.  Has been advised that no surgery would be offered at this time.  Looking into radiation and will follow up [facility] on Friday over phone.  He is wondering if erythropoetin may be low.    Past medical history, medications, allergies, social history, family history reviewed and updated in EPIC as of 4/16/2024 .    ROS  CONSTITUTIONAL: no fatigue, no unexpected change in weight  SKIN: no worrisome rashes,    EYES: no acute vision problems or changes  ENT: no ear problems, no mouth problems, no throat problems  RESP: no significant cough, occasional shortness of breath  CV: no chest pain, no palpitations   GI: no nausea, no vomiting, no blood in stool, appetite is OK  : no frequency, no dysuria, no hematuria  MS: no claudication, no myalgias, no joint aches  PSYCHIATRIC: no changes in mood or affect    Objective:  Vitals  /72   Pulse 62   Temp 97.2  F (36.2  C) (Tympanic)   Resp 10   Ht 1.905 m (6' 3\")   Wt 124.7 kg (274 lb 14.4 oz)   SpO2 98%   BMI 34.36 kg/m    GEN: Alert Oriented x3 NAD  HEENT: Atraumatic, normocephalic, neck supple, no thyromegaly, negative cervical adenopathy  TM: TM bilaterally pearly and grey with normal light reflex  CV: RRR no murmurs or rubs  PULM: crackles at lung bases  ABD: Soft, nontender nondistende   SKIN: No visible skin lesion or ulcerations  EXT: 2+ dorsal pedis pulses, trace edema bilateral lower extremities  NEURO: Gait and station deferred, No focal neurologic deficits  PSYCH: Mood good, affect mood congruent    No images are attached to the encounter.    Results for orders placed or performed in visit on 04/16/24 (from the past 24 hour(s))   CBC with platelets   Result Value Ref Range    WBC Count 6.1 4.0 - 11.0 10e3/uL    RBC Count 3.24 (L) 4.40 - " 5.90 10e6/uL    Hemoglobin 7.8 (LL) 13.3 - 17.7 g/dL    Hematocrit 26.6 (L) 40.0 - 53.0 %    MCV 82 78 - 100 fL    MCH 24.1 (L) 26.5 - 33.0 pg    MCHC 29.3 (L) 31.5 - 36.5 g/dL    RDW 16.6 (H) 10.0 - 15.0 %    Platelet Count 230 150 - 450 10e3/uL       Assessment/Plan:  Patient Instructions   (K92.1) Melena  (primary encounter diagnosis)  Comment: We will recheck hemoglobin and iron studies today and follow up in oncology on Thursday and with radiation oncology at [facility].  Continue soft diet.  Continue to hold apixaban.   Plan: Erythropoietin, CBC with platelets            (C64.1) Renal cell carcinoma, right (H)  Comment: also check erythropoetin levels and follow up oncology tomorrow to discuss erythropoetin treatment   Plan: Erythropoietin, CBC with platelets             Follow up in 2 weeks    Disclaimer: This note consists of symbols derived from keyboarding, dictation and/or voice recognition software. As a result, there may be errors in the script that have gone undetected. Please consider this when interpreting information found in this chart.    Nico Retana MD  [phone]

## 2024-04-16 NOTE — PATIENT INSTRUCTIONS
(K92.1) Melena  (primary encounter diagnosis)  Comment: We will recheck hemoglobin and iron studies today and follow up in oncology on Thursday and with radiation oncology at [facility].  Continue soft diet.  Continue to hold apixaban.   Plan: Erythropoietin, CBC with platelets            (C64.1) Renal cell carcinoma, right (H)  Comment: also check erythropoetin levels and follow up oncology tomorrow to discuss erythropoetin treatment   Plan: Erythropoietin, CBC with platelets

## 2024-04-17 LAB
EPO SERPL-ACNC: 89 MU/ML
FERRITIN SERPL-MCNC: 12 NG/ML (ref 31–409)
IRON BINDING CAPACITY (ROCHE): 356 UG/DL (ref 240–430)
IRON SATN MFR SERPL: 5 % (ref 15–46)
IRON SERPL-MCNC: 17 UG/DL (ref 61–157)

## 2024-04-17 NOTE — RESULT ENCOUNTER NOTE
Abrahan Luke,    I have had the opportunity to review your recent results and an interpretation is as follows:  Your hemoglobin today improved compared to the last check up to 7.8    Sincerely,  Nico Retana MD

## 2024-04-18 NOTE — RESULT ENCOUNTER NOTE
Abrahan Luke,    I have had the opportunity to review your recent results and an interpretation is as follows:  Your follow-up ferritin and iron studies returned low, but they should improve with recent blood transfusions and we can discuss with hematology/oncology as to best approach to maintain adequate hemoglobin  Erythropoietin level also returned elevated, indicating adequate erythropoietin production    Sincerely,  Nico Retana MD

## 2024-04-30 ENCOUNTER — VIRTUAL VISIT (OUTPATIENT)
Dept: FAMILY MEDICINE | Facility: CLINIC | Age: 79
End: 2024-04-30
Payer: MEDICARE

## 2024-04-30 DIAGNOSIS — C64.1 RENAL CELL CARCINOMA, RIGHT (H): Primary | ICD-10-CM

## 2024-04-30 DIAGNOSIS — D63.8 ANEMIA OF CHRONIC DISEASE: ICD-10-CM

## 2024-04-30 PROCEDURE — 99442 PR PHYSICIAN TELEPHONE EVALUATION 11-20 MIN: CPT | Mod: 93 | Performed by: INTERNAL MEDICINE

## 2024-04-30 NOTE — PROGRESS NOTES
Edgar is a 78 year old who is being evaluated via a billable telephone visit.    What phone number would you like to be contacted at? [phone]  How would you like to obtain your AVS? MyChart  Originating Location (pt. Location): Home    Distant Location (provider location):  On-site        Subjective   Edgar is a 78 year old, presenting for the following health issues:  Follow Up    HPI        Renal cell carcinoma, right (H)  Anemia of chronic disease   I spoke with Edgar today over phone to discuss his recent treatment for anemia and metastatic renal cell carcinoma.  He has established with an oncologist in the [facility] and has been recommended to initiate a 4-week cycle of chemotherapy to help shrink his tumor which is in the stomach.  He is also receiving IV iron infusion and has had improvement in his hemoglobin per his report.  He feels much more energetic.  He is optimistic as he has improved his hemoglobin.        Review of Systems  Constitutional, HEENT, cardiovascular, pulmonary, GI, , musculoskeletal, neuro, skin, endocrine and psych systems are negative, except as otherwise noted.      Objective           Vitals:  No vitals were obtained today due to virtual visit.    Physical Exam   General: Alert and no distress //Respiratory: No audible wheeze, cough, or shortness of breath // Psychiatric:  Appropriate affect, tone, and pace of words      (C64.1) Renal cell carcinoma, right (H)  (primary encounter diagnosis)  Comment: We will follow up accordingly in the summer.  Recommend follow up with oncology   Plan:     (D63.8) Anemia of chronic disease  Comment: noted improved hemoglobin   Plan:          Phone call duration: 14 minutes  Signed Electronically by: Nico Retana MD, MD

## 2024-05-28 ENCOUNTER — TRANSFERRED RECORDS (OUTPATIENT)
Dept: HEALTH INFORMATION MANAGEMENT | Facility: CLINIC | Age: 79
End: 2024-05-28

## 2024-05-28 ENCOUNTER — HOSPITAL ENCOUNTER (OUTPATIENT)
Dept: CARDIOLOGY | Facility: CLINIC | Age: 79
Discharge: HOME | End: 2024-05-28
Admitting: NURSE PRACTITIONER
Payer: MEDICARE

## 2024-05-28 ENCOUNTER — OFFICE VISIT (OUTPATIENT)
Dept: FAMILY MEDICINE | Facility: CLINIC | Age: 79
End: 2024-05-28
Payer: MEDICARE

## 2024-05-28 VITALS
RESPIRATION RATE: 10 BRPM | TEMPERATURE: 97.1 F | OXYGEN SATURATION: 98 % | HEART RATE: 73 BPM | HEIGHT: 75 IN | BODY MASS INDEX: 33.3 KG/M2 | DIASTOLIC BLOOD PRESSURE: 63 MMHG | SYSTOLIC BLOOD PRESSURE: 92 MMHG | WEIGHT: 267.8 LBS

## 2024-05-28 DIAGNOSIS — Z00.00 ROUTINE GENERAL MEDICAL EXAMINATION AT A HEALTH CARE FACILITY: Primary | ICD-10-CM

## 2024-05-28 DIAGNOSIS — I48.20 CHRONIC ATRIAL FIBRILLATION (H): ICD-10-CM

## 2024-05-28 DIAGNOSIS — C64.1 RENAL CELL CARCINOMA, RIGHT (H): ICD-10-CM

## 2024-05-28 DIAGNOSIS — I95.1 ORTHOSTATIC HYPOTENSION: ICD-10-CM

## 2024-05-28 DIAGNOSIS — R73.9 ELEVATED RANDOM BLOOD GLUCOSE LEVEL: ICD-10-CM

## 2024-05-28 DIAGNOSIS — I25.10 CORONARY ARTERY DISEASE INVOLVING NATIVE CORONARY ARTERY OF NATIVE HEART WITHOUT ANGINA PECTORIS: ICD-10-CM

## 2024-05-28 DIAGNOSIS — R60.0 BILATERAL LEG EDEMA: ICD-10-CM

## 2024-05-28 DIAGNOSIS — K92.1 MELENA: ICD-10-CM

## 2024-05-28 DIAGNOSIS — N18.32 STAGE 3B CHRONIC KIDNEY DISEASE (H): ICD-10-CM

## 2024-05-28 LAB — HBA1C MFR BLD: 5.2 % (ref 0–5.6)

## 2024-05-28 PROCEDURE — 36415 COLL VENOUS BLD VENIPUNCTURE: CPT | Performed by: INTERNAL MEDICINE

## 2024-05-28 PROCEDURE — 84443 ASSAY THYROID STIM HORMONE: CPT | Performed by: INTERNAL MEDICINE

## 2024-05-28 PROCEDURE — G0439 PPPS, SUBSEQ VISIT: HCPCS | Performed by: INTERNAL MEDICINE

## 2024-05-28 PROCEDURE — 93970 EXTREMITY STUDY: CPT

## 2024-05-28 PROCEDURE — 80053 COMPREHEN METABOLIC PANEL: CPT | Performed by: INTERNAL MEDICINE

## 2024-05-28 PROCEDURE — 93970 EXTREMITY STUDY: CPT | Mod: 26 | Performed by: INTERNAL MEDICINE

## 2024-05-28 PROCEDURE — 83036 HEMOGLOBIN GLYCOSYLATED A1C: CPT | Performed by: INTERNAL MEDICINE

## 2024-05-28 SDOH — HEALTH STABILITY: PHYSICAL HEALTH: ON AVERAGE, HOW MANY DAYS PER WEEK DO YOU ENGAGE IN MODERATE TO STRENUOUS EXERCISE (LIKE A BRISK WALK)?: 2 DAYS

## 2024-05-28 SDOH — HEALTH STABILITY: PHYSICAL HEALTH: ON AVERAGE, HOW MANY MINUTES DO YOU ENGAGE IN EXERCISE AT THIS LEVEL?: 30 MIN

## 2024-05-28 ASSESSMENT — PATIENT HEALTH QUESTIONNAIRE - PHQ9
10. IF YOU CHECKED OFF ANY PROBLEMS, HOW DIFFICULT HAVE THESE PROBLEMS MADE IT FOR YOU TO DO YOUR WORK, TAKE CARE OF THINGS AT HOME, OR GET ALONG WITH OTHER PEOPLE: NOT DIFFICULT AT ALL
SUM OF ALL RESPONSES TO PHQ QUESTIONS 1-9: 1
SUM OF ALL RESPONSES TO PHQ QUESTIONS 1-9: 1

## 2024-05-28 ASSESSMENT — PAIN SCALES - GENERAL: PAINLEVEL: NO PAIN (0)

## 2024-05-28 ASSESSMENT — SOCIAL DETERMINANTS OF HEALTH (SDOH): HOW OFTEN DO YOU GET TOGETHER WITH FRIENDS OR RELATIVES?: TWICE A WEEK

## 2024-05-28 NOTE — PATIENT INSTRUCTIONS
(Z00.00) Routine general medical examination at a health care facility  (primary encounter diagnosis)  Comment: For routine exam, we will draw labs as ordered, cholesterol, diabetes mellitus check, liver function, renal function.  We will also update vaccination history.   Plan:     (K92.1) Melena  Comment: Plan to continue to monitor and continue apixaban as per cardiology recommendations.    Plan:     (C64.1) Renal cell carcinoma, right (H)  Comment: follow up in oncology.  Noted recent dizziness related to recent infusion  Plan:     (I48.20) Chronic atrial fibrillation (H)  Comment: consider a Watchman device  Plan:     (I25.10) Coronary artery disease involving native coronary artery of native heart without angina pectoris  Comment: doing well.  No issues   Plan:

## 2024-05-28 NOTE — PROGRESS NOTES
Preventive Care Visit  [facility] PALMA Retana MD, MD, Internal Medicine  May 28, 2024        Neal Hernández is a 78 year old, presenting for the following:  Physical          Health Care Directive  Patient does not have a Health Care Directive or Living Will: Discussed advance care planning with patient; however, patient declined at this time.    HPI          5/28/2024   General Health   How would you rate your overall physical health? (!) FAIR   Feel stress (tense, anxious, or unable to sleep) Not at all         5/28/2024   Nutrition   Diet: Regular (no restrictions)         5/28/2024   Exercise   Days per week of moderate/strenous exercise 2 days   Average minutes spent exercising at this level 30 min   (!) EXERCISE CONCERN      5/28/2024   Social Factors   Frequency of gathering with friends or relatives Twice a week   Worry food won't last until get money to buy more No   Food not last or not have enough money for food? No   Do you have housing?  Yes   Are you worried about losing your housing? No   Lack of transportation? No   Unable to get utilities (heat,electricity)? No         5/28/2024   Fall Risk   Fallen 2 or more times in the past year? No   Trouble with walking or balance? No          5/28/2024   Activities of Daily Living- Home Safety   Needs help with the following daily activites None of the above   Safety concerns in the home None of the above         5/28/2024   Dental   Dentist two times every year? (!) NO         5/28/2024   Hearing Screening   Hearing concerns? None of the above         5/28/2024   Driving Risk Screening   Patient/family members have concerns about driving No         5/28/2024   General Alertness/Fatigue Screening   Have you been more tired than usual lately? (!) YES         5/28/2024   Urinary Incontinence Screening   Bothered by leaking urine in past 6 months No         5/28/2024   TB Screening   Were you born outside of the [place]? No        Today's PHQ-9 Score:       5/28/2024     3:56 PM   PHQ-9 SCORE   PHQ-9 Total Score MyChart 1 (Minimal depression)   PHQ-9 Total Score 1         5/28/2024   Substance Use   Alcohol more than 3/day or more than 7/wk No   Do you have a current opioid prescription? No   How severe/bad is pain from 1 to 10? 0/10 (No Pain)   Do you use any other substances recreationally? No     Social History     Tobacco Use    Smoking status: Never    Smokeless tobacco: Never   Vaping Use    Vaping status: Never Used   Substance Use Topics    Alcohol use: Not Currently    Drug use: No       ASCVD Risk   The ASCVD Risk score (Sarah DK, et al., 2019) failed to calculate for the following reasons:    The valid total cholesterol range is 130 to 320 mg/dL          Reviewed and updated as needed this visit by Provider                    Past Medical History:   Diagnosis Date    Atrial fibrillation, unspecified 9/18/2015    CAD (coronary artery disease) 09/18/2015    minimal by angio 2007, fu nuc est nl 2018    Elevated TSH 2018    Esophageal reflux 9/18/2015    Essential hypertension     History of cardioversion     [id number]    Idiopathic cardiomyopathy (H) 09/18/2015    fu echo nl ef, nl nuclear est 11/18, Dr. Quarles    Mixed hyperlipidemia 9/18/2015    Mumps     Sleep apnea 09/18/2015    does not use cpap as of 2019    Sleep apnea     Thoracic aortic aneurysm (H24)     sinus of valsalva 4.5 and asc aorta 4.5 in 2017    Tubular adenoma of colon 01/2017    fu 3 years     Current providers sharing in care for this patient include:  Patient Care Team:  Nico Retana MD as PCP - General (Internal Medicine)  Nico Retana MD as Assigned PCP  Kayla Quintero MD as MD (Nephrology)  Kayla Quintero MD as Assigned Nephrology Provider  Nico Hernández (Medical Oncology)  Fermin Milner MD as MD (Neurology)  Abraham Olivera MD as Assigned Surgical Provider  Fermin Milner  MD Chance as Assigned Neuroscience Provider  Jamal Venegas, PhD as Assigned Behavioral Health Provider  Mamie Gaston APRN CNP as Assigned Heart and Vascular Provider  Devin Quarles MD as MD (Cardiovascular Disease)    The following health maintenance items are reviewed in Epic and correct as of today:  Health Maintenance   Topic Date Due    DEPRESSION ACTION PLAN  Never done    ZOSTER IMMUNIZATION (1 of 2) Never done    HEPATITIS A IMMUNIZATION (1 of 2 - Risk 2-dose series) 11/11/1965    HEPATITIS B IMMUNIZATION (1 of 3 - Risk 3-dose series) Never done    COVID-19 Vaccine (7 - 2023-24 season) 01/12/2024    MEDICARE ANNUAL WELLNESS VISIT  05/17/2024    IPV IMMUNIZATION (2 of 3 - Adult catch-up series) 01/01/2080 (Originally 8/6/2013)    DEPRESSION 12 MO INDEX REPEAT PHQ-9  06/11/2024    MICROALBUMIN  09/05/2024    BMP  09/26/2024    LIPID  10/09/2024    PHQ-9  11/28/2024    ANNUAL REVIEW OF HM ORDERS  03/22/2025    HEMOGLOBIN  04/16/2025    FALL RISK ASSESSMENT  05/28/2025    GLUCOSE  03/26/2027    ADVANCE CARE PLANNING  05/18/2028    DTAP/TDAP/TD IMMUNIZATION (3 - Td or Tdap) 11/28/2032    HEPATITIS C SCREENING  Completed    INFLUENZA VACCINE  Completed    Pneumococcal Vaccine: 65+ Years  Completed    URINALYSIS  Completed    RSV VACCINE (Pregnancy & 60+)  Completed    HPV IMMUNIZATION  Aged Out    MENINGITIS IMMUNIZATION  Aged Out    RSV MONOCLONAL ANTIBODY  Aged Out    COLORECTAL CANCER SCREENING  Discontinued        Melena   Has not had any recent melena.  He is back on apixaban. For stroke prevention and no appreciable bleeding.  Hemoglobin has improved with iron infusion up to 9.6 today.    Renal cell carcinoma, right (H)   Has had recent follow up in oncology and recommended to start new treatment of ipilimumab, nivolumab.    Chronic atrial fibrillation (H)   Has had follow up in cardiology and recommended to consider watchman.    Coronary artery disease involving native coronary  "artery of native heart without angina pectoris   No recent chest pain.  Toleratin statin.  Taking midodrine to help support blood pressure.  Has not had any fainting, but feels notably light-headed.  Blood pressure is notably up and down during the day and may reflect hydration and nutriiton.      Review of Systems  Constitutional, HEENT, cardiovascular, pulmonary, GI, , musculoskeletal, neuro, skin, endocrine and psych systems are negative, except as otherwise noted.     Objective    Exam  BP 92/63 (BP Location: Left arm, Patient Position: Sitting, Cuff Size: Adult Large)   Pulse 73   Temp 97.1  F (36.2  C) (Tympanic)   Resp 10   Ht 1.905 m (6' 3\")   Wt 121.5 kg (267 lb 12.8 oz)   SpO2 98%   BMI 33.47 kg/m     Estimated body mass index is 33.47 kg/m  as calculated from the following:    Height as of this encounter: 1.905 m (6' 3\").    Weight as of this encounter: 121.5 kg (267 lb 12.8 oz).    Physical Exam  GENERAL: alert and no distress  EYES: Eyes grossly normal to inspection,   HENT: ear canals and TM's dena   NECK: no adenopathy, no asymmetry, masses, or scars  RESP: lungs clear to auscultation - no rales, rhonchi or wheezes  CV: irregular rate and rhythm. normal S1 S2, no S3 or S4, no murmur   ABDOMEN: soft, nontender, no hepatosplenomegaly   MS: no gross musculoskeletal defects noted, no edema  SKIN: no suspicious lesions or rashes  NEURO: Normal strength and tone  PSYCH: mentation appears normal, affect normal/bright         5/28/2024   Mini Cog   Clock Draw Score 2 Normal   3 Item Recall 3 objects recalled   Mini Cog Total Score 5              Patient Instructions   (Z00.00) Routine general medical examination at a health care facility  (primary encounter diagnosis)  Comment: For routine exam, we will draw labs as ordered, cholesterol, diabetes mellitus check, liver function, renal function.  We will also update vaccination history.   Plan:     (K92.1) Melena  Comment: Plan to continue to monitor " and continue apixaban as per cardiology recommendations.    Plan:     (C64.1) Renal cell carcinoma, right (H)  Comment: follow up in oncology.  Noted recent dizziness related to recent infusion  Plan:     (I48.20) Chronic atrial fibrillation (H)  Comment: consider a Watchman device  Plan:     (I25.10) Coronary artery disease involving native coronary artery of native heart without angina pectoris  Comment: doing well.  No issues   Plan:         Signed Electronically by: Nico Retana MD, MD    Answers submitted by the patient for this visit:  Patient Health Questionnaire (Submitted on 5/28/2024)  If you checked off any problems, how difficult have these problems made it for you to do your work, take care of things at home, or get along with other people?: Not difficult at all  PHQ9 TOTAL SCORE: 1

## 2024-05-29 LAB
ALBUMIN SERPL BCG-MCNC: 3.7 G/DL (ref 3.5–5.2)
ALP SERPL-CCNC: 86 U/L (ref 40–150)
ALT SERPL W P-5'-P-CCNC: 16 U/L (ref 0–70)
ANION GAP SERPL CALCULATED.3IONS-SCNC: 13 MMOL/L (ref 7–15)
AST SERPL W P-5'-P-CCNC: 23 U/L (ref 0–45)
BILIRUB SERPL-MCNC: 0.6 MG/DL
BUN SERPL-MCNC: 35.2 MG/DL (ref 8–23)
CALCIUM SERPL-MCNC: 9.2 MG/DL (ref 8.8–10.2)
CHLORIDE SERPL-SCNC: 103 MMOL/L (ref 98–107)
CREAT SERPL-MCNC: 2.28 MG/DL (ref 0.67–1.17)
DEPRECATED HCO3 PLAS-SCNC: 21 MMOL/L (ref 22–29)
EGFRCR SERPLBLD CKD-EPI 2021: 29 ML/MIN/1.73M2
GLUCOSE SERPL-MCNC: 110 MG/DL (ref 70–99)
POTASSIUM SERPL-SCNC: 4.5 MMOL/L (ref 3.4–5.3)
PROT SERPL-MCNC: 6.4 G/DL (ref 6.4–8.3)
SODIUM SERPL-SCNC: 137 MMOL/L (ref 135–145)
TSH SERPL DL<=0.005 MIU/L-ACNC: 2.9 UIU/ML (ref 0.3–4.2)

## 2024-05-30 ENCOUNTER — TELEPHONE (OUTPATIENT)
Dept: NEPHROLOGY | Facility: CLINIC | Age: 79
End: 2024-05-30
Payer: MEDICARE

## 2024-05-30 NOTE — RESULT ENCOUNTER NOTE
Abrahan Luke,    I have had the opportunity to review your recent results and an interpretation is as follows:  Your follow up comprehensive metabolic panel did show a rise in both creatinine and BUN - would recommend close follow up with nephrology and make sure to stay hydrated as best you can   Your hemoglobin A1c shows stable average blood glucose  Your thyroid function remains stable as well      Sincerely,  Nico Retana MD

## 2024-05-30 NOTE — TELEPHONE ENCOUNTER
Called patient, spoke to him and his wife. They are concerned about his recent lab resuts. Would like Leon to provide input on kidney function.     He started immunotherapy- on round 3 of 4 and wants to make sure he is okay to continue with his current kidney function.     Feeling well otherwise.    Sent message to schedulers to help schedule follow up

## 2024-05-30 NOTE — TELEPHONE ENCOUNTER
[facility] Call Center    Phone Message    May a detailed message be left on voicemail: no     Reason for Call: Other: Patient recently had labs done and they were concerned about the results of the labs. (Labs were done yesterday). Please call patients spouse to discuss results.     Action Taken: Other: [facility]    Travel Screening: Not Applicable

## 2024-05-31 NOTE — TELEPHONE ENCOUNTER
Sent denis with MD recommendations.      Hi  He needs to call his oncology team, they may choose to hold the therapy or offer steroids (they have a protocol for this).    Thanks

## 2024-06-03 ENCOUNTER — DOCUMENTATION ONLY (OUTPATIENT)
Dept: LAB | Facility: CLINIC | Age: 79
End: 2024-06-03
Payer: MEDICARE

## 2024-06-03 DIAGNOSIS — E55.9 VITAMIN D DEFICIENCY: ICD-10-CM

## 2024-06-03 DIAGNOSIS — N18.32 STAGE 3B CHRONIC KIDNEY DISEASE (H): Primary | ICD-10-CM

## 2024-06-12 ENCOUNTER — LAB (OUTPATIENT)
Dept: LAB | Facility: CLINIC | Age: 79
End: 2024-06-12
Payer: MEDICARE

## 2024-06-12 DIAGNOSIS — N18.32 STAGE 3B CHRONIC KIDNEY DISEASE (H): ICD-10-CM

## 2024-06-12 DIAGNOSIS — D63.8 ANEMIA OF CHRONIC DISEASE: ICD-10-CM

## 2024-06-12 DIAGNOSIS — K26.4 GASTROINTESTINAL HEMORRHAGE ASSOCIATED WITH DUODENAL ULCER: ICD-10-CM

## 2024-06-12 DIAGNOSIS — E55.9 VITAMIN D DEFICIENCY: ICD-10-CM

## 2024-06-12 LAB
ALBUMIN MFR UR ELPH: <6 MG/DL
ALBUMIN SERPL BCG-MCNC: 3.4 G/DL (ref 3.5–5.2)
ANION GAP SERPL CALCULATED.3IONS-SCNC: 10 MMOL/L (ref 7–15)
BASOPHILS # BLD AUTO: 0 10E3/UL (ref 0–0.2)
BASOPHILS NFR BLD AUTO: 0 %
BUN SERPL-MCNC: 23.2 MG/DL (ref 8–23)
CALCIUM SERPL-MCNC: 8.6 MG/DL (ref 8.8–10.2)
CHLORIDE SERPL-SCNC: 106 MMOL/L (ref 98–107)
CREAT SERPL-MCNC: 1.88 MG/DL (ref 0.67–1.17)
CREAT UR-MCNC: 44.8 MG/DL
DEPRECATED HCO3 PLAS-SCNC: 21 MMOL/L (ref 22–29)
EGFRCR SERPLBLD CKD-EPI 2021: 36 ML/MIN/1.73M2
EOSINOPHIL # BLD AUTO: 0.4 10E3/UL (ref 0–0.7)
EOSINOPHIL NFR BLD AUTO: 7 %
ERYTHROCYTE [DISTWIDTH] IN BLOOD BY AUTOMATED COUNT: 23.5 % (ref 10–15)
GLUCOSE SERPL-MCNC: 104 MG/DL (ref 70–99)
HCT VFR BLD AUTO: 30.7 % (ref 40–53)
HGB BLD-MCNC: 9.2 G/DL (ref 13.3–17.7)
IMM GRANULOCYTES # BLD: 0 10E3/UL
IMM GRANULOCYTES NFR BLD: 1 %
LYMPHOCYTES # BLD AUTO: 1.2 10E3/UL (ref 0.8–5.3)
LYMPHOCYTES NFR BLD AUTO: 19 %
MCH RBC QN AUTO: 27 PG (ref 26.5–33)
MCHC RBC AUTO-ENTMCNC: 30 G/DL (ref 31.5–36.5)
MCV RBC AUTO: 90 FL (ref 78–100)
MONOCYTES # BLD AUTO: 0.6 10E3/UL (ref 0–1.3)
MONOCYTES NFR BLD AUTO: 9 %
NEUTROPHILS # BLD AUTO: 4 10E3/UL (ref 1.6–8.3)
NEUTROPHILS NFR BLD AUTO: 64 %
NRBC # BLD AUTO: 0 10E3/UL
NRBC BLD AUTO-RTO: 0 /100
PHOSPHATE SERPL-MCNC: 3.1 MG/DL (ref 2.5–4.5)
PLATELET # BLD AUTO: 224 10E3/UL (ref 150–450)
POTASSIUM SERPL-SCNC: 4.2 MMOL/L (ref 3.4–5.3)
PROT/CREAT 24H UR: NORMAL MG/G{CREAT}
PTH-INTACT SERPL-MCNC: 24 PG/ML (ref 15–65)
RBC # BLD AUTO: 3.41 10E6/UL (ref 4.4–5.9)
SODIUM SERPL-SCNC: 137 MMOL/L (ref 135–145)
VIT D+METAB SERPL-MCNC: 36 NG/ML (ref 20–50)
WBC # BLD AUTO: 6.2 10E3/UL (ref 4–11)

## 2024-06-12 PROCEDURE — 36415 COLL VENOUS BLD VENIPUNCTURE: CPT

## 2024-06-12 PROCEDURE — 84156 ASSAY OF PROTEIN URINE: CPT

## 2024-06-12 PROCEDURE — 85025 COMPLETE CBC W/AUTO DIFF WBC: CPT

## 2024-06-12 PROCEDURE — 83970 ASSAY OF PARATHORMONE: CPT

## 2024-06-12 PROCEDURE — 82306 VITAMIN D 25 HYDROXY: CPT

## 2024-06-12 PROCEDURE — 80069 RENAL FUNCTION PANEL: CPT

## 2024-06-16 NOTE — PROGRESS NOTES
~Cardiology Clinic Visit~    Primary Cardiologist: Dr. Quarles  Reason for visit: Routine follow up    History of Present Illness    Ti Nickerson is a very pleasant 77 year old male with a past medical history notable for  idiopathic cardiomyopathy (LVEF has resolved; remains on low-dose BB only due to soft BP), chronic atrial fibrillation (rate control with low-dose BB; anticoagulation with apixaban), minimal non-obstructive CAD, RCC s/p partial right nephrectomy, AscA dilation, CKD3, KEENA, and GERD.  He follows with nephrology.     In summary, he is followed with Dr. Quarles for many years for concern of lightheadedness and dizziness when standing.  He has had documented blood pressures as low as 80s to 90s when standing.  In the past, we have cut back his blood pressure medication and heart medications to the point where he is only taking Toprol-XL 25 mg daily.  Unfortunately he continued to have symptoms with this.     In past, echocardiograms have been stable showing low normal EF with mild valvular insufficiency.  This was thought to not be the cause of his hypotension, and certainly not a cause of his mental status changes.  His atrial fibrillation is well controlled.  He was recommended support stockings for symptom management.     He was admitted at [facility] on 10/31/2023 for sepsis due to post-operative wound infection.  He had multiple other ER visits preceding his most recent admission.  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  Since then, he has continued to struggle with fatigue, weakness, lightheadedness and dizziness.  He does have orthostatic hypotension confirmed in clinic follow-up.  He was recommended compression stocking use and started on low-dose midodrine.     Diagnostics:    10/9/23 echocardiogram shows stable EF 55-60%, no regional  WMA identified.    23 BMP with stable Cr 1.39, Na 138, K 3.5; Hgb 9.1     Interval 24     Blood pressure continues to remain soft.  He had an episode of passing out while in [place] this January - likely multifactorial as he has had multiple hospitalizations and testing.  Understandably the heat may have contributed, and he does have documented orthostasis.  He wonders if there is an element of vertigo at play as well - he is scheduled to see his PCP later this week to discuss.  Pt and his wife really hoping to continue working to find answers for his symptoms.    When I saw the patient last in March, his blood pressures remain soft and he had an episode of passing out while in [place] this past January.  He does have documented orthostasis.  With his ongoing lightheadedness and dizziness, we increased his midodrine.  He continues to follow with oncology.  We also completed venous insufficiency testin. Right leg:   Deep venous system: No thrombus seen. Reflux in CFV (2.5s), FV (2.7s), and popliteal vein (5s). Superficial venous system: No thrombus seen. There is reflux in theGSV from the proximal thigh (8.4mm, 2.7s) through the proximal calf (6.6mm, 5.4s). No reflux in GSV in the mid calf and ankle. No reflux in the SSV or thigh extension vein. .  Varicose veins: Varicose veins in the medial calf, one extending anteriorly (4.6mm, 4.2s reflux), and extending posteriorly (4.8mm, 4.9s reflux) from the GSV. Varicose vein extending from  he SSV medially (5mm, 7.2s reflux).      2. Left leg:   Deep venous system: No thrombus seen. Reflux in CFV (1.5s), FV (1.6s), and popliteal vein (1.6s).  Superficial venous system: No thrombus seen. Reflux in GSV at the SFJ junction (9.5mm, 1.9s), proximal thigh (10.6mm, 6.2s), none in the mid to distal thigh, reflux at the knee (6.1mm, 6.1s), and none in the proximal calf to the ankle. There is reflux in the SSV (4.1mm, 3.2s).  Varicose veins: Varicose veins extending  from the GSV in the proximal thigh (8.7mm, 6.5s reflux), distally to the calf (8mm, 7.2s reflux)    In follow-up with his care team after our visit, he had labs done through oncology and noted to have a low hemoglobin in the 7 range.  At the time when I saw him, his most recent hemoglobin was from December and was stable for him in the 9.6 range.  With this  Hemoglobin, he has been receiving iron infusion.  With this, he has felt markedly improved and hemoglobin levels have improved.  Most recently from 6/12/2024 his hemoglobin is 9.2.  He has had no further episodes of dizziness or lightheadedness.  We discussed his venous testing and ways to support venous insufficiency with compression stocking use.  He continues to remain as active as he can with walking.  He really is feeling quite well today.    We also discussed Watchman device placement given his history of GIB, though in setting of cancer treatment.  Patient and spouse interested in general information about Watchman, not actively interested in pursuing.  Questions answered.  __________________________________________________________________         Assessment and Impression:     Persistent dizziness and lightheadedness  Orthostatic hypotension  BP stable.  Symptoms resolved.  Hgb stable.  Metastatic renal cell carcinoma, s/p right radical nephrectomy, partial  Recent abdominal seroma, s/p percutaneous drain placement (10/31/2023)  Chronic atrial fibrillation  Chronic HFpEF without exacerbation  Ascending aorta dilatation  CKD stage IIIb, stable  Obesity  KEENA (not on CPAP)  Dyslipidemia         Recommendations and Plan:     No changes to plan from a medication standpoint.  Ensure adequate hydration and use compression stockings to facilitate venous return.  Oncology plan pending.  Follow up with Dr. Quarles in 6-months for routine visit.  __________________________________________________________________    Thank you for the opportunity to participate in  this pleasant patient's care.    We would be happy to see this patient sooner for any concerns in the meantime.    NANCY Gray, CNP   Nurse Practitioner  [facility]    Today's clinic visit entailed:  The following tests were independently interpreted by me as noted in my documentation: venous testing, labs  Prescription drug management  The level of medical decision making during this visit was of moderate complexity.  Review of the result(s) of each unique test - cardiac testing, cardiac imaging, labs  Care everywhere reviewed for additional records to facilitate comprehensive patient care.    Orders this Visit:  Orders Placed This Encounter   Procedures    Follow-Up with Cardiology     No orders of the defined types were placed in this encounter.    Medications Discontinued During This Encounter   Medication Reason    sucralfate (CARAFATE) 1 GM tablet Discontinued by another Health Care Provider (No AVS)     Encounter Diagnoses   Name Primary?    Orthostatic hypotension Yes    Bilateral leg edema     Stage 3b chronic kidney disease (H)     Persistent atrial fibrillation (H)     Renal cell carcinoma, right (H)      CURRENT MEDICATIONS:  Current Outpatient Medications   Medication Sig Dispense Refill    acetaminophen (TYLENOL) 500 MG tablet Take 500-1,000 mg by mouth every 6 hours as needed for mild pain      apixaban ANTICOAGULANT (ELIQUIS) 5 MG tablet Take 1 tablet (5 mg) by mouth 2 times daily 180 tablet 3    ipilimumab (YERVOY) 200 MG/40ML SOLN Inject 125 mg into the vein      loperamide (IMODIUM) 2 MG capsule Take 2 mg by mouth 4 times daily as needed for diarrhea      methylprednisoLONE sodium succinate (SOLU-MEDROL) 2000 MG injection Inject 125 mg into the vein      midodrine (PROAMATINE) 5 MG tablet Take 1 tablet (5 mg) by mouth 2 times daily 60 tablet 4    multivitamin w/minerals (THERA-VIT-M) tablet Take 1 tablet by mouth daily      nivolumab (OPDIVO) 100 MG/10ML Inject 240  "mg into the vein      pantoprazole (PROTONIX) 40 MG EC tablet Take 1 tablet (40 mg) by mouth 2 times daily (before meals) (Patient taking differently: Take 40 mg by mouth 2 times daily) 60 tablet 0    pravastatin (PRAVACHOL) 20 MG tablet Take 1 tablet (20 mg) by mouth daily 90 tablet 3    tamsulosin (FLOMAX) 0.4 MG capsule TAKE 1 CAPSULE BY MOUTH EVERY DAY 90 capsule 1    ASPIRIN NOT PRESCRIBED (INTENTIONAL) Please choose reason not prescribed, below (Patient not taking: Reported on 5/28/2024)       ALLERGIES     Allergies   Allergen Reactions    Fentanyl Confusion     PAST MEDICAL, SURGICAL, FAMILY, SOCIAL HISTORY:  History was reviewed and updated as needed, see medical record.    Review of Systems:  A 10-point Review Of Systems is otherwise normal except for that which is noted in the HPI and interval summary.    Physical Exam:    Vitals: /72   Pulse 84   Ht 1.905 m (6' 3\")   Wt 125.6 kg (276 lb 12.8 oz)   SpO2 96%   BMI 34.60 kg/m    Constitutional: Appears stated age, well nourished, NAD.  Neck: Supple. Carotid pulses full and equal.   Respiratory: Non-labored. Lungs CTAB.  Cardiovascular: RRR, normal S1 and S2. No M/G/R.  Chronic lymphedema.  GI: Soft, non-distended, non-tender.  Skin: Warm and dry.   Musculoskeletal/Extremities: Symmetrical movement.  Neurologic: No gross focal deficits. Alert, awake.  Psychiatric: Affect appropriate. Mentation normal.    Recent Lab Results:  LIPID RESULTS:  Lab Results   Component Value Date    CHOL 113 10/09/2023    CHOL 116 06/10/2021    HDL 39 (L) 10/09/2023    HDL 45 06/10/2021    LDL 57 10/09/2023    LDL 56 06/10/2021    TRIG 84 10/09/2023    TRIG 74 06/10/2021    CHOLHDLRATIO 2.8 09/17/2015     LIVER ENZYME RESULTS:  Lab Results   Component Value Date    AST 23 05/28/2024    AST 19 06/10/2021    ALT 16 05/28/2024    ALT 24 06/10/2021     CBC RESULTS:  Lab Results   Component Value Date    WBC 6.2 06/12/2024    WBC 5.8 06/16/2021    RBC 3.41 (L) 06/12/2024    " RBC 4.32 (L) 06/16/2021    HGB 9.2 (L) 06/12/2024    HGB 14.2 06/16/2021    HCT 30.7 (L) 06/12/2024    HCT 41.9 06/16/2021    MCV 90 06/12/2024    MCV 97 06/16/2021    MCH 27.0 06/12/2024    MCH 32.9 06/16/2021    MCHC 30.0 (L) 06/12/2024    MCHC 33.9 06/16/2021    RDW 23.5 (H) 06/12/2024    RDW 12.9 06/16/2021     06/12/2024     (L) 06/16/2021     BMP RESULTS:  Lab Results   Component Value Date     06/12/2024     06/10/2021    POTASSIUM 4.2 06/12/2024    POTASSIUM 4.4 09/21/2022    POTASSIUM 4.5 06/10/2021    CHLORIDE 106 06/12/2024    CHLORIDE 109 09/21/2022    CHLORIDE 111 (H) 06/10/2021    CO2 21 (L) 06/12/2024    CO2 25 09/21/2022    CO2 25 06/10/2021    ANIONGAP 10 06/12/2024    ANIONGAP 6 09/21/2022    ANIONGAP 4 06/10/2021     (H) 06/12/2024    GLC 66 (L) 09/21/2022    GLC 97 06/10/2021    BUN 23.2 (H) 06/12/2024    BUN 27 09/21/2022    BUN 30 06/10/2021    CR 1.88 (H) 06/12/2024    CR 1.68 (H) 06/10/2021    GFRESTIMATED 36 (L) 06/12/2024    GFRESTIMATED 39 (L) 10/24/2023    GFRESTIMATED 39 (L) 06/10/2021    GFRESTBLACK 46 (L) 06/10/2021    DOMINICK 8.6 (L) 06/12/2024    DOMINICK 8.5 06/10/2021      A1C RESULTS:  Lab Results   Component Value Date    A1C 5.2 05/28/2024    A1C 5.6 12/08/2017     INR RESULTS:  Lab Results   Component Value Date    INR 2.29 (H) 10/25/2023    INR 1.22 (H) 09/13/2023    INR 2.3 06/17/2013    INR 2.4 05/08/2013

## 2024-06-18 ENCOUNTER — OFFICE VISIT (OUTPATIENT)
Dept: CARDIOLOGY | Facility: CLINIC | Age: 79
End: 2024-06-18
Attending: NURSE PRACTITIONER
Payer: MEDICARE

## 2024-06-18 VITALS
SYSTOLIC BLOOD PRESSURE: 106 MMHG | DIASTOLIC BLOOD PRESSURE: 72 MMHG | HEART RATE: 84 BPM | HEIGHT: 75 IN | BODY MASS INDEX: 34.42 KG/M2 | OXYGEN SATURATION: 96 % | WEIGHT: 276.8 LBS

## 2024-06-18 DIAGNOSIS — N18.32 STAGE 3B CHRONIC KIDNEY DISEASE (H): ICD-10-CM

## 2024-06-18 DIAGNOSIS — I95.1 ORTHOSTATIC HYPOTENSION: Primary | ICD-10-CM

## 2024-06-18 DIAGNOSIS — C64.1 RENAL CELL CARCINOMA, RIGHT (H): ICD-10-CM

## 2024-06-18 DIAGNOSIS — R60.0 BILATERAL LEG EDEMA: ICD-10-CM

## 2024-06-18 DIAGNOSIS — I48.19 PERSISTENT ATRIAL FIBRILLATION (H): ICD-10-CM

## 2024-06-18 PROCEDURE — 99214 OFFICE O/P EST MOD 30 MIN: CPT | Performed by: NURSE PRACTITIONER

## 2024-06-18 NOTE — LETTER
6/18/2024    Nico Retana MD, MD  [address]  [facility] [address]    RE: Ti Durandnemaribel       Dear Colleague,     I had the pleasure of seeing Ti Nickerson in the [facility].              ~Cardiology Clinic Visit~    Primary Cardiologist: Dr. Quarles  Reason for visit: Routine follow up    History of Present Illness    Ti Nickerson is a very pleasant 77 year old male with a past medical history notable for  idiopathic cardiomyopathy (LVEF has resolved; remains on low-dose BB only due to soft BP), chronic atrial fibrillation (rate control with low-dose BB; anticoagulation with apixaban), minimal non-obstructive CAD, RCC s/p partial right nephrectomy, AscA dilation, CKD3, KEENA, and GERD.  He follows with nephrology.     In summary, he is followed with Dr. Quarles for many years for concern of lightheadedness and dizziness when standing.  He has had documented blood pressures as low as 80s to 90s when standing.  In the past, we have cut back his blood pressure medication and heart medications to the point where he is only taking Toprol-XL 25 mg daily.  Unfortunately he continued to have symptoms with this.     In past, echocardiograms have been stable showing low normal EF with mild valvular insufficiency.  This was thought to not be the cause of his hypotension, and certainly not a cause of his mental status changes.  His atrial fibrillation is well controlled.  He was recommended support stockings for symptom management.     He was admitted at [facility] on 10/31/2023 for sepsis due to post-operative wound infection.  He had multiple other ER visits preceding his most recent admission.  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  Since then, he has continued to struggle with fatigue, weakness, lightheadedness and dizziness.  He  does have orthostatic hypotension confirmed in clinic follow-up.  He was recommended compression stocking use and started on low-dose midodrine.     Diagnostics:    10/9/23 echocardiogram shows stable EF 55-60%, no regional WMA identified.    23 BMP with stable Cr 1.39, Na 138, K 3.5; Hgb 9.1     Interval 24     Blood pressure continues to remain soft.  He had an episode of passing out while in [place] this January - likely multifactorial as he has had multiple hospitalizations and testing.  Understandably the heat may have contributed, and he does have documented orthostasis.  He wonders if there is an element of vertigo at play as well - he is scheduled to see his PCP later this week to discuss.  Pt and his wife really hoping to continue working to find answers for his symptoms.    When I saw the patient last in March, his blood pressures remain soft and he had an episode of passing out while in [place] this past January.  He does have documented orthostasis.  With his ongoing lightheadedness and dizziness, we increased his midodrine.  He continues to follow with oncology.  We also completed venous insufficiency testin. Right leg:   Deep venous system: No thrombus seen. Reflux in CFV (2.5s), FV (2.7s), and popliteal vein (5s). Superficial venous system: No thrombus seen. There is reflux in theGSV from the proximal thigh (8.4mm, 2.7s) through the proximal calf (6.6mm, 5.4s). No reflux in GSV in the mid calf and ankle. No reflux in the SSV or thigh extension vein. .  Varicose veins: Varicose veins in the medial calf, one extending anteriorly (4.6mm, 4.2s reflux), and extending posteriorly (4.8mm, 4.9s reflux) from the GSV. Varicose vein extending from  he SSV medially (5mm, 7.2s reflux).      2. Left leg:   Deep venous system: No thrombus seen. Reflux in CFV (1.5s), FV (1.6s), and popliteal vein (1.6s).  Superficial venous system: No thrombus seen. Reflux in GSV at the SFJ junction (9.5mm, 1.9s),  proximal thigh (10.6mm, 6.2s), none in the mid to distal thigh, reflux at the knee (6.1mm, 6.1s), and none in the proximal calf to the ankle. There is reflux in the SSV (4.1mm, 3.2s).  Varicose veins: Varicose veins extending from the GSV in the proximal thigh (8.7mm, 6.5s reflux), distally to the calf (8mm, 7.2s reflux)    In follow-up with his care team after our visit, he had labs done through oncology and noted to have a low hemoglobin in the 7 range.  At the time when I saw him, his most recent hemoglobin was from December and was stable for him in the 9.6 range.  With this  Hemoglobin, he has been receiving iron infusion.  With this, he has felt markedly improved and hemoglobin levels have improved.  Most recently from 6/12/2024 his hemoglobin is 9.2.  He has had no further episodes of dizziness or lightheadedness.  We discussed his venous testing and ways to support venous insufficiency with compression stocking use.  He continues to remain as active as he can with walking.  He really is feeling quite well today.    We also discussed Watchman device placement given his history of GIB, though in setting of cancer treatment.  Patient and spouse interested in general information about Watchman, not actively interested in pursuing.  Questions answered.  __________________________________________________________________         Assessment and Impression:     Persistent dizziness and lightheadedness  Orthostatic hypotension  BP stable.  Symptoms resolved.  Hgb stable.  Metastatic renal cell carcinoma, s/p right radical nephrectomy, partial  Recent abdominal seroma, s/p percutaneous drain placement (10/31/2023)  Chronic atrial fibrillation  Chronic HFpEF without exacerbation  Ascending aorta dilatation  CKD stage IIIb, stable  Obesity  KEENA (not on CPAP)  Dyslipidemia         Recommendations and Plan:     No changes to plan from a medication standpoint.  Ensure adequate hydration and use compression stockings to  facilitate venous return.  Oncology plan pending.  Follow up with Dr. Quarles in 6-months for routine visit.  __________________________________________________________________    Thank you for the opportunity to participate in this pleasant patient's care.    We would be happy to see this patient sooner for any concerns in the meantime.    NANCY Gray, CNP   Nurse Practitioner  [facility]    Today's clinic visit entailed:  The following tests were independently interpreted by me as noted in my documentation: venous testing, labs  Prescription drug management  The level of medical decision making during this visit was of moderate complexity.  Review of the result(s) of each unique test - cardiac testing, cardiac imaging, labs  Care everywhere reviewed for additional records to facilitate comprehensive patient care.    Orders this Visit:  Orders Placed This Encounter   Procedures    Follow-Up with Cardiology     No orders of the defined types were placed in this encounter.    Medications Discontinued During This Encounter   Medication Reason    sucralfate (CARAFATE) 1 GM tablet Discontinued by another Health Care Provider (No AVS)     Encounter Diagnoses   Name Primary?    Orthostatic hypotension Yes    Bilateral leg edema     Stage 3b chronic kidney disease (H)     Persistent atrial fibrillation (H)     Renal cell carcinoma, right (H)      CURRENT MEDICATIONS:  Current Outpatient Medications   Medication Sig Dispense Refill    acetaminophen (TYLENOL) 500 MG tablet Take 500-1,000 mg by mouth every 6 hours as needed for mild pain      apixaban ANTICOAGULANT (ELIQUIS) 5 MG tablet Take 1 tablet (5 mg) by mouth 2 times daily 180 tablet 3    ipilimumab (YERVOY) 200 MG/40ML SOLN Inject 125 mg into the vein      loperamide (IMODIUM) 2 MG capsule Take 2 mg by mouth 4 times daily as needed for diarrhea      methylprednisoLONE sodium succinate (SOLU-MEDROL) 2000 MG injection Inject 125 mg into  "the vein      midodrine (PROAMATINE) 5 MG tablet Take 1 tablet (5 mg) by mouth 2 times daily 60 tablet 4    multivitamin w/minerals (THERA-VIT-M) tablet Take 1 tablet by mouth daily      nivolumab (OPDIVO) 100 MG/10ML Inject 240 mg into the vein      pantoprazole (PROTONIX) 40 MG EC tablet Take 1 tablet (40 mg) by mouth 2 times daily (before meals) (Patient taking differently: Take 40 mg by mouth 2 times daily) 60 tablet 0    pravastatin (PRAVACHOL) 20 MG tablet Take 1 tablet (20 mg) by mouth daily 90 tablet 3    tamsulosin (FLOMAX) 0.4 MG capsule TAKE 1 CAPSULE BY MOUTH EVERY DAY 90 capsule 1    ASPIRIN NOT PRESCRIBED (INTENTIONAL) Please choose reason not prescribed, below (Patient not taking: Reported on 5/28/2024)       ALLERGIES     Allergies   Allergen Reactions    Fentanyl Confusion     PAST MEDICAL, SURGICAL, FAMILY, SOCIAL HISTORY:  History was reviewed and updated as needed, see medical record.    Review of Systems:  A 10-point Review Of Systems is otherwise normal except for that which is noted in the HPI and interval summary.    Physical Exam:    Vitals: /72   Pulse 84   Ht 1.905 m (6' 3\")   Wt 125.6 kg (276 lb 12.8 oz)   SpO2 96%   BMI 34.60 kg/m    Constitutional: Appears stated age, well nourished, NAD.  Neck: Supple. Carotid pulses full and equal.   Respiratory: Non-labored. Lungs CTAB.  Cardiovascular: RRR, normal S1 and S2. No M/G/R.  Chronic lymphedema.  GI: Soft, non-distended, non-tender.  Skin: Warm and dry.   Musculoskeletal/Extremities: Symmetrical movement.  Neurologic: No gross focal deficits. Alert, awake.  Psychiatric: Affect appropriate. Mentation normal.    Recent Lab Results:  LIPID RESULTS:  Lab Results   Component Value Date    CHOL 113 10/09/2023    CHOL 116 06/10/2021    HDL 39 (L) 10/09/2023    HDL 45 06/10/2021    LDL 57 10/09/2023    LDL 56 06/10/2021    TRIG 84 10/09/2023    TRIG 74 06/10/2021    CHOLHDLRATIO 2.8 09/17/2015     LIVER ENZYME RESULTS:  Lab Results "   Component Value Date    AST 23 05/28/2024    AST 19 06/10/2021    ALT 16 05/28/2024    ALT 24 06/10/2021     CBC RESULTS:  Lab Results   Component Value Date    WBC 6.2 06/12/2024    WBC 5.8 06/16/2021    RBC 3.41 (L) 06/12/2024    RBC 4.32 (L) 06/16/2021    HGB 9.2 (L) 06/12/2024    HGB 14.2 06/16/2021    HCT 30.7 (L) 06/12/2024    HCT 41.9 06/16/2021    MCV 90 06/12/2024    MCV 97 06/16/2021    MCH 27.0 06/12/2024    MCH 32.9 06/16/2021    MCHC 30.0 (L) 06/12/2024    MCHC 33.9 06/16/2021    RDW 23.5 (H) 06/12/2024    RDW 12.9 06/16/2021     06/12/2024     (L) 06/16/2021     BMP RESULTS:  Lab Results   Component Value Date     06/12/2024     06/10/2021    POTASSIUM 4.2 06/12/2024    POTASSIUM 4.4 09/21/2022    POTASSIUM 4.5 06/10/2021    CHLORIDE 106 06/12/2024    CHLORIDE 109 09/21/2022    CHLORIDE 111 (H) 06/10/2021    CO2 21 (L) 06/12/2024    CO2 25 09/21/2022    CO2 25 06/10/2021    ANIONGAP 10 06/12/2024    ANIONGAP 6 09/21/2022    ANIONGAP 4 06/10/2021     (H) 06/12/2024    GLC 66 (L) 09/21/2022    GLC 97 06/10/2021    BUN 23.2 (H) 06/12/2024    BUN 27 09/21/2022    BUN 30 06/10/2021    CR 1.88 (H) 06/12/2024    CR 1.68 (H) 06/10/2021    GFRESTIMATED 36 (L) 06/12/2024    GFRESTIMATED 39 (L) 10/24/2023    GFRESTIMATED 39 (L) 06/10/2021    GFRESTBLACK 46 (L) 06/10/2021    DOMINICK 8.6 (L) 06/12/2024    DOMINICK 8.5 06/10/2021      A1C RESULTS:  Lab Results   Component Value Date    A1C 5.2 05/28/2024    A1C 5.6 12/08/2017     INR RESULTS:  Lab Results   Component Value Date    INR 2.29 (H) 10/25/2023    INR 1.22 (H) 09/13/2023    INR 2.3 06/17/2013    INR 2.4 05/08/2013                     Thank you for allowing me to participate in the care of your patient.      Sincerely,     NANCY Gray CNP     [facility]  cc:   NANCY Gray CNP  [address]  [place] [address]

## 2024-06-18 NOTE — PATIENT INSTRUCTIONS
Thank you for your visit with the [facility] today.    Today's Summary:    Continue current medications without changes.  Wear compression stockings to help with venous return and reducing edema.    Follow-up:  Cardiology follow up at [facility]: 6-months with Dr. Quarles.     Cardiology Scheduling ~[phone]  Cardiology Clinic RN ~ [phone]    It was a pleasure seeing you today.     NANCY Gray, CNP  Certified Nurse Practitioner  [facility]  June 18, 2024  ________________________________________________________

## 2024-06-19 ENCOUNTER — OFFICE VISIT (OUTPATIENT)
Dept: NEPHROLOGY | Facility: CLINIC | Age: 79
End: 2024-06-19
Payer: MEDICARE

## 2024-06-19 VITALS
DIASTOLIC BLOOD PRESSURE: 79 MMHG | BODY MASS INDEX: 34.5 KG/M2 | HEART RATE: 64 BPM | OXYGEN SATURATION: 98 % | SYSTOLIC BLOOD PRESSURE: 120 MMHG | WEIGHT: 276 LBS

## 2024-06-19 DIAGNOSIS — I95.9 HYPOTENSION, UNSPECIFIED HYPOTENSION TYPE: ICD-10-CM

## 2024-06-19 DIAGNOSIS — E87.20 METABOLIC ACIDOSIS: ICD-10-CM

## 2024-06-19 DIAGNOSIS — N18.32 ANEMIA DUE TO STAGE 3B CHRONIC KIDNEY DISEASE (H): ICD-10-CM

## 2024-06-19 DIAGNOSIS — N18.32 STAGE 3B CHRONIC KIDNEY DISEASE (H): Primary | ICD-10-CM

## 2024-06-19 DIAGNOSIS — D63.1 ANEMIA DUE TO STAGE 3B CHRONIC KIDNEY DISEASE (H): ICD-10-CM

## 2024-06-19 PROCEDURE — 99214 OFFICE O/P EST MOD 30 MIN: CPT | Performed by: PHYSICIAN ASSISTANT

## 2024-06-19 RX ORDER — SODIUM BICARBONATE 650 MG/1
650 TABLET ORAL DAILY
Qty: 90 TABLET | Refills: 3 | Status: SHIPPED | OUTPATIENT
Start: 2024-06-19 | End: 2024-09-05

## 2024-06-19 ASSESSMENT — PAIN SCALES - GENERAL: PAINLEVEL: NO PAIN (0)

## 2024-06-19 NOTE — PROGRESS NOTES
Nephrology Progress Note  6/19/2024    Assessment and Plan:  78 year old male with history of renal cell carcinoma s/p right nephrectomy 2019, Scr 1.1 up to 1.6-1.7 post nephrectomy, with metastatic disease to lungs and new liver nodule noted on ultrasound. He presents for followup of CKD, first see Fall 2021.   # CKD stage 3b- Scr 1.6-1.7, eGFR 35-40ml/min, more recently has been closer to 1.7-2, eGFR 30-35.  He has no albuminuria, normal blood pressure with no medication. In fact he has low BP at this time and is taking midodrine which helps.   He has been monitoring disease and is now on chemotherapy, managed by oncology at [place].   - we reviewed diet and exercise and discussed blood pressure    # Hypertension: blood pressures are better now on midodrine, usually 100s-120/s  -Taking midodrine 5 mg BID  - follows with cardiology  - no changes today    # Anemia  - Hgb: 9.2, improved from 7.1 with transfusion and iron infusions  - will monitor    # Electrolytes:   - Potassium; level: Normal  - Bicarbonate; level: 21 Low  - start sodium bicarb 650 mg daily.     # Mineral Bone Disorder:   - Vitamin D; level is: Normal  - Corrected alcium; level is 9.08 :  Normal   - Phos 3.1, Vit D 36, PTH 24    Orthostatic hypotension- improved with stopping metoprolol and starting midodrine.  - continue compression stockings, consider abdominal binder. Follows with cardiology.  - low suspicion for adrenal insufficiency      Assessment and plan was discussed with patient and he voiced his understanding and agreement.    #Disposition: labs and follow up in 3 months    Reason for Visit:  Ti Nickerson is a 78 year old male with CKD from nephrectomy, who presents for evaluation.    HPI:  He is a very pleasant male with history of renal cell carcinoma, diagnosed when he had olivia hematuria episode in 2019. He underwent right nephrectomy and has been monitored by dr Hernández at [facility].  He had CT scan in September that showed  stable pulmonary lesions. He had an abdominal ultrasound that showed a ?new 3.4cm nodule in his liver.    He ultimately had CT at [place] which confirmed progression metastatic RCC. He is following with oncology and will complete # 4 of 4 infusion treatments tomorrow. He has repeat CT imaging planned next month.       They sold their home and moved into assisted living where there is a gym  They usually eat healthy   He has atrial fibrillation and on anticoagulation  His blood pressure runs 100-120s/ and he is feeling better.   He denies any shortness of breath or swelling. He feels fairly well except when he is in afib sometimes.  He sees Dr Quarles in cardiology who manages his afib  He is accompanied by his wife during the visit.   He denies family history of kidney failure, his parents lived to their 90s        Renal History:   Kidney Disease and Medical Hx:  Right nephrectomy    ROS:   A comprehensive review of systems was obtained and negative, except as noted in the HPI or PMH.    Active Medical Problems:  Patient Active Problem List   Diagnosis    Chronic atrial fibrillation (H)    Idiopathic cardiomyopathy (H)    Coronary artery disease involving native coronary artery of native heart without angina pectoris    Esophageal reflux    Mixed hyperlipidemia    Sleep apnea    Essential hypertension    Obesity (BMI 35.0-39.9) with comorbidity (H)    Thoracic aortic aneurysm without rupture (H24)    Tubular adenoma of colon    Elevated TSH    Renal cell carcinoma, right (H)    CKD (chronic kidney disease) stage 3, GFR 30-59 ml/min (H)    Ventral hernia without obstruction or gangrene    Long term (current) use of anticoagulants    Melena    Cellulitis of abdominal wall    Anemia of chronic disease     PMH:   Medical record was reviewed and PMH was discussed with patient and noted below.  Past Medical History:   Diagnosis Date    Atrial fibrillation, unspecified 9/18/2015    CAD (coronary artery disease) 09/18/2015     minimal by angio 2007, fu nuc est nl 2018    Elevated TSH 2018    Esophageal reflux 9/18/2015    Essential hypertension     History of cardioversion     [id number]    Idiopathic cardiomyopathy (H) 09/18/2015    fu echo nl ef, nl nuclear est 11/18, Dr. Quarles    Mixed hyperlipidemia 9/18/2015    Mumps     Sleep apnea 09/18/2015    does not use cpap as of 2019    Sleep apnea     Thoracic aortic aneurysm (H24)     sinus of valsalva 4.5 and asc aorta 4.5 in 2017    Tubular adenoma of colon 01/2017    fu 3 years     PSH:   Past Surgical History:   Procedure Laterality Date    APPENDECTOMY  1964    ESOPHAGOSCOPY, GASTROSCOPY, DUODENOSCOPY (EGD), COMBINED N/A 9/14/2023    Procedure: Esophagoscopy, gastroscopy, duodenoscopy (EGD), combined;  Surgeon: Chele Ash MD;  Location:  GI       Family Hx:   Family History   Problem Relation Age of Onset    Arrhythmia Mother         a-fib    Cerebrovascular Disease Mother     Arrhythmia Father         a-fib    Colon Cancer Father     Diabetes Father     Cerebrovascular Disease Father     No Known Problems Brother      Personal Hx:   Social History     Tobacco Use    Smoking status: Never    Smokeless tobacco: Never   Substance Use Topics    Alcohol use: Not Currently       Allergies:  Allergies   Allergen Reactions    Fentanyl Confusion       Medications:  Current Outpatient Medications   Medication Sig Dispense Refill    acetaminophen (TYLENOL) 500 MG tablet Take 500-1,000 mg by mouth every 6 hours as needed for mild pain      apixaban ANTICOAGULANT (ELIQUIS) 5 MG tablet Take 1 tablet (5 mg) by mouth 2 times daily 180 tablet 3    ASPIRIN NOT PRESCRIBED (INTENTIONAL) Please choose reason not prescribed, below (Patient not taking: Reported on 5/28/2024)      ipilimumab (YERVOY) 200 MG/40ML SOLN Inject 125 mg into the vein      loperamide (IMODIUM) 2 MG capsule Take 2 mg by mouth 4 times daily as needed for diarrhea      methylprednisoLONE sodium succinate (SOLU-MEDROL)  2000 MG injection Inject 125 mg into the vein      midodrine (PROAMATINE) 5 MG tablet Take 1 tablet (5 mg) by mouth 2 times daily 60 tablet 4    multivitamin w/minerals (THERA-VIT-M) tablet Take 1 tablet by mouth daily      nivolumab (OPDIVO) 100 MG/10ML Inject 240 mg into the vein      pantoprazole (PROTONIX) 40 MG EC tablet Take 1 tablet (40 mg) by mouth 2 times daily (before meals) (Patient taking differently: Take 40 mg by mouth 2 times daily) 60 tablet 0    pravastatin (PRAVACHOL) 20 MG tablet Take 1 tablet (20 mg) by mouth daily 90 tablet 3    tamsulosin (FLOMAX) 0.4 MG capsule TAKE 1 CAPSULE BY MOUTH EVERY DAY 90 capsule 1     No current facility-administered medications for this visit.      Vitals:  /79 (BP Location: Left arm, Patient Position: Sitting, Cuff Size: Adult Large)   Pulse 64   Wt 125.2 kg (276 lb)   SpO2 98%   BMI 34.50 kg/m      Exam:  GENERAL APPEARANCE: alert and no distress  RESP: lungs clear to auscultation - no rales, rhonchi or wheezes  CV: irregular rhythm, normal rate, no rub, no murmur  EDEMA: no LE edema bilaterally  MS: extremities normal - no gross deformities noted, no evidence of inflammation in joints, no muscle tenderness  SKIN: no rash    LABS:   CMP  Recent Labs   Lab Test 06/12/24  1014 05/28/24  1649 03/26/24  1355 03/22/24  1000 10/11/21  0942 06/10/21  0845 04/28/21  0000 11/20/20  0000 10/26/20  0936    137 139 138   < > 140  --   --  139   POTASSIUM 4.2 4.5 4.6 4.6   < > 4.5 5.2 4.5 5.1   CHLORIDE 106 103 106 107   < > 111*  --   --  105   CO2 21* 21* 21* 20*   < > 25  --   --  29   ANIONGAP 10 13 12 11   < > 4  --   --  10.1   * 110* 101* 101*   < > 97 102 98 113*   BUN 23.2* 35.2* 34.1* 33.3*   < > 30  --   --  28   CR 1.88* 2.28* 1.96* 1.65*   < > 1.68* 1.89* 1.87* 1.77*   GFRESTIMATED 36* 29* 35* 43*   < > 39* 34* 35* 38*   GFRESTBLACK  --   --   --   --   --  46* 40* 40* 46*   DOMINICK 8.6* 9.2 8.6* 8.8   < > 8.5  --   --  9.2    < > = values in  this interval not displayed.     Recent Labs   Lab Test 05/28/24  1649 03/26/24  1355 10/31/23  0102 10/25/23  1002   BILITOTAL 0.6 0.5 0.6 0.5   ALKPHOS 86 70 67 60   ALT 16 14 19 18   AST 23 19 26 23     CBC  Recent Labs   Lab Test 06/12/24  1014 04/16/24  1407 04/12/24  0947 04/08/24  0914   HGB 9.2* 7.8* 7.1* 7.4*   WBC 6.2 6.1 5.9 6.2   RBC 3.41* 3.24* 2.96* 3.05*   HCT 30.7* 26.6* 24.4* 25.3*   MCV 90 82 82 83   MCH 27.0 24.1* 24.0* 24.3*   MCHC 30.0* 29.3* 29.1* 29.2*   RDW 23.5* 16.6* 16.9* 16.7*    230 225 212     URINE STUDIES  Recent Labs   Lab Test 10/31/23  0142 09/05/23  0938 10/11/21  0947 12/26/19  0812 09/25/19  0859 09/17/19  1442   COLOR Yellow Yellow Yellow Yellow Yellow Yellow   APPEARANCE Clear Clear Clear Clear Clear Clear   URINEGLC Negative Negative Negative Negative Negative Negative   URINEBILI Negative Negative Negative Negative Negative Negative   URINEKETONE Negative Negative Negative Negative Negative Negative   SG 1.021 1.025 1.023 1.020 1.020 1.025   UBLD Negative Trace* Trace* Small* Large* Large*   URINEPH 5.5 5.5 5.0 5.0 5.0 5.0   PROTEIN Negative Negative Negative Negative Negative Negative   UROBILINOGEN  --  1.0  --  0.2 0.2 0.2   NITRITE Negative Negative Negative Negative Negative Negative   LEUKEST Negative Negative Negative Negative Negative Negative   RBCU 1 0-2 1 O - 2  --  5-10*   WBCU 2 0-5 <1 0 - 5  --  0 - 5     No lab results found.  PTH  Recent Labs   Lab Test 06/12/24  1014 09/21/22  1102 10/11/21  0942   PTHI 24 45 36     IRON STUDIES  Recent Labs   Lab Test 04/16/24  1407 10/11/21  0942 06/10/21  0845 12/26/19  0812   IRON 17* 97  --  60    304  --  307   IRONSAT 5* 32  --  20   TACHO 12* 274 250 160     Interpretation Summary     The left ventricle is borderline dilated.  There is mild concentric left ventricular hypertrophy.  The visual ejection fraction is 55-60%.  The right ventricle is normal in structure, function and size.  Normal left atrial  size.  The right atrium is moderate to severely dilated.  There is mild (1+) mitral regurgitation.  There is trace tricuspid regurgitation.  The right ventricular systolic pressure is approximated at 24mmHg plus the  right atrial pressure.  There is trace aortic regurgitation.  Mild aortic root dilatation. The aortic root and ascending aorta both measure  4.6 cm, There has been some variability in measured aortic size over previous  echoes, but overall likely no change since 2016.  ______________________________________________________________________________  Left Ventricle  The left ventricle is borderline dilated. There is mild concentric left  ventricular hypertrophy. The visual ejection fraction is 55-60%. Diastolic  function not assessed due to atrial fibrillation. Diastolic Doppler findings  (E/E' ratio and/or other parameters) suggest left ventricular filling  pressures are indeterminate. No regional wall motion abnormalities noted.     Right Ventricle  The right ventricle is normal in structure, function and size.     Atria  Normal left atrial size. The right atrium is moderate to severely dilated.  There is no atrial shunt seen.     Mitral Valve  The mitral valve leaflets appear normal. There is no evidence of stenosis,  fluttering, or prolapse. There is mild (1+) mitral regurgitation.     Tricuspid Valve  There is trace tricuspid regurgitation. The right ventricular systolic  pressure is approximated at 24mmHg plus the right atrial pressure. IVC  diameter and respiratory changes fall into an intermediate range suggesting an  RA pressure of 8 mmHg.     Aortic Valve  There is mild trileaflet aortic sclerosis. There is trace aortic  regurgitation.     Pulmonic Valve  There is mild (1+) pulmonic valvular regurgitation.     Vessels  Mild aortic root dilatation.     Pericardium  There is no pericardial effusion.     Rhythm  The rhythm was atrial  fibrillation.  ______________________________________________________________________________  MMode/2D Measurements & Calculations        Klaudia Lazo PA-C    Visit length 15 minutes. An additional 15 minutes were spent on date of service in chart review, documentation, and other activities as noted.

## 2024-06-19 NOTE — PATIENT INSTRUCTIONS
Start sodium bicarb 650 mg daily, prescription sent.   Avoid ibuprofen/aleve.   Continue good hydration.   Labs and follow up in 3 months.

## 2024-06-19 NOTE — NURSING NOTE
Chief Complaint   Patient presents with    RECHECK     Stage 3a chronic kidney disease       Vitals:    06/19/24 1029   BP: 120/79   BP Location: Left arm   Patient Position: Sitting   Cuff Size: Adult Large   Pulse: 64   SpO2: 98%   Weight: 125.2 kg (276 lb)       Body mass index is 34.5 kg/m .      Irwin Farr MA

## 2024-07-14 ENCOUNTER — HOSPITAL ENCOUNTER (INPATIENT)
Facility: CLINIC | Age: 79
LOS: 5 days | Discharge: HOME | DRG: 374 | End: 2024-07-19
Attending: EMERGENCY MEDICINE | Admitting: INTERNAL MEDICINE
Payer: MEDICARE

## 2024-07-14 ENCOUNTER — APPOINTMENT (OUTPATIENT)
Dept: CT IMAGING | Facility: CLINIC | Age: 79
DRG: 374 | End: 2024-07-14
Attending: EMERGENCY MEDICINE
Payer: MEDICARE

## 2024-07-14 DIAGNOSIS — D63.8 ANEMIA OF CHRONIC DISEASE: Primary | ICD-10-CM

## 2024-07-14 DIAGNOSIS — K92.1 MELENA: ICD-10-CM

## 2024-07-14 DIAGNOSIS — R53.81 PHYSICAL DECONDITIONING: ICD-10-CM

## 2024-07-14 DIAGNOSIS — R55 SYNCOPE, UNSPECIFIED SYNCOPE TYPE: ICD-10-CM

## 2024-07-14 DIAGNOSIS — K92.2 LOWER GI BLEED: ICD-10-CM

## 2024-07-14 LAB
ABO/RH(D): NORMAL
ALBUMIN SERPL BCG-MCNC: 2.9 G/DL (ref 3.5–5.2)
ALBUMIN SERPL BCG-MCNC: 2.9 G/DL (ref 3.5–5.2)
ALP SERPL-CCNC: 80 U/L (ref 40–150)
ALP SERPL-CCNC: 80 U/L (ref 40–150)
ALT SERPL W P-5'-P-CCNC: 14 U/L (ref 0–70)
ALT SERPL W P-5'-P-CCNC: 14 U/L (ref 0–70)
ANION GAP SERPL CALCULATED.3IONS-SCNC: 12 MMOL/L (ref 7–15)
ANION GAP SERPL CALCULATED.3IONS-SCNC: 12 MMOL/L (ref 7–15)
ANTIBODY SCREEN: NEGATIVE
AST SERPL W P-5'-P-CCNC: 27 U/L (ref 0–45)
AST SERPL W P-5'-P-CCNC: 27 U/L (ref 0–45)
ATRIAL RATE - MUSE: NORMAL BPM
BASE EXCESS BLDV CALC-SCNC: -5 MMOL/L (ref -3–3)
BASOPHILS # BLD AUTO: 0 10E3/UL (ref 0–0.2)
BASOPHILS # BLD AUTO: 0 10E3/UL (ref 0–0.2)
BASOPHILS NFR BLD AUTO: 0 %
BASOPHILS NFR BLD AUTO: 0 %
BILIRUB DIRECT SERPL-MCNC: <0.2 MG/DL (ref 0–0.3)
BILIRUB SERPL-MCNC: 0.3 MG/DL
BILIRUB SERPL-MCNC: 0.3 MG/DL
BLD PROD TYP BPU: NORMAL
BLOOD COMPONENT TYPE: NORMAL
BUN SERPL-MCNC: 27.1 MG/DL (ref 8–23)
BUN SERPL-MCNC: 27.1 MG/DL (ref 8–23)
CALCIUM SERPL-MCNC: 8.4 MG/DL (ref 8.8–10.2)
CALCIUM SERPL-MCNC: 8.4 MG/DL (ref 8.8–10.2)
CHLORIDE SERPL-SCNC: 109 MMOL/L (ref 98–107)
CHLORIDE SERPL-SCNC: 109 MMOL/L (ref 98–107)
CODING SYSTEM: NORMAL
CPB POCT: NO
CREAT SERPL-MCNC: 2.41 MG/DL (ref 0.67–1.17)
CREAT SERPL-MCNC: 2.41 MG/DL (ref 0.67–1.17)
CROSSMATCH: NORMAL
DEPRECATED HCO3 PLAS-SCNC: 19 MMOL/L (ref 22–29)
DEPRECATED HCO3 PLAS-SCNC: 19 MMOL/L (ref 22–29)
DIASTOLIC BLOOD PRESSURE - MUSE: NORMAL MMHG
EGFRCR SERPLBLD CKD-EPI 2021: 27 ML/MIN/1.73M2
EGFRCR SERPLBLD CKD-EPI 2021: 27 ML/MIN/1.73M2
EOSINOPHIL # BLD AUTO: 0.1 10E3/UL (ref 0–0.7)
EOSINOPHIL # BLD AUTO: 0.4 10E3/UL (ref 0–0.7)
EOSINOPHIL NFR BLD AUTO: 1 %
EOSINOPHIL NFR BLD AUTO: 5 %
ERYTHROCYTE [DISTWIDTH] IN BLOOD BY AUTOMATED COUNT: 18.8 % (ref 10–15)
ERYTHROCYTE [DISTWIDTH] IN BLOOD BY AUTOMATED COUNT: 19.3 % (ref 10–15)
GLUCOSE BLDC GLUCOMTR-MCNC: 100 MG/DL (ref 70–99)
GLUCOSE BLDC GLUCOMTR-MCNC: 112 MG/DL (ref 70–99)
GLUCOSE BLDC GLUCOMTR-MCNC: 93 MG/DL (ref 70–99)
GLUCOSE BLDC GLUCOMTR-MCNC: 94 MG/DL (ref 70–99)
GLUCOSE SERPL-MCNC: 137 MG/DL (ref 70–99)
GLUCOSE SERPL-MCNC: 137 MG/DL (ref 70–99)
HCO3 BLDV-SCNC: 20 MMOL/L (ref 21–28)
HCT VFR BLD AUTO: 26.2 % (ref 40–53)
HCT VFR BLD AUTO: 26.9 % (ref 40–53)
HCT VFR BLD CALC: 25 % (ref 40–53)
HGB BLD-MCNC: 7.8 G/DL (ref 13.3–17.7)
HGB BLD-MCNC: 7.9 G/DL (ref 13.3–17.7)
HGB BLD-MCNC: 8.3 G/DL (ref 13.3–17.7)
HGB BLD-MCNC: 8.3 G/DL (ref 13.3–17.7)
HGB BLD-MCNC: 8.5 G/DL (ref 13.3–17.7)
HOLD SPECIMEN: NORMAL
IMM GRANULOCYTES # BLD: 0 10E3/UL
IMM GRANULOCYTES # BLD: 0.1 10E3/UL
IMM GRANULOCYTES NFR BLD: 1 %
IMM GRANULOCYTES NFR BLD: 1 %
INR PPP: 1.57 (ref 0.85–1.15)
INTERPRETATION ECG - MUSE: NORMAL
ISSUE DATE AND TIME: NORMAL
LIPASE SERPL-CCNC: 30 U/L (ref 13–60)
LYMPHOCYTES # BLD AUTO: 1.4 10E3/UL (ref 0.8–5.3)
LYMPHOCYTES # BLD AUTO: 1.8 10E3/UL (ref 0.8–5.3)
LYMPHOCYTES NFR BLD AUTO: 16 %
LYMPHOCYTES NFR BLD AUTO: 20 %
MAGNESIUM SERPL-MCNC: 2.1 MG/DL (ref 1.7–2.3)
MCH RBC QN AUTO: 26.9 PG (ref 26.5–33)
MCH RBC QN AUTO: 27.1 PG (ref 26.5–33)
MCHC RBC AUTO-ENTMCNC: 30.2 G/DL (ref 31.5–36.5)
MCHC RBC AUTO-ENTMCNC: 30.9 G/DL (ref 31.5–36.5)
MCV RBC AUTO: 87 FL (ref 78–100)
MCV RBC AUTO: 90 FL (ref 78–100)
MONOCYTES # BLD AUTO: 0.7 10E3/UL (ref 0–1.3)
MONOCYTES # BLD AUTO: 0.9 10E3/UL (ref 0–1.3)
MONOCYTES NFR BLD AUTO: 10 %
MONOCYTES NFR BLD AUTO: 8 %
NEUTROPHILS # BLD AUTO: 5.6 10E3/UL (ref 1.6–8.3)
NEUTROPHILS # BLD AUTO: 6.3 10E3/UL (ref 1.6–8.3)
NEUTROPHILS NFR BLD AUTO: 64 %
NEUTROPHILS NFR BLD AUTO: 74 %
NRBC # BLD AUTO: 0 10E3/UL
NRBC # BLD AUTO: 0 10E3/UL
NRBC BLD AUTO-RTO: 0 /100
NRBC BLD AUTO-RTO: 0 /100
P AXIS - MUSE: NORMAL DEGREES
PCO2 BLDV: 36 MM HG (ref 40–50)
PH BLDV: 7.35 [PH] (ref 7.32–7.43)
PLATELET # BLD AUTO: 210 10E3/UL (ref 150–450)
PLATELET # BLD AUTO: 229 10E3/UL (ref 150–450)
PO2 BLDV: 34 MM HG (ref 25–47)
POTASSIUM BLD-SCNC: 3.6 MMOL/L (ref 3.4–5.3)
POTASSIUM SERPL-SCNC: 3.7 MMOL/L (ref 3.4–5.3)
POTASSIUM SERPL-SCNC: 3.7 MMOL/L (ref 3.4–5.3)
PR INTERVAL - MUSE: NORMAL MS
PROT SERPL-MCNC: 5.3 G/DL (ref 6.4–8.3)
PROT SERPL-MCNC: 5.3 G/DL (ref 6.4–8.3)
QRS DURATION - MUSE: 104 MS
QT - MUSE: 416 MS
QTC - MUSE: 500 MS
R AXIS - MUSE: -36 DEGREES
RBC # BLD AUTO: 2.91 10E6/UL (ref 4.4–5.9)
RBC # BLD AUTO: 3.09 10E6/UL (ref 4.4–5.9)
SAO2 % BLDV: 62 % (ref 70–75)
SODIUM BLD-SCNC: 142 MMOL/L (ref 135–145)
SODIUM SERPL-SCNC: 140 MMOL/L (ref 135–145)
SODIUM SERPL-SCNC: 140 MMOL/L (ref 135–145)
SPECIMEN EXPIRATION DATE: NORMAL
SYSTOLIC BLOOD PRESSURE - MUSE: NORMAL MMHG
T AXIS - MUSE: 27 DEGREES
TROPONIN T SERPL HS-MCNC: 49 NG/L
UNIT ABO/RH: NORMAL
UNIT NUMBER: NORMAL
UNIT STATUS: NORMAL
UNIT TYPE ISBT: 7300
UNIT TYPE ISBT: 9500
UPPER GI ENDOSCOPY: NORMAL
VENTRICULAR RATE- MUSE: 87 BPM
WBC # BLD AUTO: 8.4 10E3/UL (ref 4–11)
WBC # BLD AUTO: 8.8 10E3/UL (ref 4–11)

## 2024-07-14 PROCEDURE — 82803 BLOOD GASES ANY COMBINATION: CPT

## 2024-07-14 PROCEDURE — 3E0G8GC INTRODUCTION OF OTHER THERAPEUTIC SUBSTANCE INTO UPPER GI, VIA NATURAL OR ARTIFICIAL OPENING ENDOSCOPIC: ICD-10-PCS | Performed by: INTERNAL MEDICINE

## 2024-07-14 PROCEDURE — P9016 RBC LEUKOCYTES REDUCED: HCPCS

## 2024-07-14 PROCEDURE — 36430 TRANSFUSION BLD/BLD COMPNT: CPT

## 2024-07-14 PROCEDURE — 83735 ASSAY OF MAGNESIUM: CPT | Performed by: EMERGENCY MEDICINE

## 2024-07-14 PROCEDURE — 250N000011 HC RX IP 250 OP 636: Performed by: INTERNAL MEDICINE

## 2024-07-14 PROCEDURE — 83690 ASSAY OF LIPASE: CPT | Performed by: EMERGENCY MEDICINE

## 2024-07-14 PROCEDURE — 99291 CRITICAL CARE FIRST HOUR: CPT | Mod: 25

## 2024-07-14 PROCEDURE — 80053 COMPREHEN METABOLIC PANEL: CPT | Performed by: EMERGENCY MEDICINE

## 2024-07-14 PROCEDURE — 80048 BASIC METABOLIC PNL TOTAL CA: CPT | Performed by: EMERGENCY MEDICINE

## 2024-07-14 PROCEDURE — 99292 CRITICAL CARE ADDL 30 MIN: CPT

## 2024-07-14 PROCEDURE — 85018 HEMOGLOBIN: CPT | Performed by: INTERNAL MEDICINE

## 2024-07-14 PROCEDURE — 96375 TX/PRO/DX INJ NEW DRUG ADDON: CPT

## 2024-07-14 PROCEDURE — 96374 THER/PROPH/DIAG INJ IV PUSH: CPT | Mod: 59

## 2024-07-14 PROCEDURE — 250N000011 HC RX IP 250 OP 636: Performed by: EMERGENCY MEDICINE

## 2024-07-14 PROCEDURE — 84484 ASSAY OF TROPONIN QUANT: CPT | Performed by: EMERGENCY MEDICINE

## 2024-07-14 PROCEDURE — 86923 COMPATIBILITY TEST ELECTRIC: CPT | Performed by: EMERGENCY MEDICINE

## 2024-07-14 PROCEDURE — 85025 COMPLETE CBC W/AUTO DIFF WBC: CPT | Performed by: EMERGENCY MEDICINE

## 2024-07-14 PROCEDURE — 99207 PR APP CREDIT; MD BILLING SHARED VISIT: CPT | Performed by: INTERNAL MEDICINE

## 2024-07-14 PROCEDURE — P9016 RBC LEUKOCYTES REDUCED: HCPCS | Performed by: EMERGENCY MEDICINE

## 2024-07-14 PROCEDURE — 86900 BLOOD TYPING SEROLOGIC ABO: CPT | Performed by: EMERGENCY MEDICINE

## 2024-07-14 PROCEDURE — 36415 COLL VENOUS BLD VENIPUNCTURE: CPT | Performed by: EMERGENCY MEDICINE

## 2024-07-14 PROCEDURE — 86923 COMPATIBILITY TEST ELECTRIC: CPT | Performed by: INTERNAL MEDICINE

## 2024-07-14 PROCEDURE — 99223 1ST HOSP IP/OBS HIGH 75: CPT | Mod: A1 | Performed by: INTERNAL MEDICINE

## 2024-07-14 PROCEDURE — 85004 AUTOMATED DIFF WBC COUNT: CPT | Performed by: EMERGENCY MEDICINE

## 2024-07-14 PROCEDURE — 200N000001 HC R&B ICU

## 2024-07-14 PROCEDURE — 36415 COLL VENOUS BLD VENIPUNCTURE: CPT | Performed by: INTERNAL MEDICINE

## 2024-07-14 PROCEDURE — 74174 CTA ABD&PLVS W/CONTRAST: CPT | Mod: MG

## 2024-07-14 PROCEDURE — 258N000003 HC RX IP 258 OP 636: Performed by: INTERNAL MEDICINE

## 2024-07-14 PROCEDURE — G0500 MOD SEDAT ENDO SERVICE >5YRS: HCPCS | Performed by: INTERNAL MEDICINE

## 2024-07-14 PROCEDURE — 43255 EGD CONTROL BLEEDING ANY: CPT | Performed by: INTERNAL MEDICINE

## 2024-07-14 PROCEDURE — 96361 HYDRATE IV INFUSION ADD-ON: CPT

## 2024-07-14 PROCEDURE — 85610 PROTHROMBIN TIME: CPT | Performed by: EMERGENCY MEDICINE

## 2024-07-14 PROCEDURE — 250N000009 HC RX 250: Performed by: EMERGENCY MEDICINE

## 2024-07-14 PROCEDURE — 93005 ELECTROCARDIOGRAM TRACING: CPT

## 2024-07-14 PROCEDURE — 250N000009 HC RX 250: Performed by: INTERNAL MEDICINE

## 2024-07-14 PROCEDURE — 258N000003 HC RX IP 258 OP 636: Performed by: EMERGENCY MEDICINE

## 2024-07-14 RX ORDER — EPINEPHRINE 0.1 MG/ML
INJECTION INTRAVENOUS PRN
Status: DISCONTINUED | OUTPATIENT
Start: 2024-07-14 | End: 2024-07-17

## 2024-07-14 RX ORDER — NALOXONE HYDROCHLORIDE 0.4 MG/ML
0.2 INJECTION, SOLUTION INTRAMUSCULAR; INTRAVENOUS; SUBCUTANEOUS
Status: DISCONTINUED | OUTPATIENT
Start: 2024-07-14 | End: 2024-07-14

## 2024-07-14 RX ORDER — FENTANYL CITRATE 0.05 MG/ML
50-100 INJECTION, SOLUTION INTRAMUSCULAR; INTRAVENOUS EVERY 5 MIN PRN
Status: DISCONTINUED | OUTPATIENT
Start: 2024-07-14 | End: 2024-07-14

## 2024-07-14 RX ORDER — NALOXONE HYDROCHLORIDE 0.4 MG/ML
0.4 INJECTION, SOLUTION INTRAMUSCULAR; INTRAVENOUS; SUBCUTANEOUS
Status: DISCONTINUED | OUTPATIENT
Start: 2024-07-14 | End: 2024-07-14

## 2024-07-14 RX ORDER — ONDANSETRON 2 MG/ML
4 INJECTION INTRAMUSCULAR; INTRAVENOUS EVERY 6 HOURS PRN
Status: DISCONTINUED | OUTPATIENT
Start: 2024-07-14 | End: 2024-07-19 | Stop reason: HOSPADM

## 2024-07-14 RX ORDER — AMOXICILLIN 250 MG
2 CAPSULE ORAL 2 TIMES DAILY PRN
Status: DISCONTINUED | OUTPATIENT
Start: 2024-07-14 | End: 2024-07-19 | Stop reason: HOSPADM

## 2024-07-14 RX ORDER — ACETAMINOPHEN 500 MG
500-1000 TABLET ORAL EVERY 6 HOURS PRN
Status: DISCONTINUED | OUTPATIENT
Start: 2024-07-14 | End: 2024-07-19 | Stop reason: HOSPADM

## 2024-07-14 RX ORDER — SODIUM CHLORIDE 9 MG/ML
INJECTION, SOLUTION INTRAVENOUS CONTINUOUS
Status: DISCONTINUED | OUTPATIENT
Start: 2024-07-14 | End: 2024-07-15

## 2024-07-14 RX ORDER — FLUMAZENIL 0.1 MG/ML
0.2 INJECTION, SOLUTION INTRAVENOUS
Status: DISCONTINUED | OUTPATIENT
Start: 2024-07-14 | End: 2024-07-14

## 2024-07-14 RX ORDER — ATROPINE SULFATE 0.1 MG/ML
1 INJECTION INTRAVENOUS
Status: DISCONTINUED | OUTPATIENT
Start: 2024-07-14 | End: 2024-07-14

## 2024-07-14 RX ORDER — OCTREOTIDE ACETATE 50 UG/ML
50 INJECTION, SOLUTION INTRAVENOUS; SUBCUTANEOUS ONCE
Status: DISCONTINUED | OUTPATIENT
Start: 2024-07-14 | End: 2024-07-14

## 2024-07-14 RX ORDER — IOPAMIDOL 755 MG/ML
135 INJECTION, SOLUTION INTRAVASCULAR ONCE
Status: COMPLETED | OUTPATIENT
Start: 2024-07-14 | End: 2024-07-14

## 2024-07-14 RX ORDER — AMOXICILLIN 250 MG
1 CAPSULE ORAL 2 TIMES DAILY PRN
Status: DISCONTINUED | OUTPATIENT
Start: 2024-07-14 | End: 2024-07-19 | Stop reason: HOSPADM

## 2024-07-14 RX ORDER — NICOTINE POLACRILEX 4 MG
15-30 LOZENGE BUCCAL
Status: DISCONTINUED | OUTPATIENT
Start: 2024-07-14 | End: 2024-07-19 | Stop reason: HOSPADM

## 2024-07-14 RX ORDER — DEXTROSE MONOHYDRATE 25 G/50ML
25-50 INJECTION, SOLUTION INTRAVENOUS
Status: DISCONTINUED | OUTPATIENT
Start: 2024-07-14 | End: 2024-07-19 | Stop reason: HOSPADM

## 2024-07-14 RX ORDER — DIPHENHYDRAMINE HYDROCHLORIDE 50 MG/ML
25-50 INJECTION INTRAMUSCULAR; INTRAVENOUS
Status: DISCONTINUED | OUTPATIENT
Start: 2024-07-14 | End: 2024-07-14

## 2024-07-14 RX ORDER — ONDANSETRON 4 MG/1
4 TABLET, ORALLY DISINTEGRATING ORAL EVERY 6 HOURS PRN
Status: DISCONTINUED | OUTPATIENT
Start: 2024-07-14 | End: 2024-07-19 | Stop reason: HOSPADM

## 2024-07-14 RX ORDER — SIMETHICONE 40MG/0.6ML
133 SUSPENSION, DROPS(FINAL DOSAGE FORM)(ML) ORAL
Status: DISCONTINUED | OUTPATIENT
Start: 2024-07-14 | End: 2024-07-14

## 2024-07-14 RX ORDER — LIDOCAINE 40 MG/G
CREAM TOPICAL
OUTPATIENT
Start: 2024-07-14

## 2024-07-14 RX ORDER — FLUMAZENIL 0.1 MG/ML
0.2 INJECTION, SOLUTION INTRAVENOUS
Status: CANCELLED | OUTPATIENT
Start: 2024-07-14 | End: 2024-07-15

## 2024-07-14 RX ORDER — EPINEPHRINE 1 MG/ML
0.1 INJECTION, SOLUTION, CONCENTRATE INTRAVENOUS
Status: DISCONTINUED | OUTPATIENT
Start: 2024-07-14 | End: 2024-07-14

## 2024-07-14 RX ADMIN — PANTOPRAZOLE SODIUM 80 MG: 40 INJECTION, POWDER, FOR SOLUTION INTRAVENOUS at 05:26

## 2024-07-14 RX ADMIN — MIDAZOLAM 3 MG: 1 INJECTION INTRAMUSCULAR; INTRAVENOUS at 13:32

## 2024-07-14 RX ADMIN — SODIUM CHLORIDE 1000 ML: 9 INJECTION, SOLUTION INTRAVENOUS at 05:09

## 2024-07-14 RX ADMIN — FENTANYL CITRATE 50 MCG: 50 INJECTION, SOLUTION INTRAMUSCULAR; INTRAVENOUS at 13:32

## 2024-07-14 RX ADMIN — PROTHROMBIN, COAGULATION FACTOR VII HUMAN, COAGULATION FACTOR IX HUMAN, COAGULATION FACTOR X HUMAN, PROTEIN C, PROTEIN S HUMAN, AND WATER 4977 UNITS: KIT at 05:49

## 2024-07-14 RX ADMIN — SODIUM CHLORIDE 79 ML: 9 INJECTION, SOLUTION INTRAVENOUS at 06:23

## 2024-07-14 RX ADMIN — SODIUM CHLORIDE: 9 INJECTION, SOLUTION INTRAVENOUS at 07:14

## 2024-07-14 RX ADMIN — IOPAMIDOL 135 ML: 755 INJECTION, SOLUTION INTRAVENOUS at 06:22

## 2024-07-14 RX ADMIN — PANTOPRAZOLE SODIUM 40 MG: 40 INJECTION, POWDER, FOR SOLUTION INTRAVENOUS at 19:55

## 2024-07-14 RX ADMIN — SODIUM CHLORIDE: 9 INJECTION, SOLUTION INTRAVENOUS at 22:26

## 2024-07-14 ASSESSMENT — ACTIVITIES OF DAILY LIVING (ADL)
ADLS_ACUITY_SCORE: 31
DRESSING/BATHING_DIFFICULTY: NO
ADLS_ACUITY_SCORE: 31
TOILETING_ISSUES: NO
ADLS_ACUITY_SCORE: 30
ADLS_ACUITY_SCORE: 31
ADLS_ACUITY_SCORE: 27
ADLS_ACUITY_SCORE: 31
DIFFICULTY_EATING/SWALLOWING: NO
WEAR_GLASSES_OR_BLIND: YES
CHANGE_IN_FUNCTIONAL_STATUS_SINCE_ONSET_OF_CURRENT_ILLNESS/INJURY: YES
ADLS_ACUITY_SCORE: 31
CONCENTRATING,_REMEMBERING_OR_MAKING_DECISIONS_DIFFICULTY: YES
ADLS_ACUITY_SCORE: 30
ADLS_ACUITY_SCORE: 37
FALL_HISTORY_WITHIN_LAST_SIX_MONTHS: YES
ADLS_ACUITY_SCORE: 27
DOING_ERRANDS_INDEPENDENTLY_DIFFICULTY: NO
ADLS_ACUITY_SCORE: 37
NUMBER_OF_TIMES_PATIENT_HAS_FALLEN_WITHIN_LAST_SIX_MONTHS: 2
ADLS_ACUITY_SCORE: 31
HEARING_DIFFICULTY_OR_DEAF: NO
ADLS_ACUITY_SCORE: 31
WALKING_OR_CLIMBING_STAIRS_DIFFICULTY: NO
DIFFICULTY_COMMUNICATING: NO
EQUIPMENT_CURRENTLY_USED_AT_HOME: WALKER, ROLLING
ADLS_ACUITY_SCORE: 31
ADLS_ACUITY_SCORE: 30
ADLS_ACUITY_SCORE: 23
ADLS_ACUITY_SCORE: 31

## 2024-07-14 ASSESSMENT — COLUMBIA-SUICIDE SEVERITY RATING SCALE - C-SSRS
1. IN THE PAST MONTH, HAVE YOU WISHED YOU WERE DEAD OR WISHED YOU COULD GO TO SLEEP AND NOT WAKE UP?: NO
2. HAVE YOU ACTUALLY HAD ANY THOUGHTS OF KILLING YOURSELF IN THE PAST MONTH?: NO
6. HAVE YOU EVER DONE ANYTHING, STARTED TO DO ANYTHING, OR PREPARED TO DO ANYTHING TO END YOUR LIFE?: NO

## 2024-07-14 NOTE — CONSULTS
[facility]  CONSULT  MEDICAL ONCOLOGY/HEMATOLOGY  Roderick Parra MD       Ti Nickerson MRN# [id number]   YOB: 1945  PCP Nico Retana [phone] Age: 78 year old      Date of Admission:  7/14/2024         Assessment and Plan:   GI bleeding, likely from upper GI source. Based on his history, local tumor recurrence in the GI tract seems the most likely explanation particularly given CT findings. He reports no bleeding elsewhere so a generalized bleeding diathesis is unlikely. 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 Most recently, he has been receiving dual immunotherapy. Recent imaging performed at [place] reportedly shows disease progression. He was due for treatment in our office next week. This will need to be postponed until bleeding has resolved  Chronic kidney disease, stage 3B and with associated ROCKY likely related to hypotension  4.   Atrial fibrillation on long term anticoagulation  5.   Heart failure with preserved EF    Recommendations;  Continue serial monitoring CBC and transfusion support as needed  Hold apixiban  Await GI consult. Discussed potential need for EGD and/or embolization         Code Status:   Full Code           Chief Complaint:   Dizziness and passing bloody stools         History of Present Illness:   Ti Nickerson is a 78 year old man who has a history of clear cell cancer of the right kidney and for which he has had extensive prior therapy as outlined below. He is seen locally by my colleague  and also at the [facility] by Dr. Hernández. Per available records he was diagnosed with a renal mass in 2019 and subsequently had right radical nephrectomy and IVC thrombectomy on 10/9/2019.  Grade 4 out of 4 clear-cell renal cell carcinoma pT3b margins were negative no sarcomatoid features lymphovascular invasion was  present tumor necrosis was not identified.  He was on surveillance until 1/25/2021 at which time a liver lesion was identified. He then underwent biopsy and cryoablation at [facility].  Biopsy was consistent with metastatic renal cell carcinoma. He was then followed and was without further disease recurrence until 9/14/2023.  At that  time, he was admitted with melena and GI bleed.  EGD showed an ulcerated mass in the third portion of the duodenum.  Biopsy was consistent with metastatic renal cell carcinoma.  10/11/2023.  He underwent partial duodenectomy under the care of Dr. Ceballos at [facility] again consistent with metastatic RCC.  1/19/2024: Underwent EUS and resection of gastric mass with complete endoscopic removal again consistent with metastatic renal cell carcinoma.  Margins were negative.  Postoperatively patient had melena and GI bleed. with a drop in hemoglobin requiring blood transfusions.     He is admitted now after developing syncope and subsequent GI bleeding characterized by passing large clots. Labs showed hemoglobin 8.3. He was noted to be hypotensive and has received IV fluids with improvement in blood pressure. At the time of the consultation, he reports no further bleeding. No complaints of nausea, vomiting or abdominal pain. No complaints of epistaxis or urinary tract bleeding            Past Medical History:     Past Medical History:   Diagnosis Date    Atrial fibrillation, unspecified 9/18/2015    CAD (coronary artery disease) 09/18/2015    minimal by angio 2007, fu nuc est nl 2018    Elevated TSH 2018    Esophageal reflux 9/18/2015    Essential hypertension     History of cardioversion     [id number]    Idiopathic cardiomyopathy (H) 09/18/2015    fu echo nl ef, nl nuclear est 11/18, Dr. Quarles    Mixed hyperlipidemia 9/18/2015    Mumps     Sleep apnea 09/18/2015    does not use cpap as of 2019    Sleep apnea     Thoracic aortic aneurysm (H24)     sinus of valsalva 4.5 and asc aorta 4.5 in  2017    Tubular adenoma of colon 01/2017    fu 3 years               Past Surgical History:     Past Surgical History:   Procedure Laterality Date    APPENDECTOMY  1964    ESOPHAGOSCOPY, GASTROSCOPY, DUODENOSCOPY (EGD), COMBINED N/A 9/14/2023    Procedure: Esophagoscopy, gastroscopy, duodenoscopy (EGD), combined;  Surgeon: Chele Ash MD;  Location:  GI            Home Medications:     Prior to Admission medications    Medication Sig Last Dose Taking? Auth Provider Long Term End Date   acetaminophen (TYLENOL) 500 MG tablet Take 500-1,000 mg by mouth every 6 hours as needed for mild pain   Reported, Patient     apixaban ANTICOAGULANT (ELIQUIS) 5 MG tablet Take 1 tablet (5 mg) by mouth 2 times daily   Nico Retana MD No    ASPIRIN NOT PRESCRIBED (INTENTIONAL) Please choose reason not prescribed, below  Patient not taking: Reported on 5/28/2024   Tyrell Ybarra MD     ipilimumab (YERVOY) 200 MG/40ML SOLN Inject 125 mg into the vein   Reported, Patient     loperamide (IMODIUM) 2 MG capsule Take 2 mg by mouth 4 times daily as needed for diarrhea   Reported, Patient     methylprednisoLONE sodium succinate (SOLU-MEDROL) 2000 MG injection Inject 125 mg into the vein   Reported, Patient     midodrine (PROAMATINE) 5 MG tablet Take 1 tablet (5 mg) by mouth 2 times daily   Mamie Gaston APRN CNP     multivitamin w/minerals (THERA-VIT-M) tablet Take 1 tablet by mouth daily   Unknown, Entered By History     nivolumab (OPDIVO) 100 MG/10ML Inject 240 mg into the vein   Reported, Patient     pantoprazole (PROTONIX) 40 MG EC tablet Take 1 tablet (40 mg) by mouth 2 times daily (before meals)  Patient taking differently: Take 40 mg by mouth 2 times daily   Christiano Fox MD     pravastatin (PRAVACHOL) 20 MG tablet Take 1 tablet (20 mg) by mouth daily   Nico Retana MD Yes    sodium bicarbonate 650 MG tablet Take 1 tablet (650 mg) by mouth daily   Klaudia Laoz, PAMarlenC    "  tamsulosin (FLOMAX) 0.4 MG capsule TAKE 1 CAPSULE BY MOUTH EVERY DAY   Nico Retana MD              Allergies:     Allergies   Allergen Reactions    Fentanyl Confusion            Social History:   Ti Nickerson  reports that he has never smoked. He has never used smokeless tobacco. He reports that he does not currently use alcohol. He reports that he does not use drugs.              Family History:     Family History   Problem Relation Age of Onset    Arrhythmia Mother         a-fib    Cerebrovascular Disease Mother     Arrhythmia Father         a-fib    Colon Cancer Father     Diabetes Father     Cerebrovascular Disease Father     No Known Problems Brother                 Review of Systems:   The 10 point Review of Systems is negative other than noted in the HPI or here.           Physical Exam:    , Blood pressure 119/69, pulse 72, temperature 98.1  F (36.7  C), resp. rate 22, height 1.905 m (6' 3\"), weight 121.7 kg (268 lb 4.8 oz), SpO2 95%.  268 lbs 4.8 oz    Constitutional: He appears weak but comfortable   Psych: Mood and affect normal   Neuro: No localizing deficits   Eyes: Non icteric   ENT: Ears and nose normal   Lymph: No palpable adenopathy in the cervical or axillary regions   Cardiovascular: Irregular heart rhythm   Lungs: Clear to auscultation   Abdomen: Soft and non tender   Skin: No rashes or petechiae   Musculoskeletal: Muscle groups symmetric   Other:           Data:   All new lab and imaging data was reviewed.   Recent Labs   Lab Test 07/14/24  0739 07/14/24  0505 07/14/24  0500 10/25/23  1002 10/25/23  0915   WBC 8.4  --  8.8   < >  --    HGB 7.9* 8.5* 8.3*   < >  --    MCV 90  --  87   < >  --      --  229   < >  --    INR  --   --  1.57*  --  2.29*    < > = values in this interval not displayed.      Recent Labs   Lab Test 07/14/24  0647 07/14/24  0505 07/14/24  0500 06/12/24  1014   NA  --  142 140  140 137   POTASSIUM  --  3.6 3.7  3.7 4.2   CHLORIDE  --   --  109* "  109* 106   CO2  --   --  19*  19* 21*   BUN  --   --  27.1*  27.1* 23.2*   CR  --   --  2.41*  2.41* 1.88*   ANIONGAP  --   --  12  12 10   DOMINICK  --   --  8.4*  8.4* 8.6*   *  --  137*  137* 104*     EXAM: CTA ABDOMEN PELVIS WITH CONTRAST  LOCATION: [facility]  DATE: 7/14/2024    INDICATION: Pain, lower GI bleed, hematochezia.  History of renal cell carcinoma with metastasis status postresection with recurrence of tumor within the duodenum but prior resection site per CT from 7 8 2024 through [place].  COMPARISON: 10/31/2023. The recent [facility] CT dated 7/8/2024 is not available.  TECHNIQUE: CT angiogram abdomen pelvis during arterial phase of injection of IV contrast. 2D and 3D MIP reconstructions were performed by the CT technologist. Dose reduction techniques were used.  CONTRAST: 135  ml Isovue 370    FINDINGS:  ANGIOGRAM ABDOMEN/PELVIS: There is atherosclerotic calcification of the aorta and its branches. No aortic aneurysm or dissection. The major aortic branch vessels are widely patent. There are 2 left renal arteries. The iliac arterial system is normal in  caliber.    LOWER CHEST: Bilateral lower lobe bronchiectasis. Mild atelectasis and scarring at the lung bases.    HEPATOBILIARY: There is a 2.7 cm mass at the posterior right lobe of liver, decreased in size from 10/31/2023.    PANCREAS: Normal.    SPLEEN: Normal.    ADRENAL GLANDS: Normal.    KIDNEYS/BLADDER: The right kidney is surgically absent. There is a 6.4 cm cyst at the posterior aspect of the left kidney. No hydronephrosis.    BOWEL: There are colonic diverticula without acute diverticulitis. No bowel obstruction or inflammation. There is a mass in the third segment of duodenum measuring 4.1 x 3.0 cm. Metallic clips in the stomach. There is a midline ventral hernia containing  small bowel loops but not causing obstruction. There is no free intraperitoneal gas or fluid. No active GI bleeding.    LYMPH  "NODES: Soft tissue mass anterior to the IVC measures 4.0 x 2.3 cm. There is no other lymph node enlargement.    PELVIC ORGANS: The prostate gland is enlarged.    MUSCULOSKELETAL: Degenerative disease in the spine.   Impression:     IMPRESSION:  1.  There is no active GI bleed.  2.  4.1 cm mass in the duodenum.  3.  4.0 cm lymph node anterior to the inferior vena cava.  4.  2.7 cm posterior right lobe liver mass.  5.  Midline ventral hernia containing small bowel but not causing obstruction.           No lab results found.    Invalid input(s): \"TROP\", \"TROPONINIES\"    "

## 2024-07-14 NOTE — ED TRIAGE NOTES
Pt has hx of GI bleed and is on Eliquis for afib. Had syncopal episode tonight and called 911 but canceled it because he was feeling better. Pt went to the bathroom later and started passing large clots and had another syncopal episode. EMS report 200-300cc blood on ground. Wife reports dark stools earlier on Saturday but thought it was from a recent iron infusion he had because his hbg was low.     Triage Assessment (Adult)       Row Name 07/14/24 0505          Triage Assessment    Airway WDL WDL        Respiratory WDL    Respiratory WDL WDL        Skin Circulation/Temperature WDL    Skin Circulation/Temperature WDL X  pale        Cardiac WDL    Cardiac WDL X  hypotensive        Peripheral/Neurovascular WDL    Peripheral Neurovascular WDL WDL        Cognitive/Neuro/Behavioral WDL    Cognitive/Neuro/Behavioral WDL WDL

## 2024-07-14 NOTE — PLAN OF CARE
"  Problem: Adult Inpatient Plan of Care  Goal: Plan of Care Review  Description: The Plan of Care Review/Shift note should be completed every shift.  The Outcome Evaluation is a brief statement about your assessment that the patient is improving, declining, or no change.  This information will be displayed automatically on your shift  note.  Outcome: Progressing  Goal: Patient-Specific Goal (Individualized)  Description: You can add care plan individualizations to a care plan. Examples of Individualization might be:  \"Parent requests to be called daily at 9am for status\", \"I have a hard time hearing out of my right ear\", or \"Do not touch me to wake me up as it startles  me\".  Outcome: Progressing  Goal: Absence of Hospital-Acquired Illness or Injury  Outcome: Progressing  Intervention: Identify and Manage Fall Risk  Recent Flowsheet Documentation  Taken 7/14/2024 1800 by Rabia Rees RN  Safety Promotion/Fall Prevention: safety round/check completed  Taken 7/14/2024 1600 by Rabia Rees RN  Safety Promotion/Fall Prevention: safety round/check completed  Taken 7/14/2024 1400 by Rabia Rees RN  Safety Promotion/Fall Prevention: safety round/check completed  Taken 7/14/2024 1200 by Rabia Rees RN  Safety Promotion/Fall Prevention: safety round/check completed  Taken 7/14/2024 1000 by Rabia Rees RN  Safety Promotion/Fall Prevention: safety round/check completed  Taken 7/14/2024 0800 by Rabia Rees RN  Safety Promotion/Fall Prevention: safety round/check completed  Intervention: Prevent Skin Injury  Recent Flowsheet Documentation  Taken 7/14/2024 1800 by Rabia Rees RN  Body Position: position changed independently  Taken 7/14/2024 1600 by Rabia Rees RN  Body Position: position changed independently  Taken 7/14/2024 1400 by Rabia Rees RN  Body Position: position changed independently  Taken 7/14/2024 1200 by Rabia Rees RN  Body Position: position changed " independently  Taken 7/14/2024 1000 by Rabia Rees RN  Body Position: position changed independently  Taken 7/14/2024 0900 by Rabia Rees RN  Body Position: position changed independently  Taken 7/14/2024 0800 by Rabia Rees RN  Body Position: position changed independently  Intervention: Prevent and Manage VTE (Venous Thromboembolism) Risk  Recent Flowsheet Documentation  Taken 7/14/2024 1600 by Rabia Rees RN  VTE Prevention/Management: SCDs on (sequential compression devices)  Taken 7/14/2024 1200 by Rabia Rees RN  VTE Prevention/Management: SCDs on (sequential compression devices)  Taken 7/14/2024 0800 by Rabia Rees RN  VTE Prevention/Management: SCDs on (sequential compression devices)  Intervention: Prevent Infection  Recent Flowsheet Documentation  Taken 7/14/2024 1800 by Rabia Rees RN  Infection Prevention: rest/sleep promoted  Taken 7/14/2024 1600 by Rabia Rees RN  Infection Prevention: rest/sleep promoted  Taken 7/14/2024 1400 by Rabia Rees RN  Infection Prevention: rest/sleep promoted  Taken 7/14/2024 1200 by Rabia Rees RN  Infection Prevention: rest/sleep promoted  Taken 7/14/2024 1000 by Rabia Rees RN  Infection Prevention: rest/sleep promoted  Taken 7/14/2024 0800 by Rabia Rees RN  Infection Prevention: rest/sleep promoted  Goal: Optimal Comfort and Wellbeing  Outcome: Progressing  Intervention: Provide Person-Centered Care  Recent Flowsheet Documentation  Taken 7/14/2024 1600 by Rabia Rees RN  Trust Relationship/Rapport:   care explained   choices provided   emotional support provided   empathic listening provided   questions answered   questions encouraged   reassurance provided   thoughts/feelings acknowledged  Taken 7/14/2024 1200 by Rabia Rees RN  Trust Relationship/Rapport:   care explained   choices provided   emotional support provided   empathic listening provided   questions answered   questions  encouraged   reassurance provided   thoughts/feelings acknowledged  Taken 7/14/2024 0800 by Rabia Rees RN  Trust Relationship/Rapport:   care explained   choices provided   emotional support provided   empathic listening provided   questions answered   questions encouraged   reassurance provided   thoughts/feelings acknowledged  Goal: Readiness for Transition of Care  Outcome: Progressing     Problem: Risk for Delirium  Goal: Optimal Coping  Outcome: Progressing  Goal: Improved Behavioral Control  Outcome: Progressing  Intervention: Minimize Safety Risk  Recent Flowsheet Documentation  Taken 7/14/2024 1600 by Rabia Rees RN  Trust Relationship/Rapport:   care explained   choices provided   emotional support provided   empathic listening provided   questions answered   questions encouraged   reassurance provided   thoughts/feelings acknowledged  Taken 7/14/2024 1200 by Rabia Rees RN  Trust Relationship/Rapport:   care explained   choices provided   emotional support provided   empathic listening provided   questions answered   questions encouraged   reassurance provided   thoughts/feelings acknowledged  Taken 7/14/2024 0800 by Rabia Rees RN  Communication Enhancement Strategies: call light answered in person  Trust Relationship/Rapport:   care explained   choices provided   emotional support provided   empathic listening provided   questions answered   questions encouraged   reassurance provided   thoughts/feelings acknowledged  Goal: Improved Attention and Thought Clarity  Outcome: Progressing  Intervention: Maximize Cognitive Function  Recent Flowsheet Documentation  Taken 7/14/2024 0800 by Rabia Rees RN  Reorientation Measures: clock in view  Goal: Improved Sleep  Outcome: Progressing     Problem: Gastrointestinal Bleeding  Goal: Optimal Coping with Acute Illness  Outcome: Progressing  Goal: Hemostasis  Outcome: Progressing   Goal Outcome Evaluation:       Patient alert and  oriented, VSS, 1 BM this shift, per MD to stay in ICU for night until plan confirmed as he is high risk for bleed, Continuing to monitor.    DINAH, RN

## 2024-07-14 NOTE — PHARMACY-ADMISSION MEDICATION HISTORY
Pharmacy Intern Admission Medication History    Admission medication history is complete. The information provided in this note is only as accurate as the sources available at the time of the update.    Information Source(s): Patient, Family member, and CareEverywhere/SureScripts via in-person    Pertinent Information:   Patient's wife reports that he is done with his Ipilimumab and Nivolumab treatments.  Patient's wife reports that he completed an iron infusion at [facility] 7/11/24 due to blood count of 7.     Changes made to PTA medication list:  Added: None  Deleted: Aspirin, Loperamide 2 mg, Methylprednisolone sodium succinate 2000 mg injection, multivitamins with minerals  Changed: None    Allergies reviewed with patient and updates made in EHR: yes    Medication History Completed By: Shadia Do 7/14/2024 10:41 AM    PTA Med List   Medication Sig Last Dose    acetaminophen (TYLENOL) 500 MG tablet Take 500-1,000 mg by mouth daily as needed for mild pain More than a month at PRN    apixaban ANTICOAGULANT (ELIQUIS) 5 MG tablet Take 1 tablet (5 mg) by mouth 2 times daily 7/13/2024 at PM    midodrine (PROAMATINE) 5 MG tablet Take 1 tablet (5 mg) by mouth 2 times daily 7/13/2024 at PM    pantoprazole (PROTONIX) 40 MG EC tablet Take 1 tablet (40 mg) by mouth 2 times daily (before meals) 7/13/2024 at PM    pravastatin (PRAVACHOL) 20 MG tablet Take 1 tablet (20 mg) by mouth daily 7/13/2024 at PM    sodium bicarbonate 650 MG tablet Take 1 tablet (650 mg) by mouth daily 7/13/2024 at PM    tamsulosin (FLOMAX) 0.4 MG capsule TAKE 1 CAPSULE BY MOUTH EVERY DAY 7/13/2024 at PM

## 2024-07-14 NOTE — H&P
[facility]    History and Physical - Hospitalist Service       Date of Admission:  7/14/2024    Assessment & Plan      Ti Nickerson is a 78 year old male admitted on 7/14/2024. He presents with syncope    Syncope  Lower GI bleed  Acute blood loss anemia  Hypotension 2/2 bleeding  Hx orthostatic hypotension  GERD  [Pantoprazole 40 mg BID, midodrine 5 mg BID]  With syncopal episode tonight, called 911 but was feeling better. Then later using bathroom and passing large clots, another syncopal episode. 200-300 ml blood on ground. On arrival BP 59/42, improved with fluids. Troponin 49. Hgb 8.3 (9.2 6/12). EKG atrial fibrillation.   *given Kcentra in ED  - ICU  - NPO, IV fluids  - q4 hgb x 6  - pantoprazole 40 mg IV BID  - GI consult- [facility] patient  - transfuse hgb<8, conditional ordered,  consent signed and in chart  - CT visceral angioram  - may need IR embolization pending CT    Metastatic renal cell carcinoma  *Follows through [facility] and [facility]. S/p radical R nephrectomy 1/2019. W/ mass in 3rd portion of duodenum, s/p partial duodenectomy, path positive for RCC.  1/2024 with upper EUS and resection of gastric mass. Also w/ recent bleeding and tumor found at blind end of duodenal resection, non-operable. Currently undergoing chemotherapy.   *CT 7/8 w/ enlargement of duodenal tumor and interval enlargement of retroperitoneal tumor distorting IVC and abutting aorta. Chest w/ new pulmonary nodules indeterminate for metastatic disease.   - [facility] consult    ROCKY  CKD IIIb  Baseline creatinine 1.7-2.0. creatinine on presentation 2.41, likely 2/2 hypotension with above  - avoid nephrotoxins  - IV fluids  - monitor    Atrial fibrillation   - hold apixaban    Chronic HFpEF  Hx idiopathic CM- resolved  Non-obstructive CAD  Ascending aorta dilatation  HLD  - resume statin with rec    BPH- resume tamsulosin with rec  KEENA- not currently on CPAP        Diet: NPO for Medical/Clinical Reasons  "Except for: No Exceptions    DVT Prophylaxis: Pneumatic Compression Devices  Dillard Catheter: Not present  Lines: None     Cardiac Monitoring: None  Code Status:  Full    Clinically Significant Risk Factors Present on Admission               # Drug Induced Coagulation Defect: home medication list includes an anticoagulant medication    # Hypertension: Noted on problem list    # Anemia: based on hgb <11  # Anemia: based on hgb <11           # Obesity: Estimated body mass index is 32.5 kg/m  as calculated from the following:    Height as of this encounter: 1.905 m (6' 3\").    Weight as of this encounter: 117.9 kg (260 lb).              Disposition Plan     Medically Ready for Discharge: Anticipated in 2-4 Days           Javi Pozo MD  Hospitalist Service  [facility]  Securely message with [employer] (more info)  Text page via [employer] Paging/Directory     ______________________________________________________________________    Chief Complaint   Syncope, GI bleed    History is obtained from the patient, electronic health record, emergency department physician, and patient's spouse    History of Present Illness   Ti Nickerson is a 78 year old male who presents with syncope and lower GI bleed.  Patient has a history of metastatic renal cell carcinoma status post resection.  He had duodenal mass which was resected but there is residual tumor.  Surgery was unable to get at the residual tumor.  He has been getting immunotherapy as well.  They found out had a visit had couple days ago that the immunotherapy was not working.  Last evening patient and his wife went out to dinner.  Before this he had had some black stools but he had had dark urine earlier in the week and they thought it may be because of that.  He went to bed the patient states he had some mild abdominal discomfort.  He got up to go to the bathroom and his wife helped him and with this he had a syncopal episode.  They think he " was out for about 30 seconds.  They called 911 but patient was feeling better so 911 was canceled.  He then again had to go to the bathroom and got up.  That time when he was on the toilet he had bright red blood per rectum, quite a large amount.  He also had a noted 32nd syncopal episode.  He had no chest pain or shortness of breath with any of this.  No hematemesis.  He has not had any notable GI bleeds with bright red blood per rectum in the past.      Past Medical History    Past Medical History:   Diagnosis Date    Atrial fibrillation, unspecified 9/18/2015    CAD (coronary artery disease) 09/18/2015    minimal by angio 2007, fu nuc est nl 2018    Elevated TSH 2018    Esophageal reflux 9/18/2015    Essential hypertension     History of cardioversion     [id number]    Idiopathic cardiomyopathy (H) 09/18/2015    fu echo nl ef, nl nuclear est 11/18, Dr. Quarles    Mixed hyperlipidemia 9/18/2015    Mumps     Sleep apnea 09/18/2015    does not use cpap as of 2019    Sleep apnea     Thoracic aortic aneurysm (H24)     sinus of valsalva 4.5 and asc aorta 4.5 in 2017    Tubular adenoma of colon 01/2017    fu 3 years       Past Surgical History   Past Surgical History:   Procedure Laterality Date    APPENDECTOMY  1964    ESOPHAGOSCOPY, GASTROSCOPY, DUODENOSCOPY (EGD), COMBINED N/A 9/14/2023    Procedure: Esophagoscopy, gastroscopy, duodenoscopy (EGD), combined;  Surgeon: Chele Ash MD;  Location:  GI       Prior to Admission Medications   Prior to Admission Medications   Prescriptions Last Dose Informant Patient Reported? Taking?   ASPIRIN NOT PRESCRIBED (INTENTIONAL)  Spouse/Significant Other No No   Sig: Please choose reason not prescribed, below   Patient not taking: Reported on 5/28/2024   acetaminophen (TYLENOL) 500 MG tablet  Spouse/Significant Other Yes No   Sig: Take 500-1,000 mg by mouth every 6 hours as needed for mild pain   apixaban ANTICOAGULANT (ELIQUIS) 5 MG tablet   No No   Sig: Take 1 tablet  (5 mg) by mouth 2 times daily   ipilimumab (YERVOY) 200 MG/40ML SOLN   Yes No   Sig: Inject 125 mg into the vein   loperamide (IMODIUM) 2 MG capsule  Spouse/Significant Other Yes No   Sig: Take 2 mg by mouth 4 times daily as needed for diarrhea   methylprednisoLONE sodium succinate (SOLU-MEDROL) 2000 MG injection   Yes No   Sig: Inject 125 mg into the vein   midodrine (PROAMATINE) 5 MG tablet   No No   Sig: Take 1 tablet (5 mg) by mouth 2 times daily   multivitamin w/minerals (THERA-VIT-M) tablet  Spouse/Significant Other Yes No   Sig: Take 1 tablet by mouth daily   nivolumab (OPDIVO) 100 MG/10ML   Yes No   Sig: Inject 240 mg into the vein   pantoprazole (PROTONIX) 40 MG EC tablet  Spouse/Significant Other No No   Sig: Take 1 tablet (40 mg) by mouth 2 times daily (before meals)   Patient taking differently: Take 40 mg by mouth 2 times daily   pravastatin (PRAVACHOL) 20 MG tablet  Spouse/Significant Other No No   Sig: Take 1 tablet (20 mg) by mouth daily   sodium bicarbonate 650 MG tablet   No No   Sig: Take 1 tablet (650 mg) by mouth daily   tamsulosin (FLOMAX) 0.4 MG capsule   No No   Sig: TAKE 1 CAPSULE BY MOUTH EVERY DAY      Facility-Administered Medications: None        Review of Systems    The 10 point Review of Systems is negative other than noted in the HPI or here.     Social History   I have reviewed this patient's social history and updated it with pertinent information if needed.  Social History     Tobacco Use    Smoking status: Never    Smokeless tobacco: Never   Vaping Use    Vaping status: Never Used   Substance Use Topics    Alcohol use: Not Currently    Drug use: No        Physical Exam   Vital Signs: Temp: 97.9  F (36.6  C) Temp src: Oral BP: (!) 84/60 Pulse: 85   Resp: 17 SpO2: 99 % O2 Device: None (Room air)    Weight: 260 lbs 0 oz    General Appearance: Alert, very pleasant, no distressed. Mood appears depressed  Respiratory: CTA B  Cardiovascular: irregular, rate ok. No edema  GI: soft, mild  diffuse tenderness  Skin: no rashes or lesions grossly   Other: CN grossly intact, COOK      Medical Decision Making       80 MINUTES SPENT BY ME on the date of service doing chart review, history, exam, documentation & further activities per the note.      Data   ------------------------- PAST 24 HR DATA REVIEWED -----------------------------------------------    I have personally reviewed the following data over the past 24 hrs:    8.8  \   8.5 (L)   / 229     142 109 (H); 109 (H) 27.1 (H); 27.1 (H) /  137 (H); 137 (H)   3.6 19 (L); 19 (L) 2.41 (H); 2.41 (H) \     ALT: 14; 14 AST: 27; 27 AP: 80; 80 TBILI: 0.3; 0.3   ALB: 2.9 (L); 2.9 (L) TOT PROTEIN: 5.3 (L); 5.3 (L) LIPASE: 30     Trop: 49 (H) BNP: N/A     INR:  1.57 (H) PTT:  N/A   D-dimer:  N/A Fibrinogen:  N/A       Imaging results reviewed over the past 24 hrs:   No results found for this or any previous visit (from the past 24 hour(s)).

## 2024-07-14 NOTE — CONSULTS
[facility]  Gastroenterology Consultation         Ti Nickerson  [address]  [place] [facility] [address]  78 year old male    Admission Date/Time: 7/14/2024  Primary Care Provider: Nico Retana  Referring / Attending Physician:  Dr. Dailey    We were asked to see the patient in consultation by Dr. Dailey for evaluation of syncope melena acute GI bleed.      CC: Syncope melena acute GI bleed    HPI:  Ti Nickerson is a 78 year old male who who was admitted through ER with episode of hypotension and syncope.  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  Now patient is presenting with 2-day history of melena with acute episode of maroon stools last night along with acute symptomatic weakness hypotension and syncope.  Patient was evaluated in the ER..  Patient has history of atrial fibrillation for which patient is on Eliquis.  Patient was admitted to ICU patient is laying in bed fairly comfortably now patient is hemodynamically stable.  Patient denied any history of chest pain hematemesis patient has been complaining of some abdominal discomfort.  Most of the information was obtained by reviewing chart and discussing with family.  Patient is n.p.o.    ROS: A comprehensive ten point review of systems was negative aside from those in mentioned in the HPI.      PAST MED HX:  I have reviewed this patient's medical history and updated it with pertinent information if needed.   Past Medical History:   Diagnosis Date    Atrial fibrillation, unspecified 9/18/2015    CAD (coronary artery disease) 09/18/2015    minimal by angio 2007, fu nuc est nl 2018    Elevated TSH 2018    Esophageal reflux 9/18/2015    Essential hypertension     History of cardioversion     [id number]    Idiopathic cardiomyopathy (H) 09/18/2015    fu echo nl  ef, nl nuclear est 11/18, Dr. Quarles    Mixed hyperlipidemia 9/18/2015    Mumps     Sleep apnea 09/18/2015    does not use cpap as of 2019    Sleep apnea     Thoracic aortic aneurysm (H24)     sinus of valsalva 4.5 and asc aorta 4.5 in 2017    Tubular adenoma of colon 01/2017    fu 3 years       MEDICATIONS:   Prior to Admission Medications   Prescriptions Last Dose Informant Patient Reported? Taking?   ASPIRIN NOT PRESCRIBED (INTENTIONAL)  Spouse/Significant Other No No   Sig: Please choose reason not prescribed, below   Patient not taking: Reported on 5/28/2024   acetaminophen (TYLENOL) 500 MG tablet  Spouse/Significant Other Yes No   Sig: Take 500-1,000 mg by mouth every 6 hours as needed for mild pain   apixaban ANTICOAGULANT (ELIQUIS) 5 MG tablet   No No   Sig: Take 1 tablet (5 mg) by mouth 2 times daily   ipilimumab (YERVOY) 200 MG/40ML SOLN   Yes No   Sig: Inject 125 mg into the vein   loperamide (IMODIUM) 2 MG capsule  Spouse/Significant Other Yes No   Sig: Take 2 mg by mouth 4 times daily as needed for diarrhea   methylprednisoLONE sodium succinate (SOLU-MEDROL) 2000 MG injection   Yes No   Sig: Inject 125 mg into the vein   midodrine (PROAMATINE) 5 MG tablet   No No   Sig: Take 1 tablet (5 mg) by mouth 2 times daily   multivitamin w/minerals (THERA-VIT-M) tablet  Spouse/Significant Other Yes No   Sig: Take 1 tablet by mouth daily   nivolumab (OPDIVO) 100 MG/10ML   Yes No   Sig: Inject 240 mg into the vein   pantoprazole (PROTONIX) 40 MG EC tablet  Spouse/Significant Other No No   Sig: Take 1 tablet (40 mg) by mouth 2 times daily (before meals)   Patient taking differently: Take 40 mg by mouth 2 times daily   pravastatin (PRAVACHOL) 20 MG tablet  Spouse/Significant Other No No   Sig: Take 1 tablet (20 mg) by mouth daily   sodium bicarbonate 650 MG tablet   No No   Sig: Take 1 tablet (650 mg) by mouth daily   tamsulosin (FLOMAX) 0.4 MG capsule   No No   Sig: TAKE 1 CAPSULE BY MOUTH EVERY DAY       Facility-Administered Medications: None       ALLERGIES:   Allergies   Allergen Reactions    Fentanyl Confusion       SOCIAL HISTORY:  Social History     Tobacco Use    Smoking status: Never    Smokeless tobacco: Never   Vaping Use    Vaping status: Never Used   Substance Use Topics    Alcohol use: Not Currently    Drug use: No       FAMILY HISTORY:  Family History   Problem Relation Age of Onset    Arrhythmia Mother         a-fib    Cerebrovascular Disease Mother     Arrhythmia Father         a-fib    Colon Cancer Father     Diabetes Father     Cerebrovascular Disease Father     No Known Problems Brother        PHYSICAL EXAM:   General awake alert oriented comfortable  Vital Signs with Ranges  Temp: 98.1  F (36.7  C) Temp src: Oral BP: 119/69 Pulse: 72   Resp: 22 SpO2: 95 % O2 Device: None (Room air)    No intake/output data recorded.    Cardiovascular: negative, PMI normal. No lifts, heaves, or thrills. RRR. No murmurs, clicks gallops or rub  Respiratory: negative, Percussion normal. Good diaphragmatic excursion. Lungs clear  Head: Normocephalic. No masses, lesions, tenderness or abnormalities  Neck: Neck supple. No adenopathy. Thyroid symmetric, normal size,, Carotids without bruits.  Abdomen: Abdomen soft, non-tender. BS normal. No masses, organomegaly  SKIN: no suspicious lesions or rashes          ADDITIONAL COMMENTS:   I reviewed the patient's new clinical lab test results.   Recent Labs   Lab Test 07/14/24  0739 07/14/24  0505 07/14/24  0500 06/12/24  1014 10/25/23  1002 10/25/23  0915 09/14/23  0623 09/13/23  2057   WBC 8.4  --  8.8 6.2   < >  --    < >  --    HGB 7.9* 8.5* 8.3* 9.2*   < >  --    < >  --    MCV 90  --  87 90   < >  --    < >  --      --  229 224   < >  --    < >  --    INR  --   --  1.57*  --   --  2.29*  --  1.22*    < > = values in this interval not displayed.     Recent Labs   Lab Test 07/14/24  0505 07/14/24  0500 06/12/24  1014 05/28/24  1649   POTASSIUM 3.6 3.7  3.7 4.2 4.5    CHLORIDE  --  109*  109* 106 103   CO2  --  19*  19* 21* 21*   BUN  --  27.1*  27.1* 23.2* 35.2*   ANIONGAP  --  12  12 10 13     Recent Labs   Lab Test 07/14/24  0500 06/12/24  1014 05/28/24  1649 03/26/24  1355 10/31/23  0142 10/09/23  0820 09/13/23  2051 09/05/23  0938 09/21/22  1102 10/11/21  0947   ALBUMIN 2.9*  2.9* 3.4* 3.7 3.6  --    < > 3.8  --    < >  --    BILITOTAL 0.3  0.3  --  0.6 0.5  --    < > 0.7  --    < >  --    ALT 14  14  --  16 14  --    < > 17  --    < >  --    AST 27  27  --  23 19  --    < > 21  --    < >  --    PROTEIN  --   --   --   --  Negative  --   --  Negative  --  Negative   LIPASE 30  --   --   --   --   --  34  --   --   --     < > = values in this interval not displayed.       I reviewed the patient's new imaging results.        CONSULTATION ASSESSMENT AND PLAN:    Active Problems:  Very pleasant 78-year-old gentleman with known history of extensive metastatic disease with metastatic renal cell carcinoma involving small bowel requiring surgical resection along with recurrent disease.  Patient CT scan findings are consistent with worsening of mass in the duodenum along with renal mass and mets.  Patient presented with acute GI bleed with clots and melena patient's findings are consistent with active GI bleed most likely metastatic disease.  Patient reversed from his anticoagulation.  At this point I will recommend to continue on supportive care n.p.o. type and cross transfuse IV Protonix plan to proceed with upper GI endoscopy.  However if patient's findings are consistent with recurrent GI bleeding from neoplastic process on metastatic renal cell carcinoma patient may need further evaluation in IR versus radiation therapy or other alternate options including surgical resection.  Risk-benefit plan differential diagnosis discussed in detail with patient and family.  GI will continue to follow along.  Thank you very much for letting me participate in his  care.          Chele Ash MD, FACP  [facility].  Office: [phone]  Cell : [phone]      [facility]  Ph: [phone] Fax: [phone]

## 2024-07-14 NOTE — ED PROVIDER NOTES
Emergency Department Note      History of Present Illness     Chief Complaint   Rectal Bleeding (Pt has hx of GI bleed. On Eliquis for afib. Pt had black tarry stool on Saturday. Tonight had a syncopal episode and called 911 but canceled it as he woke up. Then went to the bathroom again started passing large clots. Had another syncopal. EMS report 200-300cc blood on floor.)      HPI   Ti Nickerson is a 78 year old male on Eliquis with a history of atrial fibrillation, idiopathic cardiomyopathy, hypertension, stage 3 CKD, and right renal cell carcinoma who presents to the ED via EMS for evaluation of rectal bleeding. The patient's wife reports that early yesterday morning the patient had one episode of melena. Adds they believed it was due to the iron infusion 5 days ago. Patient then felt improved. However, last night, the patient went out to dinner and later had diarrhea with bright, red blood. At this time patient had a syncope events on the toilet with wife present. No injuries reported from the syncopal episodes.  The wife is able to get him back into bed.  Upon doing so, he needed to move his bowels again.  He went back on the toilet and had another episode of bright red bloody diarrhea and another syncopal episode.  No fall or injury from that.  No seizure activity.  Patient did not hit his head per wife. Notably, she adds that the patient was diagnosed with kidney cancer that metastasized and underwent extensive surgery in 2019 and since has had multiple surgeries.  He follows with [facility].  He had been on immunotherapy.  He had a follow-up at [facility] and underwent a CT of the chest, abdomen and pelvis that showed worsening metastatic disease.  He has been scoped by Dr. Ash    Per evaluating the patient, he endorses abdominal pain and some nausea. Denies chest pain, headache, or shortness of breath.       Independent Historian   Wife as detailed above.    Review of External Notes   I  reviewed the ECG from Dr. Ash that notes an upper malignant duodenal mass.   CT from July 8 2024 from [facility]    Past Medical History     Medical History and Problem List   Past Medical History:   Diagnosis Date     Atrial fibrillation, unspecified 9/18/2015     CAD (coronary artery disease) 09/18/2015     Elevated TSH 2018     Esophageal reflux 9/18/2015     Essential hypertension      History of cardioversion      Idiopathic cardiomyopathy (H) 09/18/2015     Mixed hyperlipidemia 9/18/2015     Mumps      Sleep apnea 09/18/2015     Sleep apnea      Thoracic aortic aneurysm (H24)      Tubular adenoma of colon 01/2017       Medications   acetaminophen (TYLENOL) 500 MG tablet  apixaban ANTICOAGULANT (ELIQUIS) 5 MG tablet  ASPIRIN NOT PRESCRIBED (INTENTIONAL)  ipilimumab (YERVOY) 200 MG/40ML SOLN  loperamide (IMODIUM) 2 MG capsule  methylprednisoLONE sodium succinate (SOLU-MEDROL) 2000 MG injection  midodrine (PROAMATINE) 5 MG tablet  multivitamin w/minerals (THERA-VIT-M) tablet  nivolumab (OPDIVO) 100 MG/10ML  pantoprazole (PROTONIX) 40 MG EC tablet  pravastatin (PRAVACHOL) 20 MG tablet  sodium bicarbonate 650 MG tablet  tamsulosin (FLOMAX) 0.4 MG capsule        Surgical History   Past Surgical History:   Procedure Laterality Date     APPENDECTOMY  1964     ESOPHAGOSCOPY, GASTROSCOPY, DUODENOSCOPY (EGD), COMBINED N/A 9/14/2023    Procedure: Esophagoscopy, gastroscopy, duodenoscopy (EGD), combined;  Surgeon: Chele Ash MD;  Location:  GI       Physical Exam     Patient Vitals for the past 24 hrs:   BP Temp Temp src Pulse Resp SpO2 Height Weight   07/14/24 0555 113/66 -- -- 91 -- 98 % -- --   07/14/24 0550 104/72 -- -- 77 -- 98 % -- --   07/14/24 0545 108/71 -- -- 82 12 100 % -- --   07/14/24 0540 94/57 -- -- 80 12 98 % -- --   07/14/24 0535 (!) 89/57 -- -- 81 14 96 % -- --   07/14/24 0530 (!) 87/53 -- -- 87 10 98 % -- --   07/14/24 0525 92/55 -- -- 83 16 99 % -- --   07/14/24 0520 94/55 97.8  " F (36.6  C) Oral 84 -- 99 % -- --   07/14/24 0513 (!) 84/60 97.9  F (36.6  C) -- 85 17 -- -- --   07/14/24 0505 (!) 87/59 -- -- 90 -- 99 % -- --   07/14/24 0458 (!) 59/42 -- -- -- -- -- -- --   07/14/24 0457 (!) 69/47 97.9  F (36.6  C) Oral 94 20 97 % 1.905 m (6' 3\") 117.9 kg (260 lb)     Physical Exam  General: Laying on bed, pale appearing  HENT:  No obvious trauma to head  Right Ear:  External ear normal.   Left Ear:  External ear normal.   Nose:  Nose normal.   Eyes:  Conjunctivae and EOM are normal. Pupils are equal, round, and reactive.   Neck: Normal range of motion. Neck supple. No tracheal deviation present.   CV:  Normal heart sounds. No murmur heard.  Pulm/Chest: Effort normal and breath sounds normal.   Abd: Soft. No distension.  Abdominal surgical scar appreciated.  There is slight left lower tenderness. There is no rigidity, no rebound and no guarding.   M/S: Normal range of motion.   Neuro: Awake and alert.   Skin: Skin is warm and dry. No rash noted. Not diaphoretic.   Psych: Normal mood and affect. Behavior is normal.      Diagnostics     Lab Results   Labs Ordered and Resulted from Time of ED Arrival to Time of ED Departure   BASIC METABOLIC PANEL - Abnormal       Result Value    Sodium 140      Potassium 3.7      Chloride 109 (*)     Carbon Dioxide (CO2) 19 (*)     Anion Gap 12      Urea Nitrogen 27.1 (*)     Creatinine 2.41 (*)     GFR Estimate 27 (*)     Calcium 8.4 (*)     Glucose 137 (*)    COMPREHENSIVE METABOLIC PANEL - Abnormal    Sodium 140      Potassium 3.7      Carbon Dioxide (CO2) 19 (*)     Anion Gap 12      Urea Nitrogen 27.1 (*)     Creatinine 2.41 (*)     GFR Estimate 27 (*)     Calcium 8.4 (*)     Chloride 109 (*)     Glucose 137 (*)     Alkaline Phosphatase 80      AST 27      ALT 14      Protein Total 5.3 (*)     Albumin 2.9 (*)     Bilirubin Total 0.3     INR - Abnormal    INR 1.57 (*)    TROPONIN T, HIGH SENSITIVITY - Abnormal    Troponin T, High Sensitivity 49 (*)    HEPATIC " FUNCTION PANEL - Abnormal    Protein Total 5.3 (*)     Albumin 2.9 (*)     Bilirubin Total 0.3      Alkaline Phosphatase 80      AST 27      ALT 14      Bilirubin Direct <0.20     CBC WITH PLATELETS AND DIFFERENTIAL - Abnormal    WBC Count 8.8      RBC Count 3.09 (*)     Hemoglobin 8.3 (*)     Hematocrit 26.9 (*)     MCV 87      MCH 26.9      MCHC 30.9 (*)     RDW 19.3 (*)     Platelet Count 229      % Neutrophils 64      % Lymphocytes 20      % Monocytes 10      % Eosinophils 5      % Basophils 0      % Immature Granulocytes 1      NRBCs per 100 WBC 0      Absolute Neutrophils 5.6      Absolute Lymphocytes 1.8      Absolute Monocytes 0.9      Absolute Eosinophils 0.4      Absolute Basophils 0.0      Absolute Immature Granulocytes 0.1      Absolute NRBCs 0.0     ISTAT GASES ELECTROLYTES VENOUS POCT - Abnormal    CPB Applied No      Hematocrit POCT 25 (*)     Bicarbonate Venous POCT 20 (*)     Hemoglobin POCT 8.5 (*)     Potassium POCT 3.6      Sodium POCT 142      pCO2 Venous POCT 36 (*)     pH Venous POCT 7.35      pO2 Venous POCT 34      O2 Sat, Venous POCT 62 (*)     Base Excess/Deficit (+/-) POCT -5.0 (*)    MAGNESIUM - Normal    Magnesium 2.1     LIPASE - Normal    Lipase 30     PREPARE RED BLOOD CELLS (UNIT)    ISSUE DATE AND TIME 20240714050200      Blood Component Type Red Blood Cells      Product Code M1464M61      Unit Status Issued      Unit Number [id number]      UNIT ABO/RH O-      CODING SYSTEM JRTU486      UNIT TYPE ISBT 9500     PREPARE RED BLOOD CELLS (UNIT)    ISSUE DATE AND TIME [id number]      Blood Component Type Red Blood Cells      Product Code J4207G70      Unit Status Issued      Unit Number [id number]      UNIT ABO/RH O-      CODING SYSTEM WOSS297      UNIT TYPE ISBT 9500     TYPE AND SCREEN, ADULT    ABO/RH(D) B POS      Antibody Screen Negative      SPECIMEN EXPIRATION DATE [id number]     PREPARE RED BLOOD CELLS (UNIT)    ISSUE DATE AND TIME [id number]      Blood  Component Type Red Blood Cells      Product Code B9144C53      Unit Status Transfused      Unit Number [id number]      UNIT ABO/RH O-      CODING SYSTEM VKYU601      UNIT TYPE ISBT 9500     PREPARE RED BLOOD CELLS (UNIT)    ISSUE DATE AND TIME [id number]      Blood Component Type Red Blood Cells      Product Code W7564N24      Unit Status Returned      Unit Number [id number]      UNIT ABO/RH O-      CODING SYSTEM PMYC450      UNIT TYPE ISBT 9500     PREPARE RED BLOOD CELLS (UNIT)    Blood Component Type Red Blood Cells      Product Code R8646X16      Unit Status Ready for issue      Unit Number [id number]      CROSSMATCH Compatible      CODING SYSTEM NBNS208     PREPARE RED BLOOD CELLS (UNIT)    Blood Component Type Red Blood Cells      Product Code N2399E98      Unit Status Ready for issue      Unit Number [id number]      CROSSMATCH Compatible      CODING SYSTEM OSTA776     PREPARE RED BLOOD CELLS (UNIT)   TRANSFUSE RED BLOOD CELLS (UNIT)   TRANSFUSE RED BLOOD CELLS (UNIT)   ABO/RH TYPE AND SCREEN       Imaging   CTA Abdomen Pelvis with Contrast    (Results Pending)     ECG results from 07/14/24   EKG 12 lead     Value    Systolic Blood Pressure     Diastolic Blood Pressure     Ventricular Rate 87    Atrial Rate     RI Interval     QRS Duration 104        QTc 500    P Axis     R AXIS -36    T Axis 27    Interpretation ECG      Atrial fibrillation  Left axis deviation  Nonspecific ST abnormality  Prolonged QT  Abnormal ECG  When compared with ECG of 25-OCT-2023 08:56,  No significant change was found         Independent Interpretation   None    ED Course      Medications Administered   Medications   sodium chloride 0.9% BOLUS 1,000 mL (0 mLs Intravenous Stopped 7/14/24 1658)   pantoprazole (PROTONIX) IV push injection 80 mg (80 mg Intravenous $Given 7/14/24 6108)   prothrombin 4 factor complex concentrate (KCENTRA) infusion 4,977 Units (4,977 Units Intravenous $Given 7/14/24 9349)   iopamidol  (ISOVUE-370) solution 135 mL (135 mLs Intravenous $Given 7/14/24 0622)   Saline Flush (79 mLs Intravenous $Given 7/14/24 0623)     Discussion of Management   Admitting Hospitalist, Dr. Pozo    ED Course   ED Course as of 07/14/24 0641   Sun Jul 14, 2024   0505 I obtained history and examined the patient as noted above.    0508 I spoke to Dr. Pozo from the hospitalist service regarding admission.    0543 I rechecked and updated the patient.    0546 I spoke to Dr. Ash, a gastroenterologist, regarding the condition of the patient.    0550 I rechecked and updated the patient.    0554 I spoke to Dr. Pozo from the hospitalist service regarding the information obtained from Dr. Ash.    0555 I spoke to the patient about their GFR lab results of 27.0.    0611 I spoke to Dr. Pozo from the hospitalist service regarding updates on the patient.        Optional/Additional Documentation  None    Medical Decision Making / Diagnosis     CMS Diagnoses: None    MIPS       None    MDM   Ti Nickerson is a very pleasant 78 year old male who presents with concern of multiple bright red bloody bowel movements.  He had melena yesterday prior to these episodes of hematochezia.  Patient had 2 syncopal episodes while on the toilet.  No trauma or injury from those.  Patient has metastatic renal cell carcinoma.  He has a metastatic nodule in the duodenum that has grown through the prior resection site per his most recent CT on July 8.  This may be the area of bleeding versus elsewhere.  He is on Eliquis secondary to A-fib.  The patient was brought to our stabilization room.  He was provided pantoprazole in case this is upper GI bleed.  He was provided Kcentra for reversal of his Eliquis.  He was provided fluids and 2 units of packed red blood cells were ordered and 1 unit was transfused.  Blood consent has been signed by wife and he agreed.  I consulted the gastroenterologist who recommended a CT angiogram to see  if it is the mass that is bleeding and embolization by IR would be more appropriate.  Reviewed the patient's GFR of 27 with him and his wife and the risk and benefit of a CT angiogram.  They consented for it.  This is performed.  I spoke to the hospitalist as above who has agreed to admit the patient.  An ICU bed is available.  The gastroenterologist desired to scope the patient as well which can be performed in the ICU.  The CT is pending and the hospitalist team agrees to follow-up on the results and intervene.  Patient showed improvement. His blood pressure is much improved with these interventions.    >>>Critical Care time:  Total critical care time exclusive of procedures was 35 minutes for this patient.<<<    Disposition   The patient was admitted to the hospital.     Diagnosis     ICD-10-CM    1. Lower GI bleed  K92.2       2. Syncope, unspecified syncope type  R55            Discharge Medications   New Prescriptions    No medications on file         Scribe Disclosure:  Josette CARRERA, am serving as a scribe at 5:23 AM on 7/14/2024 to document services personally performed by Pedro Manjarrez DO based on my observations and the provider's statements to me.        Pedro Manjarrez DO  07/14/24 0641     [employer], full time

## 2024-07-14 NOTE — ED NOTES
Bed: ED14  Expected date:   Expected time:   Means of arrival:   Comments:  HEMS 437 88M GI bleed, syncopal episode eta 2999

## 2024-07-14 NOTE — PROGRESS NOTES
[facility]    Medicine Progress Note - Hospitalist Service        Date of Admission:  7/14/2024  4:50 AM    Assessment & Plan:      Ti Nickerson is a 78 year old male admitted on 7/14/2024. He presents with syncope    Syncope  GI bleed  Acute blood loss anemia due to above  Duodenal mass  Hypotension 2/2 bleeding  Hx orthostatic hypotension  GERD  [Pantoprazole 40 mg BID, midodrine 5 mg BID]  -Patient presents with syncopal episode and bloody bowel movement including large clots. -On arrival BP 59/42, improved with fluids. Troponin 49. Hgb 8.3 (9.2 6/12). EKG atrial fibrillation.   *given Kcentra in ED(patient is on Eliquis)  -Admitted to ICU  -Hemoglobin now stable around 8  -S/p 1 unit of PRBC overnight  -Hemodynamically stable, no clinical evidence of ongoing GI bleed  -CTA abdomen done early this morning shows no active GI bleed but does show 4.1 cm mass in the duodenum(known from before)  -Continue serial hemoglobin  -Protonix 40 mg IV twice a day  -N.p.o.  -If hemoglobin stable and no clinical evidence of ongoing bleed, might be able to transfer out of the ICU later today  -[facility] consulted    Metastatic renal cell carcinoma  *Follows through [facility]. S/p radical R nephrectomy 1/2019. W/ mass in 3rd portion of duodenum, s/p partial duodenectomy, path positive for RCC.    -1/2024 with upper EUS and resection of gastric mass. Also w/ recent bleeding and tumor found at blind end of duodenal resection, non-operable. Currently undergoing chemotherapy.   *CT 7/8 w/ enlargement of duodenal tumor and interval enlargement of retroperitoneal tumor distorting IVC and abutting aorta. Chest w/ new pulmonary nodules indeterminate for metastatic disease.   -As above CT shows duodenal mass at 4.1 cm  -[place] [facility] consulted    ROCKY  CKD IIIb  Baseline creatinine 1.7-2.0. creatinine on presentation 2.41, likely 2/2 hypotension with above  -Continue IV fluids  -BMP in the  "morning    Atrial fibrillation   -hold apixaban    Chronic HFpEF  Hx idiopathic CM- resolved  Non-obstructive CAD  Ascending aorta dilatation  HLD  -resume statin prior to discharge    BPH-resume tamsulosin   KEENA-not currently on CPAP      Diet: NPO for Medical/Clinical Reasons Except for: Meds, Ice Chips     DVT Prophylaxis: Pneumatic Compression Devices   Dillard Catheter: Not present  Code Status: Full Code     Disposition Plan       Expected Discharge Date: 07/16/2024              Entered: Roque Marrero MD 07/14/2024, 9:14 AM        Medically Ready for Discharge: Anticipated in 2-4 Days       Clinically Significant Risk Factors Present on Admission              # Hypoalbuminemia: Lowest albumin = 2.9 g/dL at 7/14/2024  5:00 AM, will monitor as appropriate  # Drug Induced Coagulation Defect: home medication list includes an anticoagulant medication    # Hypertension: Noted on problem list    # Anemia: based on hgb <11  # Anemia: based on hgb <11           # Obesity: Estimated body mass index is 33.54 kg/m  as calculated from the following:    Height as of this encounter: 1.905 m (6' 3\").    Weight as of this encounter: 121.7 kg (268 lb 4.8 oz).                   The patient's care was discussed with the Bedside Nurse and Patient.    Medical Decision Making       **CLEAR ALL SELECTIONS**      Labs/Imaging Reviewed:  See Information above and Data section below  Time SPENT BY ME on the date of service doing chart review, history, exam, documentation & further activities per the note:  . MINUTES    Chart documentation was completed, in part, with [employer] voice-recognition software. Even though reviewed, some grammatical, spelling, and word errors may remain.    Roque Marrero MD  Hospitalist Service  [facility]  Text Page 7AM-6PM  Securely message with the Vocera Web Console (learn more here)  Text page via [employer] " "Paging/Directory    ______________________________________________________________________    Interval History   Blood pressure is stable this morning.  He denies any lightheadedness, dizziness.  No ongoing evidence of GI bleed.  Denies hematochezia or melena.  No abdominal pain    Data reviewed today: I reviewed all medications, new labs and imaging results over the last 24 hours. I personally reviewed no images or EKG's today.    Physical Exam   Vital signs:  Temp: 98.1  F (36.7  C) Temp src: Oral BP: 119/69 Pulse: 72   Resp: 22 SpO2: 95 % O2 Device: None (Room air)   Height: 190.5 cm (6' 3\") Weight: 121.7 kg (268 lb 4.8 oz)  Estimated body mass index is 33.54 kg/m  as calculated from the following:    Height as of this encounter: 1.905 m (6' 3\").    Weight as of this encounter: 121.7 kg (268 lb 4.8 oz).      Wt Readings from Last 2 Encounters:   07/14/24 121.7 kg (268 lb 4.8 oz)   06/19/24 125.2 kg (276 lb)       Gen: AAOX3, NAD, comfortable  HEENT: Supple neck, moist oral mucosa, no pallor  Resp: CTA B/L, normal WOB, no crackles, no wheezes  CVS: RRR, no murmur  Abd/GI: Soft, non-tender. BS- normoactive.  No G/R/R  Skin: Warm, dry no rashes  MSK:no pedal edema  Neuro- CN- intact. No focal deficits.  Spontaneously moving all 4 extremities      Data   Recent Labs   Lab 07/14/24  0739 07/14/24  0647 07/14/24  0505 07/14/24  0500   WBC 8.4  --   --  8.8   HGB 7.9*  --  8.5* 8.3*   MCV 90  --   --  87     --   --  229   INR  --   --   --  1.57*   NA  --   --  142 140  140   POTASSIUM  --   --  3.6 3.7  3.7   CHLORIDE  --   --   --  109*  109*   CO2  --   --   --  19*  19*   BUN  --   --   --  27.1*  27.1*   CR  --   --   --  2.41*  2.41*   ANIONGAP  --   --   --  12  12   DOMINICK  --   --   --  8.4*  8.4*   GLC  --  112*  --  137*  137*   ALBUMIN  --   --   --  2.9*  2.9*   PROTTOTAL  --   --   --  5.3*  5.3*   BILITOTAL  --   --   --  0.3  0.3   ALKPHOS  --   --   --  80  80   ALT  --   --   --  14  " 14   AST  --   --   --  27  27   LIPASE  --   --   --  30       Recent Results (from the past 24 hour(s))   CTA Abdomen Pelvis with Contrast    Narrative    EXAM: CTA ABDOMEN PELVIS WITH CONTRAST  LOCATION: [facility]  DATE: 7/14/2024    INDICATION: Pain, lower GI bleed, hematochezia.  History of renal cell carcinoma with metastasis status postresection with recurrence of tumor within the duodenum but prior resection site per CT from 7 8 2024 through [facility].  COMPARISON: 10/31/2023. The recent [facility] CT dated 7/8/2024 is not available.  TECHNIQUE: CT angiogram abdomen pelvis during arterial phase of injection of IV contrast. 2D and 3D MIP reconstructions were performed by the CT technologist. Dose reduction techniques were used.  CONTRAST: 135  ml Isovue 370    FINDINGS:  ANGIOGRAM ABDOMEN/PELVIS: There is atherosclerotic calcification of the aorta and its branches. No aortic aneurysm or dissection. The major aortic branch vessels are widely patent. There are 2 left renal arteries. The iliac arterial system is normal in   caliber.    LOWER CHEST: Bilateral lower lobe bronchiectasis. Mild atelectasis and scarring at the lung bases.    HEPATOBILIARY: There is a 2.7 cm mass at the posterior right lobe of liver, decreased in size from 10/31/2023.    PANCREAS: Normal.    SPLEEN: Normal.    ADRENAL GLANDS: Normal.    KIDNEYS/BLADDER: The right kidney is surgically absent. There is a 6.4 cm cyst at the posterior aspect of the left kidney. No hydronephrosis.    BOWEL: There are colonic diverticula without acute diverticulitis. No bowel obstruction or inflammation. There is a mass in the third segment of duodenum measuring 4.1 x 3.0 cm. Metallic clips in the stomach. There is a midline ventral hernia containing   small bowel loops but not causing obstruction. There is no free intraperitoneal gas or fluid. No active GI bleeding.    LYMPH NODES: Soft tissue mass anterior to the IVC measures 4.0 x  2.3 cm. There is no other lymph node enlargement.    PELVIC ORGANS: The prostate gland is enlarged.    MUSCULOSKELETAL: Degenerative disease in the spine.      Impression    IMPRESSION:  1.  There is no active GI bleed.  2.  4.1 cm mass in the duodenum.  3.  4.0 cm lymph node anterior to the inferior vena cava.  4.  2.7 cm posterior right lobe liver mass.  5.  Midline ventral hernia containing small bowel but not causing obstruction.     Medications   Current Facility-Administered Medications   Medication Dose Route Frequency Provider Last Rate Last Admin    sodium chloride 0.9 % infusion   Intravenous Continuous Javi Pozo  mL/hr at 07/14/24 0714 New Bag at 07/14/24 0714     Current Facility-Administered Medications   Medication Dose Route Frequency Provider Last Rate Last Admin    pantoprazole (PROTONIX) IV push injection 40 mg  40 mg Intravenous Q12H Javi Pozo MD

## 2024-07-15 LAB
ANION GAP SERPL CALCULATED.3IONS-SCNC: 6 MMOL/L (ref 7–15)
BUN SERPL-MCNC: 23.3 MG/DL (ref 8–23)
CALCIUM SERPL-MCNC: 7.6 MG/DL (ref 8.8–10.2)
CHLORIDE SERPL-SCNC: 112 MMOL/L (ref 98–107)
CREAT SERPL-MCNC: 1.77 MG/DL (ref 0.67–1.17)
DEPRECATED HCO3 PLAS-SCNC: 22 MMOL/L (ref 22–29)
EGFRCR SERPLBLD CKD-EPI 2021: 39 ML/MIN/1.73M2
ERYTHROCYTE [DISTWIDTH] IN BLOOD BY AUTOMATED COUNT: 18.1 % (ref 10–15)
GLUCOSE BLDC GLUCOMTR-MCNC: 100 MG/DL (ref 70–99)
GLUCOSE SERPL-MCNC: 94 MG/DL (ref 70–99)
HCT VFR BLD AUTO: 27.6 % (ref 40–53)
HGB BLD-MCNC: 8.9 G/DL (ref 13.3–17.7)
HGB BLD-MCNC: 9 G/DL (ref 13.3–17.7)
HGB BLD-MCNC: 9.6 G/DL (ref 13.3–17.7)
MCH RBC QN AUTO: 28.3 PG (ref 26.5–33)
MCHC RBC AUTO-ENTMCNC: 32.6 G/DL (ref 31.5–36.5)
MCV RBC AUTO: 87 FL (ref 78–100)
PLATELET # BLD AUTO: 199 10E3/UL (ref 150–450)
POTASSIUM SERPL-SCNC: 3.5 MMOL/L (ref 3.4–5.3)
RBC # BLD AUTO: 3.18 10E6/UL (ref 4.4–5.9)
SODIUM SERPL-SCNC: 140 MMOL/L (ref 135–145)
WBC # BLD AUTO: 7.2 10E3/UL (ref 4–11)

## 2024-07-15 PROCEDURE — 85018 HEMOGLOBIN: CPT | Performed by: INTERNAL MEDICINE

## 2024-07-15 PROCEDURE — 258N000003 HC RX IP 258 OP 636: Performed by: INTERNAL MEDICINE

## 2024-07-15 PROCEDURE — 99233 SBSQ HOSP IP/OBS HIGH 50: CPT | Performed by: INTERNAL MEDICINE

## 2024-07-15 PROCEDURE — 250N000011 HC RX IP 250 OP 636: Performed by: INTERNAL MEDICINE

## 2024-07-15 PROCEDURE — 120N000001 HC R&B MED SURG/OB

## 2024-07-15 PROCEDURE — 36415 COLL VENOUS BLD VENIPUNCTURE: CPT | Performed by: INTERNAL MEDICINE

## 2024-07-15 PROCEDURE — 80048 BASIC METABOLIC PNL TOTAL CA: CPT | Performed by: INTERNAL MEDICINE

## 2024-07-15 RX ADMIN — PANTOPRAZOLE SODIUM 40 MG: 40 INJECTION, POWDER, FOR SOLUTION INTRAVENOUS at 09:06

## 2024-07-15 RX ADMIN — PANTOPRAZOLE SODIUM 40 MG: 40 INJECTION, POWDER, FOR SOLUTION INTRAVENOUS at 20:38

## 2024-07-15 RX ADMIN — SODIUM CHLORIDE: 9 INJECTION, SOLUTION INTRAVENOUS at 06:29

## 2024-07-15 ASSESSMENT — ACTIVITIES OF DAILY LIVING (ADL)
ADLS_ACUITY_SCORE: 30
ADLS_ACUITY_SCORE: 30
ADLS_ACUITY_SCORE: 32
ADLS_ACUITY_SCORE: 30
ADLS_ACUITY_SCORE: 32
ADLS_ACUITY_SCORE: 30
ADLS_ACUITY_SCORE: 32
ADLS_ACUITY_SCORE: 30
ADLS_ACUITY_SCORE: 32

## 2024-07-15 NOTE — PROGRESS NOTES
[facility]  Gastroenterology Progress Note     Ti Nickerson MRN# [id number]   YOB: 1945 Age: 78 year old          Assessment and Plan:   Very pleasant 78-year-old gentleman with known history of extensive metastatic disease with metastatic renal cell carcinoma involving small bowel requiring surgical resection along with recurrent disease.      GI bleed due to bleeding metastatic duodenal mass  Acute blood loss anemia due to above  Syncope due to above  Duodenal mass  Hypotension 2/2 bleeding  Hx orthostatic hypotension  GERD  Pt presents with syncopal episode and bloody bowel movement including large clots. On arrival BP 59/42, improved with fluids. Troponin 49. Hgb 8.3 (9.2 6/12). EKG atrial fibrillation.   *given Kcentra in ED(patient is on Eliquis)  -s/p 2 units of PRBC, hemoglobin stable at 9   -Hemodynamically stable, no clinical evidence of ongoing GI bleed  -CTA abdomen shows no active GI bleed but does show 4.1 cm mass in the duodenum(known from before)  --EGD 7/14/24 showed large, fungating, ulcerated duodenal mass with stigmata of recent bleeding.  Area was injected with epinephrine  --Continue Protonix 40 mg BID  --Full liquid diet  -Appreciate input from oncology in if palliative radiation would be an option.  Otherwise we will need to discuss with IR if embolization is an option.  Also concerned about potential outlet obstruction given the size of the mass and the location.  --Will follow        Interval History:     no new complaints and doing well; no cp, sob, n/v/d, or abd pain.              Review of Systems:     C: NEGATIVE for fever, chills, change in weight  E/M: NEGATIVE for ear, mouth and throat problems  R: NEGATIVE for significant cough or SOB  CV: NEGATIVE for chest pain, palpitations or peripheral edema             Medications:   I have reviewed this patient's current medications  Current Facility-Administered Medications   Medication Dose  Route Frequency Provider Last Rate Last Admin    pantoprazole (PROTONIX) IV push injection 40 mg  40 mg Intravenous Q12H Javi Pozo MD   40 mg at 07/15/24 0906                  Physical Exam:   Vitals were reviewed  Vital Signs with Ranges  Temp:  [97.7  F (36.5  C)-98.7  F (37.1  C)] 97.7  F (36.5  C)  Pulse:  [] 70  Resp:  [16] 16  BP: ()/(50-94) 132/90  SpO2:  [93 %-100 %] 98 %  I/O last 3 completed shifts:  In: 4435.83 [I.V.:3095.83]  Out: 350 [Urine:350]  Constitutional: Alert, oriented. Cooperative, lying in bed in NAD.   Respiratory:  Lungs CTAB.  No crackles, wheezes, or rhonchi, no labored breathing.  Cardiovascular:  Heart RRR, no MRG  GI:  Abdomen soft, NT/ND and with normoactive BS  Skin/Integumen:  Warm, dry, non-diaphoretic.  MSK: CMS x4 intact.             Data:   I reviewed the patient's new clinical lab test results.   Recent Labs   Lab Test 07/15/24  0607 07/15/24  0033 07/14/24  1852 07/14/24  1419 07/14/24  0739 07/14/24  0505 07/14/24  0500 10/25/23  1002 10/25/23  0915 09/14/23  0623 09/13/23  2057   WBC 7.2  --   --   --  8.4  --  8.8   < >  --    < >  --    HGB 9.0* 8.9* 7.8*   < > 7.9*   < > 8.3*   < >  --    < >  --    MCV 87  --   --   --  90  --  87   < >  --    < >  --      --   --   --  210  --  229   < >  --    < >  --    INR  --   --   --   --   --   --  1.57*  --  2.29*  --  1.22*    < > = values in this interval not displayed.     Recent Labs   Lab Test 07/15/24  0607 07/14/24  0505 07/14/24  0500 06/12/24  1014   POTASSIUM 3.5 3.6 3.7  3.7 4.2   CHLORIDE 112*  --  109*  109* 106   CO2 22  --  19*  19* 21*   BUN 23.3*  --  27.1*  27.1* 23.2*   ANIONGAP 6*  --  12  12 10     Recent Labs   Lab Test 07/14/24  0500 06/12/24  1014 05/28/24  1649 03/26/24  1355 10/31/23  0142 10/09/23  0820 09/13/23  2051 09/05/23  0938 09/21/22  1102 10/11/21  0947   ALBUMIN 2.9*  2.9* 3.4* 3.7 3.6  --    < > 3.8  --    < >  --    BILITOTAL 0.3  0.3  --  0.6 0.5   --    < > 0.7  --    < >  --    ALT 14  14  --  16 14  --    < > 17  --    < >  --    AST 27  27  --  23 19  --    < > 21  --    < >  --    PROTEIN  --   --   --   --  Negative  --   --  Negative  --  Negative   LIPASE 30  --   --   --   --   --  34  --   --   --     < > = values in this interval not displayed.       I reviewed the patient's new imaging results.    All laboratory data reviewed  All imaging studies reviewed by me.    BRENDON Keller,  7/15/2024  [facility]  Office : [phone]  Cell: [phone] (Dr. Ash)

## 2024-07-15 NOTE — CONSULTS
"  Interventional Radiology - Progress Note  Inpatient - [facility]  7/15/2024    IR Brief Consult Note    IR consulted for patient w GI bleed from \"large, fungating, ulcerated duodenal mass with stigmata of recent bleeding\" as demonstrated on UGI endoscopy yesterday. IR consulted for consideration of angiogram w embolization. Reviewed w Dr Dickinson. VSS. HGB stable after transfusion. No indication for urgent embolization. Full IR consult to follow in AM. Please page on call IR team if patient exhibits any signs of hemodynamic instability.    Shyam Chang PA-C  Interventional Radiology  [phone] (IR)  [phone] ([facility] Office)    "

## 2024-07-15 NOTE — PROGRESS NOTES
[facility]    Medicine Progress Note - Hospitalist Service        Date of Admission:  7/14/2024  4:50 AM    Assessment & Plan:      Ti Nickerson is a 78 year old male admitted on 7/14/2024. 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   -On arrival BP 59/42, improved with fluids. Troponin 49. Hgb 8.3 (9.2 6/12). EKG atrial fibrillation.   *given Kcentra in ED(patient is on Eliquis)  -Admitted to ICU  -Hemoglobin ramos of 7.9.  Is s/p 2 units of PRBC, hemoglobin stable at 9 this morning  -Hemodynamically stable, no clinical evidence of ongoing GI bleed  -CTA abdomen done early this morning shows no active GI bleed but does show 4.1 cm mass in the duodenum(known from before)  -[facility] following, patient underwent upper GI endoscopy on 7/14/2024 which showed large, fungating, ulcerated duodenal mass with stigmata of recent bleeding.  Area was injected with epinephrine  -Continue Protonix 40 mg IV twice a day  -Full liquid diet  -Discussed at length yesterday with Dr. Ash, he would like for oncology to weigh in if palliative radiation would be an option.  Otherwise we will need to discuss with IR if embolization is an option.  Dr. Ash is also concerned about potential outlet obstruction given the size of the mass and the location.  -Oncology also following, will await their inputs today.  I did speak with Timbo from [place] [facility] who will discuss with Dr. FELIX, patient's primary oncologist and get back with us and the family.    Metastatic renal cell carcinoma  *Follows through [facility]. S/p radical R nephrectomy 1/2019. W/ mass in 3rd portion of duodenum, s/p partial  "duodenectomy, path positive for RCC.    -1/2024 with upper EUS and resection of gastric mass. Also w/ recent bleeding and tumor found at blind end of duodenal resection, non-operable. Currently undergoing chemotherapy.   *CT 7/8 w/ enlargement of duodenal tumor and interval enlargement of retroperitoneal tumor distorting IVC and abutting aorta. Chest w/ new pulmonary nodules indeterminate for metastatic disease.   -As above CT and endoscopy shows duodenal mass   -[place] [facility] following as above, will await their evaluation today    ROCKY  CKD IIIb  Baseline creatinine 1.7-2.0. creatinine on presentation 2.41, likely 2/2 hypotension with above  -Creatinine improving, down to 1.77, which is probably around his baseline  -Discontinue IV fluids, encourage oral fluid intake    Atrial fibrillation   -hold apixaban    Chronic HFpEF  Hx idiopathic CM- resolved  Non-obstructive CAD  Ascending aorta dilatation  HLD  -Monitor volume status closely, currently euvolemic  -resume statin prior to discharge    BPH-continue tamsulosin   KEENA-not currently on CPAP      Diet: Full Liquid Diet     DVT Prophylaxis: Pneumatic Compression Devices   Dillard Catheter: Not present  Code Status: Full Code     Disposition Plan      Expected Discharge Date: 07/17/2024              Entered: Roque Marrero MD 07/15/2024, 9:10 AM        Medically Ready for Discharge: Anticipated in 2-4 Days       Clinically Significant Risk Factors              # Hypoalbuminemia: Lowest albumin = 2.9 g/dL at 7/14/2024  5:00 AM, will monitor as appropriate    # Coagulation Defect: INR = 1.57 (Ref range: 0.85 - 1.15) and/or PTT = N/A, will monitor for bleeding    # Hypertension: Noted on problem list                # Obesity: Estimated body mass index is 33.74 kg/m  as calculated from the following:    Height as of this encounter: 1.905 m (6' 3\").    Weight as of this encounter: 122.4 kg (269 lb 14.4 oz)., PRESENT ON ADMISSION                 The patient's care " "was discussed with the Bedside Nurse and Patient.    Medical Decision Making       **CLEAR ALL SELECTIONS**      Labs/Imaging Reviewed:  See Information above and Data section below  Time SPENT BY ME on the date of service doing chart review, history, exam, documentation & further activities per the note:  52 MINUTES    Chart documentation was completed, in part, with [employer] voice-recognition software. Even though reviewed, some grammatical, spelling, and word errors may remain.    Roque Marrero MD  Hospitalist Service  [facility]  Text Page 7AM-6PM  Securely message with the Vocera Web Console (learn more here)  Text page via [employer] Paging/Directory    ______________________________________________________________________    Interval History   No further evidence of ongoing GI bleeding.  Endoscopy completed yesterday which showed duodenal mass with stigmata of recent bleeding.  This was injected with epinephrine.  Had a long discussion with Dr. Ash yesterday, he is concerned about potential obstruction given the size and the location of the mass.  Awaiting oncology inputs regarding neck steps.  Had a long discussion with the patient, his spouse and daughter at the bedside regarding coordination of care.  Denies abdominal pain.    Data reviewed today: I reviewed all medications, new labs and imaging results over the last 24 hours. I personally reviewed no images or EKG's today.    Physical Exam   Vital signs:  Temp: 97.7  F (36.5  C) Temp src: Oral BP: 108/73 Pulse: 62   Resp: 16 SpO2: 96 % O2 Device: None (Room air) Oxygen Delivery: 2 LPM Height: 190.5 cm (6' 3\") Weight: 122.4 kg (269 lb 14.4 oz)  Estimated body mass index is 33.74 kg/m  as calculated from the following:    Height as of this encounter: 1.905 m (6' 3\").    Weight as of this encounter: 122.4 kg (269 lb 14.4 oz).      Wt Readings from Last 2 Encounters:   07/14/24 122.4 kg (269 lb 14.4 oz)   06/19/24 125.2 kg (276 lb) "       Gen: AAOX3, NAD, comfortable  HEENT: Supple neck, moist oral mucosa, no pallor  Resp: CTA B/L, normal WOB, no crackles, no wheezes  CVS: RRR, no murmur  Abd/GI: Soft, non-tender. BS- normoactive.  No G/R/R  Skin: Warm, dry no rashes  MSK:no pedal edema  Neuro- CN- intact. No focal deficits.  Spontaneously moving all 4 extremities      Data   Recent Labs   Lab 07/15/24  0607 07/15/24  0352 07/15/24  0033 07/14/24  2332 07/14/24  1941 07/14/24  1852 07/14/24  1419 07/14/24  0739 07/14/24  0647 07/14/24  0505 07/14/24  0500   WBC 7.2  --   --   --   --   --   --  8.4  --   --  8.8   HGB 9.0*  --  8.9*  --   --  7.8*   < > 7.9*  --  8.5* 8.3*   MCV 87  --   --   --   --   --   --  90  --   --  87     --   --   --   --   --   --  210  --   --  229   INR  --   --   --   --   --   --   --   --   --   --  1.57*     --   --   --   --   --   --   --   --  142 140  140   POTASSIUM 3.5  --   --   --   --   --   --   --   --  3.6 3.7  3.7   CHLORIDE 112*  --   --   --   --   --   --   --   --   --  109*  109*   CO2 22  --   --   --   --   --   --   --   --   --  19*  19*   BUN 23.3*  --   --   --   --   --   --   --   --   --  27.1*  27.1*   CR 1.77*  --   --   --   --   --   --   --   --   --  2.41*  2.41*   ANIONGAP 6*  --   --   --   --   --   --   --   --   --  12  12   DOMINICK 7.6*  --   --   --   --   --   --   --   --   --  8.4*  8.4*   GLC 94 100*  --  93   < >  --    < >  --    < >  --  137*  137*   ALBUMIN  --   --   --   --   --   --   --   --   --   --  2.9*  2.9*   PROTTOTAL  --   --   --   --   --   --   --   --   --   --  5.3*  5.3*   BILITOTAL  --   --   --   --   --   --   --   --   --   --  0.3  0.3   ALKPHOS  --   --   --   --   --   --   --   --   --   --  80  80   ALT  --   --   --   --   --   --   --   --   --   --  14  14   AST  --   --   --   --   --   --   --   --   --   --  27  27   LIPASE  --   --   --   --   --   --   --   --   --   --  30    < > = values in this interval  not displayed.       No results found for this or any previous visit (from the past 24 hour(s)).    Medications   Current Facility-Administered Medications   Medication Dose Route Frequency Provider Last Rate Last Admin    sodium chloride 0.9 % infusion   Intravenous Continuous Javi Pozo  mL/hr at 07/15/24 0629 New Bag at 07/15/24 0629     Current Facility-Administered Medications   Medication Dose Route Frequency Provider Last Rate Last Admin    pantoprazole (PROTONIX) IV push injection 40 mg  40 mg Intravenous Q12H Javi Pozo MD   40 mg at 07/15/24 0906

## 2024-07-15 NOTE — PLAN OF CARE
Neuro: Intact  Cardio: Afib CVR, BP WDL  Resp: LS clear RA  GI: Multiple loose Maroon/black BM sm -med  : Voiding without issue  Mobility: A1 GB  Skin: Intact  1 Unit PRBC  infused this shift  Pain: Denies    Problem: Gastrointestinal Bleeding  Goal: Optimal Coping with Acute Illness  7/14/2024 2103 by Esthela Cid RN  Outcome: Progressing  7/14/2024 2103 by Esthela Cid RN  Outcome: Not Progressing  Intervention: Optimize Psychosocial Response  Recent Flowsheet Documentation  Taken 7/14/2024 2000 by Esthela Cid RN  Supportive Measures: active listening utilized     Problem: Adult Inpatient Plan of Care  Goal: Optimal Comfort and Wellbeing  7/14/2024 2103 by Esthela Cid RN  Outcome: Progressing  7/14/2024 2103 by Esthela Cid RN  Outcome: Not Progressing  Intervention: Monitor Pain and Promote Comfort  Recent Flowsheet Documentation  Taken 7/14/2024 2000 by Esthela Cid RN  Pain Management Interventions: repositioned  Intervention: Provide Person-Centered Care  Recent Flowsheet Documentation  Taken 7/14/2024 2000 by Esthela Cid RN  Trust Relationship/Rapport:   care explained   choices provided   emotional support provided   empathic listening provided   questions answered   questions encouraged   thoughts/feelings acknowledged   reassurance provided   Goal Outcome Evaluation:

## 2024-07-15 NOTE — PLAN OF CARE
Problem: Risk for Delirium  Goal: Improved Behavioral Control  Outcome: Progressing  Intervention: Prevent and Manage Agitation  Recent Flowsheet Documentation  Taken 7/15/2024 0800 by Eryn Dominguez RN  Environment Familiarity/Consistency: daily routine followed  Intervention: Minimize Safety Risk  Recent Flowsheet Documentation  Taken 7/15/2024 0800 by Eryn Dominguez RN  Communication Enhancement Strategies: call light answered in person  Trust Relationship/Rapport:   care explained   choices provided   emotional support provided   empathic listening provided   questions answered   questions encouraged   thoughts/feelings acknowledged   reassurance provided  Goal: Improved Sleep  Outcome: Progressing     Problem: Gastrointestinal Bleeding  Goal: Optimal Coping with Acute Illness  Outcome: Progressing  Intervention: Optimize Psychosocial Response  Recent Flowsheet Documentation  Taken 7/15/2024 0800 by Eryn Dominguez RN  Supportive Measures: active listening utilized     Problem: Adult Inpatient Plan of Care  Goal: Plan of Care Review  Description: The Plan of Care Review/Shift note should be completed every shift.  The Outcome Evaluation is a brief statement about your assessment that the patient is improving, declining, or no change.  This information will be displayed automatically on your shift  note.  Flowsheets (Taken 7/15/2024 1112)  Outcome Evaluation: pt having no signs of active bms. one bm during day shift so far with black/green loose-no blood seen. voiding well-continent. ambulate x1 with assist. pt alert oriented x4. no skin issues noted. pt afib controlled-jaspreet while asleep and hosp aware. pt to be transferred to 8th floor, report given at 1110. pt RA and clear lungs sounds.  cintia wife at bedside and updated. hmg stable after 1 unit given overnight.  Plan of Care Reviewed With: patient  Overall Patient Progress: improving  Goal: Absence of Hospital-Acquired Illness or Injury  Intervention:  Identify and Manage Fall Risk  Recent Flowsheet Documentation  Taken 7/15/2024 0800 by Eryn Dominguez RN  Safety Promotion/Fall Prevention: safety round/check completed  Intervention: Prevent Skin Injury  Recent Flowsheet Documentation  Taken 7/15/2024 0800 by Eryn Dominguez RN  Skin Protection: adhesive use limited  Device Skin Pressure Protection:   adhesive use limited   pressure points protected  Intervention: Prevent and Manage VTE (Venous Thromboembolism) Risk  Recent Flowsheet Documentation  Taken 7/15/2024 0800 by Eryn Dominguez RN  VTE Prevention/Management: SCDs on (sequential compression devices)  Intervention: Prevent Infection  Recent Flowsheet Documentation  Taken 7/15/2024 0800 by Eryn Dominguez RN  Infection Prevention: rest/sleep promoted  Goal: Optimal Comfort and Wellbeing  Intervention: Provide Person-Centered Care  Recent Flowsheet Documentation  Taken 7/15/2024 0800 by Eryn Dominguez RN  Trust Relationship/Rapport:   care explained   choices provided   emotional support provided   empathic listening provided   questions answered   questions encouraged   thoughts/feelings acknowledged   reassurance provided     Problem: Risk for Delirium  Goal: Optimal Coping  Intervention: Optimize Psychosocial Adjustment to Delirium  Recent Flowsheet Documentation  Taken 7/15/2024 0800 by Eryn Dominguez RN  Supportive Measures: active listening utilized  Family/Support System Care: caregiver stress acknowledged  Goal: Improved Attention and Thought Clarity  Intervention: Maximize Cognitive Function  Recent Flowsheet Documentation  Taken 7/15/2024 0800 by Eryn Dominguez RN  Sensory Stimulation Regulation: care clustered  Reorientation Measures: clock in view     Problem: Gastrointestinal Bleeding  Goal: Hemostasis  Intervention: Manage Gastrointestinal Bleeding  Recent Flowsheet Documentation  Taken 7/15/2024 0800 by Eryn Dominguez RN  Bleeding Management: blood products  administered  Stabilization Measures: provider notified  Environmental Support:   calm environment promoted   caregiver consistency promoted   comfort object encouraged   distractions minimized   environmental consistency promoted   personal routine supported   rest periods encouraged     Problem: Skin Injury Risk Increased  Goal: Skin Health and Integrity  Intervention: Optimize Skin Protection  Recent Flowsheet Documentation  Taken 7/15/2024 1000 by Eryn Dominguez RN  Activity Management:   ambulated to bathroom   up in chair  Taken 7/15/2024 0900 by Eryn Dominguez RN  Activity Management:   ambulated to bathroom   up in chair  Taken 7/15/2024 0800 by Eryn Dominguez RN  Skin Protection: adhesive use limited  Activity Management:   ambulated to bathroom   up in chair   Goal Outcome Evaluation:      Plan of Care Reviewed With: patient    Overall Patient Progress: improvingOverall Patient Progress: improving    Outcome Evaluation: pt having no signs of active bms. one bm during day shift so far with black/green loose-no blood seen. voiding well-continent. ambulate x1 with assist. pt alert oriented x4. no skin issues noted. pt afib controlled-jaspreet while asleep and hosp aware. pt to be transferred to 8th floor, report given at 1110. pt RA and clear lungs sounds.  cintia wife at bedside and updated. hmg stable after 1 unit given overnight.

## 2024-07-15 NOTE — PLAN OF CARE
Goal Outcome Evaluation:       [id number]  Orientation: AOx4  Aggression Stop Light: Green  Activity: SBA  Diet/BS Checks: Full liquid  Tele:  AFIB CVR  IV Access/Drains: PIV SL  Pain Management: DENIES  Abnormal VS/Results: VSS on RA. Hgb 9.6  Bowel/Bladder: cont, had black stools during shift  Skin/Wounds: sacral mepi CDI  Consults: rad Onc  and IR consult placed. GI and HemOnc following  D/C Disposition: discharge pending  Other Info: family at bedside. No nausea. No dizziness.

## 2024-07-15 NOTE — PROGRESS NOTES
[facility]  Progress Note  MEDICAL ONCOLOGY/HEMATOLOGY  Patrick Dreyer, DO Thomas L Luukkonen MRN# [id number]   YOB: 1945  PCP Nico Retana [phone] Age: 78 year old      Date of Admission:  7/14/2024         Assessment and Plan:   GI bleeding, likely from upper GI source. Based on his history, local tumor recurrence in the GI tract seems the most likely explanation particularly given CT findings. He reports no bleeding elsewhere so a generalized bleeding diathesis is unlikely. He appears stable at present. 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 Most recently, he has been receiving dual immunotherapy. Recent imaging performed at [place] reportedly shows significant disease progression.     Chronic kidney disease, stage 3B and with associated ROCKY likely related to hypotension  4.   Atrial fibrillation on long term anticoagulation  5.   Heart failure with preserved EF    Recommendations;  Continue serial monitoring CBC and transfusion support as needed  Hold apixiban  3. He is high risk for further GI bleeding. Await interventional radiology and radiation oncology input. IR embolization could stop the bleeding, but may not prevent the mass from obstructing. Unclear if duodenum is able to be irradiated due to organ mobility, will await rad onc consultation. Family updated and all questions answered. Discussed some of the next line of treatment options today. His primary oncologist returns from vacation tomorrow, and we have been in touch with his team.          Code Status:   Full Code           Chief Complaint:   Dizziness and passing bloody stools         History of Present Illness:   7/15/24 s/p EGD revealed duodenal mass at risk for obstructing, with stigmata of recent bleeding.      Ti Nickerson is a 78 year old man who has a history of clear cell cancer of the right kidney and for which he has had extensive prior therapy as outlined below. He is seen locally by my colleague  and also at the [facility] by Dr. Hernández. Per available records he was diagnosed with a renal mass in 2019 and subsequently had right radical nephrectomy and IVC thrombectomy on 10/9/2019.  Grade 4 out of 4 clear-cell renal cell carcinoma pT3b margins were negative no sarcomatoid features lymphovascular invasion was present tumor necrosis was not identified.  He was on surveillance until 1/25/2021 at which time a liver lesion was identified. He then underwent biopsy and cryoablation at [facility].  Biopsy was consistent with metastatic renal cell carcinoma. He was then followed and was without further disease recurrence until 9/14/2023.  At that  time, he was admitted with melena and GI bleed.  EGD showed an ulcerated mass in the third portion of the duodenum.  Biopsy was consistent with metastatic renal cell carcinoma.  10/11/2023.  He underwent partial duodenectomy under the care of Dr. Ceballos at [facility] again consistent with metastatic RCC.  1/19/2024: Underwent EUS and resection of gastric mass with complete endoscopic removal again consistent with metastatic renal cell carcinoma.  Margins were negative.  Postoperatively patient had melena and GI bleed. with a drop in hemoglobin requiring blood transfusions.     He is admitted now after developing syncope and subsequent GI bleeding characterized by passing large clots. Labs showed hemoglobin 8.3. He was noted to be hypotensive and has received IV fluids with improvement in blood pressure. At the time of the consultation, he reports no further bleeding. No complaints of nausea, vomiting or abdominal pain. No complaints of epistaxis or urinary tract bleeding            Past Medical History:     Past Medical History:   Diagnosis Date     Atrial fibrillation, unspecified 9/18/2015    CAD (coronary artery disease) 09/18/2015    minimal by angio 2007, fu nuc est nl 2018    Elevated TSH 2018    Esophageal reflux 9/18/2015    Essential hypertension     History of cardioversion     [id number]    Idiopathic cardiomyopathy (H) 09/18/2015    fu echo nl ef, nl nuclear est 11/18, Dr. Quarles    Mixed hyperlipidemia 9/18/2015    Mumps     Sleep apnea 09/18/2015    does not use cpap as of 2019    Sleep apnea     Thoracic aortic aneurysm (H24)     sinus of valsalva 4.5 and asc aorta 4.5 in 2017    Tubular adenoma of colon 01/2017    fu 3 years               Past Surgical History:     Past Surgical History:   Procedure Laterality Date    APPENDECTOMY  1964    ESOPHAGOSCOPY, GASTROSCOPY, DUODENOSCOPY (EGD), COMBINED N/A 9/14/2023    Procedure: Esophagoscopy, gastroscopy, duodenoscopy (EGD), combined;  Surgeon: Chele Ash MD;  Location: [facility]            Home Medications:     Prior to Admission medications    Medication Sig Last Dose Taking? Auth Provider Long Term End Date   acetaminophen (TYLENOL) 500 MG tablet Take 500-1,000 mg by mouth daily as needed for mild pain More than a month at PRN Yes Reported, Patient     apixaban ANTICOAGULANT (ELIQUIS) 5 MG tablet Take 1 tablet (5 mg) by mouth 2 times daily 7/13/2024 at PM Yes Nico Retana MD No    midodrine (PROAMATINE) 5 MG tablet Take 1 tablet (5 mg) by mouth 2 times daily 7/13/2024 at PM Yes Mamie Gaston APRN CNP     pantoprazole (PROTONIX) 40 MG EC tablet Take 1 tablet (40 mg) by mouth 2 times daily (before meals) 7/13/2024 at PM Yes Christiano Fox MD     pravastatin (PRAVACHOL) 20 MG tablet Take 1 tablet (20 mg) by mouth daily 7/13/2024 at PM Yes Nico Retana MD Yes    sodium bicarbonate 650 MG tablet Take 1 tablet (650 mg) by mouth daily 7/13/2024 at PM Yes Klaudia Lazo, PAMarlenC     tamsulosin (FLOMAX) 0.4 MG capsule TAKE 1 CAPSULE BY MOUTH EVERY DAY  "7/13/2024 at PM Yes Nico Retana MD     ipilimumab (YERVOY) 200 MG/40ML SOLN Inject 125 mg into the vein   Reported, Patient     nivolumab (OPDIVO) 100 MG/10ML Inject 240 mg into the vein   Reported, Patient              Allergies:     Allergies   Allergen Reactions    Fentanyl Confusion            Social History:   Ti Nickerson  reports that he has never smoked. He has never used smokeless tobacco. He reports that he does not currently use alcohol. He reports that he does not use drugs.              Family History:     Family History   Problem Relation Age of Onset    Arrhythmia Mother         a-fib    Cerebrovascular Disease Mother     Arrhythmia Father         a-fib    Colon Cancer Father     Diabetes Father     Cerebrovascular Disease Father     No Known Problems Brother                 Review of Systems:   The 10 point Review of Systems is negative other than noted in the HPI or here.           Physical Exam:    , Blood pressure 125/73, pulse 54, temperature 98  F (36.7  C), temperature source Oral, resp. rate 16, height 1.905 m (6' 3\"), weight 122.4 kg (269 lb 14.4 oz), SpO2 98%.  269 lbs 14.4 oz    Constitutional: He appears weak but comfortable   Psych: Mood and affect normal   Neuro: No localizing deficits   Eyes: Non icteric   ENT: Ears and nose normal   Lymph: No palpable adenopathy in the cervical or axillary regions   Cardiovascular: Irregular heart rhythm   Lungs: Clear to auscultation   Abdomen: Soft and non tender   Skin: No rashes or petechiae   Musculoskeletal: Muscle groups symmetric   Other:           Data:   All new lab and imaging data was reviewed.   Recent Labs   Lab Test 07/15/24  1439 07/15/24  0607 07/14/24  1419 07/14/24  0739 07/14/24  0505 07/14/24  0500 10/25/23  1002 10/25/23  0915   WBC  --  7.2  --  8.4  --  8.8   < >  --    HGB 9.6* 9.0*   < > 7.9*   < > 8.3*   < >  --    MCV  --  87  --  90  --  87   < >  --    PLT  --  199  --  210  --  229   < >  --    INR  --  "  --   --   --   --  1.57*  --  2.29*    < > = values in this interval not displayed.      Recent Labs   Lab Test 07/15/24  0607 07/15/24  0352 07/14/24  0647 07/14/24  0505 07/14/24  0500     --   --  142 140  140   POTASSIUM 3.5  --   --  3.6 3.7  3.7   CHLORIDE 112*  --   --   --  109*  109*   CO2 22  --   --   --  19*  19*   BUN 23.3*  --   --   --  27.1*  27.1*   CR 1.77*  --   --   --  2.41*  2.41*   ANIONGAP 6*  --   --   --  12  12   DOMINICK 7.6*  --   --   --  8.4*  8.4*   GLC 94 100*   < >  --  137*  137*    < > = values in this interval not displayed.     EXAM: CTA ABDOMEN PELVIS WITH CONTRAST  LOCATION: [facility]  DATE: 7/14/2024    INDICATION: Pain, lower GI bleed, hematochezia.  History of renal cell carcinoma with metastasis status postresection with recurrence of tumor within the duodenum but prior resection site per CT from 7 8 2024 through [place].  COMPARISON: 10/31/2023. The recent [facility] CT dated 7/8/2024 is not available.  TECHNIQUE: CT angiogram abdomen pelvis during arterial phase of injection of IV contrast. 2D and 3D MIP reconstructions were performed by the CT technologist. Dose reduction techniques were used.  CONTRAST: 135  ml Isovue 370    FINDINGS:  ANGIOGRAM ABDOMEN/PELVIS: There is atherosclerotic calcification of the aorta and its branches. No aortic aneurysm or dissection. The major aortic branch vessels are widely patent. There are 2 left renal arteries. The iliac arterial system is normal in  caliber.    LOWER CHEST: Bilateral lower lobe bronchiectasis. Mild atelectasis and scarring at the lung bases.    HEPATOBILIARY: There is a 2.7 cm mass at the posterior right lobe of liver, decreased in size from 10/31/2023.    PANCREAS: Normal.    SPLEEN: Normal.    ADRENAL GLANDS: Normal.    KIDNEYS/BLADDER: The right kidney is surgically absent. There is a 6.4 cm cyst at the posterior aspect of the left kidney. No hydronephrosis.    BOWEL: There are  "colonic diverticula without acute diverticulitis. No bowel obstruction or inflammation. There is a mass in the third segment of duodenum measuring 4.1 x 3.0 cm. Metallic clips in the stomach. There is a midline ventral hernia containing  small bowel loops but not causing obstruction. There is no free intraperitoneal gas or fluid. No active GI bleeding.    LYMPH NODES: Soft tissue mass anterior to the IVC measures 4.0 x 2.3 cm. There is no other lymph node enlargement.    PELVIC ORGANS: The prostate gland is enlarged.    MUSCULOSKELETAL: Degenerative disease in the spine.   Impression:     IMPRESSION:  1.  There is no active GI bleed.  2.  4.1 cm mass in the duodenum.  3.  4.0 cm lymph node anterior to the inferior vena cava.  4.  2.7 cm posterior right lobe liver mass.  5.  Midline ventral hernia containing small bowel but not causing obstruction.           No lab results found.    Invalid input(s): \"TROP\", \"TROPONINIES\"    "

## 2024-07-16 ENCOUNTER — TRANSFERRED RECORDS (OUTPATIENT)
Dept: HEALTH INFORMATION MANAGEMENT | Facility: CLINIC | Age: 79
End: 2024-07-16
Payer: MEDICARE

## 2024-07-16 LAB
ANION GAP SERPL CALCULATED.3IONS-SCNC: 10 MMOL/L (ref 7–15)
BUN SERPL-MCNC: 18.6 MG/DL (ref 8–23)
CALCIUM SERPL-MCNC: 7.8 MG/DL (ref 8.8–10.2)
CHLORIDE SERPL-SCNC: 110 MMOL/L (ref 98–107)
CREAT SERPL-MCNC: 1.63 MG/DL (ref 0.67–1.17)
EGFRCR SERPLBLD CKD-EPI 2021: 43 ML/MIN/1.73M2
ERYTHROCYTE [DISTWIDTH] IN BLOOD BY AUTOMATED COUNT: 19.1 % (ref 10–15)
GLUCOSE SERPL-MCNC: 92 MG/DL (ref 70–99)
HCO3 SERPL-SCNC: 20 MMOL/L (ref 22–29)
HCT VFR BLD AUTO: 29.4 % (ref 40–53)
HGB BLD-MCNC: 9.5 G/DL (ref 13.3–17.7)
MCH RBC QN AUTO: 28.5 PG (ref 26.5–33)
MCHC RBC AUTO-ENTMCNC: 32.3 G/DL (ref 31.5–36.5)
MCV RBC AUTO: 88 FL (ref 78–100)
PLATELET # BLD AUTO: 216 10E3/UL (ref 150–450)
POTASSIUM SERPL-SCNC: 3.4 MMOL/L (ref 3.4–5.3)
RBC # BLD AUTO: 3.33 10E6/UL (ref 4.4–5.9)
SODIUM SERPL-SCNC: 140 MMOL/L (ref 135–145)
WBC # BLD AUTO: 6.7 10E3/UL (ref 4–11)

## 2024-07-16 PROCEDURE — 77290 THER RAD SIMULAJ FIELD CPLX: CPT

## 2024-07-16 PROCEDURE — 77334 RADIATION TREATMENT AID(S): CPT

## 2024-07-16 PROCEDURE — 120N000001 HC R&B MED SURG/OB

## 2024-07-16 PROCEDURE — 36415 COLL VENOUS BLD VENIPUNCTURE: CPT | Performed by: INTERNAL MEDICINE

## 2024-07-16 PROCEDURE — 99232 SBSQ HOSP IP/OBS MODERATE 35: CPT | Performed by: INTERNAL MEDICINE

## 2024-07-16 PROCEDURE — 250N000011 HC RX IP 250 OP 636: Performed by: INTERNAL MEDICINE

## 2024-07-16 PROCEDURE — 82374 ASSAY BLOOD CARBON DIOXIDE: CPT | Performed by: INTERNAL MEDICINE

## 2024-07-16 PROCEDURE — 85027 COMPLETE CBC AUTOMATED: CPT | Performed by: INTERNAL MEDICINE

## 2024-07-16 RX ADMIN — PANTOPRAZOLE SODIUM 40 MG: 40 INJECTION, POWDER, FOR SOLUTION INTRAVENOUS at 09:23

## 2024-07-16 RX ADMIN — PANTOPRAZOLE SODIUM 40 MG: 40 INJECTION, POWDER, FOR SOLUTION INTRAVENOUS at 20:45

## 2024-07-16 ASSESSMENT — ACTIVITIES OF DAILY LIVING (ADL)
ADLS_ACUITY_SCORE: 32
DEPENDENT_IADLS:: INDEPENDENT
ADLS_ACUITY_SCORE: 32

## 2024-07-16 NOTE — CONSULTS
79 y/o male with Hx. Of metastatic clear cell carcinoma right kidney Dx in 10/2019 s/p right radical nephrectomy with subsequent recurrent metastatic tumor, admitted with syncope, hypotension & Hgb of 7.9 gm & rectal bleeding.  CTA CAP 7/14/24 shows 4.1 cm mass in duodenum & 4.0 cm lymph node mass anterior to IVC.  EGD 7/14/24 shows fungating mass in 3rd portion of duodenum c/w tumor involvement.  Pt's Hgb has stabilized today.  IR intervention being considered.  I reviewed imaging & met with pt & family.  I discussed with Dr. Ollie Castañeda in IR radiology.  At this time no IR procedure recommended.  I also reviewed & discussed with TRU CRUZ CNP in Oncology.  Rec: XRT treatment to duodenal & lymph node mass to reduce bleeding.  Outlined XRT treatment to dose of 4000 cGy in 16 treatments of 250 cGy to region utilizing 3D conformal XRT rx.  I outlined potential side effects & complications of XRT rx. to pt who consents.  Will carry out CT simulation today & initiate XRT rx tomorrow.  Consult dictated.  SRIRAM Clay MD.

## 2024-07-16 NOTE — PROGRESS NOTES
[facility] Progress Note     Primary Oncologist/Hematologist:  Adalberto Harp, [facility]          Assessment and Plan:     GI bleeding, likely from upper GI source. Based on his history, local tumor recurrence in the GI tract seems the most likely explanation particularly given CT findings. He reports no bleeding elsewhere so a generalized bleeding diathesis is unlikely. He appears stable at present. 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 Most recently, he has been receiving dual immunotherapy. Recent imaging performed at [place] reportedly shows significant disease progression.      Chronic kidney disease, stage 3B and with associated ROCKY likely related to hypotension  4.   Atrial fibrillation on long term anticoagulation  5.   Heart failure with preserved EF     Recommendations;  Continue serial monitoring CBC and transfusion support as needed  Hold apixiban  3. He is high risk for further GI bleeding. Await interventional radiology and radiation oncology input. IR embolization could stop the bleeding, but may not prevent the mass from obstructing. Unclear if duodenum is able to be irradiated due to organ mobility, will await rad onc consultation. Family updated and all questions answered. We have discussed some of the next line of treatment options.  I spoke to his primary oncologist, Dr. Glover, today.  He agrees with the plan outlined above.  Although next line therapies are available to treat Edgar's disease progression, this will depend on his recovery from his current bleeding  and his overall performance status.  Dr. Glover will discuss these options at the time of outpatient follow up.     Discussed with Dr. Glover and Dr. Clay.    Karan Valle, NANCY, AOCNP  Nurse  "Practitioner  [facility]  [phone]          Interval History:     No overnight bleeding.  Feeling a bit stronger with rise in hgb              Review of Systems:     The 5 point Review of Systems is negative other than noted in the HPI                Medications:   Scheduled Medications  Current Facility-Administered Medications   Medication Dose Route Frequency Provider Last Rate Last Admin    pantoprazole (PROTONIX) IV push injection 40 mg  40 mg Intravenous Q12H Javi Pozo MD   40 mg at 07/16/24 0923     PRN Medications  Current Facility-Administered Medications   Medication Dose Route Frequency Provider Last Rate Last Admin    acetaminophen (TYLENOL) tablet 500-1,000 mg  500-1,000 mg Oral Q6H PRN Javi Pozo MD        benzocaine 20% (HURRICAINE/TOPEX) 20 % spray   Mouth/Throat PRN Chele Ash MD   1 spray at 07/14/24 1315    glucose gel 15-30 g  15-30 g Oral Q15 Min PRN Javi Pozo MD        Or    dextrose 50 % injection 25-50 mL  25-50 mL Intravenous Q15 Min PRN Javi Pozo MD        Or    glucagon injection 1 mg  1 mg Subcutaneous Q15 Min PRN Javi Pozo MD        EPINEPHrine (ADRENALIN) injection    PRN Chele Ash MD   2.5 mL at 07/14/24 1330    ondansetron (ZOFRAN ODT) ODT tab 4 mg  4 mg Oral Q6H PRN Javi Pozo MD        Or    ondansetron (ZOFRAN) injection 4 mg  4 mg Intravenous Q6H PRN Javi Pozo MD        senna-docusate (SENOKOT-S/PERICOLACE) 8.6-50 MG per tablet 1 tablet  1 tablet Oral BID PRN Javi Pozo MD        Or    senna-docusate (SENOKOT-S/PERICOLACE) 8.6-50 MG per tablet 2 tablet  2 tablet Oral BID PRN Javi Pozo MD                      Physical Exam:   Vitals were reviewed  Blood pressure 135/76, pulse 63, temperature 99.2  F (37.3  C), temperature source Oral, resp. rate 16, height 1.905 m (6' 3\"), weight 119.9 kg (264 lb 4.8 oz), SpO2 " 95%.  Wt Readings from Last 4 Encounters:   07/16/24 119.9 kg (264 lb 4.8 oz)   06/19/24 125.2 kg (276 lb)   06/18/24 125.6 kg (276 lb 12.8 oz)   05/28/24 121.5 kg (267 lb 12.8 oz)       I/O last 3 completed shifts:  In: 700 [P.O.:450; I.V.:250]  Out: -                  Data:   All laboratory data and imaging studies reviewed.    CMP  Recent Labs   Lab 07/16/24  0805 07/15/24  0607 07/15/24  0352 07/14/24  2332 07/14/24  0647 07/14/24  0505 07/14/24  0500    140  --   --   --  142 140  140   POTASSIUM 3.4 3.5  --   --   --  3.6 3.7  3.7   CHLORIDE 110* 112*  --   --   --   --  109*  109*   CO2 20* 22  --   --   --   --  19*  19*   ANIONGAP 10 6*  --   --   --   --  12  12   GLC 92 94 100* 93   < >  --  137*  137*   BUN 18.6 23.3*  --   --   --   --  27.1*  27.1*   CR 1.63* 1.77*  --   --   --   --  2.41*  2.41*   GFRESTIMATED 43* 39*  --   --   --   --  27*  27*   DOMINICK 7.8* 7.6*  --   --   --   --  8.4*  8.4*   MAG  --   --   --   --   --   --  2.1   PROTTOTAL  --   --   --   --   --   --  5.3*  5.3*   ALBUMIN  --   --   --   --   --   --  2.9*  2.9*   BILITOTAL  --   --   --   --   --   --  0.3  0.3   ALKPHOS  --   --   --   --   --   --  80  80   AST  --   --   --   --   --   --  27  27   ALT  --   --   --   --   --   --  14  14    < > = values in this interval not displayed.     CBC  Recent Labs   Lab 07/16/24  0805 07/15/24  1439 07/15/24  0607 07/15/24  0033 07/14/24  1419 07/14/24  0739 07/14/24  0505 07/14/24  0500   WBC 6.7  --  7.2  --   --  8.4  --  8.8   RBC 3.33*  --  3.18*  --   --  2.91*  --  3.09*   HGB 9.5* 9.6* 9.0* 8.9*   < > 7.9* 8.5* 8.3*   HCT 29.4*  --  27.6*  --   --  26.2* 25* 26.9*   MCV 88  --  87  --   --  90  --  87   MCH 28.5  --  28.3  --   --  27.1  --  26.9   MCHC 32.3  --  32.6  --   --  30.2*  --  30.9*   RDW 19.1*  --  18.1*  --   --  18.8*  --  19.3*     --  199  --   --  210  --  229    < > = values in this interval not displayed.     INR  Recent Labs    Lab 07/14/24  0500   INR 1.57*           Karan CRUZ, ESTEFANIA  Nurse Practitioner  [facility]  [phone]

## 2024-07-16 NOTE — CONSULTS
Care Management Initial Consult    General Information  Assessment completed with: Valery Magallanes  Type of CM/SW Visit: Initial Assessment    Primary Care Provider verified and updated as needed: Yes   Readmission within the last 30 days: no previous admission in last 30 days      Reason for Consult: discharge planning  Advance Care Planning: Advance Care Planning Reviewed: no concerns identified          Communication Assessment  Patient's communication style: spoken language (English or Bilingual)    Hearing Difficulty or Deaf: no   Wear Glasses or Blind: yes    Cognitive  Cognitive/Neuro/Behavioral: WDL                      Living Environment:   People in home: spouse  Valery  Current living Arrangements: house      Able to return to prior arrangements: yes       Family/Social Support:  Care provided by: self  Provides care for: no one  Marital Status:   Wife  Valery       Description of Support System: Supportive    Support Assessment: Adequate family and caregiver support    Current Resources:   Patient receiving home care services: No     Community Resources: None  Equipment currently used at home: walker, kristyn  Supplies currently used at home: None    Employment/Financial:  Employment Status: retired     Employment/ Comments: no  Financial Concerns: none   Referral to Financial Worker: No       Does the patient's insurance plan have a 3 day qualifying hospital stay waiver?  No    Lifestyle & Psychosocial Needs:  Social Determinants of Health     Food Insecurity: Low Risk  (5/28/2024)    Food Insecurity     Within the past 12 months, did you worry that your food would run out before you got money to buy more?: No     Within the past 12 months, did the food you bought just not last and you didn t have money to get more?: No   Depression: Not at risk (5/28/2024)    PHQ-2     PHQ-2 Score: 0   Housing Stability: Low Risk  (5/28/2024)    Housing Stability     Do you have housing? : Yes     Are you  worried about losing your housing?: No   Tobacco Use: Low Risk  (6/19/2024)    Patient History     Smoking Tobacco Use: Never     Smokeless Tobacco Use: Never     Passive Exposure: Not on file   Financial Resource Strain: Low Risk  (5/28/2024)    Financial Resource Strain     Within the past 12 months, have you or your family members you live with been unable to get utilities (heat, electricity) when it was really needed?: No   Alcohol Use: Not At Risk (7/11/2020)    [facility], [facility]    AUDIT-C     Frequency of Alcohol Consumption: Not on file     Average Number of Drinks: 1 or 2     Frequency of Binge Drinking: Never     : Not on file     : Not on file     : Not on file   Transportation Needs: Low Risk  (5/28/2024)    Transportation Needs     Within the past 12 months, has lack of transportation kept you from medical appointments, getting your medicines, non-medical meetings or appointments, work, or from getting things that you need?: No   Physical Activity: Insufficiently Active (5/28/2024)    Exercise Vital Sign     Days of Exercise per Week: 2 days     Minutes of Exercise per Session: 30 min   Interpersonal Safety: Not At Risk (10/1/2019)    [facility] from [facility], [facility]    Humiliation, Afraid, Rape, and Kick questionnaire     Fear of Current or Ex-Partner: No     Emotionally Abused: No     Physically Abused: No     Sexually Abused: No   Stress: No Stress Concern Present (5/28/2024)    [employer] [place] [employer] - Occupational Stress Questionnaire     Feeling of Stress : Not at all   Social Connections: Unknown (5/28/2024)    Social Connection and Isolation Panel [NHANES]     Frequency of Communication with Friends and Family: Not on file     Frequency of Social Gatherings with Friends and Family: Twice a week     Attends Zoroastrian Services: Not on file     Active Member of Clubs or Organizations: Not on file     Attends Club or Organization Meetings: Not on file      Marital Status: Not on file   Health Literacy: Not on file       Functional Status:  Prior to admission patient needed assistance:   Dependent ADLs:: Ambulation-walker  Dependent IADLs:: Independent    Additional Information:  Writer was consulted for discharge planning due to the patient's elevated risk score. Ti Nickerson is a 78 year old male admitted on 7/14/2024. He presents with syncope.     Ti Nickerson is a 78 year old male who presents with syncope and lower GI bleed.  Patient has a history of metastatic renal cell carcinoma status post resection.  He had duodenal mass which was resected but there is residual tumor.  Surgery was unable to get at the residual tumor.  He has been getting immunotherapy as well.  They found out had a visit had couple days ago that the immunotherapy was not working.  Last evening patient and his wife went out to dinner.  Before this he had had some black stools but he had had dark urine earlier in the week and they thought it may be because of that.  He went to bed the patient states he had some mild abdominal discomfort.  He got up to go to the bathroom and his wife helped him and with this he had a syncopal episode.  They think he was out for about 30 seconds.  They called 911 but patient was feeling better so 911 was canceled.  He then again had to go to the bathroom and got up.  That time when he was on the toilet he had bright red blood per rectum, quite a large amount.  He also had a noted 2nd syncopal episode.      Writer spoke with patient's wife who is listed on the patient's face sheet. Patient lives at home with his wife Valery. They live together in independent living apartment at [facility] in [place]. Currently the patient does not receive any services at home. He has a walker, a cane and a wheel chair for DME. Patient's family is concerned he may need a higher level of care if he continues to have bleeding like he experienced on the day of his  admission. Writer introduced CM team as a layer of support throughout the hospital stay and that if needed this is something that can be looked into. Patient's wife was able to confirm address, PCP, Contacts and Insurance information. Writer completed consult with Wife while patient was off the floor. CM team will follow for any discharge needs.     Chencho Mccarthy MSW, TLGSW  [facility]

## 2024-07-16 NOTE — PROGRESS NOTES
Pt came down to Radiation Therapy for a CT simulation of the abdomen. Plan to start radiation treatments tomorrow 7/17

## 2024-07-16 NOTE — PROGRESS NOTES
[facility]    Medicine Progress Note - Hospitalist Service        Date of Admission:  7/14/2024  4:50 AM    Assessment & Plan:      Ti Nickerson is a 78 year old male admitted on 7/14/2024. 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   -On arrival BP 59/42, improved with fluids. Troponin 49. Hgb 8.3 (9.2 6/12). EKG atrial fibrillation.   *given Kcentra in ED(patient is on Eliquis)  -Admitted to ICU  -Hemoglobin ramos of 7.9.  Is s/p 2 units of PRBC, hemoglobin stable at 9 this morning  -Hemodynamically stable, no clinical evidence of ongoing GI bleed  -CTA abdomen done early this morning shows no active GI bleed but does show 4.1 cm mass in the duodenum(known from before)  -[facility] following, patient underwent upper GI endoscopy on 7/14/2024 which showed large, fungating, ulcerated duodenal mass with stigmata of recent bleeding.  Area was injected with epinephrine  -No further evidence of ongoing GI bleed.  Hemoglobin has been stable around 9.5  -Continue Protonix 40 mg IV twice a day  -Full liquid diet  -Discussed with Dr. Ash and Dr. Dreyer from oncology.  Challenging situation given the mass has been already surgically intervened at [facility] previously and I am not sure if there is a surgical option.  Radiation oncology and IR consult per oncology recommendation.  Awaiting evaluation by both teams today  -Also awaiting to hear back from Dr. FELIX, patient's primary oncologist regarding next steps    Metastatic renal cell carcinoma  *Follows through [facility]. S/p radical R nephrectomy 1/2019. W/ mass in 3rd portion of duodenum, s/p partial duodenectomy, path positive for RCC.   "  -1/2024 with upper EUS and resection of gastric mass. Also w/ recent bleeding and tumor found at blind end of duodenal resection, non-operable. Currently undergoing chemotherapy.   *CT 7/8 w/ enlargement of duodenal tumor and interval enlargement of retroperitoneal tumor distorting IVC and abutting aorta. Chest w/ new pulmonary nodules indeterminate for metastatic disease.   -As above CT and endoscopy shows duodenal mass   -[facility] following   -Also awaiting evaluation by radiation oncology and interventional radiology today    ROCKY  CKD IIIb  Baseline creatinine 1.7-2.0. creatinine on presentation 2.41, likely 2/2 hypotension with above  -Creatinine improving, down to 1.63, which is probably around his baseline    Atrial fibrillation   -hold apixaban    Chronic HFpEF  Hx idiopathic CM- resolved  Non-obstructive CAD  Ascending aorta dilatation  HLD  -Monitor volume status closely, currently euvolemic  -resume statin prior to discharge    BPH-continue tamsulosin   KEENA-not currently on CPAP      Diet: Full Liquid Diet     DVT Prophylaxis: Pneumatic Compression Devices   Dillard Catheter: Not present  Code Status: Full Code     Disposition Plan      Expected Discharge Date: 07/17/2024              Entered: Roque Marrero MD 07/16/2024, 7:32 AM        Medically Ready for Discharge: Anticipated in 1 to 2 days pending plans from oncology, radiation oncology and IR       Clinically Significant Risk Factors              # Hypoalbuminemia: Lowest albumin = 2.9 g/dL at 7/14/2024  5:00 AM, will monitor as appropriate    # Coagulation Defect: INR = 1.57 (Ref range: 0.85 - 1.15) and/or PTT = N/A, will monitor for bleeding    # Hypertension: Noted on problem list                # Obesity: Estimated body mass index is 33.04 kg/m  as calculated from the following:    Height as of this encounter: 1.905 m (6' 3\").    Weight as of this encounter: 119.9 kg (264 lb 4.8 oz)., PRESENT ON ADMISSION                 The " "patient's care was discussed with the Bedside Nurse and Patient.  And wife at the bedside    Medical Decision Making       **CLEAR ALL SELECTIONS**      Labs/Imaging Reviewed:  See Information above and Data section below  Time SPENT BY ME on the date of service doing chart review, history, exam, documentation & further activities per the note:  35 MINUTES    Chart documentation was completed, in part, with [employer] voice-recognition software. Even though reviewed, some grammatical, spelling, and word errors may remain.    Roque Marrero MD  Hospitalist Service  [facility]  Text Page 7AM-6PM  Securely message with the Vocera Web Console (learn more here)  Text page via [employer] Paging/Directory    ______________________________________________________________________    Interval History   No further evidence of ongoing bleeding.  No hematochezia or melena.  Hemoglobin has been stable around 9.5.  Awaiting evaluation by radiation oncology, interventional radiology and further input from oncology regarding the next steps.  Denies abdominal pain.  No lightheadedness or dizziness.    Data reviewed today: I reviewed all medications, new labs and imaging results over the last 24 hours. I personally reviewed no images or EKG's today.    Physical Exam   Vital signs:  Temp: 98.2  F (36.8  C) Temp src: Oral BP: 126/83 Pulse: 71   Resp: 16 SpO2: 94 % O2 Device: None (Room air) Oxygen Delivery: 2 LPM Height: 190.5 cm (6' 3\") Weight: 119.9 kg (264 lb 4.8 oz)  Estimated body mass index is 33.04 kg/m  as calculated from the following:    Height as of this encounter: 1.905 m (6' 3\").    Weight as of this encounter: 119.9 kg (264 lb 4.8 oz).      Wt Readings from Last 2 Encounters:   07/16/24 119.9 kg (264 lb 4.8 oz)   06/19/24 125.2 kg (276 lb)       Gen: AAOX3, NAD, comfortable  Resp: CTA B/L, normal WOB  CVS: RRR, no murmur  Abd/GI: Soft, non-tender. BS- normoactive.    Skin: Warm, dry no rashes  MSK:no " pedal edema  Neuro- CN- intact. No focal deficits.  Spontaneously moving all 4 extremities      Data   Recent Labs   Lab 07/15/24  1439 07/15/24  0607 07/15/24  0352 07/15/24  0033 07/14/24  2332 07/14/24  1419 07/14/24  0739 07/14/24  0647 07/14/24  0505 07/14/24  0500   WBC  --  7.2  --   --   --   --  8.4  --   --  8.8   HGB 9.6* 9.0*  --  8.9*  --    < > 7.9*  --  8.5* 8.3*   MCV  --  87  --   --   --   --  90  --   --  87   PLT  --  199  --   --   --   --  210  --   --  229   INR  --   --   --   --   --   --   --   --   --  1.57*   NA  --  140  --   --   --   --   --   --  142 140  140   POTASSIUM  --  3.5  --   --   --   --   --   --  3.6 3.7  3.7   CHLORIDE  --  112*  --   --   --   --   --   --   --  109*  109*   CO2  --  22  --   --   --   --   --   --   --  19*  19*   BUN  --  23.3*  --   --   --   --   --   --   --  27.1*  27.1*   CR  --  1.77*  --   --   --   --   --   --   --  2.41*  2.41*   ANIONGAP  --  6*  --   --   --   --   --   --   --  12  12   DOMINICK  --  7.6*  --   --   --   --   --   --   --  8.4*  8.4*   GLC  --  94 100*  --  93   < >  --    < >  --  137*  137*   ALBUMIN  --   --   --   --   --   --   --   --   --  2.9*  2.9*   PROTTOTAL  --   --   --   --   --   --   --   --   --  5.3*  5.3*   BILITOTAL  --   --   --   --   --   --   --   --   --  0.3  0.3   ALKPHOS  --   --   --   --   --   --   --   --   --  80  80   ALT  --   --   --   --   --   --   --   --   --  14  14   AST  --   --   --   --   --   --   --   --   --  27  27   LIPASE  --   --   --   --   --   --   --   --   --  30    < > = values in this interval not displayed.       No results found for this or any previous visit (from the past 24 hour(s)).    Medications   Current Facility-Administered Medications   Medication Dose Route Frequency Provider Last Rate Last Admin     Current Facility-Administered Medications   Medication Dose Route Frequency Provider Last Rate Last Admin    pantoprazole (PROTONIX) IV push  injection 40 mg  40 mg Intravenous Q12H Javi Pozo MD   40 mg at 07/15/24 2038

## 2024-07-16 NOTE — PROGRESS NOTES
"    Edgar Nickerson is a 78 year old male with a recent history of metastatic RCC including to the duodenum which has been resected in the past. He was admitted with a GI bleed from a \"large, fungating, ulcerated duodenal mass with stigmata of recent bleeding\" as demonstrated on UGI endoscopy 7/14/24.     IR was consulted for a possible angiogram and embolization. Yesterday it was not felt emergent as hemoglobin was stable after RBC transfusions and blood thinner was held.     Radiation oncology also reviewing for radiation to the duodenal and lymph node mass to reduce bleeding, who d/w IR Dr Castañeda. They decided that Radiation to the area would most likely be successful and will start with mapping tomorrow.     IF in the future there is recurrent bleeding after radiation then IR embolization can be considered. IR can't get to the GDA per the patient's anatomy but could possible get to the tumor through other branches that supply the mass.     IR will sign off at this time    Total time: 20 minutes     Thanks, [facility] CNP ([phone]) (phone [phone])       "

## 2024-07-16 NOTE — PLAN OF CARE
Goal Outcome Evaluation:    Orientation: A&Ox4  Aggression Stop Light: green  Activity: SBA  Diet/BS Checks: full liquid diet  Tele:  NA  IV Access/Drains: L PIV SL  Pain Management: pt denies  Abnormal VS/Results: VSS on RA  Bowel/Bladder: continent b/b. BM x1 this shift, loose, black  Skin/Wounds: scattered bruising, Mepi in place on coccyx  Consults: GI, Hem/Onc, Radiation Onc, IR  D/C Disposition: Plan pending  Other Info: Wife at bedside overnight.

## 2024-07-16 NOTE — PLAN OF CARE
Goal Outcome Evaluation:       [id number]  Orientation: AOx4  Aggression Stop Light: GREEN  Activity: SBA  Diet/BS Checks: Full liquid  Tele:  A fib CVR  IV Access/Drains: PIV SL  Pain Management: DENIES  Abnormal VS/Results: vss on RA  Bowel/Bladder: cont, had loose black stools  Skin/Wounds: Intact ex sacral mepi, CDI  Consults: GI,IR, Rad Onc.  D/C Disposition: discharge pending  Other Info: Radiation mapping done today. Please premedicate with anti nausea medications before radiation on 7/17. Planned time will be 1230.

## 2024-07-17 LAB — HGB BLD-MCNC: 9.1 G/DL (ref 13.3–17.7)

## 2024-07-17 PROCEDURE — 77300 RADIATION THERAPY DOSE PLAN: CPT

## 2024-07-17 PROCEDURE — 77295 3-D RADIOTHERAPY PLAN: CPT

## 2024-07-17 PROCEDURE — 77293 RESPIRATOR MOTION MGMT SIMUL: CPT

## 2024-07-17 PROCEDURE — 85018 HEMOGLOBIN: CPT | Performed by: INTERNAL MEDICINE

## 2024-07-17 PROCEDURE — 99232 SBSQ HOSP IP/OBS MODERATE 35: CPT | Performed by: INTERNAL MEDICINE

## 2024-07-17 PROCEDURE — 36415 COLL VENOUS BLD VENIPUNCTURE: CPT | Performed by: INTERNAL MEDICINE

## 2024-07-17 PROCEDURE — 250N000011 HC RX IP 250 OP 636: Performed by: INTERNAL MEDICINE

## 2024-07-17 PROCEDURE — 77412 RADIATION TX DELIVERY LVL 3: CPT

## 2024-07-17 PROCEDURE — 120N000001 HC R&B MED SURG/OB

## 2024-07-17 PROCEDURE — 77334 RADIATION TREATMENT AID(S): CPT

## 2024-07-17 RX ADMIN — PANTOPRAZOLE SODIUM 40 MG: 40 INJECTION, POWDER, FOR SOLUTION INTRAVENOUS at 19:22

## 2024-07-17 RX ADMIN — PANTOPRAZOLE SODIUM 40 MG: 40 INJECTION, POWDER, FOR SOLUTION INTRAVENOUS at 09:18

## 2024-07-17 RX ADMIN — ONDANSETRON 4 MG: 2 INJECTION INTRAMUSCULAR; INTRAVENOUS at 11:55

## 2024-07-17 ASSESSMENT — ACTIVITIES OF DAILY LIVING (ADL)
ADLS_ACUITY_SCORE: 32
ADLS_ACUITY_SCORE: 31
ADLS_ACUITY_SCORE: 32
ADLS_ACUITY_SCORE: 31
ADLS_ACUITY_SCORE: 32
ADLS_ACUITY_SCORE: 31
ADLS_ACUITY_SCORE: 32
ADLS_ACUITY_SCORE: 31
ADLS_ACUITY_SCORE: 32
ADLS_ACUITY_SCORE: 31
ADLS_ACUITY_SCORE: 31
ADLS_ACUITY_SCORE: 32
ADLS_ACUITY_SCORE: 32
ADLS_ACUITY_SCORE: 31
ADLS_ACUITY_SCORE: 31
ADLS_ACUITY_SCORE: 32
ADLS_ACUITY_SCORE: 31
ADLS_ACUITY_SCORE: 32
ADLS_ACUITY_SCORE: 31

## 2024-07-17 NOTE — PROGRESS NOTES
Patient was brought to radiation oncology for treatment to the abdomen. Patient was given 300cGy of 10x photons. Patient has received 300cGy total and has 14 treatments remaining.

## 2024-07-17 NOTE — PROGRESS NOTES
[facility] Progress Note     Primary Oncologist/Hematologist:            Assessment and Plan:   GI bleeding, likely from upper GI source. Based on his history, local tumor recurrence in the GI tract seems the most likely explanation particularly given CT findings of progression.  He reports no bleeding elsewhere so a generalized bleeding diathesis is unlikely. He appears stable at present. 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 Most recently, he has been receiving dual immunotherapy. Recent imaging performed at [facility] reportedly shows significant disease progression.   3.   Chronic kidney disease, stage 3B and with associated ROCKY likely related to hypotension  4.   Atrial fibrillation on long term anticoagulation  5.   Heart failure with preserved EF     Recommendations;  Continue serial monitoring CBC and transfusion support as needed.  Hgb relatively stable today at 9.1  Hold apixiban  3. He is high risk for further GI bleeding. Has met with Dr. Clay. Was simulated 7/17 with started on RT today.   We spoke to his primary oncologist, Dr. Glover on 7/16/24.  He agrees with the plan outlined above.  Although next line therapies are available to treat Edgar's disease progression, this will depend on his recovery from his current bleeding  and his overall performance status.  Dr. Glover will discuss these options at the time of outpatient follow up.     German Escalante MD  [facility]  [phone]          Interval History:     Started on RT treatments today under the direction of Dr Clay.  No further bleeding events clinically.                Review of Systems:     The 5 point Review of Systems is negative other than noted in the HPI                Medications:   Scheduled Medications  Current  "Facility-Administered Medications   Medication Dose Route Frequency Provider Last Rate Last Admin    pantoprazole (PROTONIX) IV push injection 40 mg  40 mg Intravenous Q12H Roque Marrero MD   40 mg at 07/17/24 0918     PRN Medications  Current Facility-Administered Medications   Medication Dose Route Frequency Provider Last Rate Last Admin    acetaminophen (TYLENOL) tablet 500-1,000 mg  500-1,000 mg Oral Q6H PRN Roque Marrero MD        glucose gel 15-30 g  15-30 g Oral Q15 Min PRN Roque Marrero MD        Or    dextrose 50 % injection 25-50 mL  25-50 mL Intravenous Q15 Min PRN Roque Marrero MD        Or    glucagon injection 1 mg  1 mg Subcutaneous Q15 Min PRN Roque Marrero MD        ondansetron (ZOFRAN ODT) ODT tab 4 mg  4 mg Oral Q6H PRN Roque Marrero MD        Or    ondansetron (ZOFRAN) injection 4 mg  4 mg Intravenous Q6H PRN Roque Marrero MD   4 mg at 07/17/24 1155    senna-docusate (SENOKOT-S/PERICOLACE) 8.6-50 MG per tablet 1 tablet  1 tablet Oral BID PRN Roque Marrero MD        Or    senna-docusate (SENOKOT-S/PERICOLACE) 8.6-50 MG per tablet 2 tablet  2 tablet Oral BID PRN Roque Marrero MD                      Physical Exam:   Vitals were reviewed  Blood pressure 110/72, pulse 77, temperature 97.9  F (36.6  C), temperature source Oral, resp. rate 16, height 1.905 m (6' 3\"), weight 119.9 kg (264 lb 4.8 oz), SpO2 95%.  Wt Readings from Last 4 Encounters:   07/16/24 119.9 kg (264 lb 4.8 oz)   06/19/24 125.2 kg (276 lb)   06/18/24 125.6 kg (276 lb 12.8 oz)   05/28/24 121.5 kg (267 lb 12.8 oz)       No intake/output data recorded.        Constitutional: Awake, alert, cooperative, no apparent distress   Lungs: Clear to auscultation bilaterally, no crackles or wheezing   Cardiovascular: Regular rate and rhythm, normal S1 and S2, and no murmur noted   Abdomen: Normal bowel sounds, soft, non-distended, non-tender   Skin: No rashes, no cyanosis, no edema   Other:             "   Data:   All laboratory data and imaging studies reviewed.    CMP  Recent Labs   Lab 07/16/24  0805 07/15/24  0607 07/15/24  0352 07/14/24  2332 07/14/24  0647 07/14/24  0505 07/14/24  0500    140  --   --   --  142 140  140   POTASSIUM 3.4 3.5  --   --   --  3.6 3.7  3.7   CHLORIDE 110* 112*  --   --   --   --  109*  109*   CO2 20* 22  --   --   --   --  19*  19*   ANIONGAP 10 6*  --   --   --   --  12  12   GLC 92 94 100* 93   < >  --  137*  137*   BUN 18.6 23.3*  --   --   --   --  27.1*  27.1*   CR 1.63* 1.77*  --   --   --   --  2.41*  2.41*   GFRESTIMATED 43* 39*  --   --   --   --  27*  27*   DOMINICK 7.8* 7.6*  --   --   --   --  8.4*  8.4*   MAG  --   --   --   --   --   --  2.1   PROTTOTAL  --   --   --   --   --   --  5.3*  5.3*   ALBUMIN  --   --   --   --   --   --  2.9*  2.9*   BILITOTAL  --   --   --   --   --   --  0.3  0.3   ALKPHOS  --   --   --   --   --   --  80  80   AST  --   --   --   --   --   --  27  27   ALT  --   --   --   --   --   --  14  14    < > = values in this interval not displayed.     CBC  Recent Labs   Lab 07/17/24  0903 07/16/24  0805 07/15/24  1439 07/15/24  0607 07/14/24  1419 07/14/24  0739 07/14/24  0505 07/14/24  0500   WBC  --  6.7  --  7.2  --  8.4  --  8.8   RBC  --  3.33*  --  3.18*  --  2.91*  --  3.09*   HGB 9.1* 9.5* 9.6* 9.0*   < > 7.9* 8.5* 8.3*   HCT  --  29.4*  --  27.6*  --  26.2* 25* 26.9*   MCV  --  88  --  87  --  90  --  87   MCH  --  28.5  --  28.3  --  27.1  --  26.9   MCHC  --  32.3  --  32.6  --  30.2*  --  30.9*   RDW  --  19.1*  --  18.1*  --  18.8*  --  19.3*   PLT  --  216  --  199  --  210  --  229    < > = values in this interval not displayed.     INR  Recent Labs   Lab 07/14/24  0500   INR 1.57*           ESTEFANIA Shepard  Nurse Practitioner  [facility]  [phone]

## 2024-07-17 NOTE — PROGRESS NOTES
[facility]  Gastroenterology Progress Note     Ti Nickerson MRN# [id number]   YOB: 1945 Age: 78 year old          Assessment and Plan:   Ti Nickerson is a 78 year old male admitted on 7/14/2024. 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   -On arrival BP 59/42, improved with fluids. Troponin 49. Hgb 8.3 (9.2 6/12). EKG atrial fibrillation.   *given Kcentra in ED(patient is on Eliquis) and admitted to ICU  *Hemoglobin ramos of 7.9.  Is s/p 2 units of PRBC  -CTA abdomen shows no active GI bleed but does show 4.1 cm mass in the duodenum (known from before)  -EGD 7/14/2024 - showed large, fungating, ulcerated duodenal mass with stigmata of recent bleeding.  Area was injected with epinephrine  -No further evidence of ongoing GI bleed.  Hemoglobin has been stable around 9.5  -Continue Protonix 40 mg IV twice a day  -Full liquid diet  -Appreciate input from oncology.    Recent Labs   Lab 07/17/24  0903 07/16/24  0805 07/15/24  1439 07/15/24  0607 07/15/24  0033   HGB 9.1* 9.5* 9.6* 9.0* 8.9*           Interval History:     doing well; no cp, sob, n/v/d, or abd pain.              Review of Systems:     C: NEGATIVE for fever, chills, change in weight  E/M: NEGATIVE for ear, mouth and throat problems  R: NEGATIVE for significant cough or SOB  CV: NEGATIVE for chest pain, palpitations or peripheral edema             Medications:   I have reviewed this patient's current medications  Current Facility-Administered Medications   Medication Dose Route Frequency Provider Last Rate Last Admin    pantoprazole (PROTONIX) IV push injection 40 mg  40 mg Intravenous Q12H Sánchez  Javi Posada MD   40 mg at 07/17/24 0918                  Physical Exam:   Vitals were reviewed  Vital Signs with Ranges  Temp:  [97.9  F (36.6  C)-98.6  F (37  C)] 97.9  F (36.6  C)  Pulse:  [65-77] 77  Resp:  [16] 16  BP: (110-132)/(72-75) 110/72  SpO2:  [94 %-96 %] 95 %  No intake/output data recorded.  Constitutional: Alert, oriented to person, place, situation.  Cooperative, lying in bed in NAD.   Respiratory:  Lungs CTAB.  No crackles, wheezes, or rhonchi, no labored breathing.  Cardiovascular:  Heart RRR, no MRG, no edema.  GI:  Abdomen soft, NT/ND and with normoactive BS  Skin/Integumen:  Warm, dry, non-diaphoretic.  MSK: CMS x4 intact.             Data:   I reviewed the patient's new clinical lab test results.   Recent Labs   Lab Test 07/16/24  0805 07/15/24  1439 07/15/24  0607 07/14/24  1419 07/14/24  0739 07/14/24  0505 07/14/24  0500 10/25/23  1002 10/25/23  0915 09/14/23  0623 09/13/23  2057   WBC 6.7  --  7.2  --  8.4  --  8.8   < >  --    < >  --    HGB 9.5* 9.6* 9.0*   < > 7.9*   < > 8.3*   < >  --    < >  --    MCV 88  --  87  --  90  --  87   < >  --    < >  --      --  199  --  210  --  229   < >  --    < >  --    INR  --   --   --   --   --   --  1.57*  --  2.29*  --  1.22*    < > = values in this interval not displayed.     Recent Labs   Lab Test 07/16/24  0805 07/15/24  0607 07/14/24  0505 07/14/24  0500   POTASSIUM 3.4 3.5 3.6 3.7  3.7   CHLORIDE 110* 112*  --  109*  109*   CO2 20* 22  --  19*  19*   BUN 18.6 23.3*  --  27.1*  27.1*   ANIONGAP 10 6*  --  12  12     Recent Labs   Lab Test 07/14/24  0500 06/12/24  1014 05/28/24  1649 03/26/24  1355 10/31/23  0142 10/09/23  0820 09/13/23  2051 09/05/23  0938 09/21/22  1102 10/11/21  0947   ALBUMIN 2.9*  2.9* 3.4* 3.7 3.6  --    < > 3.8  --    < >  --    BILITOTAL 0.3  0.3  --  0.6 0.5  --    < > 0.7  --    < >  --    ALT 14  14  --  16 14  --    < > 17  --    < >  --    AST 27  27  --  23 19  --    < > 21  --    < >  --     PROTEIN  --   --   --   --  Negative  --   --  Negative  --  Negative   LIPASE 30  --   --   --   --   --  34  --   --   --     < > = values in this interval not displayed.       I reviewed the patient's new imaging results.    All laboratory data reviewed  All imaging studies reviewed by me.    BRENDON Keller,  7/17/2024  [facility]  Office : [phone]  Cell: [phone] (Dr. Ash)

## 2024-07-17 NOTE — PLAN OF CARE
Goal Outcome Evaluation:    Orientation: A&Ox4  Aggression Stop Light: green  Activity: SBA  Diet/BS Checks: full liquid diet  Tele:  NA  IV Access/Drains: L PIV SL  Pain Management: pt denies  Abnormal VS/Results: VSS on RA  Bowel/Bladder: continent b/b. BM x1 this shift, loose, black-brown  Skin/Wounds: scattered bruising, Mepi in place on coccyx  Consults: GI, Hem/Onc, Radiation Onc, IR  D/C Disposition: 1-2 days pending plans from Oncology, Radiation Oncology and IR  Other Info: Wife at bedside overnight. Radiation mapping completed yesterday. Please premedicate with anti-nausea medications before radiation scheduled for 1230 today.

## 2024-07-17 NOTE — PROGRESS NOTES
[facility]    Medicine Progress Note - Hospitalist Service        Date of Admission:  7/14/2024  4:50 AM    Assessment & Plan:      Ti Nickerson is a 78 year old male admitted on 7/14/2024. 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   -On arrival BP 59/42, improved with fluids. Troponin 49. Hgb 8.3 (9.2 6/12). EKG atrial fibrillation.   *given Kcentra in ED(patient is on Eliquis)  -Admitted to ICU  -Hemoglobin ramos of 7.9.  Is s/p 2 units of PRBC, hemoglobin stable at 9 this morning  -Hemodynamically stable, no clinical evidence of ongoing GI bleed  -CTA abdomen done at presentation shows no active GI bleed but does show 4.1 cm mass in the duodenum(known from before)  -[facility] following, patient underwent upper GI endoscopy on 7/14/2024 which showed large, fungating, ulcerated duodenal mass with stigmata of recent bleeding.  Area was injected with epinephrine  -No further evidence of ongoing GI bleed.  Hemoglobin has been stable ~9  -Continue Protonix 40 mg IV twice a day  -Full liquid diet, defer diet advancement to GI  -Dr. Ash also concerned about potential bowel obstruction with the mass  -Oncology following  -Interventional radiology and radiation oncology consulted  -No plans for acute intervention from interventional radiology standpoint as bleeding appears to have stopped and hemoglobin is stable  -Radiation oncology planning XRT treatment starting today  -Monitor daily hemoglobin while in the hospital.    Metastatic renal cell carcinoma  *Follows through [facility]. S/p radical R nephrectomy 1/2019. W/ mass in 3rd portion of duodenum, s/p partial duodenectomy, path  "positive for RCC.    -1/2024 with upper EUS and resection of gastric mass. Also w/ recent bleeding and tumor found at blind end of duodenal resection, non-operable. Currently undergoing chemotherapy.   *CT 7/8 w/ enlargement of duodenal tumor and interval enlargement of retroperitoneal tumor distorting IVC and abutting aorta. Chest w/ new pulmonary nodules indeterminate for metastatic disease.   -As above CT and endoscopy shows duodenal mass   -[place] [facility] and radiation oncology following  -Radiation plans as mentioned above    ROCKY  CKD IIIb  Baseline creatinine 1.7-2.0. creatinine on presentation 2.41, likely 2/2 hypotension with above  -Creatinine improving, down to 1.63, which is probably around his baseline    Atrial fibrillation   -hold apixaban, likely will have to hold indefinitely given the tenuous nature of the duodenal mass and risk for rebleeding.  This was discussed with the patient and his spouse and they understand the reasoning.    Chronic HFpEF  Hx idiopathic CM- resolved  Non-obstructive CAD  Ascending aorta dilatation  HLD  -Monitor volume status closely, currently euvolemic  -resume statin prior to discharge    BPH-continue tamsulosin   KEENA-not currently on CPAP      Diet: Full Liquid Diet     DVT Prophylaxis: Pneumatic Compression Devices   Dillard Catheter: Not present  Code Status: Full Code     Disposition Plan      Expected Discharge Date: 07/18/2024              Entered: Roque Marrero MD 07/17/2024, 8:39 AM        Medically Ready for Discharge: Anticipated in 1 to 2 days pending further input from radiation oncology and oncology.       Clinically Significant Risk Factors              # Hypoalbuminemia: Lowest albumin = 2.9 g/dL at 7/14/2024  5:00 AM, will monitor as appropriate       # Hypertension: Noted on problem list                # Obesity: Estimated body mass index is 33.04 kg/m  as calculated from the following:    Height as of this encounter: 1.905 m (6' 3\").    Weight as " "of this encounter: 119.9 kg (264 lb 4.8 oz)., PRESENT ON ADMISSION     # Financial/Environmental Concerns: none              The patient's care was discussed with the Bedside Nurse and Patient.  And wife at the bedside    Medical Decision Making       **CLEAR ALL SELECTIONS**      Labs/Imaging Reviewed:  See Information above and Data section below  Time SPENT BY ME on the date of service doing chart review, history, exam, documentation & further activities per the note:  35 MINUTES    Chart documentation was completed, in part, with [employer] voice-recognition software. Even though reviewed, some grammatical, spelling, and word errors may remain.    Roque Marrero MD  Hospitalist Service  [facility]  Text Page 7AM-6PM  Securely message with the Vocera Web Console (learn more here)  Text page via [employer] Paging/Directory    ______________________________________________________________________    Interval History   No further clinical evidence of bleeding.  Denies hematochezia or melena.  Hemoglobin is stable around 9.  Evaluated by radiation oncology with plans to start XRT today.    Data reviewed today: I reviewed all medications, new labs and imaging results over the last 24 hours. I personally reviewed no images or EKG's today.    Physical Exam   Vital signs:  Temp: 97.9  F (36.6  C) Temp src: Oral BP: 110/72 Pulse: 77   Resp: 16 SpO2: 95 % O2 Device: None (Room air) Oxygen Delivery: 2 LPM Height: 190.5 cm (6' 3\") Weight: 119.9 kg (264 lb 4.8 oz)  Estimated body mass index is 33.04 kg/m  as calculated from the following:    Height as of this encounter: 1.905 m (6' 3\").    Weight as of this encounter: 119.9 kg (264 lb 4.8 oz).      Wt Readings from Last 2 Encounters:   07/16/24 119.9 kg (264 lb 4.8 oz)   06/19/24 125.2 kg (276 lb)       Gen: AAOX3, NAD, comfortable  Resp: CTA B/L, normal WOB  CVS: RRR, no murmur  Abd/GI: Soft, non-tender. BS- normoactive.    Skin: Warm, dry no " idalmis  MSK:no pedal edema  Neuro- CN- intact. No focal deficits.  Spontaneously moving all 4 extremities      Data   Recent Labs   Lab 07/16/24  0805 07/15/24  1439 07/15/24  0607 07/15/24  0352 07/14/24  1419 07/14/24  0739 07/14/24  0647 07/14/24  0505 07/14/24  0500   WBC 6.7  --  7.2  --   --  8.4  --   --  8.8   HGB 9.5* 9.6* 9.0*  --    < > 7.9*  --  8.5* 8.3*   MCV 88  --  87  --   --  90  --   --  87     --  199  --   --  210  --   --  229   INR  --   --   --   --   --   --   --   --  1.57*     --  140  --   --   --   --  142 140  140   POTASSIUM 3.4  --  3.5  --   --   --   --  3.6 3.7  3.7   CHLORIDE 110*  --  112*  --   --   --   --   --  109*  109*   CO2 20*  --  22  --   --   --   --   --  19*  19*   BUN 18.6  --  23.3*  --   --   --   --   --  27.1*  27.1*   CR 1.63*  --  1.77*  --   --   --   --   --  2.41*  2.41*   ANIONGAP 10  --  6*  --   --   --   --   --  12  12   DOMINICK 7.8*  --  7.6*  --   --   --   --   --  8.4*  8.4*   GLC 92  --  94 100*   < >  --    < >  --  137*  137*   ALBUMIN  --   --   --   --   --   --   --   --  2.9*  2.9*   PROTTOTAL  --   --   --   --   --   --   --   --  5.3*  5.3*   BILITOTAL  --   --   --   --   --   --   --   --  0.3  0.3   ALKPHOS  --   --   --   --   --   --   --   --  80  80   ALT  --   --   --   --   --   --   --   --  14  14   AST  --   --   --   --   --   --   --   --  27  27   LIPASE  --   --   --   --   --   --   --   --  30    < > = values in this interval not displayed.       No results found for this or any previous visit (from the past 24 hour(s)).    Medications   Current Facility-Administered Medications   Medication Dose Route Frequency Provider Last Rate Last Admin     Current Facility-Administered Medications   Medication Dose Route Frequency Provider Last Rate Last Admin    pantoprazole (PROTONIX) IV push injection 40 mg  40 mg Intravenous Q12H Javi Pozo MD   40 mg at 07/16/24 2286

## 2024-07-17 NOTE — PLAN OF CARE
Goal Outcome Evaluation:    Orientation: A&Ox4  Aggression Stop Light: green  Activity: Independent.   Diet/BS Checks: full liquid diet  Tele:  NA  IV Access/Drains: L PIV SL  Pain Management: pt denies  Abnormal VS/Results: VSS on RA  Bowel/Bladder: continent b/b. No BM this shift  Skin/Wounds: scattered bruising, Mepi in place on coccyx  Consults: GI, Hem/Onc, Radiation Onc, IR  D/C Disposition: 1-2 days pending plans from Oncology, Radiation Oncology and IR  Other Info:   -Radiation completed today.

## 2024-07-18 LAB — HGB BLD-MCNC: 9.6 G/DL (ref 13.3–17.7)

## 2024-07-18 PROCEDURE — 77387 GUIDANCE FOR RADJ TX DLVR: CPT

## 2024-07-18 PROCEDURE — 250N000013 HC RX MED GY IP 250 OP 250 PS 637: Performed by: INTERNAL MEDICINE

## 2024-07-18 PROCEDURE — 99232 SBSQ HOSP IP/OBS MODERATE 35: CPT | Performed by: HOSPITALIST

## 2024-07-18 PROCEDURE — 77412 RADIATION TX DELIVERY LVL 3: CPT

## 2024-07-18 PROCEDURE — 120N000001 HC R&B MED SURG/OB

## 2024-07-18 PROCEDURE — 250N000011 HC RX IP 250 OP 636: Performed by: INTERNAL MEDICINE

## 2024-07-18 PROCEDURE — 85018 HEMOGLOBIN: CPT | Performed by: INTERNAL MEDICINE

## 2024-07-18 PROCEDURE — 250N000013 HC RX MED GY IP 250 OP 250 PS 637: Performed by: HOSPITALIST

## 2024-07-18 PROCEDURE — 36415 COLL VENOUS BLD VENIPUNCTURE: CPT | Performed by: INTERNAL MEDICINE

## 2024-07-18 RX ORDER — MIDODRINE HYDROCHLORIDE 5 MG/1
5 TABLET ORAL 2 TIMES DAILY
Status: DISCONTINUED | OUTPATIENT
Start: 2024-07-18 | End: 2024-07-19 | Stop reason: HOSPADM

## 2024-07-18 RX ORDER — PRAVASTATIN SODIUM 20 MG
20 TABLET ORAL EVERY EVENING
Status: DISCONTINUED | OUTPATIENT
Start: 2024-07-18 | End: 2024-07-19 | Stop reason: HOSPADM

## 2024-07-18 RX ORDER — ONDANSETRON 4 MG/1
4 TABLET, ORALLY DISINTEGRATING ORAL EVERY 6 HOURS PRN
Qty: 20 TABLET | Refills: 0 | Status: SHIPPED | OUTPATIENT
Start: 2024-07-18

## 2024-07-18 RX ORDER — PANTOPRAZOLE SODIUM 40 MG/1
40 TABLET, DELAYED RELEASE ORAL
Status: DISCONTINUED | OUTPATIENT
Start: 2024-07-18 | End: 2024-07-19 | Stop reason: HOSPADM

## 2024-07-18 RX ORDER — SODIUM BICARBONATE 650 MG/1
650 TABLET ORAL EVERY EVENING
Status: DISCONTINUED | OUTPATIENT
Start: 2024-07-18 | End: 2024-07-19 | Stop reason: HOSPADM

## 2024-07-18 RX ORDER — TAMSULOSIN HYDROCHLORIDE 0.4 MG/1
0.4 CAPSULE ORAL EVERY EVENING
Status: DISCONTINUED | OUTPATIENT
Start: 2024-07-18 | End: 2024-07-19 | Stop reason: HOSPADM

## 2024-07-18 RX ADMIN — PANTOPRAZOLE SODIUM 40 MG: 40 INJECTION, POWDER, FOR SOLUTION INTRAVENOUS at 07:55

## 2024-07-18 RX ADMIN — TAMSULOSIN HYDROCHLORIDE 0.4 MG: 0.4 CAPSULE ORAL at 20:12

## 2024-07-18 RX ADMIN — PRAVASTATIN SODIUM 20 MG: 20 TABLET ORAL at 20:12

## 2024-07-18 RX ADMIN — ONDANSETRON 4 MG: 2 INJECTION INTRAMUSCULAR; INTRAVENOUS at 07:55

## 2024-07-18 RX ADMIN — SODIUM BICARBONATE 650 MG TABLET 650 MG: at 20:12

## 2024-07-18 RX ADMIN — ACETAMINOPHEN 1000 MG: 500 TABLET, FILM COATED ORAL at 07:55

## 2024-07-18 RX ADMIN — MIDODRINE HYDROCHLORIDE 5 MG: 5 TABLET ORAL at 20:12

## 2024-07-18 RX ADMIN — PANTOPRAZOLE SODIUM 40 MG: 40 TABLET, DELAYED RELEASE ORAL at 15:40

## 2024-07-18 ASSESSMENT — ACTIVITIES OF DAILY LIVING (ADL)
ADLS_ACUITY_SCORE: 31

## 2024-07-18 NOTE — PROGRESS NOTES
[facility]  Gastroenterology Progress Note     Ti Nickerson MRN# [id number]   YOB: 1945 Age: 78 year old          Assessment and Plan:   Ti Nikcerson is a 78 year old male admitted on 7/14/2024. 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   -On arrival BP 59/42, improved with fluids. Troponin 49. Hgb 8.3 (9.2 6/12). EKG atrial fibrillation.   *given Kcentra in ED(patient is on Eliquis) and admitted to ICU  *Hemoglobin ramos of 7.9.  Is s/p 2 units of PRBC  -CTA abdomen shows no active GI bleed but does show 4.1 cm mass in the duodenum (known from before)  -EGD 7/14/2024 - showed large, fungating, ulcerated duodenal mass with stigmata of recent bleeding.  Area was injected with epinephrine  -No further evidence of ongoing GI bleed.  Hemoglobin has been stable around 9.5  -Continue Protonix 40 mg IV twice a day  -Tolerating full liquid diet.  Patient can advance to a mechanical soft diet.  Discussed with patient and family present.  Questions answered.  -Appreciate input from oncology.  Patient currently undergoing radiation therapy.    Recent Labs   Lab 07/18/24  0949 07/17/24  0903 07/16/24  0805 07/15/24  1439 07/15/24  0607   HGB 9.6* 9.1* 9.5* 9.6* 9.0*           Interval History:     doing well; no cp, sob, n/v/d, or abd pain.              Review of Systems:     C: NEGATIVE for fever, chills, change in weight  E/M: NEGATIVE for ear, mouth and throat problems  R: NEGATIVE for significant cough or SOB  CV: NEGATIVE for chest pain, palpitations or peripheral edema             Medications:   I have reviewed this patient's current medications  Current  Facility-Administered Medications   Medication Dose Route Frequency Provider Last Rate Last Admin    pantoprazole (PROTONIX) IV push injection 40 mg  40 mg Intravenous Q12H Roque Marrero MD   40 mg at 07/18/24 0755                  Physical Exam:   Vitals were reviewed  Vital Signs with Ranges  Temp:  [98  F (36.7  C)-98.6  F (37  C)] 98  F (36.7  C)  Pulse:  [64-66] 64  Resp:  [16] 16  BP: (104-121)/(66-75) 113/75  SpO2:  [95 %-96 %] 96 %  No intake/output data recorded.  Constitutional: Alert, oriented to person, place, situation.  Cooperative, lying in bed in NAD.   Respiratory:  Lungs CTAB.  No labored breathing.  Cardiovascular:  Heart RRR, well-perfused.  GI:  Abdomen soft, NT/ND and with normoactive BS  Skin/Integumen:  Warm, dry, non-diaphoretic.  MSK: CMS x4 grossly intact.             Data:   I reviewed the patient's new clinical lab test results.   Recent Labs   Lab Test 07/18/24  0949 07/17/24  0903 07/16/24  0805 07/15/24  1439 07/15/24  0607 07/14/24  1419 07/14/24  0739 07/14/24  0505 07/14/24  0500 10/25/23  1002 10/25/23  0915 09/14/23  0623 09/13/23  2057   WBC  --   --  6.7  --  7.2  --  8.4  --  8.8   < >  --    < >  --    HGB 9.6* 9.1* 9.5*   < > 9.0*   < > 7.9*   < > 8.3*   < >  --    < >  --    MCV  --   --  88  --  87  --  90  --  87   < >  --    < >  --    PLT  --   --  216  --  199  --  210  --  229   < >  --    < >  --    INR  --   --   --   --   --   --   --   --  1.57*  --  2.29*  --  1.22*    < > = values in this interval not displayed.     Recent Labs   Lab Test 07/16/24  0805 07/15/24  0607 07/14/24  0505 07/14/24  0500   POTASSIUM 3.4 3.5 3.6 3.7  3.7   CHLORIDE 110* 112*  --  109*  109*   CO2 20* 22  --  19*  19*   BUN 18.6 23.3*  --  27.1*  27.1*   ANIONGAP 10 6*  --  12  12     Recent Labs   Lab Test 07/14/24  0500 06/12/24  1014 05/28/24  1649 03/26/24  1355 10/31/23  0142 10/09/23  0820 09/13/23  2051 09/05/23  0938 09/21/22  1102 10/11/21  0947   ALBUMIN 2.9*  2.9*  3.4* 3.7 3.6  --    < > 3.8  --    < >  --    BILITOTAL 0.3  0.3  --  0.6 0.5  --    < > 0.7  --    < >  --    ALT 14  14  --  16 14  --    < > 17  --    < >  --    AST 27  27  --  23 19  --    < > 21  --    < >  --    PROTEIN  --   --   --   --  Negative  --   --  Negative  --  Negative   LIPASE 30  --   --   --   --   --  34  --   --   --     < > = values in this interval not displayed.       I reviewed the patient's new imaging results.    All laboratory data reviewed  All imaging studies reviewed by me.    BRENDON Keller,  7/17/2024  [facility]  Office : [phone]  Cell: [phone] (Dr. Ash)

## 2024-07-18 NOTE — CONSULTS
MD Consult - Low fiber diet     Chart reviewed  Diet: Full Liquids    GI follow-up pending  Awaiting diet recs (low fiber vs need for liquids/pureed-type foods)    Will provide diet teaching once diet recs for discharge clarified    Connie Siddiqui RD, LD  Clinical Dietitian - [facility]   Pager - [phone]

## 2024-07-18 NOTE — PLAN OF CARE
Orientation: A&Ox4  Aggression Stop Light: green  Activity: Independent.   Diet/BS Checks: full liquid diet  Tele:  NA  IV Access/Drains: L PIV SL  Pain Management: denies  Abnormal VS/Results: VSS on RA  Bowel/Bladder: continent b/b. No BM this shift  Skin/Wounds: scattered bruising, Mepi in place on coccyx  Consults: GI, Hem/Onc, Radiation Onc, IR  D/C Disposition: 1-2 days pending plans from Oncology, Radiation Oncology and IR  Other Info:

## 2024-07-18 NOTE — PROGRESS NOTES
[facility] Progress Note     Primary Oncologist/Hematologist:  Dr Glover, [facility]          Assessment and Plan:   GI bleeding, likely from upper GI source. Based on his history, local tumor recurrence in the GI tract seems the most likely explanation particularly given CT findings of progression.  He reports no bleeding elsewhere so a generalized bleeding diathesis is unlikely. He appears stable at present. 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 Most recently, he has been receiving dual immunotherapy. Recent imaging performed at [place] reportedly shows significant disease progression.   3.   Chronic kidney disease, stage 3B and with associated ROCKY likely related to hypotension  4.   Atrial fibrillation on long term anticoagulation  5.   Heart failure with preserved EF     Recommendations;  Continue serial monitoring CBC and transfusion support as needed.  Hgb relatively stable 7/17 at 9.1  Continue to hold apixaban  3. He is high risk for further GI bleeding. Has met with Dr. Clay. Was simulated 7/17 with started on RT 7/17/2024.   We spoke to his primary oncologist, Dr. Glover on 7/16/24.  He agrees with the plan outlined above.  Although next line therapies are available to treat Edgar's disease progression, this will depend on his recovery from his current bleeding  and his overall performance status.  Dr. Glover will discuss these options at the time of outpatient follow up.   4. Advance diet per GI team     German Escalante MD  [facility]  [phone]          Interval History:     No acute events overnight.                Review of Systems:     The 5 point Review of Systems is negative other than noted in the HPI                Medications:   Scheduled Medications  Current  "Facility-Administered Medications   Medication Dose Route Frequency Provider Last Rate Last Admin    pantoprazole (PROTONIX) IV push injection 40 mg  40 mg Intravenous Q12H Roque Marrero MD   40 mg at 07/18/24 0755     PRN Medications  Current Facility-Administered Medications   Medication Dose Route Frequency Provider Last Rate Last Admin    acetaminophen (TYLENOL) tablet 500-1,000 mg  500-1,000 mg Oral Q6H PRN Roque Marrero MD   1,000 mg at 07/18/24 0755    glucose gel 15-30 g  15-30 g Oral Q15 Min PRN Roque Marrero MD        Or    dextrose 50 % injection 25-50 mL  25-50 mL Intravenous Q15 Min PRN Roque Marrero MD        Or    glucagon injection 1 mg  1 mg Subcutaneous Q15 Min PRN Roque Marrero MD        ondansetron (ZOFRAN ODT) ODT tab 4 mg  4 mg Oral Q6H PRN Roque Marrero MD        Or    ondansetron (ZOFRAN) injection 4 mg  4 mg Intravenous Q6H PRN Roque Marrero MD   4 mg at 07/18/24 0755    senna-docusate (SENOKOT-S/PERICOLACE) 8.6-50 MG per tablet 1 tablet  1 tablet Oral BID PRN Roque Marrero MD        Or    senna-docusate (SENOKOT-S/PERICOLACE) 8.6-50 MG per tablet 2 tablet  2 tablet Oral BID PRN Roque Marrero MD                      Physical Exam:   Vitals were reviewed  Blood pressure 113/75, pulse 64, temperature 98  F (36.7  C), temperature source Oral, resp. rate 16, height 1.905 m (6' 3\"), weight 117.7 kg (259 lb 8 oz), SpO2 96%.  Wt Readings from Last 4 Encounters:   07/18/24 117.7 kg (259 lb 8 oz)   06/19/24 125.2 kg (276 lb)   06/18/24 125.6 kg (276 lb 12.8 oz)   05/28/24 121.5 kg (267 lb 12.8 oz)       No intake/output data recorded.        Constitutional: Awake, alert, cooperative, no apparent distress   Lungs: Clear to auscultation bilaterally, no crackles or wheezing   Cardiovascular: Regular rate and rhythm, normal S1 and S2, and no murmur noted   Abdomen: Normal bowel sounds, soft, non-distended, non-tender   Skin: No rashes, no cyanosis, no edema "   Other:               Data:   All laboratory data and imaging studies reviewed.    CMP  Recent Labs   Lab 07/16/24  0805 07/15/24  0607 07/15/24  0352 07/14/24  2332 07/14/24  0647 07/14/24  0505 07/14/24  0500    140  --   --   --  142 140  140   POTASSIUM 3.4 3.5  --   --   --  3.6 3.7  3.7   CHLORIDE 110* 112*  --   --   --   --  109*  109*   CO2 20* 22  --   --   --   --  19*  19*   ANIONGAP 10 6*  --   --   --   --  12  12   GLC 92 94 100* 93   < >  --  137*  137*   BUN 18.6 23.3*  --   --   --   --  27.1*  27.1*   CR 1.63* 1.77*  --   --   --   --  2.41*  2.41*   GFRESTIMATED 43* 39*  --   --   --   --  27*  27*   DOMINICK 7.8* 7.6*  --   --   --   --  8.4*  8.4*   MAG  --   --   --   --   --   --  2.1   PROTTOTAL  --   --   --   --   --   --  5.3*  5.3*   ALBUMIN  --   --   --   --   --   --  2.9*  2.9*   BILITOTAL  --   --   --   --   --   --  0.3  0.3   ALKPHOS  --   --   --   --   --   --  80  80   AST  --   --   --   --   --   --  27  27   ALT  --   --   --   --   --   --  14  14    < > = values in this interval not displayed.     CBC  Recent Labs   Lab 07/17/24  0903 07/16/24  0805 07/15/24  1439 07/15/24  0607 07/14/24  1419 07/14/24  0739 07/14/24  0505 07/14/24  0500   WBC  --  6.7  --  7.2  --  8.4  --  8.8   RBC  --  3.33*  --  3.18*  --  2.91*  --  3.09*   HGB 9.1* 9.5* 9.6* 9.0*   < > 7.9* 8.5* 8.3*   HCT  --  29.4*  --  27.6*  --  26.2* 25* 26.9*   MCV  --  88  --  87  --  90  --  87   MCH  --  28.5  --  28.3  --  27.1  --  26.9   MCHC  --  32.3  --  32.6  --  30.2*  --  30.9*   RDW  --  19.1*  --  18.1*  --  18.8*  --  19.3*   PLT  --  216  --  199  --  210  --  229    < > = values in this interval not displayed.     INR  Recent Labs   Lab 07/14/24  0500   INR 1.57*

## 2024-07-18 NOTE — CONSULTS
"Care Management Follow Up    Length of Stay (days): 4    Expected Discharge Date: 07/19/2024     Concerns to be Addressed: all concerns addressed in this encounter     Patient plan of care discussed at interdisciplinary rounds: Yes    Anticipated Discharge Disposition:  home after radiation therapy and final recommendations of provider(s)     Anticipated Discharge Services:  possible outpatient therapy PT consult vs \"in [facility]\" Physical therapy  Anticipated Discharge DME: n/a      Patient/family educated on Medicare website which has current facility and service quality ratings:    Education Provided on the Discharge Plan:  yes  Patient/Family in Agreement with the Plan: yes    Referrals Placed by CM/SW:  cm  Private pay costs discussed: Not applicable    Additional Information:  Writer met with Patient and Spouse at the bedside. Possible plan for discharge to home with spouse and continued outpatient radiation therapy with Dorina BATISTA and outpatient follow up's as recommended per rounding provider(s).  Patient and Wife feel comfortable with plan for discharge to home to prior living in independent [facility].  They did have follow up questions for discharge that writer is awaiting further clarification for:  -Patient was attending outpatient physical therapy with joanna Powell prior to admission. Patient would like to see if he is able to have Physical therapy in the [facility] building.   Writer has placed a message with Therapy team at [facility] to confirm if they are a \"homecare PT\" and bill to Medicare or if they are private pay. Writer will follow up with patient tomorrow to update and get a new outpatient Physical therapy appointment if needed.  -Spouse would like Nutrition services to see the patient as his diet has changed will call 07/19 to see if they can give final recommendations.  -Spouse was wanting to make sure that the patient has an antiemetic for discharge he has been receiving IV zofran prior to radiation therapy. " Writer passed along to rounding provider and patient may want to try PO Zofran prior to radiation treatment tomorrow bedside RN present during this part of the conversation. Spouse has Radiation therapy appointment date/times and parking card.   -Spouse asking if the patient is able to take Immodium for diarrhea related to radiation therapy. Sticky note for discharging provider to address. Patient and Spouse aware that the patient will have discharge orders to reflect any new medications or changes to medications.   -Spouse asking about follow up lab(s) when to go and where. Writer has a call to [facility] primary MD is through [place] to ask for Lab at [place] while undergoing Radiation therapy . Writer awaits a call back.    Care Transitions will round back on patient 07/19 for additional needs or updates regarding discharge planning.      Vida Iyer RN, BSN, ACM   Care Transitions Specialist  [facility]  Care Transitions Specialist  [address] [address]  [email or web address]  Office: [phone] Fax: [phone]  [facility]

## 2024-07-18 NOTE — PROGRESS NOTES
1500 - 6560    Orientation: A&Ox4  Aggression Stop Light: green  Activity: Independent.   Diet/BS Checks: full liquid diet  Tele:  NA  IV Access/Drains: L PIV SL  Pain Management: pt denies  Abnormal VS/Results: VSS on RA  Bowel/Bladder: continent b/b. No BM this shift  Skin/Wounds: scattered bruising, Mepi in place on coccyx  Consults: GI, Hem/Onc, Radiation Onc, IR  D/C Disposition: 1-2 days pending plans from Oncology, Radiation Oncology and IR  Other Info:   -Radiation completed today.

## 2024-07-18 NOTE — PROGRESS NOTES
[facility]    Medicine Progress Note - Hospitalist Service    Date of Admission:  7/14/2024    Assessment & Plan   Ti Nickerson is a 78 year old male admitted on 7/14/2024. He presented with syncope in setting of GI bleed. Received 2 units PRBCs. Underwent EGD which showed duodenal mass with stigmata of recent bleed. Started radiation treatments in hospital, hemoglobin stabilized and his diet was advanced.     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 On arrival BP 59/42, improved with fluids. Troponin 49. Hgb 8.3 (9.2 6/12). EKG atrial fibrillation.   *given Kcentra in ED (patient is on Eliquis)  *Hemoglobin ramos of 7.9. Received 2u PRBCs  *CTA abdomen done at presentation shows no active GI bleed but does show 4.1 cm mass in the duodenum (known from before)  -[facility] GI following, patient underwent upper GI endoscopy on 7/14/2024 which showed large, fungating, ulcerated duodenal mass with stigmata of recent bleeding.  Area was injected with epinephrine  -No further evidence of ongoing GI bleed.  Hemoglobin has been stable ~9.5  -Continue Protonix 40 mg BID  -soft diet  -Radiation oncology planning XRT treatment for 15 fractions-- first on 7/17, continue to follow up on discharge  - follow up with oncology  -Monitor daily hemoglobin while in the hospital, follow up CBC in one week after discharge     Metastatic renal cell carcinoma  *Follows through [facility]. S/p radical R nephrectomy 1/2019. W/ mass in 3rd portion of duodenum, s/p partial duodenectomy, path positive for RCC.    -1/2024 with upper EUS and resection of gastric mass. Also w/ recent bleeding and tumor found at blind end of duodenal  "resection, non-operable. Currently undergoing chemotherapy.   *CT 7/8 w/ enlargement of duodenal tumor and interval enlargement of retroperitoneal tumor distorting IVC and abutting aorta. Chest w/ new pulmonary nodules indeterminate for metastatic disease.   -As above CT and endoscopy shows duodenal mass   -[facility] and [facility] oncology following  -Radiation plans as mentioned above     ROCKY  CKD IIIb  Baseline creatinine 1.7-2.0. creatinine on presentation 2.41, likely 2/2 hypotension with above  -Creatinine improving, down to 1.63, which is probably around his baseline  - resume PTA sodium bicarbonate     Atrial fibrillation   -hold apixaban indefinitely given the tenuous nature of the duodenal mass and risk for rebleeding.  This was discussed with the patient and his spouse and they understand     Chronic HFpEF  Hx idiopathic CM- resolved  Non-obstructive CAD  Ascending aorta dilatation  HLD  Resume PTA statin, midodrine     BPH-continue tamsulosin   KEENA-not currently on CPAP          Diet: Snacks/Supplements Adult: Magic Cup; Between Meals  Snacks/Supplements Adult: Ensure Enlive; Between Meals  Mechanical/Dental Soft Diet    DVT Prophylaxis: Pneumatic Compression Devices  Dillard Catheter: Not present  Lines: None     Cardiac Monitoring: None  Code Status: Full Code      Clinically Significant Risk Factors              # Hypoalbuminemia: Lowest albumin = 2.9 g/dL at 7/14/2024  5:00 AM, will monitor as appropriate     # Hypertension: Noted on problem list              # Obesity: Estimated body mass index is 32.44 kg/m  as calculated from the following:    Height as of this encounter: 1.905 m (6' 3\").    Weight as of this encounter: 117.7 kg (259 lb 8 oz).        # Financial/Environmental Concerns: none         Disposition Plan     Medically Ready for Discharge: Anticipated Tomorrow  Pending diet tolerance           Nanci Horn DO  Hospitalist Service  [facility]  Securely " message with [place] (more info)  Text page via [facility] Paging/Directory   ______________________________________________________________________    Interval History   Patient seen and examined. Diet advanced today. Discussed ways to get more protein in, interested in trying ensure supplement and magic cup. Feels stronger than he has been and that he would be ready for discharge tomorrow.    Physical Exam   Vital Signs: Temp: 98  F (36.7  C) Temp src: Oral BP: 113/75 Pulse: 64   Resp: 16 SpO2: 96 % O2 Device: None (Room air)    Weight: 259 lbs 8 oz    Constitutional: Awake, alert, cooperative, no apparent distress. Sitting up in chair.  Respiratory: Clear to auscultation bilaterally, no crackles or wheezing  Cardiovascular: Regular rate and rhythm, normal S1 and S2, and no murmur noted  GI: Normal bowel sounds, soft, non-distended, non-tender  Skin/Integumen: No rashes, no cyanosis, no edema  Other:      Medical Decision Making       35 MINUTES SPENT BY ME on the date of service doing chart review, history, exam, documentation & further activities per the note.      Data     I have personally reviewed the following data over the past 24 hrs:    N/A  \   9.6 (L)   / N/A     N/A N/A N/A /  N/A   N/A N/A N/A \       Imaging results reviewed over the past 24 hrs:   No results found for this or any previous visit (from the past 24 hour(s)).

## 2024-07-18 NOTE — PROGRESS NOTES
Radiation therapy treatment 2 of 15. Pt received a daily dose of 300 cGy with 10 MV photon radiation to the abdomen for a total dose of 600 cGy given to date. Plan for 13 more treatments.

## 2024-07-18 NOTE — PLAN OF CARE
Admitted on 7/14/2024 with syncope   Metastatic renal cell carcinoma   Lower GI bleed  Acute blood loss anemia  Orientation: A&Ox4  Aggression Stop Light: green  Activity: Independent.   Diet/BS Checks: mechanical soft  Tele:  NA  IV Access/Drains: L PIV SL  Pain Management: denies  Abnormal VS/Results: VSS on RA  Bowel/Bladder: continent b/b. No BM this shift  Skin/Wounds: scattered bruising, Mepi in place on coccyx  Consults: GI, Hem/Onc, Radiation Onc, IR  D/C Disposition:7/19 pending Oncology, Radiation Oncology and IR  Other Info:

## 2024-07-18 NOTE — PROGRESS NOTES
1500 - 0230    Orientation: A&Ox4  Aggression Stop Light: green  Activity: Independent.   Diet/BS Checks: full liquid diet  Tele:  NA  IV Access/Drains: L PIV SL  Pain Management: pt denies  Abnormal VS/Results: VSS on RA  Bowel/Bladder: continent b/b. No BM this shift  Skin/Wounds: scattered bruising, Mepi in place on coccyx  Consults: GI, Hem/Onc, Radiation Onc, IR  D/C Disposition: 1-2 days pending plans from Oncology, Radiation Oncology and IR  Other Info:   -Radiation completed today.   14 treatments remaining,

## 2024-07-19 VITALS
WEIGHT: 259.3 LBS | BODY MASS INDEX: 32.24 KG/M2 | DIASTOLIC BLOOD PRESSURE: 86 MMHG | RESPIRATION RATE: 16 BRPM | HEART RATE: 70 BPM | TEMPERATURE: 98.1 F | OXYGEN SATURATION: 95 % | HEIGHT: 75 IN | SYSTOLIC BLOOD PRESSURE: 141 MMHG

## 2024-07-19 LAB
ALBUMIN SERPL BCG-MCNC: 3 G/DL (ref 3.5–5.2)
ALP SERPL-CCNC: 90 U/L (ref 40–150)
ALT SERPL W P-5'-P-CCNC: 15 U/L (ref 0–70)
ANION GAP SERPL CALCULATED.3IONS-SCNC: 10 MMOL/L (ref 7–15)
AST SERPL W P-5'-P-CCNC: 24 U/L (ref 0–45)
BILIRUB DIRECT SERPL-MCNC: 0.21 MG/DL (ref 0–0.3)
BILIRUB SERPL-MCNC: 0.7 MG/DL
BUN SERPL-MCNC: 14.4 MG/DL (ref 8–23)
CALCIUM SERPL-MCNC: 8.1 MG/DL (ref 8.8–10.4)
CHLORIDE SERPL-SCNC: 106 MMOL/L (ref 98–107)
CREAT SERPL-MCNC: 1.65 MG/DL (ref 0.67–1.17)
EGFRCR SERPLBLD CKD-EPI 2021: 43 ML/MIN/1.73M2
ERYTHROCYTE [DISTWIDTH] IN BLOOD BY AUTOMATED COUNT: 20.1 % (ref 10–15)
GLUCOSE SERPL-MCNC: 104 MG/DL (ref 70–99)
HCO3 SERPL-SCNC: 22 MMOL/L (ref 22–29)
HCT VFR BLD AUTO: 31.2 % (ref 40–53)
HGB BLD-MCNC: 9.6 G/DL (ref 13.3–17.7)
MAGNESIUM SERPL-MCNC: 2 MG/DL (ref 1.7–2.3)
MCH RBC QN AUTO: 27.5 PG (ref 26.5–33)
MCHC RBC AUTO-ENTMCNC: 30.8 G/DL (ref 31.5–36.5)
MCV RBC AUTO: 89 FL (ref 78–100)
PHOSPHATE SERPL-MCNC: 2.6 MG/DL (ref 2.5–4.5)
PLATELET # BLD AUTO: 197 10E3/UL (ref 150–450)
POTASSIUM SERPL-SCNC: 3.5 MMOL/L (ref 3.4–5.3)
PROT SERPL-MCNC: 5.6 G/DL (ref 6.4–8.3)
RBC # BLD AUTO: 3.49 10E6/UL (ref 4.4–5.9)
SODIUM SERPL-SCNC: 138 MMOL/L (ref 135–145)
WBC # BLD AUTO: 5.7 10E3/UL (ref 4–11)

## 2024-07-19 PROCEDURE — 77412 RADIATION TX DELIVERY LVL 3: CPT

## 2024-07-19 PROCEDURE — 80076 HEPATIC FUNCTION PANEL: CPT | Performed by: HOSPITALIST

## 2024-07-19 PROCEDURE — 250N000011 HC RX IP 250 OP 636: Performed by: INTERNAL MEDICINE

## 2024-07-19 PROCEDURE — 85041 AUTOMATED RBC COUNT: CPT | Performed by: HOSPITALIST

## 2024-07-19 PROCEDURE — 250N000013 HC RX MED GY IP 250 OP 250 PS 637: Performed by: HOSPITALIST

## 2024-07-19 PROCEDURE — 99239 HOSP IP/OBS DSCHRG MGMT >30: CPT | Performed by: HOSPITALIST

## 2024-07-19 PROCEDURE — 84100 ASSAY OF PHOSPHORUS: CPT | Performed by: HOSPITALIST

## 2024-07-19 PROCEDURE — 77387 GUIDANCE FOR RADJ TX DLVR: CPT

## 2024-07-19 PROCEDURE — 36415 COLL VENOUS BLD VENIPUNCTURE: CPT | Performed by: HOSPITALIST

## 2024-07-19 PROCEDURE — 83735 ASSAY OF MAGNESIUM: CPT | Performed by: HOSPITALIST

## 2024-07-19 RX ADMIN — PANTOPRAZOLE SODIUM 40 MG: 40 TABLET, DELAYED RELEASE ORAL at 06:52

## 2024-07-19 RX ADMIN — ONDANSETRON 4 MG: 4 TABLET, ORALLY DISINTEGRATING ORAL at 09:47

## 2024-07-19 RX ADMIN — MIDODRINE HYDROCHLORIDE 5 MG: 5 TABLET ORAL at 09:48

## 2024-07-19 ASSESSMENT — ACTIVITIES OF DAILY LIVING (ADL)
ADLS_ACUITY_SCORE: 31

## 2024-07-19 NOTE — PROGRESS NOTES
[facility]  Gastroenterology Progress Note     Ti Nickerson MRN# [id number]   YOB: 1945 Age: 78 year old          Assessment and Plan:   Ti Nickerson is a 78 year old male admitted on 7/14/2024. He presents with syncope, found to have GI bleed and acute anemia.     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   -On arrival BP 59/42, improved with fluids. Troponin 49. Hgb 8.3 (9.2 6/12). EKG atrial fibrillation.   *given Kcentra in ED (patient is on Eliquis) and admitted to ICU  *Hemoglobin ramos of 7.9. S/p 2 units of PRBC  -CTA abdomen showed no active GI bleed but does show 4.1 cm mass in the duodenum (known from before)  -EGD 7/14/2024 - showed large, fungating, ulcerated duodenal mass with stigmata of recent bleeding.  Area was injected with epinephrine  -No further evidence of ongoing GI bleed.  Hemoglobin has been stable around 9.5  -Continue Protonix 40 mg BID  -Tolerating full liquid diet.  Patient tolerating mechanical soft diet and should continue.  Discussed with patient and family present.  Questions answered.  -Appreciate input from oncology.  Patient currently undergoing radiation therapy.  -Follow up with Nilsa HONEYCUTT in 2 weeks. Our clinical staff will call the patient to schedule. Office phone: [phone]      Recent Labs   Lab 07/18/24  0949 07/17/24  0903 07/16/24  0805 07/15/24  1439 07/15/24  0607   HGB 9.6* 9.1* 9.5* 9.6* 9.0*           Interval History:     doing well; no cp, sob, n/v/d, or abd pain.  Tolerating diet.              Review of Systems:     C: NEGATIVE for fever, chills, change in weight  E/M: NEGATIVE for ear, mouth and throat problems  R: NEGATIVE for  significant cough or SOB  CV: NEGATIVE for chest pain, palpitations or peripheral edema             Medications:   I have reviewed this patient's current medications  Current Facility-Administered Medications   Medication Dose Route Frequency Provider Last Rate Last Admin    midodrine (PROAMATINE) tablet 5 mg  5 mg Oral BID Nanci Horn DO   5 mg at 07/18/24 2012    pantoprazole (PROTONIX) EC tablet 40 mg  40 mg Oral BID AC Nanci Horn DO   40 mg at 07/19/24 0652    pravastatin (PRAVACHOL) tablet 20 mg  20 mg Oral QPM Nanci Horn DO   20 mg at 07/18/24 2012    sodium bicarbonate tablet 650 mg  650 mg Oral QPM Nanci Horn DO   650 mg at 07/18/24 2012    tamsulosin (FLOMAX) capsule 0.4 mg  0.4 mg Oral QPM Nanci Horn DO   0.4 mg at 07/18/24 2012                  Physical Exam:   Vitals were reviewed  Vital Signs with Ranges  Temp:  [98.1  F (36.7  C)] 98.1  F (36.7  C)  Pulse:  [70] 70  Resp:  [16] 16  BP: (113-141)/(80-86) 141/86  SpO2:  [95 %-96 %] 95 %  I/O last 3 completed shifts:  In: 800 [P.O.:800]  Out: -   Constitutional: Alert, oriented to person, place, situation.  Cooperative, sitting up in NAD.   Respiratory:  Lungs CTAB.  No labored breathing.  GI:  Abdomen soft, NT/ND  Skin/Integumen:  Warm, dry, non-diaphoretic.  MSK: CMS x4 grossly intact.             Data:   I reviewed the patient's new clinical lab test results.   Recent Labs   Lab Test 07/18/24  0949 07/17/24  0903 07/16/24  0805 07/15/24  1439 07/15/24  0607 07/14/24  1419 07/14/24  0739 07/14/24  0505 07/14/24  0500 10/25/23  1002 10/25/23  0915 09/14/23  0623 09/13/23  2057   WBC  --   --  6.7  --  7.2  --  8.4  --  8.8   < >  --    < >  --    HGB 9.6* 9.1* 9.5*   < > 9.0*   < > 7.9*   < > 8.3*   < >  --    < >  --    MCV  --   --  88  --  87  --  90  --  87   < >  --    < >  --    PLT  --   --  216  --  199  --  210  --  229   < >  --    < >  --    INR  --   --   --   --    --   --   --   --  1.57*  --  2.29*  --  1.22*    < > = values in this interval not displayed.     Recent Labs   Lab Test 07/16/24  0805 07/15/24  0607 07/14/24  0505 07/14/24  0500   POTASSIUM 3.4 3.5 3.6 3.7  3.7   CHLORIDE 110* 112*  --  109*  109*   CO2 20* 22  --  19*  19*   BUN 18.6 23.3*  --  27.1*  27.1*   ANIONGAP 10 6*  --  12  12     Recent Labs   Lab Test 07/14/24  0500 06/12/24  1014 05/28/24  1649 03/26/24  1355 10/31/23  0142 10/09/23  0820 09/13/23 2051 09/05/23  0938 09/21/22  1102 10/11/21  0947   ALBUMIN 2.9*  2.9* 3.4* 3.7 3.6  --    < > 3.8  --    < >  --    BILITOTAL 0.3  0.3  --  0.6 0.5  --    < > 0.7  --    < >  --    ALT 14  14  --  16 14  --    < > 17  --    < >  --    AST 27  27  --  23 19  --    < > 21  --    < >  --    PROTEIN  --   --   --   --  Negative  --   --  Negative  --  Negative   LIPASE 30  --   --   --   --   --  34  --   --   --     < > = values in this interval not displayed.       I reviewed the patient's new imaging results.    All laboratory data reviewed  All imaging studies reviewed by me.    BRENDON Keller,  7/17/2024  [facility]  Office : [phone]  Cell: [phone] (Dr. Ash)

## 2024-07-19 NOTE — DISCHARGE SUMMARY
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  Syncope, hypotension due to above.  Ulcerated Duodenal mass.  Acute kidney injury   Physical deconditioning from medical illness.      Past Medical History:   Diagnosis Date    Atrial fibrillation, unspecified 9/18/2015    CAD (coronary artery disease) 09/18/2015    minimal by angio 2007, fu nuc est nl 2018    Elevated TSH 2018    Esophageal reflux 9/18/2015    Essential hypertension     History of cardioversion     [id number]    Idiopathic cardiomyopathy (H) 09/18/2015    fu echo nl ef, nl nuclear est 11/18, Dr. Quarles    Mixed hyperlipidemia 9/18/2015    Mumps     Sleep apnea 09/18/2015    does not use cpap as of 2019    Sleep apnea     Thoracic aortic aneurysm (H24)     sinus of valsalva 4.5 and asc aorta 4.5 in 2017    Tubular adenoma of colon 01/2017    fu 3 years       History of Present Illness   Ti Nickerson is an 78 year old male who presented with Syncope.    Hospital Course     Ti Nickerson is a 78 year old male  with renal cell cancer, CKD stage 3, atrial fibrillation on anticoagulation, heart failure with preserved EF admitted on 7/14/2024 with syncope.    Presented with syncope in setting of GI bleed. Received 2 units PRBCs. Underwent EGD which showed duodenal mass with stigmata of recent bleed. Started radiation treatments in hospital, hemoglobin stabilized and his diet was advanced. Plan for discharge with close follow up.     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  Syncope due to above.  Duodenal mass  Hypotension 2/2 bleeding.  Hx  orthostatic hypotension on midodrine.  GERD  [Pantoprazole 40 mg BID, midodrine 5 mg BID]  *Patient presented with syncopal episode and bloody bowel movement including large clots. On arrival BP 59/42, improved with fluids.   Hemoglobin 8.8, INR 1.57, potassium 3.7.  Magnesium 2.1.  Lipase 30  Creatinine 2.41.  Troponin high sensitivity 49.  Liver enzymes within normal limits.  EKG atrial fibrillation.   *CTA abdomen 7/14 -  no active GI bleed but does show 4.1 cm mass in the duodenum (known from before)  Upper GI endoscopy on 7/14/2024 which showed large, fungating, ulcerated duodenal mass with stigmata of recent bleeding.  Area was injected with epinephrine.    *Given Kcentra in ED (patient is on Eliquis), Was on IV PPI.  *Hemoglobin ramos of 7.9. Received 2u PRBCs. No further evidence of ongoing GI bleed.  Hemoglobin has been stable ~9.5.  *[facility] followed, recommend outpatient follow-up.  -Radiation oncology followed,  XRT treatment for 15 fractions-- first on 7/17 - 7/19.  - [facility] followed. Continue to hold apixaban, high risk for further GI bleeding.   -Continue Protonix 40 mg BID  -PTA Eliquis discontinued.  Avoid blood thinners, NSAIDs.  Soft diet as able to tolerate.  Follow up CBC in one week after discharge.    Follow-up with PCP within 1 week.  Follow-up with radiation oncology in clinic per schedule.  Follow-up with primary oncology team in clinic per schedule.  Follow-up with NP gastroenterology in clinic in 2 weeks.  Follow-up with cardiology in clinic, while holding anticoagulation for A-fib.    Metastatic renal cell carcinoma  *Follows through [place] and [facility]. S/p radical R nephrectomy 1/2019. W/ mass in 3rd portion of duodenum, s/p partial duodenectomy, path positive for RCC.    -1/2024 with upper EUS and resection of gastric mass. Also w/ recent bleeding and tumor found at blind end of duodenal resection, non-operable. Currently undergoing chemotherapy.   *CT 7/8 w/ enlargement of  duodenal tumor and interval enlargement of retroperitoneal tumor distorting IVC and abutting aorta.   Chest w/ new pulmonary nodules indeterminate for metastatic disease.   -As above CT and endoscopy shows duodenal mass   -[facility] and [facility] oncology followed - outpatient follow up.  -Radiation plans as mentioned above     Acute kidney injury   CKD IIIb  Baseline creatinine 1.7-2.0. creatinine on presentation 2.41, likely 2/2 hypotension with above  Creatinine improved, down to 1.63, which is probably around his baseline  Continue PTA sodium bicarbonate     Atrial fibrillation   Holding PTA apixaban indefinitely given the tenuous nature of the duodenal mass and risk for rebleeding. This was discussed with the patient and his spouse and they understand     Chronic HFpEF  Hx idiopathic CM- resolved  Non-obstructive CAD  Ascending aorta dilatation  HLD.  Denies any chest pain, palpitations or shortness of breath at this time.  TTE (10/9/23): EF 55-60%  PTA not on antihypertensives.  Continue PTA atorvastatin.  Continue PTA midodrine.  PTA Eliquis discontinued as above. as above.   Monitor blood pressure readings, heart rate review on provider.  Cardiology follow-up patient recommended, holding eliquis.     Ventral Hernia   Noted on CT on presentation -Midline ventral hernia containing small bowel but not causing obstruction.     BPH  Continue PTA tamsulosin     Obesity with a BMI of 32.41.  KEENA not currently on CPAP.  Consider lifestyle modification with diet  Age-appropriate health maintenance on PCP visit.    Physical deconditioning from medical illness.  Encourage ambulation, referral for outpatient PT.    Judy Fraga MD.    Pending Results   Unresulted Labs Ordered in the Past 30 Days of this Admission       Date and Time Order Name Status Description    7/14/2024  8:00 PM CONDITIONAL Prepare red blood cells (unit) Preliminary     7/14/2024  5:02 AM Prepare red blood cells (unit) Preliminary      7/14/2024  5:02 AM Prepare red blood cells (unit) Preliminary                Physical Exam   Vitals:    07/16/24 0623 07/18/24 0613 07/19/24 0647   Weight: 119.9 kg (264 lb 4.8 oz) 117.7 kg (259 lb 8 oz) 117.6 kg (259 lb 4.8 oz)     Vital Signs with Ranges  Temp:  [98.1  F (36.7  C)] 98.1  F (36.7  C)  Pulse:  [70] 70  Resp:  [16] 16  BP: (113-141)/(80-86) 141/86  SpO2:  [95 %-96 %] 95 %  I/O last 3 completed shifts:  In: 800 [P.O.:800]  Out: -   PHYSICAL EXAM  GENERAL: Patient is in no distress. Alert and oriented.  HEART: Regular rate and rhythm. S1S2. No murmurs  LUNGS: Clear to auscultation bilaterally. No expiratory wheeze.  Respirations unlabored  ABDOMEN: Soft, no abdominal tenderness, bowel sounds heard   NEURO: moving all extremities.  EXTREMITIES: No pedal edema. 2+ peripheral pulses.  SKIN: Warm, dry. No rash or bruising.  PSYCHIATRY Cooperative  )Consultations This Hospital Stay   PHARMACY IP CONSULT  GASTROENTEROLOGY IP CONSULT  HEMATOLOGY & ONCOLOGY IP CONSULT  CARE MANAGEMENT / SOCIAL WORK IP CONSULT  RADIATION ONCOLOGY IP CONSULT  INTERVENTIONAL RADIOLOGY ADULT/PEDS IP CONSULT  NUTRITION SERVICES ADULT IP CONSULT    Time Spent on this Encounter   Judy CARRERA MD, personally saw the patient today and spent greater than 30 minutes discharging this patient. Discussed with patient, his wife by bedside, bedside RN, care team.    Discharge Disposition   Discharged to home  Condition at discharge: Stable    Discharge Orders      **CBC with platelets FUTURE 14d     Physical Therapy  Referral      Reason for your hospital stay    GI bleed, found to have metastatic duodenal mass. Received blood transfusion, diet advanced and started radiation therapy in hospital.     Activity    Your activity upon discharge: activity as tolerated     Discharge Instructions    Monitor daily blood pressure, heart rate, review on provider visit.  Hold prior to admission midodrine if systolic blood pressure  greater than 120.    Fall precautions.  Avoid NSAIDs.  Stop taking your eliquis.     Follow-up and recommended labs and tests     Follow up with primary care provider, Nico Retana    The following labs/tests are recommended: CBC,BMP in one week.   You are scheduled for labs on Tuesday July 23rd with [facility] in [place] at 10:00 a.m.  [facility] [place]  [address], [place] [address]   [phone]  Dr. Ricardo's office will call you regarding outpatient follow up date/time.       Age-appropriate health maintenance on PCP visit.    Follow up with radiation oncology for ongoing treatments Monday-Friday  Please keep your calendar and parking permit with you.    Follow up with Nilsa HONEYCUTT in 2 weeks. Our clinical staff will call the patient to schedule. Office phone: [phone].    Cardiology follow-up patient recommended, holding eliquis.     Diet    Follow this diet upon discharge:  Mechanical/Dental Soft Diet, high protein supplements (shakes)       Discharge Medications   Current Discharge Medication List        START taking these medications    Details   ondansetron (ZOFRAN ODT) 4 MG ODT tab Take 1 tablet (4 mg) by mouth every 6 hours as needed for nausea or vomiting  Qty: 20 tablet, Refills: 0    Associated Diagnoses: Lower GI bleed           CONTINUE these medications which have NOT CHANGED    Details   acetaminophen (TYLENOL) 500 MG tablet Take 500-1,000 mg by mouth daily as needed for mild pain      midodrine (PROAMATINE) 5 MG tablet Take 1 tablet (5 mg) by mouth 2 times daily  Qty: 60 tablet, Refills: 4    Associated Diagnoses: Coronary artery disease involving native coronary artery of native heart without angina pectoris      pantoprazole (PROTONIX) 40 MG EC tablet Take 1 tablet (40 mg) by mouth 2 times daily (before meals)  Qty: 60 tablet, Refills: 0    Associated Diagnoses: Melena      pravastatin (PRAVACHOL) 20 MG tablet Take 1 tablet (20 mg) by mouth daily  Qty: 90  tablet, Refills: 3    Comments: For Profile Only - patient will call to fill  Associated Diagnoses: Hyperlipidemia LDL goal <100      sodium bicarbonate 650 MG tablet Take 1 tablet (650 mg) by mouth daily  Qty: 90 tablet, Refills: 3    Associated Diagnoses: Metabolic acidosis      tamsulosin (FLOMAX) 0.4 MG capsule TAKE 1 CAPSULE BY MOUTH EVERY DAY  Qty: 90 capsule, Refills: 1    Associated Diagnoses: Benign prostatic hyperplasia with nocturia      ipilimumab (YERVOY) 200 MG/40ML SOLN Inject 125 mg into the vein      nivolumab (OPDIVO) 100 MG/10ML Inject 240 mg into the vein           STOP taking these medications       apixaban ANTICOAGULANT (ELIQUIS) 5 MG tablet Comments:   Reason for Stopping:             Allergies   Allergies   Allergen Reactions    Fentanyl Confusion       DATA  Most Recent 3 CBC's:  Recent Labs   Lab Test 07/19/24  0948 07/18/24  0949 07/17/24  0903 07/16/24  0805 07/15/24  1439 07/15/24  0607   WBC 5.7  --   --  6.7  --  7.2   HGB 9.6* 9.6* 9.1* 9.5*   < > 9.0*   MCV 89  --   --  88  --  87     --   --  216  --  199    < > = values in this interval not displayed.      Most Recent 3 BMP's:  Recent Labs   Lab Test 07/19/24  0948 07/16/24  0805 07/15/24  0607    140 140   POTASSIUM 3.5 3.4 3.5   CHLORIDE 106 110* 112*   CO2 22 20* 22   BUN 14.4 18.6 23.3*   CR 1.65* 1.63* 1.77*   ANIONGAP 10 10 6*   DOMINICK 8.1* 7.8* 7.6*   * 92 94     Most Recent 2 LFT's:  Recent Labs   Lab Test 07/19/24  0948 07/14/24  0500   AST 24 27  27   ALT 15 14  14   ALKPHOS 90 80  80   BILITOTAL 0.7 0.3  0.3     Most Recent TSH, T4 and A1c Labs:  Recent Labs   Lab Test 05/28/24  1649 05/11/23  0943 07/29/19  0828   TSH 2.90   < > 4.94*   T4  --   --  1.01   A1C 5.2   < >  --     < > = values in this interval not displayed.     Results for orders placed or performed during the hospital encounter of 07/14/24   CTA Abdomen Pelvis with Contrast    Narrative    EXAM: CTA ABDOMEN PELVIS WITH  CONTRAST  LOCATION: [facility]  DATE: 7/14/2024    INDICATION: Pain, lower GI bleed, hematochezia.  History of renal cell carcinoma with metastasis status postresection with recurrence of tumor within the duodenum but prior resection site per CT from 7 8 2024 through [facility].  COMPARISON: 10/31/2023. The recent [facility] CT dated 7/8/2024 is not available.  TECHNIQUE: CT angiogram abdomen pelvis during arterial phase of injection of IV contrast. 2D and 3D MIP reconstructions were performed by the CT technologist. Dose reduction techniques were used.  CONTRAST: 135  ml Isovue 370    FINDINGS:  ANGIOGRAM ABDOMEN/PELVIS: There is atherosclerotic calcification of the aorta and its branches. No aortic aneurysm or dissection. The major aortic branch vessels are widely patent. There are 2 left renal arteries. The iliac arterial system is normal in   caliber.    LOWER CHEST: Bilateral lower lobe bronchiectasis. Mild atelectasis and scarring at the lung bases.    HEPATOBILIARY: There is a 2.7 cm mass at the posterior right lobe of liver, decreased in size from 10/31/2023.    PANCREAS: Normal.    SPLEEN: Normal.    ADRENAL GLANDS: Normal.    KIDNEYS/BLADDER: The right kidney is surgically absent. There is a 6.4 cm cyst at the posterior aspect of the left kidney. No hydronephrosis.    BOWEL: There are colonic diverticula without acute diverticulitis. No bowel obstruction or inflammation. There is a mass in the third segment of duodenum measuring 4.1 x 3.0 cm. Metallic clips in the stomach. There is a midline ventral hernia containing   small bowel loops but not causing obstruction. There is no free intraperitoneal gas or fluid. No active GI bleeding.    LYMPH NODES: Soft tissue mass anterior to the IVC measures 4.0 x 2.3 cm. There is no other lymph node enlargement.    PELVIC ORGANS: The prostate gland is enlarged.    MUSCULOSKELETAL: Degenerative disease in the spine.      Impression     IMPRESSION:  1.  There is no active GI bleed.  2.  4.1 cm mass in the duodenum.  3.  4.0 cm lymph node anterior to the inferior vena cava.  4.  2.7 cm posterior right lobe liver mass.  5.  Midline ventral hernia containing small bowel but not causing obstruction.

## 2024-07-19 NOTE — PROGRESS NOTES
[facility] H[facility]ology Progress Note     Primary Oncologist/Hematologist:  Dr Glover, [facility]          Assessment and Plan:   GI bleeding, likely from upper GI source. Based on his history, local tumor recurrence in the GI tract seems the most likely explanation particularly given CT findings of progression.  He reports no bleeding elsewhere so a generalized bleeding diathesis is unlikely. He appears stable at present. 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 Most recently, he has been receiving dual immunotherapy. Recent imaging performed at [place] reportedly shows significant disease progression.   3.   Chronic kidney disease, stage 3B and with associated ROCKY likely related to hypotension  4.   Atrial fibrillation on long term anticoagulation  5.   Heart failure with preserved EF     Recommendations;  Continue serial monitoring CBC and transfusion support as needed.  Hgb relatively stable 7/18 at 9.6  Continue to hold apixaban  3. He is high risk for further GI bleeding. Has met with Dr. Clay. Was simulated 7/17 with started on RT 7/17/2024.   We spoke to his primary oncologist, Dr. Glover on 7/16/24.  He agrees with the plan outlined above.  Although next line therapies are available to treat Edgar's disease progression, this will depend on his recovery from his current bleeding  and his overall performance status.  Dr. Glover will discuss these options at the time of outpatient follow up.   4. Advance diet per GI team     Okay to discharge home from hematology/oncology stand point.  We have arranged follow up in [facility] clinic on 7/23/2024 at 10:05am.  He voiced understanding and agreement with this plan.     German Escalante MD  [facility]  [phone]          Interval History:     No acute  events overnight.                Review of Systems:     The 5 point Review of Systems is negative other than noted in the HPI                Medications:   Scheduled Medications  Current Facility-Administered Medications   Medication Dose Route Frequency Provider Last Rate Last Admin    midodrine (PROAMATINE) tablet 5 mg  5 mg Oral BID Nanci Horn, DO   5 mg at 07/18/24 2012    pantoprazole (PROTONIX) EC tablet 40 mg  40 mg Oral BID AC Nanci Horn, DO   40 mg at 07/19/24 0652    pravastatin (PRAVACHOL) tablet 20 mg  20 mg Oral QPM Green, Nanci [place], DO   20 mg at 07/18/24 2012    sodium bicarbonate tablet 650 mg  650 mg Oral QPM GreenNanci DO   650 mg at 07/18/24 2012    tamsulosin (FLOMAX) capsule 0.4 mg  0.4 mg Oral QPM Green, Nanci [place], DO   0.4 mg at 07/18/24 2012     PRN Medications  Current Facility-Administered Medications   Medication Dose Route Frequency Provider Last Rate Last Admin    acetaminophen (TYLENOL) tablet 500-1,000 mg  500-1,000 mg Oral Q6H PRN Roque Marrero MD   1,000 mg at 07/18/24 0755    glucose gel 15-30 g  15-30 g Oral Q15 Min PRN Roque Marrero MD        Or    dextrose 50 % injection 25-50 mL  25-50 mL Intravenous Q15 Min PRN Roque Marrero MD        Or    glucagon injection 1 mg  1 mg Subcutaneous Q15 Min PRN Roque Marrero MD        ondansetron (ZOFRAN ODT) ODT tab 4 mg  4 mg Oral Q6H PRN Roque Marrero MD        Or    ondansetron (ZOFRAN) injection 4 mg  4 mg Intravenous Q6H PRN Roque Marrero MD   4 mg at 07/18/24 0755    senna-docusate (SENOKOT-S/PERICOLACE) 8.6-50 MG per tablet 1 tablet  1 tablet Oral BID PRN Roque Marrero MD        Or    senna-docusate (SENOKOT-S/PERICOLACE) 8.6-50 MG per tablet 2 tablet  2 tablet Oral BID PRRoque Sellers MD                      Physical Exam:   Vitals were reviewed  Blood pressure (!) 141/86, pulse 70, temperature 98.1  F (36.7  C), temperature source  "Oral, resp. rate 16, height 1.905 m (6' 3\"), weight 117.6 kg (259 lb 4.8 oz), SpO2 95%.  Wt Readings from Last 4 Encounters:   07/19/24 117.6 kg (259 lb 4.8 oz)   06/19/24 125.2 kg (276 lb)   06/18/24 125.6 kg (276 lb 12.8 oz)   05/28/24 121.5 kg (267 lb 12.8 oz)       I/O last 3 completed shifts:  In: 800 [P.O.:800]  Out: -         Constitutional: Awake, alert, cooperative, no apparent distress   Lungs: Clear to auscultation bilaterally, no crackles or wheezing   Cardiovascular: Regular rate and rhythm, normal S1 and S2, and no murmur noted   Abdomen: Normal bowel sounds, soft, non-distended, non-tender   Skin: No rashes, no cyanosis, no edema   Other:               Data:   All laboratory data and imaging studies reviewed.    CMP  Recent Labs   Lab 07/16/24  0805 07/15/24  0607 07/15/24  0352 07/14/24  2332 07/14/24  0647 07/14/24  0505 07/14/24  0500    140  --   --   --  142 140  140   POTASSIUM 3.4 3.5  --   --   --  3.6 3.7  3.7   CHLORIDE 110* 112*  --   --   --   --  109*  109*   CO2 20* 22  --   --   --   --  19*  19*   ANIONGAP 10 6*  --   --   --   --  12  12   GLC 92 94 100* 93   < >  --  137*  137*   BUN 18.6 23.3*  --   --   --   --  27.1*  27.1*   CR 1.63* 1.77*  --   --   --   --  2.41*  2.41*   GFRESTIMATED 43* 39*  --   --   --   --  27*  27*   DOMINICK 7.8* 7.6*  --   --   --   --  8.4*  8.4*   MAG  --   --   --   --   --   --  2.1   PROTTOTAL  --   --   --   --   --   --  5.3*  5.3*   ALBUMIN  --   --   --   --   --   --  2.9*  2.9*   BILITOTAL  --   --   --   --   --   --  0.3  0.3   ALKPHOS  --   --   --   --   --   --  80  80   AST  --   --   --   --   --   --  27  27   ALT  --   --   --   --   --   --  14  14    < > = values in this interval not displayed.     CBC  Recent Labs   Lab 07/18/24  0949 07/17/24  0903 07/16/24  0805 07/15/24  1439 07/15/24  0607 07/14/24  1419 07/14/24  0739 07/14/24  0505 07/14/24  0500   WBC  --   --  6.7  --  7.2  --  8.4  --  8.8   RBC  --   --  " 3.33*  --  3.18*  --  2.91*  --  3.09*   HGB 9.6* 9.1* 9.5* 9.6* 9.0*   < > 7.9* 8.5* 8.3*   HCT  --   --  29.4*  --  27.6*  --  26.2* 25* 26.9*   MCV  --   --  88  --  87  --  90  --  87   MCH  --   --  28.5  --  28.3  --  27.1  --  26.9   MCHC  --   --  32.3  --  32.6  --  30.2*  --  30.9*   RDW  --   --  19.1*  --  18.1*  --  18.8*  --  19.3*   PLT  --   --  216  --  199  --  210  --  229    < > = values in this interval not displayed.     INR  Recent Labs   Lab 07/14/24  0500   INR 1.57*

## 2024-07-19 NOTE — PLAN OF CARE
Goal Outcome Evaluation:    Discharge Note    Patient discharged to home via private vehicle  accompanied by significant other .  IV: Discontinued  Prescriptions filled and given to patient/family.   Belongings reviewed and sent with patient.   Home medications returned to patient: NA  Equipment sent with: patient, N/A.   patient verbalizes understanding of discharge instructions. AVS given to patient.

## 2024-07-19 NOTE — PROGRESS NOTES
"Visited with pt, wife and dtr this morning  Diet: Mechanical Soft  (translates into \"Easy Chew/Dysphagia Diet 7\" in [employer] program)  Reports tolerating pasta and eggs last night  Provided and reviewed handout on low fiber/soft diet  Discussed eating small meals (5-6 per day), cutting food into small pieces and eating slowly/chewing well  Recommend 6 cups liquid per day  Encouraged pt to utilize nutrition supplements for added cals/pro (pt likes Premier Protein and the Magic Cup)  Reviewed daily calorie and protein goals  Family with many questions - answered to the best of my ability   They were appreciative of visit    Connie Siddiqui RD, LD  Clinical Dietitian - [facility]   Pager - [phone]      "

## 2024-07-19 NOTE — PROGRESS NOTES
Radiation therapy treatment 3 of 15. Pt received a daily dose of 300 cGy with 10 MV photon radiation to the abdomen for a total dose of 900 cGy given to date. Plan for 12 more treatments.

## 2024-07-19 NOTE — CONSULTS
Care Management Discharge Note    Discharge Date: 07/19/2024       Discharge Disposition:  home with continued outpatient radiation therapy, and recommended follow up's of rounding providers.    Discharge Services:  n/a    Discharge DME:  n/a    Discharge Transportation: family or friend will provide    Private pay costs discussed: Not applicable    Does the patient's insurance plan have a 3 day qualifying hospital stay waiver?  Yes     Which insurance plan 3 day waiver is available? [employer]    Will the waiver be used for post-acute placement? No    PAS Confirmation Code:  n/a  Patient/family educated on Medicare website which has current facility and service quality ratings:  no    Education Provided on the Discharge Plan:  yes  Persons Notified of Discharge Plans: Patient and Spouse at bedside, [facility] RN, bedside RN and rounding provider  Patient/Family in Agreement with the Plan: yes    Handoff Referral Completed: Yes to [facility]    Additional Information:  Writer met with patient and spouse at the bedside. Patient most likely cleared for discharge to home with recommended follow up(s) including continued outpatient Radiation therapy, labs and outpatient therapy.  Patient and Spouse are aware that we have coordinated labs (BMP/CBC) for 7/23 at the [facility] [place] location appointment is at 10:00 a.m. information in discharge instructions.    [facility] will reach out to the patient for a follow up appointment in August after completion of Radiation therapy per [facility] RN.  Patient does not want homecare but is asking for outpatient Physical therapy will ask rounding provider for a referral.  Writer paged Nutrition to see the patient for new reccs at discharge.  No further needs identified.  Updated bedside RN.        Vida Iyer RN, BSN, ACM   Care Transitions Specialist  [facility]  Care Transitions Specialist  [phone]  [id number]  [email or web address]  Office: [phone] Fax: [phone]  [facility]

## 2024-07-20 ENCOUNTER — HOSPITAL ENCOUNTER (EMERGENCY)
Facility: CLINIC | Age: 79
Discharge: HOME | End: 2024-07-20
Attending: EMERGENCY MEDICINE | Admitting: EMERGENCY MEDICINE
Payer: MEDICARE

## 2024-07-20 ENCOUNTER — APPOINTMENT (OUTPATIENT)
Dept: CT IMAGING | Facility: CLINIC | Age: 79
End: 2024-07-20
Attending: EMERGENCY MEDICINE
Payer: MEDICARE

## 2024-07-20 VITALS
TEMPERATURE: 98.1 F | RESPIRATION RATE: 20 BRPM | DIASTOLIC BLOOD PRESSURE: 83 MMHG | SYSTOLIC BLOOD PRESSURE: 132 MMHG | HEART RATE: 69 BPM | WEIGHT: 259 LBS | BODY MASS INDEX: 32.37 KG/M2 | OXYGEN SATURATION: 98 %

## 2024-07-20 DIAGNOSIS — K31.89 DUODENAL MASS: ICD-10-CM

## 2024-07-20 DIAGNOSIS — K62.5 BRIGHT RED BLOOD PER RECTUM: ICD-10-CM

## 2024-07-20 LAB
ANION GAP SERPL CALCULATED.3IONS-SCNC: 9 MMOL/L (ref 7–15)
BASOPHILS # BLD AUTO: 0 10E3/UL (ref 0–0.2)
BASOPHILS NFR BLD AUTO: 1 %
BUN SERPL-MCNC: 15.7 MG/DL (ref 8–23)
CALCIUM SERPL-MCNC: 8.1 MG/DL (ref 8.8–10.4)
CHLORIDE SERPL-SCNC: 107 MMOL/L (ref 98–107)
CREAT SERPL-MCNC: 1.64 MG/DL (ref 0.67–1.17)
EGFRCR SERPLBLD CKD-EPI 2021: 43 ML/MIN/1.73M2
EOSINOPHIL # BLD AUTO: 0.3 10E3/UL (ref 0–0.7)
EOSINOPHIL NFR BLD AUTO: 5 %
ERYTHROCYTE [DISTWIDTH] IN BLOOD BY AUTOMATED COUNT: 20.4 % (ref 10–15)
GLUCOSE SERPL-MCNC: 103 MG/DL (ref 70–99)
HCO3 SERPL-SCNC: 22 MMOL/L (ref 22–29)
HCT VFR BLD AUTO: 30.9 % (ref 40–53)
HGB BLD-MCNC: 9.5 G/DL (ref 13.3–17.7)
HGB BLD-MCNC: 9.5 G/DL (ref 13.3–17.7)
HOLD SPECIMEN: NORMAL
IMM GRANULOCYTES # BLD: 0 10E3/UL
IMM GRANULOCYTES NFR BLD: 1 %
LYMPHOCYTES # BLD AUTO: 1.1 10E3/UL (ref 0.8–5.3)
LYMPHOCYTES NFR BLD AUTO: 19 %
MCH RBC QN AUTO: 27.7 PG (ref 26.5–33)
MCHC RBC AUTO-ENTMCNC: 30.7 G/DL (ref 31.5–36.5)
MCV RBC AUTO: 90 FL (ref 78–100)
MONOCYTES # BLD AUTO: 0.5 10E3/UL (ref 0–1.3)
MONOCYTES NFR BLD AUTO: 9 %
NEUTROPHILS # BLD AUTO: 4 10E3/UL (ref 1.6–8.3)
NEUTROPHILS NFR BLD AUTO: 66 %
NRBC # BLD AUTO: 0 10E3/UL
NRBC BLD AUTO-RTO: 0 /100
PLATELET # BLD AUTO: 198 10E3/UL (ref 150–450)
POTASSIUM SERPL-SCNC: 3.3 MMOL/L (ref 3.4–5.3)
RBC # BLD AUTO: 3.43 10E6/UL (ref 4.4–5.9)
SODIUM SERPL-SCNC: 138 MMOL/L (ref 135–145)
WBC # BLD AUTO: 6 10E3/UL (ref 4–11)

## 2024-07-20 PROCEDURE — 99285 EMERGENCY DEPT VISIT HI MDM: CPT | Mod: 25

## 2024-07-20 PROCEDURE — 85018 HEMOGLOBIN: CPT | Mod: 59 | Performed by: EMERGENCY MEDICINE

## 2024-07-20 PROCEDURE — 82374 ASSAY BLOOD CARBON DIOXIDE: CPT | Performed by: EMERGENCY MEDICINE

## 2024-07-20 PROCEDURE — 36415 COLL VENOUS BLD VENIPUNCTURE: CPT | Performed by: EMERGENCY MEDICINE

## 2024-07-20 PROCEDURE — 85004 AUTOMATED DIFF WBC COUNT: CPT | Performed by: EMERGENCY MEDICINE

## 2024-07-20 PROCEDURE — 74174 CTA ABD&PLVS W/CONTRAST: CPT | Mod: MG

## 2024-07-20 PROCEDURE — 250N000011 HC RX IP 250 OP 636: Performed by: EMERGENCY MEDICINE

## 2024-07-20 PROCEDURE — 85025 COMPLETE CBC W/AUTO DIFF WBC: CPT | Performed by: EMERGENCY MEDICINE

## 2024-07-20 PROCEDURE — 80048 BASIC METABOLIC PNL TOTAL CA: CPT | Performed by: EMERGENCY MEDICINE

## 2024-07-20 PROCEDURE — 250N000009 HC RX 250: Performed by: EMERGENCY MEDICINE

## 2024-07-20 RX ORDER — IOPAMIDOL 755 MG/ML
135 INJECTION, SOLUTION INTRAVASCULAR ONCE
Status: COMPLETED | OUTPATIENT
Start: 2024-07-20 | End: 2024-07-20

## 2024-07-20 RX ADMIN — SODIUM CHLORIDE 80 ML: 9 INJECTION, SOLUTION INTRAVENOUS at 16:31

## 2024-07-20 RX ADMIN — IOPAMIDOL 135 ML: 755 INJECTION, SOLUTION INTRAVENOUS at 16:31

## 2024-07-20 ASSESSMENT — ACTIVITIES OF DAILY LIVING (ADL)
ADLS_ACUITY_SCORE: 37
ADLS_ACUITY_SCORE: 37

## 2024-07-20 ASSESSMENT — COLUMBIA-SUICIDE SEVERITY RATING SCALE - C-SSRS
2. HAVE YOU ACTUALLY HAD ANY THOUGHTS OF KILLING YOURSELF IN THE PAST MONTH?: NO
6. HAVE YOU EVER DONE ANYTHING, STARTED TO DO ANYTHING, OR PREPARED TO DO ANYTHING TO END YOUR LIFE?: NO
1. IN THE PAST MONTH, HAVE YOU WISHED YOU WERE DEAD OR WISHED YOU COULD GO TO SLEEP AND NOT WAKE UP?: NO

## 2024-07-20 NOTE — DISCHARGE INSTRUCTIONS
As discussed, no obvious cause of your bleeding was found here today, although we are not sure exactly been coming from the mass in your small intestine.  You may have a separate issue with hemorrhoids, and if you have any additional bleeding, you need to come back to the ER immediately.  Otherwise I think that you are safe to get in touch with your GI doctor and see if they would offer you a colonoscopy, and also to have your hemoglobin rechecked by your regular doctor within the next 1 week.  Come back with any other concerns you have.

## 2024-07-20 NOTE — ED PROVIDER NOTES
Emergency Department Note      History of Present Illness     Chief Complaint  Rectal Bleeding    HPI  Ti Nickerson is a 78 year old male who presents to the emergency room with another episode earlier today of significant amount of bright red blood per rectum.  He states that this is entirely painless, and he was recently admitted to this hospital for a GI bleed that was thought to be due to a duodenal mass.  He was on a blood thinner at the time, and this was stopped.  The mass was treated with radiation and has had 3 or 4 of the radiation therapy sessions so far, and states that he was feeling fine when he went home yesterday.  Has no pain today, had 1 single episode of bright red blood per rectum, but no clots.      Independent Historian  Yes patient's wife is at bedside and confirms the above history.    Review of External Notes  Yes I have reviewed the patient's last oncology visit note from 8 July of this year the patient was seen at [place] [facility] for renal cell carcinoma, was on a blood thinner at that time and recently admitted to this hospital for GI bleed.      Past Medical History   Medical History and Problem List  Past Medical History:   Diagnosis Date     Atrial fibrillation, unspecified 9/18/2015     CAD (coronary artery disease) 09/18/2015     Elevated TSH 2018     Esophageal reflux 9/18/2015     Essential hypertension      History of cardioversion      Idiopathic cardiomyopathy (H) 09/18/2015     Mixed hyperlipidemia 9/18/2015     Mumps      Sleep apnea 09/18/2015     Sleep apnea      Thoracic aortic aneurysm (H24)      Tubular adenoma of colon 01/2017       Medications  acetaminophen (TYLENOL) 500 MG tablet  ipilimumab (YERVOY) 200 MG/40ML SOLN  midodrine (PROAMATINE) 5 MG tablet  nivolumab (OPDIVO) 100 MG/10ML  ondansetron (ZOFRAN ODT) 4 MG ODT tab  pantoprazole (PROTONIX) 40 MG EC tablet  pravastatin (PRAVACHOL) 20 MG tablet  sodium bicarbonate 650 MG tablet  tamsulosin (FLOMAX) 0.4  MG capsule        Surgical History   Past Surgical History:   Procedure Laterality Date     APPENDECTOMY  1964     ESOPHAGOSCOPY, GASTROSCOPY, DUODENOSCOPY (EGD), COMBINED N/A 9/14/2023    Procedure: Esophagoscopy, gastroscopy, duodenoscopy (EGD), combined;  Surgeon: Chele Ash MD;  Location:  GI         Physical Exam   Patient Vitals for the past 24 hrs:   BP Temp Pulse Resp SpO2 Weight   07/20/24 1357 100/62 -- -- -- -- --   07/20/24 1354 -- 98.1  F (36.7  C) 96 20 97 % 117.5 kg (259 lb)       Physical Exam  Vitals: reviewed by me  General: Pt seen on hospital [facility], cooperative, and alert to conversation  Eyes: Tracking well, clear conjunctiva BL  ENT: MMM, midline trachea.   Lungs: No tachypnea, no accessory muscle use. No respiratory distress.   CV: Rate as above  Abd: Soft, non tender, no guarding, no rebound. Non distended   MSK: no joint effusion.  No evidence of trauma  Skin: No rash  Neuro: Clear speech and no facial droop.  Psych: Not RIS, no e/o AH/      Diagnostics   Lab Results   Labs Ordered and Resulted from Time of ED Arrival to Time of ED Departure   BASIC METABOLIC PANEL - Abnormal       Result Value    Sodium 138      Potassium 3.3 (*)     Chloride 107      Carbon Dioxide (CO2) 22      Anion Gap 9      Urea Nitrogen 15.7      Creatinine 1.64 (*)     GFR Estimate 43 (*)     Calcium 8.1 (*)     Glucose 103 (*)    CBC WITH PLATELETS AND DIFFERENTIAL - Abnormal    WBC Count 6.0      RBC Count 3.43 (*)     Hemoglobin 9.5 (*)     Hematocrit 30.9 (*)     MCV 90      MCH 27.7      MCHC 30.7 (*)     RDW 20.4 (*)     Platelet Count 198      % Neutrophils 66      % Lymphocytes 19      % Monocytes 9      % Eosinophils 5      % Basophils 1      % Immature Granulocytes 1      NRBCs per 100 WBC 0      Absolute Neutrophils 4.0      Absolute Lymphocytes 1.1      Absolute Monocytes 0.5      Absolute Eosinophils 0.3      Absolute Basophils 0.0      Absolute Immature Granulocytes 0.0       Absolute NRBCs 0.0         Imaging  CTA Abdomen Pelvis with Contrast    (Results Pending)             ED Course    Medications Administered  Medications   sodium chloride 0.9% BOLUS 1,000 mL (has no administration in time range)   iopamidol (ISOVUE-370) solution 135 mL (has no administration in time range)   Saline (has no administration in time range)       Procedures  Procedures     Discussion of Management  Discussed with Dr. Ash of the GI team who is familiar with the patient and states that there is no obvious benefit to doing another endoscopy today hemoglobin is stable.  I also spoke briefly with Dr. Groves, and we agreed to do a CTA to evaluate any presence of active bleeding currently since he is having new bleeding after his radiation.  Dr. Groves would like to be made aware if the CTA does show significant bleeding or abnormality, as the patient may need to be considered for embolization    Social Determinants of Health adding to complexity of care  None      Disposition  Care of the patient was transferred to my colleague pending CT read.  If there is active bleeding, the patient should be admitted for IR consult, if the CT scan is normal, the family and patient know that they will be discharged home..       Medical Decision Making / Diagnosis             MDM  This is a very pleasant 78-year-old presents to the emergency room with what appears to be bright red blood per rectum.  He had 1 episode of today, and thankfully his hemoglobin does not drop.  We did do a second hemoglobin 2 hours after the first and also it does not show any decline.  His blood pressures at baseline for him, I did also speak with his GI doctor, Dr. Ash, who stated that there would be really no benefit to doing another endoscopy or having the admitted for that it is hemoglobin is stable.  It was recommended to speak with IR, and I have done that as documented above, and just now discussed my plan with the patient.  If the  CTA shows active extravasation or is abnormal, IR should be consulted and the patient should be admitted for hospitalization.  However if the CT scan is unremarkable, the patient notes that he will be discharged home and he is okay with that.    4:54 PM  Just spoke to Dr. Groves, who read the patient's CTA.  No active bleeding noted, no intervention to be offered.      ICD-10 Codes:    ICD-10-CM    1. Bright red blood per rectum  K62.5       2. Duodenal mass  K31.89                   Burt Mcdonough MD  07/20/24 1629       Burt Mcdonough MD  07/20/24 1658

## 2024-07-20 NOTE — ED TRIAGE NOTES
Rectal bleeding started today, was discharged from the hospital yesterday after being in for a GI bleed,not on blood thinners currently, being treated for kidney CA     Triage Assessment (Adult)       Row Name 07/20/24 1610          Triage Assessment    Airway WDL WDL        Respiratory WDL    Respiratory WDL WDL        Cardiac WDL    Cardiac WDL WDL        Cognitive/Neuro/Behavioral WDL    Cognitive/Neuro/Behavioral WDL WDL

## 2024-07-22 ENCOUNTER — PATIENT OUTREACH (OUTPATIENT)
Dept: CARE COORDINATION | Facility: CLINIC | Age: 79
End: 2024-07-22
Payer: MEDICARE

## 2024-07-22 NOTE — PROGRESS NOTES
Connected Care Resource Center  Community Health Worker Initial Outreach    CHW Initial Information Gathering:  Referral Source: ED Follow-Up  Preferred Hospital: [facility] [place] [facility]  [phone]  Current living arrangement:: I live in a private home  Type of residence:: Independent Senior Living  Community Resources: None  Supplies Currently Used at Home: None  Equipment Currently Used at Home: walker, standard, wheelchair, manual (BP monitor)  Informal Support system:: Children, Friends  No PCP office visit in Past Year: No  Transportation means:: Regular car, Family (Spouse usually calls 911 for emergencies because she is unable to transport pt herself)  CHW Additional Questions  If ED/Hospital discharge, follow-up appointment scheduled as recommended?: Other  Medication changes made following ED/Hospital discharge?: No  MyChart active?: Yes  Patient sent Social Determinants of Health questionnaire?: Yes    Patient accepts CC: Yes. Patient scheduled for assessment with CC RN on 07/23/2024 at 1:00 PM. Patient noted desire to discuss care planning, options for in home support such as home care RN or PCA versus [facility] a [facility] or care ([place]), and caregiver support for pt's spouse Valery. Please call Valery for assessment at [phone] - consent to communicate on file.       Patsy Pope  Community Health Worker  Connected Care Resource Center, [facility]    *Connected Care Resource Team does NOT follow patient ongoing. Referrals are identified based on internal discharge reports and the outreach is to ensure patient has an understanding of their discharge instructions.

## 2024-07-23 ENCOUNTER — PATIENT OUTREACH (OUTPATIENT)
Dept: NURSING | Facility: CLINIC | Age: 79
End: 2024-07-23
Payer: MEDICARE

## 2024-07-23 ASSESSMENT — ACTIVITIES OF DAILY LIVING (ADL)
DEPENDENT_IADLS:: INCONTINENCE;CLEANING;COOKING;LAUNDRY;SHOPPING;MEAL PREPARATION;MEDICATION MANAGEMENT;MONEY MANAGEMENT;TRANSPORTATION

## 2024-07-23 NOTE — LETTER
[facility]  Patient Centered Plan of Care  About Me:        Patient Name:  Ti Nickerson    YOB: 1945  Age:         78 year old   Andre MRN:    [id number] Telephone Information:  Home Phone [phone]   Mobile [phone]       Address:  [address] [address] Email address:  [email or web address]      Emergency Contact(s)    Name Relationship Lgl Grd Work Phone Home Phone Mobile Phone   Devin. TIFFANY, CAR* Spouse    [phone]           Primary language:  English     needed? No   [place] [employer]:  [phone] op. 1  Other communication barriers:Glasses    Preferred Method of Communication:  Mail  Current living arrangement: I live in a private home    Mobility Status/ Medical Equipment: Independent w/Device        Health Maintenance  Health Maintenance Reviewed: Due/Overdue   Health Maintenance Due   Topic Date Due    DEPRESSION ACTION PLAN  Never done    ZOSTER IMMUNIZATION (1 of 2) Never done    COVID-19 Vaccine (7 - 2023-24 season) 01/12/2024           My Access Plan  Medical Emergency 911   Primary Clinic Line [facility] [phone]   24 Hour Appointment Line [phone] or  [phone]) (toll-free)   24 Hour Nurse Line [phone] (toll-free)   Preferred Urgent Care [facility], [phone]     Preferred Hospital [facility]  [phone]     Preferred Pharmacy [id number] - [place], [place] - [address] AT [facility]     Behavioral Health Crisis Line The National Suicide Prevention Lifeline at 1-454.318.1423 or Text/Call 258           My Care Team Members  Patient Care Team         Relationship Specialty Notifications Start End    Nico Retana MD PCP - General Internal Medicine  7/31/20     Referred Pt to Nephrology for Stage 3 chronic kidney disease, unspecified whether stage 3a or 3b  CKD, referring to Neurology    Phone: [phone] Pager: [phone] Fax: [phone] [address] [place] [address]    Nico Retana MD Assigned PCP   10/20/19     Phone: [phone] Pager: [phone] Fax: [phone] [address] [place] [address]    Kayla Quintero MD MD Nephrology  6/16/21     Phone: [phone] Fax: [phone]         [address] [facility] [address] [place] [address]    Nico Hernández  Medical Oncology  5/25/22     Phone: [phone] Fax: [phone]         [address] [facility] [address]    Fermin Milner MD MD Neurology  11/30/22     Phone: [phone] Fax: [phone] [address] [place] [address]    Abraham Olivera MD Assigned Surgical Provider   4/29/23     Phone: [phone] Fax: [phone] [address] [place] [address]    Fermin Milner MD Assigned Neuroscience Provider   5/6/23     Phone: [phone] Fax: [phone] [address] [place] [address]    Jamal Venegas, PhD Assigned Behavioral Health Provider   9/16/23     Phone: [phone] Fax: [phone] [address] [facility] [place] [address]    Mamie Gaston APRN CNP Assigned Heart and Vascular Provider   1/25/24     Phone: [phone] Fax: [phone] [address] [place] [address]    Devin Quarles MD MD Cardiovascular Disease  2/22/24     Phone: [phone] Fax: [phone] [address] [facility] [address]    Klaudia Lazo PA-C Assigned Nephrology Provider   6/23/24     Phone: [phone] Fax: [phone]         [address] [facility] [address]    Emory Glover MD MD Hematology & Oncology  7/14/24     Oncologist    Phone: [phone] Fax: [phone]         [place] [facility] [address] [address]    Klaudia Brewer, RN Lead Care Coordinator  Admissions 7/22/24                  My Care Plans  Self Management and Treatment Plan    Care Plan  Care Plan: Advance Care Plan       Problem: Patient does not have a valid Health Care Directive       Goal: Complete Health Care Directive       Start Date: 7/23/2024 Expected End Date: 1/23/2025    This Visit's Progress: 10%    Note:     Barriers: Diagnosis of multiple, chronic, complex medical conditions, provider availability-wait time to complete appointments.   Strengths: Motivated, engaged in Care Coordination.  Patient expressed understanding of goal: Yes  Action steps to achieve this goal:  1. I will review the health care directive documents that are mailed out.   2. I will complete the health care directive. I can check with my bank about notary services, locate a nearby notary [email or web address]. I understand to make this document legal I will need to sign this in the presence of two witnesses who are not my listed health care agents, or having this notarized. (Dates must match)    3. I will provide a copy of my health care directive to my care team when completed. I can email a copy to [email or web address].   4. I can visit [email or web address] website or call [employer] at #[phone] for additional information or questions.   5. I will contact my care team with any barriers, questions or assistance needed with this goal. Care coordinator will remain available as needed.                               Care Plan: Help At Home       Problem: Insufficient In-home support       Goal: Establish adequate home support       Start Date: 7/23/2024 Expected End Date: 10/23/2024    This Visit's Progress: 10%    Note:     Barriers: Diagnosis of multiple, chronic, complex medical conditions.  Strengths: motivated, engaged in care coordination  Patient expressed understanding of goal: Yes  Action steps to achieve this goal:  1. I will discuss in home support options that are available  to me or assisted living options at [facility].   2. I will review PCA, home care and caregiver support information.   3. I will contact my care team with questions, concerns, support needs. I will use the clinic as a resource and I understand I can contact my clinic with 24/7 after hours services available. Care Coordinator will remain available as needed.                                 Action Plans on File:                       Advance Care Plans/Directives:   Advanced Care Plan/Directives on file:   No    Discussed with patient/caregiver(s):   Advanced Healthcare Directive planning document and instructions sent             My Medical and Care Information  Problem List   Patient Active Problem List   Diagnosis    Chronic atrial fibrillation (H)    Idiopathic cardiomyopathy (H)    Coronary artery disease involving native coronary artery of native heart without angina pectoris    Esophageal reflux    Mixed hyperlipidemia    Sleep apnea    Essential hypertension    Obesity (BMI 35.0-39.9) with comorbidity (H)    Thoracic aortic aneurysm without rupture (H24)    Tubular adenoma of colon    Elevated TSH    Renal cell carcinoma, right (H)    CKD (chronic kidney disease) stage 3, GFR 30-59 ml/min (H)    Ventral hernia without obstruction or gangrene    Long term (current) use of anticoagulants    Melena    Cellulitis of abdominal wall    Anemia of chronic disease    Lower GI bleed    Syncope, unspecified syncope type      Current Medications and Allergies:    Allergies   Allergen Reactions    Fentanyl Confusion     Current Outpatient Medications   Medication Sig Dispense Refill    acetaminophen (TYLENOL) 500 MG tablet Take 500-1,000 mg by mouth daily as needed for mild pain      midodrine (PROAMATINE) 5 MG tablet Take 1 tablet (5 mg) by mouth 2 times daily 60 tablet 4    ondansetron (ZOFRAN ODT) 4 MG ODT tab Take 1 tablet (4 mg) by mouth every 6 hours as needed for nausea or vomiting 20 tablet 0    pantoprazole  (PROTONIX) 40 MG EC tablet Take 1 tablet (40 mg) by mouth 2 times daily (before meals) 60 tablet 0    pravastatin (PRAVACHOL) 20 MG tablet Take 1 tablet (20 mg) by mouth daily 90 tablet 3    sodium bicarbonate 650 MG tablet Take 1 tablet (650 mg) by mouth daily 90 tablet 3    tamsulosin (FLOMAX) 0.4 MG capsule TAKE 1 CAPSULE BY MOUTH EVERY DAY 90 capsule 1    ipilimumab (YERVOY) 200 MG/40ML SOLN Inject 125 mg into the vein      nivolumab (OPDIVO) 100 MG/10ML Inject 240 mg into the vein       No current facility-administered medications for this visit.         Care Coordination Start Date: 7/22/2024   Frequency of Care Coordination: monthly, more frequently as needed     Form Last Updated: 07/23/2024

## 2024-07-23 NOTE — LETTER
[facility] CARE COORDINATION  [address]  [place] [address]    July 23, 2024    Ti Nickerson  [address]  [place] [address]      Dear Edgar & Valery,    I am a clinic care coordinator who works with Nico Retana MD with the [facility]. I wanted to thank you for spending the time to talk with me.  Below is a description of clinic care coordination and how I can further assist you.       The clinic care coordination team is made up of a registered nurse, , financial resource worker and community health worker who understand the health care system. The goal of clinic care coordination is to help you manage your health and improve access to the health care system. Our team works alongside your provider to assist you in determining your health and social needs. We can help you obtain health care and community resources, providing you with necessary information and education. We can work with you through any barriers and develop a care plan that helps coordinate and strengthen the communication between you and your care team.  Our services are voluntary and are offered without charge to you personally.    Please feel free to contact me with any questions or concerns regarding care coordination and what we can offer.      We are focused on providing you with the highest-quality healthcare experience possible.    Sincerely,     Klaudia Brewer, RN Clinic Care Coordinator  [facility] Clinics: [place], [place] (on-site Wednesdays), [facility] (on-site Thursdays) & [facility].  [email or web address]  Phone: [phone]       Enclosed: PCA, Home Care and caregiver support information. The healthcare directive forms will be mailed out to your home address.             Community Caregivers: Caregiver Assurance    Six-month membership to provide unlimited telephonic or virtual sessions with a dedicated Caregiver  Advisor.    [phone]    [email or web address]   $50 a month, for six months       Caregiving Link   http://caregivinglink.org/ a great website with information to help support the caregiver with links to resources.  Multilingual options as well       Echo [place]   www.tpt.org/echo-[place] caregiver tip sheets and videos for caregivers.  Multilingual.  Includes Health topics, safety topics, emergency topics, civil topics, and public service announcements/short videos.       REACH   [phone]   www.Infinitten.org/caregivers   Caregiver coaching, reducing stress, managing behaviors, improve coping. Generally 2-3 months on a sliding scale fee.     tps://www.[employer]/        Private Pay in home help:    -Home Instead: Ph. ([phone] 721-4692    https://www.The Hunt/location/505?redirFrom=/505        -[place]: Ph. [phone]   https://www.[employer]/locations/home-health/cmtus-ew-5078     Resources that help review community care providers/agencies:   -Care Options Network: Care Options Network, now in a first-time offering, provides the strength of our professional association to you in this fully searchable website. Use this website to search for many senior care professionals ready to serve you.   https://www.careoptionsnetwork.org/       -Senior LinkAge Line: The Senior LinkAge Line is a free statewide service of the [place] Board on Aging in partnership with [place]'s area agencies on aging. The Senior LinkAge Line assists older [place] and caregivers, by connecting them to local services, finding answers and getting the help they need.    P. 1-395.240.3769     Followap.EastMeetEast is a searchable website where you can find all kinds of services, including PCA company's.   Here are a few that I found in your area:     [facility]  (809) 770-9099  https://Dine perfect.org/  https://[place].gov/senior-linkage-line/     [facility] Chunnel[employer] [facility]  ([phone] [phone]    [facility]  [employer] [facility] [place]  ([phone] [phone]  https://Ongage.Tellme/    [facility]  ([phone] [phone]  https://www.ABA EnglishTransylvania Regional HospitalBlue Flame DataMercy Health St. [facility].Tellme/

## 2024-07-23 NOTE — PROGRESS NOTES
Clinic Care Coordination Contact  Clinic Care Coordination Contact  OUTREACH    Referral Information:  Referral Source: ED Follow-Up    Primary Diagnosis: Oncology    Chief Complaint   Patient presents with    Clinic Care Coordination - Initial        Universal Utilization: 88.4%  Clinic Utilization  Difficulty keeping appointments:: No  Compliance Concerns: No  No-Show Concerns: No  No PCP office visit in Past Year: No  Utilization      No Show Count (past year)  0             ED Visits  6             Hospital Admissions  4                    Current as of: 7/23/2024  1:11 PM                Clinical Concerns:  Current Medical Concerns: Rectal bleeding history due to radiation treatments.   Current Behavioral Concerns: None    Education Provided to patient: Home care services and coverage, caregiver support, PCA description.    Pain  Pain (GOAL):: No  Health Maintenance Reviewed: Due/Overdue   Health Maintenance Due   Topic Date Due    DEPRESSION ACTION PLAN  Never done    ZOSTER IMMUNIZATION (1 of 2) Never done    COVID-19 Vaccine (7 - 2023-24 season) 01/12/2024       Clinical Pathway: None    Medication Management:  Medication review status: Medications reviewed.  Changes noted per patient report.  Done with immunotherapy infusions.      Functional Status:  Dependent ADLs:: Ambulation-walker, Incontinence (sometimes wheelchair)  Dependent IADLs:: Incontinence, Cleaning, Cooking, Laundry, Shopping, Meal Preparation, Medication Management, Money Management, Transportation  Bed or wheelchair confined:: No  Mobility Status: Independent w/Device  Fallen 2 or more times in the past year?: (!) Yes  Any fall with injury in the past year?: No    Living Situation:  Current living arrangement:: I live in a private home  Type of residence:: Independent Senior Living    Lifestyle & Psychosocial Needs:    Social Determinants of Health     Food Insecurity: Low Risk  (5/28/2024)    Food Insecurity     Within the past 12 months,  did you worry that your food would run out before you got money to buy more?: No     Within the past 12 months, did the food you bought just not last and you didn t have money to get more?: No   Depression: Not at risk (5/28/2024)    PHQ-2     PHQ-2 Score: 0   Housing Stability: Low Risk  (5/28/2024)    Housing Stability     Do you have housing? : Yes     Are you worried about losing your housing?: No   Tobacco Use: Low Risk  (6/19/2024)    Patient History     Smoking Tobacco Use: Never     Smokeless Tobacco Use: Never     Passive Exposure: Not on file   Financial Resource Strain: Low Risk  (5/28/2024)    Financial Resource Strain     Within the past 12 months, have you or your family members you live with been unable to get utilities (heat, electricity) when it was really needed?: No   Alcohol Use: Not At Risk (7/11/2020)    [facility]    AUDIT-C     Frequency of Alcohol Consumption: Not on file     Average Number of Drinks: 1 or 2     Frequency of Binge Drinking: Never     : Not on file     : Not on file     : Not on file   Transportation Needs: Low Risk  (5/28/2024)    Transportation Needs     Within the past 12 months, has lack of transportation kept you from medical appointments, getting your medicines, non-medical meetings or appointments, work, or from getting things that you need?: No   Physical Activity: Insufficiently Active (5/28/2024)    Exercise Vital Sign     Days of Exercise per Week: 2 days     Minutes of Exercise per Session: 30 min   Interpersonal Safety: Not At Risk (10/1/2019)    [facility]    Humiliation, Afraid, Rape, and Kick questionnaire     Fear of Current or Ex-Partner: No     Emotionally Abused: No     Physically Abused: No     Sexually Abused: No   Stress: No Stress Concern Present (5/28/2024)    [employer] - Occupational Stress Questionnaire     Feeling of Stress : Not at all   Social Connections:  Unknown (5/28/2024)    Social Connection and Isolation Panel [NHANES]     Frequency of Communication with Friends and Family: Not on file     Frequency of Social Gatherings with Friends and Family: Twice a week     Attends Christian Services: Not on file     Active Member of Clubs or Organizations: Not on file     Attends Club or Organization Meetings: Not on file     Marital Status: Not on file   Health Literacy: Not on file     Diet:: Other (soft diet with premier protein drinks)  Inadequate nutrition (GOAL):: No  Tube Feeding: No  Inadequate activity/exercise (GOAL):: No  Significant changes in sleep pattern (GOAL): No  Transportation means:: Regular car, Family (Spouse usually calls 911 for emergencies because she is unable to transport pt herself)     Christian or spiritual beliefs that impact treatment:: No  Mental health DX:: No  Mental health management concern (GOAL):: No  Chemical Dependency Status: No Current Concerns  Chemical Dependency Management:  (N/A)  Informal Support system:: Children, Friends, Spouse      Resources and Interventions:  Current Resources:      Community Resources: Housekeeping/Chore Agency, DME  Supplies Currently Used at Home: Nutritional Supplements, Incontinence Supplies, Compression Stockings  Equipment Currently Used at Home: wheelchair, manual, shower chair, grab bar, tub/shower, grab bar, toilet, walker, rolling (BP monitor)  Employment Status: retired         Advance Care Plan/Directive  Advanced Care Plans/Directives on file:: No  Discussed with patient/caregiver:: Advanced Healthcare Directive planning document and instructions sent       Care Plan:  Care Plan: Advance Care Plan       Problem: Patient does not have a valid Health Care Directive       Goal: Complete Health Care Directive       Start Date: 7/23/2024 Expected End Date: 1/23/2025    This Visit's Progress: 10%    Note:     Barriers: Diagnosis of multiple, chronic, complex medical conditions, provider  availability-wait time to complete appointments.   Strengths: Motivated, engaged in Care Coordination.  Patient expressed understanding of goal: Yes  Action steps to achieve this goal:  1. I will review the health care directive documents that are mailed out.   2. I will complete the health care directive. I can check with my bank about notary services, locate a nearby notary [email or web address]. I understand to make this document legal I will need to sign this in the presence of two witnesses who are not my listed health care agents, or having this notarized. (Dates must match)    3. I will provide a copy of my health care directive to my care team when completed. I can email a copy to [email or web address].   4. I can visit [email or web address] website or call [employer] at #[phone] for additional information or questions.   5. I will contact my care team with any barriers, questions or assistance needed with this goal. Care coordinator will remain available as needed.                               Care Plan: Help At Home       Problem: Insufficient In-home support       Goal: Establish adequate home support       Start Date: 7/23/2024 Expected End Date: 10/23/2024    This Visit's Progress: 10%    Note:     Barriers: Diagnosis of multiple, chronic, complex medical conditions.  Strengths: motivated, engaged in care coordination  Patient expressed understanding of goal: Yes  Action steps to achieve this goal:  1. I will discuss in home support options that are available to me or assisted living options at [facility].   2. I will review PCA, home care and caregiver support information.   3. I will contact my care team with questions, concerns, support needs. I will use the clinic as a resource and I understand I can contact my clinic with 24/7 after hours services available. Care Coordinator will remain available as needed.                                  Patient/Caregiver understanding: As above. CC role, goal(s), clinic after hours, appointments, discussed/reviewed. Support provided.      Outreach Frequency: monthly, more frequently as needed  Future Appointments                In 2 weeks Nico Retana MD [facility] PIPPA Cam    In 1 month CS LAB [facility] PIPPA Elizabeth    In 1 month Klaudia Lazo PA-C [facility]            Plan: Care Coordination will mail out healthcare directive forms to patient and wife.   Care Coordination will send PCA, home care and caregiver resources along with Care Plan.     Care Coordination will follow up with Wife in 1 month.     Klaudia Brewer RN Clinic Care Coordinator  [facility]: [place], [place] (on-site Wednesdays), [place] (on-site Thursdays) & [facility].  [email or web address]  Phone: [phone]

## 2024-08-06 ENCOUNTER — TRANSFERRED RECORDS (OUTPATIENT)
Dept: HEALTH INFORMATION MANAGEMENT | Facility: CLINIC | Age: 79
End: 2024-08-06
Payer: MEDICARE

## 2024-08-07 ENCOUNTER — OFFICE VISIT (OUTPATIENT)
Dept: FAMILY MEDICINE | Facility: CLINIC | Age: 79
End: 2024-08-07
Payer: MEDICARE

## 2024-08-07 VITALS
OXYGEN SATURATION: 94 % | WEIGHT: 267.5 LBS | RESPIRATION RATE: 10 BRPM | TEMPERATURE: 97.1 F | HEIGHT: 75 IN | BODY MASS INDEX: 33.26 KG/M2 | DIASTOLIC BLOOD PRESSURE: 75 MMHG | SYSTOLIC BLOOD PRESSURE: 107 MMHG | HEART RATE: 77 BPM

## 2024-08-07 DIAGNOSIS — R35.1 BENIGN PROSTATIC HYPERPLASIA WITH NOCTURIA: ICD-10-CM

## 2024-08-07 DIAGNOSIS — G47.33 OBSTRUCTIVE SLEEP APNEA SYNDROME: ICD-10-CM

## 2024-08-07 DIAGNOSIS — N40.1 BENIGN PROSTATIC HYPERPLASIA WITH NOCTURIA: ICD-10-CM

## 2024-08-07 DIAGNOSIS — C64.1 RENAL CELL CARCINOMA, RIGHT (H): ICD-10-CM

## 2024-08-07 DIAGNOSIS — I48.20 CHRONIC ATRIAL FIBRILLATION (H): Primary | ICD-10-CM

## 2024-08-07 DIAGNOSIS — E78.5 HYPERLIPIDEMIA LDL GOAL <100: ICD-10-CM

## 2024-08-07 DIAGNOSIS — N18.32 STAGE 3B CHRONIC KIDNEY DISEASE (H): ICD-10-CM

## 2024-08-07 PROCEDURE — G2211 COMPLEX E/M VISIT ADD ON: HCPCS | Performed by: INTERNAL MEDICINE

## 2024-08-07 PROCEDURE — 99214 OFFICE O/P EST MOD 30 MIN: CPT | Performed by: INTERNAL MEDICINE

## 2024-08-07 RX ORDER — TAMSULOSIN HYDROCHLORIDE 0.4 MG/1
0.4 CAPSULE ORAL DAILY
Qty: 90 CAPSULE | Refills: 3 | Status: SHIPPED | OUTPATIENT
Start: 2024-08-07

## 2024-08-07 RX ORDER — PRAVASTATIN SODIUM 20 MG
20 TABLET ORAL DAILY
Qty: 90 TABLET | Refills: 3 | Status: SHIPPED | OUTPATIENT
Start: 2024-08-07

## 2024-08-07 ASSESSMENT — PATIENT HEALTH QUESTIONNAIRE - PHQ9
SUM OF ALL RESPONSES TO PHQ QUESTIONS 1-9: 8
10. IF YOU CHECKED OFF ANY PROBLEMS, HOW DIFFICULT HAVE THESE PROBLEMS MADE IT FOR YOU TO DO YOUR WORK, TAKE CARE OF THINGS AT HOME, OR GET ALONG WITH OTHER PEOPLE: SOMEWHAT DIFFICULT
SUM OF ALL RESPONSES TO PHQ QUESTIONS 1-9: 8

## 2024-08-07 ASSESSMENT — PAIN SCALES - GENERAL: PAINLEVEL: NO PAIN (0)

## 2024-08-07 NOTE — PROGRESS NOTES
"  Neal   Edgar is a 78 year old, presenting for the following health issues:  Follow Up    History of Present Illness       Reason for visit:  3 month follow    He eats 0-1 servings of fruits and vegetables daily.He consumes 0 sweetened beverage(s) daily.He exercises with enough effort to increase his heart rate 9 or less minutes per day.  He exercises with enough effort to increase his heart rate 3 or less days per week.   He is taking medications regularly.        Chronic atrial fibrillation (H)  Renal cell carcinoma, right (H)   No further black stools over the past 5 days.  Holding apixaban due to bleeding risk.  He has now completed the full 15 doses of radiation.  He completed prior chemotherapy and will start Cabozantinib later this month pending upcoming scan.  He did have complete blood counts checked in oncology yesterday and hemoglobin 9.0.  no symptoms of gastroesophageal reflux and taking panetoprazole 40 mg twice per day and able to eat without symptoms of dysphagia or constipation.    Stage 3b chronic kidney disease (H)   Notably staying hydrated and will have labs checked with oncology.    Obstructive sleep apnea syndrome   Not able to tolerate CPAP  Benign prostatic hyperplasia   No difficulty with obstruction.  Has some difficulty with reduced flow and taking tamsulosin      Review of Systems  Constitutional, HEENT - cough and phlegm in the AM, cardiovascular, pulmonary, GI, , musculoskeletal, neuro, skin, endocrine and psych systems are negative, except as otherwise noted.      Objective    /75 (BP Location: Left arm, Patient Position: Sitting, Cuff Size: Adult Large)   Pulse 77   Temp 97.1  F (36.2  C) (Tympanic)   Resp 10   Ht 1.905 m (6' 3\")   Wt 121.3 kg (267 lb 8 oz)   SpO2 94%   BMI 33.44 kg/m    Body mass index is 33.44 kg/m .  Physical Exam   GENERAL: alert and no distress  EYES: Eyes grossly normal to inspection,  NECK: no adenopathy, no asymmetry, masses, or " scars  RESP: lungs clear to auscultation - no rales, rhonchi or wheezes  CV: regular rate and rhythm, normal S1 S2, no S3 or S4, no murmur, click or rub, 2+  peripheral edema  ABDOMEN: soft, nontender, no hepatosplenomegaly, no masses and bowel sounds normal  MS: no gross musculoskeletal defects noted,    SKIN: no suspicious lesions or rashes  NEURO: Normal strength and tone, mentation intact and speech normal  PSYCH: mentation appears normal, affect normal/bright    Patient Instructions   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  Hemoglobin recently rechecked and found to be 9.0.  Noted that oncology continues to monitor and will continue to hold apixaban given high risk of additional bleeding also avoiding nonsteroidal anti-inflammatories and aspirin.  We can recheck hemoglobin again with oncology.     Hx orthostatic hypotension    Continue on midodrine at current dose to help maintain blood pressure     Metastatic renal cell carcinoma   Completed radiation and will continue chemotherapy for metastatic cancer to the duodenum.     Chronic kidney disease    Baseline creatinine around 1.7.  Noted recent hospitalization with slight rise,  continue to stay hydrated with follow up in oncology     Atrial fibrillation    As above, will continue to hold apixaban indefinitely given high risk of bleeding.  Blood pressure and heart rate are well-controlled.     Benign prostatic hyperplasia    Continue tamsulosin           The longitudinal plan of care for the diagnosis(es)/condition(s) as documented were addressed during this visit. Due to the added complexity in care, I will continue to support Edgar in the subsequent management and with ongoing continuity of care.    Signed Electronically by: Nico Retana MD, MD

## 2024-08-07 NOTE — PATIENT INSTRUCTIONS
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  Hemoglobin recently rechecked and found to be 9.0.  Noted that oncology continues to monitor and will continue to hold apixaban given high risk of additional bleeding also avoiding nonsteroidal anti-inflammatories and aspirin.  We can recheck hemoglobin again with oncology.     Hx orthostatic hypotension    Continue on midodrine at current dose to help maintain blood pressure     Metastatic renal cell carcinoma   Completed radiation and will continue chemotherapy for metastatic cancer to the duodenum.     Chronic kidney disease    Baseline creatinine around 1.7.  Noted recent hospitalization with slight rise,  continue to stay hydrated with follow up in oncology     Atrial fibrillation    As above, will continue to hold apixaban indefinitely given high risk of bleeding.  Blood pressure and heart rate are well-controlled.     Benign prostatic hyperplasia    Continue tamsulosin

## 2024-08-19 DIAGNOSIS — I25.10 CORONARY ARTERY DISEASE INVOLVING NATIVE CORONARY ARTERY OF NATIVE HEART WITHOUT ANGINA PECTORIS: ICD-10-CM

## 2024-08-19 RX ORDER — MIDODRINE HYDROCHLORIDE 5 MG/1
5 TABLET ORAL 2 TIMES DAILY
Qty: 60 TABLET | Refills: 11 | Status: SHIPPED | OUTPATIENT
Start: 2024-08-19

## 2024-08-21 ENCOUNTER — PATIENT OUTREACH (OUTPATIENT)
Dept: CARE COORDINATION | Facility: CLINIC | Age: 79
End: 2024-08-21
Payer: MEDICARE

## 2024-08-21 NOTE — PROGRESS NOTES
Clinic Care Coordination Contact  Follow Up Progress Note      Assessment:   Spoke with Wife.   Patient overall doing well.   Patient is feeling stronger and sleeping well.   Radiation therapy went well.   Next step is to discuss chemo pill.   Going to join a kidney cancer support group.   Feels well supported by their family.     They don't have a need for in home help at this time. Aware of the resources if needed.     They have the ACP documents at home. They likely will go over it with someone to discuss.     Care Gaps:    Health Maintenance Due   Topic Date Due    DEPRESSION ACTION PLAN  Never done    ZOSTER IMMUNIZATION (1 of 2) Never done    COVID-19 Vaccine (7 - 2023-24 season) 01/12/2024    MICROALBUMIN  09/05/2024       Care Gaps Last addressed on today's call.     Care Plans  Care Plan: Advance Care Plan       Problem: Patient does not have a valid Health Care Directive       Goal: Complete Health Care Directive       Start Date: 7/23/2024 Expected End Date: 1/23/2025    This Visit's Progress: 10%    Note:     Barriers: Diagnosis of multiple, chronic, complex medical conditions, provider availability-wait time to complete appointments.   Strengths: Motivated, engaged in Care Coordination.  Patient expressed understanding of goal: Yes  Action steps to achieve this goal:  1. I will review the health care directive documents that are mailed out.   2. I will complete the health care directive. I can check with my Fac[employer] System about [email or web address] services, locate a nearby notary [email or web address]. I understand to make this document legal I will need to sign this in the presence of two witnesses who are not my listed health care agents, or having this notarized. (Dates must match)    3. I will provide a copy of my health care directive to my care team when completed. I can email a copy to [email or web address].   4. I can visit [email or web address] website or call kristen  choices at #[phone] for additional information or questions.   5. I will contact my care team with any barriers, questions or assistance needed with this goal. Care coordinator will remain available as needed.                               Care Plan: Help At Home       Problem: Insufficient In-home support       Goal: Establish adequate home support       Start Date: 7/23/2024 Expected End Date: 10/23/2024    This Visit's Progress: 10%    Note:     Barriers: Diagnosis of multiple, chronic, complex medical conditions.  Strengths: motivated, engaged in care coordination  Patient expressed understanding of goal: Yes  Action steps to achieve this goal:  1. I will discuss in home support options that are available to me or assisted living options at [facility].   2. I will review PCA, home care and caregiver support information.   3. I will contact my care team with questions, concerns, support needs. I will use the clinic as a resource and I understand I can contact my clinic with 24/7 after hours services available. Care Coordinator will remain available as needed.                                 Intervention/Education provided during outreach: As above. CC role, goal(s), clinic after hours, appointments, discussed/reviewed. Support provided.     Outreach Frequency: monthly, more frequently as needed    Plan:   Care Coordinator will follow up in 1 month.     Klaudia Brewer RN Clinic Care Coordinator  [facility] Clinics: [place], [place] (on-site Wednesdays), [place] [facility] (on-site Thursdays) & [facility].  [email or web address]  Phone: [phone]

## 2024-08-28 DIAGNOSIS — N18.32 STAGE 3B CHRONIC KIDNEY DISEASE (H): Primary | ICD-10-CM

## 2024-08-30 ENCOUNTER — MEDICAL CORRESPONDENCE (OUTPATIENT)
Dept: HEALTH INFORMATION MANAGEMENT | Facility: CLINIC | Age: 79
End: 2024-08-30

## 2024-08-30 ENCOUNTER — LAB (OUTPATIENT)
Dept: LAB | Facility: CLINIC | Age: 79
End: 2024-08-30
Payer: MEDICARE

## 2024-08-30 DIAGNOSIS — Z00.00 ROUTINE GENERAL MEDICAL EXAMINATION AT A HEALTH CARE FACILITY: ICD-10-CM

## 2024-08-30 DIAGNOSIS — I25.10 CORONARY ARTERY DISEASE INVOLVING NATIVE CORONARY ARTERY OF NATIVE HEART WITHOUT ANGINA PECTORIS: ICD-10-CM

## 2024-08-30 DIAGNOSIS — N18.30 CKD (CHRONIC KIDNEY DISEASE) STAGE 3, GFR 30-59 ML/MIN (H): Primary | ICD-10-CM

## 2024-08-30 DIAGNOSIS — N18.32 STAGE 3B CHRONIC KIDNEY DISEASE (H): ICD-10-CM

## 2024-08-30 LAB
ALBUMIN MFR UR ELPH: 12.6 MG/DL
ALBUMIN SERPL BCG-MCNC: 3.5 G/DL (ref 3.5–5.2)
ANION GAP SERPL CALCULATED.3IONS-SCNC: 8 MMOL/L (ref 7–15)
BUN SERPL-MCNC: 19.9 MG/DL (ref 8–23)
CALCIUM SERPL-MCNC: 8.5 MG/DL (ref 8.8–10.4)
CHLORIDE SERPL-SCNC: 105 MMOL/L (ref 98–107)
CHOLEST SERPL-MCNC: 117 MG/DL
CREAT SERPL-MCNC: 1.78 MG/DL (ref 0.67–1.17)
CREAT UR-MCNC: 141 MG/DL
CREAT UR-MCNC: 141 MG/DL
EGFRCR SERPLBLD CKD-EPI 2021: 39 ML/MIN/1.73M2
FASTING STATUS PATIENT QL REPORTED: YES
GLUCOSE SERPL-MCNC: 106 MG/DL (ref 70–99)
HCO3 SERPL-SCNC: 25 MMOL/L (ref 22–29)
HDLC SERPL-MCNC: 35 MG/DL
HGB BLD-MCNC: 11.8 G/DL (ref 13.3–17.7)
LDLC SERPL CALC-MCNC: 61 MG/DL
MICROALBUMIN UR-MCNC: <12 MG/L
MICROALBUMIN/CREAT UR: NORMAL MG/G{CREAT}
NONHDLC SERPL-MCNC: 82 MG/DL
PHOSPHATE SERPL-MCNC: 2.8 MG/DL (ref 2.5–4.5)
POTASSIUM SERPL-SCNC: 4.2 MMOL/L (ref 3.4–5.3)
PROT/CREAT 24H UR: 0.09 MG/MG CR (ref 0–0.2)
SODIUM SERPL-SCNC: 138 MMOL/L (ref 135–145)
TRIGL SERPL-MCNC: 103 MG/DL

## 2024-08-30 PROCEDURE — 84156 ASSAY OF PROTEIN URINE: CPT

## 2024-08-30 PROCEDURE — 80069 RENAL FUNCTION PANEL: CPT

## 2024-08-30 PROCEDURE — 82043 UR ALBUMIN QUANTITATIVE: CPT

## 2024-08-30 PROCEDURE — 36415 COLL VENOUS BLD VENIPUNCTURE: CPT

## 2024-08-30 PROCEDURE — 82570 ASSAY OF URINE CREATININE: CPT

## 2024-08-30 PROCEDURE — 85018 HEMOGLOBIN: CPT

## 2024-08-30 PROCEDURE — 80061 LIPID PANEL: CPT

## 2024-08-30 NOTE — RESULT ENCOUNTER NOTE
Hi Edgar,    I had the opportunity to review your recent labs and a summary of your labs reads as follows:    Your fasting lipid panel show  - stable HDL (good) cholesterol -as your goal is greater than 40  - low LDL (bad) cholesterol as your goal is less than 100  - normal triglyceride levels  Your urine albumin returned stable    Sincerely,  Nico Retana MD

## 2024-09-04 NOTE — PROGRESS NOTES
Nephrology Progress Note  9/5/2024    Assessment and Plan:  78 year old male with history of renal cell carcinoma s/p right nephrectomy 2019, Scr 1.1 up to 1.6-1.7 post nephrectomy, with metastatic disease to lungs and new liver nodule noted on ultrasound. He presents for followup of CKD, first see Fall 2021.     # CKD stage 3b- Scr 1.6-1.7, eGFR 35-40ml/min, more recently has been closer to 1.7-2, eGFR 30-35.  He has no albuminuria, normal blood pressure with no medication. In fact he has low BP at this time and is taking midodrine which helps.   He has been monitoring disease and is now on chemotherapy, managed by oncology at [facility].   - we reviewed diet and exercise and discussed blood pressure    # Hypertension: blood pressures are better now on midodrine, usually 120/s  -Taking midodrine 5 mg BID, /83 in clinic  - follows with cardiology  - no changes today    # Anemia  - Hgb: 11.8, improved from 7.1 with transfusion and iron infusions, and cancer treatment  - will monitor    # Electrolytes:   - Potassium; level: Normal  - Bicarbonate; level: 25 Normal  - stopped sodium bicarb due to increased LE edema, which improved  - bicarb is normal, okay to continue holding sodium bicarb.     # Mineral Bone Disorder:   - Vitamin D; level is: Normal  - Corrected calcium; level is 8.9 :  Normal   - Phos 2.8, 6/12/24 Vit D 36, PTH 24  - no concerns    Orthostatic hypotension- improved with stopping metoprolol and starting midodrine.  - continue compression stockings, consider abdominal binder. Follows with cardiology.  - low suspicion for adrenal insufficiency      Assessment and plan was discussed with patient and he voiced his understanding and agreement.    #Disposition: labs and follow up in 3 months    Reason for Visit:  Ti Nickerson is a 78 year old male with CKD from nephrectomy, who presents for evaluation.    HPI:  He is a very pleasant male with history of renal cell carcinoma, diagnosed when he had olivia  hematuria episode in 2019. He underwent right nephrectomy and has been monitored by Dr Hernández at [facility].  He had CT scan in September that showed stable pulmonary lesions. He had an abdominal ultrasound that showed a ?new 3.4cm nodule in his liver.    He ultimately had CT at [facility] which confirmed progression metastatic RCC.     They sold their home and moved into assisted living where there is a gym  They usually eat healthy   He has atrial fibrillation and on anticoagulation  His blood pressure runs 100-120s/ and he is feeling better.   He denies any shortness of breath or swelling. He feels fairly well except when he is in afib sometimes.  He sees Dr Quarles in cardiology who manages his afib  He is accompanied by his wife during the visit.   He denies family history of kidney failure, his parents lived to their 90s    He is now following with [facility]. Completed infusions and radiation treatment. Will be starting cabozantinib once healed from dental extraction.   He started PT last week, going well.   Hopeful to be traveling to [place] for the month of November         Renal History:   Kidney Disease and Medical Hx:  Right nephrectomy    ROS:   A comprehensive review of systems was obtained and negative, except as noted in the HPI or PMH.    Active Medical Problems:  Patient Active Problem List   Diagnosis    Chronic atrial fibrillation (H)    Idiopathic cardiomyopathy (H)    Coronary artery disease involving native coronary artery of native heart without angina pectoris    Esophageal reflux    Mixed hyperlipidemia    Sleep apnea    Essential hypertension    Obesity (BMI 35.0-39.9) with comorbidity (H)    Thoracic aortic aneurysm without rupture (H24)    Tubular adenoma of colon    Elevated TSH    Renal cell carcinoma, right (H)    CKD (chronic kidney disease) stage 3, GFR 30-59 ml/min (H)    Ventral hernia without obstruction or gangrene    Long term (current) use of anticoagulants    Melena     Cellulitis of abdominal wall    Anemia of chronic disease    Lower GI bleed    Syncope, unspecified syncope type     PMH:   Medical record was reviewed and PMH was discussed with patient and noted below.  Past Medical History:   Diagnosis Date    Atrial fibrillation, unspecified 9/18/2015    CAD (coronary artery disease) 09/18/2015    minimal by angio 2007, fu nuc est nl 2018    Elevated TSH 2018    Esophageal reflux 9/18/2015    Essential hypertension     History of cardioversion     [id number]    Idiopathic cardiomyopathy (H) 09/18/2015    fu echo nl ef, nl nuclear est 11/18, Dr. Quarles    Mixed hyperlipidemia 9/18/2015    Mumps     Sleep apnea 09/18/2015    does not use cpap as of 2019    Sleep apnea     Thoracic aortic aneurysm (H24)     sinus of valsalva 4.5 and asc aorta 4.5 in 2017    Tubular adenoma of colon 01/2017    fu 3 years     PSH:   Past Surgical History:   Procedure Laterality Date    APPENDECTOMY  1964    ESOPHAGOSCOPY, GASTROSCOPY, DUODENOSCOPY (EGD), COMBINED N/A 9/14/2023    Procedure: Esophagoscopy, gastroscopy, duodenoscopy (EGD), combined;  Surgeon: Chele Ash MD;  Location:  GI       Family Hx:   Family History   Problem Relation Age of Onset    Arrhythmia Mother         a-fib    Cerebrovascular Disease Mother     Arrhythmia Father         a-fib    Colon Cancer Father     Diabetes Father     Cerebrovascular Disease Father     No Known Problems Brother      Personal Hx:   Social History     Tobacco Use    Smoking status: Never    Smokeless tobacco: Never   Substance Use Topics    Alcohol use: Not Currently       Allergies:  Allergies   Allergen Reactions    Fentanyl Confusion       Medications:  Current Outpatient Medications   Medication Sig Dispense Refill    acetaminophen (TYLENOL) 500 MG tablet Take 500-1,000 mg by mouth daily as needed for mild pain      Cabozantinib S-Malate (CABOMETYX) 20 MG Take 20 mg by mouth (Patient not taking: Reported on 8/7/2024)      midodrine  (PROAMATINE) 5 MG tablet Take 1 tablet (5 mg) by mouth 2 times daily 60 tablet 11    ondansetron (ZOFRAN ODT) 4 MG ODT tab Take 1 tablet (4 mg) by mouth every 6 hours as needed for nausea or vomiting 20 tablet 0    pantoprazole (PROTONIX) 40 MG EC tablet Take 1 tablet (40 mg) by mouth 2 times daily (before meals) 60 tablet 0    pravastatin (PRAVACHOL) 20 MG tablet Take 1 tablet (20 mg) by mouth daily 90 tablet 3    sodium bicarbonate 650 MG tablet Take 1 tablet (650 mg) by mouth daily 90 tablet 3    tamsulosin (FLOMAX) 0.4 MG capsule Take 1 capsule (0.4 mg) by mouth daily 90 capsule 3     No current facility-administered medications for this visit.      Vitals:  /83 (BP Location: Right arm, Cuff Size: Adult Large)   Pulse 66   Wt 117 kg (258 lb)   BMI 32.25 kg/m      Exam:  GENERAL APPEARANCE: alert and no distress  RESP: lungs clear to auscultation - no rales, rhonchi or wheezes  CV: irregular rhythm, normal rate, no rub, no murmur  EDEMA: trace LE edema bilaterally, L>R  MS: extremities normal - no gross deformities noted, no evidence of inflammation in joints, no muscle tenderness  SKIN: no rash    LABS:   CMP  Recent Labs   Lab Test 08/30/24  0857 07/20/24  1403 07/19/24  0948 07/16/24  0805 10/11/21  0942 06/10/21  0845 04/28/21  0000 11/20/20  0000 10/26/20  0936    138 138 140   < > 140  --   --  139   POTASSIUM 4.2 3.3* 3.5 3.4   < > 4.5 5.2 4.5 5.1   CHLORIDE 105 107 106 110*   < > 111*  --   --  105   CO2 25 22 22 20*   < > 25  --   --  29   ANIONGAP 8 9 10 10   < > 4  --   --  10.1   * 103* 104* 92   < > 97 102 98 113*   BUN 19.9 15.7 14.4 18.6   < > 30  --   --  28   CR 1.78* 1.64* 1.65* 1.63*   < > 1.68* 1.89* 1.87* 1.77*   GFRESTIMATED 39* 43* 43* 43*   < > 39* 34* 35* 38*   GFRESTBLACK  --   --   --   --   --  46* 40* 40* 46*   DOMINICK 8.5* 8.1* 8.1* 7.8*   < > 8.5  --   --  9.2    < > = values in this interval not displayed.     Recent Labs   Lab Test 07/19/24  0948 07/14/24  3977  05/28/24  1649 03/26/24  1355   BILITOTAL 0.7 0.3  0.3 0.6 0.5   ALKPHOS 90 80  80 86 70   ALT 15 14  14 16 14   AST 24 27 27 23 19     CBC  Recent Labs   Lab Test 08/30/24  0857 07/20/24  1538 07/20/24  1403 07/19/24  0948 07/17/24  0903 07/16/24  0805 07/15/24  1439 07/15/24  0607   HGB 11.8* 9.5* 9.5* 9.6*   < > 9.5*   < > 9.0*   WBC  --   --  6.0 5.7  --  6.7  --  7.2   RBC  --   --  3.43* 3.49*  --  3.33*  --  3.18*   HCT  --   --  30.9* 31.2*  --  29.4*  --  27.6*   MCV  --   --  90 89  --  88  --  87   MCH  --   --  27.7 27.5  --  28.5  --  28.3   MCHC  --   --  30.7* 30.8*  --  32.3  --  32.6   RDW  --   --  20.4* 20.1*  --  19.1*  --  18.1*   PLT  --   --  198 197  --  216  --  199    < > = values in this interval not displayed.     URINE STUDIES  Recent Labs   Lab Test 10/31/23  0142 09/05/23  0938 10/11/21  0947 12/26/19  0812 09/25/19  0859 09/17/19  1442   COLOR Yellow Yellow Yellow Yellow Yellow Yellow   APPEARANCE Clear Clear Clear Clear Clear Clear   URINEGLC Negative Negative Negative Negative Negative Negative   URINEBILI Negative Negative Negative Negative Negative Negative   URINEKETONE Negative Negative Negative Negative Negative Negative   SG 1.021 1.025 1.023 1.020 1.020 1.025   UBLD Negative Trace* Trace* Small* Large* Large*   URINEPH 5.5 5.5 5.0 5.0 5.0 5.0   PROTEIN Negative Negative Negative Negative Negative Negative   UROBILINOGEN  --  1.0  --  0.2 0.2 0.2   NITRITE Negative Negative Negative Negative Negative Negative   LEUKEST Negative Negative Negative Negative Negative Negative   RBCU 1 0-2 1 O - 2  --  5-10*   WBCU 2 0-5 <1 0 - 5  --  0 - 5     No lab results found.  PTH  Recent Labs   Lab Test 06/12/24  1014 09/21/22  1102 10/11/21  0942   PTHI 24 45 36     IRON STUDIES  Recent Labs   Lab Test 04/16/24  1407 10/11/21  0942 06/10/21  0845 12/26/19  0812   IRON 17* 97  --  60    304  --  307   IRONSAT 5* 32  --  20   TACHO 12* 274 250 160     Interpretation Summary     The  left ventricle is borderline dilated.  There is mild concentric left ventricular hypertrophy.  The visual ejection fraction is 55-60%.  The right ventricle is normal in structure, function and size.  Normal left atrial size.  The right atrium is moderate to severely dilated.  There is mild (1+) mitral regurgitation.  There is trace tricuspid regurgitation.  The right ventricular systolic pressure is approximated at 24mmHg plus the  right atrial pressure.  There is trace aortic regurgitation.  Mild aortic root dilatation. The aortic root and ascending aorta both measure  4.6 cm, There has been some variability in measured aortic size over previous  echoes, but overall likely no change since 2016.  ______________________________________________________________________________  Left Ventricle  The left ventricle is borderline dilated. There is mild concentric left  ventricular hypertrophy. The visual ejection fraction is 55-60%. Diastolic  function not assessed due to atrial fibrillation. Diastolic Doppler findings  (E/E' ratio and/or other parameters) suggest left ventricular filling  pressures are indeterminate. No regional wall motion abnormalities noted.     Right Ventricle  The right ventricle is normal in structure, function and size.     Atria  Normal left atrial size. The right atrium is moderate to severely dilated.  There is no atrial shunt seen.     Mitral Valve  The mitral valve leaflets appear normal. There is no evidence of stenosis,  fluttering, or prolapse. There is mild (1+) mitral regurgitation.     Tricuspid Valve  There is trace tricuspid regurgitation. The right ventricular systolic  pressure is approximated at 24mmHg plus the right atrial pressure. IVC  diameter and respiratory changes fall into an intermediate range suggesting an  RA pressure of 8 mmHg.     Aortic Valve  There is mild trileaflet aortic sclerosis. There is trace aortic  regurgitation.     Pulmonic Valve  There is mild (1+)  pulmonic valvular regurgitation.     Vessels  Mild aortic root dilatation.     Pericardium  There is no pericardial effusion.     Rhythm  The rhythm was atrial fibrillation.  ______________________________________________________________________________  MMode/2D Measurements & Calculations        Klaudia Lazo PA-C    Visit length 15 minutes. An additional 15 minutes were spent on date of service in chart review, documentation, and other activities as noted.

## 2024-09-05 ENCOUNTER — OFFICE VISIT (OUTPATIENT)
Dept: NEPHROLOGY | Facility: CLINIC | Age: 79
End: 2024-09-05
Attending: PHYSICIAN ASSISTANT
Payer: MEDICARE

## 2024-09-05 VITALS
DIASTOLIC BLOOD PRESSURE: 83 MMHG | BODY MASS INDEX: 32.25 KG/M2 | WEIGHT: 258 LBS | HEART RATE: 66 BPM | SYSTOLIC BLOOD PRESSURE: 124 MMHG

## 2024-09-05 DIAGNOSIS — E87.20 METABOLIC ACIDOSIS: ICD-10-CM

## 2024-09-05 DIAGNOSIS — R60.9 EDEMA, UNSPECIFIED TYPE: ICD-10-CM

## 2024-09-05 DIAGNOSIS — N18.32 STAGE 3B CHRONIC KIDNEY DISEASE (H): Primary | ICD-10-CM

## 2024-09-05 DIAGNOSIS — N18.32 ANEMIA DUE TO STAGE 3B CHRONIC KIDNEY DISEASE (H): ICD-10-CM

## 2024-09-05 DIAGNOSIS — D63.1 ANEMIA DUE TO STAGE 3B CHRONIC KIDNEY DISEASE (H): ICD-10-CM

## 2024-09-05 DIAGNOSIS — C64.1 RENAL CELL CARCINOMA, RIGHT (H): ICD-10-CM

## 2024-09-05 PROCEDURE — 99214 OFFICE O/P EST MOD 30 MIN: CPT | Performed by: PHYSICIAN ASSISTANT

## 2024-09-05 PROCEDURE — G0463 HOSPITAL OUTPT CLINIC VISIT: HCPCS | Performed by: PHYSICIAN ASSISTANT

## 2024-09-05 ASSESSMENT — PAIN SCALES - GENERAL: PAINLEVEL: NO PAIN (0)

## 2024-09-05 NOTE — LETTER
9/5/2024       RE: Ti Nickerson  [address] [facility] [address]  [facility] [place] [place] [address]     Dear Colleague,    Thank you for referring your patient, Ti Nickerson, to the [facility] [place] at [facility]. Please see a copy of my visit note below.    Nephrology Progress Note  9/5/2024    Assessment and Plan:  78 year old male with history of renal cell carcinoma s/p right nephrectomy 2019, Scr 1.1 up to 1.6-1.7 post nephrectomy, with metastatic disease to lungs and new liver nodule noted on ultrasound. He presents for followup of CKD, first see Fall 2021.     # CKD stage 3b- Scr 1.6-1.7, eGFR 35-40ml/min, more recently has been closer to 1.7-2, eGFR 30-35.  He has no albuminuria, normal blood pressure with no medication. In fact he has low BP at this time and is taking midodrine which helps.   He has been monitoring disease and is now on chemotherapy, managed by oncology at [place].   - we reviewed diet and exercise and discussed blood pressure    # Hypertension: blood pressures are better now on midodrine, usually 120/s  -Taking midodrine 5 mg BID, /83 in clinic  - follows with cardiology  - no changes today    # Anemia  - Hgb: 11.8, improved from 7.1 with transfusion and iron infusions, and cancer treatment  - will monitor    # Electrolytes:   - Potassium; level: Normal  - Bicarbonate; level: 25 Normal  - stopped sodium bicarb due to increased LE edema, which improved  - bicarb is normal, okay to continue holding sodium bicarb.     # Mineral Bone Disorder:   - Vitamin D; level is: Normal  - Corrected calcium; level is 8.9 :  Normal   - Phos 2.8, 6/12/24 Vit D 36, PTH 24  - no concerns    Orthostatic hypotension- improved with stopping metoprolol and starting midodrine.  - continue compression stockings, consider abdominal binder. Follows with cardiology.  - low suspicion for adrenal insufficiency      Assessment and plan was  discussed with patient and he voiced his understanding and agreement.    #Disposition: labs and follow up in 3 months    Reason for Visit:  Ti Nickerson is a 78 year old male with CKD from nephrectomy, who presents for evaluation.    HPI:  He is a very pleasant male with history of renal cell carcinoma, diagnosed when he had olivia hematuria episode in 2019. He underwent right nephrectomy and has been monitored by Dr Hernández at [facility].  He had CT scan in September that showed stable pulmonary lesions. He had an abdominal ultrasound that showed a ?new 3.4cm nodule in his liver.    He ultimately had CT at [facility] which confirmed progression metastatic RCC.     They sold their home and moved into assisted living where there is a gym  They usually eat healthy   He has atrial fibrillation and on anticoagulation  His blood pressure runs 100-120s/ and he is feeling better.   He denies any shortness of breath or swelling. He feels fairly well except when he is in afib sometimes.  He sees Dr Quarles in cardiology who manages his afib  He is accompanied by his wife during the visit.   He denies family history of kidney failure, his parents lived to their 90s    He is now following with [place] oncology. Completed infusions and radiation treatment. Will be starting cabozantinib once healed from dental extraction.   He started PT last week, going well.   Hopeful to be traveling to [place] for the month of November         Renal History:   Kidney Disease and Medical Hx:  Right nephrectomy    ROS:   A comprehensive review of systems was obtained and negative, except as noted in the HPI or PMH.    Active Medical Problems:  Patient Active Problem List   Diagnosis     Chronic atrial fibrillation (H)     Idiopathic cardiomyopathy (H)     Coronary artery disease involving native coronary artery of native heart without angina pectoris     Esophageal reflux     Mixed hyperlipidemia     Sleep apnea     Essential hypertension      Obesity (BMI 35.0-39.9) with comorbidity (H)     Thoracic aortic aneurysm without rupture (H24)     Tubular adenoma of colon     Elevated TSH     Renal cell carcinoma, right (H)     CKD (chronic kidney disease) stage 3, GFR 30-59 ml/min (H)     Ventral hernia without obstruction or gangrene     Long term (current) use of anticoagulants     Melena     Cellulitis of abdominal wall     Anemia of chronic disease     Lower GI bleed     Syncope, unspecified syncope type     PMH:   Medical record was reviewed and PMH was discussed with patient and noted below.  Past Medical History:   Diagnosis Date     Atrial fibrillation, unspecified 9/18/2015     CAD (coronary artery disease) 09/18/2015    minimal by angio 2007, fu nuc est nl 2018     Elevated TSH 2018     Esophageal reflux 9/18/2015     Essential hypertension      History of cardioversion     [id number]     Idiopathic cardiomyopathy (H) 09/18/2015    fu echo nl ef, nl nuclear est 11/18, Dr. Quarles     Mixed hyperlipidemia 9/18/2015     Mumps      Sleep apnea 09/18/2015    does not use cpap as of 2019     Sleep apnea      Thoracic aortic aneurysm (H24)     sinus of valsalva 4.5 and asc aorta 4.5 in 2017     Tubular adenoma of colon 01/2017    fu 3 years     PSH:   Past Surgical History:   Procedure Laterality Date     APPENDECTOMY  1964     ESOPHAGOSCOPY, GASTROSCOPY, DUODENOSCOPY (EGD), COMBINED N/A 9/14/2023    Procedure: Esophagoscopy, gastroscopy, duodenoscopy (EGD), combined;  Surgeon: Chele Ash MD;  Location:  GI       Family Hx:   Family History   Problem Relation Age of Onset     Arrhythmia Mother         a-fib     Cerebrovascular Disease Mother      Arrhythmia Father         a-fib     Colon Cancer Father      Diabetes Father      Cerebrovascular Disease Father      No Known Problems Brother      Personal Hx:   Social History     Tobacco Use     Smoking status: Never     Smokeless tobacco: Never   Substance Use Topics     Alcohol use: Not Currently        Allergies:  Allergies   Allergen Reactions     Fentanyl Confusion       Medications:  Current Outpatient Medications   Medication Sig Dispense Refill     acetaminophen (TYLENOL) 500 MG tablet Take 500-1,000 mg by mouth daily as needed for mild pain       Cabozantinib S-Malate (CABOMETYX) 20 MG Take 20 mg by mouth (Patient not taking: Reported on 8/7/2024)       midodrine (PROAMATINE) 5 MG tablet Take 1 tablet (5 mg) by mouth 2 times daily 60 tablet 11     ondansetron (ZOFRAN ODT) 4 MG ODT tab Take 1 tablet (4 mg) by mouth every 6 hours as needed for nausea or vomiting 20 tablet 0     pantoprazole (PROTONIX) 40 MG EC tablet Take 1 tablet (40 mg) by mouth 2 times daily (before meals) 60 tablet 0     pravastatin (PRAVACHOL) 20 MG tablet Take 1 tablet (20 mg) by mouth daily 90 tablet 3     sodium bicarbonate 650 MG tablet Take 1 tablet (650 mg) by mouth daily 90 tablet 3     tamsulosin (FLOMAX) 0.4 MG capsule Take 1 capsule (0.4 mg) by mouth daily 90 capsule 3     No current facility-administered medications for this visit.      Vitals:  /83 (BP Location: Right arm, Cuff Size: Adult Large)   Pulse 66   Wt 117 kg (258 lb)   BMI 32.25 kg/m      Exam:  GENERAL APPEARANCE: alert and no distress  RESP: lungs clear to auscultation - no rales, rhonchi or wheezes  CV: irregular rhythm, normal rate, no rub, no murmur  EDEMA: trace LE edema bilaterally, L>R  MS: extremities normal - no gross deformities noted, no evidence of inflammation in joints, no muscle tenderness  SKIN: no rash    LABS:   CMP  Recent Labs   Lab Test 08/30/24  0857 07/20/24  1403 07/19/24  0948 07/16/24  0805 10/11/21  0942 06/10/21  0845 04/28/21  0000 11/20/20  0000 10/26/20  0936    138 138 140   < > 140  --   --  139   POTASSIUM 4.2 3.3* 3.5 3.4   < > 4.5 5.2 4.5 5.1   CHLORIDE 105 107 106 110*   < > 111*  --   --  105   CO2 25 22 22 20*   < > 25  --   --  29   ANIONGAP 8 9 10 10   < > 4  --   --  10.1   * 103* 104* 92   < >  97 102 98 113*   BUN 19.9 15.7 14.4 18.6   < > 30  --   --  28   CR 1.78* 1.64* 1.65* 1.63*   < > 1.68* 1.89* 1.87* 1.77*   GFRESTIMATED 39* 43* 43* 43*   < > 39* 34* 35* 38*   GFRESTBLACK  --   --   --   --   --  46* 40* 40* 46*   DOMINICK 8.5* 8.1* 8.1* 7.8*   < > 8.5  --   --  9.2    < > = values in this interval not displayed.     Recent Labs   Lab Test 07/19/24  0948 07/14/24  0500 05/28/24  1649 03/26/24  1355   BILITOTAL 0.7 0.3  0.3 0.6 0.5   ALKPHOS 90 80  80 86 70   ALT 15 14  14 16 14   AST 24 27  27 23 19     CBC  Recent Labs   Lab Test 08/30/24  0857 07/20/24  1538 07/20/24  1403 07/19/24  0948 07/17/24  0903 07/16/24  0805 07/15/24  1439 07/15/24  0607   HGB 11.8* 9.5* 9.5* 9.6*   < > 9.5*   < > 9.0*   WBC  --   --  6.0 5.7  --  6.7  --  7.2   RBC  --   --  3.43* 3.49*  --  3.33*  --  3.18*   HCT  --   --  30.9* 31.2*  --  29.4*  --  27.6*   MCV  --   --  90 89  --  88  --  87   MCH  --   --  27.7 27.5  --  28.5  --  28.3   MCHC  --   --  30.7* 30.8*  --  32.3  --  32.6   RDW  --   --  20.4* 20.1*  --  19.1*  --  18.1*   PLT  --   --  198 197  --  216  --  199    < > = values in this interval not displayed.     URINE STUDIES  Recent Labs   Lab Test 10/31/23  0142 09/05/23  0938 10/11/21  0947 12/26/19  0812 09/25/19  0859 09/17/19  1442   COLOR Yellow Yellow Yellow Yellow Yellow Yellow   APPEARANCE Clear Clear Clear Clear Clear Clear   URINEGLC Negative Negative Negative Negative Negative Negative   URINEBILI Negative Negative Negative Negative Negative Negative   URINEKETONE Negative Negative Negative Negative Negative Negative   SG 1.021 1.025 1.023 1.020 1.020 1.025   UBLD Negative Trace* Trace* Small* Large* Large*   URINEPH 5.5 5.5 5.0 5.0 5.0 5.0   PROTEIN Negative Negative Negative Negative Negative Negative   UROBILINOGEN  --  1.0  --  0.2 0.2 0.2   NITRITE Negative Negative Negative Negative Negative Negative   LEUKEST Negative Negative Negative Negative Negative Negative   RBCU 1 0-2 1 O - 2   --  5-10*   WBCU 2 0-5 <1 0 - 5  --  0 - 5     No lab results found.  PTH  Recent Labs   Lab Test 06/12/24  1014 09/21/22  1102 10/11/21  0942   PTHI 24 45 36     IRON STUDIES  Recent Labs   Lab Test 04/16/24  1407 10/11/21  0942 06/10/21  0845 12/26/19  0812   IRON 17* 97  --  60    304  --  307   IRONSAT 5* 32  --  20   TACHO 12* 274 250 160     Interpretation Summary     The left ventricle is borderline dilated.  There is mild concentric left ventricular hypertrophy.  The visual ejection fraction is 55-60%.  The right ventricle is normal in structure, function and size.  Normal left atrial size.  The right atrium is moderate to severely dilated.  There is mild (1+) mitral regurgitation.  There is trace tricuspid regurgitation.  The right ventricular systolic pressure is approximated at 24mmHg plus the  right atrial pressure.  There is trace aortic regurgitation.  Mild aortic root dilatation. The aortic root and ascending aorta both measure  4.6 cm, There has been some variability in measured aortic size over previous  echoes, but overall likely no change since 2016.  ______________________________________________________________________________  Left Ventricle  The left ventricle is borderline dilated. There is mild concentric left  ventricular hypertrophy. The visual ejection fraction is 55-60%. Diastolic  function not assessed due to atrial fibrillation. Diastolic Doppler findings  (E/E' ratio and/or other parameters) suggest left ventricular filling  pressures are indeterminate. No regional wall motion abnormalities noted.     Right Ventricle  The right ventricle is normal in structure, function and size.     Atria  Normal left atrial size. The right atrium is moderate to severely dilated.  There is no atrial shunt seen.     Mitral Valve  The mitral valve leaflets appear normal. There is no evidence of stenosis,  fluttering, or prolapse. There is mild (1+) mitral regurgitation.     Tricuspid Valve  There  is trace tricuspid regurgitation. The right ventricular systolic  pressure is approximated at 24mmHg plus the right atrial pressure. IVC  diameter and respiratory changes fall into an intermediate range suggesting an  RA pressure of 8 mmHg.     Aortic Valve  There is mild trileaflet aortic sclerosis. There is trace aortic  regurgitation.     Pulmonic Valve  There is mild (1+) pulmonic valvular regurgitation.     Vessels  Mild aortic root dilatation.     Pericardium  There is no pericardial effusion.     Rhythm  The rhythm was atrial fibrillation.  ______________________________________________________________________________  MMode/2D Measurements & Calculations        Carlton Lazo PA-C    Visit length 15 minutes. An additional 15 minutes were spent on date of service in chart review, documentation, and other activities as noted.       Again, thank you for allowing me to participate in the care of your patient.      Sincerely,    CARLTON MELENDEZ PA-C

## 2024-09-05 NOTE — PATIENT INSTRUCTIONS
Okay to stay off sodium bicarb.   Avoid ibuprofen/aleve.   Continue good hydration, low sodium diet.   Labs and follow up in 3 months.

## 2024-09-05 NOTE — NURSING NOTE
"Chief Complaint   Patient presents with    RECHECK     Vital signs:      BP: 124/83 Pulse: 66             Weight: 117 kg (258 lb)  Estimated body mass index is 32.25 kg/m  as calculated from the following:    Height as of 8/7/24: 1.905 m (6' 3\").    Weight as of this encounter: 117 kg (258 lb).      Cydney Talavera CMA   9/5/2024 10:03 AM    "

## 2024-09-12 ENCOUNTER — TELEPHONE (OUTPATIENT)
Dept: CARDIOLOGY | Facility: CLINIC | Age: 79
End: 2024-09-12
Payer: MEDICARE

## 2024-09-12 NOTE — TELEPHONE ENCOUNTER
Returned call to spouse discussed long ride with car,and sitting , vs shorter plain ride, wearing geronimo hose, and airplane pressurized. JENNIFER Turner RN

## 2024-09-12 NOTE — TELEPHONE ENCOUNTER
[facility] Call Center    Phone Message    May a detailed message be left on voicemail: yes     Reason for Call: Other: pts  wife is wondering if it is safe for pt to fly to [place] or if it is safer for the pt drive down. They are concerned about possible clots or heart attack risks with pt flying. Please call pts wife back to discuss.      Action Taken: Other: cardiology     Travel Screening: Not Applicable      Thank you!  [facility]        Date of Service:

## 2024-09-20 ENCOUNTER — PATIENT OUTREACH (OUTPATIENT)
Dept: CARE COORDINATION | Facility: CLINIC | Age: 79
End: 2024-09-20
Payer: MEDICARE

## 2024-09-20 NOTE — PROGRESS NOTES
Clinic Care Coordination Contact  [facility]/Voicemail    Clinical Data: Care Coordinator Outreach    Outreach Documentation Number of Outreach Attempt   9/20/2024   2:32 PM 1       Left message on patient's voicemail with call back information and requested return call.    Plan: Care Coordinator will try to reach patient again in 10 business days.    Klaudia Brewer, RN Clinic Care Coordinator  [facility] Clinics: [place], [place] (on-site Wednesdays), [facility] (on-site Thursdays) & [facility].  [email or web address]  Phone: [phone]

## 2024-10-02 NOTE — PROGRESS NOTES
Clinic Care Coordination Contact  [facility]/Voicemail    Clinical Data: Care Coordinator Outreach    Outreach Documentation Number of Outreach Attempt   9/20/2024   2:32 PM 1   10/2/2024  12:19 PM 2       Left message on wife'svoicemail with call back information and requested return call.    Plan: Care Coordinator will try to reach patient again in 10 business days.    Klaudia Brewer, RN Clinic Care Coordinator  [facility] Clinics: [place], [place] (on-site Wednesdays), [facility] & [facility].  [email or web address]  Phone: [phone]

## 2024-10-14 DIAGNOSIS — K92.1 MELENA: ICD-10-CM

## 2024-10-14 RX ORDER — PANTOPRAZOLE SODIUM 40 MG/1
40 TABLET, DELAYED RELEASE ORAL
Qty: 180 TABLET | Refills: 0 | Status: SHIPPED | OUTPATIENT
Start: 2024-10-14

## 2024-10-14 NOTE — TELEPHONE ENCOUNTER
Patient asking for 90 day supply of BID dosing    Pended    Previously written at hospital discharge    RT Bhavesh (R)

## 2024-11-18 ENCOUNTER — DOCUMENTATION ONLY (OUTPATIENT)
Dept: LAB | Facility: CLINIC | Age: 79
End: 2024-11-18

## 2024-11-27 ENCOUNTER — PATIENT OUTREACH (OUTPATIENT)
Dept: CARE COORDINATION | Facility: CLINIC | Age: 79
End: 2024-11-27
Payer: MEDICARE

## 2024-11-27 NOTE — PROGRESS NOTES
Clinic Care Coordination Contact  Follow Up Progress Note      Assessment:   Spoke with Wife.   Overall doing well. No needs at this time.     Care Gaps:    Health Maintenance Due   Topic Date Due    DEPRESSION ACTION PLAN  Never done    ZOSTER IMMUNIZATION (1 of 2) Never done    INFLUENZA VACCINE (1) 09/01/2024    COVID-19 Vaccine (7 - 2024-25 season) 09/01/2024       Postponed to upcoming office visit with PCP.     Care Plans  Care Plan: Advance Care Plan       Problem: Patient does not have a valid Health Care Directive       Goal: Complete Health Care Directive       Start Date: 7/23/2024 Expected End Date: 1/23/2025    This Visit's Progress: 20% Recent Progress: 10%    Note:     Barriers: Diagnosis of multiple, chronic, complex medical conditions, provider availability-wait time to complete appointments.   Strengths: Motivated, engaged in Care Coordination.  Patient expressed understanding of goal: Yes  Action steps to achieve this goal:  1. I will review the health care directive documents that are mailed out.   2. I will complete the health care directive. I can check with my bank about [employer] services, locate a nearby notary [email or web address]. I understand to make this document legal I will need to sign this in the presence of two witnesses who are not my listed health care agents, or having this notarized. (Dates must match)    3. I will provide a copy of my health care directive to my care team when completed. I can email a copy to [email or web address].   4. I can visit [email or web address] website or call kristen ragland at #[phone] for additional information or questions.   5. I will contact my care team with any barriers, questions or assistance needed with this goal. Care coordinator will remain available as needed.                               Care Plan: Help At Home       Problem: Insufficient In-home support       Goal: Establish adequate home  support       Start Date: 7/23/2024 Expected End Date: 10/23/2024    This Visit's Progress: 40% Recent Progress: 20%    Note:     Barriers: Diagnosis of multiple, chronic, complex medical conditions.  Strengths: motivated, engaged in care coordination  Patient expressed understanding of goal: Yes  Action steps to achieve this goal:  1. I will discuss in home support options that are available to me or assisted living options at [facility].   2. I will review PCA, home care and caregiver support information.   3. I will contact my care team with questions, concerns, support needs. I will use the clinic as a resource and I understand I can contact my clinic with 24/7 after hours services available. Care Coordinator will remain available as needed.                                 Intervention/Education provided during outreach: As above. CC role, goal(s), clinic after hours, appointments, discussed/reviewed. Support provided.      Outreach Frequency: monthly, more frequently as needed    Plan:   Care Coordinator will follow up in 1 month and as needed.    Klaudia Brewre RN Clinic Care Coordinator  [facility] Clinics: [place], [place] (on-site Wednesdays), [facility] (on-site Thursdays) & [facility].  [email or web address]  Phone: [phone]

## 2024-12-09 ENCOUNTER — LAB (OUTPATIENT)
Dept: LAB | Facility: CLINIC | Age: 79
End: 2024-12-09
Payer: MEDICARE

## 2024-12-09 DIAGNOSIS — N18.32 STAGE 3B CHRONIC KIDNEY DISEASE (H): ICD-10-CM

## 2024-12-09 LAB
ALBUMIN MFR UR ELPH: 10.6 MG/DL
ALBUMIN SERPL BCG-MCNC: 3.5 G/DL (ref 3.5–5.2)
ANION GAP SERPL CALCULATED.3IONS-SCNC: 7 MMOL/L (ref 7–15)
BUN SERPL-MCNC: 23.9 MG/DL (ref 8–23)
CALCIUM SERPL-MCNC: 8.8 MG/DL (ref 8.8–10.4)
CHLORIDE SERPL-SCNC: 105 MMOL/L (ref 98–107)
CREAT SERPL-MCNC: 1.72 MG/DL (ref 0.67–1.17)
CREAT UR-MCNC: 134 MG/DL
EGFRCR SERPLBLD CKD-EPI 2021: 40 ML/MIN/1.73M2
GLUCOSE SERPL-MCNC: 100 MG/DL (ref 70–99)
HCO3 SERPL-SCNC: 26 MMOL/L (ref 22–29)
HGB BLD-MCNC: 14.3 G/DL (ref 13.3–17.7)
PHOSPHATE SERPL-MCNC: 2.5 MG/DL (ref 2.5–4.5)
POTASSIUM SERPL-SCNC: 4.7 MMOL/L (ref 3.4–5.3)
PROT/CREAT 24H UR: 0.08 MG/MG CR (ref 0–0.2)
SODIUM SERPL-SCNC: 138 MMOL/L (ref 135–145)

## 2024-12-09 PROCEDURE — 85018 HEMOGLOBIN: CPT

## 2024-12-09 PROCEDURE — 84156 ASSAY OF PROTEIN URINE: CPT

## 2024-12-09 PROCEDURE — 80069 RENAL FUNCTION PANEL: CPT

## 2024-12-09 PROCEDURE — 36415 COLL VENOUS BLD VENIPUNCTURE: CPT

## 2024-12-12 ENCOUNTER — OFFICE VISIT (OUTPATIENT)
Dept: NEPHROLOGY | Facility: CLINIC | Age: 79
End: 2024-12-12
Attending: PHYSICIAN ASSISTANT
Payer: MEDICARE

## 2024-12-12 VITALS
BODY MASS INDEX: 31.5 KG/M2 | DIASTOLIC BLOOD PRESSURE: 72 MMHG | OXYGEN SATURATION: 96 % | SYSTOLIC BLOOD PRESSURE: 103 MMHG | WEIGHT: 252 LBS | HEART RATE: 90 BPM

## 2024-12-12 DIAGNOSIS — N18.32 STAGE 3B CHRONIC KIDNEY DISEASE (H): Primary | ICD-10-CM

## 2024-12-12 DIAGNOSIS — N18.32 ANEMIA DUE TO STAGE 3B CHRONIC KIDNEY DISEASE (H): ICD-10-CM

## 2024-12-12 DIAGNOSIS — C64.1 RENAL CELL CARCINOMA, RIGHT (H): ICD-10-CM

## 2024-12-12 DIAGNOSIS — D63.1 ANEMIA DUE TO STAGE 3B CHRONIC KIDNEY DISEASE (H): ICD-10-CM

## 2024-12-12 DIAGNOSIS — I95.9 HYPOTENSION, UNSPECIFIED HYPOTENSION TYPE: ICD-10-CM

## 2024-12-12 DIAGNOSIS — R60.9 EDEMA, UNSPECIFIED TYPE: ICD-10-CM

## 2024-12-12 PROCEDURE — G0463 HOSPITAL OUTPT CLINIC VISIT: HCPCS | Performed by: PHYSICIAN ASSISTANT

## 2024-12-12 ASSESSMENT — PAIN SCALES - GENERAL: PAINLEVEL_OUTOF10: NO PAIN (0)

## 2024-12-12 NOTE — NURSING NOTE
Chief Complaint   Patient presents with    RECHECK     3 mo f/u       Chief Complaint   Patient presents with    RECHECK     3 mo f/u       /72   Pulse 90   Wt 114.3 kg (252 lb)   SpO2 96%   BMI 31.50 kg/m      Phong Cid on 12/12/2024 at 11:26 AM

## 2024-12-12 NOTE — LETTER
12/12/2024       RE: Ti Nickerson  [address]  [place] [address]     Dear Colleague,    Thank you for referring your patient, Ti Nickerson, to the [facility] [place] at [facility]. Please see a copy of my visit note below.    Nephrology Progress Note  12/12/2024    Assessment and Plan:  79 year old male with history of renal cell carcinoma s/p right nephrectomy 2019, Scr 1.1 up to 1.6-1.7 post nephrectomy, with metastatic disease to lungs and new liver nodule noted on ultrasound. He presents for followup of CKD, first seen Fall 2021.     # CKD stage 3b- Scr 1.6-1.7, eGFR 35-40ml/min, had been closer to 1.7-2, eGFR 30-35 with lower BP's.  He has no albuminuria, normal blood pressure with no medication. In fact he has low BP at this time and is taking midodrine which helps.   - no proteinuria   He has been monitoring disease and is now on chemotherapy, managed by oncology at [facility].   - we reviewed diet and exercise and discussed blood pressure    # Hypertension: blood pressures are better now on midodrine, usually 120/s  -Taking midodrine 5 mg BID, /72 in clinic  - follows with cardiology  - no changes today    # Anemia  - Hgb: 14.3, improved from 7.1 with transfusion and iron infusions, and cancer treatment  - will monitor    # Electrolytes:   - Potassium; level: 4.7 Normal, sodium 138  - Bicarbonate; level: 26 Normal  - stopped sodium bicarb due to increased LE edema, which improved  - bicarb is normal, okay to continue holding sodium bicarb.     # Mineral Bone Disorder:   - Vitamin D; level is: Normal  - Corrected calcium; level is 8.8 :  Normal   - Phos 2.5, 6/12/24 Vit D 36, PTH 24  - no concerns    Orthostatic hypotension- improved with stopping metoprolol and starting midodrine.  - continue compression stockings, consider abdominal binder. Follows with cardiology.  - low suspicion for adrenal  insufficiency      Assessment and plan was discussed with patient and he voiced his understanding and agreement.    #Disposition: labs and follow up in 3 months    Reason for Visit:  Ti Nickerson is a 79 year old male with CKD from nephrectomy, who presents for evaluation.    HPI:  He is a very pleasant male with history of renal cell carcinoma, diagnosed when he had olivia hematuria episode in 2019. He underwent right nephrectomy and has been monitored by Dr Hernández at [facility].  He had CT scan in September that showed stable pulmonary lesions. He had an abdominal ultrasound that showed a ?new 3.4cm nodule in his liver.    He ultimately had CT at [facility] which confirmed progression metastatic RCC.     They sold their home and moved into assisted living where there is a gym  They usually eat healthy   He has atrial fibrillation and no longer on anticoagulation  His blood pressure runs 100-120s/ overall and he is feeling better.   He denies any shortness of breath or swelling. He feels fairly well except when he is in afib sometimes.  He sees Dr Quarles in cardiology who manages his afib, he is wondering if watchman procedure might be an option for him.   He is accompanied by his wife during the visit.   He denies family history of kidney failure, his parents lived to their 90s    He is now following with [place] [facility]ology. Completed infusions and radiation treatment. Has started cabozantinib. He is scheduled for follow up scans on 12/30/24.    He started PT last week, going well.   Hopeful to be traveling to [place] in February returning in April. He sees Dr. Daley with cancer in clinic in [place] over the winter.         Renal History:   Kidney Disease and Medical Hx:  Right nephrectomy    ROS:   A comprehensive review of systems was obtained and negative, except as noted in the HPI or PMH.    Active Medical Problems:  Patient Active Problem List   Diagnosis     Chronic atrial fibrillation (H)      Idiopathic cardiomyopathy (H)     Coronary artery disease involving native coronary artery of native heart without angina pectoris     Esophageal reflux     Mixed hyperlipidemia     Sleep apnea     Essential hypertension     Obesity (BMI 35.0-39.9) with comorbidity (H)     Thoracic aortic aneurysm without rupture (H)     Tubular adenoma of colon     Elevated TSH     Renal cell carcinoma, right (H)     CKD (chronic kidney disease) stage 3, GFR 30-59 ml/min (H)     Ventral hernia without obstruction or gangrene     Long term (current) use of anticoagulants     Melena     Cellulitis of abdominal wall     Anemia of chronic disease     Lower GI bleed     Syncope, unspecified syncope type     PMH:   Medical record was reviewed and PMH was discussed with patient and noted below.  Past Medical History:   Diagnosis Date     Atrial fibrillation, unspecified 9/18/2015     CAD (coronary artery disease) 09/18/2015    minimal by angio 2007, fu nuc est nl 2018     Elevated TSH 2018     Esophageal reflux 9/18/2015     Essential hypertension      History of cardioversion     [id number]     Idiopathic cardiomyopathy (H) 09/18/2015    fu echo nl ef, nl nuclear est 11/18, Dr. Quarles     Mixed hyperlipidemia 9/18/2015     Mumps      Sleep apnea 09/18/2015    does not use cpap as of 2019     Sleep apnea      Thoracic aortic aneurysm (H)     sinus of valsalva 4.5 and asc aorta 4.5 in 2017     Tubular adenoma of colon 01/2017    fu 3 years     PSH:   Past Surgical History:   Procedure Laterality Date     APPENDECTOMY  1964     ESOPHAGOSCOPY, GASTROSCOPY, DUODENOSCOPY (EGD), COMBINED N/A 9/14/2023    Procedure: Esophagoscopy, gastroscopy, duodenoscopy (EGD), combined;  Surgeon: Chele Ash MD;  Location:  GI       Family Hx:   Family History   Problem Relation Age of Onset     Arrhythmia Mother         a-fib     Cerebrovascular Disease Mother      Arrhythmia Father         a-fib     Colon Cancer Father      Diabetes Father       Cerebrovascular Disease Father      No Known Problems Brother      Personal Hx:   Social History     Tobacco Use     Smoking status: Never     Smokeless tobacco: Never   Substance Use Topics     Alcohol use: Not Currently       Allergies:  Allergies   Allergen Reactions     Fentanyl Confusion       Medications:  Current Outpatient Medications   Medication Sig Dispense Refill     acetaminophen (TYLENOL) 500 MG tablet Take 500-1,000 mg by mouth daily as needed for mild pain       Cabozantinib S-Malate (CABOMETYX) 20 MG Take 20 mg by mouth (Patient not taking: Reported on 8/7/2024)       midodrine (PROAMATINE) 5 MG tablet Take 1 tablet (5 mg) by mouth 2 times daily 60 tablet 11     ondansetron (ZOFRAN ODT) 4 MG ODT tab Take 1 tablet (4 mg) by mouth every 6 hours as needed for nausea or vomiting 20 tablet 0     pantoprazole (PROTONIX) 40 MG EC tablet Take 1 tablet (40 mg) by mouth 2 times daily (before meals). 180 tablet 0     pravastatin (PRAVACHOL) 20 MG tablet Take 1 tablet (20 mg) by mouth daily 90 tablet 3     tamsulosin (FLOMAX) 0.4 MG capsule Take 1 capsule (0.4 mg) by mouth daily 90 capsule 3     No current facility-administered medications for this visit.      Vitals:  /72   Pulse 90   Wt 114.3 kg (252 lb)   SpO2 96%   BMI 31.50 kg/m      Exam:  GENERAL APPEARANCE: alert and no distress  RESP: lungs clear to auscultation   CV: irregular rhythm, normal rate, no rub, no murmur  EDEMA: trace LE edema bilaterally  MS: extremities normal - no gross deformities noted, no evidence of inflammation in joints, no muscle tenderness  SKIN: no visible rash    LABS:   CMP  Recent Labs   Lab Test 12/09/24  1002 08/30/24  0857 07/20/24  1403 07/19/24  0948 10/11/21  0942 06/10/21  0845 04/28/21  0000 11/20/20  0000 10/26/20  0936    138 138 138   < > 140  --   --  139   POTASSIUM 4.7 4.2 3.3* 3.5   < > 4.5 5.2 4.5 5.1   CHLORIDE 105 105 107 106   < > 111*  --   --  105   CO2 26 25 22 22   < > 25  --   --  29    ANIONGAP 7 8 9 10   < > 4  --   --  10.1   * 106* 103* 104*   < > 97 102 98 113*   BUN 23.9* 19.9 15.7 14.4   < > 30  --   --  28   CR 1.72* 1.78* 1.64* 1.65*   < > 1.68* 1.89* 1.87* 1.77*   GFRESTIMATED 40* 39* 43* 43*   < > 39* 34* 35* 38*   GFRESTBLACK  --   --   --   --   --  46* 40* 40* 46*   DOMINICK 8.8 8.5* 8.1* 8.1*   < > 8.5  --   --  9.2    < > = values in this interval not displayed.     Recent Labs   Lab Test 07/19/24  0948 07/14/24  0500 05/28/24  1649 03/26/24  1355   BILITOTAL 0.7 0.3  0.3 0.6 0.5   ALKPHOS 90 80  80 86 70   ALT 15 14  14 16 14   AST 24 27  27 23 19     CBC  Recent Labs   Lab Test 12/09/24  1002 08/30/24  0857 07/20/24  1538 07/20/24  1403 07/19/24  0948 07/17/24  0903 07/16/24  0805 07/15/24  1439 07/15/24  0607   HGB 14.3 11.8* 9.5* 9.5* 9.6*   < > 9.5*   < > 9.0*   WBC  --   --   --  6.0 5.7  --  6.7  --  7.2   RBC  --   --   --  3.43* 3.49*  --  3.33*  --  3.18*   HCT  --   --   --  30.9* 31.2*  --  29.4*  --  27.6*   MCV  --   --   --  90 89  --  88  --  87   MCH  --   --   --  27.7 27.5  --  28.5  --  28.3   MCHC  --   --   --  30.7* 30.8*  --  32.3  --  32.6   RDW  --   --   --  20.4* 20.1*  --  19.1*  --  18.1*   PLT  --   --   --  198 197  --  216  --  199    < > = values in this interval not displayed.     URINE STUDIES  Recent Labs   Lab Test 10/31/23  0142 09/05/23  0938 10/11/21  0947 12/26/19  0812 09/25/19  0859 09/17/19  1442   COLOR Yellow Yellow Yellow Yellow Yellow Yellow   APPEARANCE Clear Clear Clear Clear Clear Clear   URINEGLC Negative Negative Negative Negative Negative Negative   URINEBILI Negative Negative Negative Negative Negative Negative   URINEKETONE Negative Negative Negative Negative Negative Negative   SG 1.021 1.025 1.023 1.020 1.020 1.025   UBLD Negative Trace* Trace* Small* Large* Large*   URINEPH 5.5 5.5 5.0 5.0 5.0 5.0   PROTEIN Negative Negative Negative Negative Negative Negative   UROBILINOGEN  --  1.0  --  0.2 0.2 0.2   NITRITE  Negative Negative Negative Negative Negative Negative   LEUKEST Negative Negative Negative Negative Negative Negative   RBCU 1 0-2 1 O - 2  --  5-10*   WBCU 2 0-5 <1 0 - 5  --  0 - 5     No lab results found.  PTH  Recent Labs   Lab Test 06/12/24  1014 09/21/22  1102 10/11/21  0942   PTHI 24 45 36     IRON STUDIES  Recent Labs   Lab Test 04/16/24  1407 10/11/21  0942 06/10/21  0845 12/26/19  0812   IRON 17* 97  --  60    304  --  307   IRONSAT 5* 32  --  20   TACHO 12* 274 250 160     Interpretation Summary     The left ventricle is borderline dilated.  There is mild concentric left ventricular hypertrophy.  The visual ejection fraction is 55-60%.  The right ventricle is normal in structure, function and size.  Normal left atrial size.  The right atrium is moderate to severely dilated.  There is mild (1+) mitral regurgitation.  There is trace tricuspid regurgitation.  The right ventricular systolic pressure is approximated at 24mmHg plus the  right atrial pressure.  There is trace aortic regurgitation.  Mild aortic root dilatation. The aortic root and ascending aorta both measure  4.6 cm, There has been some variability in measured aortic size over previous  echoes, but overall likely no change since 2016.  ______________________________________________________________________________  Left Ventricle  The left ventricle is borderline dilated. There is mild concentric left  ventricular hypertrophy. The visual ejection fraction is 55-60%. Diastolic  function not assessed due to atrial fibrillation. Diastolic Doppler findings  (E/E' ratio and/or other parameters) suggest left ventricular filling  pressures are indeterminate. No regional wall motion abnormalities noted.     Right Ventricle  The right ventricle is normal in structure, function and size.     Atria  Normal left atrial size. The right atrium is moderate to severely dilated.  There is no atrial shunt seen.     Mitral Valve  The mitral valve leaflets  appear normal. There is no evidence of stenosis,  fluttering, or prolapse. There is mild (1+) mitral regurgitation.     Tricuspid Valve  There is trace tricuspid regurgitation. The right ventricular systolic  pressure is approximated at 24mmHg plus the right atrial pressure. IVC  diameter and respiratory changes fall into an intermediate range suggesting an  RA pressure of 8 mmHg.     Aortic Valve  There is mild trileaflet aortic sclerosis. There is trace aortic  regurgitation.     Pulmonic Valve  There is mild (1+) pulmonic valvular regurgitation.     Vessels  Mild aortic root dilatation.     Pericardium  There is no pericardial effusion.     Rhythm  The rhythm was atrial fibrillation.  ______________________________________________________________________________  MMode/2D Measurements & Calculations        Carlton Lazo PA-C    Visit length 15 minutes. An additional 15 minutes were spent on date of service in chart review, documentation, and other activities as noted.       Again, thank you for allowing me to participate in the care of your patient.      Sincerely,    CARLTON MELENDEZ PA-C

## 2024-12-12 NOTE — PROGRESS NOTES
Nephrology Progress Note  12/12/2024    Assessment and Plan:  79 year old male with history of renal cell carcinoma s/p right nephrectomy 2019, Scr 1.1 up to 1.6-1.7 post nephrectomy, with metastatic disease to lungs and new liver nodule noted on ultrasound. He presents for followup of CKD, first seen Fall 2021.     # CKD stage 3b- Scr 1.6-1.7, eGFR 35-40ml/min, had been closer to 1.7-2, eGFR 30-35 with lower BP's.  He has no albuminuria, normal blood pressure with no medication. In fact he has low BP at this time and is taking midodrine which helps.   - no proteinuria   He has been monitoring disease and is now on chemotherapy, managed by oncology at [facility].   - we reviewed diet and exercise and discussed blood pressure    # Hypertension: blood pressures are better now on midodrine, usually 120/s  -Taking midodrine 5 mg BID, /72 in clinic  - follows with cardiology  - no changes today    # Anemia  - Hgb: 14.3, improved from 7.1 with transfusion and iron infusions, and cancer treatment  - will monitor    # Electrolytes:   - Potassium; level: 4.7 Normal, sodium 138  - Bicarbonate; level: 26 Normal  - stopped sodium bicarb due to increased LE edema, which improved  - bicarb is normal, okay to continue holding sodium bicarb.     # Mineral Bone Disorder:   - Vitamin D; level is: Normal  - Corrected calcium; level is 8.8 :  Normal   - Phos 2.5, 6/12/24 Vit D 36, PTH 24  - no concerns    Orthostatic hypotension- improved with stopping metoprolol and starting midodrine.  - continue compression stockings, consider abdominal binder. Follows with cardiology.  - low suspicion for adrenal insufficiency      Assessment and plan was discussed with patient and he voiced his understanding and agreement.    #Disposition: labs and follow up in 3 months    Reason for Visit:  Ti Nickerson is a 79 year old male with CKD from nephrectomy, who presents for evaluation.    HPI:  He is a very pleasant male with history of renal  cell carcinoma, diagnosed when he had olivia hematuria episode in 2019. He underwent right nephrectomy and has been monitored by Dr Hernández at [facility].  He had CT scan in September that showed stable pulmonary lesions. He had an abdominal ultrasound that showed a ?new 3.4cm nodule in his liver.    He ultimately had CT at [place] which confirmed progression metastatic RCC.     They sold their home and moved into assisted living where there is a gym  They usually eat healthy   He has atrial fibrillation and no longer on anticoagulation  His blood pressure runs 100-120s/ overall and he is feeling better.   He denies any shortness of breath or swelling. He feels fairly well except when he is in afib sometimes.  He sees Dr Quarles in cardiology who manages his afib, he is wondering if watchman procedure might be an option for him.   He is accompanied by his wife during the visit.   He denies family history of kidney failure, his parents lived to their 90s    He is now following with [place] [facility]. Completed infusions and radiation treatment. Has started cabozantinib. He is scheduled for follow up scans on 12/30/24.    He started PT last week, going well.   Hopeful to be traveling to [place] in February returning in April. He sees Dr. Daley with cancer in clinic in [place] over the winter.         Renal History:   Kidney Disease and Medical Hx:  Right nephrectomy    ROS:   A comprehensive review of systems was obtained and negative, except as noted in the HPI or PMH.    Active Medical Problems:  Patient Active Problem List   Diagnosis    Chronic atrial fibrillation (H)    Idiopathic cardiomyopathy (H)    Coronary artery disease involving native coronary artery of native heart without angina pectoris    Esophageal reflux    Mixed hyperlipidemia    Sleep apnea    Essential hypertension    Obesity (BMI 35.0-39.9) with comorbidity (H)    Thoracic aortic aneurysm without rupture (H)    Tubular adenoma of colon     Elevated TSH    Renal cell carcinoma, right (H)    CKD (chronic kidney disease) stage 3, GFR 30-59 ml/min (H)    Ventral hernia without obstruction or gangrene    Long term (current) use of anticoagulants    Melena    Cellulitis of abdominal wall    Anemia of chronic disease    Lower GI bleed    Syncope, unspecified syncope type     PMH:   Medical record was reviewed and PMH was discussed with patient and noted below.  Past Medical History:   Diagnosis Date    Atrial fibrillation, unspecified 9/18/2015    CAD (coronary artery disease) 09/18/2015    minimal by angio 2007, fu nuc est nl 2018    Elevated TSH 2018    Esophageal reflux 9/18/2015    Essential hypertension     History of cardioversion     [id number]    Idiopathic cardiomyopathy (H) 09/18/2015    fu echo nl ef, nl nuclear est 11/18, Dr. Quarles    Mixed hyperlipidemia 9/18/2015    Mumps     Sleep apnea 09/18/2015    does not use cpap as of 2019    Sleep apnea     Thoracic aortic aneurysm (H)     sinus of valsalva 4.5 and asc aorta 4.5 in 2017    Tubular adenoma of colon 01/2017    fu 3 years     PSH:   Past Surgical History:   Procedure Laterality Date    APPENDECTOMY  1964    ESOPHAGOSCOPY, GASTROSCOPY, DUODENOSCOPY (EGD), COMBINED N/A 9/14/2023    Procedure: Esophagoscopy, gastroscopy, duodenoscopy (EGD), combined;  Surgeon: Chele Ash MD;  Location:  GI       Family Hx:   Family History   Problem Relation Age of Onset    Arrhythmia Mother         a-fib    Cerebrovascular Disease Mother     Arrhythmia Father         a-fib    Colon Cancer Father     Diabetes Father     Cerebrovascular Disease Father     No Known Problems Brother      Personal Hx:   Social History     Tobacco Use    Smoking status: Never    Smokeless tobacco: Never   Substance Use Topics    Alcohol use: Not Currently       Allergies:  Allergies   Allergen Reactions    Fentanyl Confusion       Medications:  Current Outpatient Medications   Medication Sig Dispense Refill     acetaminophen (TYLENOL) 500 MG tablet Take 500-1,000 mg by mouth daily as needed for mild pain      Cabozantinib S-Malate (CABOMETYX) 20 MG Take 20 mg by mouth (Patient not taking: Reported on 8/7/2024)      midodrine (PROAMATINE) 5 MG tablet Take 1 tablet (5 mg) by mouth 2 times daily 60 tablet 11    ondansetron (ZOFRAN ODT) 4 MG ODT tab Take 1 tablet (4 mg) by mouth every 6 hours as needed for nausea or vomiting 20 tablet 0    pantoprazole (PROTONIX) 40 MG EC tablet Take 1 tablet (40 mg) by mouth 2 times daily (before meals). 180 tablet 0    pravastatin (PRAVACHOL) 20 MG tablet Take 1 tablet (20 mg) by mouth daily 90 tablet 3    tamsulosin (FLOMAX) 0.4 MG capsule Take 1 capsule (0.4 mg) by mouth daily 90 capsule 3     No current facility-administered medications for this visit.      Vitals:  /72   Pulse 90   Wt 114.3 kg (252 lb)   SpO2 96%   BMI 31.50 kg/m      Exam:  GENERAL APPEARANCE: alert and no distress  RESP: lungs clear to auscultation   CV: irregular rhythm, normal rate, no rub, no murmur  EDEMA: trace LE edema bilaterally  MS: extremities normal - no gross deformities noted, no evidence of inflammation in joints, no muscle tenderness  SKIN: no visible rash    LABS:   CMP  Recent Labs   Lab Test 12/09/24  1002 08/30/24  0857 07/20/24  1403 07/19/24  0948 10/11/21  0942 06/10/21  0845 04/28/21  0000 11/20/20  0000 10/26/20  0936    138 138 138   < > 140  --   --  139   POTASSIUM 4.7 4.2 3.3* 3.5   < > 4.5 5.2 4.5 5.1   CHLORIDE 105 105 107 106   < > 111*  --   --  105   CO2 26 25 22 22   < > 25  --   --  29   ANIONGAP 7 8 9 10   < > 4  --   --  10.1   * 106* 103* 104*   < > 97 102 98 113*   BUN 23.9* 19.9 15.7 14.4   < > 30  --   --  28   CR 1.72* 1.78* 1.64* 1.65*   < > 1.68* 1.89* 1.87* 1.77*   GFRESTIMATED 40* 39* 43* 43*   < > 39* 34* 35* 38*   GFRESTBLACK  --   --   --   --   --  46* 40* 40* 46*   DOMINICK 8.8 8.5* 8.1* 8.1*   < > 8.5  --   --  9.2    < > = values in this interval  not displayed.     Recent Labs   Lab Test 07/19/24  0948 07/14/24  0500 05/28/24  1649 03/26/24  1355   BILITOTAL 0.7 0.3  0.3 0.6 0.5   ALKPHOS 90 80  80 86 70   ALT 15 14  14 16 14   AST 24 27  27 23 19     CBC  Recent Labs   Lab Test 12/09/24  1002 08/30/24  0857 07/20/24  1538 07/20/24  1403 07/19/24  0948 07/17/24  0903 07/16/24  0805 07/15/24  1439 07/15/24  0607   HGB 14.3 11.8* 9.5* 9.5* 9.6*   < > 9.5*   < > 9.0*   WBC  --   --   --  6.0 5.7  --  6.7  --  7.2   RBC  --   --   --  3.43* 3.49*  --  3.33*  --  3.18*   HCT  --   --   --  30.9* 31.2*  --  29.4*  --  27.6*   MCV  --   --   --  90 89  --  88  --  87   MCH  --   --   --  27.7 27.5  --  28.5  --  28.3   MCHC  --   --   --  30.7* 30.8*  --  32.3  --  32.6   RDW  --   --   --  20.4* 20.1*  --  19.1*  --  18.1*   PLT  --   --   --  198 197  --  216  --  199    < > = values in this interval not displayed.     URINE STUDIES  Recent Labs   Lab Test 10/31/23  0142 09/05/23  0938 10/11/21  0947 12/26/19  0812 09/25/19  0859 09/17/19  1442   COLOR Yellow Yellow Yellow Yellow Yellow Yellow   APPEARANCE Clear Clear Clear Clear Clear Clear   URINEGLC Negative Negative Negative Negative Negative Negative   URINEBILI Negative Negative Negative Negative Negative Negative   URINEKETONE Negative Negative Negative Negative Negative Negative   SG 1.021 1.025 1.023 1.020 1.020 1.025   UBLD Negative Trace* Trace* Small* Large* Large*   URINEPH 5.5 5.5 5.0 5.0 5.0 5.0   PROTEIN Negative Negative Negative Negative Negative Negative   UROBILINOGEN  --  1.0  --  0.2 0.2 0.2   NITRITE Negative Negative Negative Negative Negative Negative   LEUKEST Negative Negative Negative Negative Negative Negative   RBCU 1 0-2 1 O - 2  --  5-10*   WBCU 2 0-5 <1 0 - 5  --  0 - 5     No lab results found.  PTH  Recent Labs   Lab Test 06/12/24  1014 09/21/22  1102 10/11/21  0942   PTHI 24 45 36     IRON STUDIES  Recent Labs   Lab Test 04/16/24  1407 10/11/21  0942 06/10/21  0845  12/26/19  0812   IRON 17* 97  --  60    304  --  307   IRONSAT 5* 32  --  20   TACHO 12* 274 250 160     Interpretation Summary     The left ventricle is borderline dilated.  There is mild concentric left ventricular hypertrophy.  The visual ejection fraction is 55-60%.  The right ventricle is normal in structure, function and size.  Normal left atrial size.  The right atrium is moderate to severely dilated.  There is mild (1+) mitral regurgitation.  There is trace tricuspid regurgitation.  The right ventricular systolic pressure is approximated at 24mmHg plus the  right atrial pressure.  There is trace aortic regurgitation.  Mild aortic root dilatation. The aortic root and ascending aorta both measure  4.6 cm, There has been some variability in measured aortic size over previous  echoes, but overall likely no change since 2016.  ______________________________________________________________________________  Left Ventricle  The left ventricle is borderline dilated. There is mild concentric left  ventricular hypertrophy. The visual ejection fraction is 55-60%. Diastolic  function not assessed due to atrial fibrillation. Diastolic Doppler findings  (E/E' ratio and/or other parameters) suggest left ventricular filling  pressures are indeterminate. No regional wall motion abnormalities noted.     Right Ventricle  The right ventricle is normal in structure, function and size.     Atria  Normal left atrial size. The right atrium is moderate to severely dilated.  There is no atrial shunt seen.     Mitral Valve  The mitral valve leaflets appear normal. There is no evidence of stenosis,  fluttering, or prolapse. There is mild (1+) mitral regurgitation.     Tricuspid Valve  There is trace tricuspid regurgitation. The right ventricular systolic  pressure is approximated at 24mmHg plus the right atrial pressure. IVC  diameter and respiratory changes fall into an intermediate range suggesting an  RA pressure of 8 mmHg.      Aortic Valve  There is mild trileaflet aortic sclerosis. There is trace aortic  regurgitation.     Pulmonic Valve  There is mild (1+) pulmonic valvular regurgitation.     Vessels  Mild aortic root dilatation.     Pericardium  There is no pericardial effusion.     Rhythm  The rhythm was atrial fibrillation.  ______________________________________________________________________________  MMode/2D Measurements & Calculations        Klaudia Lazo PA-C    Visit length 15 minutes. An additional 15 minutes were spent on date of service in chart review, documentation, and other activities as noted.

## 2024-12-12 NOTE — PATIENT INSTRUCTIONS
No changes today.   Check blood pressure daily, keep log.   Avoid ibuprofen/aleve.   Continue good hydration, low sodium diet.   Labs and follow up in 3-4 months.

## 2024-12-30 ENCOUNTER — PATIENT OUTREACH (OUTPATIENT)
Dept: CARE COORDINATION | Facility: CLINIC | Age: 79
End: 2024-12-30
Payer: MEDICARE

## 2024-12-30 NOTE — PROGRESS NOTES
Clinic Care Coordination Contact  Follow Up Progress Note      Assessment:   Had a PET scan today.   Will follow up with Oncology on the results.   They will determine if patient should continue the Cabometyx or stop it.     Has Cardiology, Nephrology & Neuropsychology follow up in the coming month.     They hope to leave to their condo in February.     Some days patient gets fatigued by activities. Can also feel down due to his energy levels.   They have been managing well however.   Will cancel plans to rest or reschedule things.    Care Gaps:    Health Maintenance Due   Topic Date Due    DEPRESSION ACTION PLAN  Never done    ZOSTER IMMUNIZATION (1 of 2) Never done    COVID-19 Vaccine (8 - 2024-25 season) 12/17/2024       Postponed to visit tomorrow with PCP.      Care Plans  Care Plan: Advance Care Plan       Problem: Patient does not have a valid Health Care Directive       Goal: Complete Health Care Directive       Start Date: 7/23/2024 Expected End Date: 1/23/2025    This Visit's Progress: 20% Recent Progress: 10%    Note:     Barriers: Diagnosis of multiple, chronic, complex medical conditions, provider availability-wait time to complete appointments.   Strengths: Motivated, engaged in Care Coordination.  Patient expressed understanding of goal: Yes  Action steps to achieve this goal:  1. I will review the health care directive documents that are mailed out.   2. I will complete the health care directive. I can check with my bank about [employer] services, locate a nearby notary [email or web address]. I understand to make this document legal I will need to sign this in the presence of two witnesses who are not my listed health care agents, or having this notarized. (Dates must match)    3. I will provide a copy of my health care directive to my care team when completed. I can email a copy to [email or web address].   4. I can visit [email or web address] website or call  honordeisy ragland at #[phone] for additional information or questions.   5. I will contact my care team with any barriers, questions or assistance needed with this goal. Care coordinator will remain available as needed.                               Care Plan: Help At Home       Problem: Insufficient In-home support       Goal: Establish adequate home support       Start Date: 7/23/2024 Expected End Date: 10/23/2024    This Visit's Progress: 40% Recent Progress: 20%    Note:     Barriers: Diagnosis of multiple, chronic, complex medical conditions.  Strengths: motivated, engaged in care coordination  Patient expressed understanding of goal: Yes  Action steps to achieve this goal:  1. I will discuss in home support options that are available to me or assisted living options at [facility].   2. I will review PCA, home care and caregiver support information.   3. I will contact my care team with questions, concerns, support needs. I will use the clinic as a resource and I understand I can contact my clinic with 24/7 after hours services available. Care Coordinator will remain available as needed.                                 Intervention/Education provided during outreach: As above. CC role, goal(s), clinic after hours, appointments, discussed/reviewed. Support provided.      Outreach Frequency: monthly, more frequently as needed    Plan:   Care Coordinator will follow up in 1 month.     Klaudia Brewer, RN Clinic Care Coordinator  [facility] Clinics: [place], [place] (on-site Wednesdays), [facility] (on-site Thursdays) & [facility].  [email or web address]  Phone: [phone]

## 2024-12-31 PROBLEM — C78.00 MALIGNANT NEOPLASM METASTATIC TO LUNG, UNSPECIFIED LATERALITY (H): Status: ACTIVE | Noted: 2024-12-31

## 2025-01-01 ENCOUNTER — APPOINTMENT (OUTPATIENT)
Dept: GENERAL RADIOLOGY | Facility: CLINIC | Age: 80
End: 2025-01-01
Attending: NURSE PRACTITIONER
Payer: MEDICARE

## 2025-01-01 ENCOUNTER — APPOINTMENT (OUTPATIENT)
Dept: CT IMAGING | Facility: CLINIC | Age: 80
End: 2025-01-01
Attending: EMERGENCY MEDICINE
Payer: MEDICARE

## 2025-01-01 ENCOUNTER — APPOINTMENT (OUTPATIENT)
Dept: ULTRASOUND IMAGING | Facility: CLINIC | Age: 80
End: 2025-01-01
Attending: EMERGENCY MEDICINE
Payer: MEDICARE

## 2025-01-01 ENCOUNTER — NURSE TRIAGE (OUTPATIENT)
Dept: CARDIOLOGY | Facility: CLINIC | Age: 80
End: 2025-01-01

## 2025-01-01 ENCOUNTER — APPOINTMENT (OUTPATIENT)
Dept: PHYSICAL THERAPY | Facility: CLINIC | Age: 80
DRG: 270 | End: 2025-01-01
Payer: MEDICARE

## 2025-01-01 ENCOUNTER — TELEPHONE (OUTPATIENT)
Dept: FAMILY MEDICINE | Facility: CLINIC | Age: 80
End: 2025-01-01
Payer: MEDICARE

## 2025-01-01 ENCOUNTER — PATIENT OUTREACH (OUTPATIENT)
Dept: CARE COORDINATION | Facility: CLINIC | Age: 80
End: 2025-01-01
Payer: MEDICARE

## 2025-01-01 ENCOUNTER — APPOINTMENT (OUTPATIENT)
Dept: MRI IMAGING | Facility: CLINIC | Age: 80
End: 2025-01-01
Attending: INTERNAL MEDICINE
Payer: MEDICARE

## 2025-01-01 ENCOUNTER — APPOINTMENT (OUTPATIENT)
Dept: ULTRASOUND IMAGING | Facility: CLINIC | Age: 80
End: 2025-01-01
Attending: INTERNAL MEDICINE
Payer: MEDICARE

## 2025-01-01 ENCOUNTER — HOSPITAL ENCOUNTER (INPATIENT)
Facility: CLINIC | Age: 80
LOS: 3 days | End: 2025-02-06
Attending: STUDENT IN AN ORGANIZED HEALTH CARE EDUCATION/TRAINING PROGRAM | Admitting: STUDENT IN AN ORGANIZED HEALTH CARE EDUCATION/TRAINING PROGRAM
Payer: MEDICARE

## 2025-01-01 ENCOUNTER — APPOINTMENT (OUTPATIENT)
Dept: INTERVENTIONAL RADIOLOGY/VASCULAR | Facility: CLINIC | Age: 80
End: 2025-01-01
Attending: RADIOLOGY
Payer: MEDICARE

## 2025-01-01 VITALS — OXYGEN SATURATION: 94 %

## 2025-01-01 DIAGNOSIS — C64.1 RENAL CELL CARCINOMA, RIGHT (H): Primary | ICD-10-CM

## 2025-01-01 DIAGNOSIS — S22.081A T12 BURST FRACTURE (H): ICD-10-CM

## 2025-01-01 DIAGNOSIS — C78.4: ICD-10-CM

## 2025-01-01 PROCEDURE — 93970 EXTREMITY STUDY: CPT

## 2025-01-01 PROCEDURE — 250N000011 HC RX IP 250 OP 636: Mod: JZ | Performed by: STUDENT IN AN ORGANIZED HEALTH CARE EDUCATION/TRAINING PROGRAM

## 2025-01-01 PROCEDURE — 250N000011 HC RX IP 250 OP 636: Performed by: STUDENT IN AN ORGANIZED HEALTH CARE EDUCATION/TRAINING PROGRAM

## 2025-01-01 PROCEDURE — 37187 VENOUS MECH THROMBECTOMY: CPT

## 2025-01-01 PROCEDURE — 70450 CT HEAD/BRAIN W/O DYE: CPT

## 2025-01-01 PROCEDURE — 72131 CT LUMBAR SPINE W/O DYE: CPT

## 2025-01-01 PROCEDURE — 71260 CT THORAX DX C+: CPT

## 2025-01-01 PROCEDURE — 74177 CT ABD & PELVIS W/CONTRAST: CPT

## 2025-01-01 PROCEDURE — 110N000005 HC R&B HOSPICE, ACCENT

## 2025-01-01 PROCEDURE — 72125 CT NECK SPINE W/O DYE: CPT

## 2025-01-01 PROCEDURE — 99232 SBSQ HOSP IP/OBS MODERATE 35: CPT | Mod: GV | Performed by: STUDENT IN AN ORGANIZED HEALTH CARE EDUCATION/TRAINING PROGRAM

## 2025-01-01 PROCEDURE — 72128 CT CHEST SPINE W/O DYE: CPT

## 2025-01-01 PROCEDURE — 72070 X-RAY EXAM THORAC SPINE 2VWS: CPT

## 2025-01-01 PROCEDURE — 250N000009 HC RX 250: Performed by: STUDENT IN AN ORGANIZED HEALTH CARE EDUCATION/TRAINING PROGRAM

## 2025-01-01 PROCEDURE — 99231 SBSQ HOSP IP/OBS SF/LOW 25: CPT | Mod: GV | Performed by: STUDENT IN AN ORGANIZED HEALTH CARE EDUCATION/TRAINING PROGRAM

## 2025-01-01 PROCEDURE — 250N000011 HC RX IP 250 OP 636: Performed by: HOSPITALIST

## 2025-01-01 PROCEDURE — 99207 PR NO BILLABLE SERVICE THIS VISIT: CPT | Performed by: STUDENT IN AN ORGANIZED HEALTH CARE EDUCATION/TRAINING PROGRAM

## 2025-01-01 PROCEDURE — 99238 HOSP IP/OBS DSCHRG MGMT 30/<: CPT | Mod: GV | Performed by: NURSE PRACTITIONER

## 2025-01-01 PROCEDURE — 72146 MRI CHEST SPINE W/O DYE: CPT

## 2025-01-01 PROCEDURE — 76705 ECHO EXAM OF ABDOMEN: CPT

## 2025-01-01 PROCEDURE — 99152 MOD SED SAME PHYS/QHP 5/>YRS: CPT

## 2025-01-01 RX ORDER — NALOXONE HYDROCHLORIDE 0.4 MG/ML
0.1 INJECTION, SOLUTION INTRAMUSCULAR; INTRAVENOUS; SUBCUTANEOUS
Status: DISCONTINUED | OUTPATIENT
Start: 2025-01-01 | End: 2025-01-01 | Stop reason: HOSPADM

## 2025-01-01 RX ORDER — HYDROMORPHONE HYDROCHLORIDE 1 MG/ML
0.5 INJECTION, SOLUTION INTRAMUSCULAR; INTRAVENOUS; SUBCUTANEOUS 4 TIMES DAILY
Status: DISCONTINUED | OUTPATIENT
Start: 2025-01-01 | End: 2025-01-01

## 2025-01-01 RX ORDER — LORAZEPAM 1 MG/1
1 TABLET ORAL EVERY 4 HOURS PRN
Status: DISCONTINUED | OUTPATIENT
Start: 2025-01-01 | End: 2025-01-01 | Stop reason: HOSPADM

## 2025-01-01 RX ORDER — HYDROMORPHONE HYDROCHLORIDE 1 MG/ML
0.5 INJECTION, SOLUTION INTRAMUSCULAR; INTRAVENOUS; SUBCUTANEOUS EVERY 4 HOURS
Status: DISCONTINUED | OUTPATIENT
Start: 2025-01-01 | End: 2025-01-01 | Stop reason: HOSPADM

## 2025-01-01 RX ORDER — HYDROMORPHONE HYDROCHLORIDE 1 MG/ML
0.5 INJECTION, SOLUTION INTRAMUSCULAR; INTRAVENOUS; SUBCUTANEOUS
Status: DISCONTINUED | OUTPATIENT
Start: 2025-01-01 | End: 2025-01-01 | Stop reason: HOSPADM

## 2025-01-01 RX ORDER — ACETAMINOPHEN 325 MG/1
650 TABLET ORAL EVERY 4 HOURS PRN
Status: DISCONTINUED | OUTPATIENT
Start: 2025-01-01 | End: 2025-01-01 | Stop reason: HOSPADM

## 2025-01-01 RX ORDER — ATROPINE SULFATE 10 MG/ML
2 SOLUTION/ DROPS OPHTHALMIC EVERY 4 HOURS PRN
Status: DISCONTINUED | OUTPATIENT
Start: 2025-01-01 | End: 2025-01-01 | Stop reason: HOSPADM

## 2025-01-01 RX ORDER — LORAZEPAM 2 MG/ML
1 INJECTION INTRAMUSCULAR 4 TIMES DAILY
Status: DISCONTINUED | OUTPATIENT
Start: 2025-01-01 | End: 2025-01-01

## 2025-01-01 RX ORDER — LORAZEPAM 2 MG/ML
1 INJECTION INTRAMUSCULAR EVERY 4 HOURS
Status: DISCONTINUED | OUTPATIENT
Start: 2025-01-01 | End: 2025-01-01 | Stop reason: HOSPADM

## 2025-01-01 RX ORDER — NALOXONE HYDROCHLORIDE 0.4 MG/ML
0.2 INJECTION, SOLUTION INTRAMUSCULAR; INTRAVENOUS; SUBCUTANEOUS
Status: DISCONTINUED | OUTPATIENT
Start: 2025-01-01 | End: 2025-01-01 | Stop reason: HOSPADM

## 2025-01-01 RX ORDER — SALIVA STIMULANT COMB. NO.3
2 SPRAY, NON-AEROSOL (ML) MUCOUS MEMBRANE
Status: DISCONTINUED | OUTPATIENT
Start: 2025-01-01 | End: 2025-01-01 | Stop reason: HOSPADM

## 2025-01-01 RX ORDER — MINERAL OIL/HYDROPHIL PETROLAT
OINTMENT (GRAM) TOPICAL
Status: DISCONTINUED | OUTPATIENT
Start: 2025-01-01 | End: 2025-01-01 | Stop reason: HOSPADM

## 2025-01-01 RX ORDER — HYDROMORPHONE HYDROCHLORIDE 1 MG/ML
0.3 INJECTION, SOLUTION INTRAMUSCULAR; INTRAVENOUS; SUBCUTANEOUS
Status: DISCONTINUED | OUTPATIENT
Start: 2025-01-01 | End: 2025-01-01

## 2025-01-01 RX ORDER — METHOCARBAMOL 500 MG/1
500 TABLET, FILM COATED ORAL 4 TIMES DAILY PRN
Status: DISCONTINUED | OUTPATIENT
Start: 2025-01-01 | End: 2025-01-01 | Stop reason: HOSPADM

## 2025-01-01 RX ORDER — AMOXICILLIN 250 MG
2 CAPSULE ORAL 2 TIMES DAILY PRN
Status: DISCONTINUED | OUTPATIENT
Start: 2025-01-01 | End: 2025-01-01 | Stop reason: HOSPADM

## 2025-01-01 RX ORDER — ACETAMINOPHEN 650 MG/1
650 SUPPOSITORY RECTAL EVERY 4 HOURS PRN
Status: DISCONTINUED | OUTPATIENT
Start: 2025-01-01 | End: 2025-01-01 | Stop reason: HOSPADM

## 2025-01-01 RX ORDER — ONDANSETRON 4 MG/1
4 TABLET, ORALLY DISINTEGRATING ORAL EVERY 6 HOURS PRN
Status: DISCONTINUED | OUTPATIENT
Start: 2025-01-01 | End: 2025-01-01 | Stop reason: HOSPADM

## 2025-01-01 RX ORDER — CARBOXYMETHYLCELLULOSE SODIUM 5 MG/ML
1-2 SOLUTION/ DROPS OPHTHALMIC
Status: DISCONTINUED | OUTPATIENT
Start: 2025-01-01 | End: 2025-01-01 | Stop reason: HOSPADM

## 2025-01-01 RX ORDER — ONDANSETRON 2 MG/ML
4 INJECTION INTRAMUSCULAR; INTRAVENOUS EVERY 6 HOURS PRN
Status: DISCONTINUED | OUTPATIENT
Start: 2025-01-01 | End: 2025-01-01 | Stop reason: HOSPADM

## 2025-01-01 RX ORDER — BISACODYL 10 MG
10 SUPPOSITORY, RECTAL RECTAL
Status: DISCONTINUED | OUTPATIENT
Start: 2025-01-01 | End: 2025-01-01 | Stop reason: HOSPADM

## 2025-01-01 RX ORDER — LIDOCAINE 40 MG/G
CREAM TOPICAL
Status: DISCONTINUED | OUTPATIENT
Start: 2025-01-01 | End: 2025-01-01 | Stop reason: HOSPADM

## 2025-01-01 RX ORDER — LIDOCAINE 4 G/G
2 PATCH TOPICAL
Status: DISCONTINUED | OUTPATIENT
Start: 2025-01-01 | End: 2025-01-01 | Stop reason: HOSPADM

## 2025-01-01 RX ORDER — LORAZEPAM 2 MG/ML
1 INJECTION INTRAMUSCULAR EVERY 4 HOURS PRN
Status: DISCONTINUED | OUTPATIENT
Start: 2025-01-01 | End: 2025-01-01 | Stop reason: HOSPADM

## 2025-01-01 RX ORDER — GLYCOPYRROLATE 0.2 MG/ML
0.2 INJECTION, SOLUTION INTRAMUSCULAR; INTRAVENOUS EVERY 4 HOURS PRN
Status: DISCONTINUED | OUTPATIENT
Start: 2025-01-01 | End: 2025-01-01 | Stop reason: HOSPADM

## 2025-01-01 RX ORDER — AMOXICILLIN 250 MG
1 CAPSULE ORAL 2 TIMES DAILY PRN
Status: DISCONTINUED | OUTPATIENT
Start: 2025-01-01 | End: 2025-01-01 | Stop reason: HOSPADM

## 2025-01-01 RX ADMIN — HYDROMORPHONE HYDROCHLORIDE 0.5 MG: 1 INJECTION, SOLUTION INTRAMUSCULAR; INTRAVENOUS; SUBCUTANEOUS at 18:05

## 2025-01-01 RX ADMIN — HYDROMORPHONE HYDROCHLORIDE 0.5 MG: 1 INJECTION, SOLUTION INTRAMUSCULAR; INTRAVENOUS; SUBCUTANEOUS at 04:16

## 2025-01-01 RX ADMIN — LORAZEPAM 1 MG: 2 INJECTION INTRAMUSCULAR; INTRAVENOUS at 04:58

## 2025-01-01 RX ADMIN — HYDROMORPHONE HYDROCHLORIDE 0.5 MG: 1 INJECTION, SOLUTION INTRAMUSCULAR; INTRAVENOUS; SUBCUTANEOUS at 13:28

## 2025-01-01 RX ADMIN — LORAZEPAM 1 MG: 2 INJECTION INTRAMUSCULAR; INTRAVENOUS at 09:07

## 2025-01-01 RX ADMIN — HYDROMORPHONE HYDROCHLORIDE 0.5 MG: 1 INJECTION, SOLUTION INTRAMUSCULAR; INTRAVENOUS; SUBCUTANEOUS at 00:55

## 2025-01-01 RX ADMIN — LORAZEPAM 1 MG: 2 INJECTION INTRAMUSCULAR; INTRAVENOUS at 09:28

## 2025-01-01 RX ADMIN — LORAZEPAM 1 MG: 2 INJECTION INTRAMUSCULAR; INTRAVENOUS at 13:17

## 2025-01-01 RX ADMIN — LORAZEPAM 1 MG: 2 INJECTION INTRAMUSCULAR; INTRAVENOUS at 16:04

## 2025-01-01 RX ADMIN — HYDROMORPHONE HYDROCHLORIDE 0.5 MG: 1 INJECTION, SOLUTION INTRAMUSCULAR; INTRAVENOUS; SUBCUTANEOUS at 01:55

## 2025-01-01 RX ADMIN — LORAZEPAM 1 MG: 2 INJECTION INTRAMUSCULAR; INTRAVENOUS at 00:12

## 2025-01-01 RX ADMIN — LORAZEPAM 1 MG: 2 INJECTION INTRAMUSCULAR; INTRAVENOUS at 20:08

## 2025-01-01 RX ADMIN — HYDROMORPHONE HYDROCHLORIDE 0.5 MG: 1 INJECTION, SOLUTION INTRAMUSCULAR; INTRAVENOUS; SUBCUTANEOUS at 13:18

## 2025-01-01 RX ADMIN — HYDROMORPHONE HYDROCHLORIDE 0.5 MG: 1 INJECTION, SOLUTION INTRAMUSCULAR; INTRAVENOUS; SUBCUTANEOUS at 17:23

## 2025-01-01 RX ADMIN — HYDROMORPHONE HYDROCHLORIDE 0.5 MG: 1 INJECTION, SOLUTION INTRAMUSCULAR; INTRAVENOUS; SUBCUTANEOUS at 20:08

## 2025-01-01 RX ADMIN — HYDROMORPHONE HYDROCHLORIDE 0.5 MG: 1 INJECTION, SOLUTION INTRAMUSCULAR; INTRAVENOUS; SUBCUTANEOUS at 09:29

## 2025-01-01 RX ADMIN — HYDROMORPHONE HYDROCHLORIDE 0.5 MG: 1 INJECTION, SOLUTION INTRAMUSCULAR; INTRAVENOUS; SUBCUTANEOUS at 00:13

## 2025-01-01 RX ADMIN — ATROPINE SULFATE 2 DROP: 10 SOLUTION/ DROPS OPHTHALMIC at 20:04

## 2025-01-01 RX ADMIN — HYDROMORPHONE HYDROCHLORIDE 0.5 MG: 1 INJECTION, SOLUTION INTRAMUSCULAR; INTRAVENOUS; SUBCUTANEOUS at 09:07

## 2025-01-01 RX ADMIN — LORAZEPAM 1 MG: 2 INJECTION INTRAMUSCULAR; INTRAVENOUS at 01:01

## 2025-01-01 RX ADMIN — LORAZEPAM 1 MG: 2 INJECTION INTRAMUSCULAR; INTRAVENOUS at 17:23

## 2025-01-01 RX ADMIN — LORAZEPAM 1 MG: 2 INJECTION INTRAMUSCULAR; INTRAVENOUS at 09:27

## 2025-01-01 RX ADMIN — HYDROMORPHONE HYDROCHLORIDE 0.5 MG: 1 INJECTION, SOLUTION INTRAMUSCULAR; INTRAVENOUS; SUBCUTANEOUS at 04:59

## 2025-01-01 RX ADMIN — HYDROMORPHONE HYDROCHLORIDE 0.5 MG: 1 INJECTION, SOLUTION INTRAMUSCULAR; INTRAVENOUS; SUBCUTANEOUS at 06:19

## 2025-01-01 RX ADMIN — LORAZEPAM 1 MG: 2 INJECTION INTRAMUSCULAR; INTRAVENOUS at 21:23

## 2025-01-01 RX ADMIN — HYDROMORPHONE HYDROCHLORIDE 0.5 MG: 1 INJECTION, SOLUTION INTRAMUSCULAR; INTRAVENOUS; SUBCUTANEOUS at 09:27

## 2025-01-01 RX ADMIN — HYDROMORPHONE HYDROCHLORIDE 0.5 MG: 1 INJECTION, SOLUTION INTRAMUSCULAR; INTRAVENOUS; SUBCUTANEOUS at 21:06

## 2025-01-01 RX ADMIN — HYDROMORPHONE HYDROCHLORIDE 0.5 MG: 1 INJECTION, SOLUTION INTRAMUSCULAR; INTRAVENOUS; SUBCUTANEOUS at 21:23

## 2025-01-01 RX ADMIN — LORAZEPAM 1 MG: 2 INJECTION INTRAMUSCULAR; INTRAVENOUS at 13:54

## 2025-01-01 RX ADMIN — LORAZEPAM 1 MG: 2 INJECTION INTRAMUSCULAR; INTRAVENOUS at 21:06

## 2025-01-01 RX ADMIN — LORAZEPAM 1 MG: 2 INJECTION INTRAMUSCULAR; INTRAVENOUS at 13:29

## 2025-01-01 RX ADMIN — HYDROMORPHONE HYDROCHLORIDE 0.5 MG: 1 INJECTION, SOLUTION INTRAMUSCULAR; INTRAVENOUS; SUBCUTANEOUS at 04:19

## 2025-01-01 RX ADMIN — LORAZEPAM 1 MG: 2 INJECTION INTRAMUSCULAR; INTRAVENOUS at 04:19

## 2025-01-01 RX ADMIN — HYDROMORPHONE HYDROCHLORIDE 0.5 MG: 1 INJECTION, SOLUTION INTRAMUSCULAR; INTRAVENOUS; SUBCUTANEOUS at 13:54

## 2025-01-01 RX ADMIN — LORAZEPAM 1 MG: 2 INJECTION INTRAMUSCULAR; INTRAVENOUS at 00:55

## 2025-01-01 ASSESSMENT — ACTIVITIES OF DAILY LIVING (ADL)
ADLS_ACUITY_SCORE: 67
ADLS_ACUITY_SCORE: 63
ADLS_ACUITY_SCORE: 63
ADLS_ACUITY_SCORE: 67
ADLS_ACUITY_SCORE: 67
ADLS_ACUITY_SCORE: 63
ADLS_ACUITY_SCORE: 67
ADLS_ACUITY_SCORE: 75
ADLS_ACUITY_SCORE: 71
ADLS_ACUITY_SCORE: 63
ADLS_ACUITY_SCORE: 63
ADLS_ACUITY_SCORE: 71
ADLS_ACUITY_SCORE: 67
ADLS_ACUITY_SCORE: 67
ADLS_ACUITY_SCORE: 63
ADLS_ACUITY_SCORE: 71
ADLS_ACUITY_SCORE: 63
ADLS_ACUITY_SCORE: 63
ADLS_ACUITY_SCORE: 75
ADLS_ACUITY_SCORE: 71
ADLS_ACUITY_SCORE: 63
ADLS_ACUITY_SCORE: 75
ADLS_ACUITY_SCORE: 67
ADLS_ACUITY_SCORE: 63
ADLS_ACUITY_SCORE: 67
ADLS_ACUITY_SCORE: 71
ADLS_ACUITY_SCORE: 75
ADLS_ACUITY_SCORE: 63
ADLS_ACUITY_SCORE: 67
ADLS_ACUITY_SCORE: 75
ADLS_ACUITY_SCORE: 63
ADLS_ACUITY_SCORE: 75
ADLS_ACUITY_SCORE: 67
ADLS_ACUITY_SCORE: 67
ADLS_ACUITY_SCORE: 75
ADLS_ACUITY_SCORE: 63
ADLS_ACUITY_SCORE: 75
ADLS_ACUITY_SCORE: 71
ADLS_ACUITY_SCORE: 75
ADLS_ACUITY_SCORE: 75
ADLS_ACUITY_SCORE: 71
ADLS_ACUITY_SCORE: 75
ADLS_ACUITY_SCORE: 67
ADLS_ACUITY_SCORE: 71
ADLS_ACUITY_SCORE: 63
ADLS_ACUITY_SCORE: 67
ADLS_ACUITY_SCORE: 75
ADLS_ACUITY_SCORE: 75
ADLS_ACUITY_SCORE: 67
ADLS_ACUITY_SCORE: 71
ADLS_ACUITY_SCORE: 63
ADLS_ACUITY_SCORE: 71
ADLS_ACUITY_SCORE: 63
ADLS_ACUITY_SCORE: 71
ADLS_ACUITY_SCORE: 67
ADLS_ACUITY_SCORE: 63
ADLS_ACUITY_SCORE: 67
ADLS_ACUITY_SCORE: 67
ADLS_ACUITY_SCORE: 71
ADLS_ACUITY_SCORE: 71
ADLS_ACUITY_SCORE: 63
ADLS_ACUITY_SCORE: 63
ADLS_ACUITY_SCORE: 67
ADLS_ACUITY_SCORE: 75
ADLS_ACUITY_SCORE: 63
ADLS_ACUITY_SCORE: 75
ADLS_ACUITY_SCORE: 71

## 2025-01-07 ENCOUNTER — TRANSFERRED RECORDS (OUTPATIENT)
Dept: HEALTH INFORMATION MANAGEMENT | Facility: CLINIC | Age: 80
End: 2025-01-07
Payer: MEDICARE

## 2025-01-07 ENCOUNTER — TELEPHONE (OUTPATIENT)
Dept: CARDIOLOGY | Facility: CLINIC | Age: 80
End: 2025-01-07
Payer: MEDICARE

## 2025-01-07 DIAGNOSIS — K92.1 MELENA: ICD-10-CM

## 2025-01-07 RX ORDER — PANTOPRAZOLE SODIUM 40 MG/1
40 TABLET, DELAYED RELEASE ORAL
Qty: 180 TABLET | Refills: 3 | Status: SHIPPED | OUTPATIENT
Start: 2025-01-07

## 2025-01-07 NOTE — TELEPHONE ENCOUNTER
1st attempt- Left voicemail for the patient to call back and schedule the following:    Appointment type:  Return Cardiology  Provider:  Dr Quarles  Return date:  within the next 4-6 weeks  Additional appointment(s) needed: Per Luana MONTGOMERYwants him to see Willam to discuss watchman, and he is [facility]ann[facility] Pt, does Willam have an opening in the next 4-6 weeks?   Additonal Notes:    Specialty phone number: [phone]

## 2025-01-10 ENCOUNTER — HOSPITAL ENCOUNTER (INPATIENT)
Facility: CLINIC | Age: 80
DRG: 312 | End: 2025-01-10
Attending: EMERGENCY MEDICINE | Admitting: HOSPITALIST
Payer: MEDICARE

## 2025-01-10 DIAGNOSIS — E87.6 HYPOKALEMIA: ICD-10-CM

## 2025-01-10 DIAGNOSIS — R55 SYNCOPE, UNSPECIFIED SYNCOPE TYPE: ICD-10-CM

## 2025-01-10 DIAGNOSIS — C78.00 MALIGNANT NEOPLASM METASTATIC TO LUNG, UNSPECIFIED LATERALITY (H): ICD-10-CM

## 2025-01-10 DIAGNOSIS — I95.1 ORTHOSTATIC HYPOTENSION: Primary | ICD-10-CM

## 2025-01-10 DIAGNOSIS — E83.51 HYPOCALCEMIA: ICD-10-CM

## 2025-01-10 LAB
ALBUMIN SERPL BCG-MCNC: 2.2 G/DL (ref 3.5–5.2)
ALP SERPL-CCNC: 71 U/L (ref 40–150)
ALT SERPL W P-5'-P-CCNC: 20 U/L (ref 0–70)
ANION GAP SERPL CALCULATED.3IONS-SCNC: 9 MMOL/L (ref 7–15)
AST SERPL W P-5'-P-CCNC: 22 U/L (ref 0–45)
ATRIAL RATE - MUSE: NORMAL BPM
BASOPHILS # BLD AUTO: 0 10E3/UL (ref 0–0.2)
BASOPHILS NFR BLD AUTO: 0 %
BILIRUB SERPL-MCNC: 0.9 MG/DL
BUN SERPL-MCNC: 21.3 MG/DL (ref 8–23)
CA-I BLD-MCNC: 4.3 MG/DL (ref 4.4–5.2)
CA-I BLD-MCNC: 4.6 MG/DL (ref 4.4–5.2)
CALCIUM SERPL-MCNC: 5.7 MG/DL (ref 8.8–10.4)
CHLORIDE SERPL-SCNC: 113 MMOL/L (ref 98–107)
CREAT SERPL-MCNC: 1.17 MG/DL (ref 0.67–1.17)
DIASTOLIC BLOOD PRESSURE - MUSE: NORMAL MMHG
EGFRCR SERPLBLD CKD-EPI 2021: 64 ML/MIN/1.73M2
EOSINOPHIL # BLD AUTO: 0.2 10E3/UL (ref 0–0.7)
EOSINOPHIL NFR BLD AUTO: 3 %
ERYTHROCYTE [DISTWIDTH] IN BLOOD BY AUTOMATED COUNT: 17.1 % (ref 10–15)
GLUCOSE SERPL-MCNC: 83 MG/DL (ref 70–99)
HCO3 SERPL-SCNC: 18 MMOL/L (ref 22–29)
HCT VFR BLD AUTO: 33.3 % (ref 40–53)
HGB BLD-MCNC: 11.3 G/DL (ref 13.3–17.7)
IMM GRANULOCYTES # BLD: 0 10E3/UL
IMM GRANULOCYTES NFR BLD: 0 %
INTERPRETATION ECG - MUSE: NORMAL
IRON BINDING CAPACITY (ROCHE): 146 UG/DL (ref 240–430)
IRON SATN MFR SERPL: 43 % (ref 15–46)
IRON SERPL-MCNC: 63 UG/DL (ref 61–157)
LYMPHOCYTES # BLD AUTO: 0.9 10E3/UL (ref 0.8–5.3)
LYMPHOCYTES NFR BLD AUTO: 18 %
MCH RBC QN AUTO: 32.4 PG (ref 26.5–33)
MCHC RBC AUTO-ENTMCNC: 33.9 G/DL (ref 31.5–36.5)
MCV RBC AUTO: 95 FL (ref 78–100)
MONOCYTES # BLD AUTO: 0.5 10E3/UL (ref 0–1.3)
MONOCYTES NFR BLD AUTO: 10 %
NEUTROPHILS # BLD AUTO: 3.2 10E3/UL (ref 1.6–8.3)
NEUTROPHILS NFR BLD AUTO: 68 %
NRBC # BLD AUTO: 0 10E3/UL
NRBC BLD AUTO-RTO: 0 /100
P AXIS - MUSE: NORMAL DEGREES
PHOSPHATE SERPL-MCNC: 1.5 MG/DL (ref 2.5–4.5)
PLATELET # BLD AUTO: 138 10E3/UL (ref 150–450)
POTASSIUM SERPL-SCNC: 2.9 MMOL/L (ref 3.4–5.3)
PR INTERVAL - MUSE: NORMAL MS
PROT SERPL-MCNC: 4.3 G/DL (ref 6.4–8.3)
PTH-INTACT SERPL-MCNC: 95 PG/ML (ref 15–65)
QRS DURATION - MUSE: 102 MS
QT - MUSE: 410 MS
QTC - MUSE: 433 MS
R AXIS - MUSE: -36 DEGREES
RBC # BLD AUTO: 3.49 10E6/UL (ref 4.4–5.9)
RETICS # AUTO: 0.05 10E6/UL (ref 0.03–0.1)
RETICS/RBC NFR AUTO: 1.3 % (ref 0.5–2)
SODIUM SERPL-SCNC: 140 MMOL/L (ref 135–145)
SYSTOLIC BLOOD PRESSURE - MUSE: NORMAL MMHG
T AXIS - MUSE: 27 DEGREES
TROPONIN T SERPL HS-MCNC: 15 NG/L
TROPONIN T SERPL HS-MCNC: 19 NG/L
TSH SERPL DL<=0.005 MIU/L-ACNC: 3.41 UIU/ML (ref 0.3–4.2)
VENTRICULAR RATE- MUSE: 67 BPM
VIT D+METAB SERPL-MCNC: 27 NG/ML (ref 20–50)
WBC # BLD AUTO: 4.7 10E3/UL (ref 4–11)

## 2025-01-10 PROCEDURE — 250N000013 HC RX MED GY IP 250 OP 250 PS 637: Performed by: HOSPITALIST

## 2025-01-10 PROCEDURE — 82330 ASSAY OF CALCIUM: CPT | Performed by: HOSPITALIST

## 2025-01-10 PROCEDURE — 83550 IRON BINDING TEST: CPT | Performed by: INTERNAL MEDICINE

## 2025-01-10 PROCEDURE — 210N000002 HC R&B HEART CARE

## 2025-01-10 PROCEDURE — 82306 VITAMIN D 25 HYDROXY: CPT | Performed by: HOSPITALIST

## 2025-01-10 PROCEDURE — 83970 ASSAY OF PARATHORMONE: CPT | Performed by: HOSPITALIST

## 2025-01-10 PROCEDURE — 84100 ASSAY OF PHOSPHORUS: CPT | Performed by: INTERNAL MEDICINE

## 2025-01-10 PROCEDURE — 82040 ASSAY OF SERUM ALBUMIN: CPT | Performed by: EMERGENCY MEDICINE

## 2025-01-10 PROCEDURE — 85004 AUTOMATED DIFF WBC COUNT: CPT | Performed by: EMERGENCY MEDICINE

## 2025-01-10 PROCEDURE — 82330 ASSAY OF CALCIUM: CPT | Performed by: EMERGENCY MEDICINE

## 2025-01-10 PROCEDURE — 99285 EMERGENCY DEPT VISIT HI MDM: CPT

## 2025-01-10 PROCEDURE — 84484 ASSAY OF TROPONIN QUANT: CPT | Performed by: EMERGENCY MEDICINE

## 2025-01-10 PROCEDURE — 36415 COLL VENOUS BLD VENIPUNCTURE: CPT | Performed by: HOSPITALIST

## 2025-01-10 PROCEDURE — 84443 ASSAY THYROID STIM HORMONE: CPT | Performed by: HOSPITALIST

## 2025-01-10 PROCEDURE — 99223 1ST HOSP IP/OBS HIGH 75: CPT | Mod: AI | Performed by: HOSPITALIST

## 2025-01-10 PROCEDURE — 82607 VITAMIN B-12: CPT | Performed by: INTERNAL MEDICINE

## 2025-01-10 PROCEDURE — 36415 COLL VENOUS BLD VENIPUNCTURE: CPT | Performed by: EMERGENCY MEDICINE

## 2025-01-10 PROCEDURE — 96360 HYDRATION IV INFUSION INIT: CPT

## 2025-01-10 PROCEDURE — 82728 ASSAY OF FERRITIN: CPT | Performed by: INTERNAL MEDICINE

## 2025-01-10 PROCEDURE — 250N000013 HC RX MED GY IP 250 OP 250 PS 637: Performed by: INTERNAL MEDICINE

## 2025-01-10 PROCEDURE — 93005 ELECTROCARDIOGRAM TRACING: CPT

## 2025-01-10 PROCEDURE — 85045 AUTOMATED RETICULOCYTE COUNT: CPT | Performed by: INTERNAL MEDICINE

## 2025-01-10 PROCEDURE — 85041 AUTOMATED RBC COUNT: CPT | Performed by: EMERGENCY MEDICINE

## 2025-01-10 PROCEDURE — 258N000003 HC RX IP 258 OP 636: Performed by: EMERGENCY MEDICINE

## 2025-01-10 PROCEDURE — 250N000013 HC RX MED GY IP 250 OP 250 PS 637: Performed by: EMERGENCY MEDICINE

## 2025-01-10 PROCEDURE — 99222 1ST HOSP IP/OBS MODERATE 55: CPT | Performed by: INTERNAL MEDICINE

## 2025-01-10 RX ORDER — PRAVASTATIN SODIUM 20 MG
20 TABLET ORAL AT BEDTIME
Status: DISCONTINUED | OUTPATIENT
Start: 2025-01-10 | End: 2025-01-17 | Stop reason: HOSPADM

## 2025-01-10 RX ORDER — CHOLECALCIFEROL (VITAMIN D3) 1250 MCG
1250 CAPSULE ORAL
Status: DISCONTINUED | OUTPATIENT
Start: 2025-01-10 | End: 2025-01-17 | Stop reason: HOSPADM

## 2025-01-10 RX ORDER — ONDANSETRON 4 MG/1
4 TABLET, ORALLY DISINTEGRATING ORAL EVERY 6 HOURS PRN
Status: DISCONTINUED | OUTPATIENT
Start: 2025-01-10 | End: 2025-01-10

## 2025-01-10 RX ORDER — ONDANSETRON 4 MG/1
4 TABLET, ORALLY DISINTEGRATING ORAL EVERY 6 HOURS PRN
Status: DISCONTINUED | OUTPATIENT
Start: 2025-01-10 | End: 2025-01-17 | Stop reason: HOSPADM

## 2025-01-10 RX ORDER — CALCIUM CARBONATE 500 MG/1
500 TABLET, CHEWABLE ORAL 2 TIMES DAILY
Status: DISCONTINUED | OUTPATIENT
Start: 2025-01-10 | End: 2025-01-17 | Stop reason: HOSPADM

## 2025-01-10 RX ORDER — ACETAMINOPHEN 500 MG
500-1000 TABLET ORAL DAILY PRN
Status: DISCONTINUED | OUTPATIENT
Start: 2025-01-10 | End: 2025-01-17 | Stop reason: HOSPADM

## 2025-01-10 RX ORDER — AMOXICILLIN 250 MG
2 CAPSULE ORAL 2 TIMES DAILY PRN
Status: DISCONTINUED | OUTPATIENT
Start: 2025-01-10 | End: 2025-01-17 | Stop reason: HOSPADM

## 2025-01-10 RX ORDER — AMOXICILLIN 250 MG
1 CAPSULE ORAL 2 TIMES DAILY PRN
Status: DISCONTINUED | OUTPATIENT
Start: 2025-01-10 | End: 2025-01-17 | Stop reason: HOSPADM

## 2025-01-10 RX ORDER — COSYNTROPIN 0.25 MG/ML
250 INJECTION, POWDER, FOR SOLUTION INTRAMUSCULAR; INTRAVENOUS ONCE
Status: COMPLETED | OUTPATIENT
Start: 2025-01-11 | End: 2025-01-11

## 2025-01-10 RX ORDER — CALCIUM CARBONATE 500 MG/1
1000 TABLET, CHEWABLE ORAL 4 TIMES DAILY PRN
Status: DISCONTINUED | OUTPATIENT
Start: 2025-01-10 | End: 2025-01-17 | Stop reason: HOSPADM

## 2025-01-10 RX ORDER — PANTOPRAZOLE SODIUM 40 MG/1
40 TABLET, DELAYED RELEASE ORAL
Status: DISCONTINUED | OUTPATIENT
Start: 2025-01-10 | End: 2025-01-17 | Stop reason: HOSPADM

## 2025-01-10 RX ORDER — POTASSIUM CHLORIDE 1.5 G/1.58G
40 POWDER, FOR SOLUTION ORAL ONCE
Status: COMPLETED | OUTPATIENT
Start: 2025-01-10 | End: 2025-01-10

## 2025-01-10 RX ORDER — COSYNTROPIN 0.25 MG/ML
250 INJECTION, POWDER, FOR SOLUTION INTRAMUSCULAR; INTRAVENOUS ONCE
Status: DISCONTINUED | OUTPATIENT
Start: 2025-01-10 | End: 2025-01-10

## 2025-01-10 RX ORDER — ONDANSETRON 2 MG/ML
4 INJECTION INTRAMUSCULAR; INTRAVENOUS EVERY 6 HOURS PRN
Status: DISCONTINUED | OUTPATIENT
Start: 2025-01-10 | End: 2025-01-17 | Stop reason: HOSPADM

## 2025-01-10 RX ORDER — CALCIUM GLUCONATE 20 MG/ML
1 INJECTION, SOLUTION INTRAVENOUS ONCE
Status: DISCONTINUED | OUTPATIENT
Start: 2025-01-10 | End: 2025-01-10

## 2025-01-10 RX ORDER — LIDOCAINE 40 MG/G
CREAM TOPICAL
Status: DISCONTINUED | OUTPATIENT
Start: 2025-01-10 | End: 2025-01-17 | Stop reason: HOSPADM

## 2025-01-10 RX ORDER — MIDODRINE HYDROCHLORIDE 2.5 MG/1
5 TABLET ORAL 2 TIMES DAILY
Status: DISCONTINUED | OUTPATIENT
Start: 2025-01-10 | End: 2025-01-11

## 2025-01-10 RX ORDER — CALCITRIOL 0.25 UG/1
0.25 CAPSULE, LIQUID FILLED ORAL 2 TIMES DAILY
Status: DISCONTINUED | OUTPATIENT
Start: 2025-01-10 | End: 2025-01-11

## 2025-01-10 RX ORDER — TAMSULOSIN HYDROCHLORIDE 0.4 MG/1
0.4 CAPSULE ORAL EVERY EVENING
Status: DISCONTINUED | OUTPATIENT
Start: 2025-01-10 | End: 2025-01-17 | Stop reason: HOSPADM

## 2025-01-10 RX ADMIN — PANTOPRAZOLE SODIUM 40 MG: 40 TABLET, DELAYED RELEASE ORAL at 16:04

## 2025-01-10 RX ADMIN — TAMSULOSIN HYDROCHLORIDE 0.4 MG: 0.4 CAPSULE ORAL at 21:19

## 2025-01-10 RX ADMIN — SODIUM CHLORIDE 1000 ML: 9 INJECTION, SOLUTION INTRAVENOUS at 09:27

## 2025-01-10 RX ADMIN — MIDODRINE HYDROCHLORIDE 5 MG: 5 TABLET ORAL at 21:19

## 2025-01-10 RX ADMIN — POTASSIUM CHLORIDE 40 MEQ: 1.5 POWDER, FOR SOLUTION ORAL at 10:28

## 2025-01-10 RX ADMIN — CALCIUM CARBONATE 500 MG: 500 TABLET, CHEWABLE ORAL at 21:19

## 2025-01-10 RX ADMIN — CHOLECALCIFEROL CAP 1.25 MG (50000 UNIT) 1250 MCG: 1.25 CAP at 21:20

## 2025-01-10 RX ADMIN — PRAVASTATIN SODIUM 20 MG: 20 TABLET ORAL at 22:04

## 2025-01-10 ASSESSMENT — ACTIVITIES OF DAILY LIVING (ADL)
ADLS_ACUITY_SCORE: 57
ADLS_ACUITY_SCORE: 41
ADLS_ACUITY_SCORE: 41
ADLS_ACUITY_SCORE: 57
ADLS_ACUITY_SCORE: 41
ADLS_ACUITY_SCORE: 57
ADLS_ACUITY_SCORE: 41
ADLS_ACUITY_SCORE: 41

## 2025-01-10 NOTE — CONSULTS
[facility]    Cardiology Consultation     Date of Admission:  1/10/2025    Assessment & Plan   Ti Nickerson is a 79 year old male who was admitted on 1/10/2025.    Episodes of presyncope and syncope, chronic for last few years, likely related to orthostatic hypotension  Chronic atrial fibrillation, rate controlled.  Not on anticoagulation due to previous GI bleed related to duodenal tumor which is a metastatic renal cell carcinoma  Metastatic clear-cell renal cell carcinoma, status post previous nephrectomy, immunotherapy and now on targeted therapy with cabozantinib.  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  Also, in the past he has received immunotherapy and also has history of metastatic clear-cell renal carcinoma and I wonder if he has an element of autonomic neuropathy related to his previous immunotherapy or has a formal paraneoplastic syndrome.  In any case, supportive care is necessary.  Would recommend compression stockings with Jobst stockings which allows for 20 to 30 mm pressure about the knee.  Abdominal binder can also be considered.  We talked about taking his time from supine position to standing up by sitting at the edge of the bed and doing some isometric exercises before getting up.    I would also consider ruling out adrenal insufficiency which can occur with patients who are on cabozantinib.     Plan  Check orthostasis  Please prescribe Jobst stockings 20 to 30 mm above knee pressure  Consider an alternative agent to Flomax as Flomax can precipitate orthostatic hypotension  Rule out adrenal insufficiency as patient is on cabozantinib.  Replace electrolytes and treat hypocalcemia which also can occur with cabozantinib.  If patient has refractory symptoms despite the above measures,  consider Florinef as an outpatient.  Continue midodrine.  Continue rate control for atrial fibrillation.  No need to for rhythm control as patient is overall asymptomatic and has had atrial fibrillation for several years.  With respect to stroke risk and atrial fibrillation, if his prognosis from renal cell carcinoma is reasonable and greater than 1 year, Watchman device can be considered and a consultation for that can be rearranged as an outpatient.    We will sign off.  Recall if questions.  Today's visit, reviewed previous cardiology notes, echo results, EKGs were reviewed by myself, labs, oncology notes from [facility].  Today's medical decision making was of high complexity.  Plan discussed with the patient, wife, daughter.  Management plan also discussed with the hospitalist.            Christopher Mckeon MD  Primary Care Physician   Nico Retana MD    Reason for Consult   Reason for consult: I was asked by hospitalist to evaluate this patient for recurrent syncope.    History of Present Illness   Ti Nickerson is a 79 year old male who presents with syncope.  Patient history of metastatic clear-cell renal cancer for which he is on cabozantinib.  In the past, he had nephrectomy in 2019.  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  Underwent duodenal surgery at [facility] in 2023.  Cryoablation of the liver lesions were performed.  Last year he had GI bleed related to recurrence of duodenal tumor and underwent radiation therapy.  He is not on any antiplatelet agents or anticoagulation because of previous GI bleed.  He has chronic atrial fibrillation for several years.  This is rate controlled.  He has history of low normal ejection fraction.  First few years now, he has had episodes of orthostatic hypotension and syncope.  He has them at least once a week or sometimes once in few weeks.  This morning, when he woke up to go to the bathroom  at 3 AM, he fainted and went down.  His wife helped him out back to the bed and before getting to the bed he had another transient fainting episode.  He did not lose consciousness for any prolonged period of time.  About 3 days ago he had similar episode during the day.  He wears compression stockings but they are routine FREDY stockings.  He is also on midodrine.  Blood pressure on admission was 85/68 and now has improved.    For his metastatic renal cell carcinoma, after the surgery he was also given immunotherapy with a combination of nivolumab and Yervoy.  This has now been transitioned to targeted therapy with cabozantinib.    Labs today revealed normal creatinine.  Sodium was 140 potassium is 2.9.  Calcium is 5.7 which is quite low.  Hemoglobin 11.3.  Platelet count 138.  TSH is normal.  Troponin T is normal.  EKG was reviewed by me and revealed atrial fibrillation with left axis deviation.  Atrial fibrillation is unchanged from before.       Past Medical History   Past Medical History:   Diagnosis Date    Atrial fibrillation, unspecified 9/18/2015    CAD (coronary artery disease) 09/18/2015    minimal by angio 2007, fu nuc est nl 2018    Elevated TSH 2018    Esophageal reflux 9/18/2015    Essential hypertension     History of cardioversion     [id number]    Idiopathic cardiomyopathy (H) 09/18/2015    fu echo nl ef, nl nuclear est 11/18, Dr. Quarles    Mixed hyperlipidemia 9/18/2015    Mumps     Sleep apnea 09/18/2015    does not use cpap as of 2019    Sleep apnea     Thoracic aortic aneurysm     sinus of valsalva 4.5 and asc aorta 4.5 in 2017    Tubular adenoma of colon 01/2017    fu 3 years         Past Surgical History   Past Surgical History:   Procedure Laterality Date    APPENDECTOMY  1964    ESOPHAGOSCOPY, GASTROSCOPY, DUODENOSCOPY (EGD), COMBINED N/A 9/14/2023    Procedure: Esophagoscopy, gastroscopy, duodenoscopy (EGD), combined;  Surgeon: Chele Ash MD;  Location:  GI         Prior to  Admission Medications   Prior to Admission Medications   Prescriptions Last Dose Informant Patient Reported? Taking?   Cabozantinib S-Malate (CABOMETYX) 20 MG 1/10/2025 Morning  Yes Yes   Sig: Take 20 mg by mouth daily   acetaminophen (TYLENOL) 500 MG tablet  Spouse/Significant Other Yes Yes   Sig: Take 500-1,000 mg by mouth daily as needed for mild pain   midodrine (PROAMATINE) 5 MG tablet 1/10/2025 Morning  No Yes   Sig: Take 1 tablet (5 mg) by mouth 2 times daily   ondansetron (ZOFRAN ODT) 4 MG ODT tab   No Yes   Sig: Take 1 tablet (4 mg) by mouth every 6 hours as needed for nausea or vomiting   pantoprazole (PROTONIX) 40 MG EC tablet 1/9/2025 Evening  No Yes   Sig: TAKE 1 TABLET BY MOUTH TWICE A DAY BEFORE MEALS   pravastatin (PRAVACHOL) 20 MG tablet 1/9/2025 Evening  No Yes   Sig: Take 1 tablet (20 mg) by mouth daily   tamsulosin (FLOMAX) 0.4 MG capsule 1/9/2025 Evening  No Yes   Sig: Take 1 capsule (0.4 mg) by mouth daily   Patient taking differently: Take 0.4 mg by mouth every evening.      Facility-Administered Medications: None     No current facility-administered medications for this encounter.     Current Outpatient Medications   Medication Sig Dispense Refill    acetaminophen (TYLENOL) 500 MG tablet Take 500-1,000 mg by mouth daily as needed for mild pain      Cabozantinib S-Malate (CABOMETYX) 20 MG Take 20 mg by mouth daily      midodrine (PROAMATINE) 5 MG tablet Take 1 tablet (5 mg) by mouth 2 times daily 60 tablet 11    ondansetron (ZOFRAN ODT) 4 MG ODT tab Take 1 tablet (4 mg) by mouth every 6 hours as needed for nausea or vomiting 20 tablet 0    pantoprazole (PROTONIX) 40 MG EC tablet TAKE 1 TABLET BY MOUTH TWICE A DAY BEFORE MEALS 180 tablet 3    pravastatin (PRAVACHOL) 20 MG tablet Take 1 tablet (20 mg) by mouth daily 90 tablet 3    tamsulosin (FLOMAX) 0.4 MG capsule Take 1 capsule (0.4 mg) by mouth daily (Patient taking differently: Take 0.4 mg by mouth every evening.) 90 capsule 3     No current  facility-administered medications for this encounter.     Current Outpatient Medications   Medication Sig Dispense Refill    acetaminophen (TYLENOL) 500 MG tablet Take 500-1,000 mg by mouth daily as needed for mild pain      Cabozantinib S-Malate (CABOMETYX) 20 MG Take 20 mg by mouth daily      midodrine (PROAMATINE) 5 MG tablet Take 1 tablet (5 mg) by mouth 2 times daily 60 tablet 11    ondansetron (ZOFRAN ODT) 4 MG ODT tab Take 1 tablet (4 mg) by mouth every 6 hours as needed for nausea or vomiting 20 tablet 0    pantoprazole (PROTONIX) 40 MG EC tablet TAKE 1 TABLET BY MOUTH TWICE A DAY BEFORE MEALS 180 tablet 3    pravastatin (PRAVACHOL) 20 MG tablet Take 1 tablet (20 mg) by mouth daily 90 tablet 3    tamsulosin (FLOMAX) 0.4 MG capsule Take 1 capsule (0.4 mg) by mouth daily (Patient taking differently: Take 0.4 mg by mouth every evening.) 90 capsule 3     Allergies   Allergies   Allergen Reactions    Fentanyl Confusion       Social History    reports that he has never smoked. He has never used smokeless tobacco. He reports that he does not currently use alcohol. He reports that he does not use drugs.      Family History   I have reviewed this patient's family history and updated it with pertinent information if needed.  Family History   Problem Relation Age of Onset    Arrhythmia Mother         a-fib    Cerebrovascular Disease Mother     Arrhythmia Father         a-fib    Colon Cancer Father     Diabetes Father     Cerebrovascular Disease Father     No Known Problems Brother           Review of Systems   A comprehensive review of system was performed and is negative other than that noted in the HPI or here.     Physical Exam   Vital Signs with Ranges  Temp:  [97.8  F (36.6  C)] 97.8  F (36.6  C)  Pulse:  [83-89] 89  Resp:  [16] 16  BP: (85-92)/(68-72) 85/68  SpO2:  [91 %-96 %] 91 %  Wt Readings from Last 4 Encounters:   12/12/24 114.3 kg (252 lb)   09/05/24 117 kg (258 lb)   08/07/24 121.3 kg (267 lb 8 oz)  "  07/20/24 117.5 kg (259 lb)     No intake/output data recorded.      Vitals: BP (!) 85/68   Pulse 89   Temp 97.8  F (36.6  C) (Oral)   Resp 16   SpO2 91%     Physical Exam:   General - Alert and oriented to time place and person in no acute distress  Eyes - No scleral icterus  HEENT - Neck supple, moist mucous membranes  Cardiovascular -irrregular S1-S2 without murmurs  Extremities - There is no edema  Respiratory -clear to auscultation  Skin - No pallor or cyanosis  Gastrointestinal - Non tender and non distended without rebound or guarding  Psych - Appropriate affect   Neurological - No gross motor neurological focal deficits    No lab results found in last 7 days.    Invalid input(s): \"TROPONINIES\"    Recent Labs   Lab 01/10/25  0919   WBC 4.7   HGB 11.3*   MCV 95   *      POTASSIUM 2.9*   CHLORIDE 113*   CO2 18*   BUN 21.3   CR 1.17   GFRESTIMATED 64   ANIONGAP 9   DOMINICK 5.7*   GLC 83   ALBUMIN 2.2*   PROTTOTAL 4.3*   BILITOTAL 0.9   ALKPHOS 71   ALT 20   AST 22     Recent Labs   Lab Test 08/30/24  0857 10/09/23  0820   CHOL 117 113   HDL 35* 39*   LDL 61 57   TRIG 103 84     Recent Labs   Lab 01/10/25  0919   WBC 4.7   HGB 11.3*   HCT 33.3*   MCV 95   *     No results for input(s): \"PH\", \"PHV\", \"PO2\", \"PO2V\", \"SAT\", \"PCO2\", \"PCO2V\", \"HCO3\", \"HCO3V\" in the last 168 hours.  No results for input(s): \"NTBNPI\", \"NTBNP\" in the last 168 hours.  No results for input(s): \"DD\" in the last 168 hours.  No results for input(s): \"SED\", \"CRP\" in the last 168 hours.  Recent Labs   Lab 01/10/25  0919   *     Recent Labs   Lab 01/10/25  0919   TSH 3.41     No results for input(s): \"COLOR\", \"APPEARANCE\", \"URINEGLC\", \"URINEBILI\", \"URINEKETONE\", \"SG\", \"UBLD\", \"URINEPH\", \"PROTEIN\", \"UROBILINOGEN\", \"NITRITE\", \"LEUKEST\", \"RBCU\", \"WBCU\" in the last 168 hours.    Imaging:  No results found for this or any previous visit (from the past 48 hours).    Echo:  No results found for this or any previous visit " (from the past 4320 hours).    Clinically Significant Risk Factors Present on Admission        # Hypokalemia: Lowest K = 2.9 mmol/L in last 2 days, will replace as needed   # Hyperchloremia: Highest Cl = 113 mmol/L in last 2 days, will monitor as appropriate          # Hypoalbuminemia: Lowest albumin = 2.2 g/dL at 1/10/2025  9:19 AM, will monitor as appropriate     # Hypertension: Noted on problem list

## 2025-01-10 NOTE — CONSULTS
Consultation    Ti Nickerson MRN# [id number]   YOB: 1945 Age: 79 year old   Date of Admission: 1/10/2025  Requesting physician: Dr. Mcnamara  Reason for consult: RCC           Assessment and Plan:     Metastatic clear cell renal cell carcinoma  - Progressed on Ipi/Nivo 6/25/24   - s/p radiation due to duodenal bleeding ending 8/6/24   - Started Cabozantanib 20mg 9/26/24   - Most recent CT 12/30/24 with decreased retroperitoneal lymphadenopathy, stable R hepatic lobe mass, duodenal mass measuring up to 2.3cm was not well seen on the non contrast study     2. History of GI bleeding due to metastatic disease in the duodenum   3. History of iron deficiency anemia  - Previously treated with IV iron  - More recently Hg has been in the 14 range   - Hg 11.3 1/10/25   - Will check iron studies, B12 level, retic count   - Replace iron as needed     4. Hypotension with syncope and near syncope  - Chronically in atrial fibrillation with rate control  - On Midodrine   - Wife correlates that his syncope / near syncopal episodes occur when his blood pressure becomes low  - Etiology for the hypotension not entirely clear    5. Hypocalcemia  - May be lab error as ionized calcium came back WNL  - Start vitamin D 1,250 mcg weekly x 6 weeks followed by re-evaluation with Dr. Glover   - Continue PO calcium until outpatient follow up  - I would not hold his Cabozantanib (half life is 120 hours and will take a while to be excreted from his system)   I    6. Hypokalemia  - Replacement per primary      Patrick Dreyer, DO  [facility]  [phone] (office)             Chief Complaint:   Syncope           History of Present Illness:   The patient presented to the emergency department on 1/10/2025 for the further evaluation of syncope and near syncope.  The patient was scheduled to meet with a cardiologist regarding a possible Watchman procedure versus ablation today.  The patient was unable to make  his appointment because he was suffering from syncopal episode.  His wife called 911 and he was brought to the emergency department.  The patient and his wife tell me that he has been dealing with the syncopal episodes for over a year.  They previously were attributed to gastrointestinal bleeding and symptomatic anemia.  More recently they have been happening more frequently while his hemoglobin has remained stable in the normal range.  The wife tells me that every time he has a syncopal or near syncopal event his blood pressure is always low.  The patient's been on cabozantinib for about 3 months.  His most recent scans showed improved disease.      Patient was diagnosed with renal mass in 2019.    10/9/2019: Right radical nephrectomy and IVC thrombectomy.  Grade 4 out of 4 clear-cell renal cell carcinoma pT3b margins were negative no sarcomatoid features lymphovascular invasion was present tumor necrosis was not identified.  He was on surveillance until  1/25/2021.  Liver lesion he underwent biopsy and cryoablation at [facility].  Biopsy was consistent with metastatic renal cell carcinoma  9/14/2023.  Admitted with melena and GI bleed.  EGD showed an ulcerated mass in the third portion of the duodenum.  Biopsy was consistent with metastatic renal cell carcinoma.  10/11/2023.  He underwent partial duodenectomy under the care of Dr. Ceballos at [facility] again consistent with metastatic RCC.  1/19/2024: Underwent EUS and resection of gastric mass with complete endoscopic removal again consistent with metastatic renal cell carcinoma.  Margins were negative.  Postoperatively patient had melena and GI bleed.  With a drop in hemoglobin requiring blood transfusions.  4/23/24-6/25/24 : Ipi/Nivo- 4 cycles. Progression    7/14/24: Admission Gi bleeding, local tumor recurrence in the Gi tract. Prolonged hospital stay and debility.    7/17/24 :Radiation for bleeding control : SUMMARY OF RADIATION THERAPY Treatment Dates.  2024 - 2024 (20 elapsed days) Treatment Site Technique Dose Fractions Dose/Fraction Energy Abdomen [id number] 4.500 cGy 15 300 eGy 10x    9/26/24: Cabo at 20 mg daily started            Physical Exam:   Vitals were reviewed  Blood pressure (!) 129/93, pulse 69, temperature 97.8  F (36.6  C), temperature source Oral, resp. rate 21, SpO2 93%.  Temperatures:  Current - Temp: 97.8  F (36.6  C); Max - Temp  Av.8  F (36.6  C)  Min: 97.8  F (36.6  C)  Max: 97.8  F (36.6  C)  Respiration range: Resp  Avg: 15.5  Min: 9  Max: 28  Pulse range: Pulse  Av.4  Min: 66  Max: 98  Blood pressure range: Systolic (24hrs), Av , Min:85 , Max:129   ; Diastolic (24hrs), Av, Min:67, Max:98    Pulse oximetry range: SpO2  Av.5 %  Min: 87 %  Max: 96 %    GENERAL: No acute distress.  SKIN: No rashes or jaundice.  HEENT: Normocephalic, atraumatic. Eyes anicteric. Oropharynx is clear.  LYMPH: No palpable lymphadenopathy in the cervical or supraclavicular regions  HEART: Regular rate with no murmurs.  LUNGS: Clear bilaterally.  ABDOMEN: Soft, nontender, nondistended with no palpable hepatosplenomegaly.  EXTREMITIES: No clubbing, cyanosis, or edema.  MENTAL: Alert and oriented to person, place, and time.  NEURO: Cranial nerves II through XII grossly intact with no focal motor or sensory deficits.              Past Medical History:   I have reviewed this patient's past medical history  Past Medical History:   Diagnosis Date    Atrial fibrillation, unspecified 2015    CAD (coronary artery disease) 2015    minimal by angio , fu nuc est nl     Elevated TSH 2018    Esophageal reflux 2015    Essential hypertension     History of cardioversion     [id number]    Idiopathic cardiomyopathy (H) 2015    fu echo nl ef, nl nuclear est , Dr. Quarles    Mixed hyperlipidemia 2015    Mumps     Sleep apnea 2015    does not use cpap as of 2019    Sleep apnea     Thoracic aortic aneurysm      sinus of valsalva 4.5 and asc aorta 4.5 in 2017    Tubular adenoma of colon 01/2017    fu 3 years             Past Surgical History:   I have reviewed this patient's past surgical history  Past Surgical History:   Procedure Laterality Date    APPENDECTOMY  1964    ESOPHAGOSCOPY, GASTROSCOPY, DUODENOSCOPY (EGD), COMBINED N/A 9/14/2023    Procedure: Esophagoscopy, gastroscopy, duodenoscopy (EGD), combined;  Surgeon: Chele Ash MD;  Location:  GI               Social History:   I have reviewed this patient's social history  Social History     Tobacco Use    Smoking status: Never    Smokeless tobacco: Never   Substance Use Topics    Alcohol use: Not Currently             Family History:   I have reviewed this patient's family history  Family History   Problem Relation Age of Onset    Arrhythmia Mother         a-fib    Cerebrovascular Disease Mother     Arrhythmia Father         a-fib    Colon Cancer Father     Diabetes Father     Cerebrovascular Disease Father     No Known Problems Brother              Allergies:     Allergies   Allergen Reactions    Fentanyl Confusion             Medications:   I have reviewed this patient's current medications  (Not in a hospital admission)    Current Facility-Administered Medications   Medication Dose Route Frequency Provider Last Rate Last Admin    acetaminophen (TYLENOL) tablet 500-1,000 mg  500-1,000 mg Oral Daily PRN Javi Mcnamara,         calcitRIOL (ROCALTROL) capsule 0.25 mcg  0.25 mcg Oral BID Javi Mcnamara,         calcium carbonate (TUMS) chewable tablet 1,000 mg  1,000 mg Oral 4x Daily PRN Javi Mcnamara,         calcium carbonate (TUMS) chewable tablet 500 mg  500 mg Oral BID Javi Mcnamara,         cholecalciferol (VITAMIN D3) capsule 1,250 mcg  1,250 mcg Oral Q7 Days Dreyer, Patrick, DO        [START ON 1/11/2025] cosyntropin (CORTROSYN) injection 250 mcg  250 mcg Intravenous Once Javi Mcnamara,  DO        lidocaine (LMX4) cream   Topical Q1H PRN Javi Mcnamara DO        lidocaine 1 % 0.1-1 mL  0.1-1 mL Other Q1H PRN Javi Mcnamara DO        midodrine (PROAMATINE) tablet 5 mg  5 mg Oral BID Javi Mcnamara DO        ondansetron (ZOFRAN ODT) ODT tab 4 mg  4 mg Oral Q6H PRN Javi Mcnamara DO        Or    ondansetron (ZOFRAN) injection 4 mg  4 mg Intravenous Q6H PRN Javi Mcnamara DO        pantoprazole (PROTONIX) EC tablet 40 mg  40 mg Oral BID AC Javi Mcnamara DO   40 mg at 01/10/25 1604    pravastatin (PRAVACHOL) tablet 20 mg  20 mg Oral Daily Javi Mcnamara DO        senna-docusate (SENOKOT-S/PERICOLACE) 8.6-50 MG per tablet 1 tablet  1 tablet Oral BID PRN Javi Mcnamara DO        Or    senna-docusate (SENOKOT-S/PERICOLACE) 8.6-50 MG per tablet 2 tablet  2 tablet Oral BID PRN Javi Mcnamara,         sodium chloride (PF) 0.9% PF flush 3 mL  3 mL Intracatheter Q8H Javi Mcnamara DO   3 mL at 01/10/25 1604    sodium chloride (PF) 0.9% PF flush 3 mL  3 mL Intracatheter q1 min prn Javi Mcnamara DO        tamsulosin (FLOMAX) capsule 0.4 mg  0.4 mg Oral QPM Javi Mcnamara DO         Current Outpatient Medications   Medication Sig Dispense Refill    acetaminophen (TYLENOL) 500 MG tablet Take 500-1,000 mg by mouth daily as needed for mild pain      Cabozantinib S-Malate (CABOMETYX) 20 MG Take 20 mg by mouth daily      midodrine (PROAMATINE) 5 MG tablet Take 1 tablet (5 mg) by mouth 2 times daily 60 tablet 11    ondansetron (ZOFRAN ODT) 4 MG ODT tab Take 1 tablet (4 mg) by mouth every 6 hours as needed for nausea or vomiting 20 tablet 0    pantoprazole (PROTONIX) 40 MG EC tablet TAKE 1 TABLET BY MOUTH TWICE A DAY BEFORE MEALS 180 tablet 3    pravastatin (PRAVACHOL) 20 MG tablet Take 1 tablet (20 mg) by mouth daily 90 tablet 3    tamsulosin (FLOMAX) 0.4 MG capsule Take 1 capsule (0.4  mg) by mouth daily (Patient taking differently: Take 0.4 mg by mouth every evening.) 90 capsule 3             Review of Systems:     The 10 point Review of Systems is negative other than noted in the HPI.            Data:   Data   Results for orders placed or performed during the hospital encounter of 01/10/25 (from the past 24 hours)   CBC with platelets + differential    Narrative    The following orders were created for panel order CBC with platelets + differential.  Procedure                               Abnormality         Status                     ---------                               -----------         ------                     CBC with platelets and d...[[id number]]  Abnormal            Final result                 Please view results for these tests on the individual orders.   Comprehensive metabolic panel   Result Value Ref Range    Sodium 140 135 - 145 mmol/L    Potassium 2.9 (L) 3.4 - 5.3 mmol/L    Carbon Dioxide (CO2) 18 (L) 22 - 29 mmol/L    Anion Gap 9 7 - 15 mmol/L    Urea Nitrogen 21.3 8.0 - 23.0 mg/dL    Creatinine 1.17 0.67 - 1.17 mg/dL    GFR Estimate 64 >60 mL/min/1.73m2    Calcium 5.7 (LL) 8.8 - 10.4 mg/dL    Chloride 113 (H) 98 - 107 mmol/L    Glucose 83 70 - 99 mg/dL    Alkaline Phosphatase 71 40 - 150 U/L    AST 22 0 - 45 U/L    ALT 20 0 - 70 U/L    Protein Total 4.3 (L) 6.4 - 8.3 g/dL    Albumin 2.2 (L) 3.5 - 5.2 g/dL    Bilirubin Total 0.9 <=1.2 mg/dL   Troponin T, High Sensitivity   Result Value Ref Range    Troponin T, High Sensitivity 15 <=22 ng/L   CBC with platelets and differential   Result Value Ref Range    WBC Count 4.7 4.0 - 11.0 10e3/uL    RBC Count 3.49 (L) 4.40 - 5.90 10e6/uL    Hemoglobin 11.3 (L) 13.3 - 17.7 g/dL    Hematocrit 33.3 (L) 40.0 - 53.0 %    MCV 95 78 - 100 fL    MCH 32.4 26.5 - 33.0 pg    MCHC 33.9 31.5 - 36.5 g/dL    RDW 17.1 (H) 10.0 - 15.0 %    Platelet Count 138 (L) 150 - 450 10e3/uL    % Neutrophils 68 %    % Lymphocytes 18 %    % Monocytes 10 %    %  Eosinophils 3 %    % Basophils 0 %    % Immature Granulocytes 0 %    NRBCs per 100 WBC 0 <1 /100    Absolute Neutrophils 3.2 1.6 - 8.3 10e3/uL    Absolute Lymphocytes 0.9 0.8 - 5.3 10e3/uL    Absolute Monocytes 0.5 0.0 - 1.3 10e3/uL    Absolute Eosinophils 0.2 0.0 - 0.7 10e3/uL    Absolute Basophils 0.0 0.0 - 0.2 10e3/uL    Absolute Immature Granulocytes 0.0 <=0.4 10e3/uL    Absolute NRBCs 0.0 10e3/uL   TSH with free T4 reflex   Result Value Ref Range    TSH 3.41 0.30 - 4.20 uIU/mL   Phosphorus   Result Value Ref Range    Phosphorus 1.5 (L) 2.5 - 4.5 mg/dL   EKG 12-lead, tracing only   Result Value Ref Range    Systolic Blood Pressure  mmHg    Diastolic Blood Pressure  mmHg    Ventricular Rate 67 BPM    Atrial Rate  BPM    VA Interval  ms    QRS Duration 102 ms     ms    QTc 433 ms    P Axis  degrees    R AXIS -36 degrees    T Axis 27 degrees    Interpretation ECG       Atrial fibrillation  Left axis deviation  Abnormal ECG  When compared with ECG of 14-Jul-2024 05:10,  QT has shortened  Confirmed by GENERATED REPORT, COMPUTER ([id number]),  Delfina Milton ([id number]) on 1/10/2025 4:00:12 PM     Ionized Calcium   Result Value Ref Range    Calcium Ionized Whole Blood 4.6 4.4 - 5.2 mg/dL   Troponin T, High Sensitivity   Result Value Ref Range    Troponin T, High Sensitivity 19 <=22 ng/L   Parathyroid Hormone Intact   Result Value Ref Range    Parathyroid Hormone Intact 95 (H) 15 - 65 pg/mL    Narrative    This result was obtained with the Roche Elecsys PTH STAT assay.   This reference range differs from PTH assays used in other [facility] laboratories.

## 2025-01-10 NOTE — ED NOTES
Bed: ED15  Expected date:   Expected time:   Means of arrival:   Comments:  433  79 M near syncope  0855

## 2025-01-10 NOTE — ED NOTES
[facility]  ED Nurse Handoff Report    ED Chief complaint: Syncope      ED Diagnosis:   Final diagnoses:   Syncope, unspecified syncope type   Hypokalemia   Hypocalcemia       Code Status: Full Code    Allergies:   Allergies   Allergen Reactions    Fentanyl Confusion       Patient Story: Pt BIBA after 2 syncopal episodes today, pt states he's being dealing w/ syncopal episodes over last few weeks, hx A-fib and kidney cancer, pt currently in A-fib per EMS, , ABCD intact.    Focused Assessment:  [id number]  Neuro-WDL  Resp-WDL    Treatments and/or interventions provided: IV NS, oral potassium  Patient's response to treatments and/or interventions: tolerated    To be done/followed up on inpatient unit:  monitor    Does this patient have any cognitive concerns?:  A/OX4    Activity level - Baseline/Home:  Independent  Activity Level - Current:   Stand with assist x2    Patient's Preferred language: English   Needed?: No    Isolation: None  Infection: Not Applicable  Patient tested for COVID 19 prior to admission: NO  Bariatric?: No    Vital Signs:   Vitals:    01/10/25 0908 01/10/25 0915   BP: 92/72 (!) 85/68   Pulse: 83 89   Resp: 16    Temp: 97.8  F (36.6  C)    TempSrc: Oral    SpO2: 96% 91%       Cardiac Rhythm:     Was the PSS-3 completed:   Yes  What interventions are required if any?               Family Comments: none  OBS brochure/video discussed/provided to patient/family: N/A              Name of person given brochure if not patient: NA              Relationship to patient: NA    For the majority of the shift this patient's behavior was Green.   Behavioral interventions performed were NA.    ED NURSE PHONE NUMBER: [phone]

## 2025-01-10 NOTE — H&P
[facility]    History and Physical - Hospitalist Service       Date of Admission:  1/10/2025    Assessment & Plan      Ti Nickerson is a 79 year old male admitted on 1/10/2025.       Recurrent syncope  Hypocalcemia  hypokalemia  Low blood pressure  Orthostatic hypotension  Lost consciousness in the AM prior to admission trying to make his cardiology appointment to  consider cardioversion as well as Watchman device  Found to have significant hypocalcemia correcting to 7.1 with albumin  Cabozantinib which is this patient's oral renal cancer treatment is often associated with hypocalcemia  Patient and wife very concerned about cardiac causes of syncope which although possible seems most likely related to neurocardiogenic symptoms such as orthostatic.  Will nonetheless spread cardiac net with telemetry, echo, and cardiology consultation  Plan  - serial ionized calcium and give calcium gluconate and oral calcium  - potassium replacement  - PT and OT  - echocardiogram  - cardiology consultation  - continue midodrine, increase as tolerates  - vitamin d and PTH  - [facility] given consideration of cessation or dose reduction of the Cabozantinib   - obtain TSH per the recommendations for Cabozantinib monitoring  - serial orthostatic vital signs  - abdominal binder and compression stockings  - schedule calcium and calcitriol to promote calcium absorption    History of renal cancer  Plan  - oncology consult as above    CKD 3b  Baseline creatnine of 1.7-2      Atrial fibrillation  Reports being off all meds due to syncope  Was in discussions to discuss watchman as outpatient  Plan  - cardiology consulted              Diet:  regular  DVT Prophylaxis: Pneumatic Compression Devices  Dillard Catheter: Not present  Lines: None     Cardiac Monitoring: None  Code Status:  full code    Clinically Significant Risk Factors Present on Admission        # Hypokalemia: Lowest K = 2.9 mmol/L in last 2  days, will replace as needed   # Hyperchloremia: Highest Cl = 113 mmol/L in last 2 days, will monitor as appropriate          # Hypoalbuminemia: Lowest albumin = 2.2 g/dL at 1/10/2025  9:19 AM, will monitor as appropriate     # Hypertension: Noted on problem list               # Financial/Environmental Concerns:           Disposition Plan     Medically Ready for Discharge: Anticipated in 2-4 Days           Javi Mcnamara DO  Hospitalist Service  [facility]  Securely message with "[employer]" (more info)  Text page via [email or web address] Paging/Directory     ______________________________________________________________________    Chief Complaint   Recurrent fainting spells    History is obtained from the patient    History of Present Illness   Ti Nickerson is a 79 year old male with past medical history of metastatic renal cell cancer, atrial fibrillation, orthostatic hypotension on midodrine who presents with recurrent fainting spells.  The patient has struggled with recurrent orthostasis since diagnosis of cancer but reports worsening symptoms.  He was set to see his outpatient cardiologist this morning due to persistent low blood pressure and fainting episodes.  She notes in the past that these fainting episodes were associated with gastrointestinal bleeding however this improved after change in the patient's oral chemotherapeutic agent and that recent hemoglobins have been much higher up to 14 with no evidence of gastrointestinal bleeding.    Nonetheless the patient continues to have episodes of feeling faint that generally happen after standing up.  He does note there was 1 episode that happened while sitting.  No chest pain    In the ED he was found to have low calcium which Chlorax to 7.1 and a potassium of 2.9.       Past Medical History    Past Medical History:   Diagnosis Date    Atrial fibrillation, unspecified 9/18/2015    CAD (coronary artery disease) 09/18/2015    minimal by angio  2007, fu nuc est nl 2018    Elevated TSH 2018    Esophageal reflux 9/18/2015    Essential hypertension     History of cardioversion     [id number]    Idiopathic cardiomyopathy (H) 09/18/2015    fu echo nl ef, nl nuclear est 11/18, Dr. Quarles    Mixed hyperlipidemia 9/18/2015    Mumps     Sleep apnea 09/18/2015    does not use cpap as of 2019    Sleep apnea     Thoracic aortic aneurysm     sinus of valsalva 4.5 and asc aorta 4.5 in 2017    Tubular adenoma of colon 01/2017    fu 3 years       Past Surgical History   Past Surgical History:   Procedure Laterality Date    APPENDECTOMY  1964    ESOPHAGOSCOPY, GASTROSCOPY, DUODENOSCOPY (EGD), COMBINED N/A 9/14/2023    Procedure: Esophagoscopy, gastroscopy, duodenoscopy (EGD), combined;  Surgeon: Chele Ash MD;  Location:  GI       Prior to Admission Medications   Prior to Admission Medications   Prescriptions Last Dose Informant Patient Reported? Taking?   Cabozantinib S-Malate (CABOMETYX) 20 MG   Yes No   Sig: Take 20 mg by mouth   acetaminophen (TYLENOL) 500 MG tablet  Spouse/Significant Other Yes No   Sig: Take 500-1,000 mg by mouth daily as needed for mild pain   midodrine (PROAMATINE) 5 MG tablet   No No   Sig: Take 1 tablet (5 mg) by mouth 2 times daily   ondansetron (ZOFRAN ODT) 4 MG ODT tab   No No   Sig: Take 1 tablet (4 mg) by mouth every 6 hours as needed for nausea or vomiting   pantoprazole (PROTONIX) 40 MG EC tablet   No No   Sig: TAKE 1 TABLET BY MOUTH TWICE A DAY BEFORE MEALS   pravastatin (PRAVACHOL) 20 MG tablet   No No   Sig: Take 1 tablet (20 mg) by mouth daily   tamsulosin (FLOMAX) 0.4 MG capsule   No No   Sig: Take 1 capsule (0.4 mg) by mouth daily      Facility-Administered Medications: None        Review of Systems    The 10 point Review of Systems is negative other than noted in the HPI or here.     Social History   I have reviewed this patient's social history and updated it with pertinent information if needed.  Social History      Tobacco Use    Smoking status: Never    Smokeless tobacco: Never   Vaping Use    Vaping status: Never Used   Substance Use Topics    Alcohol use: Not Currently    Drug use: No         Family History   I have reviewed this patient's family history and updated it with pertinent information if needed.  Family History   Problem Relation Age of Onset    Arrhythmia Mother         a-fib    Cerebrovascular Disease Mother     Arrhythmia Father         a-fib    Colon Cancer Father     Diabetes Father     Cerebrovascular Disease Father     No Known Problems Brother          Allergies   Allergies   Allergen Reactions    Fentanyl Confusion        Physical Exam   Vital Signs: Temp: 97.8  F (36.6  C) Temp src: Oral BP: (!) 85/68 Pulse: 89   Resp: 16 SpO2: 91 %      Weight: 0 lbs 0 oz    General: Alert, No distress. Nontoxic appearance  Head: No signs of trauma.   Mouth/Throat: Oropharynx moist.   Eyes: Conjunctivae are normal. Pupils are equal..   Neck: Normal range of motion.    CV: Appears well perfused.  Resp:No respiratory distress.   MSK: Normal range of motion. No obvious deformity.   Neuro: The patient is alert and interactive. COOK. Speech normal. GCS 15  Skin: No lesion or sign of trauma noted.   Psych: normal mood and affect. behavior is normal.      Medical Decision Making       76 MINUTES SPENT BY ME on the date of service doing chart review, history, exam, documentation & further activities per the note.      Data     I have personally reviewed the following data over the past 24 hrs:    4.7  \   11.3 (L)   / 138 (L)     140 113 (H) 21.3 /  83   2.9 (L) 18 (L) 1.17 \     ALT: 20 AST: 22 AP: 71 TBILI: 0.9   ALB: 2.2 (L) TOT PROTEIN: 4.3 (L) LIPASE: N/A     Trop: 15 BNP: N/A       Imaging results reviewed over the past 24 hrs:   No results found for this or any previous visit (from the past 24 hours).

## 2025-01-10 NOTE — ED PROVIDER NOTES
"  Emergency Department Note      History of Present Illness     Chief Complaint   Syncope      HPI   Ti Nickerson is a 79 year old male with a history of renal cancer, atrial fibrillation, CAD, aortic aneurysm, cardiomyopathy, and hyperlipidemia presenting with syncope.  The patients spouse reports trying to take him to his cardiologist appointment with Dr. Quarles this morning; she was unable to due to persistent syncopal episodes. He has had syncope episodes in the past due to low hemoglobin. She states that he has being having low pressure since last week, which she suspects is the cause of his symptoms. He takes midodrine to bring his blood pressure up as prescribed by his cardiologist. He was diagnosed with late renal cancer 5 years ago for which he is in treatment for. His wife states that he drinks a bout half a gallon of water day.     Independent Historian   Wife as detailed above.    Review of External Notes   I reviewed a clinic note from 31 December 2024    Past Medical History     Medical History and Problem List   Atrial fibrillation, unspecified  CAD (coronary artery disease)  Elevated TSH  Esophageal reflux  Essential hypertension  History of cardioversion  Idiopathic cardiomyopathy   Mixed hyperlipidemia  Sleep apnea  Thoracic aortic aneurysm  Tubular adenoma of colon    Medications   cabozantinib   midodrine  ondansetron  pantoprazole   pravastatin  tamsulosin    Surgical History   Appendectomy   ECG      Physical Exam     Patient Vitals for the past 24 hrs:   BP Temp Temp src Pulse Resp SpO2 Height Weight   01/10/25 1800 (!) 135/93 97.3  F (36.3  C) Oral 65 19 95 % 1.93 m (6' 4\") 108.8 kg (239 lb 12.8 oz)   01/10/25 1400 (!) 129/93 -- -- 69 21 93 % -- --   01/10/25 1300 112/84 -- -- 79 14 (!) 89 % -- --   01/10/25 1220 126/89 -- -- 85 18 94 % -- --   01/10/25 1200 91/70 -- -- 71 (!) 9 96 % -- --   01/10/25 1140 (!) 118/96 -- -- 66 14 94 % -- --   01/10/25 1130 90/78 -- -- 74 19 (!) 89 % -- -- "   01/10/25 1120 (!) 122/98 -- -- 68 14 (!) 87 % -- --   01/10/25 1100 121/73 -- -- 81 16 (!) 88 % -- --   01/10/25 1045 (!) 116/92 -- -- 98 28 (!) 89 % -- --   01/10/25 1030 109/68 -- -- 82 15 91 % -- --   01/10/25 1015 106/70 -- -- 71 11 93 % -- --   01/10/25 1000 105/82 -- -- 67 13 92 % -- --   01/10/25 0945 105/67 -- -- 67 12 91 % -- --   01/10/25 0930 106/68 -- -- 73 12 91 % -- --   01/10/25 0915 (!) 85/68 -- -- 89 -- 91 % -- --   01/10/25 0908 92/72 97.8  F (36.6  C) Oral 83 16 96 % -- --     Physical Exam  General: Alert, No distress. Nontoxic appearance  Head: No signs of trauma.   Mouth/Throat: Oropharynx moist.   Eyes: Conjunctivae are normal. Pupils are equal..   Neck: Normal range of motion.    CV: Appears well perfused.  Resp:No respiratory distress.   MSK: Normal range of motion. No obvious deformity.   Neuro: The patient is alert and interactive. COOK. Speech normal. GCS 15  Skin: No lesion or sign of trauma noted.   Psych: normal mood and affect. behavior is normal.      Diagnostics     Lab Results   Labs Ordered and Resulted from Time of ED Arrival to Time of ED Departure   COMPREHENSIVE METABOLIC PANEL - Abnormal       Result Value    Sodium 140      Potassium 2.9 (*)     Carbon Dioxide (CO2) 18 (*)     Anion Gap 9      Urea Nitrogen 21.3      Creatinine 1.17      GFR Estimate 64      Calcium 5.7 (*)     Chloride 113 (*)     Glucose 83      Alkaline Phosphatase 71      AST 22      ALT 20      Protein Total 4.3 (*)     Albumin 2.2 (*)     Bilirubin Total 0.9     CBC WITH PLATELETS AND DIFFERENTIAL - Abnormal    WBC Count 4.7      RBC Count 3.49 (*)     Hemoglobin 11.3 (*)     Hematocrit 33.3 (*)     MCV 95      MCH 32.4      MCHC 33.9      RDW 17.1 (*)     Platelet Count 138 (*)     % Neutrophils 68      % Lymphocytes 18      % Monocytes 10      % Eosinophils 3      % Basophils 0      % Immature Granulocytes 0      NRBCs per 100 WBC 0      Absolute Neutrophils 3.2      Absolute Lymphocytes 0.9       Absolute Monocytes 0.5      Absolute Eosinophils 0.2      Absolute Basophils 0.0      Absolute Immature Granulocytes 0.0      Absolute NRBCs 0.0     PARATHYROID HORMONE INTACT - Abnormal    Parathyroid Hormone Intact 95 (*)    PHOSPHORUS - Abnormal    Phosphorus 1.5 (*)    IRON AND IRON BINDING CAPACITY - Abnormal    Iron 63      Iron Binding Capacity 146 (*)     Iron Sat Index 43     TROPONIN T, HIGH SENSITIVITY - Normal    Troponin T, High Sensitivity 15     IONIZED CALCIUM - Normal    Calcium Ionized Whole Blood 4.6     TROPONIN T, HIGH SENSITIVITY - Normal    Troponin T, High Sensitivity 19     TSH WITH FREE T4 REFLEX - Normal    TSH 3.41     RETICULOCYTE COUNT - Normal    % Reticulocyte 1.3      Absolute Reticulocyte 0.046     VITAMIN D DEFICIENCY SCREENING   FERRITIN   VITAMIN B12       Imaging   Echocardiogram Complete    (Results Pending)       EKG   ECG taken at 0930, ECG read at 0957  Atrial fibrillation  Left axis deviation  Abnormal ECG  No significant changes as compared to prior, dated 07/14/24.  Rate 67 bpm. LA interval * ms. QRS duration 102 ms. QT/QTc 410/433 ms. P-R-T axes * -36 27.    Independent Interpretation   None    ED Course      Medications Administered   Medications   lidocaine 1 % 0.1-1 mL (has no administration in time range)   lidocaine (LMX4) cream (has no administration in time range)   sodium chloride (PF) 0.9% PF flush 3 mL (3 mLs Intracatheter $Given 1/10/25 2636)   sodium chloride (PF) 0.9% PF flush 3 mL (has no administration in time range)   senna-docusate (SENOKOT-S/PERICOLACE) 8.6-50 MG per tablet 1 tablet (has no administration in time range)     Or   senna-docusate (SENOKOT-S/PERICOLACE) 8.6-50 MG per tablet 2 tablet (has no administration in time range)   calcium carbonate (TUMS) chewable tablet 1,000 mg (has no administration in time range)   ondansetron (ZOFRAN ODT) ODT tab 4 mg (has no administration in time range)     Or   ondansetron (ZOFRAN) injection 4 mg (has no  administration in time range)   calcitRIOL (ROCALTROL) capsule 0.25 mcg ( Oral Automatically Held 1/13/25 2000)   acetaminophen (TYLENOL) tablet 500-1,000 mg (has no administration in time range)   midodrine (PROAMATINE) tablet 5 mg (has no administration in time range)   pantoprazole (PROTONIX) EC tablet 40 mg (40 mg Oral $Given 1/10/25 1604)   pravastatin (PRAVACHOL) tablet 20 mg (has no administration in time range)   tamsulosin (FLOMAX) capsule 0.4 mg (has no administration in time range)   calcium carbonate (TUMS) chewable tablet 500 mg (has no administration in time range)   cosyntropin (CORTROSYN) injection 250 mcg (has no administration in time range)   cholecalciferol (VITAMIN D3) capsule 1,250 mcg (has no administration in time range)   sodium chloride 0.9% BOLUS 1,000 mL (0 mLs Intravenous Stopped 1/10/25 1142)   potassium chloride (KLOR-CON) Packet 40 mEq (40 mEq Oral $Given 1/10/25 1028)       Procedures   Procedures     Discussion of Management   Admitting Hospitalist, Dr. Mcnamara    ED Course   ED Course as of 01/10/25 1932   Fri Chava 10, 2025   0929 I obtained the history and examined the patient as noted above.     1031 I rechecked the patient and explained findings.    1036 I spoke with Dr. Mcnamara of the hospitalist team who accepted the patient for admission.     1041 I spoke with Dr. Quarles's cardiology clinic staff to relay message that patient will be admitted to hospital.        Additional Documentation  None    Medical Decision Making / Diagnosis     CMS Diagnoses: None    MIPS       None    MDM   Ti Nickerson is a 79 year old male who presents with a history and clinical exam consistent with syncope.  While orthostatic hypotension was the most likely etiology given the history of this patient, I considered a broad differential for their syncope today including cardiac arrythmia, ACS, aortic stenosis or other acute valvular dysfunction, HOCM, PE, orthostatic hypotension, drugs,  medication side effect, situational, carotid hypersensitivity, seizure, TIA, stroke, cerebral insufficiency, vasovagal, etc.  Patient has no signs at this point of an acute stroke, PE, dissection, acute coronary syndrome.    The patient has evidence of hypokalemia and hypocalcemia.  No EKG changes.  Replacement will be started in the ED for his low potassium and his calcium will be followed closely but is likely related to his low albumin state.  The patient will be admitted to the hospital for further evaluation and treatment.      Disposition   The patient was admitted to the hospital.     Diagnosis     ICD-10-CM    1. Syncope, unspecified syncope type  R55       2. Hypokalemia  E87.6       3. Hypocalcemia  E83.51            Discharge Medications   Current Discharge Medication List        Scribe Disclosure:  Meron CARRERA, am serving as a scribe at 9:36 AM on 1/10/2025 to document services personally performed by Femi Mccarthy MD based on my observations and the provider's statements to me.        Femi Mccarthy MD  01/10/25 1934

## 2025-01-10 NOTE — PROGRESS NOTES
RECEIVING UNIT ED HANDOFF REVIEW    ED Nurse Handoff Report was reviewed by: Lyssa Krishna RN on January 10, 2025 at 5:45 PM

## 2025-01-10 NOTE — PHARMACY-ADMISSION MEDICATION HISTORY
Pharmacist Admission Medication History    Admission medication history is complete. The information provided in this note is only as accurate as the sources available at the time of the update.    Information Source(s): Patient, Family member, and CareEverywhere/SureScripts via in-person    Pertinent Information: Verified that he has been off apixaban since summer 2024    Changes made to PTA medication list:  Added: None  Deleted: None  Changed: None    Allergies reviewed with patient and updates made in EHR: yes    Medication History Completed By: Sanrda Johansen [facility] 1/10/2025 11:26 AM    PTA Med List   Medication Sig Last Dose/Taking    acetaminophen (TYLENOL) 500 MG tablet Take 500-1,000 mg by mouth daily as needed for mild pain Taking As Needed    Cabozantinib S-Malate (CABOMETYX) 20 MG Take 20 mg by mouth daily 1/10/2025 Morning    midodrine (PROAMATINE) 5 MG tablet Take 1 tablet (5 mg) by mouth 2 times daily 1/10/2025 Morning    ondansetron (ZOFRAN ODT) 4 MG ODT tab Take 1 tablet (4 mg) by mouth every 6 hours as needed for nausea or vomiting Taking As Needed    pantoprazole (PROTONIX) 40 MG EC tablet TAKE 1 TABLET BY MOUTH TWICE A DAY BEFORE MEALS 1/9/2025 Evening    pravastatin (PRAVACHOL) 20 MG tablet Take 1 tablet (20 mg) by mouth daily 1/9/2025 Evening    tamsulosin (FLOMAX) 0.4 MG capsule Take 1 capsule (0.4 mg) by mouth daily (Patient taking differently: Take 0.4 mg by mouth every evening.) 1/9/2025 Evening

## 2025-01-10 NOTE — TELEPHONE ENCOUNTER
"Received a call from the [facility] to discuss low blood pressure and blacking out with wife Valery.    HX: orthostatic hypotension, bilateral leg edema, persistent A fib, non-obstructive CAD, right renal cell carcinoma, CKD3    Spoke to Valery who says Edgar had an appointment this morning with Dr. Quarles, and was trying to get him ready for that, but his blood pressure has been low in the 80's systolic, and he blacked out on her a couple of times, and is not able to stand up safely. She says she called 911 and the paramedics are there currently. She is wondering if he should try to see Dr. Quarles or go to the hospital. Advised the paramedics will make recommendations for next steps of care, and should follow those recommendations. She verbalized agreement and understanding.  Advised a message will be sent to Mamie Gaston's team for an update.    1. REASON FOR CALL or QUESTION: \"What is your reason for calling today?\" or \"How can I best help you?\" or \"What question do you have that I can help answer?\"  "

## 2025-01-11 ENCOUNTER — APPOINTMENT (OUTPATIENT)
Dept: CARDIOLOGY | Facility: CLINIC | Age: 80
DRG: 312 | End: 2025-01-11
Attending: HOSPITALIST
Payer: MEDICARE

## 2025-01-11 ENCOUNTER — APPOINTMENT (OUTPATIENT)
Dept: PHYSICAL THERAPY | Facility: CLINIC | Age: 80
DRG: 312 | End: 2025-01-11
Attending: HOSPITALIST
Payer: MEDICARE

## 2025-01-11 LAB
ALBUMIN SERPL BCG-MCNC: 3.2 G/DL (ref 3.5–5.2)
ALP SERPL-CCNC: 103 U/L (ref 40–150)
ALT SERPL W P-5'-P-CCNC: 29 U/L (ref 0–70)
ANION GAP SERPL CALCULATED.3IONS-SCNC: 9 MMOL/L (ref 7–15)
AST SERPL W P-5'-P-CCNC: 31 U/L (ref 0–45)
BASOPHILS # BLD AUTO: 0 10E3/UL (ref 0–0.2)
BASOPHILS NFR BLD AUTO: 0 %
BILIRUB SERPL-MCNC: 1.4 MG/DL
BLAIMP ISLT/SPM QL: NOT DETECTED
BLAKPC ISLT/SPM QL: NOT DETECTED
BLAOXA-48 ISLT/SPM QL: NOT DETECTED
BLAVIM ISLT/SPM QL: NOT DETECTED
BUN SERPL-MCNC: 25 MG/DL (ref 8–23)
CA-I BLD-MCNC: 4.3 MG/DL (ref 4.4–5.2)
CALCIUM SERPL-MCNC: 8.2 MG/DL (ref 8.8–10.4)
CHLORIDE SERPL-SCNC: 104 MMOL/L (ref 98–107)
CORTICOSTER 1H P 250 UG ACTH SERPL-SCNC: 24.7 UG/DL
CORTIS SERPL-MCNC: 11.8 UG/DL
CREAT SERPL-MCNC: 1.52 MG/DL (ref 0.67–1.17)
EGFRCR SERPLBLD CKD-EPI 2021: 47 ML/MIN/1.73M2
EOSINOPHIL # BLD AUTO: 0.2 10E3/UL (ref 0–0.7)
EOSINOPHIL NFR BLD AUTO: 4 %
ERYTHROCYTE [DISTWIDTH] IN BLOOD BY AUTOMATED COUNT: 17 % (ref 10–15)
FERRITIN SERPL-MCNC: 500 NG/ML (ref 31–409)
GLUCOSE SERPL-MCNC: 101 MG/DL (ref 70–99)
HCO3 SERPL-SCNC: 22 MMOL/L (ref 22–29)
HCT VFR BLD AUTO: 39.1 % (ref 40–53)
HGB BLD-MCNC: 13.3 G/DL (ref 13.3–17.7)
IMM GRANULOCYTES # BLD: 0 10E3/UL
IMM GRANULOCYTES NFR BLD: 0 %
LVEF ECHO: NORMAL
LYMPHOCYTES # BLD AUTO: 1 10E3/UL (ref 0.8–5.3)
LYMPHOCYTES NFR BLD AUTO: 20 %
MAGNESIUM SERPL-MCNC: 2.3 MG/DL (ref 1.7–2.3)
MCH RBC QN AUTO: 32.2 PG (ref 26.5–33)
MCHC RBC AUTO-ENTMCNC: 34 G/DL (ref 31.5–36.5)
MCV RBC AUTO: 95 FL (ref 78–100)
MONOCYTES # BLD AUTO: 0.5 10E3/UL (ref 0–1.3)
MONOCYTES NFR BLD AUTO: 9 %
NDM TARGET DNA: NOT DETECTED
NEUTROPHILS # BLD AUTO: 3.4 10E3/UL (ref 1.6–8.3)
NEUTROPHILS NFR BLD AUTO: 66 %
NRBC # BLD AUTO: 0 10E3/UL
NRBC BLD AUTO-RTO: 0 /100
PHOSPHATE SERPL-MCNC: 2.3 MG/DL (ref 2.5–4.5)
PLATELET # BLD AUTO: 159 10E3/UL (ref 150–450)
POTASSIUM SERPL-SCNC: 4.6 MMOL/L (ref 3.4–5.3)
PROT SERPL-MCNC: 6.2 G/DL (ref 6.4–8.3)
RBC # BLD AUTO: 4.13 10E6/UL (ref 4.4–5.9)
SODIUM SERPL-SCNC: 135 MMOL/L (ref 135–145)
VIT B12 SERPL-MCNC: 323 PG/ML (ref 232–1245)
WBC # BLD AUTO: 5.1 10E3/UL (ref 4–11)

## 2025-01-11 PROCEDURE — 99233 SBSQ HOSP IP/OBS HIGH 50: CPT | Performed by: HOSPITALIST

## 2025-01-11 PROCEDURE — 250N000011 HC RX IP 250 OP 636: Performed by: HOSPITALIST

## 2025-01-11 PROCEDURE — 86596 VOLTAGE-GTD CA CHNL ANTB EA: CPT | Performed by: PSYCHIATRY & NEUROLOGY

## 2025-01-11 PROCEDURE — 85004 AUTOMATED DIFF WBC COUNT: CPT | Performed by: HOSPITALIST

## 2025-01-11 PROCEDURE — 120N000001 HC R&B MED SURG/OB

## 2025-01-11 PROCEDURE — 84100 ASSAY OF PHOSPHORUS: CPT | Performed by: HOSPITALIST

## 2025-01-11 PROCEDURE — 83735 ASSAY OF MAGNESIUM: CPT | Performed by: HOSPITALIST

## 2025-01-11 PROCEDURE — 97530 THERAPEUTIC ACTIVITIES: CPT | Mod: GP

## 2025-01-11 PROCEDURE — 87798 DETECT AGENT NOS DNA AMP: CPT | Performed by: HOSPITALIST

## 2025-01-11 PROCEDURE — 82330 ASSAY OF CALCIUM: CPT | Performed by: HOSPITALIST

## 2025-01-11 PROCEDURE — 36415 COLL VENOUS BLD VENIPUNCTURE: CPT | Performed by: PSYCHIATRY & NEUROLOGY

## 2025-01-11 PROCEDURE — 255N000002 HC RX 255 OP 636: Performed by: HOSPITALIST

## 2025-01-11 PROCEDURE — 999N000208 ECHOCARDIOGRAM COMPLETE

## 2025-01-11 PROCEDURE — 258N000003 HC RX IP 258 OP 636: Performed by: HOSPITALIST

## 2025-01-11 PROCEDURE — 36415 COLL VENOUS BLD VENIPUNCTURE: CPT | Performed by: HOSPITALIST

## 2025-01-11 PROCEDURE — 85041 AUTOMATED RBC COUNT: CPT | Performed by: HOSPITALIST

## 2025-01-11 PROCEDURE — 82533 TOTAL CORTISOL: CPT | Performed by: HOSPITALIST

## 2025-01-11 PROCEDURE — 93306 TTE W/DOPPLER COMPLETE: CPT | Mod: 26 | Performed by: INTERNAL MEDICINE

## 2025-01-11 PROCEDURE — 80053 COMPREHEN METABOLIC PANEL: CPT | Performed by: HOSPITALIST

## 2025-01-11 PROCEDURE — 97161 PT EVAL LOW COMPLEX 20 MIN: CPT | Mod: GP

## 2025-01-11 PROCEDURE — 250N000013 HC RX MED GY IP 250 OP 250 PS 637: Performed by: HOSPITALIST

## 2025-01-11 RX ORDER — MIDODRINE HYDROCHLORIDE 2.5 MG/1
10 TABLET ORAL
Status: DISCONTINUED | OUTPATIENT
Start: 2025-01-11 | End: 2025-01-13

## 2025-01-11 RX ADMIN — PRAVASTATIN SODIUM 20 MG: 20 TABLET ORAL at 21:12

## 2025-01-11 RX ADMIN — PANTOPRAZOLE SODIUM 40 MG: 40 TABLET, DELAYED RELEASE ORAL at 17:42

## 2025-01-11 RX ADMIN — COSYNTROPIN 250 MCG: 0.25 INJECTION, POWDER, LYOPHILIZED, FOR SOLUTION INTRAMUSCULAR; INTRAVENOUS at 09:55

## 2025-01-11 RX ADMIN — MIDODRINE HYDROCHLORIDE 10 MG: 2.5 TABLET ORAL at 17:42

## 2025-01-11 RX ADMIN — PANTOPRAZOLE SODIUM 40 MG: 40 TABLET, DELAYED RELEASE ORAL at 09:51

## 2025-01-11 RX ADMIN — CALCIUM CARBONATE 500 MG: 500 TABLET, CHEWABLE ORAL at 09:51

## 2025-01-11 RX ADMIN — POTASSIUM & SODIUM PHOSPHATES POWDER PACK 280-160-250 MG 1 PACKET: 280-160-250 PACK at 17:40

## 2025-01-11 RX ADMIN — POTASSIUM & SODIUM PHOSPHATES POWDER PACK 280-160-250 MG 1 PACKET: 280-160-250 PACK at 20:27

## 2025-01-11 RX ADMIN — PERFLUTREN 10 ML: 6.52 INJECTION, SUSPENSION INTRAVENOUS at 09:04

## 2025-01-11 RX ADMIN — SODIUM CHLORIDE 500 ML: 9 INJECTION, SOLUTION INTRAVENOUS at 14:55

## 2025-01-11 RX ADMIN — MIDODRINE HYDROCHLORIDE 5 MG: 5 TABLET ORAL at 09:51

## 2025-01-11 RX ADMIN — POTASSIUM & SODIUM PHOSPHATES POWDER PACK 280-160-250 MG 1 PACKET: 280-160-250 PACK at 11:49

## 2025-01-11 RX ADMIN — TAMSULOSIN HYDROCHLORIDE 0.4 MG: 0.4 CAPSULE ORAL at 20:27

## 2025-01-11 RX ADMIN — CALCIUM CARBONATE 500 MG: 500 TABLET, CHEWABLE ORAL at 20:27

## 2025-01-11 ASSESSMENT — ACTIVITIES OF DAILY LIVING (ADL)
ADLS_ACUITY_SCORE: 44
ADLS_ACUITY_SCORE: 41
ADLS_ACUITY_SCORE: 41
ADLS_ACUITY_SCORE: 59
ADLS_ACUITY_SCORE: 59
ADLS_ACUITY_SCORE: 58
ADLS_ACUITY_SCORE: 41
ADLS_ACUITY_SCORE: 44
ADLS_ACUITY_SCORE: 41
ADLS_ACUITY_SCORE: 44
ADLS_ACUITY_SCORE: 41
ADLS_ACUITY_SCORE: 42
ADLS_ACUITY_SCORE: 41
ADLS_ACUITY_SCORE: 59
ADLS_ACUITY_SCORE: 40
ADLS_ACUITY_SCORE: 42
ADLS_ACUITY_SCORE: 42
ADLS_ACUITY_SCORE: 40
ADLS_ACUITY_SCORE: 40
ADLS_ACUITY_SCORE: 41
ADLS_ACUITY_SCORE: 58

## 2025-01-11 NOTE — PROGRESS NOTES
01/11/25 1000   Appointment Info   Signing Clinician's Name / Credentials (PT) Mel Chambers PT   Living Environment   People in Home spouse   Current Living Arrangements apartment   Home Accessibility no concerns   Transportation Anticipated family or friend will provide   Self-Care   Usual Activity Tolerance good   Current Activity Tolerance fair   Regular Exercise No   Equipment Currently Used at Home walker, rolling;wheelchair, manual   Fall history within last six months yes   Number of times patient has fallen within last six months 5   Activity/Exercise/Self-Care Comment All falls related to orthostatic hypotension. Pt normally is independent without use of AD.   General Information   Onset of Illness/Injury or Date of Surgery 01/10/25   Referring Physician Dr. Mcnamara   Patient/Family Therapy Goals Statement (PT) To go home   Pertinent History of Current Problem (include personal factors and/or comorbidities that impact the POC) Pt is a 79 year old male with cancer admitted for recurrent syncope and hypotension.   Existing Precautions/Restrictions fall   Cognition   Affect/Mental Status (Cognition) WFL   Orientation Status (Cognition) oriented x 4   Follows Commands (Cognition) WFL   Pain Assessment   Patient Currently in Pain No   Range of Motion (ROM)   Range of Motion ROM is WFL   Strength (Manual Muscle Testing)   Strength (Manual Muscle Testing) Deficits observed during functional mobility   Bed Mobility   Comment, (Bed Mobility) SBA   Transfers   Comment, (Transfers) CGA   Gait/Stairs (Locomotion)   Comment, (Gait/Stairs) NT   Balance   Balance Comments Good in sitting, UE support in standing   Clinical Impression   Criteria for Skilled Therapeutic Intervention Yes, treatment indicated   PT Diagnosis (PT) Impaired ambulation   Influenced by the following impairments Decreased strength, decreased endurance, decreased balance   Functional limitations due to impairments Difficulty with bed  mobility, transfers, ambulation   Clinical Presentation (PT Evaluation Complexity) stable   Clinical Presentation Rationale Clinical judgment   Clinical Decision Making (Complexity) low complexity   Planned Therapy Interventions (PT) balance training;gait training;patient/family education;strengthening;transfer training;progressive activity/exercise   Risk & Benefits of therapy have been explained evaluation/treatment results reviewed;care plan/treatment goals reviewed;current/potential barriers reviewed;risks/benefits reviewed;participants voiced agreement with care plan;participants included;patient   PT Total Evaluation Time   PT Belle, Low Complexity Minutes (22293) 10   Physical Therapy Goals   PT Frequency Daily   PT Predicted Duration/Target Date for Goal Attainment 01/16/25   PT Goals Bed Mobility;Transfers;Gait   PT: Bed Mobility Independent;Supine to/from sit   PT: Transfers Modified independent;Sit to/from stand;Bed to/from chair;Assistive device   PT: Gait Modified independent;Rolling walker;100 feet   PT Discharge Planning   PT Plan Ambulation with w/c follow, progress independence, general strength and activity toleance   PT Discharge Recommendation (DC Rec) home with assist;home with home care physical therapy   PT Rationale for DC Rec At baseline, pt lives with his wife in a senior living apartment. Pt was independent with mobility prior to admit, although, has had several falls 2/2 recurrent syncope. Pts mobility is primarily limited by orthostatic hypotension at this time. Anticipate with further medical management, resolution of hypotension and therapy, pt will progress to modified independent with mobility and be safe to return to apartment with wife. If patient does not progress as anticipated, pt may need TCU prior to returning to home environment.   PT Brief overview of current status Goals of therapy will be to address safe mobility and make recs for d/c to next level of care. Pt and RN will  continue to follow all falls risk precautions as documented by RN staff while hospitalized. Assist of 1, decreased activity tolerance, positive orthostatic hypotenssion.   PT Total Distance Amb During Session (feet) 3   Physical Therapy Time and Intention   Total Session Time (sum of timed and untimed services) 10

## 2025-01-11 NOTE — PROGRESS NOTES
"CLINICAL NUTRITION SERVICES - ASSESSMENT NOTE      Malnutrition Status:    Moderate malnutrition     Future/Additional Recommendations:  Pt declined scheduled snacks/supplements today. RD may re-offer at follow-up.        REASON FOR ASSESSMENT  Positive admission nutrition risk screen    SUBJECTIVE INFORMATION  Assessed patient in room.    NUTRITION HISTORY  Pt follows a regular diet at home. Presenting with poor appetite for past week, related to syncope. Wife preparing meals for patient 3x/day with snack in between, but his appetite has just been poor. They suspect some subsequent acute weight loss, but unsure exactly how much. Patient states he's lost 70 lbs over the course of the past 5 years, related to chemotherapy and hospitalizations.       CURRENT NUTRITION ORDERS  Diet: Regular    CURRENT INTAKE/TOLERANCE  Ate 25% of breakfast this morning. Appetite is low. Wife at bedside, plans to assist patient in ordering meals/snacks.    LABS  Cr 1.5, BUN 25, Corrected-Ca 8.4, Phos 2.3 --> replacing via NeutraPhos    MEDICATIONS  Calcitriol 0.25 mcg BID, Calcium carbonate 500 mg BID, Vit D3 1250 mcg q 7 days, Protonix 40 mg BID     ANTHROPOMETRICS  Height: 193 cm (6' 4\")  Most Recent Weight: 108.8 kg (239 lb 14.4 oz)  IBW: 91.8 kg  % IBW: 119%  BMI (kg/m ): Overweight BMI 25-29.9  Weight History: Down 37 lbs (13%) over the past 7 months.   Wt Readings from Last 10 Encounters:   01/11/25 108.8 kg (239 lb 14.4 oz)   12/12/24 114.3 kg (252 lb)   09/05/24 117 kg (258 lb)   08/07/24 121.3 kg (267 lb 8 oz)   07/20/24 117.5 kg (259 lb)   07/19/24 117.6 kg (259 lb 4.8 oz)   06/19/24 125.2 kg (276 lb)   06/18/24 125.6 kg (276 lb 12.8 oz)   05/28/24 121.5 kg (267 lb 12.8 oz)   04/16/24 124.7 kg (274 lb 14.4 oz)       Dosing Weight: 96 kg, based on adjusted wt (IBW of 91.8 kg and actual wt of 108.8 kg)     ASSESSED NUTRITION NEEDS  Estimated Energy Needs: 8458-6071 kcals/day (25 - 30 kcals/kg)  Justification: " Maintenance  Estimated Protein Needs:  grams protein/day (1 - 1.2 grams of pro/kg)  Justification: CKD and Repletion  Estimated Fluid Needs: 5206-7649 mL/day (1 mL/kcal)  Justification: Maintenance      MALNUTRITION  % Intake: </= 50% for >/= 5 days (severe)  % Weight Loss: Up to 10% in 6 months (moderate)    Subcutaneous Fat Loss: Triceps: Mild  Muscle Loss: None observed  Fluid Accumulation/Edema: None noted  Malnutrition Diagnosis: Moderate malnutrition in the context of chronic illness  Malnutrition Present on Admission: Yes    NUTRITION DIAGNOSIS  Unintended weight loss related to inadequate oral intake r/t syncope as evidenced by 13% body weight loss over the past 7 months.     INTERVENTIONS  See nutrition interventions above    Goals  Weight maintenance 108 kg.      Monitoring/Evaluation  Progress toward goals will be monitored and evaluated per policy.    Marisa Cesar RD, LD, CNSC   Available on [facility]

## 2025-01-11 NOTE — PROVIDER NOTIFICATION
MD Notification    Notified Person: MD Mcnamara    Notification Date/Time: 1/11/25 7:52 am    Notification Interaction: Per heme/onc says to not hold his chemo med. His wife wants him to have his med asap. Are you able to reorder?     Purpose of Notification:    Orders Received:    Comments:

## 2025-01-11 NOTE — PROGRESS NOTES
[facility]    Medicine Progress Note - Hospitalist Service    Date of Admission:  1/10/2025    Assessment & Plan      Recurrent syncope  Hypocalcemia  hypokalemia  Orthostatic hypotension  Lost consciousness in the AM prior to admission trying to make his cardiology appointment to  consider cardioversion as well as Watchman device  Found to have significant hypocalcemia correcting to 7.1 with albumin  Cabozantinib which is this patient's oral renal cancer treatment is often associated with hypocalcemia  Patient and wife very concerned about cardiac causes of syncope which although possible seems most likely related to neurocardiogenic symptoms such as orthostatic.  Will nonetheless spread cardiac net with telemetry, echo, and cardiology consultation  Low calcium was somewhat spurious  Echocardiogram with normal EF, moderate RV dysfunction unclear cause, does not explain hypotension  Plan  - cosyntropin stim testing  - potassium, phos, and mg replacement protocol  - PT and OT. May need TCU  - cardiology consultation appreciated, they signed off  - increase midodrine to 10 mg tid  - MOHPA consulted. If orthostatic hypotension refractory, would consider holding the cabozanitinib to see if improves  - liberalize salt  - abdominal binder and compression stockings  - therapies  - neurology consult if any further medication changes recommended     History of renal cancer  Plan  - oncology consult as above     CKD 3b  Baseline creatnine of 1.7-2        Atrial fibrillation  Reports being off all meds due to syncope  Was in discussions to discuss watchman as outpatient  Plan  - cardiology consulted and signed off             Diet: Combination Diet Regular Diet Adult    DVT Prophylaxis: Pneumatic Compression Devices  Dillard Catheter: Not present  Lines: None     Cardiac Monitoring: ACTIVE order. Indication: Syncope- high cardiac risk (48 hours)  Code Status: Full Code      Clinically Significant Risk  "Factors        # Hypokalemia: Lowest K = 2.9 mmol/L in last 2 days, will replace as needed   # Hyperchloremia: Highest Cl = 113 mmol/L in last 2 days, will monitor as appropriate      # Hypocalcemia: Lowest iCa = 4.3 mg/dL in last 2 days, will monitor and replace as appropriate     # Hypoalbuminemia: Lowest albumin = 2.2 g/dL at 1/10/2025  9:19 AM, will monitor as appropriate     # Hypertension: Noted on problem list            # Overweight: Estimated body mass index is 29.2 kg/m  as calculated from the following:    Height as of this encounter: 1.93 m (6' 4\").    Weight as of this encounter: 108.8 kg (239 lb 14.4 oz)., PRESENT ON ADMISSION  # Moderate Malnutrition: based on nutrition assessment, PRESENT ON ADMISSION     # Financial/Environmental Concerns:           Social Drivers of Health    Physical Activity: Insufficiently Active (5/28/2024)    Exercise Vital Sign     Days of Exercise per Week: 2 days     Minutes of Exercise per Session: 30 min   Social Connections: Unknown (5/28/2024)    Social Connection and Isolation Panel [NHANES]     Frequency of Social Gatherings with Friends and Family: Twice a week          Disposition Plan     Medically Ready for Discharge: Anticipated in 2-4 Days             Javi Mcnamara DO  Hospitalist Service  [facility]  Securely message with [employer] (more info)  Text page via [employer] Paging/Directory   ______________________________________________________________________    Interval History   Stable, but remains symptomatic, dropping into 50 to 60 sbp while standing with severe lightheadedness    Physical Exam   Vital Signs: Temp: 97.2  F (36.2  C) Temp src: Oral BP: 124/84 Pulse: 67   Resp: 16 SpO2: 96 % O2 Device: None (Room air)    Weight: 239 lbs 14.4 oz    General: Alert, No distress. Nontoxic appearance  Head: No signs of trauma.   Mouth/Throat: Oropharynx moist.   Eyes: Conjunctivae are normal. Pupils are equal..   Neck: Normal range of " motion.    CV: Appears well perfused.  Resp:No respiratory distress.   MSK: Normal range of motion. No obvious deformity.   Neuro: The patient is alert and interactive. COOK. Speech normal. GCS 15  Skin: No lesion or sign of trauma noted.   Psych: normal mood and affect. behavior is normal.      Medical Decision Making       56 MINUTES SPENT BY ME on the date of service doing chart review, history, exam, documentation & further activities per the note.      Data     I have personally reviewed the following data over the past 24 hrs:    5.1  \   13.3   / 159     135 104 25.0 (H) /  101 (H)   4.6 22 1.52 (H) \     ALT: 29 AST: 31 AP: 103 TBILI: 1.4 (H)   ALB: 3.2 (L) TOT PROTEIN: 6.2 (L) LIPASE: N/A     Ferritin:  N/A % Retic:  N/A LDH:  N/A       Imaging results reviewed over the past 24 hrs:   Recent Results (from the past 24 hours)   Echocardiogram Complete   Result Value    LVEF  55-60%    Narrative    [id number]  [id number]  [id number]^JULIENNE^JULIANO^ANUJA     [facility]  Echocardiography Laboratory  [address] [place] [address]     Name: JOHN ALLEN  MRN: [id number]  : 1945  Study Date: 2025 08:25 AM  Age: 79 yrs  Gender: Male  Patient Location: [facility]  Reason For Study: Syncope and Collapse  Ordering Physician: JULIANO ROBINS  Referring Physician: Nico Retana  Performed By: Genoveva Rucker     BSA: 2.4 m2  Height: 76 in  Weight: 240 lb  HR: 83  BP: 102/73 mmHg  ______________________________________________________________________________  Procedure  Echocardiogram with two-dimensional, color and spectral Doppler. Definity (NDC  #[id number]) given intravenously.  ______________________________________________________________________________  Interpretation Summary     Left ventricular systolic function is normal.The visual ejection fraction is  55-60%.  The right ventricular systolic function is normal.The right ventricle is mild  to  moderately dilated.  Moderate biatrial enlargement.  There is mild (1+) aortic regurgitation.  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     Compared to prior study dated 10/9/2023 aortic root and ascending aorta appear  similar size, RV size was noted to be normal in prior study.  ______________________________________________________________________________  Left Ventricle  The left ventricle is normal in size. There is mild concentric left  ventricular hypertrophy. Left ventricular systolic function is normal. The  visual ejection fraction is 55-60%. Diastolic Doppler findings (E/E' ratio  and/or other parameters) suggest left ventricular filling pressures are  indeterminate. No regional wall motion abnormalities noted.     Right Ventricle  The right ventricle is mild to moderately dilated. The right ventricular  systolic function is normal.     Atria  The left atrium is moderately dilated. The right atrium is moderately dilated.  There is no color Doppler evidence of an atrial shunt.     Mitral Valve  There is trace mitral regurgitation.     Tricuspid Valve  There is trace tricuspid regurgitation. Right ventricular systolic pressure  could not be approximated due to inadequate tricuspid regurgitation.     Aortic Valve  The aortic valve is trileaflet. Mild aortic valve sclerosis. There is mild  (1+) aortic regurgitation. No aortic stenosis is present.     Pulmonic Valve  There is trace pulmonic valvular regurgitation. There is no pulmonic valvular  stenosis.     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 Inferior vena cava not well visualized for estimation of right atrial  pressure.     Pericardium  There is no pericardial effusion.     Rhythm  The rhythm was atrial fibrillation.  ______________________________________________________________________________  MMode/2D Measurements & Calculations  IVSd: 1.2 cm      LVIDd: 5.0 cm  LVIDs: 3.1 cm  LVPWd: 1.2 cm  FS: 37.4 %  LV mass(C)d: 242.8 grams  LV mass(C)dI: 101.4 grams/m2  Ao root diam: 4.6 cm  LA dimension: 4.7 cm  asc Aorta Diam: 4.6 cm  LA/Ao: 1.0  Ao root diam index Ht(cm/m): 2.4  Ao root diam index BSA (cm/m2): 1.9  Asc Ao diam index BSA (cm/m2): 1.9  Asc Ao diam index Ht(cm/m): 2.4  LA Volume (BP): 102.0 ml     LA Volume Index (BP): 42.7 ml/m2  RV Base: 5.5 cm  RWT: 0.48     Doppler Measurements & Calculations  MV E max vern: 89.0 cm/sec  MV dec time: 0.17 sec  E/E' av.5  Lateral E/e': 6.6  Medial E/e': 10.4     ______________________________________________________________________________  Report approved by: Jone Mcfarland MD on 2025 12:46 PM

## 2025-01-11 NOTE — PROGRESS NOTES
Pt arrived to Unit at 1800 via cart. Able to take a few steps to bed. Wt & ht completed.  Admission questioned completed.    Pt A&O x4  Q8 orthostatic: positive  Denies pain  Skin intact: bruising, abrasion, scabs scattered.   PIV, SL  Voiding to urinal, LBM: this am  Tele: Afib CVR  Regular diet  Wife at the bedside    ECHO to be completed tomorrow.  Cards: signed off  Hem/Once: following

## 2025-01-11 NOTE — PROGRESS NOTES
[facility] Progress Note          Assessment and Plan:   Mr. Edgar Nickerson is a 79 year old man who was admitted on 1/10/2025 with syncope  Metastatic clear cell renal cell carcinoma  - Progressed on Ipi/Nivo 6/25/24   - s/p radiation due to duodenal bleeding ending 8/6/24   - Started Cabozantanib 20mg 9/26/24   - Most recent CT 12/30/24 with decreased retroperitoneal lymphadenopathy, stable R hepatic lobe mass, duodenal mass measuring up to 2.3cm was not well seen on the non contrast study      2. 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 He reports no bleeding overnight     3. History of iron deficiency anemia  - Previously treated with IV iron  - More recently Hg has been in the 14 range   - Hg 11.3 1/10/25  and repeat Hgb today is 13.3  - B12 and TSH both normal. Serum iron 63, TIBC 146 and iron saturation index 43%       4. Hypotension with syncope and near syncope  - Chronically in atrial fibrillation with rate control  - On Midodrine   - Wife correlates that his syncope / near syncopal episodes occur when his blood pressure becomes low  - Etiology for the hypotension not entirely clear. Discussed possible causes with patient and his family and also with Dr. Marrufo. Await results of adrenal axis testing     5. Hypocalcemia  - May be lab error as ionized calcium came back WNL  - Start vitamin D 1,250 mcg weekly x 6 weeks followed by re-evaluation with Dr. Glover   - Continue PO calcium until outpatient follow up  - No clear basis to hold cabozantanib at present (half life is 120 hours and will take a while to be excreted from his system)   I     6. Hypokalemia  - Replacement per primary             Interval History:     He reports no new symptoms overnight              Medications:     Current Facility-Administered Medications   Medication Dose Route Frequency Provider Last Rate Last Admin    Cabozantinib S-Malate (CABOMETYX) tablet 20 mg  20 mg Oral Daily  Javi Mcnamara, DO   20 mg at 01/11/25 0855    [Held by provider] calcitRIOL (ROCALTROL) capsule 0.25 mcg  0.25 mcg Oral BID Javi Mcnamara,         calcium carbonate (TUMS) chewable tablet 500 mg  500 mg Oral BID Javi Mcnamara, DO   500 mg at 01/11/25 0951    cholecalciferol (VITAMIN D3) capsule 1,250 mcg  1,250 mcg Oral Q7 Days Dreyer, Patrick, DO   1,250 mcg at 01/10/25 2120    midodrine (PROAMATINE) tablet 5 mg  5 mg Oral BID Javi Mcnamara, DO   5 mg at 01/11/25 0951    pantoprazole (PROTONIX) EC tablet 40 mg  40 mg Oral BID AC Javi Mcnamara, DO   40 mg at 01/11/25 0951    potassium & sodium phosphates (NEUTRA-PHOS) Packet 1 packet  1 packet Oral or Feeding Tube Q4H Javi Mcnamara, DO   1 packet at 01/11/25 1149    pravastatin (PRAVACHOL) tablet 20 mg  20 mg Oral At Bedtime Javi Mcnamara, DO   20 mg at 01/10/25 2204    sodium chloride (PF) 0.9% PF flush 3 mL  3 mL Intracatheter Q8H Javi Mcnamara, DO   3 mL at 01/11/25 1150    tamsulosin (FLOMAX) capsule 0.4 mg  0.4 mg Oral QPM Javi Mcnamara, DO   0.4 mg at 01/10/25 2119     Current Facility-Administered Medications   Medication Dose Route Frequency Provider Last Rate Last Admin    acetaminophen (TYLENOL) tablet 500-1,000 mg  500-1,000 mg Oral Daily PRN Javi Mcnamara DO        calcium carbonate (TUMS) chewable tablet 1,000 mg  1,000 mg Oral 4x Daily PRN Javi Mcnamara, DO        lidocaine (LMX4) cream   Topical Q1H PRN Javi Mcnamara, DO        lidocaine 1 % 0.1-1 mL  0.1-1 mL Other Q1H PRN Javi Mcnamara DO        ondansetron (ZOFRAN ODT) ODT tab 4 mg  4 mg Oral Q6H PRN Javi Mcnamara DO        Or    ondansetron (ZOFRAN) injection 4 mg  4 mg Intravenous Q6H PRN Javi Mcnamara DO        senna-docusate (SENOKOT-S/PERICOLACE) 8.6-50 MG per tablet 1 tablet  1 tablet Oral BID PRN Javi Mcnamara,  "DO        Or    senna-docusate (SENOKOT-S/PERICOLACE) 8.6-50 MG per tablet 2 tablet  2 tablet Oral BID PRN Javi Mcnamara DO        sodium chloride (PF) 0.9% PF flush 3 mL  3 mL Intracatheter q1 min prn Javi Mcnamara DO                      Physical Exam:   Blood pressure 124/84, pulse 67, temperature 97.2  F (36.2  C), temperature source Oral, resp. rate 16, height 1.93 m (6' 4\"), weight 108.8 kg (239 lb 14.4 oz), SpO2 96%.  Wt Readings from Last 4 Encounters:   25 108.8 kg (239 lb 14.4 oz)   24 114.3 kg (252 lb)   24 117 kg (258 lb)   24 121.3 kg (267 lb 8 oz)         Vital Sign Ranges  Temperature Temp  Av.4  F (36.3  C)  Min: 97.2  F (36.2  C)  Max: 97.6  F (36.4  C)   Blood pressure Systolic (24hrs), Av , Min:81 , Max:135        Diastolic (24hrs), Av, Min:62, Max:97      Pulse Pulse  Av.1  Min: 65  Max: 88   Respirations Resp  Av.3  Min: 14  Max: 21   Pulse oximetry SpO2  Av.4 %  Min: 89 %  Max: 96 %         Intake/Output Summary (Last 24 hours) at 2025 1248  Last data filed at 2025 0830  Gross per 24 hour   Intake 240 ml   Output 100 ml   Net 140 ml       Constitutional: Awake, alert, cooperative, no apparent distress     Lungs: Clear to auscultation bilaterally, no crackles or wheezing   Cardiovascular: Regular rate and rhythm   Abdomen: Normal bowel sounds, soft, non-distended, non-tender   Skin: No rashes, no cyanosis, no upper extremity edema   Other:           Data:   Laboratory:  CMP  Recent Labs   Lab 25  0534 01/10/25  0919    140   POTASSIUM 4.6 2.9*   CHLORIDE 104 113*   CO2 22 18*   ANIONGAP 9 9   * 83   BUN 25.0* 21.3   CR 1.52* 1.17   GFRESTIMATED 47* 64   DOMINICK 8.2* 5.7*   MAG 2.3  --    PHOS 2.3* 1.5*   PROTTOTAL 6.2* 4.3*   ALBUMIN 3.2* 2.2*   BILITOTAL 1.4* 0.9   ALKPHOS 103 71   AST 31 22   ALT 29 20     CBC  Recent Labs   Lab 25  0534 01/10/25  0919   WBC 5.1 4.7   RBC 4.13* 3.49* "   HGB 13.3 11.3*   HCT 39.1* 33.3*   MCV 95 95   MCH 32.2 32.4   MCHC 34.0 33.9   RDW 17.0* 17.1*    138*     INRNo lab results found in last 7 days.    Imaging data:  Recent Results (from the past 48 hours)   Echocardiogram Complete   Result Value    LVEF  55-60%    Narrative    [id number]  [id number]  [id number]  [id number]^JULIENNE^JULIANO^ANUJA     [facility]  Echocardiography Laboratory  [address] [place] [id number]     Name: JOHN ALLEN  MRN: [id number]  : 1945  Study Date: 2025 08:25 AM  Age: 79 yrs  Gender: Male  Patient Location: [facility]  Reason For Study: Syncope and Collapse  Ordering Physician: JULIANO ROBINS  Referring Physician: Nico Retana  Performed By: Genoveva Rucker     BSA: 2.4 m2  Height: 76 in  Weight: 240 lb  HR: 83  BP: 102/73 mmHg  ______________________________________________________________________________  Procedure  Echocardiogram with two-dimensional, color and spectral Doppler. Definity (NDC  #[id number]) given intravenously.  ______________________________________________________________________________  Interpretation Summary     Left ventricular systolic function is normal.The visual ejection fraction is  55-60%.  The right ventricular systolic function is normal.The right ventricle is mild  to moderately dilated.  Moderate biatrial enlargement.  There is mild (1+) aortic regurgitation.  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     Compared to prior study dated 10/9/2023 aortic root and ascending aorta appear  similar size, RV size was noted to be normal in prior study.  ______________________________________________________________________________  Left Ventricle  The left ventricle is normal in size. There is mild concentric left  ventricular hypertrophy. Left ventricular systolic function is normal. The  visual ejection fraction is 55-60%. Diastolic Doppler  findings (E/E' ratio  and/or other parameters) suggest left ventricular filling pressures are  indeterminate. No regional wall motion abnormalities noted.     Right Ventricle  The right ventricle is mild to moderately dilated. The right ventricular  systolic function is normal.     Atria  The left atrium is moderately dilated. The right atrium is moderately dilated.  There is no color Doppler evidence of an atrial shunt.     Mitral Valve  There is trace mitral regurgitation.     Tricuspid Valve  There is trace tricuspid regurgitation. Right ventricular systolic pressure  could not be approximated due to inadequate tricuspid regurgitation.     Aortic Valve  The aortic valve is trileaflet. Mild aortic valve sclerosis. There is mild  (1+) aortic regurgitation. No aortic stenosis is present.     Pulmonic Valve  There is trace pulmonic valvular regurgitation. There is no pulmonic valvular  stenosis.     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 Inferior vena cava not well visualized for estimation of right atrial  pressure.     Pericardium  There is no pericardial effusion.     Rhythm  The rhythm was atrial fibrillation.  ______________________________________________________________________________  MMode/2D Measurements & Calculations  IVSd: 1.2 cm     LVIDd: 5.0 cm  LVIDs: 3.1 cm  LVPWd: 1.2 cm  FS: 37.4 %  LV mass(C)d: 242.8 grams  LV mass(C)dI: 101.4 grams/m2  Ao root diam: 4.6 cm  LA dimension: 4.7 cm  asc Aorta Diam: 4.6 cm  LA/Ao: 1.0  Ao root diam index Ht(cm/m): 2.4  Ao root diam index BSA (cm/m2): 1.9  Asc Ao diam index BSA (cm/m2): 1.9  Asc Ao diam index Ht(cm/m): 2.4  LA Volume (BP): 102.0 ml     LA Volume Index (BP): 42.7 ml/m2  RV Base: 5.5 cm  RWT: 0.48     Doppler Measurements & Calculations  MV E max vern: 89.0 cm/sec  MV dec time: 0.17 sec  E/E' av.5  Lateral E/e': 6.6  Medial E/e': 10.4      ______________________________________________________________________________  Report approved by: Jone Mcfarland MD on 01/11/2025 12:46 PM               Roderick Parra MD

## 2025-01-11 NOTE — PROGRESS NOTES
A&O x 4, pleasant. VSS on RA. Orthostatic positive. VSS on RA, denied pain/SOB. Scab on right knee, Tele Afib CVR. Urinal at bedside, voiding well. Cardiologist signed off. Hem/Onc following. Plan for ECHO today.

## 2025-01-11 NOTE — PLAN OF CARE
Goal Outcome Evaluation:      Plan of Care Reviewed With: patient, spouse    [facility]    Patient Information  Name: Ti Nickerson  Age: 79 year old    Admission Information  Date: 1/10/2025   Reason:Hypocalcemia [E83.51]  Hypokalemia [E87.6]  Syncope, unspecified syncope type [R55]     Assessment  Orientation/Neuro: Alert and Oriented x4  Cardiac/Tele: NSR  Resp: GALLEGOS   GI/: Voids small frequent amonts  No nausea, vomiting, abdominal pain, diarrhea, constipation or change in bowel habits   Mobility: Unable to ambulate patient today due to orthostatic hypotension, pt's BP improves with foot pumping and slow movement changes/sitting in chair for 5 minutes prior to standing  Pain: denies   Diet: Orders Placed This Encounter      Combination Diet Regular Diet Adult  Procedures/Imaging: echo completed, results pending    Vital Signs  B/P: 124/84, T: 97.2, P: 67, R: 16, O2: 95% on RA      Plan  Abd binder and geronimo hose applied without improvement in BPs, cortisol test ordered, potential discharge in 1-2 days when orthostatic BPs improve    Transferring pt to onc floor at    Kenyatta Aparicio RN

## 2025-01-12 ENCOUNTER — APPOINTMENT (OUTPATIENT)
Dept: PHYSICAL THERAPY | Facility: CLINIC | Age: 80
DRG: 312 | End: 2025-01-12
Payer: MEDICARE

## 2025-01-12 LAB
ALBUMIN SERPL BCG-MCNC: 3.2 G/DL (ref 3.5–5.2)
ALP SERPL-CCNC: 114 U/L (ref 40–150)
ALT SERPL W P-5'-P-CCNC: 37 U/L (ref 0–70)
ANION GAP SERPL CALCULATED.3IONS-SCNC: 7 MMOL/L (ref 7–15)
AST SERPL W P-5'-P-CCNC: 40 U/L (ref 0–45)
BILIRUB SERPL-MCNC: 1.5 MG/DL
BUN SERPL-MCNC: 23.5 MG/DL (ref 8–23)
CALCIUM SERPL-MCNC: 8.4 MG/DL (ref 8.8–10.4)
CHLORIDE SERPL-SCNC: 103 MMOL/L (ref 98–107)
CREAT SERPL-MCNC: 1.47 MG/DL (ref 0.67–1.17)
EGFRCR SERPLBLD CKD-EPI 2021: 49 ML/MIN/1.73M2
GLUCOSE SERPL-MCNC: 101 MG/DL (ref 70–99)
HCO3 SERPL-SCNC: 24 MMOL/L (ref 22–29)
MAGNESIUM SERPL-MCNC: 2.2 MG/DL (ref 1.7–2.3)
PHOSPHATE SERPL-MCNC: 2.6 MG/DL (ref 2.5–4.5)
POTASSIUM SERPL-SCNC: 4.4 MMOL/L (ref 3.4–5.3)
PROT SERPL-MCNC: 6.5 G/DL (ref 6.4–8.3)
SODIUM SERPL-SCNC: 134 MMOL/L (ref 135–145)

## 2025-01-12 PROCEDURE — 97530 THERAPEUTIC ACTIVITIES: CPT | Mod: GP

## 2025-01-12 PROCEDURE — 97110 THERAPEUTIC EXERCISES: CPT | Mod: GP

## 2025-01-12 PROCEDURE — 120N000001 HC R&B MED SURG/OB

## 2025-01-12 PROCEDURE — 83735 ASSAY OF MAGNESIUM: CPT | Performed by: HOSPITALIST

## 2025-01-12 PROCEDURE — 250N000013 HC RX MED GY IP 250 OP 250 PS 637: Performed by: PSYCHIATRY & NEUROLOGY

## 2025-01-12 PROCEDURE — 250N000013 HC RX MED GY IP 250 OP 250 PS 637: Performed by: HOSPITALIST

## 2025-01-12 PROCEDURE — 84100 ASSAY OF PHOSPHORUS: CPT | Performed by: HOSPITALIST

## 2025-01-12 PROCEDURE — 36415 COLL VENOUS BLD VENIPUNCTURE: CPT | Performed by: HOSPITALIST

## 2025-01-12 PROCEDURE — 84295 ASSAY OF SERUM SODIUM: CPT | Performed by: HOSPITALIST

## 2025-01-12 PROCEDURE — 99232 SBSQ HOSP IP/OBS MODERATE 35: CPT | Performed by: HOSPITALIST

## 2025-01-12 RX ORDER — PYRIDOSTIGMINE BROMIDE 60 MG/1
60 TABLET ORAL EVERY 8 HOURS SCHEDULED
Status: DISCONTINUED | OUTPATIENT
Start: 2025-01-12 | End: 2025-01-13

## 2025-01-12 RX ADMIN — PANTOPRAZOLE SODIUM 40 MG: 40 TABLET, DELAYED RELEASE ORAL at 17:26

## 2025-01-12 RX ADMIN — PYRIDOSTIGMINE BROMIDE 60 MG: 60 TABLET ORAL at 21:35

## 2025-01-12 RX ADMIN — CALCIUM CARBONATE 500 MG: 500 TABLET, CHEWABLE ORAL at 08:17

## 2025-01-12 RX ADMIN — PRAVASTATIN SODIUM 20 MG: 20 TABLET ORAL at 21:35

## 2025-01-12 RX ADMIN — PYRIDOSTIGMINE BROMIDE 60 MG: 60 TABLET ORAL at 14:38

## 2025-01-12 RX ADMIN — TAMSULOSIN HYDROCHLORIDE 0.4 MG: 0.4 CAPSULE ORAL at 20:10

## 2025-01-12 RX ADMIN — MIDODRINE HYDROCHLORIDE 10 MG: 2.5 TABLET ORAL at 11:47

## 2025-01-12 RX ADMIN — MIDODRINE HYDROCHLORIDE 10 MG: 2.5 TABLET ORAL at 08:17

## 2025-01-12 RX ADMIN — CALCIUM CARBONATE 500 MG: 500 TABLET, CHEWABLE ORAL at 20:10

## 2025-01-12 RX ADMIN — MIDODRINE HYDROCHLORIDE 10 MG: 2.5 TABLET ORAL at 17:26

## 2025-01-12 RX ADMIN — ACETAMINOPHEN 1000 MG: 500 TABLET, FILM COATED ORAL at 20:11

## 2025-01-12 RX ADMIN — PANTOPRAZOLE SODIUM 40 MG: 40 TABLET, DELAYED RELEASE ORAL at 08:17

## 2025-01-12 ASSESSMENT — ACTIVITIES OF DAILY LIVING (ADL)
ADLS_ACUITY_SCORE: 59
ADLS_ACUITY_SCORE: 59
ADLS_ACUITY_SCORE: 60
ADLS_ACUITY_SCORE: 59
ADLS_ACUITY_SCORE: 60
ADLS_ACUITY_SCORE: 59
ADLS_ACUITY_SCORE: 59
ADLS_ACUITY_SCORE: 60
ADLS_ACUITY_SCORE: 59
ADLS_ACUITY_SCORE: 59
ADLS_ACUITY_SCORE: 60
ADLS_ACUITY_SCORE: 59
ADLS_ACUITY_SCORE: 60
ADLS_ACUITY_SCORE: 59

## 2025-01-12 NOTE — PROVIDER NOTIFICATION
MD Notification    Notified Person: MD    Notified Person Name: Dr. Javi Mcnamara    Notification Date/Time: 10:00 AM 01/12/25     Notification Interaction: [employer]    Purpose of Notification: SBAR Situation: Bharat Nickerson Background: Hypotension/recurrent falls Assessment: FYI-PT was just in with pt and while standing pt became dizzy and speech became slurred. Pt sat back down and within 20 seconds was back to baseline.     Orders Received: Ok, encourage oral hydration    Comments:

## 2025-01-12 NOTE — PLAN OF CARE
1/11/25 -- [id number]    Orientation: A&O x4, intermittent confusion.   Aggression Stop Light: Green - Yellow  Activity: A1 GBW, up to BSC   Diet/BS Checks: Regular diet  Tele: A-fib  IV Access/Drains: R PIV SL  Pain Management: denies pain this shift  Abnormal VS/Results: VSS on RA, BP stable this shift  Bowel/Bladder: continent of b/b, 1 BM smear this shift  Skin/Wounds: L knee abrasion, scattered bruising  Consults: Hem/Onc, PT/OT, Nutrition, Neurology  D/C Disposition: Pending    Other Info:   - Pt woke up confused @ 0245 thinking he was home. Wife states he normally gets confused in the middle of the night. While writer attempted to reorient pt began to yell and become aggressive.   - Wife at bedside overnight

## 2025-01-12 NOTE — CONSULTS
Neurology Consult Note  [facility]       [2025]                                                                                       Admission Date: 1/10/2025  Hospital Day: 2      Patient: Ti Nickerson      : 1945  MRN:  [id number]     CC:      Chief Complaint   Patient presents with    Syncope       Consult Request:  Referring Provider:  Javi Mcnamara DO  Primary Care Provider:  Nico Retana MD        HPI:  Ti Nickerson is a 79 year old yo male admitted for refractory orthostatic hypotension and recurrent fainting spells.  Patient has history of metastatic renal cell cancer status post nephrectomy in 2019, currently on cabozantinib.  He also has history of chronic atrial fibrillation.  He has history of orthostatic hypotension since his diagnosis of renal cancer in 2019.  Patient was admitted after a fall at home when he got up in the early morning to go to the bathroom.  He complains of significant dizziness when he stands up from sitting down or lying down position.          A complete review of symptoms was performed including vascular, infectious, cardiovascular, pulmonary, gastrointestinal, endocrinological, hematologic, dermatologic, musculoskeletal, and neurological. All were normal except as above.    PAST MEDICAL HISTORY:  ALLERGIES:   Allergies   Allergen Reactions    Fentanyl Confusion     Tobacco:    History   Smoking Status    Never   Smokeless Tobacco    Never     Alcohol:  Social History    Substance and Sexual Activity      Alcohol use: Not Currently    MEDICATIONS:       CURRENTLY SCHEDULED MEDICATIONS   Current Facility-Administered Medications   Medication Dose Route Frequency Provider Last Rate Last Admin    Cabozantinib S-Malate (CABOMETYX) tablet 20 mg  20 mg Oral Daily Javi Mcnamara DO   20 mg at 25 0855    calcium carbonate (TUMS) chewable tablet 500 mg  500 mg Oral BID Javi Mcnamara  Please call pt, has had cold for 7 days, has mucous in eyes  No fever, productive cough, mild sore throat, stuffy runny nose  Pt returned from two week trip in [facility], 3/3/20.   Wife traveled with pt she is not sick  Please call to discuss/advise  Tasked to nursing Endy, DO   500 mg at 01/11/25 2027    cholecalciferol (VITAMIN D3) capsule 1,250 mcg  1,250 mcg Oral Q7 Days Dreyer, Patrick, DO   1,250 mcg at 01/10/25 2120    midodrine (PROAMATINE) tablet 10 mg  10 mg Oral TID w/meals Javi Mcnamara, DO   10 mg at 01/11/25 1742    pantoprazole (PROTONIX) EC tablet 40 mg  40 mg Oral BID AC Javi Mcnamara, DO   40 mg at 01/11/25 1742    pravastatin (PRAVACHOL) tablet 20 mg  20 mg Oral At Bedtime Javi Mcnamara, DO   20 mg at 01/11/25 2112    sodium chloride (PF) 0.9% PF flush 3 mL  3 mL Intracatheter Q8H Javi Mcnamara, DO   3 mL at 01/11/25 2029    tamsulosin (FLOMAX) capsule 0.4 mg  0.4 mg Oral QPM Javi Mcnamara, DO   0.4 mg at 01/11/25 2027          HOME MEDICATIONS  Medications Prior to Admission   Medication Sig Dispense Refill Last Dose/Taking    acetaminophen (TYLENOL) 500 MG tablet Take 500-1,000 mg by mouth daily as needed for mild pain   Taking As Needed    Cabozantinib S-Malate (CABOMETYX) 20 MG Take 20 mg by mouth daily   1/10/2025 Morning    midodrine (PROAMATINE) 5 MG tablet Take 1 tablet (5 mg) by mouth 2 times daily 60 tablet 11 1/10/2025 Morning    ondansetron (ZOFRAN ODT) 4 MG ODT tab Take 1 tablet (4 mg) by mouth every 6 hours as needed for nausea or vomiting 20 tablet 0 Taking As Needed    pantoprazole (PROTONIX) 40 MG EC tablet TAKE 1 TABLET BY MOUTH TWICE A DAY BEFORE MEALS 180 tablet 3 1/9/2025 Evening    pravastatin (PRAVACHOL) 20 MG tablet Take 1 tablet (20 mg) by mouth daily 90 tablet 3 1/9/2025 Evening    tamsulosin (FLOMAX) 0.4 MG capsule Take 1 capsule (0.4 mg) by mouth daily (Patient taking differently: Take 0.4 mg by mouth every evening.) 90 capsule 3 1/9/2025 Evening     MEDICAL HISTORY  Past Medical History:   Diagnosis Date    Atrial fibrillation, unspecified 9/18/2015    CAD (coronary artery disease) 09/18/2015    minimal by angio 2007, fu nuc est nl 2018    Elevated TSH 2018    Esophageal  reflux 9/18/2015    Essential hypertension     History of cardioversion     [id number]    Idiopathic cardiomyopathy (H) 09/18/2015    fu echo nl ef, nl nuclear est 11/18, Dr. Quarles    Mixed hyperlipidemia 9/18/2015    Mumps     Sleep apnea 09/18/2015    does not use cpap as of 2019    Sleep apnea     Thoracic aortic aneurysm     sinus of valsalva 4.5 and asc aorta 4.5 in 2017    Tubular adenoma of colon 01/2017    fu 3 years     SURGICAL HISTORY  Past Surgical History:   Procedure Laterality Date    APPENDECTOMY  1964    ESOPHAGOSCOPY, GASTROSCOPY, DUODENOSCOPY (EGD), COMBINED N/A 9/14/2023    Procedure: Esophagoscopy, gastroscopy, duodenoscopy (EGD), combined;  Surgeon: Chele Ash MD;  Location:  GI     FAMILY HISTORY    Family History   Problem Relation Age of Onset    Arrhythmia Mother         a-fib    Cerebrovascular Disease Mother     Arrhythmia Father         a-fib    Colon Cancer Father     Diabetes Father     Cerebrovascular Disease Father     No Known Problems Brother      SOCIAL HISTORY  Social History     Socioeconomic History    Marital status:     Number of children: 3   Occupational History    Occupation: retired cpa   Tobacco Use    Smoking status: Never    Smokeless tobacco: Never   Vaping Use    Vaping status: Never Used   Substance and Sexual Activity    Alcohol use: Not Currently    Drug use: No    Sexual activity: Yes     Partners: Female   Other Topics Concern    Caffeine Concern No     Comment: 2 coffee in am, occas. soda    Sleep Concern No    Stress Concern No    Weight Concern Yes    Special Diet No    Exercise No     Comment: walking 3-4 days week    Seat Belt Yes     Social Drivers of Health     Financial Resource Strain: Low Risk  (1/11/2025)    Financial Resource Strain     Within the past 12 months, have you or your family members you live with been unable to get utilities (heat, electricity) when it was really needed?: No   Food Insecurity: Low Risk  (1/11/2025)     "Food Insecurity     Within the past 12 months, did you worry that your food would run out before you got money to buy more?: No     Within the past 12 months, did the food you bought just not last and you didn t have money to get more?: No   Transportation Needs: Low Risk  (1/11/2025)    Transportation Needs     Within the past 12 months, has lack of transportation kept you from medical appointments, getting your medicines, non-medical meetings or appointments, work, or from getting things that you need?: No   Physical Activity: Insufficiently Active (5/28/2024)    Exercise Vital Sign     Days of Exercise per Week: 2 days     Minutes of Exercise per Session: 30 min   Stress: No Stress Concern Present (5/28/2024)    [employer] - Occupational Stress Questionnaire     Feeling of Stress : Not at all   Social Connections: Unknown (5/28/2024)    Social Connection and Isolation Panel [NHANES]     Frequency of Social Gatherings with Friends and Family: Twice a week   Interpersonal Safety: Low Risk  (1/11/2025)    Interpersonal Safety     Do you feel physically and emotionally safe where you currently live?: Yes     Within the past 12 months, have you been hit, slapped, kicked or otherwise physically hurt by someone?: No     Within the past 12 months, have you been humiliated or emotionally abused in other ways by your partner or ex-partner?: No   Housing Stability: Low Risk  (1/11/2025)    Housing Stability     Do you have housing? : Yes     Are you worried about losing your housing?: No            Height: 193 cm (6' 4\")     Temp: 97.5  F (36.4  C)   Weight: 108.8 kg (239 lb 14.4 oz)    Temp src: Oral         BP: 117/80         Estimated body mass index is 29.2 kg/m  as calculated from the following:    Height as of this encounter: 1.93 m (6' 4\").    Weight as of this encounter: 108.8 kg (239 lb 14.4 oz).    Resp: 18   SpO2: 97 %   O2 Device: None (Room air)     Blood Pressure:   BP Readings " from Last 3 Encounters:   25 117/80   24 103/72   24 124/83     T24 : Temp (24hrs), Av.4  F (36.3  C), Min:97.1  F (36.2  C), Max:97.6  F (36.4  C)       GENERAL EXAMINATION:  HEENT: PERRLA, EOMI.  Neck: Supple, No myofascial tender points.    NEUROLOGICAL EXAMINATION  Mental Status:  Patient is awake, alert, and oriented X3. Speech and language functions are normal. Short- and long-term memory are intact.      Cranial Nerves: Pupils are equal and reactive to light.Visual fields are full to confrontation. Extraocular movements are intact. There is no nystagmus in the horizontal or vertical planes.No facial sensory deficits. No facial weakness. The tongue is midline.     Motor: Muscle tone is normal. There is no pronator drift. There is no muscle atrophy. The strength is 5/5 in upper and lower extremities bilaterally. Deep tendon reflexes are 2+ and symmetric in biceps, triceps, knees, and ankles. Plantars are flexor. .       Coordination: .Finger to nose - normal.      .  Review of Diagnostics:     I personally reviewed and interpreted the following:    Echocardiogram Complete    Result Date: 2025  [id number]^JULIENNE^JULIANO^ANUJA  [facility] [address] [place] [address]  Name: JOHN ALLEN MRN: [id number] : 1945 Study Date: 2025 08:25 AM Age: 79 yrs Gender: Male Patient Location: [facility] Reason For Study: Syncope and Collapse Ordering Physician: JULIANO ROBINS Referring Physician: Nico Retana Performed By: Genoveva Rucker  BSA: 2.4 m2 Height: 76 in Weight: 240 lb HR: 83 BP: 102/73 mmHg ______________________________________________________________________________ Procedure Echocardiogram with two-dimensional, color and spectral Doppler. Definity (NDC #95049-991) given intravenously. ______________________________________________________________________________  Interpretation Summary  Left ventricular systolic function is normal.The visual ejection fraction is 55-60%. The right ventricular systolic function is normal.The right ventricle is mild to moderately dilated. Moderate biatrial enlargement. There is mild (1+) aortic regurgitation. 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  Compared to prior study dated 10/9/2023 aortic root and ascending aorta appear similar size, RV size was noted to be normal in prior study. ______________________________________________________________________________ Left Ventricle The left ventricle is normal in size. There is mild concentric left ventricular hypertrophy. Left ventricular systolic function is normal. The visual ejection fraction is 55-60%. Diastolic Doppler findings (E/E' ratio and/or other parameters) suggest left ventricular filling pressures are indeterminate. No regional wall motion abnormalities noted.  Right Ventricle The right ventricle is mild to moderately dilated. The right ventricular systolic function is normal.  Atria The left atrium is moderately dilated. The right atrium is moderately dilated. There is no color Doppler evidence of an atrial shunt.  Mitral Valve There is trace mitral regurgitation.  Tricuspid Valve There is trace tricuspid regurgitation. Right ventricular systolic pressure could not be approximated due to inadequate tricuspid regurgitation.  Aortic Valve The aortic valve is trileaflet. Mild aortic valve sclerosis. There is mild (1+) aortic regurgitation. No aortic stenosis is present.  Pulmonic Valve There is trace pulmonic valvular regurgitation. There is no pulmonic valvular stenosis.  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 Inferior vena cava not well visualized for estimation of right atrial pressure.  Pericardium There is no pericardial effusion.  Rhythm The rhythm was atrial fibrillation.  ______________________________________________________________________________ MMode/2D Measurements & Calculations IVSd: 1.2 cm  LVIDd: 5.0 cm LVIDs: 3.1 cm LVPWd: 1.2 cm FS: 37.4 % LV mass(C)d: 242.8 grams LV mass(C)dI: 101.4 grams/m2 Ao root diam: 4.6 cm LA dimension: 4.7 cm asc Aorta Diam: 4.6 cm LA/Ao: 1.0 Ao root diam index Ht(cm/m): 2.4 Ao root diam index BSA (cm/m2): 1.9 Asc Ao diam index BSA (cm/m2): 1.9 Asc Ao diam index Ht(cm/m): 2.4 LA Volume (BP): 102.0 ml  LA Volume Index (BP): 42.7 ml/m2 RV Base: 5.5 cm RWT: 0.48  Doppler Measurements & Calculations MV E max vern: 89.0 cm/sec MV dec time: 0.17 sec E/E' av.5 Lateral E/e': 6.6 Medial E/e': 10.4  ______________________________________________________________________________ Report approved by: Jone Mcfarland MD on 2025 12:46 PM           Vitamin B12:   Lab Results   Component Value Date    B12 323 01/10/2025    B12 370 2019         Complete Blood Count:    Recent Labs   Lab Test 25  0534 01/10/25  0919 24  1002 24  1538 24  1403   WBC 5.1 4.7  --   --  6.0   RBC 4.13* 3.49*  --   --  3.43*   HGB 13.3 11.3* 14.3   < > 9.5*   HCT 39.1* 33.3*  --   --  30.9*    138*  --   --  198   LYMPH 20 18  --   --  19    < > = values in this interval not displayed.        HgA1c:   Lab Results   Component Value Date    A1C 5.2 2024        Thyroid Stimulating Hormone:   Lab Results   Component Value Date    TSH 3.41 01/10/2025    TSH 4.94 2019        Recent Labs   Lab Test 25  0534 01/10/25  0919 24  0505 24  0500 10/25/23  1002 10/25/23  0915   WBC 5.1 4.7   < > 8.8   < >  --    HGB 13.3 11.3*   < > 8.3*   < >  --    MCV 95 95   < > 87   < >  --     138*   < > 229   < >  --    INR  --   --   --  1.57*  --  2.29*    < > = values in this interval not displayed.      Recent Labs   Lab Test 25  0534 01/10/25  0919    140   POTASSIUM 4.6 2.9*   CHLORIDE 104 113*   CO2 22 18*   BUN  25.0* 21.3   CR 1.52* 1.17   ANIONGAP 9 9   DOMINICK 8.2* 5.7*   * 83     CMP:  Recent Labs   Lab 01/11/25  0534 01/10/25  0919   PHOS 2.3* 1.5*   PROTTOTAL 6.2* 4.3*   ALBUMIN 3.2* 2.2*   ALKPHOS 103 71   AST 31 22   ALT 29 20   BILITOTAL 1.4* 0.9       _____________________________________________________________________________  _____________________________________________________________________________          ASSESSMENT     Recurrent syncopal spells with severe orthostatic hypotension: The etiology of orthostatic hypotension is uncertain and could be multifactorial.  Patient is being evaluated for adrenal insufficiency that could be associated with cabozantinib.  I am also concerned about paraneoplastic syndrome that may lead to clinical signs of pure autonomic failure.        RECOMMENDATIONS   Paraneoplastic antibody panel- If positive, would consider IVIG with IV Solu-Medrol that may help with paraneoplastic orthostatic hypotension.  Continue conservative management as outlined by cardiology and increase the dose of midodrine as recommended.           Total consult time 80 minutes with the time spent before, during and after the visit.    Juventino Cárdenas MD    [facility]

## 2025-01-12 NOTE — PROGRESS NOTES
[facility] Progress Note          Assessment and Plan:   Mr. Edgar Nickerson is a 79 year old man who was admitted on 1/10/2025 with syncope     Clear cell renal cell carcinoma diagnosed in 10/2019  - pT3b N0 M0 disease at presentation  - followed on surveillance until 1/2021 at which time liver metastasis identified and treated with cryoablation  - disease recurrence in 9/2023 presenting with GI bleeding and treated with partial duodenectomy  -disease progression in 1/2024 treated with partia gastrectomy  - Progressed on Ipi/Nivo 6/25/2024   - s/p radiation due to duodenal bleeding ending 8/06/2024   - Started Cabozantanib 20mg 9/26/24   - Most recent CT 12/30/24 with decreased retroperitoneal lymphadenopathy, stable R hepatic lobe mass, duodenal mass measuring up to 2.3cm was not well seen on the non contrast study      2. 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 He reports no bleeding overnight     3. History of iron deficiency anemia  - Previously treated with IV iron  - More recently Hg has been in the 14 range   - Hg was 11.3 on 1/10/25  and repeat Hgb on 1/11/2025 was 13.3  - B12 and TSH both normal. Serum iron 63, TIBC 146 and iron saturation index 43%  Recent labs reviewed with patient and also with his wife. Recommend serial monitoring        4. Hypotension with syncope and near syncope  - Chronically in atrial fibrillation with rate control  - On Midodrine as well as compression stockings  - Etiology for the hypotension not entirely clear. Discussed possible causes with patient and his family. Reviewed labs from 1/11 which no evidence for adrenal failure  - discussed possibility of paraneoplastic syndrome and explained I agree with Dr. Cárdenas's   Thoughts on evaluating this cause    5. Hypocalcemia  - May be lab error as ionized calcium came back WNL  - serial monitoring has since shown slightly low calcium level. Given low albumin, there is no indication for  intervention      6. Hypokalemia, resolved               Interval History:     He reports needing to use the toilet several times last night but apparently had no recurrence of syncope or change in BP. He has subsequently had recurrent spell of near syncope              Medications:     Current Facility-Administered Medications   Medication Dose Route Frequency Provider Last Rate Last Admin    Cabozantinib S-Malate (CABOMETYX) tablet 20 mg  20 mg Oral Daily Javi Mcnamara, DO   20 mg at 01/12/25 0822    calcium carbonate (TUMS) chewable tablet 500 mg  500 mg Oral BID Javi Mcnamara, DO   500 mg at 01/12/25 0817    cholecalciferol (VITAMIN D3) capsule 1,250 mcg  1,250 mcg Oral Q7 Days Dreyer, Patrick, DO   1,250 mcg at 01/10/25 2120    midodrine (PROAMATINE) tablet 10 mg  10 mg Oral TID w/meals Javi Mcnamara, DO   10 mg at 01/12/25 0817    pantoprazole (PROTONIX) EC tablet 40 mg  40 mg Oral BID AC Javi Mcnamara, DO   40 mg at 01/12/25 0817    pravastatin (PRAVACHOL) tablet 20 mg  20 mg Oral At Bedtime Javi Mcnamara, DO   20 mg at 01/11/25 2112    sodium chloride (PF) 0.9% PF flush 3 mL  3 mL Intracatheter Q8H Javi Mcnamara, DO   3 mL at 01/11/25 2029    tamsulosin (FLOMAX) capsule 0.4 mg  0.4 mg Oral QPM Javi Mcnamara, DO   0.4 mg at 01/11/25 2027     Current Facility-Administered Medications   Medication Dose Route Frequency Provider Last Rate Last Admin    acetaminophen (TYLENOL) tablet 500-1,000 mg  500-1,000 mg Oral Daily PRN Javi Mcnamara, DO        calcium carbonate (TUMS) chewable tablet 1,000 mg  1,000 mg Oral 4x Daily PRN Javi Mcnamara, DO        lidocaine (LMX4) cream   Topical Q1H PRN Javi Mcnamara DO        lidocaine 1 % 0.1-1 mL  0.1-1 mL Other Q1H PRN Javi Mcnamara, DO        ondansetron (ZOFRAN ODT) ODT tab 4 mg  4 mg Oral Q6H PRN Javi Mcnamara,         Or    ondansetron  "(ZOFRAN) injection 4 mg  4 mg Intravenous Q6H PRN Javi Mcnamara DO        senna-docusate (SENOKOT-S/PERICOLACE) 8.6-50 MG per tablet 1 tablet  1 tablet Oral BID PRN Javi Mcnamara DO        Or    senna-docusate (SENOKOT-S/PERICOLACE) 8.6-50 MG per tablet 2 tablet  2 tablet Oral BID PRN Javi Mcnamara DO        sodium chloride (PF) 0.9% PF flush 3 mL  3 mL Intracatheter q1 min prn Javi Mcnamara DO                      Physical Exam:   Blood pressure (!) 138/92, pulse 78, temperature 97.6  F (36.4  C), temperature source Oral, resp. rate 16, height 1.93 m (6' 4\"), weight 108.8 kg (239 lb 14.4 oz), SpO2 96%.  Wt Readings from Last 4 Encounters:   25 108.8 kg (239 lb 14.4 oz)   24 114.3 kg (252 lb)   24 117 kg (258 lb)   24 121.3 kg (267 lb 8 oz)         Vital Sign Ranges  Temperature Temp  Av.5  F (36.4  C)  Min: 97.1  F (36.2  C)  Max: 98.1  F (36.7  C)   Blood pressure Systolic (24hrs), Av , Min:106 , Max:146        Diastolic (24hrs), Av, Min:78, Max:98      Pulse Pulse  Av  Min: 67  Max: 82   Respirations Resp  Av.8  Min: 16  Max: 18   Pulse oximetry SpO2  Av.4 %  Min: 96 %  Max: 97 %         Intake/Output Summary (Last 24 hours) at 2025 1037  Last data filed at 2025 0738  Gross per 24 hour   Intake 240 ml   Output 130 ml   Net 110 ml       Constitutional: Sleepy cooperative, no apparent distress     Lungs: Clear to auscultation bilaterally   Cardiovascular: Regular rate and rhythm, normal S1 and S2   Abdomen: Normal bowel sounds, soft, non-distended, non-tender   Skin: No rashes, no cyanosis, no upper extremity edema. He is wearing compression stockings on both legs   Other:           Data:   Laboratory:  [facility]  Recent Labs   Lab 25  0817 25  0534 01/10/25  0919   * 135 140   POTASSIUM 4.4 4.6 2.9*   CHLORIDE 103 104 113*   CO2 24 22 18*   ANIONGAP 7 9 9   * 101* 83   BUN 23.5* 25.0* " 21.3   CR 1.47* 1.52* 1.17   GFRESTIMATED 49* 47* 64   DOMINICK 8.4* 8.2* 5.7*   MAG 2.2 2.3  --    PHOS 2.6 2.3* 1.5*   PROTTOTAL 6.5 6.2* 4.3*   ALBUMIN 3.2* 3.2* 2.2*   BILITOTAL 1.5* 1.4* 0.9   ALKPHOS 114 103 71   AST 40 31 22   ALT 37 29 20     CBC  Recent Labs   Lab 25  0534 01/10/25  0919   WBC 5.1 4.7   RBC 4.13* 3.49*   HGB 13.3 11.3*   HCT 39.1* 33.3*   MCV 95 95   MCH 32.2 32.4   MCHC 34.0 33.9   RDW 17.0* 17.1*    138*     INRNo lab results found in last 7 days.    Imaging data:  Recent Results (from the past 48 hours)   Echocardiogram Complete   Result Value    LVEF  55-60%    Narrative    [id number]  [id number]  [id number]^JULIENNE^JULIANO^ANUJA     [facility]  Echocardiography Laboratory  [address] [place] [id number]     Name: JOHN ALLEN  MRN: [id number]  : 1945  Study Date: 2025 08:25 AM  Age: 79 yrs  Gender: Male  Patient Location: [facility]  Reason For Study: Syncope and Collapse  Ordering Physician: JULIANO ROBINS  Referring Physician: Nico Retana  Performed By: Genoveva Rucker     BSA: 2.4 m2  Height: 76 in  Weight: 240 lb  HR: 83  BP: 102/73 mmHg  ______________________________________________________________________________  Procedure  Echocardiogram with two-dimensional, color and spectral Doppler. Definity (NDC  #[id number]) given intravenously.  ______________________________________________________________________________  Interpretation Summary     Left ventricular systolic function is normal.The visual ejection fraction is  55-60%.  The right ventricular systolic function is normal.The right ventricle is mild  to moderately dilated.  Moderate biatrial enlargement.  There is mild (1+) aortic regurgitation.  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     Compared to prior study dated 10/9/2023 aortic root and ascending aorta appear  similar size, RV size was noted  to be normal in prior study.  ______________________________________________________________________________  Left Ventricle  The left ventricle is normal in size. There is mild concentric left  ventricular hypertrophy. Left ventricular systolic function is normal. The  visual ejection fraction is 55-60%. Diastolic Doppler findings (E/E' ratio  and/or other parameters) suggest left ventricular filling pressures are  indeterminate. No regional wall motion abnormalities noted.     Right Ventricle  The right ventricle is mild to moderately dilated. The right ventricular  systolic function is normal.     Atria  The left atrium is moderately dilated. The right atrium is moderately dilated.  There is no color Doppler evidence of an atrial shunt.     Mitral Valve  There is trace mitral regurgitation.     Tricuspid Valve  There is trace tricuspid regurgitation. Right ventricular systolic pressure  could not be approximated due to inadequate tricuspid regurgitation.     Aortic Valve  The aortic valve is trileaflet. Mild aortic valve sclerosis. There is mild  (1+) aortic regurgitation. No aortic stenosis is present.     Pulmonic Valve  There is trace pulmonic valvular regurgitation. There is no pulmonic valvular  stenosis.     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 Inferior vena cava not well visualized for estimation of right atrial  pressure.     Pericardium  There is no pericardial effusion.     Rhythm  The rhythm was atrial fibrillation.  ______________________________________________________________________________  MMode/2D Measurements & Calculations  IVSd: 1.2 cm     LVIDd: 5.0 cm  LVIDs: 3.1 cm  LVPWd: 1.2 cm  FS: 37.4 %  LV mass(C)d: 242.8 grams  LV mass(C)dI: 101.4 grams/m2  Ao root diam: 4.6 cm  LA dimension: 4.7 cm  asc Aorta Diam: 4.6 cm  LA/Ao: 1.0  Ao root diam index Ht(cm/m): 2.4  Ao root diam index BSA (cm/m2): 1.9  Asc Ao diam index BSA (cm/m2):  1.9  Asc Ao diam index Ht(cm/m): 2.4  LA Volume (BP): 102.0 ml     LA Volume Index (BP): 42.7 ml/m2  RV Base: 5.5 cm  RWT: 0.48     Doppler Measurements & Calculations  MV E max vern: 89.0 cm/sec  MV dec time: 0.17 sec  E/E' av.5  Lateral E/e': 6.6  Medial E/e': 10.4     ______________________________________________________________________________  Report approved by: Jone Mcfarland MD on 2025 12:46 PM               Roderick Parra MD

## 2025-01-12 NOTE — PROGRESS NOTES
[facility]    Medicine Progress Note - Hospitalist Service    Date of Admission:  1/10/2025    Assessment & Plan      Recurrent syncope  Hypocalcemia  hypokalemia  Orthostatic hypotension  Lost consciousness in the AM prior to admission trying to make his cardiology appointment to  consider cardioversion as well as Watchman device  Found to have significant hypocalcemia correcting to 7.1 with albumin  Cabozantinib which is this patient's oral renal cancer treatment is often associated with hypocalcemia  Patient and wife very concerned about cardiac causes of syncope which although possible seems most likely related to neurocardiogenic symptoms such as orthostatic.  Will nonetheless spread cardiac net with telemetry, echo, and cardiology consultation  Low calcium was somewhat spurious  Echocardiogram with normal EF, moderate RV dysfunction unclear cause, does not explain hypotension  Stim testing negative for adrenal insufficiency  S/p neurology and cardiology consultation  Plan  - potassium, phos, and mg replacement protocol  - PT and OT. May need TCU  - cardiology consultation appreciated, they signed off  - increase midodrine to 10 mg tid  - MOHPA consulted. 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 Indication: Syncope- high cardiac risk (48 hours)  Code Status: Full Code      Clinically Significant Risk Factors         # Hyponatremia: Lowest Na = 134 mmol/L in last 2 days, will monitor as appropriate   # Hypocalcemia: Lowest Ca = 8.2 mg/dL in last 2 days, will monitor and replace as appropriate     # Hypoalbuminemia: Lowest albumin = 2.2 g/dL at 1/10/2025  9:19 AM, will monitor as appropriate     # Hypertension: Noted on problem list            # Overweight: Estimated body mass index is 29.2 kg/m  as calculated from the following:    Height as of this encounter: 1.93 m (6' 4\").    Weight as of this encounter: 108.8 kg (239 lb 14.4 oz)., PRESENT ON ADMISSION  # Moderate Malnutrition: based on nutrition assessment, PRESENT ON ADMISSION     # Financial/Environmental Concerns:           Social Drivers of Health    Physical Activity: Insufficiently Active (5/28/2024)    Exercise Vital Sign     Days of Exercise per Week: 2 days     Minutes of Exercise per Session: 30 min   Social Connections: Unknown (5/28/2024)    Social Connection and Isolation Panel [NHANES]     Frequency of Social Gatherings with Friends and Family: Twice a week          Disposition Plan     Medically Ready for Discharge: Anticipated in 2-4 Days             Javi Mcnamara, DO  Hospitalist Service  [facility]  Securely message with [employer] (more info)  Text page via [employer] Paging/Directory   ______________________________________________________________________    Interval History   Remains orthostatic  Some agitation overnight but wife was able to redirect    Physical Exam   Vital Signs: Temp: 97.2  F (36.2  C) Temp src: Oral BP: 113/72 Pulse: 74   Resp: 16 SpO2: 98 % O2 Device: None (Room air)    Weight: 239 lbs 14.4 oz    General: Alert, No distress. Nontoxic appearance  Head: No signs of trauma.   Mouth/Throat: Oropharynx moist.   Eyes: Conjunctivae " are normal. Pupils are equal..   Neck: Normal range of motion.    CV: Appears well perfused.  Resp:No respiratory distress.   MSK: Normal range of motion. No obvious deformity.   Neuro: The patient is alert and interactive. COOK. Speech normal. GCS 15  Skin: No lesion or sign of trauma noted.   Psych: normal mood and affect. behavior is normal.      Medical Decision Making       56 MINUTES SPENT BY ME on the date of service doing chart review, history, exam, documentation & further activities per the note.      Data     I have personally reviewed the following data over the past 24 hrs:    N/A  \   N/A   / N/A     134 (L) 103 23.5 (H) /  101 (H)   4.4 24 1.47 (H) \     ALT: 37 AST: 40 AP: 114 TBILI: 1.5 (H)   ALB: 3.2 (L) TOT PROTEIN: 6.5 LIPASE: N/A       Imaging results reviewed over the past 24 hrs:   No results found for this or any previous visit (from the past 24 hours).

## 2025-01-12 NOTE — PROVIDER NOTIFICATION
MD Notification    Notified Person: MD    Notified Person Name: Dr. Javi Mcnamara    Notification Date/Time: 4:58 PM 01/12/25     Notification Interaction: [employer] Messaging    Purpose of Notification: FYI-Orthostatic BP's re-checked, Pt positive. Again, had to sit in the midst of the standing BP d/t dizziness.    Orders Received:    Comments:

## 2025-01-12 NOTE — PLAN OF CARE
[id number]  Orientation: A&Ox4, intermittent confusion   Aggression Stop Light: Green  Activity: SBA GBW  Diet/BS Checks: Regular  Tele:  Afib   IV Access/Drains: RPIV CDI SL  Pain Management: Denied  Abnormal VS/Results: VSS on RA. K/Mag/Phos rechecks in AM  Bowel/Bladder: Continent of bowel and bladder, up to BSC   Skin/Wounds: L knee abrasion and scattered bruising   Consults: Hem/Onc, PT, OT, Nutrition, Neurology   D/C Disposition: pending   Other Info: Wife is at bedside

## 2025-01-12 NOTE — PROGRESS NOTES
"  Neurology Progress Note               Interval History:     Patient has ongoing severe orthostatic hypotension.  He had episodes of significant lightheadedness and drop in blood pressure this morning when he was working with physical therapist.  He had negative stim test for adrenal insufficiency.  His 2D echocardiogram was normal.  His wife reports that he has episodes of dream enactment behavior over the last couple of years patient lost his sense of smell 15 to 20 years ago.  He has history of constipation.  His wife notices mild forgetfulness for the last couple of years.              Medications:   I have reviewed this patient's current medications               Physical Exam:   Blood pressure 100/64, pulse 69, temperature 97.4  F (36.3  C), temperature source Oral, resp. rate 16, height 1.93 m (6' 4\"), weight 108.8 kg (239 lb 14.4 oz), SpO2 92%.  GENERAL EXAMINATION:  HEENT: PERRLA, EOMI.  Neck: Supple, No myofascial tender points.    NEUROLOGICAL EXAMINATION  Mental Status:  Patient is awake, alert, and oriented X3.  Patient has difficulty naming animals and is due or coming up with words starting with letter F.  Detailed MoCA was not performed.    Cranial Nerves: Pupils are equal and reactive to light.Visual fields are full to confrontation. Extraocular movements are intact. There is no nystagmus in the horizontal or vertical planes.No facial sensory deficits. No facial weakness. The tongue is midline.     Motor: Muscle tone is normal. There is no pronator drift. There is no muscle atrophy. The strength is 5/5 in upper and lower extremities bilaterally. Deep tendon reflexes are 2+ and symmetric in  biceps, triceps, knees, and ankles.     Coordination: .Finger to nose - normal.         Data:     Review of Diagnostics:     I personally reviewed and interpreted the following:    Echocardiogram Complete    Result Date: 1/11/2025  [id number]^JULIENNE^JULIANO^ANUJA [facility] " [facility] Echocardiography Laboratory [address] [place] [address]  Name: JOHN ALLEN MRN: [id number] : 1945 Study Date: 2025 08:25 AM Age: 79 yrs Gender: Male Patient Location: [facility] Reason For Study: Syncope and Collapse Ordering Physician: JULIANO ROBINS Referring Physician: Nico Retana Performed By: Genoveva Rucker  BSA: 2.4 m2 Height: 76 in Weight: 240 lb HR: 83 BP: 102/73 mmHg ______________________________________________________________________________ Procedure Echocardiogram with two-dimensional, color and spectral Doppler. Definity (NDC #[id number]) given intravenously. ______________________________________________________________________________ Interpretation Summary  Left ventricular systolic function is normal.The visual ejection fraction is 55-60%. The right ventricular systolic function is normal.The right ventricle is mild to moderately dilated. Moderate biatrial enlargement. There is mild (1+) aortic regurgitation. 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  Compared to prior study dated 10/9/2023 aortic root and ascending aorta appear similar size, RV size was noted to be normal in prior study. ______________________________________________________________________________ Left Ventricle The left ventricle is normal in size. There is mild concentric left ventricular hypertrophy. Left ventricular systolic function is normal. The visual ejection fraction is 55-60%. Diastolic Doppler findings (E/E' ratio and/or other parameters) suggest left ventricular filling pressures are indeterminate. No regional wall motion abnormalities noted.  Right Ventricle The right ventricle is mild to moderately dilated. The right ventricular systolic function is normal.  Atria The left atrium is moderately dilated. The right atrium is moderately dilated. There is no color Doppler evidence of an atrial shunt.  Mitral Valve  There is trace mitral regurgitation.  Tricuspid Valve There is trace tricuspid regurgitation. Right ventricular systolic pressure could not be approximated due to inadequate tricuspid regurgitation.  Aortic Valve The aortic valve is trileaflet. Mild aortic valve sclerosis. There is mild (1+) aortic regurgitation. No aortic stenosis is present.  Pulmonic Valve There is trace pulmonic valvular regurgitation. There is no pulmonic valvular stenosis.  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 Inferior vena cava not well visualized for estimation of right atrial pressure.  Pericardium There is no pericardial effusion.  Rhythm The rhythm was atrial fibrillation. ______________________________________________________________________________ MMode/2D Measurements & Calculations IVSd: 1.2 cm  LVIDd: 5.0 cm LVIDs: 3.1 cm LVPWd: 1.2 cm FS: 37.4 % LV mass(C)d: 242.8 grams LV mass(C)dI: 101.4 grams/m2 Ao root diam: 4.6 cm LA dimension: 4.7 cm asc Aorta Diam: 4.6 cm LA/Ao: 1.0 Ao root diam index Ht(cm/m): 2.4 Ao root diam index BSA (cm/m2): 1.9 Asc Ao diam index BSA (cm/m2): 1.9 Asc Ao diam index Ht(cm/m): 2.4 LA Volume (BP): 102.0 ml  LA Volume Index (BP): 42.7 ml/m2 RV Base: 5.5 cm RWT: 0.48  Doppler Measurements & Calculations MV E max vern: 89.0 cm/sec MV dec time: 0.17 sec E/E' av.5 Lateral E/e': 6.6 Medial E/e': 10.4  ______________________________________________________________________________ Report approved by: Jone Mcfarland MD on 2025 12:46 PM           Vitamin B12:   Lab Results   Component Value Date    B12 323 01/10/2025    B12 370 2019         Complete Blood Count:    Recent Labs   Lab Test 25  0534 01/10/25  0919 24  1002 24  1538 24  1403   WBC 5.1 4.7  --   --  6.0   RBC 4.13* 3.49*  --   --  3.43*   HGB 13.3 11.3* 14.3   < > 9.5*   HCT 39.1* 33.3*  --   --  30.9*    138*  --   --  198   LYMPH 20 18  --   --  19     < > = values in this interval not displayed.        HgA1c:   Lab Results   Component Value Date    A1C 5.2 05/28/2024        Thyroid Stimulating Hormone:   Lab Results   Component Value Date    TSH 3.41 01/10/2025    TSH 4.94 07/29/2019        Recent Labs   Lab Test 01/11/25  0534 01/10/25  0919 07/14/24  0505 07/14/24  0500 10/25/23  1002 10/25/23  0915   WBC 5.1 4.7   < > 8.8   < >  --    HGB 13.3 11.3*   < > 8.3*   < >  --    MCV 95 95   < > 87   < >  --     138*   < > 229   < >  --    INR  --   --   --  1.57*  --  2.29*    < > = values in this interval not displayed.      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  The differential diagnosis for severe orthostatic hypotension would include neurodegenerative disorder like multisystem atrophy versus paraneoplastic syndrome.  Paraneoplastic panel is pending.  Recommend starting patient on Mestinon 60 mg 3 times daily.  Continue midodrine and could titrate dose in future if symptoms persist.  Consider adding Florinef.  I will sign off. Call neurology on call if needed.  I spent 55 min in the activities before, during and after the visit.         Juventino Cárdenas MD  [facility]

## 2025-01-12 NOTE — PLAN OF CARE
Orientation: A/Ox4-intermittent confusion  Aggression Stop Light: Green  Activity: SBA GB/W  Diet/BS Checks: Reg diet-good appetite  IV Access/Drains: RPIV-SL  Pain Management: Denies pain or N/V  Abnormal VS/Results: VSS on RA.  Bowel/Bladder: Continenet B/B-uses urinal at bedside or up to BSC for safety. 1x BM this shift.  Skin/Wounds: Abrasion on L knee and scattered bruising  Consults: Hem/Onc, PT, OT, Nutrition, Neurology  D/C Disposition: TBD  Other Info: Echo completed today-see results. 500 mL bolus given this afternoon. Contact and Chemo precautions maintained. Candida Auris and Carbapenemase resistant organism labs completed-awaiting results for rule out. Pt has home chemo meds in [facility].

## 2025-01-13 LAB
ALBUMIN SERPL BCG-MCNC: 2.9 G/DL (ref 3.5–5.2)
ALBUMIN UR-MCNC: NEGATIVE MG/DL
ALP SERPL-CCNC: 105 U/L (ref 40–150)
ALT SERPL W P-5'-P-CCNC: 33 U/L (ref 0–70)
ANION GAP SERPL CALCULATED.3IONS-SCNC: 11 MMOL/L (ref 7–15)
APPEARANCE UR: CLEAR
AST SERPL W P-5'-P-CCNC: 37 U/L (ref 0–45)
BILIRUB DIRECT SERPL-MCNC: 0.28 MG/DL (ref 0–0.3)
BILIRUB SERPL-MCNC: 1.4 MG/DL
BILIRUB UR QL STRIP: NEGATIVE
BUN SERPL-MCNC: 22.8 MG/DL (ref 8–23)
C DIFF TOX B STL QL: NEGATIVE
CALCIUM SERPL-MCNC: 8.3 MG/DL (ref 8.8–10.4)
CHLORIDE SERPL-SCNC: 104 MMOL/L (ref 98–107)
COLOR UR AUTO: YELLOW
CREAT SERPL-MCNC: 1.44 MG/DL (ref 0.67–1.17)
EGFRCR SERPLBLD CKD-EPI 2021: 50 ML/MIN/1.73M2
ERYTHROCYTE [DISTWIDTH] IN BLOOD BY AUTOMATED COUNT: 16.6 % (ref 10–15)
GLUCOSE SERPL-MCNC: 98 MG/DL (ref 70–99)
GLUCOSE UR STRIP-MCNC: NEGATIVE MG/DL
HCO3 SERPL-SCNC: 19 MMOL/L (ref 22–29)
HCT VFR BLD AUTO: 37.8 % (ref 40–53)
HGB BLD-MCNC: 12.8 G/DL (ref 13.3–17.7)
HGB UR QL STRIP: NEGATIVE
KETONES UR STRIP-MCNC: NEGATIVE MG/DL
LEUKOCYTE ESTERASE UR QL STRIP: NEGATIVE
MAGNESIUM SERPL-MCNC: 2.1 MG/DL (ref 1.7–2.3)
MCH RBC QN AUTO: 32.3 PG (ref 26.5–33)
MCHC RBC AUTO-ENTMCNC: 33.9 G/DL (ref 31.5–36.5)
MCV RBC AUTO: 96 FL (ref 78–100)
NITRATE UR QL: NEGATIVE
PH UR STRIP: 5 [PH] (ref 5–7)
PHOSPHATE SERPL-MCNC: 2.5 MG/DL (ref 2.5–4.5)
PLATELET # BLD AUTO: 148 10E3/UL (ref 150–450)
POTASSIUM SERPL-SCNC: 4.2 MMOL/L (ref 3.4–5.3)
PROT SERPL-MCNC: 6.1 G/DL (ref 6.4–8.3)
RBC # BLD AUTO: 3.96 10E6/UL (ref 4.4–5.9)
SODIUM SERPL-SCNC: 134 MMOL/L (ref 135–145)
SP GR UR STRIP: 1.01 (ref 1–1.03)
UROBILINOGEN UR STRIP-MCNC: 2 MG/DL
WBC # BLD AUTO: 6.2 10E3/UL (ref 4–11)

## 2025-01-13 PROCEDURE — 87493 C DIFF AMPLIFIED PROBE: CPT | Performed by: HOSPITALIST

## 2025-01-13 PROCEDURE — 250N000013 HC RX MED GY IP 250 OP 250 PS 637: Performed by: PSYCHIATRY & NEUROLOGY

## 2025-01-13 PROCEDURE — 82248 BILIRUBIN DIRECT: CPT | Performed by: HOSPITALIST

## 2025-01-13 PROCEDURE — 81003 URINALYSIS AUTO W/O SCOPE: CPT | Performed by: HOSPITALIST

## 2025-01-13 PROCEDURE — 84132 ASSAY OF SERUM POTASSIUM: CPT | Performed by: HOSPITALIST

## 2025-01-13 PROCEDURE — 83735 ASSAY OF MAGNESIUM: CPT | Performed by: HOSPITALIST

## 2025-01-13 PROCEDURE — 84520 ASSAY OF UREA NITROGEN: CPT | Performed by: HOSPITALIST

## 2025-01-13 PROCEDURE — 80048 BASIC METABOLIC PNL TOTAL CA: CPT | Performed by: HOSPITALIST

## 2025-01-13 PROCEDURE — 99232 SBSQ HOSP IP/OBS MODERATE 35: CPT | Performed by: HOSPITALIST

## 2025-01-13 PROCEDURE — 120N000001 HC R&B MED SURG/OB

## 2025-01-13 PROCEDURE — 85018 HEMOGLOBIN: CPT | Performed by: HOSPITALIST

## 2025-01-13 PROCEDURE — 85049 AUTOMATED PLATELET COUNT: CPT | Performed by: HOSPITALIST

## 2025-01-13 PROCEDURE — 84100 ASSAY OF PHOSPHORUS: CPT | Performed by: HOSPITALIST

## 2025-01-13 PROCEDURE — 36415 COLL VENOUS BLD VENIPUNCTURE: CPT | Performed by: HOSPITALIST

## 2025-01-13 PROCEDURE — 250N000013 HC RX MED GY IP 250 OP 250 PS 637: Performed by: HOSPITALIST

## 2025-01-13 RX ORDER — LOPERAMIDE HYDROCHLORIDE 2 MG/1
2 CAPSULE ORAL 4 TIMES DAILY PRN
Status: DISCONTINUED | OUTPATIENT
Start: 2025-01-13 | End: 2025-01-17 | Stop reason: HOSPADM

## 2025-01-13 RX ORDER — MIDODRINE HYDROCHLORIDE 2.5 MG/1
5 TABLET ORAL
Status: DISCONTINUED | OUTPATIENT
Start: 2025-01-13 | End: 2025-01-13

## 2025-01-13 RX ORDER — MIDODRINE HYDROCHLORIDE 2.5 MG/1
10 TABLET ORAL
Status: DISCONTINUED | OUTPATIENT
Start: 2025-01-13 | End: 2025-01-17 | Stop reason: HOSPADM

## 2025-01-13 RX ORDER — PYRIDOSTIGMINE BROMIDE 60 MG/1
60 TABLET ORAL EVERY 8 HOURS SCHEDULED
Status: DISCONTINUED | OUTPATIENT
Start: 2025-01-13 | End: 2025-01-15

## 2025-01-13 RX ADMIN — PANTOPRAZOLE SODIUM 40 MG: 40 TABLET, DELAYED RELEASE ORAL at 16:14

## 2025-01-13 RX ADMIN — PRAVASTATIN SODIUM 20 MG: 20 TABLET ORAL at 22:00

## 2025-01-13 RX ADMIN — PYRIDOSTIGMINE BROMIDE 60 MG: 60 TABLET ORAL at 06:38

## 2025-01-13 RX ADMIN — CALCIUM CARBONATE 500 MG: 500 TABLET, CHEWABLE ORAL at 09:37

## 2025-01-13 RX ADMIN — MIDODRINE HYDROCHLORIDE 10 MG: 2.5 TABLET ORAL at 18:19

## 2025-01-13 RX ADMIN — LOPERAMIDE HYDROCHLORIDE 2 MG: 2 CAPSULE ORAL at 16:14

## 2025-01-13 RX ADMIN — MIDODRINE HYDROCHLORIDE 10 MG: 2.5 TABLET ORAL at 12:22

## 2025-01-13 RX ADMIN — PANTOPRAZOLE SODIUM 40 MG: 40 TABLET, DELAYED RELEASE ORAL at 06:38

## 2025-01-13 RX ADMIN — CALCIUM CARBONATE 500 MG: 500 TABLET, CHEWABLE ORAL at 19:50

## 2025-01-13 RX ADMIN — TAMSULOSIN HYDROCHLORIDE 0.4 MG: 0.4 CAPSULE ORAL at 19:50

## 2025-01-13 RX ADMIN — MIDODRINE HYDROCHLORIDE 10 MG: 2.5 TABLET ORAL at 09:37

## 2025-01-13 ASSESSMENT — ACTIVITIES OF DAILY LIVING (ADL)
ADLS_ACUITY_SCORE: 71
ADLS_ACUITY_SCORE: 68
ADLS_ACUITY_SCORE: 68
ADLS_ACUITY_SCORE: 71
ADLS_ACUITY_SCORE: 68
ADLS_ACUITY_SCORE: 71
ADLS_ACUITY_SCORE: 71
ADLS_ACUITY_SCORE: 67
ADLS_ACUITY_SCORE: 63
ADLS_ACUITY_SCORE: 63
ADLS_ACUITY_SCORE: 68
ADLS_ACUITY_SCORE: 68
ADLS_ACUITY_SCORE: 71
ADLS_ACUITY_SCORE: 63
ADLS_ACUITY_SCORE: 68
ADLS_ACUITY_SCORE: 68
DEPENDENT_IADLS:: CLEANING;COOKING;LAUNDRY;SHOPPING;MEAL PREPARATION;MEDICATION MANAGEMENT;TRANSPORTATION
ADLS_ACUITY_SCORE: 68
ADLS_ACUITY_SCORE: 71
ADLS_ACUITY_SCORE: 67
ADLS_ACUITY_SCORE: 68
ADLS_ACUITY_SCORE: 71
ADLS_ACUITY_SCORE: 68
ADLS_ACUITY_SCORE: 71

## 2025-01-13 NOTE — PROGRESS NOTES
[facility]    Medicine Progress Note - Hospitalist Service    Date of Admission:  1/10/2025    Assessment & Plan      Recurrent syncope  Hypocalcemia  hypokalemia  Orthostatic hypotension  Lost consciousness in the AM prior to admission trying to make his cardiology appointment to  consider cardioversion as well as Watchman device  Found to have significant hypocalcemia correcting to 7.1 with albumin  Cabozantinib which is this patient's oral renal cancer treatment is often associated with hypocalcemia  Patient and wife very concerned about cardiac causes of syncope which although possible seems most likely related to neurocardiogenic symptoms such as orthostatic.  Will nonetheless spread cardiac net with telemetry, echo, and cardiology consultation  Low calcium was somewhat spurious  Echocardiogram with normal EF, moderate RV dysfunction unclear cause, does not explain hypotension  Stim testing negative for adrenal insufficiency  S/p neurology and cardiology consultation  Plan  - potassium, phos, and mg replacement protocol  - PT and OT. Will need TCU  - cardiology consultation appreciated, they signed off  - continue midodrine to 10 mg tid  - MOHPA consulted. 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 At this point we will not dose reduce the midodrine  - c diff negative, start imodium  - no e/o renal dysfunction     History of renal cancer  Plan  - oncology consult as above     CKD 3b  Baseline creatnine of 1.7-2        Atrial fibrillation  Reports being off all meds due to syncope  Was in discussions to  "discuss watchman as outpatient  Plan  - cardiology consulted and signed off     Metabolic encephalopathy  Plan  - some confusion noted at home at night per wife  - monitor for infection  - consider prn seroquel  - note neurology concerned about chronic degenerative process        Diet: Combination Diet Regular Diet Adult    DVT Prophylaxis: Pneumatic Compression Devices  Dillard Catheter: Not present  Lines: None     Cardiac Monitoring: None  Code Status: Full Code      Clinically Significant Risk Factors         # Hyponatremia: Lowest Na = 134 mmol/L in last 2 days, will monitor as appropriate       # Hypoalbuminemia: Lowest albumin = 2.2 g/dL at 1/10/2025  9:19 AM, will monitor as appropriate     # Hypertension: Noted on problem list            # Overweight: Estimated body mass index is 29.2 kg/m  as calculated from the following:    Height as of this encounter: 1.93 m (6' 4\").    Weight as of this encounter: 108.8 kg (239 lb 14.4 oz)., PRESENT ON ADMISSION  # Moderate Malnutrition: based on nutrition assessment, PRESENT ON ADMISSION     # Financial/Environmental Concerns: none         Social Drivers of Health    Physical Activity: Insufficiently Active (5/28/2024)    Exercise Vital Sign     Days of Exercise per Week: 2 days     Minutes of Exercise per Session: 30 min   Social Connections: Unknown (5/28/2024)    Social Connection and Isolation Panel [NHANES]     Frequency of Social Gatherings with Friends and Family: Twice a week          Disposition Plan     Medically Ready for Discharge: Anticipated in 2-4 Days             Javi Mcnamara DO  Hospitalist Service  [facility]  Securely message with [employer] (more info)  Text page via [employer] Paging/Directory   ______________________________________________________________________    Interval History   Remains orthostatic  Less agitation  Increase loose stools  No acute concerns  Awaiting SW evaluation    Physical Exam   Vital Signs: Temp: " 97.2  F (36.2  C) Temp src: Oral BP: (!) 130/91 Pulse: 63   Resp: 17 SpO2: 97 % O2 Device: None (Room air)    Weight: 239 lbs 14.4 oz    General: Alert, No distress. Nontoxic appearance  Head: No signs of trauma.   Mouth/Throat: Oropharynx moist.   Eyes: Conjunctivae are normal. Pupils are equal..   Neck: Normal range of motion.    CV: Appears well perfused.  Resp:No respiratory distress.   MSK: Normal range of motion. No obvious deformity.   Neuro: The patient is alert and interactive. COOK. Speech normal. GCS 15  Skin: No lesion or sign of trauma noted.   Psych: normal mood and affect. behavior is normal.      Medical Decision Making       56 MINUTES SPENT BY ME on the date of service doing chart review, history, exam, documentation & further activities per the note.      Data     I have personally reviewed the following data over the past 24 hrs:    6.2  \   12.8 (L)   / 148 (L)     134 (L) 104 22.8 /  98   4.2 19 (L) 1.44 (H) \     ALT: 33 AST: 37 AP: 105 TBILI: 1.4 (H)   ALB: 2.9 (L) TOT PROTEIN: 6.1 (L) LIPASE: N/A       Imaging results reviewed over the past 24 hrs:   No results found for this or any previous visit (from the past 24 hours).

## 2025-01-13 NOTE — CONSULTS
Care Management Initial Consult    General Information  Assessment completed with: Patient, Spouse or significant other, Patient/Spouse Valery  Type of CM/SW Visit: Initial Assessment    Primary Care Provider verified and updated as needed: Yes   Readmission within the last 30 days:        Reason for Consult: discharge planning  Advance Care Planning: Advance Care Planning Reviewed: no concerns identified          Communication Assessment  Patient's communication style: spoken language (English or Bilingual)    Hearing Difficulty or Deaf: no   Wear Glasses or Blind: yes    Cognitive  Cognitive/Neuro/Behavioral: .WDL except, orientation  Level of Consciousness: alert, intermittent confusion  Arousal Level: opens eyes spontaneously  Orientation: disoriented to, situation  Mood/Behavior: calm, cooperative  Best Language: 1 - Mild to moderate  Speech: clear    Living Environment:   People in home: spouse  Valery  Current living Arrangements: independent living facility      Able to return to prior arrangements: no       Family/Social Support:  Care provided by: spouse/significant other, self  Provides care for: no one  Marital Status:   Support system: Wife  Valery       Description of Support System: Supportive, Involved    Support Assessment: Adequate family and caregiver support, Adequate social supports    Current Resources:   Patient receiving home care services: No        Community Resources: Housekeeping/Chore Agency, DME  Equipment currently used at home: walker, rolling, wheelchair, manual  Supplies currently used at home: Nutritional Supplements, Incontinence Supplies, Compression Stockings    Employment/Financial:  Employment Status: retired        Financial Concerns: none   Referral to Financial Worker: No       Does the patient's insurance plan have a 3 day qualifying hospital stay waiver?  No    Lifestyle & Psychosocial Needs:  Social Drivers of Health     Food Insecurity: Low Risk  (1/11/2025)    Food  Insecurity     Within the past 12 months, did you worry that your food would run out before you got money to buy more?: No     Within the past 12 months, did the food you bought just not last and you didn t have money to get more?: No   Depression: Not at risk (12/31/2024)    PHQ-2     PHQ-2 Score: 0   Housing Stability: Low Risk  (1/11/2025)    Housing Stability     Do you have housing? : Yes     Are you worried about losing your housing?: No   Tobacco Use: Low Risk  (12/31/2024)    Patient History     Smoking Tobacco Use: Never     Smokeless Tobacco Use: Never     Passive Exposure: Not on file   Financial Resource Strain: Low Risk  (1/11/2025)    Financial Resource Strain     Within the past 12 months, have you or your family members you live with been unable to get utilities (heat, electricity) when it was really needed?: No   Alcohol Use: Not At Risk (7/11/2020)    [facility]    AUDIT-C     Frequency of Alcohol Consumption: Not on file     Average Number of Drinks: 1 or 2     Frequency of Binge Drinking: Never     : Not on file     : Not on file     : Not on file   Transportation Needs: Low Risk  (1/11/2025)    Transportation Needs     Within the past 12 months, has lack of transportation kept you from medical appointments, getting your medicines, non-medical meetings or appointments, work, or from getting things that you need?: No   Physical Activity: Insufficiently Active (5/28/2024)    Exercise Vital Sign     Days of Exercise per Week: 2 days     Minutes of Exercise per Session: 30 min   Interpersonal Safety: Low Risk  (1/11/2025)    Interpersonal Safety     Do you feel physically and emotionally safe where you currently live?: Yes     Within the past 12 months, have you been hit, slapped, kicked or otherwise physically hurt by someone?: No     Within the past 12 months, have you been humiliated or emotionally abused in other ways by your partner or ex-partner?: No   Stress: No  Stress Concern Present (5/28/2024)    [employer] - Occupational Stress Questionnaire     Feeling of Stress : Not at all   Social Connections: Unknown (5/28/2024)    Social Connection and Isolation Panel [NHANES]     Frequency of Communication with Friends and Family: Not on file     Frequency of Social Gatherings with Friends and Family: Twice a week     Attends Confucianist Services: Not on file     Active Member of Clubs or Organizations: Not on file     Attends Club or Organization Meetings: Not on file     Marital Status: Not on file   Health Literacy: Not on file       Functional Status:  Prior to admission patient needed assistance:   Dependent ADLs:: Ambulation-walker  Dependent IADLs:: Cleaning, Cooking, Laundry, Shopping, Meal Preparation, Medication Management, Transportation       Mental Health Status:  Mental Health Status: No Current Concerns       Chemical Dependency Status:  Chemical Dependency Status: No Current Concerns             Values/Beliefs:  Spiritual, Cultural Beliefs, Confucianist Practices, Values that affect care: no               Discussed  Partnership in Safe Discharge Planning  document with patient/family: No    Additional Information:  Per care management/social work consult for discharge planning.Patient admitted on 01/10 due to recurrent syncope and hypocalcemia.. SW reviewed chart and spoke with patient/spouse.    Per patient and spouse report patient resides at [facility] where at baseline he is independent with all cares. Patient has a walker and grab bars at home. SW discussed recommendation for TCU and informed they would like patient to go to [facility] and if they are not able to accept they will consider other TCU's or taking patient home with private duty home care. SW provided list of additional TCU's as a back up option. Pending response of [facility] spouse would like a list of private duty home care agencies.     Next Steps: Follow up  mane.     JUDITH AbdiW    [facility]

## 2025-01-13 NOTE — PLAN OF CARE
1/12/25  Orientation: A/Ox3. Disoriented to time. Mild aphasia. Intermittent confusion  Aggression Stop Light: Green  Activity: Ax2 GB/W-for safety d/t possible syncope.  Diet/BS Checks: Reg diet-poor appetite  Tele:  Discontinued  IV Access/Drains: RPIV-SL  Pain Management: Denies pain or N/V  Abnormal VS/Results: VSS ex HTN. Orthostatic BP-Positive  Bowel/Bladder: Continent B/B. 2x BM this shift. Pt uses urinal at bedside and pivots to BSC for BM's.  Skin/Wounds: Scattered bruising and abrasion on L Knee.  Consults: Hem/Onc, PT/OT, Nutrition, Neurology, SW/CC  D/C Disposition: TBD  Other Info: Wife at bedside-very helpful with cares. Positive Orthostatic BP's x2. Pt unable to stand the whole time for standing Ortho BP's-pt becomes weak, dizzy, more confused, and speech is slurred. Pt's wife also informed writer that pt has recently lost sense of smell. Pt on K+, Mag, and Phos protocols-no replacements needed today-AM rechecks. Oral hydration encouraged. Contact and Chemo maintained.

## 2025-01-13 NOTE — PLAN OF CARE
[id number]     Orientation: a&Ox3. Intermittent confusion  Aggression Stop Light: green    Activity: Ax2 gait belt and walker  Diet/BS Checks: regular   Tele:  none  IV Access/Drains: R PIV SL  Pain Management: denied pain  Abnormal VS/Results: VSS on RA. Refused orthostatics overnight.   Bowel/Bladder: mostly continent B/B. 2 Bm this shift.   Skin/Wounds: L knee abrasion. Scattered bruising.   Consults: neurology, hem/onc  D/C Disposition: pending    Other Info:   A&Ox3, intermittent confusion. VSS on RA. Denied pain. Ax2 gait belt and walker. Mostly continent B/B. 2 loose BM this shift. Regular diet. R PIV SL. K, Phos, and Mg replacement protocols. Remains on contact precautions. Discharge pending. Wife at bedside overnight.

## 2025-01-13 NOTE — PLAN OF CARE
Goal Outcome Evaluation:      Plan of Care Reviewed With: patient    Shift Summary [id number]    Admitting Diagnosis: Hypocalcemia [E83.51]  Hypokalemia [E87.6]  Syncope, unspecified syncope type [R55]     Orientation: A&Ox4, intermittent confusion   Aggression Stop Light: Green  Activity: A1-2 GBW   Diet/BS Checks: Regular  Tele:  discontinued.   IV Access/Drains: RPIV CDI SL  Pain Management: 4/10, back sore.managed with prn tylenol.   Abnormal VS/Results: VSS on RA x HTN K/Mag/Phos rechecks in AM  Bowel/Bladder: Continent of bowel and bladder, up to BSC   Skin/Wounds: L knee abrasion and scattered bruising   Consults: Hem/Onc, PT, OT, Nutrition, Neurology   D/C Disposition: pending      Other Info: Wife at bedside-very helpful with cares. Contact and Chemo maintained.  Positive Orthostatic BP's x2 during the day per day nurse.

## 2025-01-13 NOTE — PLAN OF CARE
Orientation: A&Ox3, disoriented to time, int confusion    Aggression Stop Light: Green  Activity: A1-2 gb/w, cannot tolerate standing for long   Diet/BS Checks: Reg  Tele: N/A  IV Access/Drains: R PIV SL  Pain Management: Denies  Abnormal VS/Results: Creat 1.44, Ca 8.3  K, mg, phos replacement protocol. AM rechecks ordered   Bowel/Bladder: Incont at times d/t urgency. Having diarrhea,    Skin/Wounds: 2+ BLE edema   Consults: Neuro, SW, PT  D/C Disposition: TBD, to TCU    Other Info:   -Unable to complete orthos   -UA to be collected  -C.diff negative

## 2025-01-13 NOTE — PROGRESS NOTES
"[facility]    Neurology Progress Note     Assessment & Plan   Ti Nickerson is a 79 year old male who was admitted on 1/10/2025.  Orthostatic syncope, unclear etiology.    -Paraneoplastic panel is still pending.  -For now we will discontinued Mestinon continue the higher dose of midodrine and compression stockings up to the thighs.  Elevation of head of the bed, sit up at the bedside in the morning and do some exercises with the feet and legs prior to standing up.  -Physical therapy Occupational Therapy      Jonna Aguilar MD    Interval History   The patient developed diarrhea following Mestinon treatment.  The medication was stopped.  Today he feels somewhat better with no further diarrhea.  He continues to feel lightheaded when standing up but today somewhat better.  The dose of midodrine was increased.  The patient denies numbness and tingling in upper or lower extremities.    Reviewing the chart he has history of clear-cell renal carcinoma which was diagnosed in 2019.   pT3b N0 M0 disease at presentation  \"- followed on surveillance until 1/2021 at which time liver metastasis identified and treated with cryoablation  - disease recurrence in 9/2023 presenting with GI bleeding and treated with partial duodenectomy  -disease progression in 1/2024 treated with partia gastrectomy  - Progressed on Ipi/Nivo 6/25/2024   - s/p radiation due to duodenal bleeding ending 8/06/2024   - Started Cabozantanib 20mg 9/26/24. Continues to take as it seems unlikely that current symptoms are drug related.   - Most recent CT 12/30/24 with decreased retroperitoneal lymphadenopathy, stable R hepatic lobe mass, duodenal mass measuring up to 2.3cm was not well seen on the non contrast study\"     The patient was initially admitted after episodes of orthostatic hypotension and fainting.  He was actually seen in cardiology clinic with persistent low blood pressure and fainting.  In the past those " episodes have been associated with gastrointestinal bleeding but this time this has not been the case.    The patient has been on cabozantinib for the last 3 months for his renal cancer.    Physical Exam   Temp: 97.6  F (36.4  C) Temp src: Oral BP: 134/83 Pulse: 96   Resp: 15 SpO2: 94 % O2 Device: None (Room air)    Vitals:    01/10/25 1800 01/11/25 0500   Weight: 108.8 kg (239 lb 12.8 oz) 108.8 kg (239 lb 14.4 oz)     Vital Signs with Ranges  Temp:  [97.2  F (36.2  C)-97.6  F (36.4  C)] 97.6  F (36.4  C)  Pulse:  [69-96] 96  Resp:  [14-16] 15  BP: (100-135)/(64-94) 134/83  SpO2:  [92 %-99 %] 94 %  I/O last 3 completed shifts:  In: 360 [P.O.:360]  Out: -     The patient is alert oriented x 3 no acute distress.  He follows commands appropriately.  Speech is fluent and there is no aphasia.  There is no pronator drift.  Strength is normal in both lower extremities.  Reflexes are present symmetric in upper and lower extremities.  Vibratory sense was decreased at the ankles bilaterally.  Light touch is normal.  Fine movements are slightly clumsy bilateral.  Cranial nerves are unremarkable there is a mild nystagmus to both lateral gazes.    Medications   Current Facility-Administered Medications   Medication Dose Route Frequency Provider Last Rate Last Admin     Current Facility-Administered Medications   Medication Dose Route Frequency Provider Last Rate Last Admin    Cabozantinib S-Malate (CABOMETYX) tablet 20 mg  20 mg Oral Daily Javi Mcnamara, DO   20 mg at 01/13/25 0940    calcium carbonate (TUMS) chewable tablet 500 mg  500 mg Oral BID Javi Mcnamara, DO   500 mg at 01/13/25 0937    cholecalciferol (VITAMIN D3) capsule 1,250 mcg  1,250 mcg Oral Q7 Days Dreyer, Patrick, DO   1,250 mcg at 01/10/25 2120    midodrine (PROAMATINE) tablet 10 mg  10 mg Oral TID w/meals Javi Mcnamara DO   10 mg at 01/13/25 1222    pantoprazole (PROTONIX) EC tablet 40 mg  40 mg Oral BID Javi Sharma  Endy, DO   40 mg at 01/13/25 0638    pravastatin (PRAVACHOL) tablet 20 mg  20 mg Oral At Bedtime MoniquejaviNasir westonic Edward, DO   20 mg at 01/12/25 2135    [Held by provider] pyRIDostigmine (MESTINON) tablet 60 mg  60 mg Oral Q8H [facility] NaimaNasirvirginia Schumacher, DO        sodium chloride (PF) 0.9% PF flush 3 mL  3 mL Intracatheter Q8H Javi Mcnamara, DO   3 mL at 01/13/25 1222    tamsulosin (FLOMAX) capsule 0.4 mg  0.4 mg Oral QPM BridgetteNasir hindsvirginia Schumacher, DO   0.4 mg at 01/12/25 2010       Data   Results for orders placed or performed during the hospital encounter of 01/10/25 (from the past 24 hours)   C. difficile Toxin B PCR with reflex to C. difficile Antigen and Toxins A/B EIA    Specimen: Per Rectum; Stool   Result Value Ref Range    C Difficile Toxin B by PCR Negative Negative    Narrative    The [employer] Xpert C. difficile Assay, performed on the [employer]  [employer], is a qualitative in vitro diagnostic test for rapid detection of toxin B gene sequences from unformed (liquid or soft) stool specimens collected from patients suspected of having Clostridioides difficile infection (CDI). The test utilizes automated real-time polymerase chain reaction (PCR) to detect toxin gene sequences associated with toxin producing C. difficile. The Xpert C. difficile Assay is intended as an aid in the diagnosis of CDI.   Magnesium   Result Value Ref Range    Magnesium 2.1 1.7 - 2.3 mg/dL   Phosphorus   Result Value Ref Range    Phosphorus 2.5 2.5 - 4.5 mg/dL   CBC with platelets   Result Value Ref Range    WBC Count 6.2 4.0 - 11.0 10e3/uL    RBC Count 3.96 (L) 4.40 - 5.90 10e6/uL    Hemoglobin 12.8 (L) 13.3 - 17.7 g/dL    Hematocrit 37.8 (L) 40.0 - 53.0 %    MCV 96 78 - 100 fL    MCH 32.3 26.5 - 33.0 pg    MCHC 33.9 31.5 - 36.5 g/dL    RDW 16.6 (H) 10.0 - 15.0 %    Platelet Count 148 (L) 150 - 450 10e3/uL   Basic metabolic panel   Result Value Ref Range    Sodium 134 (L) 135 - 145 mmol/L    Potassium  4.2 3.4 - 5.3 mmol/L    Chloride 104 98 - 107 mmol/L    Carbon Dioxide (CO2) 19 (L) 22 - 29 mmol/L    Anion Gap 11 7 - 15 mmol/L    Urea Nitrogen 22.8 8.0 - 23.0 mg/dL    Creatinine 1.44 (H) 0.67 - 1.17 mg/dL    GFR Estimate 50 (L) >60 mL/min/1.73m2    Calcium 8.3 (L) 8.8 - 10.4 mg/dL    Glucose 98 70 - 99 mg/dL   Hepatic panel   Result Value Ref Range    Protein Total 6.1 (L) 6.4 - 8.3 g/dL    Albumin 2.9 (L) 3.5 - 5.2 g/dL    Bilirubin Total 1.4 (H) <=1.2 mg/dL    Alkaline Phosphatase 105 40 - 150 U/L    AST 37 0 - 45 U/L    ALT 33 0 - 70 U/L    Bilirubin Direct 0.28 0.00 - 0.30 mg/dL

## 2025-01-13 NOTE — PROGRESS NOTES
[facility] Progress Note     Primary Oncologist/Hematologist:  Ese Glover          Assessment and Plan:     Mr. Edgar Nickerson is a 79 year old man who was admitted on 1/10/2025 with syncope      Clear cell renal cell carcinoma diagnosed in 10/2019  - pT3b N0 M0 disease at presentation  - followed on surveillance until 1/2021 at which time liver metastasis identified and treated with cryoablation  - disease recurrence in 9/2023 presenting with GI bleeding and treated with partial duodenectomy  -disease progression in 1/2024 treated with partia gastrectomy  - Progressed on Ipi/Nivo 6/25/2024   - s/p radiation due to duodenal bleeding ending 8/06/2024   - Started Cabozantanib 20mg 9/26/24. Continues to take as it seems unlikely that current symptoms are drug related.   - Most recent CT 12/30/24 with decreased retroperitoneal lymphadenopathy, stable R hepatic lobe mass, duodenal mass measuring up to 2.3cm was not well seen on the non contrast study      2. 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 He reports no bleeding overnight     3. History of iron deficiency anemia  - Previously treated with IV iron  - More recently Hg has been in the 14 range   - Hg was 11.3 on 1/10/25  and repeat Hgb on 1/11/2025 was 13.3  - B12 and TSH both normal. Serum iron 63, TIBC 146 and iron saturation index 43%  Recent labs reviewed with patient and also with his wife. Recommend serial monitoring      4. Hypotension with syncope and near syncope  - Chronically in atrial fibrillation with rate control  - On Midodrine as well as compression stockings  - Etiology for the hypotension not entirely clear. Discussed possible causes with patient and his family. Reviewed labs from 1/11 which no evidence for adrenal failure  -   Is also being considered for neurodegenerative disorder like multisystem atrophy vs paraneoplastic syndrome.  Paraneoplastic panel pending.  Started on Mestinon.       5.  Hypocalcemia  - May be lab error as ionized calcium came back WNL  - serial monitoring has since shown slightly low calcium level. Given low albumin, there is no indication for intervention      6. Hypokalemia, resolved     7> Diarrhea  - C diff pending  - diarrhea began after initiation of Mestinon.  Now on hold.     8. Bilirubin elevation to 1.5  - US pending    Discussed with Dr. Mcnamara.     Karan Valle, APRN, AOCNP  Nurse Practitioner  [facility]  [phone]          Interval History:     Diarrhea today.  Discussed possibility of TCU placement with pt and his wife today.  There are TCU options is the [facility] community that they live. in              Review of Systems:     The 5 point Review of Systems is negative other than noted in the HPI                Medications:   Scheduled Medications  Current Facility-Administered Medications   Medication Dose Route Frequency Provider Last Rate Last Admin    Cabozantinib S-Malate (CABOMETYX) tablet 20 mg  20 mg Oral Daily Javi Mcnamara, DO   20 mg at 01/13/25 0940    calcium carbonate (TUMS) chewable tablet 500 mg  500 mg Oral BID Javi Mcnamara, DO   500 mg at 01/13/25 0937    cholecalciferol (VITAMIN D3) capsule 1,250 mcg  1,250 mcg Oral Q7 Days Dreyer, Patrick, DO   1,250 mcg at 01/10/25 2120    midodrine (PROAMATINE) tablet 10 mg  10 mg Oral TID w/meals Javi Mcnamara, DO   10 mg at 01/13/25 0937    pantoprazole (PROTONIX) EC tablet 40 mg  40 mg Oral BID AC Javi Mcnamara, DO   40 mg at 01/13/25 0638    pravastatin (PRAVACHOL) tablet 20 mg  20 mg Oral At Bedtime Javi Mcnamara, DO   20 mg at 01/12/25 2135    [Held by provider] pyRIDostigmine (MESTINON) tablet 60 mg  60 mg Oral Q8H [facility] Javi Mcnamara, DO        sodium chloride (PF) 0.9% PF flush 3 mL  3 mL Intracatheter Q8H Javi Mcnamara, DO   3 mL at 01/13/25 0639    tamsulosin (FLOMAX) capsule 0.4 mg  0.4 mg Oral QPM  "Javi Mcnamara,    0.4 mg at 01/12/25 2010     PRN Medications  Current Facility-Administered Medications   Medication Dose Route Frequency Provider Last Rate Last Admin    acetaminophen (TYLENOL) tablet 500-1,000 mg  500-1,000 mg Oral Daily PRN Javi Mcnamara DO   1,000 mg at 01/12/25 2011    calcium carbonate (TUMS) chewable tablet 1,000 mg  1,000 mg Oral 4x Daily PRN Javi Mcnamara DO        lidocaine (LMX4) cream   Topical Q1H PRN Javi Mcnamara DO        lidocaine 1 % 0.1-1 mL  0.1-1 mL Other Q1H PRN Javi Mcnamara DO        ondansetron (ZOFRAN ODT) ODT tab 4 mg  4 mg Oral Q6H PRN Javi Mcnamara DO        Or    ondansetron (ZOFRAN) injection 4 mg  4 mg Intravenous Q6H PRN Javi Mcnamara DO        senna-docusate (SENOKOT-S/PERICOLACE) 8.6-50 MG per tablet 1 tablet  1 tablet Oral BID PRN Javi Mcnamara DO        Or    senna-docusate (SENOKOT-S/PERICOLACE) 8.6-50 MG per tablet 2 tablet  2 tablet Oral BID PRN Javi Mcnamara DO        sodium chloride (PF) 0.9% PF flush 3 mL  3 mL Intracatheter q1 min prn Javi Mcnamara DO                      Physical Exam:   Vitals were reviewed  Blood pressure 134/83, pulse 96, temperature 97.6  F (36.4  C), temperature source Oral, resp. rate 15, height 1.93 m (6' 4\"), weight 108.8 kg (239 lb 14.4 oz), SpO2 94%.  Wt Readings from Last 4 Encounters:   01/11/25 108.8 kg (239 lb 14.4 oz)   12/12/24 114.3 kg (252 lb)   09/05/24 117 kg (258 lb)   08/07/24 121.3 kg (267 lb 8 oz)       I/O last 3 completed shifts:  In: 240 [P.O.:240]  Out: 130 [Urine:130]                   Data:   All laboratory data and imaging studies reviewed.    CMP  Recent Labs   Lab 01/12/25  0817 01/11/25  0534 01/10/25  0919   * 135 140   POTASSIUM 4.4 4.6 2.9*   CHLORIDE 103 104 113*   CO2 24 22 18*   ANIONGAP 7 9 9   * 101* 83   BUN 23.5* 25.0* 21.3   CR 1.47* 1.52* 1.17   GFRESTIMATED " 49* 47* 64   DOMINICK 8.4* 8.2* 5.7*   MAG 2.2 2.3  --    PHOS 2.6 2.3* 1.5*   PROTTOTAL 6.5 6.2* 4.3*   ALBUMIN 3.2* 3.2* 2.2*   BILITOTAL 1.5* 1.4* 0.9   ALKPHOS 114 103 71   AST 40 31 22   ALT 37 29 20     CBC  Recent Labs   Lab 01/11/25  0534 01/10/25  0919   WBC 5.1 4.7   RBC 4.13* 3.49*   HGB 13.3 11.3*   HCT 39.1* 33.3*   MCV 95 95   MCH 32.2 32.4   MCHC 34.0 33.9   RDW 17.0* 17.1*    138*     INRNo lab results found in last 7 days.        Karan CRUZ, AOCNP  Nurse Practitioner  [facility]  [phone]

## 2025-01-14 ENCOUNTER — APPOINTMENT (OUTPATIENT)
Dept: PHYSICAL THERAPY | Facility: CLINIC | Age: 80
DRG: 312 | End: 2025-01-14
Payer: MEDICARE

## 2025-01-14 ENCOUNTER — APPOINTMENT (OUTPATIENT)
Dept: ULTRASOUND IMAGING | Facility: CLINIC | Age: 80
DRG: 312 | End: 2025-01-14
Attending: HOSPITALIST
Payer: MEDICARE

## 2025-01-14 LAB
ANION GAP SERPL CALCULATED.3IONS-SCNC: 9 MMOL/L (ref 7–15)
BUN SERPL-MCNC: 22.1 MG/DL (ref 8–23)
CALCIUM SERPL-MCNC: 8.4 MG/DL (ref 8.8–10.4)
CANDIDA AURIS CONFIRMATION PCR: NEGATIVE
CHLORIDE SERPL-SCNC: 99 MMOL/L (ref 98–107)
CREAT SERPL-MCNC: 1.45 MG/DL (ref 0.67–1.17)
EGFRCR SERPLBLD CKD-EPI 2021: 49 ML/MIN/1.73M2
GLUCOSE SERPL-MCNC: 103 MG/DL (ref 70–99)
HCO3 SERPL-SCNC: 24 MMOL/L (ref 22–29)
MAGNESIUM SERPL-MCNC: 2.1 MG/DL (ref 1.7–2.3)
PHOSPHATE SERPL-MCNC: 2.4 MG/DL (ref 2.5–4.5)
POTASSIUM SERPL-SCNC: 4 MMOL/L (ref 3.4–5.3)
SODIUM SERPL-SCNC: 132 MMOL/L (ref 135–145)

## 2025-01-14 PROCEDURE — 97110 THERAPEUTIC EXERCISES: CPT | Mod: GP

## 2025-01-14 PROCEDURE — 83735 ASSAY OF MAGNESIUM: CPT | Performed by: HOSPITALIST

## 2025-01-14 PROCEDURE — 250N000013 HC RX MED GY IP 250 OP 250 PS 637: Performed by: HOSPITALIST

## 2025-01-14 PROCEDURE — 80048 BASIC METABOLIC PNL TOTAL CA: CPT | Performed by: HOSPITALIST

## 2025-01-14 PROCEDURE — 82565 ASSAY OF CREATININE: CPT | Performed by: HOSPITALIST

## 2025-01-14 PROCEDURE — 84100 ASSAY OF PHOSPHORUS: CPT | Performed by: HOSPITALIST

## 2025-01-14 PROCEDURE — 97530 THERAPEUTIC ACTIVITIES: CPT | Mod: GP

## 2025-01-14 PROCEDURE — 120N000001 HC R&B MED SURG/OB

## 2025-01-14 PROCEDURE — 76700 US EXAM ABDOM COMPLETE: CPT

## 2025-01-14 PROCEDURE — 99232 SBSQ HOSP IP/OBS MODERATE 35: CPT | Performed by: HOSPITALIST

## 2025-01-14 PROCEDURE — 36415 COLL VENOUS BLD VENIPUNCTURE: CPT | Performed by: HOSPITALIST

## 2025-01-14 RX ADMIN — PANTOPRAZOLE SODIUM 40 MG: 40 TABLET, DELAYED RELEASE ORAL at 09:24

## 2025-01-14 RX ADMIN — MIDODRINE HYDROCHLORIDE 10 MG: 2.5 TABLET ORAL at 18:09

## 2025-01-14 RX ADMIN — POTASSIUM & SODIUM PHOSPHATES POWDER PACK 280-160-250 MG 1 PACKET: 280-160-250 PACK at 19:36

## 2025-01-14 RX ADMIN — PRAVASTATIN SODIUM 20 MG: 20 TABLET ORAL at 21:58

## 2025-01-14 RX ADMIN — TAMSULOSIN HYDROCHLORIDE 0.4 MG: 0.4 CAPSULE ORAL at 19:37

## 2025-01-14 RX ADMIN — CALCIUM CARBONATE 500 MG: 500 TABLET, CHEWABLE ORAL at 19:36

## 2025-01-14 RX ADMIN — POTASSIUM & SODIUM PHOSPHATES POWDER PACK 280-160-250 MG 1 PACKET: 280-160-250 PACK at 16:21

## 2025-01-14 RX ADMIN — MIDODRINE HYDROCHLORIDE 10 MG: 2.5 TABLET ORAL at 13:27

## 2025-01-14 RX ADMIN — MIDODRINE HYDROCHLORIDE 10 MG: 2.5 TABLET ORAL at 09:24

## 2025-01-14 RX ADMIN — PANTOPRAZOLE SODIUM 40 MG: 40 TABLET, DELAYED RELEASE ORAL at 16:21

## 2025-01-14 RX ADMIN — POTASSIUM & SODIUM PHOSPHATES POWDER PACK 280-160-250 MG 1 PACKET: 280-160-250 PACK at 12:16

## 2025-01-14 RX ADMIN — CALCIUM CARBONATE 500 MG: 500 TABLET, CHEWABLE ORAL at 09:24

## 2025-01-14 ASSESSMENT — ACTIVITIES OF DAILY LIVING (ADL)
ADLS_ACUITY_SCORE: 70
ADLS_ACUITY_SCORE: 69
ADLS_ACUITY_SCORE: 70
ADLS_ACUITY_SCORE: 69
ADLS_ACUITY_SCORE: 70
ADLS_ACUITY_SCORE: 69
ADLS_ACUITY_SCORE: 70
ADLS_ACUITY_SCORE: 69
ADLS_ACUITY_SCORE: 68
ADLS_ACUITY_SCORE: 69
ADLS_ACUITY_SCORE: 69
ADLS_ACUITY_SCORE: 70

## 2025-01-14 NOTE — PROGRESS NOTES
[facility]    Medicine Progress Note - Hospitalist Service    Date of Admission:  1/10/2025    Assessment & Plan      Recurrent syncope  Hypocalcemia  hypokalemia  Orthostatic hypotension  Lost consciousness in the AM prior to admission trying to make his cardiology appointment to  consider cardioversion as well as Watchman device  Found to have significant hypocalcemia correcting to 7.1 with albumin  Cabozantinib which is this patient's oral renal cancer treatment is often associated with hypocalcemia  Patient and wife very concerned about cardiac causes of syncope which although possible seems most likely related to neurocardiogenic symptoms such as orthostatic.  Will nonetheless spread cardiac net with telemetry, echo, and cardiology consultation  Low calcium was somewhat spurious  Echocardiogram with normal EF, moderate RV dysfunction unclear cause, does not explain hypotension  Stim testing negative for adrenal insufficiency  S/p neurology and cardiology consultation  Plan  - potassium, phos, and mg replacement protocol  - PT and OT. Will need TCU  - cardiology consultation appreciated, they signed off  - continue midodrine to 10 mg tid  - MOHPA consulted. 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 Suspect largely related to the mestinon     History of renal cancer  Plan  - oncology consult as above     CKD 3b  Baseline creatnine of 1.7-2    Hyperbilirubinemia  US negative  Probably related to his chemo med (up to 25% per the oncology note chance of this)        Atrial fibrillation  Reports being off all meds due to syncope  Was in discussions to  "discuss watchman as outpatient  Plan  - cardiology consulted and signed off     Metabolic encephalopathy  Plan  - some confusion noted at home at night per wife  - monitor for infection  - consider prn seroquel  - note neurology concerned about chronic degenerative process        Diet: Combination Diet Regular Diet Adult    DVT Prophylaxis: Pneumatic Compression Devices  Dillard Catheter: Not present  Lines: None     Cardiac Monitoring: None  Code Status: Full Code      Clinically Significant Risk Factors         # Hyponatremia: Lowest Na = 132 mmol/L in last 2 days, will monitor as appropriate       # Hypoalbuminemia: Lowest albumin = 2.2 g/dL at 1/10/2025  9:19 AM, will monitor as appropriate     # Hypertension: Noted on problem list            # Overweight: Estimated body mass index is 28.89 kg/m  as calculated from the following:    Height as of this encounter: 1.93 m (6' 4\").    Weight as of this encounter: 107.6 kg (237 lb 4.8 oz).   # Moderate Malnutrition: based on nutrition assessment      # Financial/Environmental Concerns: none         Social Drivers of Health    Physical Activity: Insufficiently Active (5/28/2024)    Exercise Vital Sign     Days of Exercise per Week: 2 days     Minutes of Exercise per Session: 30 min   Social Connections: Unknown (5/28/2024)    Social Connection and Isolation Panel [NHANES]     Frequency of Social Gatherings with Friends and Family: Twice a week          Disposition Plan     Medically Ready for Discharge: Anticipated in 2-4 Days             Javi Mcnamara DO  Hospitalist Service  [facility]  Securely message with [employer] (more info)  Text page via [employer] Paging/Directory   ______________________________________________________________________    Interval History   Was able to walk a bit to US today  Feeling a bit better  No new orthostatic values  Still hoping for TCU    Physical Exam   Vital Signs: Temp: 97.5  F (36.4  C) Temp src: Oral BP: " 131/80 Pulse: 61   Resp: 20 SpO2: 97 % O2 Device: None (Room air)    Weight: 237 lbs 4.8 oz    General: Alert, No distress. Nontoxic appearance  Head: No signs of trauma.   Mouth/Throat: Oropharynx moist.   Eyes: Conjunctivae are normal. Pupils are equal..   Neck: Normal range of motion.    CV: Appears well perfused.  Resp:No respiratory distress.   MSK: Normal range of motion. No obvious deformity.   Neuro: The patient is alert and interactive. COOK. Speech normal. GCS 15  Skin: No lesion or sign of trauma noted.   Psych: normal mood and affect. behavior is normal.      Medical Decision Making       56 MINUTES SPENT BY ME on the date of service doing chart review, history, exam, documentation & further activities per the note.      Data     I have personally reviewed the following data over the past 24 hrs:    N/A  \   N/A   / N/A     132 (L) 99 22.1 /  103 (H)   4.0 24 1.45 (H) \       Imaging results reviewed over the past 24 hrs:   Recent Results (from the past 24 hours)   US Abdomen Complete    Narrative    EXAM: US ABDOMEN COMPLETE  LOCATION: [facility]  DATE: 1/14/2025    INDICATION: Elevated LFTs.  COMPARISON: 12/30/2024, 10/11/2021.  TECHNIQUE: Complete abdominal ultrasound.    FINDINGS:    GALLBLADDER: Mildly distended gallbladder is unchanged from several prior examinations. Solitary gallstone. No gallbladder wall thickening.    BILE DUCTS: No biliary dilatation. The common duct measures 6 mm.    LIVER: Normal where seen. Previously seen mass posterior right hepatic lobe is not visualized on today's examination.    RIGHT KIDNEY: Right nephrectomy.    LEFT KIDNEY: Normal size. A 6.3 cm cyst upper pole. No hydronephrosis.     SPLEEN: Normal.    PANCREAS: The visualized portions are normal.    AORTA: Normal in caliber.     IVC: Normal where visualized.    No ascites.      Impression    IMPRESSION:  1.  No acute findings. No bile duct dilation.

## 2025-01-14 NOTE — PROVIDER NOTIFICATION
MD Notification    Notified Person: MD    Notified Person Name: Dr. Tesfaye    Notification Date/Time: 0358 1/14    Notification Interaction: vocera    Purpose of Notification: pt lost iv access, requesting to not have another placed at this time    Orders Received:    Comments:  MD aware

## 2025-01-14 NOTE — PROGRESS NOTES
Care Management Follow Up    Length of Stay (days): 4    Expected Discharge Date: 01/15/2025     Concerns to be Addressed:       Patient plan of care discussed at interdisciplinary rounds: Yes    Anticipated Discharge Disposition: Transitional Care              Anticipated Discharge Services: None  Anticipated Discharge DME: None    Patient/family educated on Medicare website which has current facility and service quality ratings: yes  Education Provided on the Discharge Plan:    Patient/Family in Agreement with the Plan: yes    Referrals Placed by CM/SW:    Private pay costs discussed: Not applicable    Discussed  Partnership in Safe Discharge Planning  document with patient/family: No     Handoff Completed: No, handoff not indicated or clinically appropriate    Additional Information:  FISH received update from Oncology that pt and family have decided to hold on chemotherapy.    FISH called [facility] TCU and spoke with More in admission to update on holding chemo - pt not ready for a couple more days   [facility] has bed available Friday 1/17/25.       Next Steps: med ready, transport,     LANETTE BuenrostroW

## 2025-01-14 NOTE — PROGRESS NOTES
[facility] Progress Note     Primary Oncologist/Hematologist:  Dr. Glover           Assessment and Plan:     Mr. Edgar Nickerson is a 79 year old man who was admitted on 1/10/2025 with syncope      Clear cell renal cell carcinoma diagnosed in 10/2019  - pT3b N0 M0 disease at presentation  - followed on surveillance until 1/2021 at which time liver metastasis identified and treated with cryoablation  - disease recurrence in 9/2023 presenting with GI bleeding and treated with partial duodenectomy  -disease progression in 1/2024 treated with partia gastrectomy  - Progressed on Ipi/Nivo 6/25/2024   - s/p radiation due to duodenal bleeding ending 8/06/2024   - Started Cabozantanib 20mg 9/26/24.   - Most recent CT 12/30/24 with decreased retroperitoneal lymphadenopathy, stable R hepatic lobe mass, duodenal mass measuring up to 2.3cm was not well seen on the non contrast study      2. 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 He reports no bleeding overnight     3. History of iron deficiency anemia  - Previously treated with IV iron  - More recently Hg has been in the 14 range   - Hg was 11.3 on 1/10/25  and repeat Hgb on 1/11/2025 was 13.3  - B12 and TSH both normal. Serum iron 63, TIBC 146 and iron saturation index 43%  Recent labs reviewed with patient and also with his wife. Recommend serial monitoring      4. Hypotension with syncope and near syncope  - Chronically in atrial fibrillation with rate control  - On Midodrine as well as compression stockings  - Etiology for the hypotension not entirely clear. Discussed possible causes with patient and his family. Reviewed labs from 1/11 which no evidence for adrenal failure  -   Is also being considered for neurodegenerative disorder like multisystem atrophy vs paraneoplastic syndrome.  Paraneoplastic panel pending.  Started on Mestinon.       5. Hypocalcemia  - May be lab error as ionized calcium came back WNL  - serial  monitoring has since shown slightly low calcium level. Given low albumin, there is no indication for intervention      6. Hypokalemia, resolved      7> Diarrhea  - C diff pending  - diarrhea began after initiation of Mestinon.  Now on hold.      8. Bilirubin elevation to 1.5  - US shows no bile duct dilation    Note recommendations for TCU.  As it is unlikely that his cancer-directed oral medication, Cabozantanib, will be able to continue in TCU, will hold now, in the instance that it is a contributor to ongoing weakness. Can cause asthenia in about 20% of pts, hyperbilirubinemia (12-25% of pts), increased creatinine (58% of pts) and hypotension in < 5% of pts.   Pt has been on the drug since September, tolerating drug well, but with good control of malignancy, it seems reasonable to hold for the short term in the instance it is causing accumulative side effects. Discussed with Dr. Dreyer and with Dr. Mcnamara.  Also discussed with the patient, his wife and his daughter, all whom are in agreement with the plan . We will plan to re-review on an outpatient upon discharge from TCU.    Care coordinator updated with plan. ;          NANCY Flores, AOMASSIMOP  Nurse Practitioner  [facility]  [phone]          Interval History:     Up in chair. Was able to walk from bed to hallway to get on the cart to go to ultrasound.               Review of Systems:     The 5 point Review of Systems is negative other than noted in the HPI                Medications:   Scheduled Medications  Current Facility-Administered Medications   Medication Dose Route Frequency Provider Last Rate Last Admin    Cabozantinib S-Malate (CABOMETYX) tablet 20 mg  20 mg Oral Daily Javi Mcnamara, DO   20 mg at 01/14/25 0925    calcium carbonate (TUMS) chewable tablet 500 mg  500 mg Oral BID Javi Mcnamara, DO   500 mg at 01/14/25 0924    cholecalciferol (VITAMIN D3) capsule 1,250 mcg  1,250 mcg Oral Q7 Days Dreyer,  Andrew DO   1,250 mcg at 01/10/25 2120    midodrine (PROAMATINE) tablet 10 mg  10 mg Oral TID w/meals Javi Mcnamara DO   10 mg at 01/14/25 0924    pantoprazole (PROTONIX) EC tablet 40 mg  40 mg Oral BID AC Javi Mcnamara DO   40 mg at 01/14/25 0924    pravastatin (PRAVACHOL) tablet 20 mg  20 mg Oral At Bedtime Javi Mcnamara DO   20 mg at 01/13/25 2200    [Held by provider] pyRIDostigmine (MESTINON) tablet 60 mg  60 mg Oral Q8H [facility] Javi Mcnamara DO        sodium chloride (PF) 0.9% PF flush 3 mL  3 mL Intracatheter Q8H Javi Mcnamara DO   3 mL at 01/13/25 1950    tamsulosin (FLOMAX) capsule 0.4 mg  0.4 mg Oral QPM Javi Mcnamara DO   0.4 mg at 01/13/25 1950     PRN Medications  Current Facility-Administered Medications   Medication Dose Route Frequency Provider Last Rate Last Admin    acetaminophen (TYLENOL) tablet 500-1,000 mg  500-1,000 mg Oral Daily PRN Javi Mcnamara DO   1,000 mg at 01/12/25 2011    calcium carbonate (TUMS) chewable tablet 1,000 mg  1,000 mg Oral 4x Daily PRN Javi Mcnamara DO        lidocaine (LMX4) cream   Topical Q1H PRN Javi Mcnamara DO        lidocaine 1 % 0.1-1 mL  0.1-1 mL Other Q1H PRN Javi Mcnamara DO        loperamide (IMODIUM) capsule 2 mg  2 mg Oral 4x Daily PRN Javi Mcnamara DO   2 mg at 01/13/25 1614    ondansetron (ZOFRAN ODT) ODT tab 4 mg  4 mg Oral Q6H PRN Javi Mcnamara DO        Or    ondansetron (ZOFRAN) injection 4 mg  4 mg Intravenous Q6H PRN Steinbrueck, Javi Edward, DO        senna-docusate (SENOKOT-S/PERICOLACE) 8.6-50 MG per tablet 1 tablet  1 tablet Oral BID PRN Javi Mcnamara DO        Or    senna-docusate (SENOKOT-S/PERICOLACE) 8.6-50 MG per tablet 2 tablet  2 tablet Oral BID PRN Javi Mcnamara DO        sodium chloride (PF) 0.9% PF flush 3 mL  3 mL Intracatheter q1 min prn Javi Mcnamara DO      "                 Physical Exam:   Vitals were reviewed  Blood pressure 123/79, pulse 76, temperature 98  F (36.7  C), temperature source Oral, resp. rate 19, height 1.93 m (6' 4\"), weight 107.6 kg (237 lb 4.8 oz), SpO2 94%.  Wt Readings from Last 4 Encounters:   01/14/25 107.6 kg (237 lb 4.8 oz)   12/12/24 114.3 kg (252 lb)   09/05/24 117 kg (258 lb)   08/07/24 121.3 kg (267 lb 8 oz)       I/O last 3 completed shifts:  In: 120 [P.O.:120]  Out: 200 [Urine:200]                 Data:   All laboratory data and imaging studies reviewed.    CMP  Recent Labs   Lab 01/13/25  1242 01/12/25  0817 01/11/25  0534 01/10/25  0919   * 134* 135 140   POTASSIUM 4.2 4.4 4.6 2.9*   CHLORIDE 104 103 104 113*   CO2 19* 24 22 18*   ANIONGAP 11 7 9 9   GLC 98 101* 101* 83   BUN 22.8 23.5* 25.0* 21.3   CR 1.44* 1.47* 1.52* 1.17   GFRESTIMATED 50* 49* 47* 64   DOMINICK 8.3* 8.4* 8.2* 5.7*   MAG 2.1 2.2 2.3  --    PHOS 2.5 2.6 2.3* 1.5*   PROTTOTAL 6.1* 6.5 6.2* 4.3*   ALBUMIN 2.9* 3.2* 3.2* 2.2*   BILITOTAL 1.4* 1.5* 1.4* 0.9   ALKPHOS 105 114 103 71   AST 37 40 31 22   ALT 33 37 29 20     CBC  Recent Labs   Lab 01/13/25  1242 01/11/25  0534 01/10/25  0919   WBC 6.2 5.1 4.7   RBC 3.96* 4.13* 3.49*   HGB 12.8* 13.3 11.3*   HCT 37.8* 39.1* 33.3*   MCV 96 95 95   MCH 32.3 32.2 32.4   MCHC 33.9 34.0 33.9   RDW 16.6* 17.0* 17.1*   * 159 138*     INRNo lab results found in last 7 days.    Total time by me 50 min.  25 min in face to face. 25 min in care coordination, documentation, Up-to-Date review.     Karan CRUZ, RADHAP  Nurse Practitioner  [facility]  [phone]      "

## 2025-01-14 NOTE — PLAN OF CARE
[id number]  Orientation: a&ox2 d/o time and place. fluctuates  Aggression Stop Light: green  Activity: a1-2 gb/w to bsc  Diet/BS Checks: npo @ 0000 for abd US  Tele:  n/a  IV Access/Drains: no iv access. MD aware  Pain Management: denies  Abnormal VS/Results: VSS on RA  -cr 1.44, Ca 8.3  Bowel/Bladder: incont at times  Skin/Wounds: L knee abrasion w/ mepi in place. Scattered bruising. +2 BLE edema  Consults: neuro, SW, heme/onc  D/C Disposition: to TCU  Other Info:     -contact precautions maintained  -held cabometyx and Protonix due to pt being npo for US of abdomen this AM

## 2025-01-14 NOTE — PROGRESS NOTES
Care Management Follow Up    Length of Stay (days): 4    Expected Discharge Date: 01/15/2025     Concerns to be Addressed:       Patient plan of care discussed at interdisciplinary rounds: Yes    Anticipated Discharge Disposition: Transitional Care     Anticipated Discharge Services: None  Anticipated Discharge DME: None    Patient/family educated on Medicare website which has current facility and service quality ratings: yes  Education Provided on the Discharge Plan:    Patient/Family in Agreement with the Plan: yes    Referrals Placed by CM/SW:    Private pay costs discussed: Not applicable    Discussed  Partnership in Safe Discharge Planning  document with patient/family: No     Handoff Completed: No, handoff not indicated or clinically appropriate    Additional Information:  Writer spoke with patients spouse Valery. Valery states that she is hoping to figure out transport in the next couple of days. Valery states that she is discussing with More mcknight [facility] who shared that they are working on getting a bed for him.     Next Steps: SW will call admissions again tomorrow to see if there has been a sooner opening for patient.     BELINDA Bocanegra

## 2025-01-14 NOTE — PLAN OF CARE
Orientation: A&Ox3, intermittent confusion   Aggression Stop Light: Green  Activity: A1 GBW to BSC  Diet/BS Checks: Regular  Tele: n/a  IV Access/Drains: No IV access, MD aware   Pain Management: Denied  Abnormal VS/Results: VSS on RA. K/Mag/Phos rechecks in AM. Orthostatics stable   Bowel/Bladder: Continent of bowel and bladder, up to BSC   Skin/Wounds: L knee abrasion CDI, mepilex applied. Scattered bruising   Consults: Hem/Onc, PT, OT, Nutrition, Neurology   D/C Disposition: pending   Other Info: Wife is at bedside. Prn imodium given x1

## 2025-01-15 ENCOUNTER — APPOINTMENT (OUTPATIENT)
Dept: PHYSICAL THERAPY | Facility: CLINIC | Age: 80
DRG: 312 | End: 2025-01-15
Payer: MEDICARE

## 2025-01-15 LAB
ANION GAP SERPL CALCULATED.3IONS-SCNC: 9 MMOL/L (ref 7–15)
BUN SERPL-MCNC: 22.5 MG/DL (ref 8–23)
CALCIUM SERPL-MCNC: 8.3 MG/DL (ref 8.8–10.4)
CHLORIDE SERPL-SCNC: 100 MMOL/L (ref 98–107)
CREAT SERPL-MCNC: 1.41 MG/DL (ref 0.67–1.17)
EGFRCR SERPLBLD CKD-EPI 2021: 51 ML/MIN/1.73M2
GLUCOSE SERPL-MCNC: 98 MG/DL (ref 70–99)
HCO3 SERPL-SCNC: 22 MMOL/L (ref 22–29)
MAGNESIUM SERPL-MCNC: 2 MG/DL (ref 1.7–2.3)
PHOSPHATE SERPL-MCNC: 2.6 MG/DL (ref 2.5–4.5)
POTASSIUM SERPL-SCNC: 4.1 MMOL/L (ref 3.4–5.3)
SODIUM SERPL-SCNC: 131 MMOL/L (ref 135–145)

## 2025-01-15 PROCEDURE — 80048 BASIC METABOLIC PNL TOTAL CA: CPT | Performed by: HOSPITALIST

## 2025-01-15 PROCEDURE — 99232 SBSQ HOSP IP/OBS MODERATE 35: CPT | Performed by: HOSPITALIST

## 2025-01-15 PROCEDURE — 97530 THERAPEUTIC ACTIVITIES: CPT | Mod: GP

## 2025-01-15 PROCEDURE — 36415 COLL VENOUS BLD VENIPUNCTURE: CPT | Performed by: HOSPITALIST

## 2025-01-15 PROCEDURE — 97116 GAIT TRAINING THERAPY: CPT | Mod: GP

## 2025-01-15 PROCEDURE — 84100 ASSAY OF PHOSPHORUS: CPT | Performed by: HOSPITALIST

## 2025-01-15 PROCEDURE — 120N000001 HC R&B MED SURG/OB

## 2025-01-15 PROCEDURE — 250N000013 HC RX MED GY IP 250 OP 250 PS 637: Performed by: HOSPITALIST

## 2025-01-15 PROCEDURE — 83735 ASSAY OF MAGNESIUM: CPT | Performed by: HOSPITALIST

## 2025-01-15 RX ORDER — FLUDROCORTISONE ACETATE 0.1 MG/1
0.1 TABLET ORAL DAILY
Status: DISCONTINUED | OUTPATIENT
Start: 2025-01-15 | End: 2025-01-17 | Stop reason: HOSPADM

## 2025-01-15 RX ADMIN — PRAVASTATIN SODIUM 20 MG: 20 TABLET ORAL at 21:39

## 2025-01-15 RX ADMIN — FLUDROCORTISONE ACETATE 0.1 MG: 0.1 TABLET ORAL at 12:39

## 2025-01-15 RX ADMIN — MIDODRINE HYDROCHLORIDE 10 MG: 2.5 TABLET ORAL at 08:57

## 2025-01-15 RX ADMIN — ACETAMINOPHEN 500 MG: 500 TABLET, FILM COATED ORAL at 08:57

## 2025-01-15 RX ADMIN — CALCIUM CARBONATE 500 MG: 500 TABLET, CHEWABLE ORAL at 20:25

## 2025-01-15 RX ADMIN — MIDODRINE HYDROCHLORIDE 10 MG: 2.5 TABLET ORAL at 18:07

## 2025-01-15 RX ADMIN — PANTOPRAZOLE SODIUM 40 MG: 40 TABLET, DELAYED RELEASE ORAL at 15:53

## 2025-01-15 RX ADMIN — TAMSULOSIN HYDROCHLORIDE 0.4 MG: 0.4 CAPSULE ORAL at 20:25

## 2025-01-15 RX ADMIN — CALCIUM CARBONATE 500 MG: 500 TABLET, CHEWABLE ORAL at 08:57

## 2025-01-15 RX ADMIN — PANTOPRAZOLE SODIUM 40 MG: 40 TABLET, DELAYED RELEASE ORAL at 06:34

## 2025-01-15 RX ADMIN — MIDODRINE HYDROCHLORIDE 10 MG: 2.5 TABLET ORAL at 12:39

## 2025-01-15 ASSESSMENT — ACTIVITIES OF DAILY LIVING (ADL)
ADLS_ACUITY_SCORE: 69
ADLS_ACUITY_SCORE: 70
ADLS_ACUITY_SCORE: 69
ADLS_ACUITY_SCORE: 70
ADLS_ACUITY_SCORE: 70
ADLS_ACUITY_SCORE: 69
ADLS_ACUITY_SCORE: 69
ADLS_ACUITY_SCORE: 70
ADLS_ACUITY_SCORE: 70
ADLS_ACUITY_SCORE: 69
ADLS_ACUITY_SCORE: 69
ADLS_ACUITY_SCORE: 70
ADLS_ACUITY_SCORE: 69

## 2025-01-15 NOTE — PROGRESS NOTES
Care Management Follow Up    Length of Stay (days): 5    Expected Discharge Date: 01/15/2025     Concerns to be Addressed:       Patient plan of care discussed at interdisciplinary rounds: Yes    Anticipated Discharge Disposition: Transitional Care              Anticipated Discharge Services: None  Anticipated Discharge DME: None    Patient/family educated on Medicare website which has current facility and service quality ratings: yes  Education Provided on the Discharge Plan:    Patient/Family in Agreement with the Plan: yes    Referrals Placed by CM/SW:    Private pay costs discussed: Not applicable    Discussed  Partnership in Safe Discharge Planning  document with patient/family: No     Handoff Completed: No, handoff not indicated or clinically appropriate    Additional Information:  FISH Called More in admissions at [facility] to see if there are any sooner openings for TCU.  FISH left voicemail indicating pt med ready to day or tomorrow.     Addendum 11:39: FISH received call back from More from [facility] indicating that they do not have a bed available until Friday, More also monitor labs and progress notes for pt needs.      Next Steps: Discharge orders, transport PAS.   NOTE : Bed available Friday 1/17    LANETTE BuenrostroW

## 2025-01-15 NOTE — PROGRESS NOTES
[facility]    Medicine Progress Note - Hospitalist Service    Date of Admission:  1/10/2025    Assessment & Plan      Recurrent syncope  Hypocalcemia  hypokalemia  Orthostatic hypotension  Lost consciousness in the AM prior to admission trying to make his cardiology appointment to  consider cardioversion as well as Watchman device  Found to have significant hypocalcemia correcting to 7.1 with albumin  Cabozantinib which is this patient's oral renal cancer treatment is often associated with hypocalcemia  Patient and wife very concerned about cardiac causes of syncope which although possible seems most likely related to neurocardiogenic symptoms such as orthostatic.  Will nonetheless spread cardiac net with telemetry, echo, and cardiology consultation  Low calcium was somewhat spurious  Echocardiogram with normal EF, moderate RV dysfunction unclear cause, does not explain hypotension  Stim testing negative for adrenal insufficiency  S/p neurology and cardiology consultation  Plan  - potassium, phos, and mg replacement protocol  - PT and OT. Will need TCU  - cardiology consultation appreciated, they signed off  - continue midodrine to 10 mg tid  - MOHPA consulted. 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 Suspect largely related to the mestinon     History of renal cancer  Plan  - oncology consult as above     CKD 3b  Baseline creatnine of 1.7-2    Hyperbilirubinemia  US negative  Probably related to his chemo med (up to 25% per the oncology note chance of this)        Atrial  "fibrillation  Reports being off all meds due to syncope  Was in discussions to discuss watchman as outpatient  Plan  - cardiology consulted and signed off     Metabolic encephalopathy  Plan  - some confusion noted at home at night per wife  - monitor for infection  - consider prn seroquel  - note neurology concerned about chronic degenerative process        Diet: Combination Diet Regular Diet Adult    DVT Prophylaxis: Pneumatic Compression Devices  Dillard Catheter: Not present  Lines: None     Cardiac Monitoring: None  Code Status: Full Code      Clinically Significant Risk Factors         # Hyponatremia: Lowest Na = 131 mmol/L in last 2 days, will monitor as appropriate       # Hypoalbuminemia: Lowest albumin = 2.2 g/dL at 1/10/2025  9:19 AM, will monitor as appropriate     # Hypertension: Noted on problem list            # Overweight: Estimated body mass index is 28.89 kg/m  as calculated from the following:    Height as of this encounter: 1.93 m (6' 4\").    Weight as of this encounter: 107.6 kg (237 lb 4.8 oz).   # Moderate Malnutrition: based on nutrition assessment      # Financial/Environmental Concerns: none         Social Drivers of Health    Physical Activity: Insufficiently Active (5/28/2024)    Exercise Vital Sign     Days of Exercise per Week: 2 days     Minutes of Exercise per Session: 30 min   Social Connections: Unknown (5/28/2024)    Social Connection and Isolation Panel [NHANES]     Frequency of Social Gatherings with Friends and Family: Twice a week          Disposition Plan     Medically Ready for Discharge: Anticipated in 2-4 Days             Javi Mcnamara DO  Hospitalist Service  [facility]  Securely message with [employer] (more info)  Text page via [employer] Paging/Directory   ______________________________________________________________________    Interval History   Orthostatic  Frustrated due to mobility restrictions    Physical Exam   Vital Signs: Temp: 98  F (36.7 "  C) Temp src: Oral BP: 111/72 Pulse: 78   Resp: 16 SpO2: 96 % O2 Device: None (Room air)    Weight: 237 lbs 4.8 oz    General: Alert, No distress. Nontoxic appearance  Head: No signs of trauma.   Mouth/Throat: Oropharynx moist.   Eyes: Conjunctivae are normal. Pupils are equal..   Neck: Normal range of motion.    CV: Appears well perfused.  Resp:No respiratory distress.   MSK: Normal range of motion. No obvious deformity.   Neuro: The patient is alert and interactive. COOK. Speech normal. GCS 15  Skin: No lesion or sign of trauma noted.   Psych: normal mood and affect. behavior is normal.      Medical Decision Making       56 MINUTES SPENT BY ME on the date of service doing chart review, history, exam, documentation & further activities per the note.      Data     I have personally reviewed the following data over the past 24 hrs:    N/A  \   N/A   / N/A     131 (L) 100 22.5 /  98   4.1 22 1.41 (H) \       Imaging results reviewed over the past 24 hrs:   No results found for this or any previous visit (from the past 24 hours).

## 2025-01-15 NOTE — PROGRESS NOTES
[facility] Hematology Progress Note     Primary Oncologist/Hematologist:  Dr. Glover          Assessment and Plan:     Mr. Edgar Nickerson is a 79 year old man who was admitted on 1/10/2025 with syncope      Clear cell renal cell carcinoma diagnosed in 10/2019  - pT3b N0 M0 disease at presentation  - followed on surveillance until 1/2021 at which time liver metastasis identified and treated with cryoablation  - disease recurrence in 9/2023 presenting with GI bleeding and treated with partial duodenectomy  -disease progression in 1/2024 treated with partia gastrectomy  - Progressed on Ipi/Nivo 6/25/2024   - s/p radiation due to duodenal bleeding ending 8/06/2024   - Started Cabozantanib 20mg 9/26/24.   - Most recent CT 12/30/24 with decreased retroperitoneal lymphadenopathy, stable R hepatic lobe mass, duodenal mass measuring up to 2.3cm was not well seen on the non contrast study      2. 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 He reports no bleeding overnight     3. History of iron deficiency anemia  - Previously treated with IV iron  - More recently Hg has been in the 14 range   - Hg was 11.3 on 1/10/25  and repeat Hgb on 1/11/2025 was 13.3  - B12 and TSH both normal. Serum iron 63, TIBC 146 and iron saturation index 43%  Recent labs reviewed with patient and also with his wife. Recommend serial monitoring      4. Hypotension with syncope and near syncope  - Chronically in atrial fibrillation with rate control  - On Midodrine as well as compression stockings  - Etiology for the hypotension not entirely clear. Discussed possible causes with patient and his family. Reviewed labs from 1/11 which no evidence for adrenal failure  -   Is also being considered for neurodegenerative disorder like multisystem atrophy vs paraneoplastic syndrome.  Paraneoplastic panel drawn 1/11 and is pending.    - Started on Mestinon but then stopped with increase in diarrhea.  On midodrine.   - to  begin florinef      5. Hypocalcemia  - May be lab error as ionized calcium came back WNL  - serial monitoring has since shown slightly low calcium level. Given low albumin, there is no indication for intervention      6. Hypokalemia, resolved      7.  Diarrhea  - C diff negative  - diarrhea began after initiation of Mestinon.  Now on hold.      8. Bilirubin elevation to 1.5  - US shows no bile duct dilation     Note recommendations for TCU.  As it is unlikely that his cancer-directed oral medication, Cabozantanib, will be able to continue in TCU, will hold now, in the instance that it is a contributor to ongoing weakness. Can cause asthenia in about 20% of pts, hyperbilirubinemia (12-25% of pts), increased creatinine (58% of pts) and hypotension in < 5% of pts.   Pt has been on the drug since September, tolerating drug well, but with good control of malignancy, it seems reasonable to hold for the short term in the instance it is causing accumulative side effects. Discussed with Dr. Dreyer and with Dr. Mcnamara.  Also discussed with the patient, his wife and his daughter, all whom are in agreement with the plan . We will plan to re-review on an outpatient upon discharge from TCU.     Care coordinator updated with plan. ;        NANCY Flores, RADHAP  Nurse Practitioner  [facility]  [phone]          Interval History:     Frustrated that he cannot get up and walk.                Review of Systems:     The 5 point Review of Systems is negative other than noted in the HPI                Medications:   Scheduled Medications  Current Facility-Administered Medications   Medication Dose Route Frequency Provider Last Rate Last Admin    [Held by provider] Cabozantinib S-Malate (CABOMETYX) tablet 20 mg  20 mg Oral Daily Javi Mcnamara, DO   20 mg at 01/14/25 0925    calcium carbonate (TUMS) chewable tablet 500 mg  500 mg Oral BID Javi Mcnamara DO   500 mg at 01/15/25 0857     cholecalciferol (VITAMIN D3) capsule 1,250 mcg  1,250 mcg Oral Q7 Days Dreyer, Patrick, DO   1,250 mcg at 01/10/25 2120    fludrocortisone (FLORINEF) tablet 0.1 mg  0.1 mg Oral Daily Javi Mcnamara,         midodrine (PROAMATINE) tablet 10 mg  10 mg Oral TID w/meals Javi Mcnamara, DO   10 mg at 01/15/25 0857    pantoprazole (PROTONIX) EC tablet 40 mg  40 mg Oral BID AC Javi Mcnamara DO   40 mg at 01/15/25 0634    pravastatin (PRAVACHOL) tablet 20 mg  20 mg Oral At Bedtime Javi Mcnamara DO   20 mg at 01/14/25 2158    sodium chloride (PF) 0.9% PF flush 3 mL  3 mL Intracatheter Q8H Javi Mcnamara DO   3 mL at 01/13/25 1950    tamsulosin (FLOMAX) capsule 0.4 mg  0.4 mg Oral QPM Javi Mcnamara DO   0.4 mg at 01/14/25 1937     PRN Medications  Current Facility-Administered Medications   Medication Dose Route Frequency Provider Last Rate Last Admin    acetaminophen (TYLENOL) tablet 500-1,000 mg  500-1,000 mg Oral Daily PRN Javi Mcnamara DO   500 mg at 01/15/25 0857    calcium carbonate (TUMS) chewable tablet 1,000 mg  1,000 mg Oral 4x Daily PRN Javi Mcnamara DO        lidocaine (LMX4) cream   Topical Q1H PRN Javi Mcnamara DO        lidocaine 1 % 0.1-1 mL  0.1-1 mL Other Q1H PRN Javi Mcnamara DO        loperamide (IMODIUM) capsule 2 mg  2 mg Oral 4x Daily PRN Javi Mcnamara DO   2 mg at 01/13/25 1614    ondansetron (ZOFRAN ODT) ODT tab 4 mg  4 mg Oral Q6H PRN Javi Mcnamara DO        Or    ondansetron (ZOFRAN) injection 4 mg  4 mg Intravenous Q6H PRN Javi Mcnamara DO        senna-docusate (SENOKOT-S/PERICOLACE) 8.6-50 MG per tablet 1 tablet  1 tablet Oral BID PRN Javi Mcnamara DO        Or    senna-docusate (SENOKOT-S/PERICOLACE) 8.6-50 MG per tablet 2 tablet  2 tablet Oral BID PRN Javi Mcnamara DO        sodium chloride (PF) 0.9% PF flush 3 mL  3 mL  "Intracatheter q1 min Javi Zepeda, DO                      Physical Exam:   Vitals were reviewed  Blood pressure 109/79, pulse 85, temperature 97.3  F (36.3  C), temperature source Oral, resp. rate 16, height 1.93 m (6' 4\"), weight 107.6 kg (237 lb 4.8 oz), SpO2 95%.  Wt Readings from Last 4 Encounters:   01/14/25 107.6 kg (237 lb 4.8 oz)   12/12/24 114.3 kg (252 lb)   09/05/24 117 kg (258 lb)   08/07/24 121.3 kg (267 lb 8 oz)       I/O last 3 completed shifts:  In: -   Out: 150 [Urine:150]                 Data:   All laboratory data and imaging studies reviewed.    CMP  Recent Labs   Lab 01/15/25  0807 01/14/25  1059 01/13/25  1242 01/12/25  0817 01/11/25  0534 01/10/25  0919 01/10/25  0919   * 132* 134* 134* 135  --  140   POTASSIUM 4.1 4.0 4.2 4.4 4.6  --  2.9*   CHLORIDE 100 99 104 103 104  --  113*   CO2 22 24 19* 24 22  --  18*   ANIONGAP 9 9 11 7 9  --  9   GLC 98 103* 98 101* 101*  --  83   BUN 22.5 22.1 22.8 23.5* 25.0*  --  21.3   CR 1.41* 1.45* 1.44* 1.47* 1.52*  --  1.17   GFRESTIMATED 51* 49* 50* 49* 47*  --  64   DOMINICK 8.3* 8.4* 8.3* 8.4* 8.2*  --  5.7*   MAG 2.0 2.1 2.1 2.2 2.3   < >  --    PHOS 2.6 2.4* 2.5 2.6 2.3*  --  1.5*   PROTTOTAL  --   --  6.1* 6.5 6.2*  --  4.3*   ALBUMIN  --   --  2.9* 3.2* 3.2*  --  2.2*   BILITOTAL  --   --  1.4* 1.5* 1.4*  --  0.9   ALKPHOS  --   --  105 114 103  --  71   AST  --   --  37 40 31  --  22   ALT  --   --  33 37 29  --  20    < > = values in this interval not displayed.     CBC  Recent Labs   Lab 01/13/25  1242 01/11/25  0534 01/10/25  0919   WBC 6.2 5.1 4.7   RBC 3.96* 4.13* 3.49*   HGB 12.8* 13.3 11.3*   HCT 37.8* 39.1* 33.3*   MCV 96 95 95   MCH 32.3 32.2 32.4   MCHC 33.9 34.0 33.9   RDW 16.6* 17.0* 17.1*   * 159 138*     INRNo lab results found in last 7 days.        Karan CRUZ, AOCNP  Nurse Practitioner  [facility]  [phone]      "

## 2025-01-15 NOTE — PLAN OF CARE
Goal Outcome Evaluation:    Shift Note: [id number]    Orientation: A/Ox4, forgetful   Aggression Stop Light: Green   Activity: A1 GB/walker   Diet/BS Checks: Regular diet   Tele: N/a   IV Access/Drains: No PIV access   Pain Management: Denied pain  Abnormal VS/Results: VSS, on RA   Bowel/Bladder: Continent of B/B  Skin/Wounds: L knee abrasion, scattered bruising    Consults: Hem/Onc, SW, Neurology   D/C Disposition: Pending pt status     Other Info: On K+/mag/phos replacement protocol, phos requiring replacement today- recheck labs in the morning. Orthostatic BP checks Q8 hours

## 2025-01-15 NOTE — PLAN OF CARE
Goal Outcome Evaluation:  Orientation: AnOx4 intermittent confusion through the night.   Aggression Stop Light: Green   Activity: A1-2 GBW  Diet/BS Checks: Reg.   Tele:  NA  IV Access/Drains: No IV access at pt request. MD aware.   Pain Management: Denies pain   Abnormal VS/Results: VSS on RA   Bowel/Bladder: Continent B/B using BSC   Skin/Wounds: L knee abrasion, scattered bruising    Consults: Hem/Onc, SW, Neuro  D/C Disposition: Pending  Other Info:   - Orthostatics completed this shift. Unable to tolerate 5am orthostatic.  - Phos, Mg, K+ protocol AM recheck  - Pt not wearing abd binder  - Refused CPAP                        No

## 2025-01-16 ENCOUNTER — APPOINTMENT (OUTPATIENT)
Dept: PHYSICAL THERAPY | Facility: CLINIC | Age: 80
DRG: 312 | End: 2025-01-16
Payer: MEDICARE

## 2025-01-16 VITALS
OXYGEN SATURATION: 98 % | HEIGHT: 76 IN | WEIGHT: 237.3 LBS | DIASTOLIC BLOOD PRESSURE: 85 MMHG | SYSTOLIC BLOOD PRESSURE: 124 MMHG | RESPIRATION RATE: 18 BRPM | BODY MASS INDEX: 28.9 KG/M2 | TEMPERATURE: 97.5 F | HEART RATE: 72 BPM

## 2025-01-16 LAB
HOLD SPECIMEN: 0
MAGNESIUM SERPL-MCNC: 2.1 MG/DL (ref 1.7–2.3)
PHOSPHATE SERPL-MCNC: 2.2 MG/DL (ref 2.5–4.5)
POTASSIUM SERPL-SCNC: 3.9 MMOL/L (ref 3.4–5.3)

## 2025-01-16 PROCEDURE — 84132 ASSAY OF SERUM POTASSIUM: CPT | Performed by: HOSPITALIST

## 2025-01-16 PROCEDURE — 36415 COLL VENOUS BLD VENIPUNCTURE: CPT | Performed by: HOSPITALIST

## 2025-01-16 PROCEDURE — 250N000013 HC RX MED GY IP 250 OP 250 PS 637: Performed by: INTERNAL MEDICINE

## 2025-01-16 PROCEDURE — 97530 THERAPEUTIC ACTIVITIES: CPT | Mod: GP

## 2025-01-16 PROCEDURE — 99232 SBSQ HOSP IP/OBS MODERATE 35: CPT | Performed by: INTERNAL MEDICINE

## 2025-01-16 PROCEDURE — 83735 ASSAY OF MAGNESIUM: CPT | Performed by: HOSPITALIST

## 2025-01-16 PROCEDURE — 120N000001 HC R&B MED SURG/OB

## 2025-01-16 PROCEDURE — 84100 ASSAY OF PHOSPHORUS: CPT | Performed by: HOSPITALIST

## 2025-01-16 PROCEDURE — 250N000013 HC RX MED GY IP 250 OP 250 PS 637: Performed by: HOSPITALIST

## 2025-01-16 RX ADMIN — POTASSIUM & SODIUM PHOSPHATES POWDER PACK 280-160-250 MG 1 PACKET: 280-160-250 PACK at 21:05

## 2025-01-16 RX ADMIN — CALCIUM CARBONATE 500 MG: 500 TABLET, CHEWABLE ORAL at 09:59

## 2025-01-16 RX ADMIN — PANTOPRAZOLE SODIUM 40 MG: 40 TABLET, DELAYED RELEASE ORAL at 17:07

## 2025-01-16 RX ADMIN — PANTOPRAZOLE SODIUM 40 MG: 40 TABLET, DELAYED RELEASE ORAL at 06:34

## 2025-01-16 RX ADMIN — PRAVASTATIN SODIUM 20 MG: 20 TABLET ORAL at 21:06

## 2025-01-16 RX ADMIN — MIDODRINE HYDROCHLORIDE 10 MG: 2.5 TABLET ORAL at 17:07

## 2025-01-16 RX ADMIN — MIDODRINE HYDROCHLORIDE 10 MG: 2.5 TABLET ORAL at 10:00

## 2025-01-16 RX ADMIN — MIDODRINE HYDROCHLORIDE 10 MG: 2.5 TABLET ORAL at 13:15

## 2025-01-16 RX ADMIN — ACETAMINOPHEN 1000 MG: 500 TABLET, FILM COATED ORAL at 03:08

## 2025-01-16 RX ADMIN — POTASSIUM & SODIUM PHOSPHATES POWDER PACK 280-160-250 MG 1 PACKET: 280-160-250 PACK at 13:15

## 2025-01-16 RX ADMIN — POTASSIUM & SODIUM PHOSPHATES POWDER PACK 280-160-250 MG 1 PACKET: 280-160-250 PACK at 17:07

## 2025-01-16 RX ADMIN — FLUDROCORTISONE ACETATE 0.1 MG: 0.1 TABLET ORAL at 09:59

## 2025-01-16 RX ADMIN — CALCIUM CARBONATE 500 MG: 500 TABLET, CHEWABLE ORAL at 21:06

## 2025-01-16 RX ADMIN — TAMSULOSIN HYDROCHLORIDE 0.4 MG: 0.4 CAPSULE ORAL at 21:07

## 2025-01-16 ASSESSMENT — ACTIVITIES OF DAILY LIVING (ADL)
ADLS_ACUITY_SCORE: 69
ADLS_ACUITY_SCORE: 67
ADLS_ACUITY_SCORE: 69
ADLS_ACUITY_SCORE: 67
ADLS_ACUITY_SCORE: 69
ADLS_ACUITY_SCORE: 69
ADLS_ACUITY_SCORE: 67
ADLS_ACUITY_SCORE: 69
ADLS_ACUITY_SCORE: 67
ADLS_ACUITY_SCORE: 69

## 2025-01-16 NOTE — PROGRESS NOTES
[facility]    Medicine Progress Note - Hospitalist Service        Date of Admission:  1/10/2025  9:02 AM    Assessment & Plan:   79-year-old male with history of metastatic renal cell cancer, atrial fibrillation, orthostatic hypotension on midodrine who presented with recurrent syncopal episode.    Recurrent syncope  Orthostatic hypotension  -Lost consciousness in the AM prior to admission trying to make his cardiology appointment   -Echocardiogram with normal EF, moderate RV dysfunction unclear cause, does not explain hypotension  -Stim testing negative for adrenal insufficiency  -S/p neurology and cardiology consultation  -cardiology recommends compression stocking, they signed off  -PT and O following. Will need TCU  -continue midodrine to 10 mg tid  -MOHPA consulted. They now plan on holding the cabozanitinib to see if improves his orthostasis  -liberalize salt  -abdominal binder   -mestinon started and then stopped due to the increase in diarrhea  -started on fludrocortisone on 1/15 given continued orthostasis     Loose stools  -c diff negative, can use imodium prn but improving. Suspect largely related to the mestinon    Hypocalcemia  Hypokalemia  -replace per protocol     History of renal cancer  -oncology following     CKD 3b  -Baseline creatnine of 1.7-2  -Creatinine stable at 1.4     Hyperbilirubinemia  -US negative  -Probably related to his chemo med (up to 25% per the oncology note chance of this)      Atrial fibrillation  -Reports being off all meds due to syncope  -Was in discussions to discuss watchman as outpatient  -cardiology consulted and signed off     Acute Metabolic encephalopathy  -some confusion noted at home at night per wife  -note neurology concerned about chronic degenerative process  -improving         Diet: Combination Diet Regular Diet Adult     DVT Prophylaxis: Pneumatic Compression Devices   Dillard Catheter: Not present  Code Status: Full Code     Disposition Plan      "   Entered: Roque Marrero MD 01/16/2025, 9:42 AM        Medically Ready for Discharge: Anticipated Tomorrow if orthostatic symptoms are better and pending placement       Clinically Significant Risk Factors         # Hyponatremia: Lowest Na = 131 mmol/L in last 2 days, will monitor as appropriate       # Hypoalbuminemia: Lowest albumin = 2.2 g/dL at 1/10/2025  9:19 AM, will monitor as appropriate     # Hypertension: Noted on problem list            # Overweight: Estimated body mass index is 28.89 kg/m  as calculated from the following:    Height as of this encounter: 1.93 m (6' 4\").    Weight as of this encounter: 107.6 kg (237 lb 4.8 oz).   # Moderate Malnutrition: based on nutrition assessment    # Financial/Environmental Concerns: none            The patient's care was discussed with the Bedside Nurse, Patient, and Patient's Family.    Medical Decision Making       **CLEAR ALL SELECTIONS**      Labs/Imaging Reviewed:  See Information above and Data section below  Time SPENT BY ME on the date of service doing chart review, history, exam, documentation & further activities per the note:  35 MINUTES    Chart documentation was completed, in part, with [employer] voice-recognition software. Even though reviewed, some grammatical, spelling, and word errors may remain.    Roque Marrero MD  Hospitalist Service  [facility]  Text Page 7AM-6PM  Securely message with the [employer]oc[employer] Web Console (learn more here)  Text page via [employer] Paging/Directory    ______________________________________________________________________    Interval History   Overall better but still having orthostatic drop as of this morning. Did better with activity yesterday. No CP or dyspnea. No lightheadedness or dizziness today.    Data reviewed today: I reviewed all medications, new labs and imaging results over the last 24 hours. I personally reviewed no images or EKG's today.    Physical Exam   Vital signs:  Temp: 98.1  F " "(36.7  C) Temp src: Oral BP: 99/72 Pulse: 85   Resp: 16 SpO2: 97 % O2 Device: None (Room air)   Height: 193 cm (6' 4\") Weight: 107.6 kg (237 lb 4.8 oz)  Estimated body mass index is 28.89 kg/m  as calculated from the following:    Height as of this encounter: 1.93 m (6' 4\").    Weight as of this encounter: 107.6 kg (237 lb 4.8 oz).      Wt Readings from Last 2 Encounters:   01/14/25 107.6 kg (237 lb 4.8 oz)   12/12/24 114.3 kg (252 lb)       Gen: AAOX2-3, NAD, somewhat forgetful  Resp: CTA B/L, normal WOB  CVS: RRR, no murmur  Abd/GI: Soft, non-tender. BS- normoactive.   Skin: Warm, dry no rashes  MSK: trace pedal edema  Neuro- CN- intact. No focal deficits.  Moving all 4 extremities      Data   Recent Labs   Lab 01/15/25  0807 01/14/25  1059 01/13/25  1242 01/12/25  0817 01/11/25  0534 01/10/25  0919   WBC  --   --  6.2  --  5.1 4.7   HGB  --   --  12.8*  --  13.3 11.3*   MCV  --   --  96  --  95 95   PLT  --   --  148*  --  159 138*   * 132* 134* 134* 135 140   POTASSIUM 4.1 4.0 4.2 4.4 4.6 2.9*   CHLORIDE 100 99 104 103 104 113*   CO2 22 24 19* 24 22 18*   BUN 22.5 22.1 22.8 23.5* 25.0* 21.3   CR 1.41* 1.45* 1.44* 1.47* 1.52* 1.17   ANIONGAP 9 9 11 7 9 9   DOMINICK 8.3* 8.4* 8.3* 8.4* 8.2* 5.7*   GLC 98 103* 98 101* 101* 83   ALBUMIN  --   --  2.9* 3.2* 3.2* 2.2*   PROTTOTAL  --   --  6.1* 6.5 6.2* 4.3*   BILITOTAL  --   --  1.4* 1.5* 1.4* 0.9   ALKPHOS  --   --  105 114 103 71   ALT  --   --  33 37 29 20   AST  --   --  37 40 31 22       No results found for this or any previous visit (from the past 24 hours).  Medications   Current Facility-Administered Medications   Medication Dose Route Frequency Provider Last Rate Last Admin     Current Facility-Administered Medications   Medication Dose Route Frequency Provider Last Rate Last Admin    [Held by provider] Cabozantinib S-Malate (CABOMETYX) tablet 20 mg  20 mg Oral Daily Javi Mcnamara,    20 mg at 01/14/25 0925    calcium carbonate (TUMS) chewable " tablet 500 mg  500 mg Oral BID Javi Mcnamara, DO   500 mg at 01/15/25 2025    cholecalciferol (VITAMIN D3) capsule 1,250 mcg  1,250 mcg Oral Q7 Days Dreyer, Patrick, DO   1,250 mcg at 01/10/25 2120    fludrocortisone (FLORINEF) tablet 0.1 mg  0.1 mg Oral Daily Javi Mcnamara, DO   0.1 mg at 01/15/25 1239    midodrine (PROAMATINE) tablet 10 mg  10 mg Oral TID w/meals Javi Mcnamara, DO   10 mg at 01/15/25 1807    pantoprazole (PROTONIX) EC tablet 40 mg  40 mg Oral BID AC Javi Mcnamara, DO   40 mg at 01/16/25 0634    pravastatin (PRAVACHOL) tablet 20 mg  20 mg Oral At Bedtime Javi Mcnamara, DO   20 mg at 01/15/25 2139    tamsulosin (FLOMAX) capsule 0.4 mg  0.4 mg Oral QPM Javi Mcnamara, DO   0.4 mg at 01/15/25 2025

## 2025-01-16 NOTE — PROGRESS NOTES
[facility] Progress Note     Primary Oncologist/Hematologist:  Dr. Glover          Assessment and Plan:     Mr. Edgar Nickerson is a 79 year old man who was admitted on 1/10/2025 with syncope      Clear cell renal cell carcinoma diagnosed in 10/2019  - pT3b N0 M0 disease at presentation  - followed on surveillance until 1/2021 at which time liver metastasis identified and treated with cryoablation  - disease recurrence in 9/2023 presenting with GI bleeding and treated with partial duodenectomy  -disease progression in 1/2024 treated with partia gastrectomy  - Progressed on Ipi/Nivo 6/25/2024   - s/p radiation due to duodenal bleeding ending 8/06/2024   - Started Cabozantanib 20mg 9/26/24.   - Most recent CT 12/30/24 with decreased retroperitoneal lymphadenopathy, stable R hepatic lobe mass, duodenal mass measuring up to 2.3cm was not well seen on the non contrast study      2. 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 He reports no bleeding overnight     3. History of iron deficiency anemia  - Previously treated with IV iron  - More recently Hg has been in the 14 range   - Hg was 11.3 on 1/10/25  and repeat Hgb on 1/11/2025 was 13.3  - B12 and TSH both normal. Serum iron 63, TIBC 146 and iron saturation index 43%  Recent labs reviewed with patient and also with his wife. Recommend serial monitoring      4. Hypotension with syncope and near syncope  - Chronically in atrial fibrillation with rate control  - Etiology for the hypotension not entirely clear.   - Appears to have hyponatremia without hyperkalemia.  No new rashes.  Reports more or less normal appetite.  No vitiligo patches.  Adrenal insufficiency can be side effect of Cabozantinib.  However stim test negative for adrenal insufficiency.    -   Is also being considered for neurodegenerative disorder like multisystem atrophy vs paraneoplastic syndrome.  Paraneoplastic panel drawn 1/11 and is pending.    - Started  on Mestinon but then stopped with increase in diarrhea.    - On midodrine and florinef      5. Hypocalcemia  - May be lab error as ionized calcium came back WNL  - serial monitoring has since shown slightly low calcium level. Given low albumin, there is no indication for intervention      6. Hypokalemia, resolved      7.  Diarrhea  - C diff negative  - diarrhea began after initiation of Mestinon.  Now on hold.      8. Bilirubin elevation to 1.5  - US shows no bile duct dilation     Note recommendations for TCU.  As it is unlikely that his cancer-directed oral medication, Cabozantanib, will be able to continue in TCU, will hold now, in the instance that it is a contributor to ongoing weakness. Can cause asthenia in about 20% of pts, hyperbilirubinemia (12-25% of pts), increased creatinine (58% of pts) and hypotension in < 5% of pts.   Pt has been on the drug since September, tolerating drug well, but with good control of malignancy, it seems reasonable to hold for the short term in the instance it is causing accumulative side effects. Discussed with the patient, his wife, all of whom are in agreement with the plan . We will plan to re-review on an outpatient upon discharge from TCU.     We will arrange follow up with Dr Glover once discharged from the hospital/TCU.     German Escalante MD  [facility]  [phone]          Interval History:     Walked around till the nursing station last night around 3am.  Denied balance issues/dizziness while walking but easily tired per wife who was at the bedside.  Eating well.  Denied nausea/vomiting.  Diarrhea better.  Feels hungry.  No new rashes.                 Review of Systems:     The 5 point Review of Systems is negative other than noted in the HPI                Medications:   Scheduled Medications  Current Facility-Administered Medications   Medication Dose Route Frequency Provider Last Rate Last Admin    [Held by provider] Cabozantinib S-Malate  (CABOMETYX) tablet 20 mg  20 mg Oral Daily Javi Mcnamara, DO   20 mg at 01/14/25 0925    calcium carbonate (TUMS) chewable tablet 500 mg  500 mg Oral BID Javi Mcnamara, DO   500 mg at 01/15/25 2025    cholecalciferol (VITAMIN D3) capsule 1,250 mcg  1,250 mcg Oral Q7 Days Dreyer, Patrick, DO   1,250 mcg at 01/10/25 2120    fludrocortisone (FLORINEF) tablet 0.1 mg  0.1 mg Oral Daily Javi Mcnamara, DO   0.1 mg at 01/15/25 1239    midodrine (PROAMATINE) tablet 10 mg  10 mg Oral TID w/meals Javi Mcnamara, DO   10 mg at 01/15/25 1807    pantoprazole (PROTONIX) EC tablet 40 mg  40 mg Oral BID AC Javi Mcnamara, DO   40 mg at 01/16/25 0634    pravastatin (PRAVACHOL) tablet 20 mg  20 mg Oral At Bedtime Javi Mcnamara, DO   20 mg at 01/15/25 2139    tamsulosin (FLOMAX) capsule 0.4 mg  0.4 mg Oral QPM Javi Mcnamara, DO   0.4 mg at 01/15/25 2025     PRN Medications  Current Facility-Administered Medications   Medication Dose Route Frequency Provider Last Rate Last Admin    acetaminophen (TYLENOL) tablet 500-1,000 mg  500-1,000 mg Oral Daily PRN Javi Mcnamara, DO   1,000 mg at 01/16/25 0308    calcium carbonate (TUMS) chewable tablet 1,000 mg  1,000 mg Oral 4x Daily PRN Javi Mcnamara, DO        lidocaine (LMX4) cream   Topical Q1H PRN Javi Mcnamara DO        lidocaine 1 % 0.1-1 mL  0.1-1 mL Other Q1H PRN Javi Mcnamara, DO        loperamide (IMODIUM) capsule 2 mg  2 mg Oral 4x Daily PRN Javi Mcnamara, DO   2 mg at 01/13/25 1614    ondansetron (ZOFRAN ODT) ODT tab 4 mg  4 mg Oral Q6H PRN Javi Mcnamara DO        Or    ondansetron (ZOFRAN) injection 4 mg  4 mg Intravenous Q6H PRN Javi Mcnamara DO        senna-docusate (SENOKOT-S/PERICOLACE) 8.6-50 MG per tablet 1 tablet  1 tablet Oral BID PRN Javi Mcnamara DO        Or    senna-docusate (SENOKOT-S/PERICOLACE) 8.6-50 MG  "per tablet 2 tablet  2 tablet Oral BID Javi Wing, DO                      Physical Exam:   Vitals were reviewed  Blood pressure 113/72, pulse 83, temperature 98.6  F (37  C), temperature source Oral, resp. rate 16, height 1.93 m (6' 4\"), weight 107.6 kg (237 lb 4.8 oz), SpO2 97%.  Wt Readings from Last 4 Encounters:   01/14/25 107.6 kg (237 lb 4.8 oz)   12/12/24 114.3 kg (252 lb)   09/05/24 117 kg (258 lb)   08/07/24 121.3 kg (267 lb 8 oz)       I/O last 3 completed shifts:  In: 720 [P.O.:720]  Out: 325 [Urine:325]                 Data:   All laboratory data and imaging studies reviewed.    CMP  Recent Labs   Lab 01/15/25  0807 01/14/25  1059 01/13/25  1242 01/12/25  0817 01/11/25  0534 01/10/25  0919 01/10/25  0919   * 132* 134* 134* 135  --  140   POTASSIUM 4.1 4.0 4.2 4.4 4.6  --  2.9*   CHLORIDE 100 99 104 103 104  --  113*   CO2 22 24 19* 24 22  --  18*   ANIONGAP 9 9 11 7 9  --  9   GLC 98 103* 98 101* 101*  --  83   BUN 22.5 22.1 22.8 23.5* 25.0*  --  21.3   CR 1.41* 1.45* 1.44* 1.47* 1.52*  --  1.17   GFRESTIMATED 51* 49* 50* 49* 47*  --  64   DOMINICK 8.3* 8.4* 8.3* 8.4* 8.2*  --  5.7*   MAG 2.0 2.1 2.1 2.2 2.3   < >  --    PHOS 2.6 2.4* 2.5 2.6 2.3*  --  1.5*   PROTTOTAL  --   --  6.1* 6.5 6.2*  --  4.3*   ALBUMIN  --   --  2.9* 3.2* 3.2*  --  2.2*   BILITOTAL  --   --  1.4* 1.5* 1.4*  --  0.9   ALKPHOS  --   --  105 114 103  --  71   AST  --   --  37 40 31  --  22   ALT  --   --  33 37 29  --  20    < > = values in this interval not displayed.     CBC  Recent Labs   Lab 01/13/25  1242 01/11/25  0534 01/10/25  0919   WBC 6.2 5.1 4.7   RBC 3.96* 4.13* 3.49*   HGB 12.8* 13.3 11.3*   HCT 37.8* 39.1* 33.3*   MCV 96 95 95   MCH 32.3 32.2 32.4   MCHC 33.9 34.0 33.9   RDW 16.6* 17.0* 17.1*   * 159 138*     INRNo lab results found in last 7 days.            "

## 2025-01-16 NOTE — PROGRESS NOTES
"Care Management Follow Up    Length of Stay (days): 6    Expected Discharge Date: 01/17/2025     Concerns to be Addressed:       Patient plan of care discussed at interdisciplinary rounds: Yes    Anticipated Discharge Disposition: Transitional Care     Anticipated Discharge Services: None  Anticipated Discharge DME: None    Patient/family educated on Medicare website which has current facility and service quality ratings: yes  Education Provided on the Discharge Plan:    Patient/Family in Agreement with the Plan: yes    Referrals Placed by CM/SW:    Private pay costs discussed: transportation costs    Discussed  Partnership in Safe Discharge Planning  document with patient/family: No     Handoff Completed: No, handoff not indicated or clinically appropriate    Additional Information:  Writer received a call from More at [facility]. More is concerned about the note from oncology that states \"Note recommendations for TCU.  As it is unlikely that his cancer-directed oral medication, Cabozantanib, will be able to continue in TCU, will hold now, in the instance that it is a contributor to ongoing weakness. Can cause asthenia in about 20% of pts, hyperbilirubinemia (12-25% of pts), increased creatinine (58% of pts) and hypotension in < 5% of pts.   Pt has been on the drug since September, tolerating drug well, but with good control of malignancy, it seems reasonable to hold for the short term in the instance it is causing accumulative side effects. Discussed with the patient, his wife, all of whom are in agreement with the plan . We will plan to re-review on an outpatient upon discharge from TCU.\" IFSH talked to [facility] and confirmed that patient is having his chemotherapy held upon discharge. Writer talked to  and he feels patient may be medically ready tomorrow. FISH talked with spouse Valery and she is in agreement. She would like transport set up. Valery is aware of the possible private pay fees. FISH called " Discharge    Today's date: 2024  Patient name: Arthur Antunez  : 2004  MRN: [id number]  Referring provider: Kishan Jeronimo  Dx:   Encounter Diagnosis     ICD-10-CM    1. S/P medial patellofemoral ligament reconstruction  Z98.890     Z87.39       2. Right knee pain, unspecified chronicity  M25.561               Start Time: 1145  Stop Time: 1230  Total time in clinic (min): 45 minutes    Subjective: Pt reports no new complaints. Pt will be returning to [facility] and will call to return if necessary in the future.      Objective: see treatment diary       Assessment: Tolerated treatment well.  Pt tolerates explosive movements today with power deficits noted in RLE. Pt will attend PT in [facility] as he is going home for winter break.  Pt tolerated leg press load progression today. Patient demonstrated fatigue post treatment and would benefit from continued PT. Will continue to progress elsewhere due to break.    Plan:  Discharge to [facility]; returning to [facility] for winter break. Will return if necessary in the future.  ,     Precautions: DOS 24 - MPFL repair     POC expires Auth Status Total   Visits  Start date  Expiration date PT/OT + Visit Limit? Co-Insurance    #:approved [id number]  Date Range:-  # of Visits:    No    approved [id number]  Date Range:10/2-25  # of Visits:12 12/12         3/12                               Visit/Unit Tracking  AUTH Status:  Date               Visits  Authed: Used                Remaining                    Date (Visit #)  (22)    (23)  (24)  (25)   (26) PN 12/3 (27)  (28)    Manual        discharge   DTM and TPR                                            Exercise Diary            Ther Ex           Active w/u  Upright pre 5 mins lvl 3-4 Pre 5 min lvl 3-4  5 min pre same 5 min pre  5 min pre 5' 5'   PROM  A-skips, high knees, jogging, backpedal, shuffle to sprint, backpedal to sprint, sprints - 8  "mins Same x 6 mins X6 min same X6 mins  X3-4 mins total     HS/glute/piri stretch           QS, SLR, hip abd           Active mobility           Leg press    Inverted leg press 225# 3x6-8 225# 3x6-8 225# 2x8, 245# x8  to finish #225 3x10 DL  #105 3x10 SL #270 2x8DL  #320 DL 1x8          Gibraltarian #30 ea. 3x10 drop weight to 3x10 SL Gibraltarian plyo hops           3 laps broad jumps                           Neuro Re Ed           Bridging           TrA progressions Same x6 min  X5-6 mins          Squat training RFE SS 16 kg KB 3x8     GHR nordic HS curl 3x10 w/ dowel RFE SS 20 kg KB 3x8     Kickstand RDL 3x8     3 rounds   RFE SS 25# x8 B    [place] HS on GHR x8 w/ dowel     Hip Abd Progressions Same x 5 mins, new progression - single landing to lateral hop 3x8 B  rev 18\" drops  Ant double x5  Ant/lat singles x5  Drop to jump 2x5     Box jump up 2x5  W/ 90 turn 2x5 B 18\" ant drop doubles and single 2x5, lat singles 2x5    18\" drop to jump, single landing 2x5    18\" single box jump 2x5 B 18\" ant drop testing x 5-6 B 18\" ant and lat drops single and double 18\" SL drop off  2x8 BLE    Balance   65# goblet squat 3x6 explosive 85# 3x8 to bench  65# goblet squat 3x8    RDL 55# B 3x8 B 3 rounds  24 kg KB squat to 18\" box x8    SLDL 24 kg KB x8 B 3x8 BLE SL box jump 12\" Explosive SL box jump from seated position 12\" 3x8 BLE       3 rounds   20 kg KB swing x10    Bear crawl 50'x2    Walking lunge 50'x2  Explosive start/stop x 5  Lateral x 5 B  Lateral w/ touch and go x 5 B  3 single leg hop over small hamlet into explosive sprint x10   HEP and POC review        Explosive seated to single leg hop over hamlet to single leg hop onto 6\" box 2x8 BL LE 3 mid hamlet SL hop around 3x3 ea.     Walking lunge 16kg KB 30'x4      Sled push/pull 180# x 3 laps   Functional jump testing, single leg sit<>stand testing x10 mins                  Single leg lateral hops 3x30\"    Bilateral fwd,bwd jump 3x30\"  RFE single hops 3x8 B      Nordics on GH " ealth and spoke with Candie. Wheelchair transport set up for tomorrow between [id number]. Writer updated [facility] at [facility] with transport time for tomorrow.     Next Steps: Discharge to TCU tomorrow between [id number]    BELINDA Bocanegra       machine 3x8 w/ lobito            B RDL 60# 3x8         Jermaine 2 16kg kettle bell 3x10 drop the weight and follow up the set with 15x plyo SL jermaine hops                                Ther Act           stairs           gait           Sit<>stand                                    Modalities            heat             Ice                                          Media Information

## 2025-01-16 NOTE — PLAN OF CARE
Goal Outcome Evaluation:    Shift Note: [id number]    Orientation: A/Ox4, forgetful   Aggression Stop Light: Green   Activity: A1-2 GB/walker   Diet/BS Checks: Regular diet   Tele: N/a   IV Access/Drains: No PIV access   Pain Management: Tylenol given x1 for lower back pain   Abnormal VS/Results: VSS, on RA   Bowel/Bladder: Continent of B/B  Skin/Wounds: L knee abrasion, scattered bruising    Consults: Hem/Onc, SW, Neurology   D/C Disposition: Pending pt status     Other Info: On K+/mag/phos replacement protocol, levels within range- recheck in the morning. Orthostatic BP checks Q8 hours. Thigh high compression stockings ordered and put on pt, wife took off this evening.

## 2025-01-16 NOTE — PROGRESS NOTES
[facility]    Neurology Progress Note     Assessment & Plan   Ti Nickerson is a 79 year old male who was admitted on 1/10/2025.  Orthostasis persists.  History of renal renal carcinoma with recurrence hepatic and duodenal mass.    -Will continue with midodrine and Florinef, compression stockings, elevated head of the bed, exercising  -Will hold for now cabozantinib to see if orthostasis improves as it can be a side effect from this medication.  -Will arrange for follow-up in neurology with Dr. Cárdenas as requested by the patient.    Jonna Aguilar MD      Interval History   Orthostasis persist however the patient is less symptomatic.  He was able to ambulate more than in other days.  Actually he could not sleep very well so during the night he walked around a bit.  At the end of the walking he felt weak.  Of note blood pressure laying down was 99/72 with a heart rate of 85 and standing was 78/53 with a pulse of 88.  Paraneoplastic panel is still pending.    Physical Exam   Temp: 98.2  F (36.8  C) Temp src: Oral BP: 124/80 Pulse: 77   Resp: 16 SpO2: 98 % O2 Device: None (Room air)    Vitals:    01/10/25 1800 01/11/25 0500 01/14/25 0626   Weight: 108.8 kg (239 lb 12.8 oz) 108.8 kg (239 lb 14.4 oz) 107.6 kg (237 lb 4.8 oz)     Vital Signs with Ranges  Temp:  [97.3  F (36.3  C)-98.6  F (37  C)] 98.2  F (36.8  C)  Pulse:  [61-87] 77  Resp:  [16-20] 16  BP: ()/(62-89) 124/80  SpO2:  [96 %-98 %] 98 %  I/O last 3 completed shifts:  In: 720 [P.O.:720]  Out: 325 [Urine:325]      Medications   Current Facility-Administered Medications   Medication Dose Route Frequency Provider Last Rate Last Admin     Current Facility-Administered Medications   Medication Dose Route Frequency Provider Last Rate Last Admin    [Held by provider] Cabozantinib S-Malate (CABOMETYX) tablet 20 mg  20 mg Oral Daily Javi Mcnamara, DO   20 mg at 01/14/25 0925    calcium carbonate (TUMS)  chewable tablet 500 mg  500 mg Oral BID Javi Mcnamara, DO   500 mg at 01/16/25 0959    cholecalciferol (VITAMIN D3) capsule 1,250 mcg  1,250 mcg Oral Q7 Days Dreyer, Patrick, DO   1,250 mcg at 01/10/25 2120    fludrocortisone (FLORINEF) tablet 0.1 mg  0.1 mg Oral Daily Javi Mcnamara DO   0.1 mg at 01/16/25 0959    midodrine (PROAMATINE) tablet 10 mg  10 mg Oral TID w/meals Javi Mcnamara DO   10 mg at 01/16/25 1315    pantoprazole (PROTONIX) EC tablet 40 mg  40 mg Oral BID AC Javi Mcnamara DO   40 mg at 01/16/25 0634    potassium & sodium phosphates (NEUTRA-PHOS) Packet 1 packet  1 packet Oral or Feeding Tube Q4H Roque Marrero MD   1 packet at 01/16/25 1315    pravastatin (PRAVACHOL) tablet 20 mg  20 mg Oral At Bedtime Javi Mcnmaara DO   20 mg at 01/15/25 2139    tamsulosin (FLOMAX) capsule 0.4 mg  0.4 mg Oral QPM Javi Mcnamara DO   0.4 mg at 01/15/25 2025       Data   Results for orders placed or performed during the hospital encounter of 01/10/25 (from the past 24 hours)   Potassium   Result Value Ref Range    Potassium 3.9 3.4 - 5.3 mmol/L   Magnesium   Result Value Ref Range    Magnesium 2.1 1.7 - 2.3 mg/dL   Phosphorus   Result Value Ref Range    Phosphorus 2.2 (L) 2.5 - 4.5 mg/dL   Extra Tube    Narrative    The following orders were created for panel order Extra Tube.  Procedure                               Abnormality         Status                     ---------                               -----------         ------                     Extra Purple Top Tube[[id number]]                            Final result                 Please view results for these tests on the individual orders.   Extra Purple Top Tube   Result Value Ref Range    Hold Specimen 0

## 2025-01-16 NOTE — PROGRESS NOTES
"CLINICAL NUTRITION SERVICES - REASSESSMENT NOTE     Malnutrition Status: (1/11)   % Intake: </= 50% for >/= 5 days (severe)  % Weight Loss: Up to 10% in 6 months (moderate)    Subcutaneous Fat Loss: Triceps: Mild  Muscle Loss: None observed  Fluid Accumulation/Edema: None noted  Malnutrition Diagnosis: Moderate malnutrition in the context of chronic illness  Malnutrition Present on Admission: Yes         SUBJECTIVE INFORMATION  Assessed patient in room.    CURRENT NUTRITION ORDERS  Diet: Regular    CURRENT INTAKE/TOLERANCE  Chart reviewed  Visited with pt this morning  Pt reports fair appetite  Tells me that he doesn't really like the hospital food  Overall eating % of his meals  \"The [place] toast and comer this morning was the best thing I have had so far\"  Does not like nutrition supplements  Notes that he is likely being d/c'd tomorrow       NEW FINDINGS  Weight:   01/14/25 0626 107.6 kg (237 lb 4.8 oz) Standing scale   01/11/25 0500 108.8 kg (239 lb 14.4 oz) Standing scale   01/10/25 1800 108.8 kg (239 lb 12.8 oz) Standing scale       Skin/wounds: scattered bruising. BLE edema. Abraision/scab to L knee, dressing in place.     Nutrition-relevant labs: Reviewed  Nutrition-relevant medications: Reviewed        EVALUATION OF THE PROGRESS TOWARD GOALS   Previous Goals (1/11)  Weight maintenance 108 kg   Evaluation: Progressing    Previous Nutrition Diagnosis (1/11)  Unintended weight loss related to inadequate oral intake r/t syncope as evidenced by 13% body weight loss over the past 7 months.   Evaluation: No change    NUTRITION DIAGNOSIS  Unintended weight loss related to inadequate oral intake r/t syncope as evidenced by 13% body weight loss over the past 7 months.     INTERVENTIONS  Encouraged pt to try various items on the menu - or have visitors bring items in     Goals  Pt to consume 75% meals     Monitoring/Evaluation      Progress toward goals will be monitored and evaluated per policy.   "

## 2025-01-16 NOTE — PLAN OF CARE
[id number]    Orientation: A&Ox4, intermittent confusion  Aggression Stop Light: green   Activity: Ax1 gait belt and walker.   Diet/BS Checks: regular  Tele:  none  IV Access/Drains: no IV access  Pain Management: reported back pain, prn tylenol x1  Abnormal VS/Results: VSS on RA  Bowel/Bladder: mostly continent B/B. No BM this shift   Skin/Wounds: scattered bruising. BLE edema. Abraision/scab to L knee, dressing in place.   Consults: neurology, SW  D/C Disposition: pending TCU    Other Info:   A&Ox4, intermittent confusion. VSS on RA. Laying and sitting orthostatics negative, unable to complete standing orthostatics d/t inability to stand long enough. Reported back pain, managed with PRN tylenol x1. Ax1 gait belt and walker. Mostly continent B/B. No BM this shift. No IV access. Regular diet. K, phos, and Mg replacement protocol. Discharge pending TCU.

## 2025-01-17 VITALS
OXYGEN SATURATION: 97 % | HEIGHT: 76 IN | RESPIRATION RATE: 16 BRPM | SYSTOLIC BLOOD PRESSURE: 115 MMHG | TEMPERATURE: 98.1 F | WEIGHT: 237.3 LBS | HEART RATE: 68 BPM | BODY MASS INDEX: 28.9 KG/M2 | DIASTOLIC BLOOD PRESSURE: 72 MMHG

## 2025-01-17 LAB
MAGNESIUM SERPL-MCNC: 2.1 MG/DL (ref 1.7–2.3)
PHOSPHATE SERPL-MCNC: 2.4 MG/DL (ref 2.5–4.5)
POTASSIUM SERPL-SCNC: 4.2 MMOL/L (ref 3.4–5.3)

## 2025-01-17 PROCEDURE — 36415 COLL VENOUS BLD VENIPUNCTURE: CPT | Performed by: INTERNAL MEDICINE

## 2025-01-17 PROCEDURE — 84132 ASSAY OF SERUM POTASSIUM: CPT | Performed by: INTERNAL MEDICINE

## 2025-01-17 PROCEDURE — 250N000013 HC RX MED GY IP 250 OP 250 PS 637: Performed by: HOSPITALIST

## 2025-01-17 PROCEDURE — 250N000013 HC RX MED GY IP 250 OP 250 PS 637: Performed by: INTERNAL MEDICINE

## 2025-01-17 PROCEDURE — 84100 ASSAY OF PHOSPHORUS: CPT | Performed by: INTERNAL MEDICINE

## 2025-01-17 PROCEDURE — 83735 ASSAY OF MAGNESIUM: CPT | Performed by: INTERNAL MEDICINE

## 2025-01-17 PROCEDURE — 99239 HOSP IP/OBS DSCHRG MGMT >30: CPT | Performed by: INTERNAL MEDICINE

## 2025-01-17 RX ORDER — MIDODRINE HYDROCHLORIDE 10 MG/1
10 TABLET ORAL
DISCHARGE
Start: 2025-01-17

## 2025-01-17 RX ORDER — FLUDROCORTISONE ACETATE 0.1 MG/1
0.1 TABLET ORAL DAILY
DISCHARGE
Start: 2025-01-18

## 2025-01-17 RX ORDER — ACETAMINOPHEN 500 MG
500 TABLET ORAL DAILY PRN
COMMUNITY
Start: 2025-01-17

## 2025-01-17 RX ADMIN — FLUDROCORTISONE ACETATE 0.1 MG: 0.1 TABLET ORAL at 08:39

## 2025-01-17 RX ADMIN — CALCIUM CARBONATE 500 MG: 500 TABLET, CHEWABLE ORAL at 08:39

## 2025-01-17 RX ADMIN — MIDODRINE HYDROCHLORIDE 10 MG: 2.5 TABLET ORAL at 08:39

## 2025-01-17 RX ADMIN — POTASSIUM & SODIUM PHOSPHATES POWDER PACK 280-160-250 MG 1 PACKET: 280-160-250 PACK at 10:37

## 2025-01-17 RX ADMIN — MIDODRINE HYDROCHLORIDE 10 MG: 2.5 TABLET ORAL at 13:11

## 2025-01-17 RX ADMIN — PANTOPRAZOLE SODIUM 40 MG: 40 TABLET, DELAYED RELEASE ORAL at 06:07

## 2025-01-17 ASSESSMENT — ACTIVITIES OF DAILY LIVING (ADL)
ADLS_ACUITY_SCORE: 67

## 2025-01-17 NOTE — PROGRESS NOTES
Care Management Discharge Note    Discharge Date: 01/17/2025       Discharge Disposition: Transitional Care    Discharge Services: None    Discharge DME: None    Discharge Transportation: [facility]     Private pay costs discussed: transportation costs    Does the patient's insurance plan have a 3 day qualifying hospital stay waiver?  No    PAS Confirmation Code: [id number]  Patient/family educated on Medicare website which has current facility and service quality ratings: yes    Education Provided on the Discharge Plan: Yes  Persons Notified of Discharge Plans: Patients spouse   Patient/Family in Agreement with the Plan: yes    Handoff Referral Completed: No, handoff not indicated or clinically appropriate    Additional Information:  Patient will be discharging today between 2440-0212 to [facility] via [facility]. SW faxed orders to [facility]. PAS completed. Spouse is aware of discharge for this afternoon. Bedside RN and HUC Aware of discharge.     BELINDA Bocanegra

## 2025-01-17 NOTE — PLAN OF CARE
Orientation: A&O x4, Forgetful   Aggression Stop Light: Green   Activity: A1 GBW, ambulated hallways x2 this shift  Diet/BS Checks: Regular diet   Tele: N/A  IV Access/Drains: No PIV access   Pain Management: Denies pain this shift  Abnormal VS/Results: VSS on RA ex positive orthostatics- MD aware  Bowel/Bladder: Continent of B/B, pt had No BM this shift  Skin/Wounds: scattered bruising, L knee abrasion-mepi in place  Consults: Hem/Onc, SW, Neurology   D/C Disposition: Discharge to TCU tomorrow if orthostatics symptoms improved  Other Info:   - On K+, mag and phos replacement. Phos replaced this shift, recheck in AM   - Compression stocking in place

## 2025-01-17 NOTE — PROGRESS NOTES
Care Management Follow Up    Length of Stay (days): 7    Expected Discharge Date: 01/17/2025     Concerns to be Addressed:       Patient plan of care discussed at interdisciplinary rounds: Yes    Anticipated Discharge Disposition: Transitional Care     Anticipated Discharge Services: None  Anticipated Discharge DME: None    Patient/family educated on Medicare website which has current facility and service quality ratings: yes  Education Provided on the Discharge Plan: Yes  Patient/Family in Agreement with the Plan: yes    Referrals Placed by CM/SW: Senior Linkage Line, Post Acute Facilities, Transportation  Private pay costs discussed: transportation costs    Discussed  Partnership in Safe Discharge Planning  document with patient/family: No     Handoff Completed: No, handoff not indicated or clinically appropriate    Additional Information:  Writer paged  for orders. Writer asked for orders no later than 12pm. Patient has a ride for today between 3120-6830. More at [facility] is aware of ride time and in agreement. FISH completed PAS     PAS-RR    D: Per DHS regulation, FISH completed and submitted PAS-RR to [place] [employer] Direct Connect via the [facility].  PAS-RR confirmation # is : [id number]    P: Further questions may be directed to [facility] at #[phone], option #4 for PAS-RR staff.      Next Steps: Discharge today pending medical readiness     BELINDA Bocanegra

## 2025-01-17 NOTE — PLAN OF CARE
1/16/25 -- [id number]    Orientation: A&Ox4, intermittent confusion  Aggression Stop Light: Green   Activity: Ax1 GBW  Diet/BS Checks: Regular  Tele: n/a  IV Access/Drains: no IV access  Pain Management: denies  Abnormal VS/Results: VSS on RA except soft BPs  Bowel/Bladder: mostly continent B/B. No BM this shift   Skin/Wounds: scattered bruising. BLE edema. Abraision/scab to L knee, dressing in place.   Consults: Neurology, SW  D/C Disposition: pending TCU     Other Info:   - K, Mg, & Phos replacement protocol, rechecks this AM

## 2025-01-17 NOTE — PROGRESS NOTES
[facility]    Neurology Progress Note     Assessment & Plan   Ti Nickerson is a 79 year old male who was admitted on 1/10/2025.  Orthostatic hypotension, recurrent syncope.  History of renal CA with metastasis.    -Paraneoplastic panel is still pending  -Will continue with midodrine and Florinef as well as compression stockings, abdominal binding,etc  -Will continue to hold cabozanitinib to see if orthostasis improves.  -The patient will follow-up with neurology and oncology as an outpatient.      Jonna Aguilar MD    Interval History   No further syncope.  The patient was able to ambulate with no significant weakness or dizziness.  Continues to be orthostatic.  The patient is wearing the compression stockings.  He is preparing to be discharged.    Physical Exam   Temp: 98.1  F (36.7  C) Temp src: Oral BP: 115/72 Pulse: 68   Resp: 16 SpO2: 97 % O2 Device: None (Room air)    Vitals:    01/10/25 1800 01/11/25 0500 01/14/25 0626   Weight: 108.8 kg (239 lb 12.8 oz) 108.8 kg (239 lb 14.4 oz) 107.6 kg (237 lb 4.8 oz)     Vital Signs with Ranges  Temp:  [97.5  F (36.4  C)-98.3  F (36.8  C)] 98.1  F (36.7  C)  Pulse:  [68-82] 68  Resp:  [16-18] 16  BP: ()/() 115/72  SpO2:  [95 %-98 %] 97 %  I/O last 3 completed shifts:  In: 540 [P.O.:540]  Out: 950 [Urine:950]        Medications   Current Facility-Administered Medications   Medication Dose Route Frequency Provider Last Rate Last Admin     Current Facility-Administered Medications   Medication Dose Route Frequency Provider Last Rate Last Admin    [Held by provider] Cabozantinib S-Malate (CABOMETYX) tablet 20 mg  20 mg Oral Daily Javi Mcnamara, DO   20 mg at 01/14/25 0925    calcium carbonate (TUMS) chewable tablet 500 mg  500 mg Oral BID Javi Mcnamara DO   500 mg at 01/17/25 0839    cholecalciferol (VITAMIN D3) capsule 1,250 mcg  1,250 mcg Oral Q7 Days Dreyer, Patrick, DO   1,250 mcg at 01/10/25  2120    fludrocortisone (FLORINEF) tablet 0.1 mg  0.1 mg Oral Daily Javi Mcnamara, DO   0.1 mg at 01/17/25 0839    midodrine (PROAMATINE) tablet 10 mg  10 mg Oral TID w/meals Javi Mcnamara, DO   10 mg at 01/17/25 0839    pantoprazole (PROTONIX) EC tablet 40 mg  40 mg Oral BID AC Javi Mcnamara, DO   40 mg at 01/17/25 0607    potassium & sodium phosphates (NEUTRA-PHOS) Packet 1 packet  1 packet Oral or Feeding Tube Q4H Roque Marrero MD   1 packet at 01/17/25 1037    pravastatin (PRAVACHOL) tablet 20 mg  20 mg Oral At Bedtime Javi Mcnamara, DO   20 mg at 01/16/25 2106    tamsulosin (FLOMAX) capsule 0.4 mg  0.4 mg Oral QPM Javi Mcnamara, DO   0.4 mg at 01/16/25 2107       Data   Results for orders placed or performed during the hospital encounter of 01/10/25 (from the past 24 hours)   Potassium   Result Value Ref Range    Potassium 4.2 3.4 - 5.3 mmol/L   Magnesium   Result Value Ref Range    Magnesium 2.1 1.7 - 2.3 mg/dL   Phosphorus   Result Value Ref Range    Phosphorus 2.4 (L) 2.5 - 4.5 mg/dL

## 2025-01-17 NOTE — PLAN OF CARE
[id number]     Orientation: A&Ox4, intermittent confusion  Aggression Stop Light: green   Activity: Ax1 gait belt and walker.   Diet/BS Checks: regular  Tele:  none  IV Access/Drains: no IV access  Pain Management: denied pain  Abnormal VS/Results: VSS on RA  Bowel/Bladder: mostly continent B/B. No BM this shift   Skin/Wounds: scattered bruising. BLE edema. Abraision/scab to L knee, dressing in place.   Consults: neurology, SW  D/C Disposition: pending TCU     Other Info:   A&Ox4, intermittent confusion. VSS on RA. Denied pain. Ax1 gait belt and walker. Mostly continent B/B. No BM this shift. No IV access. Regular diet. K, phos, and Mg replacement protocol. Discharge pending TCU.

## 2025-01-17 NOTE — PROVIDER NOTIFICATION
MD Notification    Notified Person: MD cornell    Notified Person Name:    Notification Date/Time:    Notification Interaction:    Purpose of Notification- am phos is 2.4, that would get coverage on protocol. borderline. replacement not ordered. lmk if you want it.      Orders Received:    Comments:

## 2025-01-17 NOTE — PROGRESS NOTES
Discharge Note    Patient discharged to TCU via EMS/BLS accompanied by significant other .  IV: Discontinued  Prescriptions sent with patient to discharge facility  sent to facility to fill   Belongings reviewed and sent with patient.   Home medications returned to patient: NA  Equipment sent with: patient, N/A.   patient verbalizes understanding of discharge instructions. AVS given to patient.

## 2025-01-17 NOTE — PROGRESS NOTES
Chart Check:    Edgar is discharging today. Follow up with Dr. Glover after discharge from TCU to discuss resumption of cabozantinib.    Nico CRUZ, [facility]  [phone] (office)

## 2025-01-17 NOTE — DISCHARGE SUMMARY
[facility]    Discharge Summary  Hospitalist    Date of Admission:  1/10/2025  Date of Discharge:  1/17/2025  Discharging Provider: Roque Marrero MD    Discharge Diagnoses        Recurrent syncope  Orthostatic hypotension  Hypocalcemia  Hypokalemia  History of renal cancer  CKD 3b  Hyperbilirubinemia  Atrial fibrillation  Acute Metabolic encephalopathy-resolved      Hospital Course:    79-year-old male with history of metastatic renal cell cancer, atrial fibrillation, orthostatic hypotension on midodrine who presented with recurrent syncopal episode.     Recurrent syncope  Orthostatic hypotension  -Lost consciousness in the AM prior to admission trying to make his cardiology appointment   -Echocardiogram with normal EF, moderate RV dysfunction unclear cause, does not explain hypotension  -Stim testing negative for adrenal insufficiency  -Evaluated by cardiology and general neurology  -cardiology recommends compression stocking, they signed off  -PT and OT following.  He will be discharging to TCU  -continue midodrine to 10 mg tid  -MOHPA consulted. They now plan on holding the cabozanitinib for 2 weeks to see if improves his orthostasis  -liberalize salt  -mestinon started and then stopped due to the increase in diarrhea  -started on fludrocortisone on 1/15 given continued orthostasis  -Overall orthostatic symptoms improving with fludrocortisone and midodrine and compression stockings.     Loose stools  -c diff negative, can use imodium prn but improving. Suspect largely related to the mestinon     Hypocalcemia  Hypokalemia  -replace per protocol     History of renal cancer  -oncology following     CKD 3b  -Baseline creatnine of 1.7-2  -Creatinine stable at 1.4     Hyperbilirubinemia  -US negative  -Probably related to his chemo med (up to 25% per the oncology note chance of this)      Atrial fibrillation  -Reports being off all meds due to syncope  -Will discuss watchman as  outpatient  -cardiology consulted and signed off     Acute Metabolic encephalopathy  -some confusion noted at home at night per wife  -note neurology concerned about chronic degenerative process  -improving          Roque Marrero MD    Significant Results and Procedures   See below    Pending Results     Unresulted Labs Ordered in the Past 30 Days of this Admission       Date and Time Order Name Status Description    1/11/2025  5:51 PM Paraneoplastic, Autoantibody Evaluation Cascade In process             Code Status   Full Code       Primary Care Physician   Nico Retana MD    Physical Exam   Temp: 97.9  F (36.6  C) Temp src: Oral BP: 106/71 Pulse: 74   Resp: 16 SpO2: 95 % O2 Device: None (Room air)      Constitutional: AAOX2-3, NAD  Respiratory: CTA B/L, Normal WOB  Cardiovascular: RRR, No murmur  GI: Soft, Non- tender, BS- normoactive  Neuro: CN- grossly intact, Moving all 4 extremities.     Discharge Disposition   Discharged to short-term care facility  Condition at discharge: Stable    Consultations This Hospital Stay   CARDIOLOGY IP CONSULT  HEMATOLOGY & ONCOLOGY IP CONSULT  PHYSICAL THERAPY ADULT IP CONSULT  PHYSICAL THERAPY ADULT IP CONSULT  NEUROLOGY IP CONSULT  CARE MANAGEMENT / SOCIAL WORK IP CONSULT  NEUROLOGY IP CONSULT  CARE MANAGEMENT / SOCIAL WORK IP CONSULT  CARE MANAGEMENT / SOCIAL WORK IP CONSULT  PHYSICAL THERAPY ADULT IP CONSULT  OCCUPATIONAL THERAPY ADULT IP CONSULT    Time Spent on this Encounter   I, Roque Marrero MD, personally saw the patient today and spent greater than 30 minutes discharging this patient.    Discharge Orders      Primary Care - Care Coordination Referral      General info for SNF    Length of Stay Estimate: Short Term Care: Estimated # of Days <30  Condition at Discharge: Improving  Level of care:skilled   Rehabilitation Potential: Excellent  Admission H&P remains valid and up-to-date: Yes  Recent Chemotherapy: N/A  Use Nursing Home Standing Orders: Yes      Mantoux instructions    Give two-step Mantoux (PPD) Per Facility Policy Yes     Follow Up and recommended labs and tests    Follow up with Nursing home physician.  Recommend BMP in 1 week     Reason for your hospital stay    Recurrent syncope, orthostatic hypotension     Intake and output    Every shift     Daily weights    Call Provider for weight gain of more than 2 pounds per day or 5 pounds per week.     Activity - Up with nursing assistance     Additional Discharge Instructions    Hold chemotherapy while at TCU     Full Code     Physical Therapy Adult Consult    Evaluate and treat as clinically indicated.    Reason:  Deconditioning     Occupational Therapy Adult Consult    Evaluate and treat as clinically indicated.    Reason:  Deconditioning     Fall precautions     Compression Sleeve/Stocking Order    Compression Sleeve/Stocking Documentation:   The patient needs a compression sleeve for Other reason: Orthostatic hypotension    I, the undersigned, certify that the above prescribed supplies are medically necessary for this patient and is both reasonable and necessary in reference to accepted standards of medical and necessary in reference to accepted standards of medical practice in the treatment of this patient's condition and is not prescribed as a convenience.     Diet    Follow this diet upon discharge: Current Diet:Orders Placed This Encounter      Combination Diet Regular Diet Adult     Discharge Medications   Current Discharge Medication List        START taking these medications    Details   fludrocortisone (FLORINEF) 0.1 MG tablet Take 1 tablet (0.1 mg) by mouth daily.    Associated Diagnoses: Orthostatic hypotension           CONTINUE these medications which have CHANGED    Details   midodrine (PROAMATINE) 10 MG tablet Take 1 tablet (10 mg) by mouth 3 times daily (with meals).    Associated Diagnoses: Orthostatic hypotension           CONTINUE these medications which have NOT CHANGED    Details    acetaminophen (TYLENOL) 500 MG tablet Take 500-1,000 mg by mouth daily as needed for mild pain      pantoprazole (PROTONIX) 40 MG EC tablet TAKE 1 TABLET BY MOUTH TWICE A DAY BEFORE MEALS  Qty: 180 tablet, Refills: 3    Comments: DX Code Needed  PATIENT REQUESTS MORE REFILLS FOR PANTOPRAZOLE.  Associated Diagnoses: Melena      pravastatin (PRAVACHOL) 20 MG tablet Take 1 tablet (20 mg) by mouth daily  Qty: 90 tablet, Refills: 3    Comments: For Profile Only - patient will call to fill  Associated Diagnoses: Hyperlipidemia LDL goal <100      tamsulosin (FLOMAX) 0.4 MG capsule Take 1 capsule (0.4 mg) by mouth daily  Qty: 90 capsule, Refills: 3    Comments: For Profile Only - patient will call to fill  Associated Diagnoses: Benign prostatic hyperplasia with nocturia           STOP taking these medications       Cabozantinib S-Malate (CABOMETYX) 20 MG Comments:   Reason for Stopping:         ondansetron (ZOFRAN ODT) 4 MG ODT tab Comments:   Reason for Stopping:             Allergies   Allergies   Allergen Reactions    Fentanyl Confusion     Data   Most Recent 3 CBC's:  Recent Labs   Lab Test 01/13/25  1242 01/11/25  0534 01/10/25  0919   WBC 6.2 5.1 4.7   HGB 12.8* 13.3 11.3*   MCV 96 95 95   * 159 138*      Most Recent 3 BMP's:  Recent Labs   Lab Test 01/17/25  0854 01/16/25  1025 01/15/25  0807 01/14/25  1059 01/13/25  1242   NA  --   --  131* 132* 134*   POTASSIUM 4.2 3.9 4.1 4.0 4.2   CHLORIDE  --   --  100 99 104   CO2  --   --  22 24 19*   BUN  --   --  22.5 22.1 22.8   CR  --   --  1.41* 1.45* 1.44*   ANIONGAP  --   --  9 9 11   DOMINICK  --   --  8.3* 8.4* 8.3*   GLC  --   --  98 103* 98     Most Recent 2 LFT's:  Recent Labs   Lab Test 01/13/25  1242 01/12/25  0817   AST 37 40   ALT 33 37   ALKPHOS 105 114   BILITOTAL 1.4* 1.5*     Most Recent INR's and Anticoagulation Dosing History:  Anticoagulation Dose History  More data exists         Latest Ref Rng & Units 1/25/2013 3/29/2013 5/8/2013 6/17/2013  2023 10/25/2023 2024   Recent Dosing and Labs   INR 0.85 - 1.15 2.5  2.2  2.4  2.3  1.22  2.29  1.57      Most Recent 3 Troponin's:No lab results found.  Most Recent Cholesterol Panel:  Recent Labs   Lab Test 24  0857   CHOL 117   LDL 61   HDL 35*   TRIG 103     Most Recent 6 Bacteria Isolates From Any Culture (See EPIC Reports for Culture Details):No lab results found.  Most Recent TSH, T4 and A1c Labs:  Recent Labs   Lab Test 01/10/25  0919 24  1649 23  0943 19  0828   TSH 3.41 2.90   < > 4.94*   T4  --   --   --  1.01   A1C  --  5.2   < >  --     < > = values in this interval not displayed.       Results for orders placed or performed during the hospital encounter of 01/10/25   US Abdomen Complete    Narrative    EXAM: US ABDOMEN COMPLETE  LOCATION: [facility]  DATE: 2025    INDICATION: Elevated LFTs.  COMPARISON: 2024, 10/11/2021.  TECHNIQUE: Complete abdominal ultrasound.    FINDINGS:    GALLBLADDER: Mildly distended gallbladder is unchanged from several prior examinations. Solitary gallstone. No gallbladder wall thickening.    BILE DUCTS: No biliary dilatation. The common duct measures 6 mm.    LIVER: Normal where seen. Previously seen mass posterior right hepatic lobe is not visualized on today's examination.    RIGHT KIDNEY: Right nephrectomy.    LEFT KIDNEY: Normal size. A 6.3 cm cyst upper pole. No hydronephrosis.     SPLEEN: Normal.    PANCREAS: The visualized portions are normal.    AORTA: Normal in caliber.     IVC: Normal where visualized.    No ascites.      Impression    IMPRESSION:  1.  No acute findings. No bile duct dilation.       Echocardiogram Complete     Value    LVEF  55-60%    Narrative    [id number]  [id number]  [id number]  [id number]^JULIENNE^JULIANO^[facility]  Echocardiography Laboratory  [address] [place] [address]     Name: JOHN ALLEN  MRN: [id number]  :  1945  Study Date: 01/11/2025 08:25 AM  Age: 79 yrs  Gender: Male  Patient Location: [facility]  Reason For Study: Syncope and Collapse  Ordering Physician: JULIANO ROBINS  Referring Physician: Nico Retana  Performed By: Genoveva Rucker     BSA: 2.4 m2  Height: 76 in  Weight: 240 lb  HR: 83  BP: 102/73 mmHg  ______________________________________________________________________________  Procedure  Echocardiogram with two-dimensional, color and spectral Doppler. Definity (NDC  #[id number]) given intravenously.  ______________________________________________________________________________  Interpretation Summary     Left ventricular systolic function is normal.The visual ejection fraction is  55-60%.  The right ventricular systolic function is normal.The right ventricle is mild  to moderately dilated.  Moderate biatrial enlargement.  There is mild (1+) aortic regurgitation.  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     Compared to prior study dated 10/9/2023 aortic root and ascending aorta appear  similar size, RV size was noted to be normal in prior study.  ______________________________________________________________________________  Left Ventricle  The left ventricle is normal in size. There is mild concentric left  ventricular hypertrophy. Left ventricular systolic function is normal. The  visual ejection fraction is 55-60%. Diastolic Doppler findings (E/E' ratio  and/or other parameters) suggest left ventricular filling pressures are  indeterminate. No regional wall motion abnormalities noted.     Right Ventricle  The right ventricle is mild to moderately dilated. The right ventricular  systolic function is normal.     Atria  The left atrium is moderately dilated. The right atrium is moderately dilated.  There is no color Doppler evidence of an atrial shunt.     Mitral Valve  There is trace mitral regurgitation.     Tricuspid Valve  There is  trace tricuspid regurgitation. Right ventricular systolic pressure  could not be approximated due to inadequate tricuspid regurgitation.     Aortic Valve  The aortic valve is trileaflet. Mild aortic valve sclerosis. There is mild  (1+) aortic regurgitation. No aortic stenosis is present.     Pulmonic Valve  There is trace pulmonic valvular regurgitation. There is no pulmonic valvular  stenosis.     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 Inferior vena cava not well visualized for estimation of right atrial  pressure.     Pericardium  There is no pericardial effusion.     Rhythm  The rhythm was atrial fibrillation.  ______________________________________________________________________________  MMode/2D Measurements & Calculations  IVSd: 1.2 cm     LVIDd: 5.0 cm  LVIDs: 3.1 cm  LVPWd: 1.2 cm  FS: 37.4 %  LV mass(C)d: 242.8 grams  LV mass(C)dI: 101.4 grams/m2  Ao root diam: 4.6 cm  LA dimension: 4.7 cm  asc Aorta Diam: 4.6 cm  LA/Ao: 1.0  Ao root diam index Ht(cm/m): 2.4  Ao root diam index BSA (cm/m2): 1.9  Asc Ao diam index BSA (cm/m2): 1.9  Asc Ao diam index Ht(cm/m): 2.4  LA Volume (BP): 102.0 ml     LA Volume Index (BP): 42.7 ml/m2  RV Base: 5.5 cm  RWT: 0.48     Doppler Measurements & Calculations  MV E max vern: 89.0 cm/sec  MV dec time: 0.17 sec  E/E' av.5  Lateral E/e': 6.6  Medial E/e': 10.4     ______________________________________________________________________________  Report approved by: Jone Mcfarland MD on 2025 12:46 PM

## 2025-01-18 NOTE — PLAN OF CARE
Physical Therapy Discharge Summary    Reason for therapy discharge:    Discharged to transitional care facility.    Progress towards therapy goal(s). See goals on Care Plan in [facility] electronic health record for goal details.  Goals not met.  Barriers to achieving goals:   discharge from facility.    Therapy recommendation(s):    Continued therapy is recommended.  Rationale/Recommendations:  To progress independence and safety with functional mobility.

## 2025-01-20 ENCOUNTER — PATIENT OUTREACH (OUTPATIENT)
Dept: CARE COORDINATION | Facility: CLINIC | Age: 80
End: 2025-01-20
Payer: MEDICARE

## 2025-01-20 ENCOUNTER — TELEPHONE (OUTPATIENT)
Dept: CARDIOLOGY | Facility: CLINIC | Age: 80
End: 2025-01-20
Payer: MEDICARE

## 2025-01-20 LAB
AMPHIPHYSIN IGG SER QL IA: NEGATIVE
ANNOTATION COMMENT IMP: NORMAL
CV2 AB SERPL QL IF: NEGATIVE
GLIAL NUC TYPE 1 AB SER QL IF: NEGATIVE
HU1 AB SER QL: NEGATIVE
HU2 AB SER QL IF: NEGATIVE
HU3 AB SER QL: NEGATIVE
PARANEOPLASTIC AB SER-IMP: NORMAL
PCA-1 AB SER QL IF: NEGATIVE
PCA-2 AB SER QL IF: NEGATIVE
PCA-TR AB SER QL IF: NEGATIVE
VGCC-P/Q BIND IGG+IGM SER IA-SCNC: 0 NMOL/L
VGKC IGG+IGM SER IA-SCNC: 0 NMOL/L

## 2025-01-20 NOTE — TELEPHONE ENCOUNTER
[facility]O REFERRAL    Referral received from: Dr. Mckeon   Reason for referral: previous GI bleed related to duodenal tumor which is a metastatic renal cell carcinoma   Current anticoagulation: none    Imaging: Not completed  Contrast allergy: No  Contacted patient to introduce self, provide education on LAAO. Patient declined LAAO referral at this time. Provided direct contact information if patient changes mind in the future. Patient advised to schedule annual follow up appointment with Dr. Quarles. Patient verbalized understanding and in agreement with plan.     Eleni Salas RN  Structural Heart Coordinator  [facility]

## 2025-01-20 NOTE — LETTER
[facility]   To:             Please give to facility    From:   Klaudia Brewer  RN  Care Coordinator   [facility]   P: [phone]  [email or web address]   Patient Name:  Ti Nickerson YOB: 1945   Admit date: 1/17/2025      *Information Needed:  Please contact me when the patient will discharge (or if they will move to long term care)- include the discharge date, disposition, and main diagnosis   If the patient is discharged with home care services, please provide the name of the agency    Also- Please inform me if a care conference is being held.   Phone or Email with information

## 2025-01-20 NOTE — PROGRESS NOTES
Clinic Care Coordination Contact  Care Coordination Transition Communication    Clinical Data: Patient was hospitalized at [facility] from 1/10/25 to 1/17/25 with diagnosis of:   Recurrent syncope  Orthostatic hypotension  Hypocalcemia  Hypokalemia  History of renal cancer  CKD 3b  Hyperbilirubinemia  Atrial fibrillation  Acute Metabolic encephalopathy-resolved    Assessment: Patient has transitioned to TCU/ARU for short term rehabilitation:    Facility Name: [facility]  Transition Communication:  Notified facility of Primary Care- Care Coordination support via Epic fax.    Plan: Care Coordinator will await notification from facility staff informing of patient's discharge plans/needs. Care Coordinator will review chart and outreach to facility staff every 4 weeks and as needed.     Klaudia Brewer, RN Clinic Care Coordinator  [facility] Clin[facility]: [place], [place] (on-site Wednesdays), [facility] (on-site Thursdays) & [facility].  [email or web address]  Phone: [phone]

## 2025-01-22 ENCOUNTER — OFFICE VISIT (OUTPATIENT)
Dept: FAMILY MEDICINE | Facility: CLINIC | Age: 80
End: 2025-01-22
Payer: MEDICARE

## 2025-01-22 VITALS
HEART RATE: 91 BPM | OXYGEN SATURATION: 95 % | TEMPERATURE: 97.2 F | RESPIRATION RATE: 18 BRPM | DIASTOLIC BLOOD PRESSURE: 62 MMHG | SYSTOLIC BLOOD PRESSURE: 90 MMHG

## 2025-01-22 DIAGNOSIS — E66.01 MORBID OBESITY (H): ICD-10-CM

## 2025-01-22 DIAGNOSIS — E83.39 HYPERPHOSPHATEMIA: ICD-10-CM

## 2025-01-22 DIAGNOSIS — N18.32 STAGE 3B CHRONIC KIDNEY DISEASE (H): ICD-10-CM

## 2025-01-22 DIAGNOSIS — I48.91 ATRIAL FIBRILLATION, UNSPECIFIED TYPE (H): ICD-10-CM

## 2025-01-22 DIAGNOSIS — C78.4: ICD-10-CM

## 2025-01-22 DIAGNOSIS — I95.1 ORTHOSTATIC HYPOTENSION: ICD-10-CM

## 2025-01-22 DIAGNOSIS — I42.9 PRIMARY CARDIOMYOPATHY (H): ICD-10-CM

## 2025-01-22 DIAGNOSIS — E87.1 HYPONATREMIA: ICD-10-CM

## 2025-01-22 DIAGNOSIS — S01.01XA LACERATION OF SCALP WITHOUT FOREIGN BODY, INITIAL ENCOUNTER: ICD-10-CM

## 2025-01-22 DIAGNOSIS — R55 RECURRENT SYNCOPE: ICD-10-CM
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
## 2025-01-22 RX ORDER — ONDANSETRON 8 MG/1
TABLET, FILM COATED ORAL EVERY 8 HOURS PRN
COMMUNITY
Start: 2024-09-10

## 2025-01-22 RX ORDER — POLYETHYLENE GLYCOL 3350 17 G/17G
1 POWDER, FOR SOLUTION ORAL PRN
COMMUNITY
Start: 2023-10-15

## 2025-01-22 ASSESSMENT — PAIN SCALES - GENERAL: PAINLEVEL_OUTOF10: NO PAIN (0)

## 2025-01-22 NOTE — PROGRESS NOTES
Assessment and Plan  1. Ground-level fall (Primary)  2. Laceration of scalp without foreign body, initial encounter  New concern of falling.  Today morning if patient wife endorses that he had again possible orthostatic hypotension for which there was no one around and he had head injury with laceration, patient is here for taking care of this given his recent hospitalization discharge as mentioned below.  Procedure done without any complications, please see the procedure note separately.  Explained the wound care as well as aftercare instructions given to both patient and wife which they understood and agreed with the plan of staple removal appointment to be taken with his PCP.  - HC REPAIR COMPLEX, WOUND SCALP/ARM/LEG 2.6-7.5 CM    3. Orthostatic hypotension  4. Recurrent syncope  Recent hospitalization and discharge from 1/10/25 - 1/17/25 for ongoing recurrent Syncope . Does have Orthostatic hypotension on Midodrine and also On fludrocortisone. Pt was seen by Neurology and Cardiology during hospitalization     Does have medical conditions of metastatic renal cell cancer, atrial fibrillation, orthostatic hypotension on midodrine who presented with recurrent syncopal episode.>. Possible medication - cabozanitinib for 2 weeks to see if improves his orthostasis    5. Hyponatremia  6. Hyperphosphatemia  Will check electrolytes and replete if abnormal   - Basic metabolic panel  (Ca, Cl, CO2, Creat, Gluc, K, Na, BUN); Future  - Phosphorus; Future    7. Atrial fibrillation, unspecified type (H)  8. Primary cardiomyopathy (H)  Chronic problem, not on oral anticoagulation.  Continue to follow cardiology recommendations.  - Magnesium; Future    9. 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     10. Morbid obesity (H)  Noted , patient working on his weight as per recommendations from his PCP    11. Stage 3b chronic kidney disease  (H)  Chronic problem, to avoid Nephrotoxins.          Please note that this note consists of symbols derived from keyboarding, dictation and/or voice recognition software. As a result, there may be errors in the script that have gone undetected. Please consider this when interpreting information found in this chart.    Patient Instructions   As discussed , will do required work up needed for this hospital follow up     6 staples placed >. Please do Staple removal appointment - with your PCP on Day 8     Please keep up Oncology appointment     Please maintain dryness on the suture for next 48 hours , keep cleaning with antiseptic and apply the antibiotic cream.     =====================          Return in about 8 days (around 1/30/2025), or if symptoms worsen or fail to improve, for inclinc appointment - Suture removal with his PCP on his same day slot. .    Carly Birmingham MD  [facility] KULDIP Hernández is a 79 year old, presenting for the following health issues:  Fall, Laceration, and Hospital F/U        1/22/2025     9:50 AM   Additional Questions   Roomed by Antionette BOWDEN   Accompanied by Wife     History of Present Illness       Reason for visit:  Follow up with provider.fall nd cut on head   He is taking medications regularly.       [facility], they are not sure what caused the fall. Has a cut on the back of his head, no headache, N/V, vision changes. He does have orthostatic blood pressures and is not sure if that cause the fall.       Hospital Follow-up Visit:    Hospital/Nursing Home/IP Rehab Facility: [facility]  Date of Admission: 1/10/25  Date of Discharge: 1/17/25  Reason(s) for Admission: Syncope and Orthostatic Hypotension  Was the patient in the ICU or did the patient experience delirium during hospitalization?  No  Do you have any other stressors you would like to discuss with your provider? No    Problems  taking medications regularly:  None  Medication changes since discharge: Stared taking fludrocortisone   Problems adhering to non-medication therapy:  None    Summary of hospitalization:  [facility] hospital discharge summary reviewed  Diagnostic Tests/Treatments reviewed.  Follow up needed: PCP , Hemoncology  Other Healthcare Providers Involved in Patient s Care:         Homecare  Update since discharge: fluctuating course.         Plan of care communicated with patient and family       [facility]  He is here for hospital follow-up and acute concerns         Allergies   Allergen Reactions    Fentanyl Confusion and Other (See Comments)     Hallucinations    Oxycodone Other (See Comments)     Hallucinations when given after surgery    Other reaction(s): *Unknown, Other (See Comments), Other (see comments)   Hallucinations when given after surgery    Hallucinations when given after surgery    Hallucinations when given after surgery        Past Medical History:   Diagnosis Date    Atrial fibrillation, unspecified 9/18/2015    CAD (coronary artery disease) 09/18/2015    minimal by angio 2007, fu nuc est nl 2018    Elevated TSH 2018    Esophageal reflux 9/18/2015    Essential hypertension     History of cardioversion     [id number]    Idiopathic cardiomyopathy (H) 09/18/2015    fu echo nl ef, nl nuclear est 11/18, Dr. Quarles    Mixed hyperlipidemia 9/18/2015    Mumps     Sleep apnea 09/18/2015    does not use cpap as of 2019    Sleep apnea     Thoracic aortic aneurysm     sinus of valsalva 4.5 and asc aorta 4.5 in 2017    Tubular adenoma of colon 01/2017    fu 3 years       Past Surgical History:   Procedure Laterality Date    APPENDECTOMY  1964    ESOPHAGOSCOPY, GASTROSCOPY, DUODENOSCOPY (EGD), COMBINED N/A 9/14/2023    Procedure: Esophagoscopy, gastroscopy, duodenoscopy (EGD), combined;  Surgeon: Chele Ash MD;  Location:  GI       Family History   Problem  Relation Age of Onset    Arrhythmia Mother         a-fib    Cerebrovascular Disease Mother     Arrhythmia Father         a-fib    Colon Cancer Father     Diabetes Father     Cerebrovascular Disease Father     No Known Problems Brother        Social History     Tobacco Use    Smoking status: Never    Smokeless tobacco: Never   Substance Use Topics    Alcohol use: Not Currently        Current Outpatient Medications   Medication Sig Dispense Refill    acetaminophen (TYLENOL) 500 MG tablet Take 1 tablet (500 mg) by mouth daily as needed for mild pain.      fludrocortisone (FLORINEF) 0.1 MG tablet Take 1 tablet (0.1 mg) by mouth daily.      midodrine (PROAMATINE) 10 MG tablet Take 1 tablet (10 mg) by mouth 3 times daily (with meals).      ondansetron (ZOFRAN) 8 MG tablet every 8 hours as needed for nausea.      pantoprazole (PROTONIX) 40 MG EC tablet TAKE 1 TABLET BY MOUTH TWICE A DAY BEFORE MEALS 180 tablet 3    polyethylene glycol (MIRALAX) 17 g packet Take 1 packet by mouth as needed for constipation.      pravastatin (PRAVACHOL) 20 MG tablet Take 1 tablet (20 mg) by mouth daily 90 tablet 3    tamsulosin (FLOMAX) 0.4 MG capsule Take 1 capsule (0.4 mg) by mouth daily 90 capsule 3     No current facility-administered medications for this visit.        Review of Systems  Constitutional, HEENT, cardiovascular, pulmonary, GI, , musculoskeletal, neuro, skin, endocrine and psych systems are negative, except as otherwise noted.      Objective    BP 90/62 (BP Location: Right arm, Patient Position: Sitting, Cuff Size: Adult Large)   Pulse 91   Temp 97.2  F (36.2  C) (Tympanic)   Resp 18   SpO2 95%   There is no height or weight on file to calculate BMI.  Physical Exam   GENERAL: alert and no distress  SCALP : POSITIVE for laceration measuring 7-8 cm in length with staples placed in office today  NECK: no adenopathy, no asymmetry, masses, or scars  RESP: lungs clear to auscultation - no rales, rhonchi or wheezes  CV:  regular rate and rhythm, normal S1 S2, no S3 or S4, no murmur, click or rub, + peripheral edema  ABDOMEN: soft, nontender, no hepatosplenomegaly, no masses and bowel sounds normal  MS: no gross musculoskeletal defects noted, + edema        Signed Electronically by: Carly Birmingham MD        SUBJECTIVE:   79 year old male sustained laceration of scalp 5-6 hours ago. Nature of injury: Accidental, due to syncopal. Tetanus vaccination status reviewed: last tetanus booster within 10 years.     OBJECTIVE:   Patient appears well, vitals are normal. Laceration 8 cm noted.  Description: clean wound edges, no foreign bodies. Neurovascular and tendon structures are intact.    ASSESSMENT:   Laceration as described.    PLAN:   Anesthesia with Lidocaine 1% with epinephrine. Wound cleansed, debrided of visible foreign material and necrotic tissue, and sutured. Dressing applied.  Wound care instructions provided.  Observe for any signs of infection or other problems.  Return for suture removal in 8 days.

## 2025-01-22 NOTE — PATIENT INSTRUCTIONS
As discussed , will do required work up needed for this hospital follow up     6 staples placed >. Please do Staple removal appointment - with your PCP on Day 8     Please keep up Oncology appointment     Please maintain dryness on the suture for next 48 hours , keep cleaning with antiseptic and apply the antibiotic cream.     =====================

## 2025-01-24 ENCOUNTER — HOSPITAL ENCOUNTER (INPATIENT)
Facility: CLINIC | Age: 80
End: 2025-01-24
Attending: EMERGENCY MEDICINE | Admitting: INTERNAL MEDICINE
Payer: MEDICARE

## 2025-01-24 DIAGNOSIS — S22.081A T12 BURST FRACTURE (H): Primary | ICD-10-CM

## 2025-01-24 DIAGNOSIS — G93.40 ACUTE ENCEPHALOPATHY: ICD-10-CM

## 2025-01-24 DIAGNOSIS — I26.99 PULMONARY EMBOLISM, OTHER, UNSPECIFIED CHRONICITY, UNSPECIFIED WHETHER ACUTE COR PULMONALE PRESENT (H): ICD-10-CM

## 2025-01-24 DIAGNOSIS — R29.6 UNWITNESSED FALL: ICD-10-CM

## 2025-01-24 DIAGNOSIS — I82.419 DEEP VEIN THROMBOSIS (DVT) OF FEMORAL VEIN, UNSPECIFIED CHRONICITY, UNSPECIFIED LATERALITY (H): ICD-10-CM

## 2025-01-24 LAB
ABO + RH BLD: NORMAL
ALBUMIN SERPL BCG-MCNC: 3.1 G/DL (ref 3.5–5.2)
ALBUMIN UR-MCNC: 10 MG/DL
ALP SERPL-CCNC: 121 U/L (ref 40–150)
ALT SERPL W P-5'-P-CCNC: 22 U/L (ref 0–70)
AMMONIA PLAS-SCNC: <10 UMOL/L (ref 16–60)
ANION GAP SERPL CALCULATED.3IONS-SCNC: 13 MMOL/L (ref 7–15)
APPEARANCE UR: CLEAR
AST SERPL W P-5'-P-CCNC: 28 U/L (ref 0–45)
ATRIAL RATE - MUSE: NORMAL BPM
BASOPHILS # BLD AUTO: 0 10E3/UL (ref 0–0.2)
BASOPHILS NFR BLD AUTO: 0 %
BILIRUB DIRECT SERPL-MCNC: 0.79 MG/DL (ref 0–0.3)
BILIRUB SERPL-MCNC: 2.5 MG/DL
BILIRUB UR QL STRIP: NEGATIVE
BLD GP AB SCN SERPL QL: NEGATIVE
BUN SERPL-MCNC: 30 MG/DL (ref 8–23)
CALCIUM SERPL-MCNC: 8.4 MG/DL (ref 8.8–10.4)
CHLORIDE SERPL-SCNC: 100 MMOL/L (ref 98–107)
COLOR UR AUTO: YELLOW
CREAT SERPL-MCNC: 1.59 MG/DL (ref 0.67–1.17)
DIASTOLIC BLOOD PRESSURE - MUSE: NORMAL MMHG
EGFRCR SERPLBLD CKD-EPI 2021: 44 ML/MIN/1.73M2
EOSINOPHIL # BLD AUTO: 0 10E3/UL (ref 0–0.7)
EOSINOPHIL NFR BLD AUTO: 0 %
ERYTHROCYTE [DISTWIDTH] IN BLOOD BY AUTOMATED COUNT: 15.5 % (ref 10–15)
ERYTHROCYTE [DISTWIDTH] IN BLOOD BY AUTOMATED COUNT: 15.6 % (ref 10–15)
FLUAV RNA SPEC QL NAA+PROBE: NEGATIVE
FLUBV RNA RESP QL NAA+PROBE: NEGATIVE
GLUCOSE BLDC GLUCOMTR-MCNC: 109 MG/DL (ref 70–99)
GLUCOSE SERPL-MCNC: 109 MG/DL (ref 70–99)
GLUCOSE UR STRIP-MCNC: NEGATIVE MG/DL
HCO3 SERPL-SCNC: 22 MMOL/L (ref 22–29)
HCT VFR BLD AUTO: 34 % (ref 40–53)
HCT VFR BLD AUTO: 37.5 % (ref 40–53)
HGB BLD-MCNC: 11.9 G/DL (ref 13.3–17.7)
HGB BLD-MCNC: 12.6 G/DL (ref 13.3–17.7)
HGB UR QL STRIP: ABNORMAL
HOLD SPECIMEN: NORMAL
IMM GRANULOCYTES # BLD: 0 10E3/UL
IMM GRANULOCYTES NFR BLD: 1 %
INTERPRETATION ECG - MUSE: NORMAL
KETONES UR STRIP-MCNC: NEGATIVE MG/DL
LEUKOCYTE ESTERASE UR QL STRIP: NEGATIVE
LYMPHOCYTES # BLD AUTO: 0.3 10E3/UL (ref 0.8–5.3)
LYMPHOCYTES NFR BLD AUTO: 4 %
MCH RBC QN AUTO: 33.5 PG (ref 26.5–33)
MCH RBC QN AUTO: 33.9 PG (ref 26.5–33)
MCHC RBC AUTO-ENTMCNC: 33.6 G/DL (ref 31.5–36.5)
MCHC RBC AUTO-ENTMCNC: 35 G/DL (ref 31.5–36.5)
MCV RBC AUTO: 100 FL (ref 78–100)
MCV RBC AUTO: 97 FL (ref 78–100)
MONOCYTES # BLD AUTO: 0.1 10E3/UL (ref 0–1.3)
MONOCYTES NFR BLD AUTO: 2 %
MUCOUS THREADS #/AREA URNS LPF: PRESENT /LPF
NEUTROPHILS # BLD AUTO: 6.9 10E3/UL (ref 1.6–8.3)
NEUTROPHILS NFR BLD AUTO: 94 %
NITRATE UR QL: NEGATIVE
NRBC # BLD AUTO: 0 10E3/UL
NRBC BLD AUTO-RTO: 0 /100
OSMOLALITY UR: 843 MMOL/KG (ref 100–1200)
P AXIS - MUSE: NORMAL DEGREES
PH UR STRIP: 5.5 [PH] (ref 5–7)
PLATELET # BLD AUTO: 139 10E3/UL (ref 150–450)
PLATELET # BLD AUTO: 145 10E3/UL (ref 150–450)
POTASSIUM SERPL-SCNC: 4.3 MMOL/L (ref 3.4–5.3)
PR INTERVAL - MUSE: NORMAL MS
PROT SERPL-MCNC: 5.8 G/DL (ref 6.4–8.3)
QRS DURATION - MUSE: 98 MS
QT - MUSE: 364 MS
QTC - MUSE: 459 MS
R AXIS - MUSE: -43 DEGREES
RADIOLOGIST FLAGS: ABNORMAL
RBC # BLD AUTO: 3.51 10E6/UL (ref 4.4–5.9)
RBC # BLD AUTO: 3.76 10E6/UL (ref 4.4–5.9)
RBC URINE: 1 /HPF
RSV RNA SPEC NAA+PROBE: NEGATIVE
SARS-COV-2 RNA RESP QL NAA+PROBE: NEGATIVE
SODIUM SERPL-SCNC: 135 MMOL/L (ref 135–145)
SODIUM UR-SCNC: 70 MMOL/L
SP GR UR STRIP: 1.03 (ref 1–1.03)
SPECIMEN EXP DATE BLD: NORMAL
SYSTOLIC BLOOD PRESSURE - MUSE: NORMAL MMHG
T AXIS - MUSE: 0 DEGREES
TROPONIN T SERPL HS-MCNC: 24 NG/L
TROPONIN T SERPL HS-MCNC: 27 NG/L
UROBILINOGEN UR STRIP-MCNC: 2 MG/DL
VENTRICULAR RATE- MUSE: 96 BPM
WBC # BLD AUTO: 11.2 10E3/UL (ref 4–11)
WBC # BLD AUTO: 7.4 10E3/UL (ref 4–11)
WBC URINE: 1 /HPF

## 2025-01-24 PROCEDURE — 96360 HYDRATION IV INFUSION INIT: CPT | Mod: 59

## 2025-01-24 PROCEDURE — 82140 ASSAY OF AMMONIA: CPT | Performed by: EMERGENCY MEDICINE

## 2025-01-24 PROCEDURE — 99223 1ST HOSP IP/OBS HIGH 75: CPT | Mod: A1 | Performed by: INTERNAL MEDICINE

## 2025-01-24 PROCEDURE — 36415 COLL VENOUS BLD VENIPUNCTURE: CPT | Performed by: EMERGENCY MEDICINE

## 2025-01-24 PROCEDURE — 83935 ASSAY OF URINE OSMOLALITY: CPT | Performed by: INTERNAL MEDICINE

## 2025-01-24 PROCEDURE — 84484 ASSAY OF TROPONIN QUANT: CPT | Performed by: EMERGENCY MEDICINE

## 2025-01-24 PROCEDURE — 99285 EMERGENCY DEPT VISIT HI MDM: CPT | Mod: 25

## 2025-01-24 PROCEDURE — 85025 COMPLETE CBC W/AUTO DIFF WBC: CPT | Performed by: EMERGENCY MEDICINE

## 2025-01-24 PROCEDURE — 82962 GLUCOSE BLOOD TEST: CPT

## 2025-01-24 PROCEDURE — 84300 ASSAY OF URINE SODIUM: CPT | Performed by: INTERNAL MEDICINE

## 2025-01-24 PROCEDURE — 258N000003 HC RX IP 258 OP 636: Performed by: EMERGENCY MEDICINE

## 2025-01-24 PROCEDURE — 99222 1ST HOSP IP/OBS MODERATE 55: CPT | Performed by: PHYSICIAN ASSISTANT

## 2025-01-24 PROCEDURE — 80048 BASIC METABOLIC PNL TOTAL CA: CPT | Performed by: EMERGENCY MEDICINE

## 2025-01-24 PROCEDURE — 93005 ELECTROCARDIOGRAM TRACING: CPT

## 2025-01-24 PROCEDURE — 258N000003 HC RX IP 258 OP 636: Performed by: INTERNAL MEDICINE

## 2025-01-24 PROCEDURE — 36415 COLL VENOUS BLD VENIPUNCTURE: CPT | Performed by: INTERNAL MEDICINE

## 2025-01-24 PROCEDURE — 96361 HYDRATE IV INFUSION ADD-ON: CPT

## 2025-01-24 PROCEDURE — 82040 ASSAY OF SERUM ALBUMIN: CPT | Performed by: EMERGENCY MEDICINE

## 2025-01-24 PROCEDURE — 87637 SARSCOV2&INF A&B&RSV AMP PRB: CPT | Performed by: EMERGENCY MEDICINE

## 2025-01-24 PROCEDURE — 84132 ASSAY OF SERUM POTASSIUM: CPT | Performed by: EMERGENCY MEDICINE

## 2025-01-24 PROCEDURE — 250N000011 HC RX IP 250 OP 636: Performed by: EMERGENCY MEDICINE

## 2025-01-24 PROCEDURE — 81001 URINALYSIS AUTO W/SCOPE: CPT | Performed by: EMERGENCY MEDICINE

## 2025-01-24 PROCEDURE — 250N000009 HC RX 250: Performed by: EMERGENCY MEDICINE

## 2025-01-24 PROCEDURE — 120N000001 HC R&B MED SURG/OB

## 2025-01-24 PROCEDURE — 85027 COMPLETE CBC AUTOMATED: CPT | Performed by: INTERNAL MEDICINE

## 2025-01-24 PROCEDURE — 250N000011 HC RX IP 250 OP 636: Performed by: INTERNAL MEDICINE

## 2025-01-24 PROCEDURE — 84155 ASSAY OF PROTEIN SERUM: CPT | Performed by: EMERGENCY MEDICINE

## 2025-01-24 RX ORDER — ONDANSETRON 2 MG/ML
4 INJECTION INTRAMUSCULAR; INTRAVENOUS EVERY 6 HOURS PRN
Status: ACTIVE | OUTPATIENT
Start: 2025-01-24

## 2025-01-24 RX ORDER — ONDANSETRON 4 MG/1
4 TABLET, ORALLY DISINTEGRATING ORAL EVERY 6 HOURS PRN
Status: ACTIVE | OUTPATIENT
Start: 2025-01-24

## 2025-01-24 RX ORDER — NALOXONE HYDROCHLORIDE 0.4 MG/ML
0.4 INJECTION, SOLUTION INTRAMUSCULAR; INTRAVENOUS; SUBCUTANEOUS
Status: DISCONTINUED | OUTPATIENT
Start: 2025-01-24 | End: 2025-01-29

## 2025-01-24 RX ORDER — LORAZEPAM 2 MG/ML
1 INJECTION INTRAMUSCULAR
Status: COMPLETED | OUTPATIENT
Start: 2025-01-24 | End: 2025-01-24

## 2025-01-24 RX ORDER — FENTANYL CITRATE 50 UG/ML
50 INJECTION, SOLUTION INTRAMUSCULAR; INTRAVENOUS
Status: DISCONTINUED | OUTPATIENT
Start: 2025-01-24 | End: 2025-01-24

## 2025-01-24 RX ORDER — LIDOCAINE 40 MG/G
CREAM TOPICAL
Status: ACTIVE | OUTPATIENT
Start: 2025-01-24

## 2025-01-24 RX ORDER — NALOXONE HYDROCHLORIDE 0.4 MG/ML
0.2 INJECTION, SOLUTION INTRAMUSCULAR; INTRAVENOUS; SUBCUTANEOUS
Status: DISCONTINUED | OUTPATIENT
Start: 2025-01-24 | End: 2025-01-29

## 2025-01-24 RX ORDER — HYDROMORPHONE HCL IN WATER/PF 6 MG/30 ML
0.2 PATIENT CONTROLLED ANALGESIA SYRINGE INTRAVENOUS
Status: DISCONTINUED | OUTPATIENT
Start: 2025-01-24 | End: 2025-01-25

## 2025-01-24 RX ORDER — AMOXICILLIN 250 MG
2 CAPSULE ORAL 2 TIMES DAILY PRN
Status: ACTIVE | OUTPATIENT
Start: 2025-01-24

## 2025-01-24 RX ORDER — AMOXICILLIN 250 MG
1 CAPSULE ORAL 2 TIMES DAILY PRN
Status: ACTIVE | OUTPATIENT
Start: 2025-01-24

## 2025-01-24 RX ORDER — IOPAMIDOL 755 MG/ML
120 INJECTION, SOLUTION INTRAVASCULAR ONCE
Status: COMPLETED | OUTPATIENT
Start: 2025-01-24 | End: 2025-01-24

## 2025-01-24 RX ORDER — SODIUM CHLORIDE 9 MG/ML
INJECTION, SOLUTION INTRAVENOUS CONTINUOUS
Status: DISCONTINUED | OUTPATIENT
Start: 2025-01-24 | End: 2025-01-26

## 2025-01-24 RX ORDER — HEPARIN SODIUM 10000 [USP'U]/100ML
0-5000 INJECTION, SOLUTION INTRAVENOUS CONTINUOUS
Status: DISCONTINUED | OUTPATIENT
Start: 2025-01-24 | End: 2025-01-29

## 2025-01-24 RX ADMIN — SODIUM CHLORIDE 1000 ML: 9 INJECTION, SOLUTION INTRAVENOUS at 15:16

## 2025-01-24 RX ADMIN — SODIUM CHLORIDE 500 ML: 9 INJECTION, SOLUTION INTRAVENOUS at 09:30

## 2025-01-24 RX ADMIN — HEPARIN SODIUM 1800 UNITS/HR: 10000 INJECTION, SOLUTION INTRAVENOUS at 19:58

## 2025-01-24 RX ADMIN — HYDROMORPHONE HYDROCHLORIDE 0.2 MG: 0.2 INJECTION, SOLUTION INTRAMUSCULAR; INTRAVENOUS; SUBCUTANEOUS at 19:47

## 2025-01-24 RX ADMIN — HEPARIN SODIUM 1800 UNITS/HR: 10000 INJECTION, SOLUTION INTRAVENOUS at 18:56

## 2025-01-24 RX ADMIN — SODIUM CHLORIDE 76 ML: 9 INJECTION, SOLUTION INTRAVENOUS at 11:10

## 2025-01-24 RX ADMIN — IOPAMIDOL 120 ML: 755 INJECTION, SOLUTION INTRAVENOUS at 11:10

## 2025-01-24 RX ADMIN — HYDROMORPHONE HYDROCHLORIDE 0.2 MG: 0.2 INJECTION, SOLUTION INTRAMUSCULAR; INTRAVENOUS; SUBCUTANEOUS at 15:42

## 2025-01-24 RX ADMIN — LORAZEPAM 1 MG: 2 INJECTION INTRAMUSCULAR; INTRAVENOUS at 16:13

## 2025-01-24 ASSESSMENT — ACTIVITIES OF DAILY LIVING (ADL)
ADLS_ACUITY_SCORE: 58
ADLS_ACUITY_SCORE: 58
ADLS_ACUITY_SCORE: 61
ADLS_ACUITY_SCORE: 58
ADLS_ACUITY_SCORE: 58
ADLS_ACUITY_SCORE: 61
ADLS_ACUITY_SCORE: 58
ADLS_ACUITY_SCORE: 61
ADLS_ACUITY_SCORE: 58
ADLS_ACUITY_SCORE: 58
DEPENDENT_IADLS:: CLEANING;COOKING;LAUNDRY;SHOPPING;MEAL PREPARATION;TRANSPORTATION
ADLS_ACUITY_SCORE: 58

## 2025-01-24 NOTE — PROGRESS NOTES
Exam aborted- patient very confused and not able to hold still. Patient had meds on board and also family member here to talk to him through exam. Patient still not able to hold still and very agitated inside the scanner.

## 2025-01-24 NOTE — H&P
[facility]    History and Physical - Hospitalist Service       Date of Admission:  1/24/2025    Assessment & Plan      Ti Nickerson is a 79 year old male with a complex history of metastatic renal cancer, atrial fibrillation, orthostatic hypotension on midodrine/Florinef, CKD stage IIIb, CAD, acute metabolic encephalopathy, history of GI bleeding,  presented to the emergency room after falls.    Patient had recently been hospitalized 1/10 through 1/17/2025 for orthostatic hypotension and recurrent syncope.  He had been losing consciousness and presented to the hospital.  He had an extensive evaluation including an echo with normal EF and moderate RV dysfunction.  Negative stim test for adrenal insufficiency, evaluated by cardiology and neurology.  Recommended compression stockings.  Stopped on his Cabozanitinib for 2 weeks to see if he had improved symptoms.  He was discharged on midodrine 10 mg 3 times daily with Florinef.     Now readmitted with falls and head laceration from 2 days ago, PE, DVT, Burst fracture T12 admitted to the hospital.     Recurrent falls with orthostatics  Orthostatic hypotension    Patient with a known history of recurrent falls and orthostatic hypotension.  His wife reports he has had a long history of orthostatic hypotension even prior to last admission.   Recently hospitalized 1/10 through 1/17 on the medicine service for orthostatic hypotension.  He was started on midodrine 10 mg 3 times a day.  Decision to hold cabozanitinib for 2 weeks.  Liberalized salt.  Started on Florinef 0.1 mg po daily (patient was seen by cardiology, neurology, hematology)   Patient has been at assisted living and he fell a couple days ago but he got up without assistance.  Plans to continue with midodrine and florinef   He has been at an assisted living to regain his strength.     Falls:   Recurrent falls prior to admission. (See summary as above with orthostatics)   CT head no  evidence for acute intracranial process.  Brain atrophy with microvascular changes.  Cervical spine no acute fracture posttraumatic subluxation.  Multilevel cervical spondylosis without high-grade spinal stenosis.  Multilevel neuroforaminal stenosis  Thoracic spine with acute burst T12 fracture is noted:  Mild retropulsion of the fractured vertebral body without associated spinal canal stenosis. No high-grade spinal canal or neural foraminal stenosis.  Lumbar spine no fracture or posttraumatic subluxation.Multilevel lumbar spondylosis without high-grade spinal canal stenosis. Multilevel neural foraminal stenosis     Atrial fibrillation:   Atrial fibrillation   EKG Atrial fibrillation, LAD. NSST changes  Has not been anticoagulated in past given bleeding and falls  ECHO in am with PE   Some discussion in past of potential Watchman     Head laceration   Patient seen in the clinic on 1/22/2025 with a laceration 7 to 8 cm in length with staples placed in the office.  And sutures.  Tetanus booster within 10 years.  Sutures placed 1/22 reported to remove in 8 days on 1/30    Acute Burst Fracture T12 with retropulsion  CT chest abdomen pelvis given falls showing an acute T12 fracture.  T12 CT spine showing acute burst fracture of T12 with 20% height loss.  Mild retropulsion of the fracture vertebral body without associated spinal canal stenosis.  MRI poor quality but reviewed with neurosurgery and lg to initiate heparin  Neurosurgery follow-up    Metastatic Renal cell cancer diagnosed in 2019   S/p Right radical nephrecctomy  and IVC thrombectomy 2019   On surveillance with recurrence 1/2021 with liver lesion.    Follows at [facility]   Has known metastatic renal cell cancer   Duodenal mass 2.3 cm, right hepatic lobe mass, pulmonary nodules up to 5 mm on right major fissure and 2 mm subpleural nodule in left upper lobe   CT chest abdomen pelvis with falls: Acute T12 fracture as described on same-day CT thoracolumbar  spine reformats. Otherwise, no acute traumatic findings within the chest, abdomen, or pelvis. Acute segmental pulmonary emboli in the right lower lobe and left upper lobe. There is dilatation of right-sided heart chambers which appears chronic.Acute nonocclusive deep venous thrombosis of the left common iliac vein which extends into the lower inferior vena cava.Stable postsurgical changes of partial duodenectomy with mild soft tissue thickening at the surgical site. No discrete duodenal mass is identified.Stable hypoattenuating lesion of the posterior right hepatic lobe.    Iliac Vein thrombosis and IVC extension   Patient with thrombosis extending from the iliac vein into the IVC.  1/24 IR consult.  Preliminary discussion of potential thrombectomy >> will need to get MRI cleared as he will have to be on anticoagulation  MRI was completed and patient has been started on heparin  Per discussion with the IR team the hospitalist will need to call IR on-call on Saturday and rediscuss timing of potential thrombectomy and if still plans to proceed  Pulmonary embolus   Imaging in the emergency room showing a acute segmental pulmonary emboli in the right lower lobe and left upper lobe.  Acute nonocclusive DVT of the left common iliac vein extending into the lower inferior vena cava  ECHO ordered   1/24 discussion at length with the family in the ER regarding the challenge and risk of clotting versus bleeding.   Ultimate decision after clearance from burst fracture is high dose heparin with no bolus and monitor closely for bleeding.   Family aware of the challenges and risk.   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 Biopsy with mets renal cell.   10/2023 partial duodenectomy at [facility]   1/2024 EUS and resection of gastric mass with complete endoscopic removal with [facility]    7/2024 admit recurrent GI bleed. Local tumor recurrence >> required transfusion   7/2024 Radiation for bleeding control.   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  Brief discussion with neurosurgery for patient likely to see vascular IR after discharge but should discuss this further to confirm plan    CODE full code on discussion with patient and his family        Diet:  regular   DVT Prophylaxis:   Dillard Catheter: Not present  Lines: None     Cardiac Monitoring: None  Code Status:  full code     Clinically Significant Risk Factors Present on Admission               # Hypoalbuminemia: Lowest albumin = 3.1 g/dL at 1/24/2025  9:04 AM, will monitor as appropriate     # Hypertension: Noted on problem list           # Overweight: Estimated body mass index is 28.89 kg/m  as calculated from the following:    Height as of 1/10/25: 1.93 m (6' 4\").    Weight as of 1/14/25: 107.6 kg (237 lb 4.8 oz).       # Financial/Environmental Concerns:           Disposition Plan     Medically Ready for Discharge: Anticipated in 2-4 Days           LONNY HUMPHRIES MD  Hospitalist Service  [facility]  Securely message with [employer] (more info)  Text page via [employer] Paging/Directory     ______________________________________________________________________    Chief Complaint   Fall with laceration     History is obtained from the patient, his family and chart review     History of Present Illness   Ti Nickerson is a 79 year old male with a complex history of metastatic renal cancer, atrial fibrillation, orthostatic hypotension on " midodrine/Florinef, CKD stage IIIb, CAD, acute metabolic encephalopathy, history of GI bleeding,  presented to the emergency room after falls.    Patient had recently been hospitalized 1/10 through 1/17/2025 for orthostatic hypotension and recurrent syncope.  He had been losing consciousness and presented to the hospital.  He had an extensive evaluation including an echo with normal EF and moderate RV dysfunction.  Negative stim test for adrenal insufficiency, evaluated by cardiology and neurology.  Recommended compression stockings.  Stopped on his Cabozanitinib for 2 weeks to see if he had improved symptoms.  He was discharged on midodrine 10 mg 3 times daily with Ruth.     Patient today presented to the emergency room by ambulance after falling out of bed approximately 5 AM.  He appeared to be disoriented and anxious when staff were attempting to pick him up.  He had recently fallen with a laceration repair to his head 2 days ago when he had an appointment with his internal medicine physician requiring stitches and 6 staples.  He is complained of lower back pain when he arrived to the ER.  His assisted living found him on the floor.  He was supposed to get help when he gets up but apparently 2 days ago he got up without help and did fall.  Family had noted that he has vivid dreams as well that frequently cause him to get out of bed.    Patient did appear to be confused on arrival thinking he was in [place].  Labs showed sodium of 135, potassium 4.3, chloride 100 creatinine 1.59.  White count of 7.4 hemoglobin 11.9 and platelet 145.  Ammonia level less than 10 troponin 24.  Influenza, RSV and SARS negative.     A CT of his thoracic spine showed an acute burst fracture at T12 with 20% height loss, mild retropulsion of the fracture vertebral body without associated spinal canal stenosis.  CT of lumbar spine showed no fracture or posttraumatic subluxation.    Cervical spine CT.  No evidence for acute fracture or  posttraumatic subluxation.  Multilevel cervical spondylosis without high-grade spinal stenosis    CT head showing no evidence for acute intracranial process.  Brain atrophy presumed chronic changes.Incidental dilatation with possible aneurysms at the basilar tip. Recommend CTA or mass effect bases.     CT chest abdomen pelvis  1.  Acute T12 fracture as described on same-day CT thoracolumbar spine reformats. Otherwise, no acute traumatic findings within the chest, abdomen, or pelvis.   2.  Acute segmental pulmonary emboli in the right lower lobe and left upper lobe. There is dilatation of right-sided heart chambers which appears chronic.   3.  Acute nonocclusive deep venous thrombosis of the left common iliac vein which extends into the lower inferior vena cava.   4.  Stable postsurgical changes of partial duodenectomy with mild soft tissue thickening at the surgical site. No discrete duodenal mass is identified.   5.  Stable hypoattenuating lesion of the posterior right hepatic lobe.         Multiple discussions in the ER with his family, IR radiology, hematology, Neurosurgery. Patient required MRI prior to his anticoagulation to evaluate risk of anticoagulation. In addition noted to have prior GI bleeds and had been off anticoagulation but received radiation to his GI bleed.   Also discussed with IR evaluation of his iliac vein thrombosis extension into IVC. Reported that pulmonary emboli more distal.   Multiple discussions regarding options of therapy and timing.   Difficult to get through MRI scanner with agitation.     Past Medical History    Past Medical History:   Diagnosis Date    Atrial fibrillation, unspecified 9/18/2015    CAD (coronary artery disease) 09/18/2015    minimal by angio 2007, fu nuc est nl 2018    Elevated TSH 2018    Esophageal reflux 9/18/2015    Essential hypertension     History of cardioversion     [id number]    Idiopathic cardiomyopathy (H) 09/18/2015    fu echo nl ef, nl nuclear est  11/18, Dr. Quarles    Mixed hyperlipidemia 9/18/2015    Mumps     Sleep apnea 09/18/2015    does not use cpap as of 2019    Sleep apnea     Thoracic aortic aneurysm     sinus of valsalva 4.5 and asc aorta 4.5 in 2017    Tubular adenoma of colon 01/2017    fu 3 years       Past Surgical History   Past Surgical History:   Procedure Laterality Date    APPENDECTOMY  1964    ESOPHAGOSCOPY, GASTROSCOPY, DUODENOSCOPY (EGD), COMBINED N/A 9/14/2023    Procedure: Esophagoscopy, gastroscopy, duodenoscopy (EGD), combined;  Surgeon: Chele Ash MD;  Location:  GI       Prior to Admission Medications   Prior to Admission Medications   Prescriptions Last Dose Informant Patient Reported? Taking?   acetaminophen (TYLENOL) 500 MG tablet   No No   Sig: Take 1 tablet (500 mg) by mouth daily as needed for mild pain.   fludrocortisone (FLORINEF) 0.1 MG tablet   No No   Sig: Take 1 tablet (0.1 mg) by mouth daily.   midodrine (PROAMATINE) 10 MG tablet   No No   Sig: Take 1 tablet (10 mg) by mouth 3 times daily (with meals).   ondansetron (ZOFRAN) 8 MG tablet   Yes No   Sig: every 8 hours as needed for nausea.   pantoprazole (PROTONIX) 40 MG EC tablet   No No   Sig: TAKE 1 TABLET BY MOUTH TWICE A DAY BEFORE MEALS   polyethylene glycol (MIRALAX) 17 g packet   Yes No   Sig: Take 1 packet by mouth as needed for constipation.   pravastatin (PRAVACHOL) 20 MG tablet   No No   Sig: Take 1 tablet (20 mg) by mouth daily   tamsulosin (FLOMAX) 0.4 MG capsule   No No   Sig: Take 1 capsule (0.4 mg) by mouth daily      Facility-Administered Medications: None        Review of Systems    The 10 point Review of Systems is negative other than noted in the HPI or here.     Social History   I have reviewed this patient's social history and updated it with pertinent information if needed.  Social History     Tobacco Use    Smoking status: Never    Smokeless tobacco: Never   Vaping Use    Vaping status: Never Used   Substance Use Topics    Alcohol  use: Not Currently    Drug use: No         Family History   I have reviewed this patient's family history and updated it with pertinent information if needed.  Family History   Problem Relation Age of Onset    Arrhythmia Mother         a-fib    Cerebrovascular Disease Mother     Arrhythmia Father         a-fib    Colon Cancer Father     Diabetes Father     Cerebrovascular Disease Father     No Known Problems Brother          Allergies   Allergies   Allergen Reactions    Fentanyl Confusion and Other (See Comments)     Hallucinations    Oxycodone Other (See Comments)     Hallucinations when given after surgery    Other reaction(s): *Unknown, Other (See Comments), Other (see comments)   Hallucinations when given after surgery    Hallucinations when given after surgery    Hallucinations when given after surgery        Physical Exam   Vital Signs: Temp: 98.6  F (37  C) Temp src: Oral BP: 99/69 Pulse: 97   Resp: 29 SpO2: 96 % O2 Device: None (Room air)    Weight: 0 lbs 0 oz    General Appearance: Patient was awake but appeared to be slightly confused.  Respiratory: Relatively clear to auscultation bilaterally without wheezes or rhonchi  Cardiovascular: Irregular rate with no gallop or rub  GI: Positive bowel sounds soft with no rebound or guarding   Skin: No edema or redness  Patient has dried blood on the back of his head with staples in place      Medical Decision Making       75 MINUTES SPENT BY ME on the date of service doing chart review, history, exam, documentation & further activities per the note.      Data     I have personally reviewed the following data over the past 24 hrs:    7.4  \   11.9 (L)   / 145 (L)     135 100 30.0 (H) /  109 (H)   4.3 22 1.59 (H) \     ALT: 22 AST: 28 AP: 121 TBILI: 2.5 (H)   ALB: 3.1 (L) TOT PROTEIN: 5.8 (L) LIPASE: N/A     Trop: 27 (H) BNP: N/A       Imaging results reviewed over the past 24 hrs:   Recent Results (from the past 24 hours)   CT Head w/o Contrast    Narrative    EXAM:  CT HEAD W/O CONTRAST, CT CERVICAL SPINE W/O CONTRAST  LOCATION: [facility]  DATE: 1/24/2025    INDICATION: Fall, confusion  COMPARISON: Brain MRI 05/17/2020.  TECHNIQUE:   1) Routine CT Head without IV contrast. Multiplanar reformats. Dose reduction techniques were used.  2) Routine CT Cervical Spine without IV contrast. Multiplanar reformats. Dose reduction techniques were used.    FINDINGS:   HEAD CT:   INTRACRANIAL CONTENTS: No intracranial hemorrhage, extraaxial collection, or mass effect.  No CT evidence of acute infarct. Mild presumed chronic small vessel ischemic changes. Mild generalized cerebral volume loss. Normal ventricles and sulci.     VISUALIZED ORBITS/SINUSES/MASTOIDS: No intraorbital abnormality. No paranasal sinus mucosal disease. No middle ear or mastoid effusion.    BONES/SOFT TISSUES: No acute abnormality.    CERVICAL SPINE CT:   VERTEBRA: Normal vertebral body heights. Right convex scoliosis of the lower cervical spine. No fracture or posttraumatic subluxation.     CANAL/FORAMINA: Multilevel disc height loss, osteophyte formation and facet arthropathy. No high-grade spinal canal stenosis. Moderate left and mild right neural foraminal stenosis at C2-C3. Severe left and moderate to severe right neural foraminal   stenosis at C3-C4, moderate to severe left and moderate right neural foraminal stenosis at C4-C5, mild right and mild-to-moderate left neural foraminal stenosis at C5-C6 and mild left and mild to moderate right neural foraminal stenosis at C6-C7.    PARASPINAL: No extraspinal abnormality. Visualized lung fields are clear.      Impression    IMPRESSION:  HEAD CT:  1.  No CT evidence for acute intracranial process.  2.  Brain atrophy and presumed chronic microvascular ischemic changes as above.  3.  Incidental dilatation with possible aneurysms at the basilar tip. Recommend CTA or mass effect bases.    CERVICAL SPINE CT:  1.  No CT evidence for acute fracture or  post traumatic subluxation.  2.  Multilevel cervical spondylosis without high-grade spinal canal stenosis. Multilevel neural foraminal stenosis as detailed above.   CT Chest/Abdomen/Pelvis w Contrast   Result Value    Radiologist flags New diagnosis of pulmonary embolism (AA)    Narrative    EXAM: CT CHEST/ABDOMEN/PELVIS W CONTRAST  LOCATION: [facility]  DATE: 1/24/2025    INDICATION: Trauma, right shoulder blader abrasion, bruising to bilateral flanks, fall 2 days ago, concern for traumatic injuries.  COMPARISON: CT CAP 12/30/2024.  TECHNIQUE: CT scan of the chest, abdomen, and pelvis was performed following injection of IV contrast. Multiplanar reformats were obtained. Dose reduction techniques were used.   CONTRAST: 120mL Isovue 370    FINDINGS:   LUNGS AND PLEURA: Lung apices are incompletely imaged including the region of the 2 mm subpleural nodule described on prior report. Additional pulmonary nodules along the right major fissure are stable. Mild fibrotic changes of the lung bases. No pleural   effusion or pneumothorax within the limitations of this exam.    MEDIASTINUM/AXILLAE: There is mild chronic dilatation of right heart chambers. No thoracic aneurysm. Tortuous descending thoracic aorta. Right lower lobe (series 4, image 77) and left upper lobe (series 4, image 42) segmental pulmonary emboli are evident   on this non-CTA examination. No thoracic adenopathy. Small hiatal hernia.    CORONARY ARTERY CALCIFICATION: Severe.    HEPATOBILIARY: Stable 2.7 cm hypoattenuating lesion of the posterior right hepatic lobe (series 4, image 153). No new hepatic lesion. Similar gallbladder distention without radiopaque gallstone.    PANCREAS: Similar mild pancreatic atrophy.    SPLEEN: Normal.    ADRENAL GLANDS: Normal.    KIDNEYS/BLADDER: Right nephrectomy. Left renal cyst does not require follow-up. No left hydronephrosis. Chronic mild diffuse bladder wall thickening with multiple small  bladder diverticula.    BOWEL: Diverticulosis of the distal colon. Metallic clip within the stomach at the gastric body. No evidence of bowel obstruction. Postsurgical changes of partial duodenectomy with stable mild soft tissue thickening at the surgical site. A discrete   duodenal mass is not identified.    LYMPH NODES: Normal.    VASCULATURE: Mild to moderate burden of vascular calcifications without abdominal aortic aneurysm. Left common iliac vein and nonocclusive deep venous thrombosis which extends into the lower inferior vena cava at the level of the bifurcation. There is   mass effect on the inferior vena cava at the partial duodenectomy site, possibly secondary to postsurgical scarring.    PELVIC ORGANS: Prostatomegaly.    MUSCULOSKELETAL: No acute findings. Note, the bilateral scapulae, bilateral proximal humeri, bilateral clavicles, and upper ribs are incompletely imaged. See dedicated CT thoracolumbar spine reformats for details regarding the spine including acute T12   fracture.      Impression    IMPRESSION:    Note, a portion of the upper chest is incompletely imaged. The following findings are made within these limitations.    1.  Acute T12 fracture as described on same-day CT thoracolumbar spine reformats. Otherwise, no acute traumatic findings within the chest, abdomen, or pelvis.  2.  Acute segmental pulmonary emboli in the right lower lobe and left upper lobe. There is dilatation of right-sided heart chambers which appears chronic.  3.  Acute nonocclusive deep venous thrombosis of the left common iliac vein which extends into the lower inferior vena cava.  4.  Stable postsurgical changes of partial duodenectomy with mild soft tissue thickening at the surgical site. No discrete duodenal mass is identified.  5.  Stable hypoattenuating lesion of the posterior right hepatic lobe.      [Critical Result: New diagnosis of pulmonary embolism]    Finding was identified on 1/24/2025 11:48 AM CST.       Juliette was contacted by me on 1/24/2025 12:09 PM CST and verbalized understanding of the critical result.   CT Cervical Spine w/o Contrast    Narrative    EXAM: CT HEAD W/O CONTRAST, CT CERVICAL SPINE W/O CONTRAST  LOCATION: [facility]  DATE: 1/24/2025    INDICATION: Fall, confusion  COMPARISON: Brain MRI 05/17/2020.  TECHNIQUE:   1) Routine CT Head without IV contrast. Multiplanar reformats. Dose reduction techniques were used.  2) Routine CT Cervical Spine without IV contrast. Multiplanar reformats. Dose reduction techniques were used.    FINDINGS:   HEAD CT:   INTRACRANIAL CONTENTS: No intracranial hemorrhage, extraaxial collection, or mass effect.  No CT evidence of acute infarct. Mild presumed chronic small vessel ischemic changes. Mild generalized cerebral volume loss. Normal ventricles and sulci.     VISUALIZED ORBITS/SINUSES/MASTOIDS: No intraorbital abnormality. No paranasal sinus mucosal disease. No middle ear or mastoid effusion.    BONES/SOFT TISSUES: No acute abnormality.    CERVICAL SPINE CT:   VERTEBRA: Normal vertebral body heights. Right convex scoliosis of the lower cervical spine. No fracture or posttraumatic subluxation.     CANAL/FORAMINA: Multilevel disc height loss, osteophyte formation and facet arthropathy. No high-grade spinal canal stenosis. Moderate left and mild right neural foraminal stenosis at C2-C3. Severe left and moderate to severe right neural foraminal   stenosis at C3-C4, moderate to severe left and moderate right neural foraminal stenosis at C4-C5, mild right and mild-to-moderate left neural foraminal stenosis at C5-C6 and mild left and mild to moderate right neural foraminal stenosis at C6-C7.    PARASPINAL: No extraspinal abnormality. Visualized lung fields are clear.      Impression    IMPRESSION:  HEAD CT:  1.  No CT evidence for acute intracranial process.  2.  Brain atrophy and presumed chronic microvascular ischemic changes as above.  3.   Incidental dilatation with possible aneurysms at the basilar tip. Recommend CTA or mass effect bases.    CERVICAL SPINE CT:  1.  No CT evidence for acute fracture or post traumatic subluxation.  2.  Multilevel cervical spondylosis without high-grade spinal canal stenosis. Multilevel neural foraminal stenosis as detailed above.   CT Thoracic Spine w/o Contrast    Narrative    EXAM: CT THORACIC SPINE W/O CONTRAST, CT LUMBAR SPINE W/O CONTRAST  LOCATION: [facility]  DATE: 1/24/2025    INDICATION: trauma  COMPARISON: None.  TECHNIQUE:  1) Routine CT Thoracic Spine without IV contrast. Multiplanar reformats. Dose reduction techniques were used.   2) Routine CT Lumbar Spine without IV contrast. Multiplanar reformats. Dose reduction techniques were used.     FINDINGS:    THORACIC SPINE CT:  VERTEBRA: Osteopenia. Acute burst fracture of T12 with approximately 20% height loss. Minimal retropulsion of the fractured vertebral body without associated spinal canal stenosis. Unchanged Schmorl's node-like endplate indentation along the inferior   endplate of T9 with associated chronic vertebral body height loss. Stepwise mild anterolisthesis T2-T4. No fracture or posttraumatic subluxation.     CANAL/FORAMINA: No canal or neural foraminal stenosis.    PARASPINAL: No extraspinal abnormality.    LUMBAR SPINE CT:  VERTEBRA: Normal vertebral body heights. Left convex scoliosis with the apex at L3-L4 minimal retrolisthesis at L3-L4 and minimal anterolisthesis at L5-S1. No fracture or posttraumatic subluxation.     CANAL/FORAMINA: Multilevel disc height loss, osteophyte formation and facet arthropathy without high-grade spinal or neural foraminal stenosis. Moderate right neural foraminal stenosis at L2-L3 and L3-L4 and mild to left neural foraminal stenosis at   L4-L5 and L5-S1. Multilevel Schmorl's node-like endplate indentations.    PARASPINAL: No extraspinal abnormality.      Impression     IMPRESSION:  THORACIC SPINE CT:  1.  Acute burst fracture of T12 with approximately 20% height loss. Mild retropulsion of the fractured vertebral body without associated spinal canal stenosis.  2.  No high-grade spinal canal or neural foraminal stenosis.    LUMBAR SPINE CT:  1.  No fracture or posttraumatic subluxation.  2.  Multilevel lumbar spondylosis without high-grade spinal canal stenosis. Multilevel neural foraminal stenosis     CT Lumbar Spine w/o Contrast    Narrative    EXAM: CT THORACIC SPINE W/O CONTRAST, CT LUMBAR SPINE W/O CONTRAST  LOCATION: [facility]  DATE: 1/24/2025    INDICATION: trauma  COMPARISON: None.  TECHNIQUE:  1) Routine CT Thoracic Spine without IV contrast. Multiplanar reformats. Dose reduction techniques were used.   2) Routine CT Lumbar Spine without IV contrast. Multiplanar reformats. Dose reduction techniques were used.     FINDINGS:    THORACIC SPINE CT:  VERTEBRA: Osteopenia. Acute burst fracture of T12 with approximately 20% height loss. Minimal retropulsion of the fractured vertebral body without associated spinal canal stenosis. Unchanged Schmorl's node-like endplate indentation along the inferior   endplate of T9 with associated chronic vertebral body height loss. Stepwise mild anterolisthesis T2-T4. No fracture or posttraumatic subluxation.     CANAL/FORAMINA: No canal or neural foraminal stenosis.    PARASPINAL: No extraspinal abnormality.    LUMBAR SPINE CT:  VERTEBRA: Normal vertebral body heights. Left convex scoliosis with the apex at L3-L4 minimal retrolisthesis at L3-L4 and minimal anterolisthesis at L5-S1. No fracture or posttraumatic subluxation.     CANAL/FORAMINA: Multilevel disc height loss, osteophyte formation and facet arthropathy without high-grade spinal or neural foraminal stenosis. Moderate right neural foraminal stenosis at L2-L3 and L3-L4 and mild to left neural foraminal stenosis at   L4-L5 and L5-S1. Multilevel Schmorl's  node-like endplate indentations.    PARASPINAL: No extraspinal abnormality.      Impression    IMPRESSION:  THORACIC SPINE CT:  1.  Acute burst fracture of T12 with approximately 20% height loss. Mild retropulsion of the fractured vertebral body without associated spinal canal stenosis.  2.  No high-grade spinal canal or neural foraminal stenosis.    LUMBAR SPINE CT:  1.  No fracture or posttraumatic subluxation.  2.  Multilevel lumbar spondylosis without high-grade spinal canal stenosis. Multilevel neural foraminal stenosis

## 2025-01-24 NOTE — ED PROVIDER NOTES
Emergency Department Note      History of Present Illness     Chief Complaint   Fall    HPI   Ti Nickerson is a 79 year old male with a history including hypertension, hyperlipidemia, CAD, CKD stage 3b, chronic atrial fibrillation, and right renal cell carcinoma with mets to the lung who presents to the ED via EMS for evaluation after a fall. The patient was recently admitted from 1/10 to 1/17 for orthostatic hypotension and recurrent syncopal episodes. He had another fall 2 days ago, where he hit his back and lacerated his scalp. He had an appointment with his internal medicine physician, where he received 6 staples. He didn't have a head CT at this time. He has been complaining about lower back pain since. This morning, his assisted living facility staff found him on the floor next to his bed. The patient's family states that the patient has very vivid dreams that frequently cause him to get out of bed. The patient was confused and disorientated, but were able to help stand him up. The patient thinks he is in [place]. EMS found a blood glucose of 104. They started fluids. The patient was last seen well at 2100 last night. He hasn't had any recent infections or UTIs. No fevers. No personal history of strokes. No blood thinner usage. He stopped taking his cabozantinib during his hospital stay to see if it was contributing to his falls.     Independent Historian   Wife and Daughter as detailed above.    Review of External Notes   I reviewed patient's 1/17/2025 discharge summary for orthostatic hypotension and recurrent syncope.     Past Medical History     Medical History and Problem List   Atrial fibrillation, unspecified  CAD   Elevated TSH  Esophageal reflux  Essential hypertension  History of cardioversion  Idiopathic cardiomyopathy   Mixed hyperlipidemia  Sleep apnea  Thoracic aortic aneurysm  Tubular adenoma of colon    Medications   cabozantinib   midodrine  ondansetron  pantoprazole    pravastatin  tamsulosin    Surgical History   Appendectomy   EGD    Physical Exam     Patient Vitals for the past 24 hrs:   BP Temp Temp src Pulse Resp SpO2   01/24/25 1400 100/75 -- -- 104 20 96 %   01/24/25 1300 107/76 -- -- 102 18 94 %   01/24/25 1200 109/89 -- -- 76 20 94 %   01/24/25 1100 99/69 -- -- 97 29 96 %   01/24/25 1030 97/66 -- -- 100 26 95 %   01/24/25 1000 104/74 -- -- 100 17 95 %   01/24/25 0945 -- -- -- -- 26 98 %   01/24/25 0944 91/70 -- -- (!) 108 (!) 49 --   01/24/25 0942 134/83 -- -- (!) 111 (!) 31 98 %   01/24/25 0941 135/86 -- -- (!) 110 24 (!) 77 %   01/24/25 0940 -- -- -- 105 24 97 %   01/24/25 0930 114/76 -- -- 96 -- 98 %   01/24/25 0929 -- -- -- 105 22 98 %   01/24/25 0909 -- 98.6  F (37  C) Oral -- -- --   01/24/25 0901 -- -- -- -- 19 98 %   01/24/25 0900 114/77 -- -- 98 -- --     Physical Exam  General: Confused, states he's in '[place],' appears well-developed and well-nourished. Cooperative.     In mild distress  HEENT:  Head:  Atraumatic  Ears:  External ears are normal  Mouth/Throat:  Oropharynx is without erythema or exudate and mucous membranes are dry.   Eyes:   Conjunctivae normal and EOM are normal. No scleral icterus.  CV:  Normal rate, regular rhythm, normal heart sounds and radial pulses are 2+ and symmetric.  No murmur.  Resp:  Breath sounds are clear bilaterally    Non-labored, no retractions or accessory muscle use  GI:  Abdomen is soft, no distension, no tenderness. No rebound or guarding.  No CVA tenderness bilaterally  MS:  Normal range of motion. No edema.    There is an abrasion to the right shoulder blade as well as bruising to bilateral flanks.    No midline cervical, thoracic, or lumbar tenderness  Skin:  Warm and dry.  Bruising to bilateral flanks.  Abrasion to right shoulder blade.  Neuro:   confused. Normal strength.  GCS: 14    Diagnostics     Lab Results   Labs Ordered and Resulted from Time of ED Arrival to Time of ED Departure   ROUTINE UA WITH MICROSCOPIC  REFLEX TO CULTURE - Abnormal       Result Value    Color Urine Yellow      Appearance Urine Clear      Glucose Urine Negative      Bilirubin Urine Negative      Ketones Urine Negative      Specific Gravity Urine 1.027      Blood Urine Trace (*)     pH Urine 5.5      Protein Albumin Urine 10 (*)     Urobilinogen Urine 2.0      Nitrite Urine Negative      Leukocyte Esterase Urine Negative      Mucus Urine Present (*)     RBC Urine 1      WBC Urine 1     BASIC METABOLIC PANEL - Abnormal    Sodium 135      Potassium 4.3      Chloride 100      Carbon Dioxide (CO2) 22      Anion Gap 13      Urea Nitrogen 30.0 (*)     Creatinine 1.59 (*)     GFR Estimate 44 (*)     Calcium 8.4 (*)     Glucose 109 (*)    CBC WITH PLATELETS AND DIFFERENTIAL - Abnormal    WBC Count 7.4      RBC Count 3.51 (*)     Hemoglobin 11.9 (*)     Hematocrit 34.0 (*)     MCV 97      MCH 33.9 (*)     MCHC 35.0      RDW 15.5 (*)     Platelet Count 145 (*)     % Neutrophils 94      % Lymphocytes 4      % Monocytes 2      % Eosinophils 0      % Basophils 0      % Immature Granulocytes 1      NRBCs per 100 WBC 0      Absolute Neutrophils 6.9      Absolute Lymphocytes 0.3 (*)     Absolute Monocytes 0.1      Absolute Eosinophils 0.0      Absolute Basophils 0.0      Absolute Immature Granulocytes 0.0      Absolute NRBCs 0.0     HEPATIC FUNCTION PANEL - Abnormal    Protein Total 5.8 (*)     Albumin 3.1 (*)     Bilirubin Total 2.5 (*)     Alkaline Phosphatase 121      AST 28      ALT 22      Bilirubin Direct 0.79 (*)    AMMONIA - Abnormal    Ammonia <10 (*)    TROPONIN T, HIGH SENSITIVITY - Abnormal    Troponin T, High Sensitivity 24 (*)    INFLUENZA A/B, RSV AND SARS-COV2 PCR - Normal    Influenza A PCR Negative      Influenza B PCR Negative      RSV PCR Negative      SARS CoV2 PCR Negative     TROPONIN T, HIGH SENSITIVITY     Imaging   CT Lumbar Spine w/o Contrast   Final Result   IMPRESSION:   THORACIC SPINE CT:   1.  Acute burst fracture of T12 with  approximately 20% height loss. Mild retropulsion of the fractured vertebral body without associated spinal canal stenosis.   2.  No high-grade spinal canal or neural foraminal stenosis.      LUMBAR SPINE CT:   1.  No fracture or posttraumatic subluxation.   2.  Multilevel lumbar spondylosis without high-grade spinal canal stenosis. Multilevel neural foraminal stenosis         CT Thoracic Spine w/o Contrast   Final Result   IMPRESSION:   THORACIC SPINE CT:   1.  Acute burst fracture of T12 with approximately 20% height loss. Mild retropulsion of the fractured vertebral body without associated spinal canal stenosis.   2.  No high-grade spinal canal or neural foraminal stenosis.      LUMBAR SPINE CT:   1.  No fracture or posttraumatic subluxation.   2.  Multilevel lumbar spondylosis without high-grade spinal canal stenosis. Multilevel neural foraminal stenosis         CT Cervical Spine w/o Contrast   Final Result   IMPRESSION:   HEAD CT:   1.  No CT evidence for acute intracranial process.   2.  Brain atrophy and presumed chronic microvascular ischemic changes as above.   3.  Incidental dilatation with possible aneurysms at the basilar tip. Recommend CTA or mass effect bases.      CERVICAL SPINE CT:   1.  No CT evidence for acute fracture or post traumatic subluxation.   2.  Multilevel cervical spondylosis without high-grade spinal canal stenosis. Multilevel neural foraminal stenosis as detailed above.      CT Chest/Abdomen/Pelvis w Contrast   Final Result   Abnormal   IMPRESSION:      Note, a portion of the upper chest is incompletely imaged. The following findings are made within these limitations.      1.  Acute T12 fracture as described on same-day CT thoracolumbar spine reformats. Otherwise, no acute traumatic findings within the chest, abdomen, or pelvis.   2.  Acute segmental pulmonary emboli in the right lower lobe and left upper lobe. There is dilatation of right-sided heart chambers which appears chronic.    3.  Acute nonocclusive deep venous thrombosis of the left common iliac vein which extends into the lower inferior vena cava.   4.  Stable postsurgical changes of partial duodenectomy with mild soft tissue thickening at the surgical site. No discrete duodenal mass is identified.   5.  Stable hypoattenuating lesion of the posterior right hepatic lobe.         [Critical Result: New diagnosis of pulmonary embolism]      Finding was identified on 1/24/2025 11:48 AM CST.       Dr. Segovia was contacted by me on 1/24/2025 12:09 PM CST and verbalized understanding of the critical result.      CT Head w/o Contrast   Final Result   IMPRESSION:   HEAD CT:   1.  No CT evidence for acute intracranial process.   2.  Brain atrophy and presumed chronic microvascular ischemic changes as above.   3.  Incidental dilatation with possible aneurysms at the basilar tip. Recommend CTA or mass effect bases.      CERVICAL SPINE CT:   1.  No CT evidence for acute fracture or post traumatic subluxation.   2.  Multilevel cervical spondylosis without high-grade spinal canal stenosis. Multilevel neural foraminal stenosis as detailed above.      US Lower Extremity Venous Duplex Bilateral    (Results Pending)   MR Thoracic Spine w/o & w Contrast    (Results Pending)     EKG   ECG taken at 0855, ECG read at 0856  Atrial fibrillation  Left axis deviation  Nonspecific ST abnormality  Abnormal ECG   Rate 96 bpm. UT interval * ms. QRS duration 98 ms. QT/QTc 364/459 ms. P-R-T axes * -43 0.    Independent Interpretation   CT Head: No intracranial hemorrhage.    ED Course      Medications Administered   Medications   fentaNYL (PF) (SUBLIMAZE) injection 50 mcg (has no administration in time range)   sodium chloride 0.9% BOLUS 500 mL (0 mLs Intravenous Stopped 1/24/25 1413)   Saline Flush (76 mLs Intravenous $Given 1/24/25 1110)   iopamidol (ISOVUE-370) solution 120 mL (120 mLs Intravenous $Given 1/24/25 1110)     Discussion of Management   Admitting  Hospitalist, Dr. Hansen  Neurosurgery, Zora Mccrary    ED Course   ED Course as of 01/24/25 1423   Fri Jan 24, 2025   0915 I obtained history and examined the patient as noted above.    1209 I spoke with radiology concerning the patient's PE, DVT, and burst fracture.    1214 I spoke with Dr. Hansen, admitting hospitalist,  regarding the patient's presentation and plan of care.      1229 I spoke with Zora Mccrary from neurosurgery regarding the patient's presentation and plan of care.      1229 I spoke with Dr. Hansen again regarding the patient's presentation and plan of care.        Additional Documentation  None    Medical Decision Making / Diagnosis     MDM   Ti Nickerson is a 79 year old male with a complex past medical history who presents due to unwitnessed fall, confusion, and back pain.  Patient has had multiple falls within the last 1 week.  Of note he did fall 2 days ago and sustained a laceration to the posterior occiput.  He had staples placed in the area of laceration.  Unfortunately patient had a broad workup performed today given diffuse back pain as well as bruising to bilateral flanks and pinpoint tenderness throughout the thoracic spine.  Patient did have an abrasion to the right shoulder blade as well.  CT imaging of the chest abdomen pelvis revealed an acute T12 burst fracture but thankfully no other acute traumatic findings within the chest abdomen or pelvis.  CT imaging also displayed an acute segmental pulmonary emboli in the right lower lobe and left upper lobe.  Patient with no history of blood clots historically.  There was also an appearance of an acute nonocclusive DVT of the left common iliac vein extending to the lower inferior vena cava.  Patient unfortunately has had prior GI bleeds and so after long discussion with the admitting hospitalist service and hematology/oncology and interventional radiology consider high-dose heparin without bolus assuming neurosurgical clearance following  MRI imaging of the thoracic spine secondary to the T12 burst fracture.  Patient is neuro intact on assessment.  Thankfully CT imaging of the head showed no acute skull fracture nor intracranial hemorrhage.  Given T12 burst fracture, recurrent falls, orthostatic hypotension, as well as pulmonary embolism and DVT will plan for hospitalization under the care of the hospitalist service.  I spoke with Dr. Hansen of the hospitalist service who agreed to inpatient admission    Disposition   The patient was admitted to the hospital.     Diagnosis     ICD-10-CM    1. Acute encephalopathy  G93.40       2. Unwitnessed fall  R29.6       3. Pulmonary embolism, other, unspecified chronicity, unspecified whether acute cor pulmonale present (H)  I26.99       4. Deep vein thrombosis (DVT) of femoral vein, unspecified chronicity, unspecified laterality (H)  I82.419       5. T12 burst fracture (H)  S22.081A         Scribe Disclosure:  I, Flakito Holguin, am serving as a scribe at 12:36 PM on 1/24/2025 to document services personally performed by Herson Segovia MD, based on my observations and the provider's statements to me.        Herson Segovia MD  01/24/25 [id number]

## 2025-01-24 NOTE — ED TRIAGE NOTES
Pt BIBA, EMS report pt having a fall around 0500 this morning. EMS report pt fall was disoriented and anxious when staff were attempting to pick him up. Pt is not on blood thinners. EMS report pt was seen recently for a fall that required a laceration repair on his head. Pt was also found to have orthostatic hypotension.      Triage Assessment (Adult)       Row Name 01/24/25 0906          Triage Assessment    Airway WDL WDL        Respiratory WDL    Respiratory WDL WDL        Skin Circulation/Temperature WDL    Skin Circulation/Temperature WDL X  repaired laceration to posterior head        Cardiac WDL    Cardiac WDL X     Cardiac Rhythm Atrial fibrillation        Peripheral/Neurovascular WDL    Peripheral Neurovascular WDL WDL        Cognitive/Neuro/Behavioral WDL    Cognitive/Neuro/Behavioral WDL X     Level of Consciousness confused        Pupils (CN II)    Pupil PERRLA yes

## 2025-01-24 NOTE — PROGRESS NOTES
Writer attempted to see patient and his wife for his consult. Patient went down to his MRI scanning. Writer will re-attempt.    Shahzad TREVINO  [facility]  Inpatient Care Coordination

## 2025-01-24 NOTE — CONSULTS
[facility]    Neurosurgery Consultation     Date of Admission:  1/24/2025  Date of Consult (When I saw the patient): 01/24/25    Assessment & Plan   Ti Nickerson is a 79 year old male who was admitted on 1/24/2025. Ti Nickerson is a 79 year old male with a history including hypertension, hyperlipidemia, CAD, CKD stage 3b, chronic atrial fibrillation, and right renal cell carcinoma with mets to the lung who presents to the ED via EMS for evaluation after a fall with imaging evidence as demonstrated below.     Per patient, has had ongoing back pain since his fall Wednesday. Pain is localized and does not radiate. Denies paresthesias, weakness, bowel/bladder changes. He has extensive DVT/PE diagnosed today.       EXAM: CT THORACIC SPINE W/O CONTRAST, CT LUMBAR SPINE W/O CONTRAST  LOCATION: [facility]  DATE: 1/24/2025                                                      IMPRESSION:  THORACIC SPINE CT:  1.  Acute burst fracture of T12 with approximately 20% height loss. Mild retropulsion of the fractured vertebral body without associated spinal canal stenosis.  2.  No high-grade spinal canal or neural foraminal stenosis.     LUMBAR SPINE CT:  1.  No fracture or posttraumatic subluxation.  2.  Multilevel lumbar spondylosis without high-grade spinal canal stenosis. Multilevel neural foraminal stenosis    PLAN  Bed rest spine precautions   Thoracic MRI for further evaluation   Likely custom TLSO and light activity. Will finalize after MRI   Will clear for anticoagulation as long as no epidural hemorrhage around fracture    ADDENDUM- imaging reviewed with Dr. Linder. OK for anticoagulation. Will consult orthotics for brace. Updated Dr. Hansen     Plans reviewed with Dr. Linder    I have discussed the following assessment and plan with Dr. Linder who is in agreement with the initial plan and will follow up with further consultation recommendations.    Zora  ANA LILAI Mccrary  [facility] Neurosurgery  [address] [facility]  [address] [place] [id number]  Tel [phone]  Fax [phone]  Text page via [employer] Paging/Directory        Code Status    Full Code    Reason for Consult   Reason for consult: I was asked by Dr. Hansen to evaluate this patient for t12 fracture.    Primary Care Physician   Nico Retana MD    Chief Complaint   Fall and back pain     History is obtained from the patient, electronic health record, emergency department physician, patient's daughter, and patient's spouse    History of Present Illness   Ti Nickerson is a 79 year old male with a history including hypertension, hyperlipidemia, CAD, CKD stage 3b, chronic atrial fibrillation, and right renal cell carcinoma with mets to the lung who presents to the ED via EMS for evaluation after a fall with imaging evidence as demonstrated below.     Per patient, has had ongoing back pain since his fall Wednesday. Pain is localized and does not radiate. Denies paresthesias, weakness, bowel/bladder changes. He has extensive DVT/PE diagnosed today.       EXAM: CT THORACIC SPINE W/O CONTRAST, CT LUMBAR SPINE W/O CONTRAST  LOCATION: [facility]  DATE: 1/24/2025                                                      IMPRESSION:  THORACIC SPINE CT:  1.  Acute burst fracture of T12 with approximately 20% height loss. Mild retropulsion of the fractured vertebral body without associated spinal canal stenosis.  2.  No high-grade spinal canal or neural foraminal stenosis.     LUMBAR SPINE CT:  1.  No fracture or posttraumatic subluxation.  2.  Multilevel lumbar spondylosis without high-grade spinal canal stenosis. Multilevel neural foraminal stenosis      Past Medical History   I have reviewed this patient's medical history and updated it with pertinent information if needed.   Past Medical History:   Diagnosis Date    Acute encephalopathy 1/24/2025    Atrial fibrillation,  unspecified 9/18/2015    CAD (coronary artery disease) 09/18/2015    minimal by angio 2007, fu nuc est nl 2018    Elevated TSH 2018    Esophageal reflux 9/18/2015    Essential hypertension     History of cardioversion     [id number]    Idiopathic cardiomyopathy (H) 09/18/2015    fu echo nl ef, nl nuclear est 11/18, Dr. Quarles    Mixed hyperlipidemia 9/18/2015    Mumps     Sleep apnea 09/18/2015    does not use cpap as of 2019    Sleep apnea     Thoracic aortic aneurysm     sinus of valsalva 4.5 and asc aorta 4.5 in 2017    Tubular adenoma of colon 01/2017    fu 3 years       Past Surgical History   I have reviewed this patient's surgical history and updated it with pertinent information if needed.  Past Surgical History:   Procedure Laterality Date    APPENDECTOMY  1964    ESOPHAGOSCOPY, GASTROSCOPY, DUODENOSCOPY (EGD), COMBINED N/A 9/14/2023    Procedure: Esophagoscopy, gastroscopy, duodenoscopy (EGD), combined;  Surgeon: Chele Ash MD;  Location:  GI       Prior to Admission Medications   Prior to Admission Medications   Prescriptions Last Dose Informant Patient Reported? Taking?   acetaminophen (TYLENOL) 500 MG tablet  Nursing Home No Yes   Sig: Take 1 tablet (500 mg) by mouth daily as needed for mild pain.   Patient taking differently: Take 500-1,000 mg by mouth daily as needed for mild pain.   fludrocortisone (FLORINEF) 0.1 MG tablet 1/23/2025 Morning Nursing Home No Yes   Sig: Take 1 tablet (0.1 mg) by mouth daily.   midodrine (PROAMATINE) 10 MG tablet 1/23/2025 Nursing Home No Yes   Sig: Take 1 tablet (10 mg) by mouth 3 times daily (with meals).   pantoprazole (PROTONIX) 40 MG EC tablet 1/23/2025 Nursing Home No Yes   Sig: TAKE 1 TABLET BY MOUTH TWICE A DAY BEFORE MEALS   pravastatin (PRAVACHOL) 20 MG tablet 1/23/2025 Evening Nursing Home No Yes   Sig: Take 1 tablet (20 mg) by mouth daily   tamsulosin (FLOMAX) 0.4 MG capsule 1/23/2025 Morning Nursing Home No Yes   Sig: Take 1 capsule (0.4 mg) by  mouth daily      Facility-Administered Medications: None     Allergies   Allergies   Allergen Reactions    Fentanyl Confusion and Other (See Comments)     Hallucinations    Oxycodone Other (See Comments)     Hallucinations when given after surgery    Other reaction(s): *Unknown, Other (See Comments), Other (see comments)   Hallucinations when given after surgery    Hallucinations when given after surgery    Hallucinations when given after surgery       Social History   I have reviewed this patient's social history and updated it with pertinent information if needed. Ti Nickerson  reports that he has never smoked. He has never used smokeless tobacco. He reports that he does not currently use alcohol. He reports that he does not use drugs.    Family History   I have reviewed this patient's family history and updated it with pertinent information if needed.   Family History   Problem Relation Age of Onset    Arrhythmia Mother         a-fib    Cerebrovascular Disease Mother     Arrhythmia Father         a-fib    Colon Cancer Father     Diabetes Father     Cerebrovascular Disease Father     No Known Problems Brother        Review of Systems    ROS: 10 point ROS neg other than the symptoms noted above in the HPI.    Physical Exam   Temp: 98.6  F (37  C) Temp src: Oral BP: 108/90 Pulse: 96   Resp: 15 SpO2: 96 % O2 Device: None (Room air)    Vital Signs with Ranges  Temp:  [98.6  F (37  C)] 98.6  F (37  C)  Pulse:  [] 96  Resp:  [15-49] 15  BP: ()/(63-90) 108/90  SpO2:  [77 %-98 %] 96 %  0 lbs 0 oz     , Blood pressure 108/90, pulse 96, temperature 98.6  F (37  C), temperature source Oral, resp. rate 15, SpO2 96%.  0 lbs 0 oz      NEUROLOGICAL EXAMINATION:   Mental status:  Alert and appropriate, speech is fluent.  Cranial nerves:  II-XII grossly intact.   Motor:     Hip Flexor:                Right: 5/5  Left:  5/5  Hip Adductor:             Right:  5/5  Left:  5/5  Hip Abductor:             Right:  5/5   Left:  5/5  Gastroc Soleus:        Right:  5/5  Left:  5/5  Tib/Ant:                      Right:  5/5  Left:  5/5  EHL:                     Right:  5/5  Left:  5/5  Sensation:  intact to light touch BLE  Reflexes:     Negative Clonus.      TTP around t12 midline and paraspinous     No ttp along remainder thoracic or lumbar midline or paraspinous     Negative SLR BL    Data   EXAM: CT THORACIC SPINE W/O CONTRAST, CT LUMBAR SPINE W/O CONTRAST  LOCATION: [facility]  DATE: 1/24/2025     INDICATION: trauma  COMPARISON: None.  TECHNIQUE:  1) Routine CT Thoracic Spine without IV contrast. Multiplanar reformats. Dose reduction techniques were used.   2) Routine CT Lumbar Spine without IV contrast. Multiplanar reformats. Dose reduction techniques were used.      FINDINGS:     THORACIC SPINE CT:  VERTEBRA: Osteopenia. Acute burst fracture of T12 with approximately 20% height loss. Minimal retropulsion of the fractured vertebral body without associated spinal canal stenosis. Unchanged Schmorl's node-like endplate indentation along the inferior   endplate of T9 with associated chronic vertebral body height loss. Stepwise mild anterolisthesis T2-T4. No fracture or posttraumatic subluxation.      CANAL/FORAMINA: No canal or neural foraminal stenosis.     PARASPINAL: No extraspinal abnormality.     LUMBAR SPINE CT:  VERTEBRA: Normal vertebral body heights. Left convex scoliosis with the apex at L3-L4 minimal retrolisthesis at L3-L4 and minimal anterolisthesis at L5-S1. No fracture or posttraumatic subluxation.      CANAL/FORAMINA: Multilevel disc height loss, osteophyte formation and facet arthropathy without high-grade spinal or neural foraminal stenosis. Moderate right neural foraminal stenosis at L2-L3 and L3-L4 and mild to left neural foraminal stenosis at   L4-L5 and L5-S1. Multilevel Schmorl's node-like endplate indentations.     PARASPINAL: No extraspinal abnormality.                                                                       IMPRESSION:  THORACIC SPINE CT:  1.  Acute burst fracture of T12 with approximately 20% height loss. Mild retropulsion of the fractured vertebral body without associated spinal canal stenosis.  2.  No high-grade spinal canal or neural foraminal stenosis.     LUMBAR SPINE CT:  1.  No fracture or posttraumatic subluxation.  2.  Multilevel lumbar spondylosis without high-grade spinal canal stenosis. Multilevel neural foraminal stenosis  CBC RESULTS:   Recent Labs   Lab Test 01/24/25  0904   WBC 7.4   RBC 3.51*   HGB 11.9*   HCT 34.0*   MCV 97   MCH 33.9*   MCHC 35.0   RDW 15.5*   *     Basic Metabolic Panel:  Lab Results   Component Value Date     01/24/2025     06/10/2021      Lab Results   Component Value Date    POTASSIUM 4.3 01/24/2025    POTASSIUM 3.6 07/14/2024    POTASSIUM 4.4 09/21/2022    POTASSIUM 4.5 06/10/2021     Lab Results   Component Value Date    CHLORIDE 100 01/24/2025    CHLORIDE 109 09/21/2022    CHLORIDE 111 06/10/2021     Lab Results   Component Value Date    DOMINICK 8.4 01/24/2025    DOMINICK 8.5 06/10/2021     Lab Results   Component Value Date    CO2 22 01/24/2025    CO2 25 09/21/2022    CO2 25 06/10/2021     Lab Results   Component Value Date    BUN 30.0 01/24/2025    BUN 27 09/21/2022    BUN 30 06/10/2021     Lab Results   Component Value Date    CR 1.59 01/24/2025    CR 1.68 06/10/2021     Lab Results   Component Value Date     01/24/2025     07/15/2024    GLC 66 09/21/2022    GLC 97 06/10/2021     INR:  Lab Results   Component Value Date    INR 1.57 07/14/2024    INR 2.29 10/25/2023    INR 1.22 09/13/2023    INR 2.3 06/17/2013    INR 2.4 05/08/2013    INR 2.2 03/29/2013    INR 2.5 01/25/2013    INR 2.2 05/01/2012    INR 2.1 03/27/2012    INR 2.3 02/23/2012    INR 2.0 01/26/2012    INR 2.0 01/06/2012    INR 2.52 01/31/2008    INR 1.82 01/30/2008    INR 2.20 01/29/2008

## 2025-01-24 NOTE — CONSULTS
"  Interventional Radiology - Pre-Procedure Note:  Inpatient - [facility]  01/24/2025     Procedure Requested: Pelvic DVT into IVC, evaluate for thrombectomy   Requested by: Dr Hansen    Brief HPI: Ti Nickerson is a 79 year old male with a history including hypertension, hyperlipidemia, CAD, CKD stage 3b, chronic atrial fibrillation, and right renal cell carcinoma with mets to the lung who presents to the ED via EMS for evaluation after a fall. The patient was recently admitted from 1/10 to 1/17 for orthostatic hypotension and recurrent syncopal episodes. He had another fall 2 days ago, where he hit his back and lacerated his scalp. He had an appointment with his internal medicine physician, where he received 6 staples. He didn't have a head CT at this time. He has been complaining about lower back pain since. This morning, his assisted living facility staff found him on the floor next to his bed. The patient's family states that the patient has very vivid dreams that frequently cause him to get out of bed. The patient was confused and disorientated, but were able to help stand him up. The patient thinks he is in [place]. EMS found a blood glucose of 104. They started fluids. The patient was last seen well at 2100 last night. He hasn't had any recent infections or UTIs. No fevers. No personal history of strokes. No blood thinner usage. He stopped taking his cabozantinib during his hospital stay to see if it was contributing to his falls.     Imaging demonstrated \"Acute nonocclusive deep venous thrombosis of the left common iliac vein which extends into the lower inferior vena cava\" and IR consult was placed for thrombectomy.     Assessment: Imaging and chart reviewed by IR Dr Garcia who felt the clot should be removed as the patient was at risk for further clot to develop which would cause further issues. Discussed procedure and consent with the wife, daughter and patient after he returned from [place]. "     Edgar had issues with GI bleeding which couldn't be embolized by either IR or GI in the past. He received radiation to the areas which stopped the bleeding and hasn't been on anticoagulation since. Neurosurgery ordered an MRI to check the back and also look for areas of bleeding. If no bleeding noted then he would be started on IV heparin, unclear if low intensity or high intensity without boluses. The MRI hadn't been done yet.     Plan: IR would need the patient to anticoagulate post procedure as he is at higher risk for clot formation. It was decided to wait on a procedure at this time until further evaluation is done.     Dr Hansen aware and Dr Garcia is on this weekend for further questions.     IMAGING:    EXAM: CT CHEST/ABDOMEN/PELVIS W CONTRAST  LOCATION: [facility]  DATE: 1/24/2025     INDICATION: Trauma, right shoulder blader abrasion, bruising to bilateral flanks, fall 2 days ago, concern for traumatic injuries.  COMPARISON: CT CAP 12/30/2024.  TECHNIQUE: CT scan of the chest, abdomen, and pelvis was performed following injection of IV contrast. Multiplanar reformats were obtained. Dose reduction techniques were used.   CONTRAST: 120mL Isovue 370     FINDINGS:   LUNGS AND PLEURA: Lung apices are incompletely imaged including the region of the 2 mm subpleural nodule described on prior report. Additional pulmonary nodules along the right major fissure are stable. Mild fibrotic changes of the lung bases. No pleural  effusion or pneumothorax within the limitations of this exam.     MEDIASTINUM/AXILLAE: There is mild chronic dilatation of right heart chambers. No thoracic aneurysm. Tortuous descending thoracic aorta. Right lower lobe (series 4, image 77) and left upper lobe (series 4, image 42) segmental pulmonary emboli are evident   on this non-CTA examination. No thoracic adenopathy. Small hiatal hernia.     CORONARY ARTERY CALCIFICATION: Severe.     HEPATOBILIARY: Stable 2.7 cm  hypoattenuating lesion of the posterior right hepatic lobe (series 4, image 153). No new hepatic lesion. Similar gallbladder distention without radiopaque gallstone.     PANCREAS: Similar mild pancreatic atrophy.     SPLEEN: Normal.     ADRENAL GLANDS: Normal.     KIDNEYS/BLADDER: Right nephrectomy. Left renal cyst does not require follow-up. No left hydronephrosis. Chronic mild diffuse bladder wall thickening with multiple small bladder diverticula.     BOWEL: Diverticulosis of the distal colon. Metallic clip within the stomach at the gastric body. No evidence of bowel obstruction. Postsurgical changes of partial duodenectomy with stable mild soft tissue thickening at the surgical site. A discrete   duodenal mass is not identified.     LYMPH NODES: Normal.     VASCULATURE: Mild to moderate burden of vascular calcifications without abdominal aortic aneurysm. Left common iliac vein and nonocclusive deep venous thrombosis which extends into the lower inferior vena cava at the level of the bifurcation. There is   mass effect on the inferior vena cava at the partial duodenectomy site, possibly secondary to postsurgical scarring.     PELVIC ORGANS: Prostatomegaly.     MUSCULOSKELETAL: No acute findings. Note, the bilateral scapulae, bilateral proximal humeri, bilateral clavicles, and upper ribs are incompletely imaged. See dedicated CT thoracolumbar spine reformats for details regarding the spine including acute T12 fracture.                                                               IMPRESSION:     Note, a portion of the upper chest is incompletely imaged. The following findings are made within these limitations.     1.  Acute T12 fracture as described on same-day CT thoracolumbar spine reformats. Otherwise, no acute traumatic findings within the chest, abdomen, or pelvis.  2.  Acute segmental pulmonary emboli in the right lower lobe and left upper lobe. There is dilatation of right-sided heart chambers which appears  chronic.  3.  Acute nonocclusive deep venous thrombosis of the left common iliac vein which extends into the lower inferior vena cava.  4.  Stable postsurgical changes of partial duodenectomy with mild soft tissue thickening at the surgical site. No discrete duodenal mass is identified.  5.  Stable hypoattenuating lesion of the posterior right hepatic lobe.     Past Medical History:   Diagnosis Date    Acute encephalopathy 1/24/2025    Atrial fibrillation, unspecified 9/18/2015    CAD (coronary artery disease) 09/18/2015    minimal by angio 2007, fu nuc est nl 2018    Elevated TSH 2018    Esophageal reflux 9/18/2015    Essential hypertension     History of cardioversion     [id number]    Idiopathic cardiomyopathy (H) 09/18/2015    fu echo nl ef, nl nuclear est 11/18, Dr. Quarles    Mixed hyperlipidemia 9/18/2015    Mumps     Sleep apnea 09/18/2015    does not use cpap as of 2019    Sleep apnea     Thoracic aortic aneurysm     sinus of valsalva 4.5 and asc aorta 4.5 in 2017    Tubular adenoma of colon 01/2017    fu 3 years     ALLERGIES  Allergies   Allergen Reactions    Fentanyl Confusion and Other (See Comments)     Hallucinations    Oxycodone Other (See Comments)     Hallucinations when given after surgery    Other reaction(s): *Unknown, Other (See Comments), Other (see comments)   Hallucinations when given after surgery    Hallucinations when given after surgery    Hallucinations when given after surgery         LABS:  ROUTINE ICU LABS (Last four results)  CMP  Recent Labs   Lab 01/24/25  0904      POTASSIUM 4.3   CHLORIDE 100   CO2 22   ANIONGAP 13   *   BUN 30.0*   CR 1.59*   GFRESTIMATED 44*   DOMINICK 8.4*   PROTTOTAL 5.8*   ALBUMIN 3.1*   BILITOTAL 2.5*   ALKPHOS 121   AST 28   ALT 22     CBC  Recent Labs   Lab 01/24/25  0904   WBC 7.4   RBC 3.51*   HGB 11.9*   HCT 34.0*   MCV 97   MCH 33.9*   MCHC 35.0   RDW 15.5*   *     INRNo lab results found in last 7 days.  Arterial Blood GasNo lab results  found in last 7 days.    EXAM:  Temp:  [98.6  F (37  C)] 98.6  F (37  C)  Pulse:  [] 96  Resp:  [15-49] 15  BP: ()/(63-90) 108/90  SpO2:  [77 %-98 %] 96 %    General:  Stable.  In mild acute distress with back pain    Neuro:  A&O x 3. Moves all extremities equally.  Resp:  Lungs clear to auscultation bilaterally.  Cardio:  S1S2 and reg, without murmur, clicks or rubs  Vascular:+2/4 bilateral dorsalis pedis pulses     Pre-Sedation Code Status Assessment:  Code Status: Full Code intra procedure, per chart review.     Procedural education reviewed with patient/family in detail including, but not limited to risks, benefits and alternatives with understanding verbalized by them.    Total time spent on the date of the encounter: 45 minutes.    Thanks [facility] Interventional Radiology CNP ([phone]) (phone [phone])

## 2025-01-24 NOTE — ED NOTES
"[facility]  ED Nurse Handoff Report    ED Chief complaint: Fall      ED Diagnosis:   Final diagnoses:   Acute encephalopathy   Unwitnessed fall       Code Status: Per the admitting provider     Allergies:   Allergies   Allergen Reactions    Fentanyl Confusion and Other (See Comments)     Hallucinations    Oxycodone Other (See Comments)     Hallucinations when given after surgery    Other reaction(s): *Unknown, Other (See Comments), Other (see comments)   Hallucinations when given after surgery    Hallucinations when given after surgery    Hallucinations when given after surgery       Patient Story:   Pt presented to ED for evaluation of fall and confusion.  According to pt wife , pt was trying  to get to the bathroom . \" He fell when he  got out of bed. The staff found him sitting on the floor\" Reports pt wife.  Per wife , pt  fall 2 days ago, hit his  the back of his head and sustained and lacerated  to his scalp and laceration was repaired. [facility]  [employer]  head-Lac CDI. Hx. HTN,CAD, CKD, Chronic A/Fib,      Focused Assessment:   Pt A/ O x 3 confused to events, denied pain when asked. Pt  was able to state the date of and  the . Confused to time and event. He denied pain when asked and requested some \"Ice chips. The provider notified . Pt given the ice chips. His wife is feeding him with th ice chips. No distress noted.       Treatments and/or interventions provided: Lab, EKG, CT,  Results for orders placed or performed during the hospital encounter of 01/24/25   CT Head w/o Contrast     Status: None    Narrative    EXAM: CT HEAD W/O CONTRAST, CT CERVICAL SPINE W/O CONTRAST  LOCATION: [facility]  DATE: 1/24/2025    INDICATION: Fall, confusion  COMPARISON: Brain MRI 05/17/2020.  TECHNIQUE:   1) Routine CT Head without IV contrast. Multiplanar reformats. Dose reduction techniques were used.  2) Routine CT Cervical Spine without IV contrast. " Multiplanar reformats. Dose reduction techniques were used.    FINDINGS:   HEAD CT:   INTRACRANIAL CONTENTS: No intracranial hemorrhage, extraaxial collection, or mass effect.  No CT evidence of acute infarct. Mild presumed chronic small vessel ischemic changes. Mild generalized cerebral volume loss. Normal ventricles and sulci.     VISUALIZED ORBITS/SINUSES/MASTOIDS: No intraorbital abnormality. No paranasal sinus mucosal disease. No middle ear or mastoid effusion.    BONES/SOFT TISSUES: No acute abnormality.    CERVICAL SPINE CT:   VERTEBRA: Normal vertebral body heights. Right convex scoliosis of the lower cervical spine. No fracture or posttraumatic subluxation.     CANAL/FORAMINA: Multilevel disc height loss, osteophyte formation and facet arthropathy. No high-grade spinal canal stenosis. Moderate left and mild right neural foraminal stenosis at C2-C3. Severe left and moderate to severe right neural foraminal   stenosis at C3-C4, moderate to severe left and moderate right neural foraminal stenosis at C4-C5, mild right and mild-to-moderate left neural foraminal stenosis at C5-C6 and mild left and mild to moderate right neural foraminal stenosis at C6-C7.    PARASPINAL: No extraspinal abnormality. Visualized lung fields are clear.      Impression    IMPRESSION:  HEAD CT:  1.  No CT evidence for acute intracranial process.  2.  Brain atrophy and presumed chronic microvascular ischemic changes as above.  3.  Incidental dilatation with possible aneurysms at the basilar tip. Recommend CTA or mass effect bases.    CERVICAL SPINE CT:  1.  No CT evidence for acute fracture or post traumatic subluxation.  2.  Multilevel cervical spondylosis without high-grade spinal canal stenosis. Multilevel neural foraminal stenosis as detailed above.   CT Cervical Spine w/o Contrast     Status: None    Narrative    EXAM: CT HEAD W/O CONTRAST, CT CERVICAL SPINE W/O CONTRAST  LOCATION: [facility]  DATE:  1/24/2025    INDICATION: Fall, confusion  COMPARISON: Brain MRI 05/17/2020.  TECHNIQUE:   1) Routine CT Head without IV contrast. Multiplanar reformats. Dose reduction techniques were used.  2) Routine CT Cervical Spine without IV contrast. Multiplanar reformats. Dose reduction techniques were used.    FINDINGS:   HEAD CT:   INTRACRANIAL CONTENTS: No intracranial hemorrhage, extraaxial collection, or mass effect.  No CT evidence of acute infarct. Mild presumed chronic small vessel ischemic changes. Mild generalized cerebral volume loss. Normal ventricles and sulci.     VISUALIZED ORBITS/SINUSES/MASTOIDS: No intraorbital abnormality. No paranasal sinus mucosal disease. No middle ear or mastoid effusion.    BONES/SOFT TISSUES: No acute abnormality.    CERVICAL SPINE CT:   VERTEBRA: Normal vertebral body heights. Right convex scoliosis of the lower cervical spine. No fracture or posttraumatic subluxation.     CANAL/FORAMINA: Multilevel disc height loss, osteophyte formation and facet arthropathy. No high-grade spinal canal stenosis. Moderate left and mild right neural foraminal stenosis at C2-C3. Severe left and moderate to severe right neural foraminal   stenosis at C3-C4, moderate to severe left and moderate right neural foraminal stenosis at C4-C5, mild right and mild-to-moderate left neural foraminal stenosis at C5-C6 and mild left and mild to moderate right neural foraminal stenosis at C6-C7.    PARASPINAL: No extraspinal abnormality. Visualized lung fields are clear.      Impression    IMPRESSION:  HEAD CT:  1.  No CT evidence for acute intracranial process.  2.  Brain atrophy and presumed chronic microvascular ischemic changes as above.  3.  Incidental dilatation with possible aneurysms at the basilar tip. Recommend CTA or mass effect bases.    CERVICAL SPINE CT:  1.  No CT evidence for acute fracture or post traumatic subluxation.  2.  Multilevel cervical spondylosis without high-grade spinal canal stenosis.  Multilevel neural foraminal stenosis as detailed above.   [place] Draw     Status: None    Narrative    The following orders were created for panel order [place] Draw.  Procedure                               Abnormality         Status                     ---------                               -----------         ------                     Extra Blue Top Tube[[id number]]                              Final result               Extra Red Top Tube[[id number]]                               Final result               Extra Green Top (Lithium...[[id number]]                      Final result               Extra Purple Top Tube[[id number]]                            Final result                 Please view results for these tests on the individual orders.   Extra Blue Top Tube     Status: None   Result Value Ref Range    Hold Specimen y    Extra Red Top Tube     Status: None   Result Value Ref Range    Hold Specimen y    Extra Green Top (Lithium Heparin) Tube     Status: None   Result Value Ref Range    Hold Specimen y    Extra Purple Top Tube     Status: None   Result Value Ref Range    Hold Specimen y    UA with Microscopic reflex to Culture     Status: Abnormal    Specimen: Urine, Midstream   Result Value Ref Range    Color Urine Yellow Colorless, Straw, Light Yellow, Yellow    Appearance Urine Clear Clear    Glucose Urine Negative Negative mg/dL    Bilirubin Urine Negative Negative    Ketones Urine Negative Negative mg/dL    Specific Gravity Urine 1.027 1.003 - 1.035    Blood Urine Trace (A) Negative    pH Urine 5.5 5.0 - 7.0    Protein Albumin Urine 10 (A) Negative mg/dL    Urobilinogen Urine 2.0 Normal, 2.0 mg/dL    Nitrite Urine Negative Negative    Leukocyte Esterase Urine Negative Negative    Mucus Urine Present (A) None Seen /LPF    RBC Urine 1 <=2 /HPF    WBC Urine 1 <=5 /HPF    Narrative    Urine Culture not indicated   Basic metabolic panel     Status: Abnormal   Result Value Ref Range    Sodium 135 135 - 145  mmol/L    Potassium 4.3 3.4 - 5.3 mmol/L    Chloride 100 98 - 107 mmol/L    Carbon Dioxide (CO2) 22 22 - 29 mmol/L    Anion Gap 13 7 - 15 mmol/L    Urea Nitrogen 30.0 (H) 8.0 - 23.0 mg/dL    Creatinine 1.59 (H) 0.67 - 1.17 mg/dL    GFR Estimate 44 (L) >60 mL/min/1.73m2    Calcium 8.4 (L) 8.8 - 10.4 mg/dL    Glucose 109 (H) 70 - 99 mg/dL   CBC with platelets and differential     Status: Abnormal   Result Value Ref Range    WBC Count 7.4 4.0 - 11.0 10e3/uL    RBC Count 3.51 (L) 4.40 - 5.90 10e6/uL    Hemoglobin 11.9 (L) 13.3 - 17.7 g/dL    Hematocrit 34.0 (L) 40.0 - 53.0 %    MCV 97 78 - 100 fL    MCH 33.9 (H) 26.5 - 33.0 pg    MCHC 35.0 31.5 - 36.5 g/dL    RDW 15.5 (H) 10.0 - 15.0 %    Platelet Count 145 (L) 150 - 450 10e3/uL    % Neutrophils 94 %    % Lymphocytes 4 %    % Monocytes 2 %    % Eosinophils 0 %    % Basophils 0 %    % Immature Granulocytes 1 %    NRBCs per 100 WBC 0 <1 /100    Absolute Neutrophils 6.9 1.6 - 8.3 10e3/uL    Absolute Lymphocytes 0.3 (L) 0.8 - 5.3 10e3/uL    Absolute Monocytes 0.1 0.0 - 1.3 10e3/uL    Absolute Eosinophils 0.0 0.0 - 0.7 10e3/uL    Absolute Basophils 0.0 0.0 - 0.2 10e3/uL    Absolute Immature Granulocytes 0.0 <=0.4 10e3/uL    Absolute NRBCs 0.0 10e3/uL   Influenza A/B, RSV and SARS-CoV2 PCR (COVID-19) Nasopharyngeal     Status: Normal    Specimen: Nasopharyngeal; Swab   Result Value Ref Range    Influenza A PCR Negative Negative    Influenza B PCR Negative Negative    RSV PCR Negative Negative    SARS CoV2 PCR Negative Negative    Narrative    Testing was performed using the Xpert Xpress CoV2/Flu/RSV Assay on the Cepheid GeneXpert Instrument. This test should be ordered for the detection of SARS-CoV2, influenza, and RSV viruses in individuals with signs and symptoms of respiratory tract infection. This test is for in vitro diagnostic use under the [place] FDA for laboratories certified under CLIA to perform high or moderate complexity testing. This test has been [place] FDA cleared. A  negative result does not rule out the presence of PCR inhibitors in the specimen or target RNA in concentration below the limit of detection for the assay. If only one viral target is positive but coinfection with multiple targets is suspected, the sample should be re-tested with another FDA cleared, approved, or authorized test, if coninfection would change clinical management. This test was validated by the [facility]. These laboratories are certified under the Clinical Laboratory Improvement Amendments of 1988 (CLIA-88) as qualified to perfom high complexity laboratory testing.   Hepatic function panel     Status: Abnormal   Result Value Ref Range    Protein Total 5.8 (L) 6.4 - 8.3 g/dL    Albumin 3.1 (L) 3.5 - 5.2 g/dL    Bilirubin Total 2.5 (H) <=1.2 mg/dL    Alkaline Phosphatase 121 40 - 150 U/L    AST 28 0 - 45 U/L    ALT 22 0 - 70 U/L    Bilirubin Direct 0.79 (H) 0.00 - 0.30 mg/dL   Ammonia (on ice)     Status: Abnormal   Result Value Ref Range    Ammonia <10 (L) 16 - 60 umol/L   CBC with platelets + differential     Status: Abnormal    Narrative    The following orders were created for panel order CBC with platelets + differential.  Procedure                               Abnormality         Status                     ---------                               -----------         ------                     CBC with platelets and d...[[id number]]  Abnormal            Final result                 Please view results for these tests on the individual orders.      Patient's response to treatments and/or interventions: Stable     To be done/followed up on inpatient unit:  See order     Does this patient have any cognitive concerns?:  No    Activity level - Baseline/Home:  Assist   Activity Level - Current:   Stand with assist x2    Patient's Preferred language: English   Needed?: No    Isolation: None  Infection: Not Applicable  Patient tested for COVID 19 prior to admission:  YES  Bariatric?: No    Vital Signs:   Vitals:    01/24/25 0945 01/24/25 1000 01/24/25 1030 01/24/25 1100   BP:  104/74 97/66 99/69   Pulse:  100 100 97   Resp: 26 17 26 29   Temp:       TempSrc:       SpO2: 98% 95% 95% 96%       Cardiac Rhythm:Cardiac Rhythm: Atrial fibrillation    Was the PSS-3 completed:   Yes    Family Comments:  Wife and daughter are at the bedside   OBS brochure/video discussed/provided to patient/family: Yes              Name of person given brochure if not patient: N/A              Relationship to patient: N/A    For the majority of the shift this patient's behavior was Green. N/A   Behavioral interventions performed were N/A .    ED NURSE PHONE NUMBER: [phone]

## 2025-01-24 NOTE — OR NURSING
RECEIVING UNIT ED HANDOFF REVIEW    ED Nurse Handoff Report was reviewed by: Kurt Jean RN on January 24, 2025 at 5:53 PM

## 2025-01-24 NOTE — ED NOTES
Bed: ED15  Expected date:   Expected time:   Means of arrival:   Comments:  HEMS 446 79M confusion, hypotension

## 2025-01-24 NOTE — CONSULTS
Consultation    Ti Nickerson MRN# [id number]   YOB: 1945 Age: 79 year old   Date of Admission: 1/24/2025  Requesting physician: Dr. Hansen  Reason for consult: RCC           Assessment and Plan:   Primary Oncologist: Dr. Glover    Pulmonary embolism / DVT   - Acute segmental pulmonary emboli in the right lower lobe and left upper lobe.  There is dilatation of right sided heart chambers which appeared chronic.  Acute nonocclusive DVT left common iliac vein which extends into the lower inferior vena cava.    2. Recurrent syncope with hypotension   - On midodrine  - No improvement since holding the cabozantanib   - Also taking florinef     3. Clear cell renal cell carcinoma diagnosed in 10/2019  - pT3b N0 M0 disease at presentation  - followed on surveillance until 1/2021 at which time liver metastasis identified and treated with cryoablation  - disease recurrence in 9/2023 presenting with GI bleeding and treated with partial duodenectomy  -disease progression in 1/2024 treated with partia gastrectomy  - Progressed on Ipi/Nivo 6/25/2024   - s/p radiation due to duodenal bleeding ending 8/06/2024   - Started Cabozantanib 20mg 9/26/24.   - Most recent CT 12/30/24 with decreased retroperitoneal lymphadenopathy, stable R hepatic lobe mass, duodenal mass measuring up to 2.3cm was not well seen on the non contrast study  - Cabozantanib held since 1/15/25     4. History of GI bleeding   - 2/2 metastatic disease in the duodenum   - s/p radiation to duodenum 7/2024 without bleeding since that time     5. Acute burst fracture T12 with retropulsion   - Acute T12 fracture 2/2 fall   - Neurosurgery consulted and MRI pending     PLAN  - Discussed with Dr. Hansen, we will start him on heparin pending thoracic MRI. Recommend to watch closely over 48-72 hours while on heparin to ensure no recurrence of his GI bleeding.   - Continue to hold his Cabozantanib   - Recommend IVF given contrast, NPO status,  solitary kidney       Andrew DreyerDO  [facility]  [phone] (office)             Chief Complaint:   Fall           History of Present Illness:       The patient was recently discharged on 17 January from this hospital where he was admitted for recurrent syncope orthostatic hypotension.  During that hospital stay the decision was made to hold his cabozantinib.  He has not taken the drug since that time.  He has not noticed any improvement in his syncope despite holding the medication. He was seen by Cardiology who recommended compression stockings. On 1/15 Florinef was started. He was discharged to a short term facility. Now, he comes back after suffering another syncopal episode and traumatic fall 2 days prior to presentation.  he is complaining of back pain  and a CT scan revealed a burst fracture in the thoracic spine as well as incidentally noted pulmonary embolism and DVT.        A CT scan Patient was diagnosed with renal mass in 2019.    10/9/2019: Right radical nephrectomy and IVC thrombectomy.  Grade 4 out of 4 clear-cell renal cell carcinoma pT3b margins were negative no sarcomatoid features lymphovascular invasion was present tumor necrosis was not identified.  He was on surveillance until  1/25/2021.  Liver lesion he underwent biopsy and cryoablation at [facility].  Biopsy was consistent with metastatic renal cell carcinoma  9/14/2023.  Admitted with melena and GI bleed.  EGD showed an ulcerated mass in the third portion of the duodenum.  Biopsy was consistent with metastatic renal cell carcinoma.  10/11/2023.  He underwent partial duodenectomy under the care of Dr. Ceballos at [facility] again consistent with metastatic RCC.  1/19/2024: Underwent EUS and resection of gastric mass with complete endoscopic removal again consistent with metastatic renal cell carcinoma.  Margins were negative.  Postoperatively patient had melena and GI bleed.  With a drop in hemoglobin requiring blood  transfusions.  4/23/24-6/25/24 : Ipi/Nivo- 4 cycles. Progression    7/14/24: Admission Gi bleeding, local tumor recurrence in the Gi tract. Prolonged hospital stay and debility.    7/17/24 :Radiation for bleeding control : SUMMARY OF RADIATION THERAPY Treatment Dates. 7/17/2024 - 8/6/2024 (20 elapsed days) Treatment Site Technique Dose Fractions Dose/Fraction Energy Abdomen [id number] 4.500 cGy 15 300 eGy 10x    9/26/24: Cabo at 20 mg daily started      Patient was diagnosed with renal mass in 2019.    10/9/2019: Right radical nephrectomy and IVC thrombectomy.  Grade 4 out of 4 clear-cell renal cell carcinoma pT3b margins were negative no sarcomatoid features lymphovascular invasion was present tumor necrosis was not identified.  He was on surveillance until  1/25/2021.  Liver lesion he underwent biopsy and cryoablation at [facility].  Biopsy was consistent with metastatic renal cell carcinoma  9/14/2023.  Admitted with melena and GI bleed.  EGD showed an ulcerated mass in the third portion of the duodenum.  Biopsy was consistent with metastatic renal cell carcinoma.  10/11/2023.  He underwent partial duodenectomy under the care of Dr. Ceballos at [facility] again consistent with metastatic RCC.  1/19/2024: Underwent EUS and resection of gastric mass with complete endoscopic removal again consistent with metastatic renal cell carcinoma.  Margins were negative.  Postoperatively patient had melena and GI bleed.  With a drop in hemoglobin requiring blood transfusions.}  4/23/24-6/25/24 : Ipi/Nivo- 4 cycles. Progression    7/14/24: Admission Gi bleeding, local tumor recurrence in the Gi tract. Prolonged hospital stay and debility.    7/17/24 :Radiation for bleeding control : SUMMARY OF RADIATION THERAPY Treatment Dates. 7/17/2024 - 8/6/2024 (20 elapsed days) Treatment Site Technique Dose Fractions Dose/Fraction Energy Abdomen [id number] 4.500 cGy 15 300 eGy 10x    9/26/24: Cabo at 20 mg daily started          Physical  Exam:   Vitals were reviewed  Blood pressure 100/75, pulse 104, temperature 98.6  F (37  C), temperature source Oral, resp. rate 20, SpO2 96%.  Temperatures:  Current - Temp: 98.6  F (37  C); Max - Temp  Av.6  F (37  C)  Min: 98.6  F (37  C)  Max: 98.6  F (37  C)  Respiration range: Resp  Av  Min: 17  Max: 49  Pulse range: Pulse  Av.9  Min: 76  Max: 111  Blood pressure range: Systolic (24hrs), Av , Min:91 , Max:135   ; Diastolic (24hrs), Av, Min:66, Max:89    Pulse oximetry range: SpO2  Av.9 %  Min: 77 %  Max: 98 %    GENERAL: No acute distress.  SKIN: No rashes or jaundice.  HEENT: Normocephalic, atraumatic. Eyes anicteric. Oropharynx is clear.  LYMPH: No palpable lymphadenopathy in the cervical or supraclavicular regions  HEART: Regular rate and rhythm with no murmurs.  LUNGS: Clear bilaterally.  ABDOMEN: Soft, nontender, nondistended with no palpable hepatosplenomegaly.  EXTREMITIES: No clubbing, cyanosis, or edema.  MENTAL: Alert and oriented to person, place, and time.  NEURO: Cranial nerves II through XII grossly intact with no focal motor or sensory deficits.              Past Medical History:   I have reviewed this patient's past medical history  Past Medical History:   Diagnosis Date    Acute encephalopathy 2025    Atrial fibrillation, unspecified 2015    CAD (coronary artery disease) 2015    minimal by angio , fu nuc est nl 2018    Elevated TSH 2018    Esophageal reflux 2015    Essential hypertension     History of cardioversion     [id number]    Idiopathic cardiomyopathy (H) 2015    fu echo nl ef, nl nuclear est , Dr. Quarles    Mixed hyperlipidemia 2015    Mumps     Sleep apnea 2015    does not use cpap as of 2019    Sleep apnea     Thoracic aortic aneurysm     sinus of valsalva 4.5 and asc aorta 4.5 in 2017    Tubular adenoma of colon 2017    fu 3 years             Past Surgical History:   I have reviewed this patient's past  surgical history  Past Surgical History:   Procedure Laterality Date    APPENDECTOMY  1964    ESOPHAGOSCOPY, GASTROSCOPY, DUODENOSCOPY (EGD), COMBINED N/A 9/14/2023    Procedure: Esophagoscopy, gastroscopy, duodenoscopy (EGD), combined;  Surgeon: Chele Ash MD;  Location:  GI               Social History:   I have reviewed this patient's social history  Social History     Tobacco Use    Smoking status: Never    Smokeless tobacco: Never   Substance Use Topics    Alcohol use: Not Currently             Family History:   I have reviewed this patient's family history  Family History   Problem Relation Age of Onset    Arrhythmia Mother         a-fib    Cerebrovascular Disease Mother     Arrhythmia Father         a-fib    Colon Cancer Father     Diabetes Father     Cerebrovascular Disease Father     No Known Problems Brother              Allergies:     Allergies   Allergen Reactions    Fentanyl Confusion and Other (See Comments)     Hallucinations    Oxycodone Other (See Comments)     Hallucinations when given after surgery    Other reaction(s): *Unknown, Other (See Comments), Other (see comments)   Hallucinations when given after surgery    Hallucinations when given after surgery    Hallucinations when given after surgery             Medications:   I have reviewed this patient's current medications  (Not in a hospital admission)    Current Facility-Administered Medications   Medication Dose Route Frequency Provider Last Rate Last Admin    fentaNYL (PF) (SUBLIMAZE) injection 50 mcg  50 mcg Intravenous Q1H PRN Herson Segovia MD        lidocaine (LMX4) cream   Topical Q1H PRN Becky Hansen MD        lidocaine 1 % 0.1-1 mL  0.1-1 mL Other Q1H PRN Becky Hansen MD        ondansetron (ZOFRAN ODT) ODT tab 4 mg  4 mg Oral Q6H PRN Becky Hansen MD        Or    ondansetron (ZOFRAN) injection 4 mg  4 mg Intravenous Q6H PRN Becky Hansen MD        senna-docusate (SENOKOT-S/PERICOLACE) 8.6-50 MG per tablet  1 tablet  1 tablet Oral BID PRBecky Jason MD        Or    senna-docusate (SENOKOT-S/PERICOLACE) 8.6-50 MG per tablet 2 tablet  2 tablet Oral BID PRN Becky Hansen MD        sodium chloride (PF) 0.9% PF flush 3 mL  3 mL Intracatheter Q8H Becky Hansen MD        sodium chloride (PF) 0.9% PF flush 3 mL  3 mL Intracatheter q1 min prn Becky Hansen MD        sodium chloride 0.9 % infusion   Intravenous Continuous Becky Hansen MD         Current Outpatient Medications   Medication Sig Dispense Refill    acetaminophen (TYLENOL) 500 MG tablet Take 1 tablet (500 mg) by mouth daily as needed for mild pain. (Patient taking differently: Take 500-1,000 mg by mouth daily as needed for mild pain.)      fludrocortisone (FLORINEF) 0.1 MG tablet Take 1 tablet (0.1 mg) by mouth daily.      midodrine (PROAMATINE) 10 MG tablet Take 1 tablet (10 mg) by mouth 3 times daily (with meals).      pantoprazole (PROTONIX) 40 MG EC tablet TAKE 1 TABLET BY MOUTH TWICE A DAY BEFORE MEALS 180 tablet 3    pravastatin (PRAVACHOL) 20 MG tablet Take 1 tablet (20 mg) by mouth daily 90 tablet 3    tamsulosin (FLOMAX) 0.4 MG capsule Take 1 capsule (0.4 mg) by mouth daily 90 capsule 3             Review of Systems:     The 10 point Review of Systems is negative other than noted in the HPI.            Data:   Data   Results for orders placed or performed during the hospital encounter of 01/24/25 (from the past 24 hours)   EKG 12-lead, tracing only   Result Value Ref Range    Systolic Blood Pressure  mmHg    Diastolic Blood Pressure  mmHg    Ventricular Rate 96 BPM    Atrial Rate  BPM    ID Interval  ms    QRS Duration 98 ms     ms    QTc 459 ms    P Axis  degrees    R AXIS -43 degrees    T Axis 0 degrees    Interpretation ECG       Atrial fibrillation  Left axis deviation  Nonspecific ST abnormality  Abnormal ECG  When compared with ECG of 10-Chava-2025 09:30,  Nonspecific T wave abnormality, worse in Inferior leads  Confirmed by  GENERATED REPORT, COMPUTER ([id number]),  BASIL NOEL ([id number]) on 1/24/2025 12:22:06 PM     [place] Draw    Narrative    The following orders were created for panel order [place] Draw.  Procedure                               Abnormality         Status                     ---------                               -----------         ------                     Extra Blue Top Tube[[id number]]                              Final result               Extra Red Top Tube[[id number]]                               Final result               Extra Green Top (Lithium...[[id number]]                      Final result               Extra Purple Top Tube[[id number]]                            Final result                 Please view results for these tests on the individual orders.   Extra Blue Top Tube   Result Value Ref Range    Hold Specimen y    Extra Red Top Tube   Result Value Ref Range    Hold Specimen y    Extra Green Top (Lithium Heparin) Tube   Result Value Ref Range    Hold Specimen y    Extra Purple Top Tube   Result Value Ref Range    Hold Specimen y    CBC with platelets + differential    Narrative    The following orders were created for panel order CBC with platelets + differential.  Procedure                               Abnormality         Status                     ---------                               -----------         ------                     CBC with platelets and d...[[id number]]  Abnormal            Final result                 Please view results for these tests on the individual orders.   Basic metabolic panel   Result Value Ref Range    Sodium 135 135 - 145 mmol/L    Potassium 4.3 3.4 - 5.3 mmol/L    Chloride 100 98 - 107 mmol/L    Carbon Dioxide (CO2) 22 22 - 29 mmol/L    Anion Gap 13 7 - 15 mmol/L    Urea Nitrogen 30.0 (H) 8.0 - 23.0 mg/dL    Creatinine 1.59 (H) 0.67 - 1.17 mg/dL    GFR Estimate 44 (L) >60 mL/min/1.73m2    Calcium 8.4 (L) 8.8 - 10.4 mg/dL    Glucose 109 (H) 70 - 99 mg/dL   CBC with  platelets and differential   Result Value Ref Range    WBC Count 7.4 4.0 - 11.0 10e3/uL    RBC Count 3.51 (L) 4.40 - 5.90 10e6/uL    Hemoglobin 11.9 (L) 13.3 - 17.7 g/dL    Hematocrit 34.0 (L) 40.0 - 53.0 %    MCV 97 78 - 100 fL    MCH 33.9 (H) 26.5 - 33.0 pg    MCHC 35.0 31.5 - 36.5 g/dL    RDW 15.5 (H) 10.0 - 15.0 %    Platelet Count 145 (L) 150 - 450 10e3/uL    % Neutrophils 94 %    % Lymphocytes 4 %    % Monocytes 2 %    % Eosinophils 0 %    % Basophils 0 %    % Immature Granulocytes 1 %    NRBCs per 100 WBC 0 <1 /100    Absolute Neutrophils 6.9 1.6 - 8.3 10e3/uL    Absolute Lymphocytes 0.3 (L) 0.8 - 5.3 10e3/uL    Absolute Monocytes 0.1 0.0 - 1.3 10e3/uL    Absolute Eosinophils 0.0 0.0 - 0.7 10e3/uL    Absolute Basophils 0.0 0.0 - 0.2 10e3/uL    Absolute Immature Granulocytes 0.0 <=0.4 10e3/uL    Absolute NRBCs 0.0 10e3/uL   Hepatic function panel   Result Value Ref Range    Protein Total 5.8 (L) 6.4 - 8.3 g/dL    Albumin 3.1 (L) 3.5 - 5.2 g/dL    Bilirubin Total 2.5 (H) <=1.2 mg/dL    Alkaline Phosphatase 121 40 - 150 U/L    AST 28 0 - 45 U/L    ALT 22 0 - 70 U/L    Bilirubin Direct 0.79 (H) 0.00 - 0.30 mg/dL   Troponin T, High Sensitivity   Result Value Ref Range    Troponin T, High Sensitivity 24 (H) <=22 ng/L   UA with Microscopic reflex to Culture    Specimen: Urine, Midstream   Result Value Ref Range    Color Urine Yellow Colorless, Straw, Light Yellow, Yellow    Appearance Urine Clear Clear    Glucose Urine Negative Negative mg/dL    Bilirubin Urine Negative Negative    Ketones Urine Negative Negative mg/dL    Specific Gravity Urine 1.027 1.003 - 1.035    Blood Urine Trace (A) Negative    pH Urine 5.5 5.0 - 7.0    Protein Albumin Urine 10 (A) Negative mg/dL    Urobilinogen Urine 2.0 Normal, 2.0 mg/dL    Nitrite Urine Negative Negative    Leukocyte Esterase Urine Negative Negative    Mucus Urine Present (A) None Seen /LPF    RBC Urine 1 <=2 /HPF    WBC Urine 1 <=5 /HPF    Narrative    Urine Culture not  indicated   Osmolality urine   Result Value Ref Range    Osmolality Urine 843 100 - 1,200 mmol/kg    Narrative    Reference Ranges depend on patient's hydration status and renal function.   Neonates:  mmol/kg   2 years and older, random specimens: 100-1200 mmol/kg; Greater than 850 mmol/kg after 12 hour fluid restriction  Urine/serum osmolality ratio: 2 years and older: 1.0-3.0; 3.0-4.7 after 12 hour fluid restriction   Sodium random urine   Result Value Ref Range    Sodium Urine mmol/L 70 mmol/L   Influenza A/B, RSV and SARS-CoV2 PCR (COVID-19) Nasopharyngeal    Specimen: Nasopharyngeal; Swab   Result Value Ref Range    Influenza A PCR Negative Negative    Influenza B PCR Negative Negative    RSV PCR Negative Negative    SARS CoV2 PCR Negative Negative    Narrative    Testing was performed using the Xpert Xpress CoV2/Flu/RSV Assay on the Cepheid GeneXpert Instrument. This test should be ordered for the detection of SARS-CoV2, influenza, and RSV viruses in individuals with signs and symptoms of respiratory tract infection. This test is for in vitro diagnostic use under the [place] FDA for laboratories certified under CLIA to perform high or moderate complexity testing. This test has been [place] FDA cleared. A negative result does not rule out the presence of PCR inhibitors in the specimen or target RNA in concentration below the limit of detection for the assay. If only one viral target is positive but coinfection with multiple targets is suspected, the sample should be re-tested with another FDA cleared, approved, or authorized test, if coninfection would change clinical management. This test was validated by the [facility]. These laboratories are certified under the Clinical Laboratory Improvement Amendments of 1988 (CLIA-88) as qualified to perfom high complexity laboratory testing.   Ammonia (on ice)   Result Value Ref Range    Ammonia <10 (L) 16 - 60 umol/L   CT Head w/o Contrast    Narrative     EXAM: CT HEAD W/O CONTRAST, CT CERVICAL SPINE W/O CONTRAST  LOCATION: [facility]  DATE: 1/24/2025    INDICATION: Fall, confusion  COMPARISON: Brain MRI 05/17/2020.  TECHNIQUE:   1) Routine CT Head without IV contrast. Multiplanar reformats. Dose reduction techniques were used.  2) Routine CT Cervical Spine without IV contrast. Multiplanar reformats. Dose reduction techniques were used.    FINDINGS:   HEAD CT:   INTRACRANIAL CONTENTS: No intracranial hemorrhage, extraaxial collection, or mass effect.  No CT evidence of acute infarct. Mild presumed chronic small vessel ischemic changes. Mild generalized cerebral volume loss. Normal ventricles and sulci.     VISUALIZED ORBITS/SINUSES/MASTOIDS: No intraorbital abnormality. No paranasal sinus mucosal disease. No middle ear or mastoid effusion.    BONES/SOFT TISSUES: No acute abnormality.    CERVICAL SPINE CT:   VERTEBRA: Normal vertebral body heights. Right convex scoliosis of the lower cervical spine. No fracture or posttraumatic subluxation.     CANAL/FORAMINA: Multilevel disc height loss, osteophyte formation and facet arthropathy. No high-grade spinal canal stenosis. Moderate left and mild right neural foraminal stenosis at C2-C3. Severe left and moderate to severe right neural foraminal   stenosis at C3-C4, moderate to severe left and moderate right neural foraminal stenosis at C4-C5, mild right and mild-to-moderate left neural foraminal stenosis at C5-C6 and mild left and mild to moderate right neural foraminal stenosis at C6-C7.    PARASPINAL: No extraspinal abnormality. Visualized lung fields are clear.      Impression    IMPRESSION:  HEAD CT:  1.  No CT evidence for acute intracranial process.  2.  Brain atrophy and presumed chronic microvascular ischemic changes as above.  3.  Incidental dilatation with possible aneurysms at the basilar tip. Recommend CTA or mass effect bases.    CERVICAL SPINE CT:  1.  No CT evidence for acute  fracture or post traumatic subluxation.  2.  Multilevel cervical spondylosis without high-grade spinal canal stenosis. Multilevel neural foraminal stenosis as detailed above.   CT Chest/Abdomen/Pelvis w Contrast   Result Value Ref Range    Radiologist flags New diagnosis of pulmonary embolism (AA)     Narrative    EXAM: CT CHEST/ABDOMEN/PELVIS W CONTRAST  LOCATION: [facility]  DATE: 1/24/2025    INDICATION: Trauma, right shoulder blader abrasion, bruising to bilateral flanks, fall 2 days ago, concern for traumatic injuries.  COMPARISON: CT CAP 12/30/2024.  TECHNIQUE: CT scan of the chest, abdomen, and pelvis was performed following injection of IV contrast. Multiplanar reformats were obtained. Dose reduction techniques were used.   CONTRAST: 120mL Isovue 370    FINDINGS:   LUNGS AND PLEURA: Lung apices are incompletely imaged including the region of the 2 mm subpleural nodule described on prior report. Additional pulmonary nodules along the right major fissure are stable. Mild fibrotic changes of the lung bases. No pleural   effusion or pneumothorax within the limitations of this exam.    MEDIASTINUM/AXILLAE: There is mild chronic dilatation of right heart chambers. No thoracic aneurysm. Tortuous descending thoracic aorta. Right lower lobe (series 4, image 77) and left upper lobe (series 4, image 42) segmental pulmonary emboli are evident   on this non-CTA examination. No thoracic adenopathy. Small hiatal hernia.    CORONARY ARTERY CALCIFICATION: Severe.    HEPATOBILIARY: Stable 2.7 cm hypoattenuating lesion of the posterior right hepatic lobe (series 4, image 153). No new hepatic lesion. Similar gallbladder distention without radiopaque gallstone.    PANCREAS: Similar mild pancreatic atrophy.    SPLEEN: Normal.    ADRENAL GLANDS: Normal.    KIDNEYS/BLADDER: Right nephrectomy. Left renal cyst does not require follow-up. No left hydronephrosis. Chronic mild diffuse bladder wall thickening  with multiple small bladder diverticula.    BOWEL: Diverticulosis of the distal colon. Metallic clip within the stomach at the gastric body. No evidence of bowel obstruction. Postsurgical changes of partial duodenectomy with stable mild soft tissue thickening at the surgical site. A discrete   duodenal mass is not identified.    LYMPH NODES: Normal.    VASCULATURE: Mild to moderate burden of vascular calcifications without abdominal aortic aneurysm. Left common iliac vein and nonocclusive deep venous thrombosis which extends into the lower inferior vena cava at the level of the bifurcation. There is   mass effect on the inferior vena cava at the partial duodenectomy site, possibly secondary to postsurgical scarring.    PELVIC ORGANS: Prostatomegaly.    MUSCULOSKELETAL: No acute findings. Note, the bilateral scapulae, bilateral proximal humeri, bilateral clavicles, and upper ribs are incompletely imaged. See dedicated CT thoracolumbar spine reformats for details regarding the spine including acute T12   fracture.      Impression    IMPRESSION:    Note, a portion of the upper chest is incompletely imaged. The following findings are made within these limitations.    1.  Acute T12 fracture as described on same-day CT thoracolumbar spine reformats. Otherwise, no acute traumatic findings within the chest, abdomen, or pelvis.  2.  Acute segmental pulmonary emboli in the right lower lobe and left upper lobe. There is dilatation of right-sided heart chambers which appears chronic.  3.  Acute nonocclusive deep venous thrombosis of the left common iliac vein which extends into the lower inferior vena cava.  4.  Stable postsurgical changes of partial duodenectomy with mild soft tissue thickening at the surgical site. No discrete duodenal mass is identified.  5.  Stable hypoattenuating lesion of the posterior right hepatic lobe.      [Critical Result: New diagnosis of pulmonary embolism]    Finding was identified on 1/24/2025  11:48 AM CST.     Dr. Segovia was contacted by me on 1/24/2025 12:09 PM CST and verbalized understanding of the critical result.   CT Cervical Spine w/o Contrast    Narrative    EXAM: CT HEAD W/O CONTRAST, CT CERVICAL SPINE W/O CONTRAST  LOCATION: [facility]  DATE: 1/24/2025    INDICATION: Fall, confusion  COMPARISON: Brain MRI 05/17/2020.  TECHNIQUE:   1) Routine CT Head without IV contrast. Multiplanar reformats. Dose reduction techniques were used.  2) Routine CT Cervical Spine without IV contrast. Multiplanar reformats. Dose reduction techniques were used.    FINDINGS:   HEAD CT:   INTRACRANIAL CONTENTS: No intracranial hemorrhage, extraaxial collection, or mass effect.  No CT evidence of acute infarct. Mild presumed chronic small vessel ischemic changes. Mild generalized cerebral volume loss. Normal ventricles and sulci.     VISUALIZED ORBITS/SINUSES/MASTOIDS: No intraorbital abnormality. No paranasal sinus mucosal disease. No middle ear or mastoid effusion.    BONES/SOFT TISSUES: No acute abnormality.    CERVICAL SPINE CT:   VERTEBRA: Normal vertebral body heights. Right convex scoliosis of the lower cervical spine. No fracture or posttraumatic subluxation.     CANAL/FORAMINA: Multilevel disc height loss, osteophyte formation and facet arthropathy. No high-grade spinal canal stenosis. Moderate left and mild right neural foraminal stenosis at C2-C3. Severe left and moderate to severe right neural foraminal   stenosis at C3-C4, moderate to severe left and moderate right neural foraminal stenosis at C4-C5, mild right and mild-to-moderate left neural foraminal stenosis at C5-C6 and mild left and mild to moderate right neural foraminal stenosis at C6-C7.    PARASPINAL: No extraspinal abnormality. Visualized lung fields are clear.      Impression    IMPRESSION:  HEAD CT:  1.  No CT evidence for acute intracranial process.  2.  Brain atrophy and presumed chronic microvascular ischemic changes  as above.  3.  Incidental dilatation with possible aneurysms at the basilar tip. Recommend CTA or mass effect bases.    CERVICAL SPINE CT:  1.  No CT evidence for acute fracture or post traumatic subluxation.  2.  Multilevel cervical spondylosis without high-grade spinal canal stenosis. Multilevel neural foraminal stenosis as detailed above.   CT Thoracic Spine w/o Contrast    Narrative    EXAM: CT THORACIC SPINE W/O CONTRAST, CT LUMBAR SPINE W/O CONTRAST  LOCATION: [facility]  DATE: 1/24/2025    INDICATION: trauma  COMPARISON: None.  TECHNIQUE:  1) Routine CT Thoracic Spine without IV contrast. Multiplanar reformats. Dose reduction techniques were used.   2) Routine CT Lumbar Spine without IV contrast. Multiplanar reformats. Dose reduction techniques were used.     FINDINGS:    THORACIC SPINE CT:  VERTEBRA: Osteopenia. Acute burst fracture of T12 with approximately 20% height loss. Minimal retropulsion of the fractured vertebral body without associated spinal canal stenosis. Unchanged Schmorl's node-like endplate indentation along the inferior   endplate of T9 with associated chronic vertebral body height loss. Stepwise mild anterolisthesis T2-T4. No fracture or posttraumatic subluxation.     CANAL/FORAMINA: No canal or neural foraminal stenosis.    PARASPINAL: No extraspinal abnormality.    LUMBAR SPINE CT:  VERTEBRA: Normal vertebral body heights. Left convex scoliosis with the apex at L3-L4 minimal retrolisthesis at L3-L4 and minimal anterolisthesis at L5-S1. No fracture or posttraumatic subluxation.     CANAL/FORAMINA: Multilevel disc height loss, osteophyte formation and facet arthropathy without high-grade spinal or neural foraminal stenosis. Moderate right neural foraminal stenosis at L2-L3 and L3-L4 and mild to left neural foraminal stenosis at   L4-L5 and L5-S1. Multilevel Schmorl's node-like endplate indentations.    PARASPINAL: No extraspinal abnormality.      Impression     IMPRESSION:  THORACIC SPINE CT:  1.  Acute burst fracture of T12 with approximately 20% height loss. Mild retropulsion of the fractured vertebral body without associated spinal canal stenosis.  2.  No high-grade spinal canal or neural foraminal stenosis.    LUMBAR SPINE CT:  1.  No fracture or posttraumatic subluxation.  2.  Multilevel lumbar spondylosis without high-grade spinal canal stenosis. Multilevel neural foraminal stenosis     CT Lumbar Spine w/o Contrast    Narrative    EXAM: CT THORACIC SPINE W/O CONTRAST, CT LUMBAR SPINE W/O CONTRAST  LOCATION: [facility]  DATE: 1/24/2025    INDICATION: trauma  COMPARISON: None.  TECHNIQUE:  1) Routine CT Thoracic Spine without IV contrast. Multiplanar reformats. Dose reduction techniques were used.   2) Routine CT Lumbar Spine without IV contrast. Multiplanar reformats. Dose reduction techniques were used.     FINDINGS:    THORACIC SPINE CT:  VERTEBRA: Osteopenia. Acute burst fracture of T12 with approximately 20% height loss. Minimal retropulsion of the fractured vertebral body without associated spinal canal stenosis. Unchanged Schmorl's node-like endplate indentation along the inferior   endplate of T9 with associated chronic vertebral body height loss. Stepwise mild anterolisthesis T2-T4. No fracture or posttraumatic subluxation.     CANAL/FORAMINA: No canal or neural foraminal stenosis.    PARASPINAL: No extraspinal abnormality.    LUMBAR SPINE CT:  VERTEBRA: Normal vertebral body heights. Left convex scoliosis with the apex at L3-L4 minimal retrolisthesis at L3-L4 and minimal anterolisthesis at L5-S1. No fracture or posttraumatic subluxation.     CANAL/FORAMINA: Multilevel disc height loss, osteophyte formation and facet arthropathy without high-grade spinal or neural foraminal stenosis. Moderate right neural foraminal stenosis at L2-L3 and L3-L4 and mild to left neural foraminal stenosis at   L4-L5 and L5-S1. Multilevel Schmorl's  node-like endplate indentations.    PARASPINAL: No extraspinal abnormality.      Impression    IMPRESSION:  THORACIC SPINE CT:  1.  Acute burst fracture of T12 with approximately 20% height loss. Mild retropulsion of the fractured vertebral body without associated spinal canal stenosis.  2.  No high-grade spinal canal or neural foraminal stenosis.    LUMBAR SPINE CT:  1.  No fracture or posttraumatic subluxation.  2.  Multilevel lumbar spondylosis without high-grade spinal canal stenosis. Multilevel neural foraminal stenosis     Troponin T, High Sensitivity   Result Value Ref Range    Troponin T, High Sensitivity 27 (H) <=22 ng/L

## 2025-01-24 NOTE — PHARMACY-ADMISSION MEDICATION HISTORY
Pharmacist Admission Medication History    Admission medication history is complete. The information provided in this note is only as accurate as the sources available at the time of the update.    Information Source(s): Facility (TCU/NH/) medication list/MAR via  [facility]      Changes made to PTA medication list:  Added: None  Deleted: miralax prn, Zofran prn  Changed: Tylenol dose udpated    Medication History Completed By: Lynsey Flanagan, PharmD 1/24/2025 1:29 PM    PTA Med List   Medication Sig Last Dose/Taking    acetaminophen (TYLENOL) 500 MG tablet Take 1 tablet (500 mg) by mouth daily as needed for mild pain. (Patient taking differently: Take 500-1,000 mg by mouth daily as needed for mild pain.) Taking Differently    fludrocortisone (FLORINEF) 0.1 MG tablet Take 1 tablet (0.1 mg) by mouth daily. 1/23/2025 Morning    midodrine (PROAMATINE) 10 MG tablet Take 1 tablet (10 mg) by mouth 3 times daily (with meals). 1/23/2025    pantoprazole (PROTONIX) 40 MG EC tablet TAKE 1 TABLET BY MOUTH TWICE A DAY BEFORE MEALS 1/23/2025    pravastatin (PRAVACHOL) 20 MG tablet Take 1 tablet (20 mg) by mouth daily 1/23/2025 Evening    tamsulosin (FLOMAX) 0.4 MG capsule Take 1 capsule (0.4 mg) by mouth daily 1/23/2025 Morning

## 2025-01-25 ENCOUNTER — APPOINTMENT (OUTPATIENT)
Dept: CARDIOLOGY | Facility: CLINIC | Age: 80
End: 2025-01-25
Attending: INTERNAL MEDICINE
Payer: MEDICARE

## 2025-01-25 ENCOUNTER — DOCUMENTATION ONLY (OUTPATIENT)
Dept: ORTHOPEDICS | Facility: CLINIC | Age: 80
End: 2025-01-25

## 2025-01-25 LAB
ACINETOBACTER SPECIES: NOT DETECTED
ALBUMIN SERPL BCG-MCNC: 2.7 G/DL (ref 3.5–5.2)
ALP SERPL-CCNC: 125 U/L (ref 40–150)
ALT SERPL W P-5'-P-CCNC: 20 U/L (ref 0–70)
ANION GAP SERPL CALCULATED.3IONS-SCNC: 13 MMOL/L (ref 7–15)
AST SERPL W P-5'-P-CCNC: 30 U/L (ref 0–45)
BILIRUB DIRECT SERPL-MCNC: 0.92 MG/DL (ref 0–0.3)
BILIRUB SERPL-MCNC: 2.2 MG/DL
BUN SERPL-MCNC: 31.1 MG/DL (ref 8–23)
C PNEUM DNA SPEC QL NAA+PROBE: NOT DETECTED
CALCIUM SERPL-MCNC: 7.6 MG/DL (ref 8.8–10.4)
CHLORIDE SERPL-SCNC: 102 MMOL/L (ref 98–107)
CITROBACTER SPECIES: NOT DETECTED
CREAT SERPL-MCNC: 1.49 MG/DL (ref 0.67–1.17)
CTX-M: NOT DETECTED
EGFRCR SERPLBLD CKD-EPI 2021: 48 ML/MIN/1.73M2
ENTEROBACTER SPECIES: DETECTED
ERYTHROCYTE [DISTWIDTH] IN BLOOD BY AUTOMATED COUNT: 15.5 % (ref 10–15)
ESCHERICHIA COLI: NOT DETECTED
FLUAV H1 2009 PAND RNA SPEC QL NAA+PROBE: NOT DETECTED
FLUAV H1 RNA SPEC QL NAA+PROBE: NOT DETECTED
FLUAV H3 RNA SPEC QL NAA+PROBE: NOT DETECTED
FLUAV RNA SPEC QL NAA+PROBE: NOT DETECTED
FLUBV RNA SPEC QL NAA+PROBE: NOT DETECTED
GLUCOSE BLDC GLUCOMTR-MCNC: 106 MG/DL (ref 70–99)
GLUCOSE BLDC GLUCOMTR-MCNC: 81 MG/DL (ref 70–99)
GLUCOSE SERPL-MCNC: 110 MG/DL (ref 70–99)
HADV DNA SPEC QL NAA+PROBE: NOT DETECTED
HCO3 SERPL-SCNC: 19 MMOL/L (ref 22–29)
HCOV PNL SPEC NAA+PROBE: NOT DETECTED
HCT VFR BLD AUTO: 32.3 % (ref 40–53)
HGB BLD-MCNC: 11.2 G/DL (ref 13.3–17.7)
HMPV RNA SPEC QL NAA+PROBE: NOT DETECTED
HOLD SPECIMEN: NORMAL
HPIV1 RNA SPEC QL NAA+PROBE: NOT DETECTED
HPIV2 RNA SPEC QL NAA+PROBE: NOT DETECTED
HPIV3 RNA SPEC QL NAA+PROBE: NOT DETECTED
HPIV4 RNA SPEC QL NAA+PROBE: NOT DETECTED
IMP: NOT DETECTED
KLEBSIELLA OXYTOCA: NOT DETECTED
KLEBSIELLA PNEUMONIAE: NOT DETECTED
KPC: NOT DETECTED
LACTATE SERPL-SCNC: 1.6 MMOL/L (ref 0.7–2)
LACTATE SERPL-SCNC: 1.7 MMOL/L (ref 0.7–2)
LVEF ECHO: NORMAL
M PNEUMO DNA SPEC QL NAA+PROBE: NOT DETECTED
MCH RBC QN AUTO: 34 PG (ref 26.5–33)
MCHC RBC AUTO-ENTMCNC: 34.7 G/DL (ref 31.5–36.5)
MCV RBC AUTO: 98 FL (ref 78–100)
NDM: NOT DETECTED
OXA (DETECTED/NOT DETECTED): NOT DETECTED
PLATELET # BLD AUTO: 121 10E3/UL (ref 150–450)
POTASSIUM SERPL-SCNC: 4.2 MMOL/L (ref 3.4–5.3)
PROT SERPL-MCNC: 5.6 G/DL (ref 6.4–8.3)
PROTEUS SPECIES: NOT DETECTED
PSEUDOMONAS AERUGINOSA: NOT DETECTED
RBC # BLD AUTO: 3.29 10E6/UL (ref 4.4–5.9)
RSV RNA SPEC QL NAA+PROBE: NOT DETECTED
RSV RNA SPEC QL NAA+PROBE: NOT DETECTED
RV+EV RNA SPEC QL NAA+PROBE: NOT DETECTED
SODIUM SERPL-SCNC: 134 MMOL/L (ref 135–145)
UFH PPP CHRO-ACNC: 0.52 IU/ML
UFH PPP CHRO-ACNC: 0.59 IU/ML
UFH PPP CHRO-ACNC: 0.68 IU/ML
VIM: NOT DETECTED
WBC # BLD AUTO: 8.2 10E3/UL (ref 4–11)

## 2025-01-25 PROCEDURE — 93308 TTE F-UP OR LMTD: CPT | Mod: 26 | Performed by: INTERNAL MEDICINE

## 2025-01-25 PROCEDURE — 250N000013 HC RX MED GY IP 250 OP 250 PS 637: Performed by: INTERNAL MEDICINE

## 2025-01-25 PROCEDURE — 82947 ASSAY GLUCOSE BLOOD QUANT: CPT | Performed by: INTERNAL MEDICINE

## 2025-01-25 PROCEDURE — 120N000001 HC R&B MED SURG/OB

## 2025-01-25 PROCEDURE — 83605 ASSAY OF LACTIC ACID: CPT | Performed by: INTERNAL MEDICINE

## 2025-01-25 PROCEDURE — 258N000003 HC RX IP 258 OP 636: Performed by: INTERNAL MEDICINE

## 2025-01-25 PROCEDURE — 86850 RBC ANTIBODY SCREEN: CPT | Performed by: INTERNAL MEDICINE

## 2025-01-25 PROCEDURE — 87186 SC STD MICRODIL/AGAR DIL: CPT | Performed by: INTERNAL MEDICINE

## 2025-01-25 PROCEDURE — 36415 COLL VENOUS BLD VENIPUNCTURE: CPT | Performed by: STUDENT IN AN ORGANIZED HEALTH CARE EDUCATION/TRAINING PROGRAM

## 2025-01-25 PROCEDURE — 87149 DNA/RNA DIRECT PROBE: CPT | Performed by: INTERNAL MEDICINE

## 2025-01-25 PROCEDURE — 250N000011 HC RX IP 250 OP 636: Performed by: INTERNAL MEDICINE

## 2025-01-25 PROCEDURE — 999N000208 ECHOCARDIOGRAM LIMITED

## 2025-01-25 PROCEDURE — 86900 BLOOD TYPING SEROLOGIC ABO: CPT | Performed by: INTERNAL MEDICINE

## 2025-01-25 PROCEDURE — 93325 DOPPLER ECHO COLOR FLOW MAPG: CPT | Mod: 26 | Performed by: INTERNAL MEDICINE

## 2025-01-25 PROCEDURE — 99233 SBSQ HOSP IP/OBS HIGH 50: CPT | Performed by: STUDENT IN AN ORGANIZED HEALTH CARE EDUCATION/TRAINING PROGRAM

## 2025-01-25 PROCEDURE — 87581 M.PNEUMON DNA AMP PROBE: CPT | Performed by: INTERNAL MEDICINE

## 2025-01-25 PROCEDURE — 85027 COMPLETE CBC AUTOMATED: CPT | Performed by: INTERNAL MEDICINE

## 2025-01-25 PROCEDURE — 36415 COLL VENOUS BLD VENIPUNCTURE: CPT | Performed by: INTERNAL MEDICINE

## 2025-01-25 PROCEDURE — 250N000011 HC RX IP 250 OP 636: Performed by: STUDENT IN AN ORGANIZED HEALTH CARE EDUCATION/TRAINING PROGRAM

## 2025-01-25 PROCEDURE — 87040 BLOOD CULTURE FOR BACTERIA: CPT | Performed by: INTERNAL MEDICINE

## 2025-01-25 PROCEDURE — 255N000002 HC RX 255 OP 636: Performed by: INTERNAL MEDICINE

## 2025-01-25 PROCEDURE — 99418 PROLNG IP/OBS E/M EA 15 MIN: CPT | Performed by: STUDENT IN AN ORGANIZED HEALTH CARE EDUCATION/TRAINING PROGRAM

## 2025-01-25 PROCEDURE — 87486 CHLMYD PNEUM DNA AMP PROBE: CPT | Performed by: INTERNAL MEDICINE

## 2025-01-25 PROCEDURE — 85520 HEPARIN ASSAY: CPT | Performed by: INTERNAL MEDICINE

## 2025-01-25 PROCEDURE — 93321 DOPPLER ECHO F-UP/LMTD STD: CPT | Mod: 26 | Performed by: INTERNAL MEDICINE

## 2025-01-25 PROCEDURE — 82310 ASSAY OF CALCIUM: CPT | Performed by: INTERNAL MEDICINE

## 2025-01-25 PROCEDURE — 85520 HEPARIN ASSAY: CPT | Performed by: STUDENT IN AN ORGANIZED HEALTH CARE EDUCATION/TRAINING PROGRAM

## 2025-01-25 PROCEDURE — 83605 ASSAY OF LACTIC ACID: CPT | Performed by: STUDENT IN AN ORGANIZED HEALTH CARE EDUCATION/TRAINING PROGRAM

## 2025-01-25 PROCEDURE — 80053 COMPREHEN METABOLIC PANEL: CPT | Performed by: INTERNAL MEDICINE

## 2025-01-25 PROCEDURE — 82248 BILIRUBIN DIRECT: CPT | Performed by: INTERNAL MEDICINE

## 2025-01-25 PROCEDURE — 250N000013 HC RX MED GY IP 250 OP 250 PS 637: Performed by: STUDENT IN AN ORGANIZED HEALTH CARE EDUCATION/TRAINING PROGRAM

## 2025-01-25 RX ORDER — PANTOPRAZOLE SODIUM 40 MG/1
40 TABLET, DELAYED RELEASE ORAL
Status: DISPENSED | OUTPATIENT
Start: 2025-01-25

## 2025-01-25 RX ORDER — LIDOCAINE 4 G/G
2 PATCH TOPICAL
Status: ACTIVE | OUTPATIENT
Start: 2025-01-26

## 2025-01-25 RX ORDER — METHOCARBAMOL 500 MG/1
500 TABLET, FILM COATED ORAL 4 TIMES DAILY PRN
Status: DISCONTINUED | OUTPATIENT
Start: 2025-01-25 | End: 2025-01-27

## 2025-01-25 RX ORDER — ACETAMINOPHEN 325 MG/1
975 TABLET ORAL 3 TIMES DAILY
Status: DISPENSED | OUTPATIENT
Start: 2025-01-25

## 2025-01-25 RX ORDER — HYDROMORPHONE HCL IN WATER/PF 6 MG/30 ML
0.2 PATIENT CONTROLLED ANALGESIA SYRINGE INTRAVENOUS ONCE
Status: DISCONTINUED | OUTPATIENT
Start: 2025-01-25 | End: 2025-01-29

## 2025-01-25 RX ORDER — MIDODRINE HYDROCHLORIDE 5 MG/1
10 TABLET ORAL
Status: DISPENSED | OUTPATIENT
Start: 2025-01-25

## 2025-01-25 RX ORDER — ACETAMINOPHEN 325 MG/1
650 TABLET ORAL EVERY 4 HOURS PRN
Status: DISPENSED | OUTPATIENT
Start: 2025-01-25

## 2025-01-25 RX ORDER — LIDOCAINE 40 MG/G
CREAM TOPICAL
Status: DISCONTINUED | OUTPATIENT
Start: 2025-01-25 | End: 2025-01-25

## 2025-01-25 RX ORDER — ACETAMINOPHEN 650 MG/1
650 SUPPOSITORY RECTAL EVERY 4 HOURS PRN
Status: ACTIVE | OUTPATIENT
Start: 2025-01-25

## 2025-01-25 RX ORDER — HYDROMORPHONE HCL IN WATER/PF 6 MG/30 ML
.2-.4 PATIENT CONTROLLED ANALGESIA SYRINGE INTRAVENOUS
Status: DISCONTINUED | OUTPATIENT
Start: 2025-01-25 | End: 2025-01-28

## 2025-01-25 RX ORDER — PRAVASTATIN SODIUM 20 MG
20 TABLET ORAL DAILY
Status: DISCONTINUED | OUTPATIENT
Start: 2025-01-25 | End: 2025-01-29

## 2025-01-25 RX ORDER — FLUDROCORTISONE ACETATE 0.1 MG/1
0.1 TABLET ORAL DAILY
Status: DISPENSED | OUTPATIENT
Start: 2025-01-25

## 2025-01-25 RX ORDER — PIPERACILLIN SODIUM, TAZOBACTAM SODIUM 4; .5 G/20ML; G/20ML
4.5 INJECTION, POWDER, LYOPHILIZED, FOR SOLUTION INTRAVENOUS EVERY 6 HOURS
Status: DISCONTINUED | OUTPATIENT
Start: 2025-01-25 | End: 2025-01-26

## 2025-01-25 RX ADMIN — ACETAMINOPHEN 650 MG: 325 TABLET, FILM COATED ORAL at 06:53

## 2025-01-25 RX ADMIN — PRAVASTATIN SODIUM 20 MG: 20 TABLET ORAL at 09:42

## 2025-01-25 RX ADMIN — HYDROMORPHONE HYDROCHLORIDE 0.2 MG: 0.2 INJECTION, SOLUTION INTRAMUSCULAR; INTRAVENOUS; SUBCUTANEOUS at 19:49

## 2025-01-25 RX ADMIN — ACETAMINOPHEN 650 MG: 325 TABLET, FILM COATED ORAL at 18:42

## 2025-01-25 RX ADMIN — PIPERACILLIN AND TAZOBACTAM 4.5 G: 4; .5 INJECTION, POWDER, FOR SOLUTION INTRAVENOUS at 06:01

## 2025-01-25 RX ADMIN — MIDODRINE HYDROCHLORIDE 10 MG: 5 TABLET ORAL at 09:42

## 2025-01-25 RX ADMIN — HEPARIN SODIUM 1800 UNITS/HR: 10000 INJECTION, SOLUTION INTRAVENOUS at 07:39

## 2025-01-25 RX ADMIN — HYDROMORPHONE HYDROCHLORIDE 0.2 MG: 0.2 INJECTION, SOLUTION INTRAMUSCULAR; INTRAVENOUS; SUBCUTANEOUS at 00:27

## 2025-01-25 RX ADMIN — QUETIAPINE 12.5 MG: 25 TABLET, FILM COATED ORAL at 18:39

## 2025-01-25 RX ADMIN — HYDROMORPHONE HYDROCHLORIDE 0.2 MG: 0.2 INJECTION, SOLUTION INTRAMUSCULAR; INTRAVENOUS; SUBCUTANEOUS at 12:54

## 2025-01-25 RX ADMIN — FLUDROCORTISONE ACETATE 0.1 MG: 0.1 TABLET ORAL at 09:42

## 2025-01-25 RX ADMIN — HYDROMORPHONE HYDROCHLORIDE 0.2 MG: 0.2 INJECTION, SOLUTION INTRAMUSCULAR; INTRAVENOUS; SUBCUTANEOUS at 23:15

## 2025-01-25 RX ADMIN — PIPERACILLIN AND TAZOBACTAM 4.5 G: 4; .5 INJECTION, POWDER, FOR SOLUTION INTRAVENOUS at 11:05

## 2025-01-25 RX ADMIN — HYDROMORPHONE HYDROCHLORIDE 0.2 MG: 0.2 INJECTION, SOLUTION INTRAMUSCULAR; INTRAVENOUS; SUBCUTANEOUS at 03:41

## 2025-01-25 RX ADMIN — SODIUM CHLORIDE, POTASSIUM CHLORIDE, SODIUM LACTATE AND CALCIUM CHLORIDE 1000 ML: 600; 310; 30; 20 INJECTION, SOLUTION INTRAVENOUS at 06:43

## 2025-01-25 RX ADMIN — PERFLUTREN 10 ML: 6.52 INJECTION, SUSPENSION INTRAVENOUS at 14:47

## 2025-01-25 RX ADMIN — HEPARIN SODIUM 1800 UNITS/HR: 10000 INJECTION, SOLUTION INTRAVENOUS at 22:49

## 2025-01-25 RX ADMIN — SODIUM CHLORIDE: 9 INJECTION, SOLUTION INTRAVENOUS at 09:51

## 2025-01-25 RX ADMIN — HYDROMORPHONE HYDROCHLORIDE 0.2 MG: 0.2 INJECTION, SOLUTION INTRAMUSCULAR; INTRAVENOUS; SUBCUTANEOUS at 09:37

## 2025-01-25 RX ADMIN — PIPERACILLIN AND TAZOBACTAM 4.5 G: 4; .5 INJECTION, POWDER, FOR SOLUTION INTRAVENOUS at 23:29

## 2025-01-25 RX ADMIN — SODIUM CHLORIDE: 9 INJECTION, SOLUTION INTRAVENOUS at 19:52

## 2025-01-25 RX ADMIN — PANTOPRAZOLE SODIUM 40 MG: 40 TABLET, DELAYED RELEASE ORAL at 10:04

## 2025-01-25 RX ADMIN — HYDROMORPHONE HYDROCHLORIDE 0.2 MG: 0.2 INJECTION, SOLUTION INTRAMUSCULAR; INTRAVENOUS; SUBCUTANEOUS at 13:48

## 2025-01-25 RX ADMIN — VANCOMYCIN HYDROCHLORIDE 2500 MG: 10 INJECTION, POWDER, LYOPHILIZED, FOR SOLUTION INTRAVENOUS at 07:50

## 2025-01-25 RX ADMIN — PIPERACILLIN AND TAZOBACTAM 4.5 G: 4; .5 INJECTION, POWDER, FOR SOLUTION INTRAVENOUS at 16:25

## 2025-01-25 ASSESSMENT — ACTIVITIES OF DAILY LIVING (ADL)
ADLS_ACUITY_SCORE: 68
ADLS_ACUITY_SCORE: 82
ADLS_ACUITY_SCORE: 68
ADLS_ACUITY_SCORE: 82
ADLS_ACUITY_SCORE: 68
ADLS_ACUITY_SCORE: 82
ADLS_ACUITY_SCORE: 68
ADLS_ACUITY_SCORE: 82
ADLS_ACUITY_SCORE: 68
ADLS_ACUITY_SCORE: 82
ADLS_ACUITY_SCORE: 68
ADLS_ACUITY_SCORE: 68
ADLS_ACUITY_SCORE: 82
ADLS_ACUITY_SCORE: 68
ADLS_ACUITY_SCORE: 68

## 2025-01-25 NOTE — PROGRESS NOTES
Care Management Follow Up    Length of Stay (days): 1    Expected Discharge Date: 01/27/2025     Concerns to be Addressed: discharge planning     Patient plan of care discussed at interdisciplinary rounds: Yes    Anticipated Discharge Disposition: Other (Comments) (TBD)              Anticipated Discharge Services:    Anticipated Discharge DME:      Patient/family educated on Medicare website which has current facility and service quality ratings:    Education Provided on the Discharge Plan:    Patient/Family in Agreement with the Plan:      Referrals Placed by CM/SW:      Private pay costs discussed: Not applicable    Discussed  Partnership in Safe Discharge Planning  document with patient/family: No     Handoff Completed: No, handoff not indicated or clinically appropriate    Additional Information:  Writer is made aware from ED SW note of patient coming from [facility] TCU prior to this admission.     Writer sent a referral to [facility] inquiring if the patient has a bed hold.     Next Steps: Care Management will continue to follow for discharge planning.      BELINDA Acuna  [facility]  Social Work

## 2025-01-25 NOTE — PROGRESS NOTES
Shift Summary [id number]    Admitting Diagnosis: Acute encephalopathy [G93.40]  T12 burst fracture (H) [S22.081A]  Deep vein thrombosis (DVT) of femoral vein, unspecified chronicity, unspecified laterality (H) [I82.419]  Unwitnessed fall [R29.6]  Pulmonary embolism, other, unspecified chronicity, unspecified whether acute cor pulmonale present (H) [I26.99]   Vitals VSS on RA except for fever. Tylenol given   Pain Restless over night due to pain IV dilaudid x2  A&OxConfused  Voiding continent uses urinal   Mobility bedrest  Tele n/a  CMS n/a  Lung Sounds WDL   GI WDL  Dressing n/a    Paged provider overnight VS triggered Lactic protocol. Lactic was 1.7. See provider notification note for orders. Antibiotic and fluid bolus started.     Heparin running @ 1800 units/hr. NS @ 100 ml/hr.     Orders Placed This Encounter      NPO for Medical/Clinical Reasons Except for: Meds

## 2025-01-25 NOTE — PROGRESS NOTES
[facility]    Medicine Progress Note - Hospitalist Service    Date of Admission:  1/24/2025    Assessment & Plan     Ti Nickerson is a 79 year old male with a complex history of metastatic renal cancer, atrial fibrillation, orthostatic hypotension on midodrine/Florinef, CKD stage IIIb, CAD, acute metabolic encephalopathy, history of GI bleeding,  presented to the emergency room after falls.     Patient had recently been hospitalized 1/10 through 1/17/2025 for orthostatic hypotension and recurrent syncope.  He had been losing consciousness and presented to the hospital.  He had an extensive evaluation including an echo with normal EF and moderate RV dysfunction.  Negative stim test for adrenal insufficiency, evaluated by cardiology and neurology.  Recommended compression stockings.  Stopped on his Cabozanitinib for 2 weeks to see if he had improved symptoms.  He was discharged on midodrine 10 mg 3 times daily with Jovitaf.      Now readmitted with falls and head laceration from 2 days ago, PE, DVT, Burst fracture T12 admitted to the hospital.    Iliac Vein thrombosis and IVC extension   Pulmonary Embolism   Suspected clot provoked in setting of metastatic cancer and overall deconditioned/immobile state. IR consulted on 1/24 to consider thrombectomy of clot in iliac vein extending to IVC. Patient having significant pain on 1/25 in his left leg and low back. It is unclear if pain is related to iliac vein thrombus, sciatica, or new burst fracture. I discussed patient with on call IR team on 1/25. I am worried about his ability to handle the procedure and even lay flat afterwards given how severe his pain is. We agreed it would be safest to hold off on procedure and focus on pain control before pursuing thrombectomy. Plan as follows.   Continue heparin infusion, BID hemoglobin checks given history of bleeding on anti-coagulation  Regular Diet, NPO at 0001 on 1/26.   Start scheduled  tylenol, increase prn IV dilaudid to 0.2-0.4 q2h prn, start prn robaxin, start prn lidocaine patch, prn seroquel for anxiety/agitation  Follow up TTE  IR consulted, recommendations appreciated, I personally spoke with IR staff 1/25 about plan    Recurrent falls with orthostatics  Orthostatic hypotension    Patient with a known history of recurrent falls and orthostatic hypotension.  His wife reports he has had a long history of orthostatic hypotension even prior to last admission.   Recently hospitalized 1/10 through 1/17 on the medicine service for orthostatic hypotension.  He was started on midodrine 10 mg 3 times a day.  Decision to hold cabozanitinib for 2 weeks.  Liberalized salt.  Started on Florinef 0.1 mg po daily (patient was seen by cardiology, neurology, hematology)   Patient has been at assisted living and he fell a couple days ago but he got up without assistance.  Plans to continue with midodrine and florinef   He has been at an assisted living to regain his strength.      Falls:   Recurrent falls prior to admission. (See summary as above with orthostatics)   CT head no evidence for acute intracranial process.  Brain atrophy with microvascular changes.  Cervical spine no acute fracture posttraumatic subluxation.  Multilevel cervical spondylosis without high-grade spinal stenosis.  Multilevel neuroforaminal stenosis  Thoracic spine with acute burst T12 fracture is noted:  Mild retropulsion of the fractured vertebral body without associated spinal canal stenosis. No high-grade spinal canal or neural foraminal stenosis.  Lumbar spine no fracture or posttraumatic subluxation.Multilevel lumbar spondylosis without high-grade spinal canal stenosis. Multilevel neural foraminal stenosis      Atrial fibrillation:   Atrial fibrillation   EKG Atrial fibrillation, LAD. NSST changes  Has not been anticoagulated in past given bleeding and falls  Some discussion in past of potential Watchman      Head laceration    Patient seen in the clinic on 1/22/2025 with a laceration 7 to 8 cm in length with staples placed in the office.  And sutures.  Tetanus booster within 10 years.  Sutures placed 1/22 reported to remove in 8 days on 1/30     Acute Burst Fracture T12 with retropulsion  CT chest abdomen pelvis given falls showing an acute T12 fracture.  T12 CT spine showing acute burst fracture of T12 with 20% height loss.  Mild retropulsion of the fracture vertebral body without associated spinal canal stenosis.  MRI poor quality but reviewed with neurosurgery and lg to initiate heparin  Neurosurgery follow-up  Pain management as above      Metastatic Renal cell cancer diagnosed in 2019   S/p Right radical nephrecctomy  and IVC thrombectomy 2019   On surveillance with recurrence 1/2021 with liver lesion.    Follows at [facility]   Has known metastatic renal cell cancer   Duodenal mass 2.3 cm, right hepatic lobe mass, pulmonary nodules up to 5 mm on right major fissure and 2 mm subpleural nodule in left upper lobe   CT chest abdomen pelvis with falls: Acute T12 fracture as described on same-day CT thoracolumbar spine reformats. Otherwise, no acute traumatic findings within the chest, abdomen, or pelvis. Acute segmental pulmonary emboli in the right lower lobe and left upper lobe. There is dilatation of right-sided heart chambers which appears chronic.Acute nonocclusive deep venous thrombosis of the left common iliac vein which extends into the lower inferior vena cava.Stable postsurgical changes of partial duodenectomy with mild soft tissue thickening at the surgical site. No discrete duodenal mass is identified.Stable hypoattenuating lesion of the posterior right hepatic lobe.     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 Biopsy with mets renal cell.   10/2023 partial duodenectomy at [facility]   1/2024 EUS  "and resection of gastric mass with complete endoscopic removal with met [facility]    7/2024 admit recurrent GI bleed. Local tumor recurrence >> required transfusion   7/2024 Radiation for bleeding control.   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  Brief discussion with neurosurgery for patient likely to see vascular IR after discharge but should discuss this further to confirm plan        Diet: Regular Diet Adult    DVT Prophylaxis: Heparin infusion    Dillard Catheter: Not present  Lines: None     Cardiac Monitoring: None  Code Status: Full Code      Clinically Significant Risk Factors         # Hyponatremia: Lowest Na = 134 mmol/L in last 2 days, will monitor as appropriate       # Hypoalbuminemia: Lowest albumin = 2.7 g/dL at 1/25/2025  5:06 AM, will monitor as appropriate   # Thrombocytopenia: Lowest platelets = 121 in last 2 days, will monitor for bleeding   # Hypertension: Noted on problem list            # Overweight: Estimated body mass index is 28.89 kg/m  as calculated from the following:    Height as of 1/10/25: 1.93 m (6' 4\").    Weight as of 1/14/25: 107.6 kg (237 lb 4.8 oz)., PRESENT ON ADMISSION       # Financial/Environmental Concerns: none         Social Drivers of Health    Physical Activity: Insufficiently Active (5/28/2024)    Exercise Vital Sign     Days of Exercise per Week: 2 days     Minutes of Exercise per Session: 30 min   Social Connections: Unknown (5/28/2024)    Social Connection and Isolation Panel [NHANES]     Frequency of Social Gatherings with Friends and Family: Twice a week          Disposition Plan     Medically Ready for Discharge: Anticipated in 2-4 Days             Burt Felton DO  Hospitalist Service  [facility]  Securely message with Slingbox (more info)  Text page via [facility] Paging/Directory "   ______________________________________________________________________    Interval History     - Assumed care 1/25  - No acute events overnight  - Confirmed full code status with the patient and family   - Patient having severe pain in left leg and low back     Physical Exam   Vital Signs: Temp: 98.7  F (37.1  C) Temp src: Oral BP: 100/65 Pulse: 100   Resp: 16 SpO2: 92 % O2 Device: None (Room air) Oxygen Delivery: 2 LPM  Weight: 0 lbs 0 oz    Constitutional: Appears uncomfortable, frequently adjusting position   Eyes: Eyes closed  Respiratory: No increased work of breathing, good air exchange, clear to auscultation bilaterally, no crackles or wheezing  Cardiovascular: Normal apical impulse, regular rate and rhythm, normal S1 and S2, no S3 or S4, and no murmur noted  GI: No scars, normal bowel sounds, soft, non-distended, non-tender, no masses palpated, no hepatosplenomegally  Skin: normal skin color, texture, turgor  Musculoskeletal:  No deformity, right leg more swollen than left  Neurologic: Awake, alert, oriented to name, not to place or situation   Neuropsychiatric: General: normal, calm, and normal eye contact    Medical Decision Making       100 MINUTES SPENT BY ME on the date of service doing chart review, history, exam, documentation & further activities per the note.      Data   ------------------------- PAST 24 HR DATA REVIEWED -----------------------------------------------    I have personally reviewed the following data over the past 24 hrs:    8.2  \   11.2 (L)   / 121 (L)     134 (L) 102 31.1 (H) /  110 (H)   4.2 19 (L) 1.49 (H) \     ALT: 20 AST: 30 AP: 125 TBILI: 2.2 (H)   ALB: 2.7 (L) TOT PROTEIN: 5.6 (L) LIPASE: N/A     Trop: 27 (H) BNP: N/A     Procal: N/A CRP: N/A Lactic Acid: 1.7         Imaging results reviewed over the past 24 hrs:   Recent Results (from the past 24 hours)   US Lower Extremity Venous Duplex Bilateral    Narrative    EXAM: US LOWER EXTREMITY VENOUS DUPLEX  BILATERAL  LOCATION: [facility]  DATE: 1/24/2025    INDICATION: eval for dvt  COMPARISON: None.  TECHNIQUE: Venous Duplex ultrasound of bilateral lower extremities with and without compression, augmentation and duplex. Color flow and spectral Doppler with waveform analysis performed.    FINDINGS: Exam includes the common femoral, femoral, popliteal veins as well as segmentally visualized deep calf veins and greater saphenous vein.     RIGHT: No deep vein thrombosis. No superficial thrombophlebitis. No popliteal cyst.    LEFT: No deep vein thrombosis. No superficial thrombophlebitis. No popliteal cyst.      Impression    IMPRESSION:  1.  No deep venous thrombosis in the bilateral lower extremities.   MR Thoracic Spine w/o Contrast    Narrative    EXAM: MR THORACIC SPINE W/O CONTRAST  LOCATION: [facility]  DATE: 1/24/2025    INDICATION: fall wtih burst fradture  COMPARISON:  CTA thoracic spine January 24, 2025.  TECHNIQUE: Routine Thoracic Spine MRI without IV contrast. Exam ordered secondary to patient agitation.      Impression    FINDINGS/IMPRESSION:     Examination aborted secondary to patient agitation. Only severely motion degraded sagittal T1 and T2 sequences obtained. The T12 burst fracture is identified but poorly evaluated. No evidence for canal compromise. No evidence for ligamentous injury,   although evaluation for ligamentous injury is severely degraded.

## 2025-01-25 NOTE — PLAN OF CARE
Goal Outcome Evaluation:      Plan of Care Reviewed With: spouse          Outcome Evaluation: discharge placement

## 2025-01-25 NOTE — CONSULTS
Care management is currently following for disposition planning. Discharge needs TBD yet.     Gavi Enrique RN   [facility]   Phone [phone], Vocera or [phone]

## 2025-01-25 NOTE — PHARMACY-VANCOMYCIN DOSING SERVICE
"Pharmacy Vancomycin Initial Note  Date of Service 2025  Patient's  1945  79 year old, male    Indication: Sepsis    Current estimated CrCl = Estimated Creatinine Clearance: 55 mL/min (A) (based on SCr of 1.49 mg/dL (H)).    Creatinine for last 3 days  2025:  9:04 AM Creatinine 1.59 mg/dL  2025:  5:06 AM Creatinine 1.49 mg/dL    Recent Vancomycin Level(s) for last 3 days  No results found for requested labs within last 3 days.      Vancomycin IV Administrations (past 72 hours)                     vancomycin (VANCOCIN) 2,500 mg in 0.9% NaCl 525 mL intermittent infusion (mg) 2,500 mg New Bag 25 0750                    Nephrotoxins and other renal medications (From now, onward)      Start     Dose/Rate Route Frequency Ordered Stop    25 0800  vancomycin (VANCOCIN) 1,500 mg in 0.9% NaCl 265 mL intermittent infusion         1,500 mg  over 90 Minutes Intravenous EVERY 24 HOURS 25 1114      25 0430  piperacillin-tazobactam (ZOSYN) 4.5 g vial to attach to  mL bag        Note to Pharmacy: For [facility], [facility] and [facility]: For Zosyn-naive patients, use the \"Zosyn initial dose + extended infusion\" order panel.    4.5 g  over 30 Minutes Intravenous EVERY 6 HOURS 25 0421              Contrast Orders - past 72 hours (72h ago, onward)      Start     Dose/Rate Route Frequency Stop    25 1030  iopamidol (ISOVUE-370) solution 120 mL         120 mL Intravenous ONCE 25 1110            [employer] Prediction of Planned Initial Vancomycin Regimen  Loading dose: 2500mg  Regimen: 1500 mg IV every 24 hours.  Start time: 07:50 on 2025  Exposure target: AUC24 (range)400-600 mg/L.hr   AUC24,ss: 560 mg/L.hr  Probability of AUC24 > 400: 83 %  Ctrough,ss: 17.7 mg/L  Probability of Ctrough,ss > 20: 40 %  Probability of nephrotoxicity (Lodise JAVIER ): 14 %     Plan:  Following 2500mg load, Start vancomycin  1500 mg IV q24h.   Vancomycin monitoring method: AUC  Vancomycin " therapeutic monitoring goal: 400-600 mg*h/L  Pharmacy will check vancomycin levels as appropriate in 1-3 Days.    Serum creatinine levels will be ordered daily for the first week of therapy and at least twice weekly for subsequent weeks.      Delphine Sheffield [facility], PharmD

## 2025-01-25 NOTE — OR NURSING
Patient restless and attempting to get out of bed. PRN dilaudid was administered. Family at bedside. Patient still restless. Vocera message to provider. Heparin running per order.

## 2025-01-25 NOTE — PROGRESS NOTES
[facility] Progress Note          Assessment and Plan:     Primary Oncologist: Dr. Glover     Pulmonary embolism / DVT   - Acute segmental pulmonary emboli in the right lower lobe and left upper lobe.  There is dilatation of right sided heart chambers which appeared chronic.  Acute nonocclusive DVT left common iliac vein which extends into the lower inferior vena cava.     2. Recurrent syncope with hypotension   - On midodrine  - No improvement since holding the cabozantanib   - Also taking florinef      3. Clear cell renal cell carcinoma diagnosed in 10/2019  - pT3b N0 M0 disease at presentation  - followed on surveillance until 1/2021 at which time liver metastasis identified and treated with cryoablation  - disease recurrence in 9/2023 presenting with GI bleeding and treated with partial duodenectomy  -disease progression in 1/2024 treated with partia gastrectomy  - Progressed on Ipi/Nivo 6/25/2024   - s/p radiation due to duodenal bleeding ending 8/06/2024   - Started Cabozantanib 20mg 9/26/24.   - Most recent CT 12/30/24 with decreased retroperitoneal lymphadenopathy, stable R hepatic lobe mass, duodenal mass measuring up to 2.3cm was not well seen on the non contrast study  - Cabozantanib held since 1/15/25   - Continue to hold Cabozantinib     4. 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 Acute burst fracture T12 with retropulsion   - Acute T12 fracture 2/2 fall   - MRI thoracic spine 1/24/2025 showed T12 burst fracture with no evidence of canal compromise  - Neurosurgery team onboard and noted plan for bracing     PLAN  - Continue to hold Cabozantinib   - on heparin gtt since 1/24 and tolerating anticoagulation well so far with no bleeding issues   - H&H stable   - continue heparin gtt   - IR consulted for consideration for possible embolectomy     Will follow him with you.  Updated family  at the bedside with plan above    German Escalante MD                 Interval History:     Denied bleeding manifestations.  Trigger lactate protocol and was given some IV fluids overnight.  Family at the bedside              Review of Systems:   As per subjective, otherwise 5 systems reviewed and negative.           Physical Exam:   Blood pressure 100/65, pulse 100, temperature 98.7  F (37.1  C), temperature source Oral, resp. rate 16, SpO2 92%.      Vital Sign Ranges  Temperature Temp  Av.7  F (38.2  C)  Min: 98.4  F (36.9  C)  Max: 102.3  F (39.1  C)   Blood pressure Systolic (24hrs), Av , Min:87 , Max:127        Diastolic (24hrs), Av, Min:43, Max:105      Pulse Pulse  Av.6  Min: 76  Max: 108   Respirations Resp  Av.7  Min: 13  Max: 21   Pulse oximetry SpO2  Av.5 %  Min: 90 %  Max: 98 %         Intake/Output Summary (Last 24 hours) at 2025 1139  Last data filed at 2025 0703  Gross per 24 hour   Intake --   Output 570 ml   Net -570 ml       Constitutional:   No acute distress.   Skin:   No rashes, petechiae, or ecchymoses.   HEENT:   No conjunctival pallor    Neck:   Supple.   Lungs:   Clear to auscultation bilaterally.   Cardiovascular:   Regular rhythm   Abdomen:   Soft, nontender, nondistended with no palpable hepatosplenomegaly.   Extremities:   No bruises    Neurological:   Bed bound             Medications:     No current outpatient medications on file.                Data:     Results for orders placed or performed during the hospital encounter of 25 (from the past 24 hours)   CT Thoracic Spine w/o Contrast    Narrative    EXAM: CT THORACIC SPINE W/O CONTRAST, CT LUMBAR SPINE W/O CONTRAST  LOCATION: [facility]  DATE: 2025    INDICATION: trauma  COMPARISON: None.  TECHNIQUE:  1) Routine CT Thoracic Spine without IV contrast. Multiplanar reformats. Dose reduction techniques were used.   2) Routine CT Lumbar Spine without IV contrast.  Multiplanar reformats. Dose reduction techniques were used.     FINDINGS:    THORACIC SPINE CT:  VERTEBRA: Osteopenia. Acute burst fracture of T12 with approximately 20% height loss. Minimal retropulsion of the fractured vertebral body without associated spinal canal stenosis. Unchanged Schmorl's node-like endplate indentation along the inferior   endplate of T9 with associated chronic vertebral body height loss. Stepwise mild anterolisthesis T2-T4. No fracture or posttraumatic subluxation.     CANAL/FORAMINA: No canal or neural foraminal stenosis.    PARASPINAL: No extraspinal abnormality.    LUMBAR SPINE CT:  VERTEBRA: Normal vertebral body heights. Left convex scoliosis with the apex at L3-L4 minimal retrolisthesis at L3-L4 and minimal anterolisthesis at L5-S1. No fracture or posttraumatic subluxation.     CANAL/FORAMINA: Multilevel disc height loss, osteophyte formation and facet arthropathy without high-grade spinal or neural foraminal stenosis. Moderate right neural foraminal stenosis at L2-L3 and L3-L4 and mild to left neural foraminal stenosis at   L4-L5 and L5-S1. Multilevel Schmorl's node-like endplate indentations.    PARASPINAL: No extraspinal abnormality.      Impression    IMPRESSION:  THORACIC SPINE CT:  1.  Acute burst fracture of T12 with approximately 20% height loss. Mild retropulsion of the fractured vertebral body without associated spinal canal stenosis.  2.  No high-grade spinal canal or neural foraminal stenosis.    LUMBAR SPINE CT:  1.  No fracture or posttraumatic subluxation.  2.  Multilevel lumbar spondylosis without high-grade spinal canal stenosis. Multilevel neural foraminal stenosis     CT Lumbar Spine w/o Contrast    Narrative    EXAM: CT THORACIC SPINE W/O CONTRAST, CT LUMBAR SPINE W/O CONTRAST  LOCATION: [facility]  DATE: 1/24/2025    INDICATION: trauma  COMPARISON: None.  TECHNIQUE:  1) Routine CT Thoracic Spine without IV contrast. Multiplanar reformats.  Dose reduction techniques were used.   2) Routine CT Lumbar Spine without IV contrast. Multiplanar reformats. Dose reduction techniques were used.     FINDINGS:    THORACIC SPINE CT:  VERTEBRA: Osteopenia. Acute burst fracture of T12 with approximately 20% height loss. Minimal retropulsion of the fractured vertebral body without associated spinal canal stenosis. Unchanged Schmorl's node-like endplate indentation along the inferior   endplate of T9 with associated chronic vertebral body height loss. Stepwise mild anterolisthesis T2-T4. No fracture or posttraumatic subluxation.     CANAL/FORAMINA: No canal or neural foraminal stenosis.    PARASPINAL: No extraspinal abnormality.    LUMBAR SPINE CT:  VERTEBRA: Normal vertebral body heights. Left convex scoliosis with the apex at L3-L4 minimal retrolisthesis at L3-L4 and minimal anterolisthesis at L5-S1. No fracture or posttraumatic subluxation.     CANAL/FORAMINA: Multilevel disc height loss, osteophyte formation and facet arthropathy without high-grade spinal or neural foraminal stenosis. Moderate right neural foraminal stenosis at L2-L3 and L3-L4 and mild to left neural foraminal stenosis at   L4-L5 and L5-S1. Multilevel Schmorl's node-like endplate indentations.    PARASPINAL: No extraspinal abnormality.      Impression    IMPRESSION:  THORACIC SPINE CT:  1.  Acute burst fracture of T12 with approximately 20% height loss. Mild retropulsion of the fractured vertebral body without associated spinal canal stenosis.  2.  No high-grade spinal canal or neural foraminal stenosis.    LUMBAR SPINE CT:  1.  No fracture or posttraumatic subluxation.  2.  Multilevel lumbar spondylosis without high-grade spinal canal stenosis. Multilevel neural foraminal stenosis     Troponin T, High Sensitivity   Result Value Ref Range    Troponin T, High Sensitivity 27 (H) <=22 ng/L   US Lower Extremity Venous Duplex Bilateral    Narrative    EXAM: US LOWER EXTREMITY VENOUS DUPLEX  BILATERAL  LOCATION: [facility]  DATE: 1/24/2025    INDICATION: eval for dvt  COMPARISON: None.  TECHNIQUE: Venous Duplex ultrasound of bilateral lower extremities with and without compression, augmentation and duplex. Color flow and spectral Doppler with waveform analysis performed.    FINDINGS: Exam includes the common femoral, femoral, popliteal veins as well as segmentally visualized deep calf veins and greater saphenous vein.     RIGHT: No deep vein thrombosis. No superficial thrombophlebitis. No popliteal cyst.    LEFT: No deep vein thrombosis. No superficial thrombophlebitis. No popliteal cyst.      Impression    IMPRESSION:  1.  No deep venous thrombosis in the bilateral lower extremities.   MR Thoracic Spine w/o Contrast    Narrative    EXAM: MR THORACIC SPINE W/O CONTRAST  LOCATION: [facility]  DATE: 1/24/2025    INDICATION: fall wtih burst fradture  COMPARISON:  CTA thoracic spine January 24, 2025.  TECHNIQUE: Routine Thoracic Spine MRI without IV contrast. Exam ordered secondary to patient agitation.      Impression    FINDINGS/IMPRESSION:     Examination aborted secondary to patient agitation. Only severely motion degraded sagittal T1 and T2 sequences obtained. The T12 burst fracture is identified but poorly evaluated. No evidence for canal compromise. No evidence for ligamentous injury,   although evaluation for ligamentous injury is severely degraded.   Glucose by meter   Result Value Ref Range    GLUCOSE BY METER POCT 109 (H) 70 - 99 mg/dL   CBC with platelets   Result Value Ref Range    WBC Count 11.2 (H) 4.0 - 11.0 10e3/uL    RBC Count 3.76 (L) 4.40 - 5.90 10e6/uL    Hemoglobin 12.6 (L) 13.3 - 17.7 g/dL    Hematocrit 37.5 (L) 40.0 - 53.0 %     78 - 100 fL    MCH 33.5 (H) 26.5 - 33.0 pg    MCHC 33.6 31.5 - 36.5 g/dL    RDW 15.6 (H) 10.0 - 15.0 %    Platelet Count 139 (L) 150 - 450 10e3/uL   Heparin Unfractionated Anti Xa Level   Result Value  Ref Range    Anti Xa Unfractionated Heparin 0.52 For Reference Range, See Comment IU/mL    Narrative    Therapeutic Range: UFH: 0.25-0.50 IU/mL for low intensity dosing,  0.30-0.70 IU/mL for high intensity dosing DVT and PE.  This test is not validated for other direct factor X inhibitors (e.g. rivaroxaban, apixaban, edoxaban, betrixaban, fondaparinux) and should not be used for monitoring of other medications.   ABO/Rh type and screen    Narrative    The following orders were created for panel order ABO/Rh type and screen.  Procedure                               Abnormality         Status                     ---------                               -----------         ------                     Adult Type and Screen[[id number]]                            Final result                 Please view results for these tests on the individual orders.   Adult Type and Screen   Result Value Ref Range    ABO/RH(D) B POS     Antibody Screen Negative Negative    SPECIMEN EXPIRATION DATE [id number]    Comprehensive metabolic panel   Result Value Ref Range    Sodium 134 (L) 135 - 145 mmol/L    Potassium 4.2 3.4 - 5.3 mmol/L    Carbon Dioxide (CO2) 19 (L) 22 - 29 mmol/L    Anion Gap 13 7 - 15 mmol/L    Urea Nitrogen 31.1 (H) 8.0 - 23.0 mg/dL    Creatinine 1.49 (H) 0.67 - 1.17 mg/dL    GFR Estimate 48 (L) >60 mL/min/1.73m2    Calcium 7.6 (L) 8.8 - 10.4 mg/dL    Chloride 102 98 - 107 mmol/L    Glucose 110 (H) 70 - 99 mg/dL    Alkaline Phosphatase 125 40 - 150 U/L    AST 30 0 - 45 U/L    ALT 20 0 - 70 U/L    Protein Total 5.6 (L) 6.4 - 8.3 g/dL    Albumin 2.7 (L) 3.5 - 5.2 g/dL    Bilirubin Total 2.2 (H) <=1.2 mg/dL   Bilirubin direct   Result Value Ref Range    Bilirubin Direct 0.92 (H) 0.00 - 0.30 mg/dL   CBC with platelets   Result Value Ref Range    WBC Count 8.2 4.0 - 11.0 10e3/uL    RBC Count 3.29 (L) 4.40 - 5.90 10e6/uL    Hemoglobin 11.2 (L) 13.3 - 17.7 g/dL    Hematocrit 32.3 (L) 40.0 - 53.0 %    MCV 98 78 - 100 fL     MCH 34.0 (H) 26.5 - 33.0 pg    MCHC 34.7 31.5 - 36.5 g/dL    RDW 15.5 (H) 10.0 - 15.0 %    Platelet Count 121 (L) 150 - 450 10e3/uL   Lactic Acid Whole Blood w/ 1x repeat in 2 hrs when >2   Result Value Ref Range    Lactic Acid, Initial 1.7 0.7 - 2.0 mmol/L   Heparin Unfractionated Anti Xa Level   Result Value Ref Range    Anti Xa Unfractionated Heparin 0.59 For Reference Range, See Comment IU/mL    Narrative    Therapeutic Range: UFH: 0.25-0.50 IU/mL for low intensity dosing,  0.30-0.70 IU/mL for high intensity dosing DVT and PE.  This test is not validated for other direct factor X inhibitors (e.g. rivaroxaban, apixaban, edoxaban, betrixaban, fondaparinux) and should not be used for monitoring of other medications.

## 2025-01-25 NOTE — PROGRESS NOTES
[facility]    Neurosurgery  Daily Note    Assessment & Plan   Ti Nickerson is a 79 year old male who was admitted on 1/24/2025. Ti Nickerson is a 79 year old male with a history including hypertension, hyperlipidemia, CAD, CKD stage 3b, chronic atrial fibrillation, and right renal cell carcinoma with mets to the lung who presents to the ED via EMS for evaluation after a fall with imaging evidence as demonstrated below.     AM ROUNDS: ongoing back pain. Denies radicular pain, paresthesias, weakness.   Imaging : reviewed with Dr. Linder   EXAM: MR THORACIC SPINE W/O CONTRAST  LOCATION: [facility]  DATE: 1/24/2025     INDICATION: fall wtih burst fradture  COMPARISON:  CTA thoracic spine January 24, 2025.  TECHNIQUE: Routine Thoracic Spine MRI without IV contrast. Exam ordered secondary to patient agitation.                                                                      FINDINGS/IMPRESSION:      Examination aborted secondary to patient agitation. Only severely motion degraded sagittal T1 and T2 sequences obtained. The T12 burst fracture is identified but poorly evaluated. No evidence for canal compromise. No evidence for ligamentous injury,   although evaluation for ligamentous injury is severely degraded.    PLAN  Ok for anticoagulation from nsgy standpoint   MRI reviewed with Dr. Linder and explained to patient and family   Bed rest spine precautions   Orthotics consult for custom TLSO and light activity. To be worn when upright and out of bed. Off with resting/hygiene/sleeping. To be required for 3 months. RN to call orthotics on call   Upright XR once in brace     Plans reviewed with Dr. Linder and family     Zora Mccrary PA-C  [facility] Neurosurgery  [address] [place] [id number]  Tel [phone]  Fax [phone]  Text page via [facility] Paging/Directory    Active Problems:    Acute encephalopathy    T12 burst  fracture (H)    Deep vein thrombosis (DVT) of femoral vein, unspecified chronicity, unspecified laterality (H)    Unwitnessed fall    Pulmonary embolism, other, unspecified chronicity, unspecified whether acute cor pulmonale present (H)     Zora Mcelroy PA-C    Interval History   Stable     Physical Exam   Temp: 98.7  F (37.1  C) Temp src: Oral BP: 100/65 Pulse: 100   Resp: 16 SpO2: 92 % O2 Device: None (Room air) Oxygen Delivery: 2 LPM  There were no vitals filed for this visit.  Vital Signs with Ranges  Temp:  [98.4  F (36.9  C)-102.3  F (39.1  C)] 98.7  F (37.1  C)  Pulse:  [] 100  Resp:  [13-21] 16  BP: ()/() 100/65  SpO2:  [90 %-97 %] 92 %  I/O last 3 completed shifts:  In: -   Out: 450 [Urine:450]    Moving BLE symmetrically   Sensation intact to light touch BLE      Medications   Current Facility-Administered Medications   Medication Dose Route Frequency Provider Last Rate Last Admin    heparin 25,000 units in 0.45% NaCl 250 mL ANTICOAGULANT infusion  0-5,000 Units/hr Intravenous Continuous Becky Hansen MD 18 mL/hr at 01/25/25 0739 1,800 Units/hr at 01/25/25 0739    sodium chloride 0.9 % infusion   Intravenous Continuous Becky Hansen  mL/hr at 01/25/25 0951 New Bag at 01/25/25 0951      Current Facility-Administered Medications   Medication Dose Route Frequency Provider Last Rate Last Admin    acetaminophen (TYLENOL) tablet 975 mg  975 mg Oral TID Burt Felton DO        fludrocortisone (FLORINEF) tablet 0.1 mg  0.1 mg Oral Daily Becky Hansen MD   0.1 mg at 01/25/25 0942    midodrine (PROAMATINE) tablet 10 mg  10 mg Oral TID w/meals Becky Hansen MD   10 mg at 01/25/25 0942    pantoprazole (PROTONIX) EC tablet 40 mg  40 mg Oral BID AC Becky Hansen MD   40 mg at 01/25/25 1004    piperacillin-tazobactam (ZOSYN) 4.5 g vial to attach to  mL bag  4.5 g Intravenous Q6H Kennedy, Zev Bains MD   4.5 g at 01/25/25 1105    pravastatin (PRAVACHOL) tablet 20 mg   20 mg Oral Daily Becky Hansen MD   20 mg at 01/25/25 0942    sodium chloride (PF) 0.9% PF flush 3 mL  3 mL Intracatheter Q8H Becky Hansen MD   3 mL at 01/24/25 1517    [START ON 1/26/2025] vancomycin (VANCOCIN) 1,500 mg in 0.9% NaCl 265 mL intermittent infusion  1,500 mg Intravenous Q24H Becky Hansen MD

## 2025-01-25 NOTE — PLAN OF CARE
Goal Outcome Evaluation:   Shift Summary [id number]    Admitting Diagnosis: Acute encephalopathy [G93.40]  T12 burst fracture (H) [S22.081A]  Deep vein thrombosis (DVT) of femoral vein, unspecified chronicity, unspecified laterality (H) [I82.419]  Unwitnessed fall [R29.6]  Pulmonary embolism, other, unspecified chronicity, unspecified whether acute cor pulmonale present (H) [I26.99]   Vitals Vital signs:  Temp: 98.4  F (36.9  C) Temp src: Temporal BP: 119/79 Pulse: (!) 108   Resp: 21 SpO2: 96 % O2 Device: None (Room air)        Pain 10/10. Taking Dilaudid PRN. Last dose 1542  A&Ox2  Voiding :Continent  Mobility :Bedrest  Tele :N/A  CMS :KRAIG  Lung Sounds Clear on 0 LPM via RA  GI :BS +4  Dressing :N/A    Orders Placed This Encounter      NPO for Medical/Clinical Reasons Except for: Meds       Plan:

## 2025-01-25 NOTE — PROVIDER NOTIFICATION
Brief update:    Patient febrile greater than 101.  Hypotensive with blood pressure in the upper 80s.  Mild hypoxia.    Here with a known large DVT into the IVC as well as some pulmonary emboli.    Confused, recent frequent falls.    Negative COVID, no note of pneumonia on CT imaging with CT chest abdomen pelvis on admission, negative RSV, negative influenza.  Urinalysis not consistent with infection.    Vancomycin and Zosyn ordered for sepsis of unclear source  STAT blood cultures  Lactic acid pending  1 L LR bolus now    Zev Kennedy MD  4:23 AM

## 2025-01-26 ENCOUNTER — DOCUMENTATION ONLY (OUTPATIENT)
Dept: ORTHOPEDICS | Facility: CLINIC | Age: 80
End: 2025-01-26
Payer: MEDICARE

## 2025-01-26 LAB
ANION GAP SERPL CALCULATED.3IONS-SCNC: 11 MMOL/L (ref 7–15)
BUN SERPL-MCNC: 32.1 MG/DL (ref 8–23)
CALCIUM SERPL-MCNC: 7 MG/DL (ref 8.8–10.4)
CHLORIDE SERPL-SCNC: 106 MMOL/L (ref 98–107)
CREAT SERPL-MCNC: 1.3 MG/DL (ref 0.67–1.17)
EGFRCR SERPLBLD CKD-EPI 2021: 56 ML/MIN/1.73M2
ERYTHROCYTE [DISTWIDTH] IN BLOOD BY AUTOMATED COUNT: 15.8 % (ref 10–15)
GLUCOSE BLDC GLUCOMTR-MCNC: 91 MG/DL (ref 70–99)
GLUCOSE SERPL-MCNC: 82 MG/DL (ref 70–99)
HCO3 SERPL-SCNC: 19 MMOL/L (ref 22–29)
HCT VFR BLD AUTO: 30.9 % (ref 40–53)
HGB BLD-MCNC: 10.5 G/DL (ref 13.3–17.7)
MCH RBC QN AUTO: 33.9 PG (ref 26.5–33)
MCHC RBC AUTO-ENTMCNC: 34 G/DL (ref 31.5–36.5)
MCV RBC AUTO: 100 FL (ref 78–100)
PLATELET # BLD AUTO: 98 10E3/UL (ref 150–450)
POTASSIUM SERPL-SCNC: 3.8 MMOL/L (ref 3.4–5.3)
RBC # BLD AUTO: 3.1 10E6/UL (ref 4.4–5.9)
SODIUM SERPL-SCNC: 136 MMOL/L (ref 135–145)
UFH PPP CHRO-ACNC: 0.39 IU/ML
WBC # BLD AUTO: 5.1 10E3/UL (ref 4–11)

## 2025-01-26 PROCEDURE — 82310 ASSAY OF CALCIUM: CPT | Performed by: STUDENT IN AN ORGANIZED HEALTH CARE EDUCATION/TRAINING PROGRAM

## 2025-01-26 PROCEDURE — 272N000194 HC ACCESSORY CR3

## 2025-01-26 PROCEDURE — C1753 CATH, INTRAVAS ULTRASOUND: HCPCS

## 2025-01-26 PROCEDURE — 272N000196 HC ACCESSORY CR5

## 2025-01-26 PROCEDURE — 258N000003 HC RX IP 258 OP 636: Performed by: INTERNAL MEDICINE

## 2025-01-26 PROCEDURE — 250N000011 HC RX IP 250 OP 636: Performed by: STUDENT IN AN ORGANIZED HEALTH CARE EDUCATION/TRAINING PROGRAM

## 2025-01-26 PROCEDURE — 250N000011 HC RX IP 250 OP 636: Performed by: INTERNAL MEDICINE

## 2025-01-26 PROCEDURE — 85520 HEPARIN ASSAY: CPT | Performed by: INTERNAL MEDICINE

## 2025-01-26 PROCEDURE — 36415 COLL VENOUS BLD VENIPUNCTURE: CPT | Performed by: INTERNAL MEDICINE

## 2025-01-26 PROCEDURE — 250N000009 HC RX 250: Performed by: RADIOLOGY

## 2025-01-26 PROCEDURE — 85027 COMPLETE CBC AUTOMATED: CPT | Performed by: STUDENT IN AN ORGANIZED HEALTH CARE EDUCATION/TRAINING PROGRAM

## 2025-01-26 PROCEDURE — 120N000001 HC R&B MED SURG/OB

## 2025-01-26 PROCEDURE — 87040 BLOOD CULTURE FOR BACTERIA: CPT | Performed by: INTERNAL MEDICINE

## 2025-01-26 PROCEDURE — C1769 GUIDE WIRE: HCPCS

## 2025-01-26 PROCEDURE — 272N000566 HC SHEATH CR3

## 2025-01-26 PROCEDURE — 99233 SBSQ HOSP IP/OBS HIGH 50: CPT | Performed by: STUDENT IN AN ORGANIZED HEALTH CARE EDUCATION/TRAINING PROGRAM

## 2025-01-26 PROCEDURE — 255N000002 HC RX 255 OP 636: Performed by: RADIOLOGY

## 2025-01-26 PROCEDURE — 87086 URINE CULTURE/COLONY COUNT: CPT | Performed by: STUDENT IN AN ORGANIZED HEALTH CARE EDUCATION/TRAINING PROGRAM

## 2025-01-26 PROCEDURE — 80048 BASIC METABOLIC PNL TOTAL CA: CPT | Performed by: STUDENT IN AN ORGANIZED HEALTH CARE EDUCATION/TRAINING PROGRAM

## 2025-01-26 PROCEDURE — 250N000013 HC RX MED GY IP 250 OP 250 PS 637: Performed by: STUDENT IN AN ORGANIZED HEALTH CARE EDUCATION/TRAINING PROGRAM

## 2025-01-26 PROCEDURE — 36415 COLL VENOUS BLD VENIPUNCTURE: CPT | Performed by: STUDENT IN AN ORGANIZED HEALTH CARE EDUCATION/TRAINING PROGRAM

## 2025-01-26 PROCEDURE — 99418 PROLNG IP/OBS E/M EA 15 MIN: CPT | Performed by: STUDENT IN AN ORGANIZED HEALTH CARE EDUCATION/TRAINING PROGRAM

## 2025-01-26 PROCEDURE — 272N000116 HC CATH CR1

## 2025-01-26 PROCEDURE — 06CD3ZZ EXTIRPATION OF MATTER FROM LEFT COMMON ILIAC VEIN, PERCUTANEOUS APPROACH: ICD-10-PCS | Performed by: NURSE PRACTITIONER

## 2025-01-26 PROCEDURE — 250N000011 HC RX IP 250 OP 636: Performed by: RADIOLOGY

## 2025-01-26 PROCEDURE — L0486 TLSO RIGIDLINED CUST FAB TWO: HCPCS

## 2025-01-26 RX ORDER — FENTANYL CITRATE 50 UG/ML
25-50 INJECTION, SOLUTION INTRAMUSCULAR; INTRAVENOUS EVERY 5 MIN PRN
Status: DISCONTINUED | OUTPATIENT
Start: 2025-01-26 | End: 2025-01-26 | Stop reason: HOSPADM

## 2025-01-26 RX ORDER — FLUMAZENIL 0.1 MG/ML
0.2 INJECTION, SOLUTION INTRAVENOUS
Status: DISCONTINUED | OUTPATIENT
Start: 2025-01-26 | End: 2025-01-26 | Stop reason: HOSPADM

## 2025-01-26 RX ORDER — NALOXONE HYDROCHLORIDE 0.4 MG/ML
0.2 INJECTION, SOLUTION INTRAMUSCULAR; INTRAVENOUS; SUBCUTANEOUS
Status: DISCONTINUED | OUTPATIENT
Start: 2025-01-26 | End: 2025-01-26 | Stop reason: HOSPADM

## 2025-01-26 RX ORDER — CEFEPIME HYDROCHLORIDE 2 G/1
2 INJECTION, POWDER, FOR SOLUTION INTRAVENOUS EVERY 8 HOURS
Status: DISCONTINUED | OUTPATIENT
Start: 2025-01-26 | End: 2025-01-26

## 2025-01-26 RX ORDER — IOPAMIDOL 612 MG/ML
100 INJECTION, SOLUTION INTRAVASCULAR ONCE
Status: COMPLETED | OUTPATIENT
Start: 2025-01-26 | End: 2025-01-26

## 2025-01-26 RX ORDER — NALOXONE HYDROCHLORIDE 0.4 MG/ML
0.4 INJECTION, SOLUTION INTRAMUSCULAR; INTRAVENOUS; SUBCUTANEOUS
Status: DISCONTINUED | OUTPATIENT
Start: 2025-01-26 | End: 2025-01-26 | Stop reason: HOSPADM

## 2025-01-26 RX ORDER — HYDROMORPHONE HCL IN WATER/PF 6 MG/30 ML
0.2 PATIENT CONTROLLED ANALGESIA SYRINGE INTRAVENOUS ONCE
Status: COMPLETED | OUTPATIENT
Start: 2025-01-26 | End: 2025-01-26

## 2025-01-26 RX ORDER — ONDANSETRON 2 MG/ML
4 INJECTION INTRAMUSCULAR; INTRAVENOUS
Status: DISCONTINUED | OUTPATIENT
Start: 2025-01-26 | End: 2025-01-26 | Stop reason: HOSPADM

## 2025-01-26 RX ORDER — HEPARIN SODIUM 200 [USP'U]/100ML
1 INJECTION, SOLUTION INTRAVENOUS EVERY 5 MIN PRN
Status: DISCONTINUED | OUTPATIENT
Start: 2025-01-26 | End: 2025-01-29

## 2025-01-26 RX ORDER — MEROPENEM 1 G/1
1 INJECTION, POWDER, FOR SOLUTION INTRAVENOUS EVERY 8 HOURS
Status: DISCONTINUED | OUTPATIENT
Start: 2025-01-26 | End: 2025-01-28

## 2025-01-26 RX ADMIN — PIPERACILLIN AND TAZOBACTAM 4.5 G: 4; .5 INJECTION, POWDER, FOR SOLUTION INTRAVENOUS at 03:35

## 2025-01-26 RX ADMIN — HYDROMORPHONE HYDROCHLORIDE 0.2 MG: 0.2 INJECTION, SOLUTION INTRAMUSCULAR; INTRAVENOUS; SUBCUTANEOUS at 07:52

## 2025-01-26 RX ADMIN — HYDROMORPHONE HYDROCHLORIDE 0.2 MG: 0.2 INJECTION, SOLUTION INTRAMUSCULAR; INTRAVENOUS; SUBCUTANEOUS at 13:42

## 2025-01-26 RX ADMIN — HEPARIN SODIUM 4 BAG: 200 INJECTION, SOLUTION INTRAVENOUS at 13:51

## 2025-01-26 RX ADMIN — HYDROMORPHONE HYDROCHLORIDE 0.2 MG: 0.2 INJECTION, SOLUTION INTRAMUSCULAR; INTRAVENOUS; SUBCUTANEOUS at 18:57

## 2025-01-26 RX ADMIN — ACETAMINOPHEN 975 MG: 325 TABLET, FILM COATED ORAL at 09:13

## 2025-01-26 RX ADMIN — IOPAMIDOL 50 ML: 612 INJECTION, SOLUTION INTRAVENOUS at 14:22

## 2025-01-26 RX ADMIN — MEROPENEM 1 G: 1 INJECTION, POWDER, FOR SOLUTION INTRAVENOUS at 17:59

## 2025-01-26 RX ADMIN — QUETIAPINE 12.5 MG: 25 TABLET, FILM COATED ORAL at 21:39

## 2025-01-26 RX ADMIN — LIDOCAINE HYDROCHLORIDE 10 ML: 10 INJECTION, SOLUTION INFILTRATION; PERINEURAL at 13:45

## 2025-01-26 RX ADMIN — QUETIAPINE 12.5 MG: 25 TABLET, FILM COATED ORAL at 18:58

## 2025-01-26 RX ADMIN — QUETIAPINE 12.5 MG: 25 TABLET, FILM COATED ORAL at 09:07

## 2025-01-26 RX ADMIN — HYDROMORPHONE HYDROCHLORIDE 0.2 MG: 0.2 INJECTION, SOLUTION INTRAMUSCULAR; INTRAVENOUS; SUBCUTANEOUS at 11:23

## 2025-01-26 RX ADMIN — HYDROMORPHONE HYDROCHLORIDE 0.2 MG: 0.2 INJECTION, SOLUTION INTRAMUSCULAR; INTRAVENOUS; SUBCUTANEOUS at 16:44

## 2025-01-26 RX ADMIN — HEPARIN SODIUM 1800 UNITS/HR: 10000 INJECTION, SOLUTION INTRAVENOUS at 09:54

## 2025-01-26 RX ADMIN — HYDROMORPHONE HYDROCHLORIDE 0.4 MG: 0.2 INJECTION, SOLUTION INTRAMUSCULAR; INTRAVENOUS; SUBCUTANEOUS at 23:11

## 2025-01-26 RX ADMIN — MIDAZOLAM 0.5 MG: 1 INJECTION INTRAMUSCULAR; INTRAVENOUS at 13:51

## 2025-01-26 RX ADMIN — HYDROMORPHONE HYDROCHLORIDE 0.2 MG: 0.2 INJECTION, SOLUTION INTRAMUSCULAR; INTRAVENOUS; SUBCUTANEOUS at 01:30

## 2025-01-26 RX ADMIN — VANCOMYCIN HYDROCHLORIDE 1500 MG: 10 INJECTION, POWDER, LYOPHILIZED, FOR SOLUTION INTRAVENOUS at 08:53

## 2025-01-26 RX ADMIN — MIDAZOLAM 1 MG: 1 INJECTION INTRAMUSCULAR; INTRAVENOUS at 13:35

## 2025-01-26 RX ADMIN — MIDAZOLAM 0.5 MG: 1 INJECTION INTRAMUSCULAR; INTRAVENOUS at 13:50

## 2025-01-26 RX ADMIN — HYDROMORPHONE HYDROCHLORIDE 0.2 MG: 0.2 INJECTION, SOLUTION INTRAMUSCULAR; INTRAVENOUS; SUBCUTANEOUS at 15:39

## 2025-01-26 RX ADMIN — HYDROMORPHONE HYDROCHLORIDE 0.4 MG: 0.2 INJECTION, SOLUTION INTRAMUSCULAR; INTRAVENOUS; SUBCUTANEOUS at 21:02

## 2025-01-26 RX ADMIN — HYDROMORPHONE HYDROCHLORIDE 0.2 MG: 0.2 INJECTION, SOLUTION INTRAMUSCULAR; INTRAVENOUS; SUBCUTANEOUS at 03:35

## 2025-01-26 RX ADMIN — HYDROMORPHONE HYDROCHLORIDE 0.2 MG: 0.2 INJECTION, SOLUTION INTRAMUSCULAR; INTRAVENOUS; SUBCUTANEOUS at 05:45

## 2025-01-26 RX ADMIN — SODIUM CHLORIDE: 9 INJECTION, SOLUTION INTRAVENOUS at 06:47

## 2025-01-26 ASSESSMENT — ACTIVITIES OF DAILY LIVING (ADL)
ADLS_ACUITY_SCORE: 82
ADLS_ACUITY_SCORE: 82
ADLS_ACUITY_SCORE: 77
ADLS_ACUITY_SCORE: 77
ADLS_ACUITY_SCORE: 73
ADLS_ACUITY_SCORE: 77
ADLS_ACUITY_SCORE: 82
ADLS_ACUITY_SCORE: 73
ADLS_ACUITY_SCORE: 73
ADLS_ACUITY_SCORE: 77
ADLS_ACUITY_SCORE: 82
ADLS_ACUITY_SCORE: 77

## 2025-01-26 NOTE — IR NOTE
Procedure Note for IR Procedure Dictation  Conscious sedation: 2 mg IVP Versed, No fentanyl, 0.2 Dilaudid  Sedation time: 42 minutes  Fluoro time: 5.7 minutes  Total Fluoro Dose: 334.56 mGy (Air Kerma)  Contrast: 50 ml Isovue  Local anesthetic: 10 ml 1% lidocaine

## 2025-01-26 NOTE — PROGRESS NOTES
S: Ti was seen at the [facility] #[id number] bedside for TLSO fitting/delivery and instructions today.      O: No changes from previous appointment.     A: Ti is about to go into for a treatment/procedure. The daughter and wife was present and insisted that I should not fit the TLSO right now. I asked to only try to fit the anterior portion just to make sure the TLSO looks accurate. They agreed and I fit just the anterior portion of the TLSO and was taken off right away. The TLSO looks like it is a good fitting orthosis for Edgar. I left the TLSO in the patients room for it to be donned when the patient comes back into the room. Please call me with any questions or concerns.    P: FU PRN.      G: The goal is to stabilize the spinal column.     Fitting a [place] TLSO     1. Patient should be supine. Palpate for waist (below ribs but above hips) so you know where to line up waist grooves of the brace.     2. Logroll patient and slide back section of brace under patient lining up waist groove of brace with patient's waist.     3. Roll patient onto their back with the posterior section behind them. Recheck alignment of waist groove on brace with patient's waist. It may be necessary to logroll patient to the opposite side to get posterior section centered on patient, where it extends anteriorly equal amounts on patient's sides. Repeat logrolling side to side as necessary.     4. Once posterior section positioned correctly, lay anterior section on patient. Again, line up waist groove of brace to patient's waist. Waist grooves of anterior and posterior section should match up and fit together. Anterior shell should go over the top of posterior shell. Velcro straps should line up with the chafes/D-rings, if they don't, either the anterior or the posterior sections are not in the proper place.     5. Fasten the middle straps first and tighten both sides evenly. Then fasten either top or bottom straps in the same  "manner. Brace should be snug to prevent brace from sliding up on patient. If it is too loose, the brace will slide up and cause discomfort to the patient in the chin and/or axilla area.     6. When properly fit, brace should be about 1\" inferior to the manubrium and about 1.5-2\" inferior to the axilla. The inferior aspect should allow clearance so as not to cut into the tops of the thighs when sitting but needs to be as low in the pelvic area as possible.     For optimal fit brace should NOT be donned with patient sitting. Ideal fit is achieved by donning in either laying or standing position. Due to changes in belly tissue, it is difficult to achieve a proper fit when patient is sitting.     Brace migration is inevitable, especially when patient is going from laying to upright in bed. Bed will push posterior section of brace up and will push the whole brace up. Migration will also occur when patient is sitting in a hard chair. Don't be afraid to readjust brace and pull it down and back to its proper position.     This note has been electronically signed by Burt Maria, Board Eligible   "

## 2025-01-26 NOTE — PROGRESS NOTES
"S: Ti is a 79 yom that presents to the [facility] room #2418 for an evaluation and measurements of a custom TLSO. The patient has a T12 burst fracture.    O: 6'4\" 237lbs    A: Ti was measured while laying supine for a bivalve custom TLSO made from [employer].    P: The TLSO is planned to be delivered for tomorrow. I will come fit it tomorrow.    Electronically signed by: PIPER Astorga  "

## 2025-01-26 NOTE — PLAN OF CARE
"Goal Outcome Evaluation:  Shift Summary [id number]    Admitting Diagnosis: Acute encephalopathy [G93.40]  T12 burst fracture (H) [S22.081A]  Deep vein thrombosis (DVT) of femoral vein, unspecified chronicity, unspecified laterality (H) [I82.419]  Unwitnessed fall [R29.6]  Pulmonary embolism, other, unspecified chronicity, unspecified whether acute cor pulmonale present (H) [I26.99]   Vitals Vital signs:  Temp: 97.1  F (36.2  C) Temp src: Axillary BP: 103/69 Pulse: 83   Resp: 18 SpO2: 98 % O2 Device: Nasal cannula Oxygen Delivery: 2 LPM      Estimated body mass index is 28.89 kg/m  as calculated from the following:    Height as of 1/10/25: 1.93 m (6' 4\").    Weight as of 1/14/25: 107.6 kg (237 lb 4.8 oz).  Pain 8/10. Taking Dilaudid PRN. Last dose 23:15  A&Ox2  Voiding :Continent, uses urinal  Mobility :Bedrest  Tele :N/A  CMS KRAIG  Lung Sounds Clear on 2 LPM via NC  GI :BS +4  Dressing \"N/A    Orders Placed This Encounter      Regular Diet Adult       Plan:          "

## 2025-01-26 NOTE — PROGRESS NOTES
[facility] Progress Note          Assessment and Plan:     Primary Oncologist: Dr. Glover     Pulmonary embolism / DVT   - Acute segmental pulmonary emboli in the right lower lobe and left upper lobe.  There is dilatation of right sided heart chambers which appeared chronic.  Acute nonocclusive DVT left common iliac vein which extends into the lower inferior vena cava.     2. Recurrent syncope with hypotension   - On midodrine  - No improvement since holding the cabozantanib   - Also taking florinef      3. Clear cell renal cell carcinoma diagnosed in 10/2019  - pT3b N0 M0 disease at presentation  - followed on surveillance until 1/2021 at which time liver metastasis identified and treated with cryoablation  - disease recurrence in 9/2023 presenting with GI bleeding and treated with partial duodenectomy  -disease progression in 1/2024 treated with partia gastrectomy  - Progressed on Ipi/Nivo 6/25/2024   - s/p radiation due to duodenal bleeding ending 8/06/2024   - Started Cabozantanib 20mg 9/26/24.   - Most recent CT 12/30/24 with decreased retroperitoneal lymphadenopathy, stable R hepatic lobe mass, duodenal mass measuring up to 2.3cm was not well seen on the non contrast study  - Cabozantanib held since 1/15/25   - Continue to hold Cabozantinib     4. 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 Acute burst fracture T12 with retropulsion   - Acute T12 fracture 2/2 fall   - MRI thoracic spine 1/24/2025 showed T12 burst fracture with no evidence of canal compromise  - Neurosurgery team onboard and noted plan for bracing     PLAN  - Continue to hold Cabozantinib   - on heparin gtt since 1/24 and tolerating anticoagulation well so far with no bleeding issues   - H&H stable; checking Hb levels twice daily   - continue heparin gtt   - IR consulted for consideration for possible embolectomy.  Timing per  IR.    Will follow him with you.  Updated family at the bedside with plan above    German Escalante MD                 Interval History:     Denied bleeding manifestations.  Has significant back pain.  Embolectomy deferred to focus on his pain issues as it was felt he may be unable to lie down flat for the embolectomy.                Review of Systems:   As per subjective, otherwise 5 systems reviewed and negative.           Physical Exam:   Blood pressure 133/75, pulse (!) 108, temperature 98.9  F (37.2  C), temperature source Oral, resp. rate 18, SpO2 97%.      Vital Sign Ranges  Temperature Temp  Av.7  F (38.2  C)  Min: 98.4  F (36.9  C)  Max: 102.3  F (39.1  C)   Blood pressure Systolic (24hrs), Av , Min:87 , Max:127        Diastolic (24hrs), Av, Min:43, Max:105      Pulse Pulse  Av.6  Min: 76  Max: 108   Respirations Resp  Av.7  Min: 13  Max: 21   Pulse oximetry SpO2  Av.5 %  Min: 90 %  Max: 98 %         Intake/Output Summary (Last 24 hours) at 2025 1139  Last data filed at 2025 0703  Gross per 24 hour   Intake --   Output 570 ml   Net -570 ml       Constitutional:   No acute distress.   Skin:   No rashes, petechiae, or ecchymoses.   HEENT:   No conjunctival pallor    Neck:   Supple.   Lungs:   Clear to auscultation bilaterally.   Cardiovascular:   Regular rhythm   Abdomen:   Soft, nontender, nondistended with no palpable hepatosplenomegaly.   Extremities:   No bruises    Neurological:   Bed bound             Medications:     No current outpatient medications on file.                Data:     Results for orders placed or performed during the hospital encounter of 25 (from the past 24 hours)   Glucose by meter   Result Value Ref Range    GLUCOSE BY METER POCT 81 70 - 99 mg/dL   Heparin Unfractionated Anti Xa Level   Result Value Ref Range    Anti Xa Unfractionated Heparin 0.68 For Reference Range, See Comment IU/mL    Narrative    Therapeutic Range: UFH: 0.25-0.50  IU/mL for low intensity dosing,  0.30-0.70 IU/mL for high intensity dosing DVT and PE.  This test is not validated for other direct factor X inhibitors (e.g. rivaroxaban, apixaban, edoxaban, betrixaban, fondaparinux) and should not be used for monitoring of other medications.   Extra Green Top Tube (LAB USE ONLY)   Result Value Ref Range    Hold Specimen jic    Glucose by meter   Result Value Ref Range    GLUCOSE BY METER POCT 106 (H) 70 - 99 mg/dL   Lactic Acid Whole Blood w/ 1x repeat in 2 hrs when >2   Result Value Ref Range    Lactic Acid, Initial 1.6 0.7 - 2.0 mmol/L   Basic metabolic panel   Result Value Ref Range    Sodium 136 135 - 145 mmol/L    Potassium 3.8 3.4 - 5.3 mmol/L    Chloride 106 98 - 107 mmol/L    Carbon Dioxide (CO2) 19 (L) 22 - 29 mmol/L    Anion Gap 11 7 - 15 mmol/L    Urea Nitrogen 32.1 (H) 8.0 - 23.0 mg/dL    Creatinine 1.30 (H) 0.67 - 1.17 mg/dL    GFR Estimate 56 (L) >60 mL/min/1.73m2    Calcium 7.0 (L) 8.8 - 10.4 mg/dL    Glucose 82 70 - 99 mg/dL   CBC with platelets   Result Value Ref Range    WBC Count 5.1 4.0 - 11.0 10e3/uL    RBC Count 3.10 (L) 4.40 - 5.90 10e6/uL    Hemoglobin 10.5 (L) 13.3 - 17.7 g/dL    Hematocrit 30.9 (L) 40.0 - 53.0 %     78 - 100 fL    MCH 33.9 (H) 26.5 - 33.0 pg    MCHC 34.0 31.5 - 36.5 g/dL    RDW 15.8 (H) 10.0 - 15.0 %    Platelet Count 98 (L) 150 - 450 10e3/uL   Heparin Unfractionated Anti Xa Level   Result Value Ref Range    Anti Xa Unfractionated Heparin 0.39 For Reference Range, See Comment IU/mL    Narrative    Therapeutic Range: UFH: 0.25-0.50 IU/mL for low intensity dosing,  0.30-0.70 IU/mL for high intensity dosing DVT and PE.  This test is not validated for other direct factor X inhibitors (e.g. rivaroxaban, apixaban, edoxaban, betrixaban, fondaparinux) and should not be used for monitoring of other medications.

## 2025-01-26 NOTE — IR NOTE
Interventional Radiology Intra-procedural Nursing Note    Patient Name: Ti Nickerson  Medical Record Number: [id number]  Today's Date: January 26, 2025    Procedure consented for : Left iliac vein mechanical thrombectomy with moderate sedation  Start time: 1339  End time: 1417  Report provided to: Ortho  RN  Patient depart time and location: 1936 to 9660    Note: Patient entered Interventional Radiology Suite number 1 via cart. Patient awake, alert and oriented. Assisted onto procedural table in supine position. Prepped and draped.  Dr. Mendieta in room. Time out and procedure started. Vital signs stable. Telemetry reading A Fib.    Procedure well tolerated by patient without complications. Procedure end with debrief by Dr. Mendieta.  Pressure held until hemostasis achieved. Flow stasis gauze and tegaderm  dressing applied to left groin.    2 hours bedrest.    Administered medication totals:  Lidocaine 1% 10 mL Intradermal    Heparin 1800 Units/hr   Versed 2 mg IVP  Dilaudid 0.2 mg

## 2025-01-26 NOTE — PROGRESS NOTES
[facility]    Medicine Progress Note - Hospitalist Service    Date of Admission:  1/24/2025    Assessment & Plan     Ti Nickerson is a 79 year old male with a complex history of metastatic renal cancer, atrial fibrillation, orthostatic hypotension on midodrine/Florinef, CKD stage IIIb, CAD, acute metabolic encephalopathy, and history of GI bleeding presents to the emergency room after falls.     Patient had recently been hospitalized 1/10 through 1/17/2025 for orthostatic hypotension and recurrent syncope. He had a unremarkable work up including an echo with normal EF and moderate RV dysfunction.  Negative stim test for adrenal insufficiency, evaluated by cardiology and neurology.  Recommended compression stockings.  Stopped on his Cabozanitinib for 2 weeks to see if he had improved symptoms.  He was discharged on midodrine 10 mg 3 times daily with Florinef.      Now readmitted after mechanical fall with new head laceration, PE, DVT, Burst fracture T12. Undergoing IR thrombectomy on 1/26.     Goals of Care  I had discussion with the patient and his wife regarding Edgar's recent decline. Patient reportedly doing well prior to most recent admission on 1/10. However, since that time has remained encephalopathic, impulsive, and a significant fall risk. The patient has a new large venous thrombus, past history of GI bleeding while on anti-coagulation, and metastatic renal cancer. He would be a life long candidate for anti-coagulation given cancer history but anti-coagulation previously stopped after patient developed bleeding due to metastatic duodenal mass. Ultimately family is electing to pursue thrombectomy procedure on 1/26 and accept the risks of anti-coagulation.     I am concerned it will be difficult to find a safe discharge plan given his overall fall risk, history of GI bleed and now new indication for anti-coagulation. Patient's wife and children are agreeable to a palliative care  consultation. We also discussed code status during this admission. The patient is full code but would not want prolonged life support if an easily reversible condition not found.     - Palliative care consulted, recommendations appreciated     Iliac Vein thrombosis and IVC extension   Pulmonary Embolism  Thrombocytopenia   Chronic anemia  History of blood loss anemia 2/2 to cancer while on anti-coagulation previously  Suspected clot provoked in setting of metastatic cancer and overall deconditioned/immobile state. IR consulted on 1/24 to consider thrombectomy of clot in iliac vein extending to IVC given overall size. After risk benefit discussion, patient and family elected to proceed with treatment of Iliac vein and IVC thrombus. Intermittently requiring 2 lpm nasal cannula but not persistently hypoxic. Right ventricular dilation seen on repeat limited TTE which is only mildly worse from complete echo on 1/11. No CT imaging during 1/10 admission so the acuity of Iliac and IVC thrombus is unclear. Currently suspect most of his pain is related to new thoracic spine burst fracture.  - Continue scheduled tylenol, prn IV dilaudid to 0.2-0.4 q2h prn, start prn robaxin, start prn lidocaine patch, prn seroquel for anxiety/agitation  - Planning for Thrombectomy on 1/26  - Repeat hemoglobin in AM, closely monitor for signs of GI bleeding given history    Toxic Metabolic Encephalopathy  Sepsis secondary to Enterobacter Bacteremia   Hypoxic Respiratory Failure   Hyperbilirubinemia   Patient febrile on admission with slight leukocytosis. Blood cultures grew Enterobacter species in one bottle. CT CAP on admission did not show any obvious sign of infection in lungs (not completely imaged) or abdomen. Has required 2-4 lpm nasal cannula intermittently but hypoxia could also be related to PE. Urinalysis not consistent with infection. No obvious rash. Unclear source at this time, although patient had recent hospitalization and  enterobacter can cause nosocomial infections. Cannot recall seeing port or central venous access during my exam. I also don't see record of chronic central venous access per chart review.      Patient also very impulsive and anxious/agitated at times. He responded well to low dose seroquel evening 1/25 and AM 1/26. Infection likely contributing to overall mental state.     - Family not yet aware of bacteria in blood, please update wife with this finding 1/27  - Seroquel 12.5 mg TID prn for anxiety/agitation   - Stop Vancomycin and Zosyn on 1/26  - Start Cefepime 2 g Q8H on 1/26   - Continue to follow blood cultures x2  - Add on urine culture to admission UA if possible   - Obtain portable chest XR after thrombectomy procedure today  - ID consulted, recommendations appreciated   - edit- ID switching to Meropenem. Also obtaining RUQ US given abnormal LFTs, repeat hepatic panel ordered for AM, a new urinalysis was obtained. There is also an add on urine culture pending from admission UA.     Recurrent falls with orthostatics  Orthostatic hypotension    Patient with a known history of recurrent falls and orthostatic hypotension. His wife reports he has had a long history of orthostatic hypotension even prior to last admission. Recently hospitalized 1/10 through 1/17 on the medicine service for orthostatic hypotension.  He was started on midodrine 10 mg 3 times a day.  Decision to hold cabozanitinib for 2 weeks.  Liberalized salt.  Started on Florinef 0.1 mg po daily (patient was seen by cardiology, neurology, hematology)   - Continue midodrine and florinef     Imaging:  CT head no evidence for acute intracranial process.  Brain atrophy with microvascular changes.  Cervical spine no acute fracture posttraumatic subluxation.  Multilevel cervical spondylosis without high-grade spinal stenosis.  Multilevel neuroforaminal stenosis  Thoracic spine with acute burst T12 fracture is noted:  Mild retropulsion of the fractured  vertebral body without associated spinal canal stenosis. No high-grade spinal canal or neural foraminal stenosis.  Lumbar spine no fracture or posttraumatic subluxation.Multilevel lumbar spondylosis without high-grade spinal canal stenosis. Multilevel neural foraminal stenosis      Atrial fibrillation:   EKG Atrial fibrillation, LAD. NSST changes. Stopped anticoagulation in past given bleeding and falls. Some discussion in past of potential Watchman      Head laceration   - Patient seen in the clinic on 1/22/2025 with a laceration 7 to 8 cm in length with staples placed in the office.  And sutures.  Tetanus booster within 10 years.  - Sutures placed 1/22 reported to remove in 8 days on 1/30     Acute Burst Fracture T12 with retropulsion  CT chest abdomen pelvis given falls showing an acute T12 fracture. T12 CT spine showing acute burst fracture of T12 with 20% height loss.  Mild retropulsion of the fracture vertebral body without associated spinal canal stenosis. MRI poor quality but reviewed with neurosurgery and okay to initiate heparin  - Neurosurgery follow-up, planning for brace and non-operative management of T12 fracture  - Pain management as above - consider pain team consult if struggling with pain control on 1/27      Metastatic Renal cell cancer diagnosed in 2019   S/p Right radical nephrecctomy  and IVC thrombectomy 2019   On surveillance with recurrence 1/2021 with liver lesion.    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 Biopsy with mets renal cell.   - 10/2023 partial duodenectomy at [place] [facility]   - 1/2024 EUS and resection of gastric mass with complete endoscopic removal with met RCC    - 7/2024 admit recurrent GI bleed. Local tumor recurrence >> required transfusion, stopped anticoagulation  - 7/2024 Radiation for bleeding control.   - Daily CBC    "  CKD 3  Patient with creatinine elevated to  1.59. Received IV fluids on admission and creatinine improved to 1.3 on 1/26.   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 Still low with albumin correction. Obtaining ionized calcium with AM labs.          Diet: Regular Diet Adult    DVT Prophylaxis: Heparin infusion    Dillard Catheter: Not present  Lines: None     Cardiac Monitoring: None  Code Status: Full Code      Clinically Significant Risk Factors         # Hyponatremia: Lowest Na = 134 mmol/L in last 2 days, will monitor as appropriate       # Hypoalbuminemia: Lowest albumin = 2.7 g/dL at 1/25/2025  5:06 AM, will monitor as appropriate   # Thrombocytopenia: Lowest platelets = 98 in last 2 days, will monitor for bleeding   # Hypertension: Noted on problem list            # Overweight: Estimated body mass index is 28.89 kg/m  as calculated from the following:    Height as of 1/10/25: 1.93 m (6' 4\").    Weight as of 1/14/25: 107.6 kg (237 lb 4.8 oz)., PRESENT ON ADMISSION       # Financial/Environmental Concerns: none         Social Drivers of Health    Physical Activity: Insufficiently Active (5/28/2024)    Exercise Vital Sign     Days of Exercise per Week: 2 days     Minutes of Exercise per Session: 30 min   Social Connections: Unknown (5/28/2024)    Social Connection and Isolation Panel [NHANES]     Frequency of Social Gatherings with Friends and Family: Twice a week          Disposition Plan     Medically Ready for Discharge: Anticipated in 2-4 Days             Burt Felton DO  Hospitalist Service  [facility]  Securely message with [employer] (more info)  Text page via [facility] Paging/Directory "   ______________________________________________________________________    Interval History     - Patient less agitated this AM  - Daughter states seroquel seemed to help a lot  - Patient is conversant but with nonsensical speech   - He is able to deny pain when asked   - Updated patient's wife and daughter at bedside     Physical Exam   Vital Signs: Temp: 97.3  F (36.3  C) Temp src: Axillary BP: 130/81 Pulse: 90   Resp: 11 SpO2: 98 % O2 Device: None (Room air) Oxygen Delivery: 4 LPM  Weight: 0 lbs 0 oz    Constitutional: Appears more comfortable today   Eyes: Eyes closed  Respiratory: No increased work of breathing, good air exchange, clear to auscultation bilaterally, no crackles or wheezing  Cardiovascular: Normal apical impulse, regular rate and rhythm, normal S1 and S2, no S3 or S4, and no murmur noted  GI: No scars, normal bowel sounds, soft, non-distended, non-tender, no masses palpated, no hepatosplenomegally  Skin: normal skin color, texture, turgor  Musculoskeletal:  No deformity, right leg more swollen than left  Neurologic: Awake, alert, oriented to name, not to place or situation     Medical Decision Making       75 MINUTES SPENT BY ME on the date of service doing chart review, history, exam, documentation & further activities per the note.      Data   ------------------------- PAST 24 HR DATA REVIEWED -----------------------------------------------    I have personally reviewed the following data over the past 24 hrs:    5.1  \   10.5 (L)   / 98 (L)     136 106 32.1 (H) /  82   3.8 19 (L) 1.30 (H) \     Procal: N/A CRP: N/A Lactic Acid: 1.6         Imaging results reviewed over the past 24 hrs:   No results found for this or any previous visit (from the past 24 hours).

## 2025-01-26 NOTE — PROGRESS NOTES
Shift Summary [id number]    Admitting Diagnosis: Acute encephalopathy [G93.40]  T12 burst fracture (H) [S22.081A]  Deep vein thrombosis (DVT) of femoral vein, unspecified chronicity, unspecified laterality (H) [I82.419]  Unwitnessed fall [R29.6]  Pulmonary embolism, other, unspecified chronicity, unspecified whether acute cor pulmonale present (H) [I26.99]   Vitals VSS on 2 L nc  Pain Moderate to sever pain. Pt becomes restless when in pain. PRN 0.2 mg Dilaudid given through out the night  A&Ox1-2, disoriented to place and situations.   Voiding Urinal at bedside  Mobility bedrest w/ spinal precautions. Awaiting TLSO brace   Tele NA  CMS not able to assess well due to confusion  Lung Sounds Clear on 2 L nc  GI WDL     NPO since about 0400. Order was never placed writer saw in hospitalist note and inquired about fluid intake from pt son. Pts son stated he had a few small sips but had not had any water since around 0400. Provider aware.

## 2025-01-26 NOTE — CONSULTS
"[facility]  INFECTIOUS DISEASES CONSULTATION  Ti Nickerson 1/26/2025    History  78 yo man with history of renal ca, metastatic, Afib, CKD, CAD, GIB in the past, orthostatic hypotension on midodrine and florinef.  Admitted on 1/24/25 with after falling.  Recent admit from 1/10 to 1/17 for hypotension and syncope  Evaluation then included, \" echo with normal EF and moderate RV dysfunction.  Negative stim test for adrenal insufficiency\" .   His meds were adjusted and he was discharged on 1/17/25  Had a subsequent fall with had lac and stitches  Admission now after falling out of bed and was disoriented per staff at the residence.   Reported back pain on presentation to the ED and was noted to be confused.   WBC 7.4, influenzae, Covid and RSV neg  CT of the T spine showed a burst fracture T12 with 20% height loss, mild retropulsion of the fracture vertebral body without associated spinal canal stenosis.  CT cervical and L spine neg for acute process  CT CAP showed \"Acute segmental pulmonary emboli in the right lower lobe and left upper lobe. There is dilatation of right-sided heart chambers which appears chronic\" and acute nonocclusive dVT of the L common iliac vein  On 1/25/25 he developed fever and blood cx were drawn, now growing GNB, Enterobacter by Verigene NA testing  Just had Mechanical thrombectomy with XL Clotriever device. No residual thrombus at completion. Tolerated relatively well. No complications      ROS: 10 point ROS neg other than the symptoms noted above in the HPI.    Past Medical History:   Diagnosis Date    Acute encephalopathy 1/24/2025    Atrial fibrillation, unspecified 9/18/2015    CAD (coronary artery disease) 09/18/2015    minimal by angio 2007, fu nuc est nl 2018    Elevated TSH 2018    Esophageal reflux 9/18/2015    Essential hypertension     History of cardioversion     [id number]    Idiopathic cardiomyopathy (H) 09/18/2015    fu echo nl ef, nl nuclear est " 11/18, Dr. Quarles    Mixed hyperlipidemia 9/18/2015    Mumps     Sleep apnea 09/18/2015    does not use cpap as of 2019    Sleep apnea     Thoracic aortic aneurysm     sinus of valsalva 4.5 and asc aorta 4.5 in 2017    Tubular adenoma of colon 01/2017    fu 3 years     Past Surgical History:   Procedure Laterality Date    APPENDECTOMY  1964    ESOPHAGOSCOPY, GASTROSCOPY, DUODENOSCOPY (EGD), COMBINED N/A 9/14/2023    Procedure: Esophagoscopy, gastroscopy, duodenoscopy (EGD), combined;  Surgeon: Chele Ash MD;  Location:  GI     Social History     Social History Narrative    Not on file     Family History   Problem Relation Age of Onset    Arrhythmia Mother         a-fib    Cerebrovascular Disease Mother     Arrhythmia Father         a-fib    Colon Cancer Father     Diabetes Father     Cerebrovascular Disease Father     No Known Problems Brother           Allergies   Allergen Reactions    Fentanyl Confusion and Other (See Comments)     Hallucinations    Oxycodone Other (See Comments)     Hallucinations when given after surgery    Other reaction(s): *Unknown, Other (See Comments), Other (see comments)   Hallucinations when given after surgery    Hallucinations when given after surgery    Hallucinations when given after surgery     Current Facility-Administered Medications   Medication Dose Route Frequency Provider Last Rate Last Admin    acetaminophen (TYLENOL) tablet 650 mg  650 mg Oral Q4H PRN Zev Kennedy MD   650 mg at 01/25/25 1842    Or    acetaminophen (TYLENOL) Suppository 650 mg  650 mg Rectal Q4H PRN Zev Kennedy MD        acetaminophen (TYLENOL) tablet 975 mg  975 mg Oral TID Burt Felton DO   975 mg at 01/26/25 0913    ceFEPIme (MAXIPIME) 2 g vial to attach to  mL bag for ADULTS or NS 50 mL bag for PEDS  2 g Intravenous Q8H Burt Felton DO        fludrocortisone (FLORINEF) tablet 0.1 mg  0.1 mg Oral Daily Becky Hansen MD   0.1 mg at 01/25/25 0942    heparin 2  Units/mL 0.9% NaCl (1000 mL)  1 Bag TABLE SOLN Q5 Min PRN Nico Mendieta MD   4 Bag at 01/26/25 1351    heparin 25,000 units in 0.45% NaCl 250 mL ANTICOAGULANT infusion  0-5,000 Units/hr Intravenous Continuous Becky Hansen MD 18 mL/hr at 01/26/25 0954 1,800 Units/hr at 01/26/25 0954    HYDROmorphone (DILAUDID) injection 0.2 mg  0.2 mg Intravenous Once Burt Felton DO        HYDROmorphone (DILAUDID) injection 0.2 mg  0.2 mg Intravenous Once Burt Felton DO        HYDROmorphone (DILAUDID) injection 0.2-0.4 mg  0.2-0.4 mg Intravenous Q2H PRN Burt Felton DO   0.2 mg at 01/26/25 1539    Lidocaine (LIDOCARE) 4 % Patch 2 patch  2 patch Transdermal Q24H PRN Burt Felton DO        lidocaine (LMX4) cream   Topical Q1H PRN Becky Hansen MD        lidocaine 1 % 0.1-1 mL  0.1-1 mL Other Q1H PRN Becky Hansen MD        methocarbamol (ROBAXIN) tablet 500 mg  500 mg Oral 4x Daily PRN Burt Felton DO        midodrine (PROAMATINE) tablet 10 mg  10 mg Oral TID w/meals Becky Hansen MD   10 mg at 01/25/25 0942    naloxone (NARCAN) injection 0.2 mg  0.2 mg Intravenous Q2 Min PRN Becky Hansen MD        Or    naloxone (NARCAN) injection 0.4 mg  0.4 mg Intravenous Q2 Min PRN Becky Hansen MD        Or    naloxone (NARCAN) injection 0.2 mg  0.2 mg Intramuscular Q2 Min PRN Becky Hansen MD        Or    naloxone (NARCAN) injection 0.4 mg  0.4 mg Intramuscular Q2 Min PRN Becky Hansen MD        ondansetron (ZOFRAN ODT) ODT tab 4 mg  4 mg Oral Q6H PRN Becky Hansen MD        Or    ondansetron (ZOFRAN) injection 4 mg  4 mg Intravenous Q6H PRN Becky Hansen MD        pantoprazole (PROTONIX) EC tablet 40 mg  40 mg Oral BID AC Becky Hansen MD   40 mg at 01/25/25 1004    pravastatin (PRAVACHOL) tablet 20 mg  20 mg Oral Daily Becky Hansen MD   20 mg at 01/25/25 0942    QUEtiapine (SEROquel) half-tab 12.5 mg  12.5 mg Oral TID PRN Burt Felton, DO         "senna-docusate (SENOKOT-S/PERICOLACE) 8.6-50 MG per tablet 1 tablet  1 tablet Oral BID PRBecky Cedillo MD        Or    senna-docusate (SENOKOT-S/PERICOLACE) 8.6-50 MG per tablet 2 tablet  2 tablet Oral BID PRBecky Cedillo MD        sodium chloride (PF) 0.9% PF flush 3 mL  3 mL Intracatheter Q8H Becky Hansen MD   3 mL at 01/26/25 0546    sodium chloride (PF) 0.9% PF flush 3 mL  3 mL Intracatheter q1 min prBecky Cedillo MD   3 mL at 01/26/25 1227           Physical Examination  /81 (BP Location: Right arm)   Pulse 90   Temp 97.3  F (36.3  C) (Axillary)   Resp 11   SpO2 98%   GENERAL: laying in bed, eyes closed, fatigued but responding  HEENT:, scleral anicteric , no scleral hemorrhages, lac on posterior scalp without drainage, NT   Op clear, dry.   Neck supple, no CONOR  CV: RRR   Lungs distant BS bilat  Abd soft, mod tender RUQ to deep palpation  Ext; no c/c/trace edema, no splinter hemorrhages or nodes   Groin site bandaged    LABORATORY DATA  Lab Results   Component Value Date    WBC 5.1 01/26/2025    WBC 5.8 06/16/2021     Lab Results   Component Value Date    RBC 3.10 01/26/2025    RBC 4.32 06/16/2021     Lab Results   Component Value Date    HGB 10.5 01/26/2025    HGB 14.2 06/16/2021     Lab Results   Component Value Date    HCT 30.9 01/26/2025    HCT 41.9 06/16/2021     No components found for: \"MCT\"  Lab Results   Component Value Date     01/26/2025    MCV 97 06/16/2021     Lab Results   Component Value Date    MCH 33.9 01/26/2025    MCH 32.9 06/16/2021     Lab Results   Component Value Date    MCHC 34.0 01/26/2025    MCHC 33.9 06/16/2021     Lab Results   Component Value Date    RDW 15.8 01/26/2025    RDW 12.9 06/16/2021     Lab Results   Component Value Date    PLT 98 01/26/2025     06/16/2021     Last Comprehensive Metabolic Panel:  Sodium   Date Value Ref Range Status   01/26/2025 136 135 - 145 mmol/L Final   06/10/2021 140 133 - 144 mmol/L Final     Potassium "   Date Value Ref Range Status   01/26/2025 3.8 3.4 - 5.3 mmol/L Final   09/21/2022 4.4 3.4 - 5.3 mmol/L Final   06/10/2021 4.5 3.4 - 5.3 mmol/L Final     Potassium POCT   Date Value Ref Range Status   07/14/2024 3.6 3.4 - 5.3 mmol/L Final     Comment:     Chart Critical result. Doctor notified     Chloride   Date Value Ref Range Status   01/26/2025 106 98 - 107 mmol/L Final   09/21/2022 109 94 - 109 mmol/L Final   06/10/2021 111 (H) 94 - 109 mmol/L Final     Carbon Dioxide   Date Value Ref Range Status   06/10/2021 25 20 - 32 mmol/L Final     Carbon Dioxide (CO2)   Date Value Ref Range Status   01/26/2025 19 (L) 22 - 29 mmol/L Final   09/21/2022 25 20 - 32 mmol/L Final     Anion Gap   Date Value Ref Range Status   01/26/2025 11 7 - 15 mmol/L Final   09/21/2022 6 3 - 14 mmol/L Final   06/10/2021 4 3 - 14 mmol/L Final     Glucose   Date Value Ref Range Status   01/26/2025 82 70 - 99 mg/dL Final   09/21/2022 66 (L) 70 - 99 mg/dL Final   06/10/2021 97 70 - 99 mg/dL Final     Comment:     Fasting specimen     GLUCOSE BY METER POCT   Date Value Ref Range Status   01/25/2025 106 (H) 70 - 99 mg/dL Final     Urea Nitrogen   Date Value Ref Range Status   01/26/2025 32.1 (H) 8.0 - 23.0 mg/dL Final   09/21/2022 27 7 - 30 mg/dL Final   06/10/2021 30 7 - 30 mg/dL Final     Creatinine   Date Value Ref Range Status   01/26/2025 1.30 (H) 0.67 - 1.17 mg/dL Final   06/10/2021 1.68 (H) 0.66 - 1.25 mg/dL Final     GFR Estimate   Date Value Ref Range Status   01/26/2025 56 (L) >60 mL/min/1.73m2 Final     Comment:     eGFR calculated using 2021 CKD-EPI equation.   06/10/2021 39 (L) >60 mL/min/[1.73_m2] Final     Comment:     Non  GFR Calc  Starting 12/18/2018, serum creatinine based estimated GFR (eGFR) will be   calculated using the Chronic Kidney Disease Epidemiology Collaboration   (CKD-EPI) equation.       GFR, ESTIMATED POCT   Date Value Ref Range Status   10/24/2023 39 (L) >60 mL/min/1.73m2 Final     Calcium   Date  Value Ref Range Status   01/26/2025 7.0 (L) 8.8 - 10.4 mg/dL Final     Comment:     Reference intervals for this test were updated on 7/16/2024 to reflect our healthy population more accurately. There may be differences in the flagging of prior results with similar values performed with this method. Those prior results can be interpreted in the context of the updated reference intervals.   06/10/2021 8.5 8.5 - 10.1 mg/dL Final     Bilirubin Total   Date Value Ref Range Status   01/25/2025 2.2 (H) <=1.2 mg/dL Final   06/10/2021 0.7 0.2 - 1.3 mg/dL Final     Alkaline Phosphatase   Date Value Ref Range Status   01/25/2025 125 40 - 150 U/L Final   06/10/2021 57 40 - 150 U/L Final     ALT   Date Value Ref Range Status   01/25/2025 20 0 - 70 U/L Final   06/10/2021 24 0 - 70 U/L Final     AST   Date Value Ref Range Status   01/25/2025 30 0 - 45 U/L Final   06/10/2021 19 0 - 45 U/L Final     MICROBIOLOGY  BC x 2 on 1/25/25    Positive on the 1st day of incubation Abnormal    Gram negative bacilli , Enterobacter by Verigene   1 of 2 bottles     RADIOLOGY  EXAM: CT CHEST/ABDOMEN/PELVIS W CONTRAST  LOCATION: [facility]  DATE: 1/24/2025     INDICATION: Trauma, right shoulder blader abrasion, bruising to bilateral flanks, fall 2 days ago, concern for traumatic injuries.  COMPARISON: CT CAP 12/30/2024.  TECHNIQUE: CT scan of the chest, abdomen, and pelvis was performed following injection of IV contrast. Multiplanar reformats were obtained. Dose reduction techniques were used.   CONTRAST: 120mL Isovue 370     FINDINGS:   LUNGS AND PLEURA: Lung apices are incompletely imaged including the region of the 2 mm subpleural nodule described on prior report. Additional pulmonary nodules along the right major fissure are stable. Mild fibrotic changes of the lung bases. No pleural   effusion or pneumothorax within the limitations of this exam.     MEDIASTINUM/AXILLAE: There is mild chronic dilatation of right  heart chambers. No thoracic aneurysm. Tortuous descending thoracic aorta. Right lower lobe (series 4, image 77) and left upper lobe (series 4, image 42) segmental pulmonary emboli are evident   on this non-CTA examination. No thoracic adenopathy. Small hiatal hernia.     CORONARY ARTERY CALCIFICATION: Severe.     HEPATOBILIARY: Stable 2.7 cm hypoattenuating lesion of the posterior right hepatic lobe (series 4, image 153). No new hepatic lesion. Similar gallbladder distention without radiopaque gallstone.     PANCREAS: Similar mild pancreatic atrophy.     SPLEEN: Normal.     ADRENAL GLANDS: Normal.     KIDNEYS/BLADDER: Right nephrectomy. Left renal cyst does not require follow-up. No left hydronephrosis. Chronic mild diffuse bladder wall thickening with multiple small bladder diverticula.     BOWEL: Diverticulosis of the distal colon. Metallic clip within the stomach at the gastric body. No evidence of bowel obstruction. Postsurgical changes of partial duodenectomy with stable mild soft tissue thickening at the surgical site. A discrete   duodenal mass is not identified.     LYMPH NODES: Normal.     VASCULATURE: Mild to moderate burden of vascular calcifications without abdominal aortic aneurysm. Left common iliac vein and nonocclusive deep venous thrombosis which extends into the lower inferior vena cava at the level of the bifurcation. There is   mass effect on the inferior vena cava at the partial duodenectomy site, possibly secondary to postsurgical scarring.     PELVIC ORGANS: Prostatomegaly.     MUSCULOSKELETAL: No acute findings. Note, the bilateral scapulae, bilateral proximal humeri, bilateral clavicles, and upper ribs are incompletely imaged. See dedicated CT thoracolumbar spine reformats for details regarding the spine including acute T12   fracture.                                                              IMPRESSION:     Note, a portion of the upper chest is incompletely imaged. The following findings  are made within these limitations.     1.  Acute T12 fracture as described on same-day CT thoracolumbar spine reformats. Otherwise, no acute traumatic findings within the chest, abdomen, or pelvis.  2.  Acute segmental pulmonary emboli in the right lower lobe and left upper lobe. There is dilatation of right-sided heart chambers which appears chronic.  3.  Acute nonocclusive deep venous thrombosis of the left common iliac vein which extends into the lower inferior vena cava.  4.  Stable postsurgical changes of partial duodenectomy with mild soft tissue thickening at the surgical site. No discrete duodenal mass is identified.  5.  Stable hypoattenuating lesion of the posterior right hepatic lobe.     IMPRESSION AND PLAN  78 yo man  Metastatic renal cell ca on therapy until recently  Recent admission 1/10-1/17 with hypotension  Now readmitted with confusion, falls and found to have DVT/PE AND Gram negative bacteremia/sepsis with Enterobacter by Verigene BRIGETTE    Enterobacter bacteremia/sepsis  DVT / PE  Renal Cell CA metastatic  ROCKY  Encephalopathy    His admission 2 weeks ago was marked by hypotension and it is possible that he had infection to some degree developing at that time and unrecognized. BC were not part of that work up.  He is having fever this admission which prompted the BC  The source is not evident (UA wnl), but given the organism , I have a suspicion for the hepatobiliary tract, especially in light of his CT scan not showing abscess or colonic problems.  His exam is remarkable for RUQ tenderness.  Its possible that the clotting is related to regional inflammation as well    Recommendations  I'll change the empiric therapy to meropenem pending formal ID and sensitivities of the GNB  Ordering RUQ US  to start, Depending on findings, MRCP may be useful  Repeating BC now, some risk of infected clot (has PE and just had thrombectomy)    Thank you very much for this consultation      Zev Shahid MD

## 2025-01-26 NOTE — PROGRESS NOTES
Shift Summary [id number]    Admitting Diagnosis: Acute encephalopathy [G93.40]  T12 burst fracture (H) [S22.081A]  Deep vein thrombosis (DVT) of femoral vein, unspecified chronicity, unspecified laterality (H) [I82.419]  Unwitnessed fall [R29.6]  Pulmonary embolism, other, unspecified chronicity, unspecified whether acute cor pulmonale present (H) [I26.99]   Vitals WNL, except fever.   Pain :moderate to severe back pain. Patient has also been restless when in pain.  Taking 0.2MG IV Dilaudid PRN.  A&Ox1 to 2, disoriented to place and situation. Drowsy and sleepy when on Dilaudid.   Voiding : incontinent. Use bedside urinal with assistance  Mobility : bedrest with spinal precautions. TLSO ordered. Measured for brace. Staff to follow TLSO brace protocol when delivered.  Tele : NA  CMS : WNL, but not completely assessed due to confusion.    Lung Sounds clear on 2 liters via N/C  GI : WNL  Dressing : multiple bruising on lower back with laceration. Covered by foam dressing for protection.     Orders Placed This Encounter      Regular Diet Adult       Plan:   Heparin drip for DVT  Thrombectomy may occur tomorrow. NPO at midnight per Hospitalist.   Bedrest  Awaiting delivery of TLSO brace. (Already measured for it)

## 2025-01-26 NOTE — PROCEDURES
[facility]    Procedure: IR Procedure Note    Date/Time: 1/26/2025 3:29 PM    Performed by: Nico Mendieta MD  Authorized by: Nico Mendieta MD  IR Fellow Physician:    Pre Procedure Diagnosis: Left CIV/IVC thrombus  Post Procedure Diagnosis: Left CIV/IVC thrombus    UNIVERSAL PROTOCOL   Site Marked: Yes  Prior Images Obtained and Reviewed:  Yes  Required items: Required blood products, implants, devices and special equipment available    Patient identity confirmed:  Verbally with patient, arm band and provided demographic data  Patient was reevaluated immediately before administering moderate or deep sedation or anesthesia  Confirmation Checklist:  Patient's identity using two indicators, relevant allergies, procedure was appropriate and matched the consent or emergent situation and correct equipment/implants were available  Time out: Immediately prior to the procedure a time out was called    Universal Protocol: the Joint Commission Universal Protocol was followed    Preparation: Patient was prepped and draped in usual sterile fashion    ESBL (mL):  50     ANESTHESIA    Anesthesia:  Local infiltration  Local Anesthetic:  Lidocaine 1% without epinephrine  Anesthetic Total (mL):  10      SEDATION  Patient Sedated: Yes    Sedation Type:  Moderate (conscious) sedation  Sedation:  Fentanyl and midazolam  Vital signs: Vital signs monitored during sedation    Findings: Left CIV access and 16 Fr sheath placed.  Mechanical thrombectomy with XL Clotriever device.  No residual thrombus at completion.  Tolerated relatively well.  No complications.  Continue heparin drip.  Patient's IVC near the level of the left renal vein, and below, is small in caliber and narrow (known from CT), and though to be related to previous surgery (right nephrectomy).  Thought to be unrelated to thrombus more caudal.  Don't expect patient to feel different, though thrombectomy performed due to thought  of nidus of PEs, and propagation of thrombus--problematic if extends to area of IV that is small and irregular.    Specimens: none    Procedural Complications: None    Condition: Stable      PROCEDURE    Patient Tolerance:  Patient tolerated the procedure well with no immediate complications  Length of time physician/provider present for 1:1 monitoring during sedation:  68-82 min

## 2025-01-26 NOTE — PROGRESS NOTES
[facility]    Neurosurgery  Daily Note    Assessment & Plan   Ti Nickerson is a 79 year old male who was admitted on 1/24/2025. Ti Nickerson is a 79 year old male with a history including hypertension, hyperlipidemia, CAD, CKD stage 3b, chronic atrial fibrillation, and right renal cell carcinoma with mets to the lung who presents to the ED via EMS for evaluation after a fall with imaging evidence as demonstrated below.      AM ROUNDS: maggi.  Imaging: MRI 1/24 reviewed with Dr. Linder     PLAN  Ok for anticoagulation from nsgy standpoint   MRI reviewed with Dr. Linder and explained to patient and family   Bed rest spine precautions   Orthotics consult for custom TLSO and light activity. To be worn when upright and out of bed. Off with resting/hygiene/sleeping. To be required for 3 months.   Upright XR once in brace. If stable, follow up 4-6 weeks with upright XR prior     Plans reviewed with Dr. Linder and family    Zora Mccrary PA-C  [facility] Neurosurgery  [address] [place] [address]  Tel [phone]  Fax [phone]  Text page via [facility] Paging/Directory      Active Problems:    Acute encephalopathy    T12 burst fracture (H)    Deep vein thrombosis (DVT) of femoral vein, unspecified chronicity, unspecified laterality (H)    Unwitnessed fall    Pulmonary embolism, other, unspecified chronicity, unspecified whether acute cor pulmonale present (H)     Zora Mcelroy PA-C    Interval History   Stable     Physical Exam   Temp: 98.9  F (37.2  C) Temp src: Oral BP: 112/86 Pulse: 82   Resp: 18 SpO2: 97 % O2 Device: Nasal cannula Oxygen Delivery: 4 LPM  There were no vitals filed for this visit.  Vital Signs with Ranges  Temp:  [96.1  F (35.6  C)-101.3  F (38.5  C)] 98.9  F (37.2  C)  Pulse:  [] 82  Resp:  [16-24] 18  BP: (100-141)/(61-86) 112/86  SpO2:  [93 %-100 %] 97 %  I/O last 3 completed shifts:  In: -   Out: 620 [Urine:620]    Asleep,  follows commands  Moving BLE symmetrically. Generalized weakness throughout   Sensation intact     Medications   Current Facility-Administered Medications   Medication Dose Route Frequency Provider Last Rate Last Admin    heparin 25,000 units in 0.45% NaCl 250 mL ANTICOAGULANT infusion  0-5,000 Units/hr Intravenous Continuous Becky Hansen MD 18 mL/hr at 01/26/25 0954 1,800 Units/hr at 01/26/25 0954    sodium chloride 0.9 % infusion   Intravenous Continuous Becky Hansen  mL/hr at 01/26/25 0647 New Bag at 01/26/25 0647      Current Facility-Administered Medications   Medication Dose Route Frequency Provider Last Rate Last Admin    acetaminophen (TYLENOL) tablet 975 mg  975 mg Oral TID Burt Felton DO   975 mg at 01/26/25 0913    fludrocortisone (FLORINEF) tablet 0.1 mg  0.1 mg Oral Daily Becky Hansen MD   0.1 mg at 01/25/25 0942    HYDROmorphone (DILAUDID) injection 0.2 mg  0.2 mg Intravenous Once Burt Felton DO        iopamidol (ISOVUE-300) IV solution 61% 100 mL  100 mL Intravenous Once DecesareNico MD        midodrine (PROAMATINE) tablet 10 mg  10 mg Oral TID w/meals Becky Hansen MD   10 mg at 01/25/25 0942    pantoprazole (PROTONIX) EC tablet 40 mg  40 mg Oral BID AC Becky Hansen MD   40 mg at 01/25/25 1004    piperacillin-tazobactam (ZOSYN) 4.5 g vial to attach to  mL bag  4.5 g Intravenous Q6H Zev Kennedy MD   4.5 g at 01/26/25 0335    pravastatin (PRAVACHOL) tablet 20 mg  20 mg Oral Daily Becky Hansen MD   20 mg at 01/25/25 0942    sodium chloride (PF) 0.9% PF flush 3 mL  3 mL Intracatheter Q8H Becky Hansen MD   3 mL at 01/26/25 0546    vancomycin (VANCOCIN) 1,500 mg in 0.9% NaCl 265 mL intermittent infusion  1,500 mg Intravenous Q24H Becky Hansen MD   1,500 mg at 01/26/25 0853

## 2025-01-27 LAB
ALBUMIN SERPL BCG-MCNC: 2.4 G/DL (ref 3.5–5.2)
ALP SERPL-CCNC: 118 U/L (ref 40–150)
ALT SERPL W P-5'-P-CCNC: 37 U/L (ref 0–70)
ANION GAP SERPL CALCULATED.3IONS-SCNC: 10 MMOL/L (ref 7–15)
AST SERPL W P-5'-P-CCNC: 78 U/L (ref 0–45)
BACTERIA BLD CULT: ABNORMAL
BACTERIA BLD CULT: ABNORMAL
BACTERIA UR CULT: NO GROWTH
BILIRUB DIRECT SERPL-MCNC: 0.71 MG/DL (ref 0–0.3)
BILIRUB SERPL-MCNC: 1.4 MG/DL
BUN SERPL-MCNC: 32.8 MG/DL (ref 8–23)
CA-I BLD-MCNC: 4.1 MG/DL (ref 4.4–5.2)
CALCIUM SERPL-MCNC: 7.6 MG/DL (ref 8.8–10.4)
CHLORIDE SERPL-SCNC: 108 MMOL/L (ref 98–107)
CREAT SERPL-MCNC: 1.22 MG/DL (ref 0.67–1.17)
EGFRCR SERPLBLD CKD-EPI 2021: 61 ML/MIN/1.73M2
ERYTHROCYTE [DISTWIDTH] IN BLOOD BY AUTOMATED COUNT: 15.7 % (ref 10–15)
GLUCOSE SERPL-MCNC: 86 MG/DL (ref 70–99)
HCO3 SERPL-SCNC: 21 MMOL/L (ref 22–29)
HCT VFR BLD AUTO: 33.5 % (ref 40–53)
HGB BLD-MCNC: 11.1 G/DL (ref 13.3–17.7)
LACTATE SERPL-SCNC: 1.1 MMOL/L (ref 0.7–2)
MCH RBC QN AUTO: 33.6 PG (ref 26.5–33)
MCHC RBC AUTO-ENTMCNC: 33.1 G/DL (ref 31.5–36.5)
MCV RBC AUTO: 102 FL (ref 78–100)
PLATELET # BLD AUTO: 114 10E3/UL (ref 150–450)
POTASSIUM SERPL-SCNC: 3.7 MMOL/L (ref 3.4–5.3)
PROT SERPL-MCNC: 5.5 G/DL (ref 6.4–8.3)
RBC # BLD AUTO: 3.3 10E6/UL (ref 4.4–5.9)
SODIUM SERPL-SCNC: 139 MMOL/L (ref 135–145)
UFH PPP CHRO-ACNC: 0.42 IU/ML
WBC # BLD AUTO: 4.7 10E3/UL (ref 4–11)

## 2025-01-27 PROCEDURE — 250N000011 HC RX IP 250 OP 636: Performed by: STUDENT IN AN ORGANIZED HEALTH CARE EDUCATION/TRAINING PROGRAM

## 2025-01-27 PROCEDURE — 250N000011 HC RX IP 250 OP 636: Performed by: INTERNAL MEDICINE

## 2025-01-27 PROCEDURE — 82248 BILIRUBIN DIRECT: CPT | Performed by: STUDENT IN AN ORGANIZED HEALTH CARE EDUCATION/TRAINING PROGRAM

## 2025-01-27 PROCEDURE — 250N000013 HC RX MED GY IP 250 OP 250 PS 637: Performed by: INTERNAL MEDICINE

## 2025-01-27 PROCEDURE — 82435 ASSAY OF BLOOD CHLORIDE: CPT | Performed by: STUDENT IN AN ORGANIZED HEALTH CARE EDUCATION/TRAINING PROGRAM

## 2025-01-27 PROCEDURE — 120N000001 HC R&B MED SURG/OB

## 2025-01-27 PROCEDURE — 36415 COLL VENOUS BLD VENIPUNCTURE: CPT

## 2025-01-27 PROCEDURE — 85520 HEPARIN ASSAY: CPT | Performed by: INTERNAL MEDICINE

## 2025-01-27 PROCEDURE — 250N000013 HC RX MED GY IP 250 OP 250 PS 637: Performed by: STUDENT IN AN ORGANIZED HEALTH CARE EDUCATION/TRAINING PROGRAM

## 2025-01-27 PROCEDURE — 250N000013 HC RX MED GY IP 250 OP 250 PS 637

## 2025-01-27 PROCEDURE — 82947 ASSAY GLUCOSE BLOOD QUANT: CPT | Performed by: STUDENT IN AN ORGANIZED HEALTH CARE EDUCATION/TRAINING PROGRAM

## 2025-01-27 PROCEDURE — 82565 ASSAY OF CREATININE: CPT | Performed by: STUDENT IN AN ORGANIZED HEALTH CARE EDUCATION/TRAINING PROGRAM

## 2025-01-27 PROCEDURE — 85027 COMPLETE CBC AUTOMATED: CPT | Performed by: STUDENT IN AN ORGANIZED HEALTH CARE EDUCATION/TRAINING PROGRAM

## 2025-01-27 PROCEDURE — 82330 ASSAY OF CALCIUM: CPT | Performed by: STUDENT IN AN ORGANIZED HEALTH CARE EDUCATION/TRAINING PROGRAM

## 2025-01-27 PROCEDURE — 83605 ASSAY OF LACTIC ACID: CPT

## 2025-01-27 PROCEDURE — 99233 SBSQ HOSP IP/OBS HIGH 50: CPT

## 2025-01-27 PROCEDURE — 36415 COLL VENOUS BLD VENIPUNCTURE: CPT | Performed by: STUDENT IN AN ORGANIZED HEALTH CARE EDUCATION/TRAINING PROGRAM

## 2025-01-27 PROCEDURE — 250N000011 HC RX IP 250 OP 636: Mod: JZ

## 2025-01-27 RX ORDER — METHOCARBAMOL 500 MG/1
500 TABLET, FILM COATED ORAL 4 TIMES DAILY
Status: DISCONTINUED | OUTPATIENT
Start: 2025-01-27 | End: 2025-01-29

## 2025-01-27 RX ORDER — CALCIUM GLUCONATE 20 MG/ML
1 INJECTION, SOLUTION INTRAVENOUS ONCE
Status: COMPLETED | OUTPATIENT
Start: 2025-01-27 | End: 2025-01-27

## 2025-01-27 RX ADMIN — HYDROMORPHONE HYDROCHLORIDE 0.2 MG: 0.2 INJECTION, SOLUTION INTRAMUSCULAR; INTRAVENOUS; SUBCUTANEOUS at 21:41

## 2025-01-27 RX ADMIN — MEROPENEM 1 G: 1 INJECTION, POWDER, FOR SOLUTION INTRAVENOUS at 00:02

## 2025-01-27 RX ADMIN — FLUDROCORTISONE ACETATE 0.1 MG: 0.1 TABLET ORAL at 09:39

## 2025-01-27 RX ADMIN — MIDODRINE HYDROCHLORIDE 10 MG: 5 TABLET ORAL at 14:25

## 2025-01-27 RX ADMIN — CALCIUM GLUCONATE 1 G: 20 INJECTION, SOLUTION INTRAVENOUS at 16:26

## 2025-01-27 RX ADMIN — MEROPENEM 1 G: 1 INJECTION, POWDER, FOR SOLUTION INTRAVENOUS at 09:38

## 2025-01-27 RX ADMIN — ACETAMINOPHEN 975 MG: 325 TABLET, FILM COATED ORAL at 18:36

## 2025-01-27 RX ADMIN — QUETIAPINE 12.5 MG: 25 TABLET, FILM COATED ORAL at 06:51

## 2025-01-27 RX ADMIN — METHOCARBAMOL 500 MG: 500 TABLET ORAL at 18:36

## 2025-01-27 RX ADMIN — METHOCARBAMOL 500 MG: 500 TABLET ORAL at 09:39

## 2025-01-27 RX ADMIN — HYDROMORPHONE HYDROCHLORIDE 0.4 MG: 0.2 INJECTION, SOLUTION INTRAMUSCULAR; INTRAVENOUS; SUBCUTANEOUS at 07:51

## 2025-01-27 RX ADMIN — HEPARIN SODIUM 1800 UNITS/HR: 10000 INJECTION, SOLUTION INTRAVENOUS at 14:35

## 2025-01-27 RX ADMIN — HYDROMORPHONE HYDROCHLORIDE 0.2 MG: 0.2 INJECTION, SOLUTION INTRAMUSCULAR; INTRAVENOUS; SUBCUTANEOUS at 05:14

## 2025-01-27 RX ADMIN — PANTOPRAZOLE SODIUM 40 MG: 40 TABLET, DELAYED RELEASE ORAL at 06:51

## 2025-01-27 RX ADMIN — HYDROMORPHONE HYDROCHLORIDE 0.2 MG: 0.2 INJECTION, SOLUTION INTRAMUSCULAR; INTRAVENOUS; SUBCUTANEOUS at 16:53

## 2025-01-27 RX ADMIN — MIDODRINE HYDROCHLORIDE 10 MG: 5 TABLET ORAL at 18:36

## 2025-01-27 RX ADMIN — MEROPENEM 1 G: 1 INJECTION, POWDER, FOR SOLUTION INTRAVENOUS at 17:32

## 2025-01-27 RX ADMIN — MIDODRINE HYDROCHLORIDE 10 MG: 5 TABLET ORAL at 09:39

## 2025-01-27 RX ADMIN — ACETAMINOPHEN 975 MG: 325 TABLET, FILM COATED ORAL at 09:39

## 2025-01-27 RX ADMIN — PRAVASTATIN SODIUM 20 MG: 20 TABLET ORAL at 09:39

## 2025-01-27 RX ADMIN — HYDROMORPHONE HYDROCHLORIDE 0.2 MG: 0.2 INJECTION, SOLUTION INTRAMUSCULAR; INTRAVENOUS; SUBCUTANEOUS at 02:56

## 2025-01-27 RX ADMIN — PANTOPRAZOLE SODIUM 40 MG: 40 TABLET, DELAYED RELEASE ORAL at 18:36

## 2025-01-27 RX ADMIN — HEPARIN SODIUM 1800 UNITS/HR: 10000 INJECTION, SOLUTION INTRAVENOUS at 00:02

## 2025-01-27 RX ADMIN — METHOCARBAMOL 500 MG: 500 TABLET ORAL at 14:25

## 2025-01-27 RX ADMIN — QUETIAPINE 12.5 MG: 25 TABLET, FILM COATED ORAL at 14:25

## 2025-01-27 RX ADMIN — ACETAMINOPHEN 650 MG: 325 TABLET, FILM COATED ORAL at 06:51

## 2025-01-27 ASSESSMENT — ACTIVITIES OF DAILY LIVING (ADL)
ADLS_ACUITY_SCORE: 73

## 2025-01-27 NOTE — PLAN OF CARE
Goal Outcome Evaluation:    Shift Summary    Admitting Diagnosis: Acute encephalopathy   T12 burst fracture   Deep vein thrombosis (DVT) of femoral vein, unspecified chronicity, unspecified laterality (H)  Unwitnessed fall   Pulmonary embolism, other, unspecified chronicity, unspecified whether acute cor pulmonale present (H) [I26.99]   Vitals:  VSS on 2L O2 via NC  Pain: PRN Dilaudid , Tylenol  A&Ox:1-2  Voiding :Incontinent   Mobility: Bedrest this shift  Dressing:Thrombectomy site CDI    Orders Placed This Encounter      Regular Diet Adult       Plan: Pain management, turn and repo.

## 2025-01-27 NOTE — PLAN OF CARE
Goal Outcome Evaluation:     Date/Time: 1/26/20252 [id number]     Trauma/Ortho/Medical (Choose one) Medical      Diagnosis:    Acute encephalopathy  T12 burst fracture   Deep vein thrombosis (DVT) of femoral vein  Unwitnessed fall   Pulmonary embolism, other, unspecified chronicity, unspecified whether acute cor pulmonale present     Mental Status: Alert to self, dementia at baseline, intermittent attendant at bedside d/t hx of falls, agitation & restlessness   Activity/dangle: strict bedrest; TLSO brace when oob, patient needing standing xray w/ TLSO brace when able   Diet: NPO prior to thrombectomy, meds crushed at times, regular diet as of 1830 d/t unable to complete RUQ US evening of 1/26.   Pain: c/o intermittent pain to L buttock, lower back & mild pain to L leg; PRN IV dilaudid 0.2 mg given q2 hours to manage pain & restlessness, 1x dose of PRN IV dilaudid 0.4 mg ordered & given d/t post-thromectomy increased restless, agitation & nonverbal indicators of pain.   Dillard/Voiding: patient uses urinal; urine marylin/tea colored, repeat UA ordered w/ urine culture, BS audible, no BM, passing flatus, abd soft but tender to RUQ   Tele/Restraints/Iso: intermittent bedside attendant, family also at bedside  02/LDA: 2L O2 via NC  D/C Date:TBD   Other Info: patient NPO for thrombectomy completed afternoon of 1/26, plan for RUQ US to be completed a.m. of 1/27; per hospitalist ok for patient to eat if RUQ US unable to be completed tonight. CMS intact. Pedal pulses +. New L groin IR procedure site; no drainage. Patient needing frequent redirection regarding new IR site. ID, neurosurgery, oncology, SW following. New consult for palliative care as of 1/26. Patient on heparin drip @ 1,800 units/hr; 3 consecutive therapeutic ranges, recheck in a.m. of 1/27. Repeat BC's drawn. Intermittent abx. Midodrine held d/t refusing medication & restlessness/agitation. PRN seroquel now available TID; given x3 during shift. ID did update  family at bedside after completion of thrombectomy; repeat cultures & urine culture ordered, change to abx; see recent ID note.

## 2025-01-27 NOTE — PROGRESS NOTES
[facility] H[facility]ology Progress Note     Primary Oncologist/Hematologist:            Assessment and Plan:     Primary Oncologist: Dr. Glover     Pulmonary embolism / DVT   - Acute segmental pulmonary emboli in the right lower lobe and left upper lobe.  There is dilatation of right sided heart chambers which appeared chronic.  Acute nonocclusive DVT left common iliac vein which extends into the lower inferior vena cava.  - thrombectomy 1/26/25 with successful clearing out the IVC and L Iliac clot.      2. Recurrent syncope with hypotension   - On midodrine  - No improvement since holding the cabozantanib   - Also taking florinef      3. Clear cell renal cell carcinoma diagnosed in 10/2019  - pT3b N0 M0 disease at presentation  - followed on surveillance until 1/2021 at which time liver metastasis identified and treated with cryoablation  - disease recurrence in 9/2023 presenting with GI bleeding and treated with partial duodenectomy  -disease progression in 1/2024 treated with partia gastrectomy  - Progressed on Ipi/Nivo 6/25/2024   - s/p radiation due to duodenal bleeding ending 8/06/2024   - Started Cabozantanib 20mg 9/26/24.   - Most recent CT 12/30/24 with decreased retroperitoneal lymphadenopathy, stable R hepatic lobe mass, duodenal mass measuring up to 2.3cm was not well seen on the non contrast study  - Cabozantanib held since 1/15/25   - Continue to hold Cabozantinib     4. 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 Acute burst fracture T12 with retropulsion   - Acute T12 fracture 2/2 fall   - MRI thoracic spine 1/24/2025 showed T12 burst fracture with no evidence of canal compromise  - Neurosurgery team onboard and noted plan for bracing     PLAN  - Continue to hold Cabozantinib   - on heparin gtt since 1/24 and tolerating anticoagulation well so far with no bleeding issues   - H&H stable   -  continue heparin gtt      Family at the bedside and participated in discussion.      NANCY Flores, AOCNP  Nurse Practitioner  [facility]  [phone]          Interval History:     More clear this morning per family. Pain well controlled at rest.               Review of Systems:     The 5 point Review of Systems is negative other than noted in the HPI                Medications:   Scheduled Medications  Current Facility-Administered Medications   Medication Dose Route Frequency Provider Last Rate Last Admin    acetaminophen (TYLENOL) tablet 975 mg  975 mg Oral TID Burt Felton DO   975 mg at 01/27/25 0939    fludrocortisone (FLORINEF) tablet 0.1 mg  0.1 mg Oral Daily Becky Hansen MD   0.1 mg at 01/27/25 0939    HYDROmorphone (DILAUDID) injection 0.2 mg  0.2 mg Intravenous Once Burt Felton DO        meropenem (MERREM) 1 g vial to attach to  mL bag  1 g Intravenous Q8H Zev Shahid MD   1 g at 01/27/25 0938    methocarbamol (ROBAXIN) tablet 500 mg  500 mg Oral 4x Daily Ti Verde APRN CNP   500 mg at 01/27/25 0939    midodrine (PROAMATINE) tablet 10 mg  10 mg Oral TID w/meals Becky Hansen MD   10 mg at 01/27/25 0939    pantoprazole (PROTONIX) EC tablet 40 mg  40 mg Oral BID AC Becky Hansen MD   40 mg at 01/27/25 0651    pravastatin (PRAVACHOL) tablet 20 mg  20 mg Oral Daily Becky Hansen MD   20 mg at 01/27/25 0939    sodium chloride (PF) 0.9% PF flush 3 mL  3 mL Intracatheter Q8H Becky Hansen MD   3 mL at 01/27/25 0652     PRN Medications  Current Facility-Administered Medications   Medication Dose Route Frequency Provider Last Rate Last Admin    acetaminophen (TYLENOL) tablet 650 mg  650 mg Oral Q4H PRN Zev Kennedy MD   650 mg at 01/27/25 0651    Or    acetaminophen (TYLENOL) Suppository 650 mg  650 mg Rectal Q4H PRN Zev Kennedy MD        heparin 2 Units/mL 0.9% NaCl (1000 mL)  1 Bag TABLE SOLN Q5 Min PRN Nico Mendieta  MD Basim   4 Bag at 01/26/25 1351    HYDROmorphone (DILAUDID) injection 0.2-0.4 mg  0.2-0.4 mg Intravenous Q2H PRN Burt Felton DO   0.4 mg at 01/27/25 0751    Lidocaine (LIDOCARE) 4 % Patch 2 patch  2 patch Transdermal Q24H PRN Burt Felton DO        lidocaine (LMX4) cream   Topical Q1H PRN Becky Hansen MD        lidocaine 1 % 0.1-1 mL  0.1-1 mL Other Q1H PRN Becky Hansen MD        naloxone (NARCAN) injection 0.2 mg  0.2 mg Intravenous Q2 Min PRBecky Cedillo MD        Or    naloxone (NARCAN) injection 0.4 mg  0.4 mg Intravenous Q2 Min PRBecky Cedillo MD        Or    naloxone (NARCAN) injection 0.2 mg  0.2 mg Intramuscular Q2 Min PRBecky Cedillo MD        Or    naloxone (NARCAN) injection 0.4 mg  0.4 mg Intramuscular Q2 Min PRBecky Cedillo MD        ondansetron (ZOFRAN ODT) ODT tab 4 mg  4 mg Oral Q6H PRBecky Cedillo MD        Or    ondansetron (ZOFRAN) injection 4 mg  4 mg Intravenous Q6H PRBecky Cedillo MD        QUEtiapine (SEROquel) half-tab 12.5 mg  12.5 mg Oral TID PRN Burt Felton DO   12.5 mg at 01/27/25 0651    senna-docusate (SENOKOT-S/PERICOLACE) 8.6-50 MG per tablet 1 tablet  1 tablet Oral BID PRBecky Cedillo MD        Or    senna-docusate (SENOKOT-S/PERICOLACE) 8.6-50 MG per tablet 2 tablet  2 tablet Oral BID PRBecky Cedillo MD        sodium chloride (PF) 0.9% PF flush 3 mL  3 mL Intracatheter q1 min prBecky Cedillo MD   3 mL at 01/26/25 1227                  Physical Exam:   Vitals were reviewed  Blood pressure 116/87, pulse 101, temperature 97.2  F (36.2  C), temperature source Axillary, resp. rate 17, SpO2 96%.  Wt Readings from Last 4 Encounters:   01/14/25 107.6 kg (237 lb 4.8 oz)   12/12/24 114.3 kg (252 lb)   09/05/24 117 kg (258 lb)   08/07/24 121.3 kg (267 lb 8 oz)       I/O last 3 completed shifts:  In: -   Out: 200 [Urine:200]                 Data:   All laboratory data and imaging studies  reviewed.    CMP  Recent Labs   Lab 01/27/25  0929 01/26/25  2306 01/26/25  1016 01/25/25  2209 01/25/25  1741 01/25/25  0506 01/24/25  1755 01/24/25  0904     --  136  --   --  134*  --  135   POTASSIUM 3.7  --  3.8  --   --  4.2  --  4.3   CHLORIDE 108*  --  106  --   --  102  --  100   CO2 21*  --  19*  --   --  19*  --  22   ANIONGAP 10  --  11  --   --  13  --  13   GLC 86 91 82 106*   < > 110*   < > 109*   BUN 32.8*  --  32.1*  --   --  31.1*  --  30.0*   CR 1.22*  --  1.30*  --   --  1.49*  --  1.59*   GFRESTIMATED 61  --  56*  --   --  48*  --  44*   DOMINICK 7.6*  --  7.0*  --   --  7.6*  --  8.4*   PROTTOTAL 5.5*  --   --   --   --  5.6*  --  5.8*   ALBUMIN 2.4*  --   --   --   --  2.7*  --  3.1*   BILITOTAL 1.4*  --   --   --   --  2.2*  --  2.5*   ALKPHOS 118  --   --   --   --  125  --  121   AST 78*  --   --   --   --  30  --  28   ALT 37  --   --   --   --  20  --  22    < > = values in this interval not displayed.     CBC  Recent Labs   Lab 01/27/25  0929 01/26/25  1016 01/25/25  0507 01/24/25  1939   WBC 4.7 5.1 8.2 11.2*   RBC 3.30* 3.10* 3.29* 3.76*   HGB 11.1* 10.5* 11.2* 12.6*   HCT 33.5* 30.9* 32.3* 37.5*   * 100 98 100   MCH 33.6* 33.9* 34.0* 33.5*   MCHC 33.1 34.0 34.7 33.6   RDW 15.7* 15.8* 15.5* 15.6*   * 98* 121* 139*     INRNo lab results found in last 7 days.        ESTEFANIA Shepard  Nurse Practitioner  [facility]  [phone]

## 2025-01-27 NOTE — PROGRESS NOTES
[facility]    Medicine Progress Note - Hospitalist Service    Date of Admission:  1/24/2025    Assessment & Plan   Ti Nickerson is a 79 year old male with a complex history of metastatic renal cancer, atrial fibrillation, orthostatic hypotension on midodrine/Florinef, CKD stage IIIb, CAD, acute metabolic encephalopathy, and history of GI bleeding presents to the emergency room after falls.     Patient had recently been hospitalized 1/10 through 1/17/2025 for orthostatic hypotension and recurrent syncope. He had a unremarkable work up including an echo with normal EF and moderate RV dysfunction.  Negative stim test for adrenal insufficiency, evaluated by cardiology and neurology.  Recommended compression stockings.  Stopped on his Cabozanitinib for 2 weeks to see if he had improved symptoms.  He was discharged on midodrine 10 mg 3 times daily with Florinef.      Now readmitted after mechanical fall with new head laceration, PE, DVT, Burst fracture T12. Undergoing IR thrombectomy on 1/26.     Goals of Care  Dr. Felton had discussion with the patient and his wife regarding Edgar's recent decline. Patient reportedly doing well prior to most recent admission on 1/10. However, since that time has remained encephalopathic, impulsive, and a significant fall risk. The patient has a new large venous thrombus, past history of GI bleeding while on anti-coagulation, and metastatic renal cancer. He would be a life long candidate for anti-coagulation given cancer history but anti-coagulation previously stopped after patient developed bleeding due to metastatic duodenal mass. Ultimately family is electing to pursue thrombectomy procedure on 1/26 and accept the risks of anti-coagulation.     I am concerned it will be difficult to find a safe discharge plan given his overall fall risk, history of GI bleed and now new indication for anti-coagulation. Patient's wife and children are agreeable to a  palliative care consultation. We also discussed code status during this admission. The patient is full code but would not want prolonged life support if an easily reversible condition not found.     - Palliative care consulted, recommendations appreciated     Iliac Vein thrombosis and IVC extension   Acute segmental Pulmonary Emboli (RLL, CELESTE)   Thrombocytopenia   Chronic anemia  History of blood loss anemia 2/2 to cancer while on anti-coagulation previously  Suspected clot provoked in setting of metastatic cancer and overall deconditioned/immobile state. IR consulted on 1/24 to consider thrombectomy of clot in iliac vein extending to IVC given overall size. After risk benefit discussion, patient and family elected to proceed with treatment of Iliac vein and IVC thrombus. Intermittently requiring 2 lpm nasal cannula but not persistently hypoxic. Right ventricular dilation seen on repeat limited TTE which is only mildly worse from complete echo on 1/11. No CT imaging during 1/10 admission so the acuity of Iliac and IVC thrombus is unclear. Currently suspect most of his pain is related to new thoracic spine burst fracture.  - Continue scheduled tylenol, prn IV dilaudid to 0.2-0.4 q2h prn, start prn robaxin, start prn lidocaine patch, prn seroquel for anxiety/agitation  - Planning for Thrombectomy on 1/26  - Repeat hemoglobin in AM, closely monitor for signs of GI bleeding given history  - Per IR- given narrowing of IVC near nephrectomy - not IVC filter candidate     Toxic Metabolic Encephalopathy  Sepsis secondary to Enterobacter Bacteremia   Hyperbilirubinemia   Patient febrile on admission with slight leukocytosis. Blood cultures grew Enterobacter species in one bottle. CT CAP on admission did not show any obvious sign of infection in lungs (not completely imaged) or abdomen. Has required 2-4 lpm nasal cannula intermittently but hypoxia could also be related to PE. Urinalysis not consistent with infection. No  obvious rash. Unclear source at this time, although patient had recent hospitalization and enterobacter can cause nosocomial infections. Cannot recall seeing port or central venous access during my exam. I also don't see record of chronic central venous access per chart review.      Patient also very impulsive and anxious/agitated at times. He responded well to low dose seroquel evening 1/25 and AM 1/26. Infection likely contributing to overall mental state.     - Seroquel 12.5 mg TID prn for anxiety/agitation   - Stop Vancomycin and Zosyn on 1/26  - Start Cefepime 2 g Q8H on 1/26   - Continue to follow blood cultures x2  - Add on urine culture to admission UA if possible   - Obtain portable chest XR after thrombectomy procedure today  - ID consulted, recommendations appreciated   - edit- ID switching to Meropenem. Also obtaining RUQ US given abnormal LFTs, repeat hepatic panel ordered for AM, a new urinalysis was obtained. There is also an add on urine culture pending from admission UA.     Recurrent falls with orthostatics  Orthostatic hypotension    Patient with a known history of recurrent falls and orthostatic hypotension. His wife reports he has had a long history of orthostatic hypotension even prior to last admission. Recently hospitalized 1/10 through 1/17 on the medicine service for orthostatic hypotension.  He was started on midodrine 10 mg 3 times a day.  Decision to hold cabozanitinib for 2 weeks.  Liberalized salt.  Started on Florinef 0.1 mg po daily (patient was seen by cardiology, neurology, hematology)   - Continue midodrine and florinef     Imaging:  CT head no evidence for acute intracranial process.  Brain atrophy with microvascular changes.  Cervical spine no acute fracture posttraumatic subluxation.  Multilevel cervical spondylosis without high-grade spinal stenosis.  Multilevel neuroforaminal stenosis  Thoracic spine with acute burst T12 fracture is noted:  Mild retropulsion of the fractured  vertebral body without associated spinal canal stenosis. No high-grade spinal canal or neural foraminal stenosis.  Lumbar spine no fracture or posttraumatic subluxation.Multilevel lumbar spondylosis without high-grade spinal canal stenosis. Multilevel neural foraminal stenosis      Atrial fibrillation:   EKG Atrial fibrillation, LAD. NSST changes. Stopped anticoagulation in past given bleeding and falls. Some discussion in past of potential Watchman      Head laceration   - Patient seen in the clinic on 1/22/2025 with a laceration 7 to 8 cm in length with staples placed in the office.  And sutures.  Tetanus booster within 10 years.  - Sutures placed 1/22 reported to remove in 8 days on 1/30     Acute Burst Fracture T12 with retropulsion  CT chest abdomen pelvis given falls showing an acute T12 fracture. T12 CT spine showing acute burst fracture of T12 with 20% height loss.  Mild retropulsion of the fracture vertebral body without associated spinal canal stenosis. MRI poor quality but reviewed with neurosurgery and okay to initiate heparin  - Neurosurgery follow-up, planning for brace and non-operative management of T12 fracture  - Upright xrays in brace pending  - Pain management as above - consider pain team consult if needed     Metastatic Renal cell cancer diagnosed in 2019   S/p Right radical nephrecctomy  and IVC thrombectomy 2019   On surveillance with recurrence 1/2021 with liver lesion.    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 Biopsy with mets renal cell.   - 10/2023 partial duodenectomy at [facility] mets RCC   - 1/2024 EUS and resection of gastric mass with complete endoscopic removal with met RCC    - 7/2024 admit recurrent GI bleed. Local tumor recurrence >> required transfusion, stopped anticoagulation  - 7/2024 Radiation for bleeding control.   - Daily  "CBC     CKD 3  Patient with creatinine elevated to  1.59. Received IV fluids on admission and creatinine improved to 1.3 on 1/26.   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 Likely asymptomatic   -1g calcium gluconate IV  - monitor          Diet: Regular Diet Adult    DVT Prophylaxis: Heparin infusion    Dillard Catheter: Not present  Lines: None     Cardiac Monitoring: None  Code Status: Full Code      Clinically Significant Risk Factors               # Hypoalbuminemia: Lowest albumin = 2.7 g/dL at 1/25/2025  5:06 AM, will monitor as appropriate   # Thrombocytopenia: Lowest platelets = 98 in last 2 days, will monitor for bleeding   # Hypertension: Noted on problem list            # Overweight: Estimated body mass index is 28.89 kg/m  as calculated from the following:    Height as of 1/10/25: 1.93 m (6' 4\").    Weight as of 1/14/25: 107.6 kg (237 lb 4.8 oz)., PRESENT ON ADMISSION       # Financial/Environmental Concerns: none         Social Drivers of Health    Physical Activity: Insufficiently Active (5/28/2024)    Exercise Vital Sign     Days of Exercise per Week: 2 days     Minutes of Exercise per Session: 30 min   Social Connections: Unknown (5/28/2024)    Social Connection and Isolation Panel [NHANES]     Frequency of Social Gatherings with Friends and Family: Twice a week          Disposition Plan     Medically Ready for Discharge: Anticipated in 2-4 Days             John Rain MD  Hospitalist Service  [facility]  Securely message with [employer] (more info)  Text page via [employer] Paging/Directory   ______________________________________________________________________    Interval History   - Patient is conversant but with nonsensical speech at times.  His " significant lumbar back pain, no leg weakness, denies fevers or chills, chest pain, abdominal pain or other concerns.      Physical Exam   Vital Signs: Temp: 98.3  F (36.8  C) Temp src: Oral BP: 115/80 Pulse: 79   Resp: 16 SpO2: 96 % O2 Device: None (Room air) Oxygen Delivery: 4 LPM  Weight: 0 lbs 0 oz    Constitutional: awake, alert, cooperative, NAD  HEENT: normocephalic, anicteric sclerae, MMM   Pulmonary: no increased work of breathing, lungs clear to auscultation bilaterally without wheezes or rales   Cardiovascular:  RRR, normal S1 and S2, no murmur appreciated   GI: BS+, soft, non-tender, non-distended, without guarding or rigidity  Skin: warm, dry  MSK: no peripheral edema bilaterally, left groin site soft, CDI  Neuro: AAOx person and year not situation or place, 5/5 strength bilateral upper and lower extremities, CNII-XII grossly intact      Medical Decision Making       50 MINUTES SPENT BY ME on the date of service doing chart review, history, exam, documentation & further activities per the note.      Data   ------------------------- PAST 24 HR DATA REVIEWED -----------------------------------------------    I have personally reviewed the following data over the past 24 hrs:    5.1  \   10.5 (L)   / 98 (L)     136 106 32.1 (H) /  91   3.8 19 (L) 1.30 (H) \       Imaging results reviewed over the past 24 hrs:   Recent Results (from the past 24 hours)   IR Lower Extremity Venogram Left    Narrative    79-year-old patient with small bilateral pulmonary emboli and venous thrombus incidentally noted in the distal IVC and left common iliac veins. Patient also has vertebral fracture causing significant pain. The thrombus in the left common iliac vein   thought to be nidus for PEs. Patient has an irregular and narrow IVC, by my interpretation related to previous nephrectomy. Blood flow remains through this area, though fear that if thrombus were to propagate, would become more complicated in an   anatomically  abnormal IVC. Plan is for mechanical thrombectomy.    TECHNIQUE: Patient was brought to Interventional Radiology department after informed consent obtained with patient's family members. Patient was placed in a supine position. Skin overlying the left groin was prepped and draped in standard sterile   fashion. 1% lidocaine used for local anesthetic. Ultrasound was used to visualize the left common femoral vein, patency confirmed, and image stored for documentation. With continuous ultrasound guidance, a micropuncture kit was used to access the left   common femoral vein. Over series of maneuvers, 16 [place]i vascular sheath was placed. Over a Bentson wire, Super Stiff Amplatz wire was advanced through the IVC, right atrium, heart, and into the subclavian vein on the right. An XL ClotTriever was used   for mechanical thrombectomy of the distal IVC and left common iliac vein after multiple passes. Thrombus was removed and appeared relatively chronic in appearance. Completion venograms performed in multiple projections including left external iliac vein,   common iliac vein, contralateral right common iliac vein, and IVC. Venogram was performed also to begin the procedure upon initial access in the left common femoral vein, external iliac vein, common iliac vein, and IVC. No residual thrombus noted. IVUS   was also used demonstrating wide patency through these vein segments. Images were stored for documentation. There is moderate irregularity of the more cranial infrarenal IVC, though no thrombus formation. The sheath was removed and purse string suture   applied with a flow stasis.    Sedation: A moderate level of sedation was achieved with 2 mg IV Versed and 0.2 mg IV Dilaudid.  Sedation time: 42 minutes.  Please note the above medications were administered by the interventional radiology staff under my direct supervision. Patient's vital signs were monitored and remained stable throughout the  procedure.    Fluoroscopic time: 5.7 minutes  Air Kerma: 335 mGy  Contrast: 50 mL of Isovue-300 administered intravenously without complication.  Local anesthetic: 10 mL of 1% lidocaine.    FINDINGS: Initial venogram is from the left common femoral vein demonstrating patency of the common femoral, and external iliac veins. Thrombus is noted in the left common iliac vein with approximately 60% narrowing. Thrombus extends into the IVC. IVC   remains patent. There is moderate irregularity of the IVC surrounded by surgical clips, related to prior surgery. However, no thrombus identified. At completion, venogram performed demonstrating wide patency in the common iliac, external iliac, and   inferior vena cava venous segments. Contralateral right common iliac vein is widely patent. Patient tolerated the procedure well.      Impression    IMPRESSION:   1. Successful mechanical thrombectomy of the distal IVC and left common iliac vein. No residual thrombus at completion. Continue heparin and anticoagulation.  2. Moderate irregularity of the more cranial infrarenal IVC, thought to relate to previous surgery given surrounding surgical clips. This is unrelated to the thrombus.

## 2025-01-27 NOTE — PROGRESS NOTES
[facility]    Neurosurgery  Daily Note    Assessment & Plan      T12 burst fracture    EXAM: CT THORACIC SPINE W/O CONTRAST, CT LUMBAR SPINE W/O CONTRAST  LOCATION: [facility]  DATE: 1/24/2025     INDICATION: trauma  COMPARISON: None.  TECHNIQUE:  1) Routine CT Thoracic Spine without IV contrast. Multiplanar reformats. Dose reduction techniques were used.   2) Routine CT Lumbar Spine without IV contrast. Multiplanar reformats. Dose reduction techniques were used.                                                                     IMPRESSION:  THORACIC SPINE CT:  1.  Acute burst fracture of T12 with approximately 20% height loss. Mild retropulsion of the fractured vertebral body without associated spinal canal stenosis.  2.  No high-grade spinal canal or neural foraminal stenosis.         Plan:  Ok for anticoagulation from nsgy standpoint   Bed rest spine precautions   Orthotics consult for custom TLSO and light activity. To be worn when upright and out of bed. Off with resting/hygiene/sleeping. To be required for 3 months.   Scheduled Robaxin  Upright XR once in brace.       If stable, follow up 4-6 weeks in clinic with upright XR prior (ordered and to be scheduled)        Ti Verde, DNP, APRN, CNP  [facility] Neurosurgery  Tel [phone]      Interval History   Doing well.  Continues to improve.  Pain is well-controlled.  No fevers.      Physical Exam   Temp: 97.2  F (36.2  C) Temp src: Axillary BP: 116/87 Pulse: 101   Resp: 17 SpO2: 96 % O2 Device: None (Room air) Oxygen Delivery: 4 LPM  There were no vitals filed for this visit.  Vital Signs with Ranges  Temp:  [97.2  F (36.2  C)-98.3  F (36.8  C)] 97.2  F (36.2  C)  Pulse:  [] 101  Resp:  [11-19] 17  BP: ()/(67-87) 116/87  SpO2:  [94 %-99 %] 96 %  I/O last 3 completed shifts:  In: -   Out: 200 [Urine:200]        NEUROLOGICAL EXAMINATION:     Motor strength:   Hip flexors: 5/5 right,  5/5 left   Quadriceps: 5/5 right, 5/5 left  Ankle dorsiflexion: 5/5 right, 5/5 left    Ankle plantar flexion:5/5 right, 5/5 left   Extensor hallicus longus: 5/5 right, 5/5 left   Sensation:  intact  Reflexes:  Negative Atpia's, Negative Babinski.  Negative Clonus.    Coordination:  Smooth finger to nose testing.   Negative pronator drift.    Gait:  not tested    CBC RESULTS:   Recent Labs   Lab Test 01/27/25 0929   WBC 4.7   RBC 3.30*   HGB 11.1*   HCT 33.5*   *   MCH 33.6*   MCHC 33.1   RDW 15.7*   *     Basic Metabolic Panel:  Lab Results   Component Value Date     01/26/2025     06/10/2021      Lab Results   Component Value Date    POTASSIUM 3.8 01/26/2025    POTASSIUM 3.6 07/14/2024    POTASSIUM 4.4 09/21/2022    POTASSIUM 4.5 06/10/2021     Lab Results   Component Value Date    CHLORIDE 106 01/26/2025    CHLORIDE 109 09/21/2022    CHLORIDE 111 06/10/2021     Lab Results   Component Value Date    DOMINICK 7.0 01/26/2025    DOMINICK 8.5 06/10/2021     Lab Results   Component Value Date    CO2 19 01/26/2025    CO2 25 09/21/2022    CO2 25 06/10/2021     Lab Results   Component Value Date    BUN 32.1 01/26/2025    BUN 27 09/21/2022    BUN 30 06/10/2021     Lab Results   Component Value Date    CR 1.30 01/26/2025    CR 1.68 06/10/2021     Lab Results   Component Value Date    GLC 91 01/26/2025    GLC 66 09/21/2022    GLC 97 06/10/2021     INR:  Lab Results   Component Value Date    INR 1.57 07/14/2024    INR 2.29 10/25/2023    INR 1.22 09/13/2023    INR 2.3 06/17/2013    INR 2.4 05/08/2013    INR 2.2 03/29/2013    INR 2.5 01/25/2013    INR 2.2 05/01/2012    INR 2.1 03/27/2012    INR 2.3 02/23/2012    INR 2.0 01/26/2012    INR 2.0 01/06/2012    INR 2.52 01/31/2008    INR 1.82 01/30/2008    INR 2.20 01/29/2008           Medications   Current Facility-Administered Medications   Medication Dose Route Frequency Provider Last Rate Last Admin    heparin 25,000 units in 0.45% NaCl 250 mL ANTICOAGULANT  infusion  0-5,000 Units/hr Intravenous Continuous Becky Hansen MD 18 mL/hr at 01/27/25 0002 1,800 Units/hr at 01/27/25 0002      Current Facility-Administered Medications   Medication Dose Route Frequency Provider Last Rate Last Admin    acetaminophen (TYLENOL) tablet 975 mg  975 mg Oral TID Burt Felton DO   975 mg at 01/27/25 0939    fludrocortisone (FLORINEF) tablet 0.1 mg  0.1 mg Oral Daily Becky Hansen MD   0.1 mg at 01/27/25 0939    HYDROmorphone (DILAUDID) injection 0.2 mg  0.2 mg Intravenous Once Burt Felton DO        meropenem (MERREM) 1 g vial to attach to  mL bag  1 g Intravenous Q8H Zev Shahid MD   1 g at 01/27/25 0938    methocarbamol (ROBAXIN) tablet 500 mg  500 mg Oral 4x Daily Ti Verde APRN CNP   500 mg at 01/27/25 0939    midodrine (PROAMATINE) tablet 10 mg  10 mg Oral TID w/meals Becky Hansen MD   10 mg at 01/27/25 0939    pantoprazole (PROTONIX) EC tablet 40 mg  40 mg Oral BID AC Becky Hansen MD   40 mg at 01/27/25 0651    pravastatin (PRAVACHOL) tablet 20 mg  20 mg Oral Daily Becky Hansen MD   20 mg at 01/27/25 0939    sodium chloride (PF) 0.9% PF flush 3 mL  3 mL Intracatheter Q8H Becky Hansen MD   3 mL at 01/27/25 0652

## 2025-01-27 NOTE — PROGRESS NOTES
"  Interventional Radiology Progress Note:  Inpatient at [facility]  Date: January 27, 2025     HPI: Ti Nickerson is a 79 year old male with a history including hypertension, hyperlipidemia, CAD, CKD stage 3b, chronic atrial fibrillation, and right renal cell carcinoma with mets to the lung who presents to the ED via EMS for evaluation after a fall. The patient was recently admitted from 1/10 to 1/17 for orthostatic hypotension and recurrent syncopal episodes. He had another fall 2 days ago, where he hit his back and lacerated his scalp. He had an appointment with his internal medicine physician, where he received 6 staples. He didn't have a head CT at this time. He has been complaining about lower back pain since. This morning, his assisted living facility staff found him on the floor next to his bed.     Imaging demonstrated \"Acute nonocclusive deep venous thrombosis of the left common iliac vein which extends into the lower inferior vena cava\" and IR consult was placed for thrombectomy.     After evaluating the patient for potential bleeding with the use of blood thinners he was cleared and had a thrombectomy in IR on 1/26/25 successful with clearing out the IVC and L Iliac clot.     He is being seen in IR follow up today.     Interval History: Doing ok. Not much to say. Back is still sore.     Physical Exam:   Vitals: Temp:  [97.2  F (36.2  C)-98.3  F (36.8  C)] 97.2  F (36.2  C)  Pulse:  [] 101  Resp:  [11-19] 17  BP: ()/(67-87) 116/87  SpO2:  [94 %-98 %] 96 %    General: Pleasant, tired appearing male in no acute distress.    Neuro:  A&O x 2-3. Moves all extremities equally.  Resp:  Normal respirations on room air. Non labored breathing. Equal air entry B/L   Vascular: left groin procedure site with dressing CDI. Site soft without tenderness.  -Stitch removed without pain or complications    Labs:  ROUTINE ICU LABS (Last four results)  CMP  Recent Labs   Lab 01/27/25  0929 " 01/26/25  2306 01/26/25  1016 01/25/25  2209 01/25/25  1741 01/25/25  0506 01/24/25  1755 01/24/25  0904     --  136  --   --  134*  --  135   POTASSIUM 3.7  --  3.8  --   --  4.2  --  4.3   CHLORIDE 108*  --  106  --   --  102  --  100   CO2 21*  --  19*  --   --  19*  --  22   ANIONGAP 10  --  11  --   --  13  --  13   GLC 86 91 82 106*   < > 110*   < > 109*   BUN 32.8*  --  32.1*  --   --  31.1*  --  30.0*   CR 1.22*  --  1.30*  --   --  1.49*  --  1.59*   GFRESTIMATED 61  --  56*  --   --  48*  --  44*   DOMINICK 7.6*  --  7.0*  --   --  7.6*  --  8.4*   PROTTOTAL 5.5*  --   --   --   --  5.6*  --  5.8*   ALBUMIN 2.4*  --   --   --   --  2.7*  --  3.1*   BILITOTAL 1.4*  --   --   --   --  2.2*  --  2.5*   ALKPHOS 118  --   --   --   --  125  --  121   AST 78*  --   --   --   --  30  --  28   ALT 37  --   --   --   --  20  --  22    < > = values in this interval not displayed.     CBC  Recent Labs   Lab 01/27/25  0929 01/26/25  1016 01/25/25  0507 01/24/25  1939   WBC 4.7 5.1 8.2 11.2*   RBC 3.30* 3.10* 3.29* 3.76*   HGB 11.1* 10.5* 11.2* 12.6*   HCT 33.5* 30.9* 32.3* 37.5*   * 100 98 100   MCH 33.6* 33.9* 34.0* 33.5*   MCHC 33.1 34.0 34.7 33.6   RDW 15.7* 15.8* 15.5* 15.6*   * 98* 121* 139*     INRNo lab results found in last 7 days.  Arterial Blood GasNo lab results found in last 7 days.    Assessment: Successful thrombectomy of IVC and L iliac clot yesterday 1/26/24. Patient is taking blood thinners again, which he will need to keep the venous system open.    -Stitch removed without complications    Plan: Checked with Dr Garcia re follow up  -US of IVC and Iliac veins bilaterally along with clinic visit (in person or virtual depending on patient's status) in one month      Total time spent on the date of the encounter is 30 minutes, including time spent counseling the patient, performing a medically appropriate evaluation, reviewing prior medical history, ordering medications and tests,  documenting clinical information in the medical record, and communication of results.    Thanks [facility] Interventional Radiology CNP ([phone]) (phone [phone])

## 2025-01-28 DIAGNOSIS — I82.220 IVC THROMBOSIS (H): Primary | ICD-10-CM

## 2025-01-28 LAB
ALBUMIN SERPL BCG-MCNC: 2.3 G/DL (ref 3.5–5.2)
ALP SERPL-CCNC: 114 U/L (ref 40–150)
ALT SERPL W P-5'-P-CCNC: 38 U/L (ref 0–70)
ANION GAP SERPL CALCULATED.3IONS-SCNC: 6 MMOL/L (ref 7–15)
AST SERPL W P-5'-P-CCNC: 67 U/L (ref 0–45)
BILIRUB DIRECT SERPL-MCNC: 0.71 MG/DL (ref 0–0.3)
BILIRUB SERPL-MCNC: 1.3 MG/DL
BUN SERPL-MCNC: 30.3 MG/DL (ref 8–23)
CA-I BLD-MCNC: 4.4 MG/DL (ref 4.4–5.2)
CALCIUM SERPL-MCNC: 7.7 MG/DL (ref 8.8–10.4)
CHLORIDE SERPL-SCNC: 110 MMOL/L (ref 98–107)
CREAT SERPL-MCNC: 1.1 MG/DL (ref 0.67–1.17)
EGFRCR SERPLBLD CKD-EPI 2021: 69 ML/MIN/1.73M2
ERYTHROCYTE [DISTWIDTH] IN BLOOD BY AUTOMATED COUNT: 15.7 % (ref 10–15)
GLUCOSE BLDC GLUCOMTR-MCNC: 104 MG/DL (ref 70–99)
GLUCOSE BLDC GLUCOMTR-MCNC: 113 MG/DL (ref 70–99)
GLUCOSE SERPL-MCNC: 99 MG/DL (ref 70–99)
HCO3 SERPL-SCNC: 24 MMOL/L (ref 22–29)
HCT VFR BLD AUTO: 32 % (ref 40–53)
HGB BLD-MCNC: 10.7 G/DL (ref 13.3–17.7)
LACTATE SERPL-SCNC: 1 MMOL/L (ref 0.7–2)
LACTATE SERPL-SCNC: 1 MMOL/L (ref 0.7–2)
MCH RBC QN AUTO: 33.4 PG (ref 26.5–33)
MCHC RBC AUTO-ENTMCNC: 33.4 G/DL (ref 31.5–36.5)
MCV RBC AUTO: 100 FL (ref 78–100)
PLATELET # BLD AUTO: 120 10E3/UL (ref 150–450)
POTASSIUM SERPL-SCNC: 3.9 MMOL/L (ref 3.4–5.3)
PROT SERPL-MCNC: 5.1 G/DL (ref 6.4–8.3)
RBC # BLD AUTO: 3.2 10E6/UL (ref 4.4–5.9)
SODIUM SERPL-SCNC: 140 MMOL/L (ref 135–145)
UFH PPP CHRO-ACNC: 0.31 IU/ML
WBC # BLD AUTO: 4.5 10E3/UL (ref 4–11)

## 2025-01-28 PROCEDURE — 99232 SBSQ HOSP IP/OBS MODERATE 35: CPT | Performed by: SPECIALIST

## 2025-01-28 PROCEDURE — 250N000011 HC RX IP 250 OP 636: Performed by: INTERNAL MEDICINE

## 2025-01-28 PROCEDURE — 85520 HEPARIN ASSAY: CPT

## 2025-01-28 PROCEDURE — 82330 ASSAY OF CALCIUM: CPT

## 2025-01-28 PROCEDURE — 250N000013 HC RX MED GY IP 250 OP 250 PS 637

## 2025-01-28 PROCEDURE — 250N000013 HC RX MED GY IP 250 OP 250 PS 637: Performed by: STUDENT IN AN ORGANIZED HEALTH CARE EDUCATION/TRAINING PROGRAM

## 2025-01-28 PROCEDURE — 120N000001 HC R&B MED SURG/OB

## 2025-01-28 PROCEDURE — 83605 ASSAY OF LACTIC ACID: CPT | Performed by: REGISTERED NURSE

## 2025-01-28 PROCEDURE — 99233 SBSQ HOSP IP/OBS HIGH 50: CPT

## 2025-01-28 PROCEDURE — 250N000011 HC RX IP 250 OP 636: Performed by: STUDENT IN AN ORGANIZED HEALTH CARE EDUCATION/TRAINING PROGRAM

## 2025-01-28 PROCEDURE — 80048 BASIC METABOLIC PNL TOTAL CA: CPT

## 2025-01-28 PROCEDURE — 36415 COLL VENOUS BLD VENIPUNCTURE: CPT

## 2025-01-28 PROCEDURE — 250N000011 HC RX IP 250 OP 636: Performed by: SPECIALIST

## 2025-01-28 PROCEDURE — 250N000013 HC RX MED GY IP 250 OP 250 PS 637: Performed by: INTERNAL MEDICINE

## 2025-01-28 PROCEDURE — 36415 COLL VENOUS BLD VENIPUNCTURE: CPT | Performed by: REGISTERED NURSE

## 2025-01-28 PROCEDURE — 99222 1ST HOSP IP/OBS MODERATE 55: CPT | Performed by: REGISTERED NURSE

## 2025-01-28 PROCEDURE — 82310 ASSAY OF CALCIUM: CPT

## 2025-01-28 PROCEDURE — 85018 HEMOGLOBIN: CPT

## 2025-01-28 PROCEDURE — 82248 BILIRUBIN DIRECT: CPT

## 2025-01-28 RX ORDER — ERTAPENEM 1 G/1
1 INJECTION, POWDER, LYOPHILIZED, FOR SOLUTION INTRAMUSCULAR; INTRAVENOUS EVERY 24 HOURS
Status: DISCONTINUED | OUTPATIENT
Start: 2025-01-28 | End: 2025-01-29

## 2025-01-28 RX ORDER — HYDROMORPHONE HCL IN WATER/PF 6 MG/30 ML
0.2 PATIENT CONTROLLED ANALGESIA SYRINGE INTRAVENOUS EVERY 4 HOURS PRN
Status: DISCONTINUED | OUTPATIENT
Start: 2025-01-28 | End: 2025-01-29

## 2025-01-28 RX ADMIN — PANTOPRAZOLE SODIUM 40 MG: 40 TABLET, DELAYED RELEASE ORAL at 16:30

## 2025-01-28 RX ADMIN — MIDODRINE HYDROCHLORIDE 10 MG: 5 TABLET ORAL at 18:44

## 2025-01-28 RX ADMIN — HEPARIN SODIUM 1800 UNITS/HR: 10000 INJECTION, SOLUTION INTRAVENOUS at 20:59

## 2025-01-28 RX ADMIN — METHOCARBAMOL 500 MG: 500 TABLET ORAL at 18:44

## 2025-01-28 RX ADMIN — METHOCARBAMOL 500 MG: 500 TABLET ORAL at 09:43

## 2025-01-28 RX ADMIN — ACETAMINOPHEN 975 MG: 325 TABLET, FILM COATED ORAL at 21:09

## 2025-01-28 RX ADMIN — HEPARIN SODIUM 1800 UNITS/HR: 10000 INJECTION, SOLUTION INTRAVENOUS at 04:40

## 2025-01-28 RX ADMIN — ACETAMINOPHEN 975 MG: 325 TABLET, FILM COATED ORAL at 09:43

## 2025-01-28 RX ADMIN — MIDODRINE HYDROCHLORIDE 10 MG: 5 TABLET ORAL at 09:43

## 2025-01-28 RX ADMIN — MEROPENEM 1 G: 1 INJECTION, POWDER, FOR SOLUTION INTRAVENOUS at 01:31

## 2025-01-28 RX ADMIN — PRAVASTATIN SODIUM 20 MG: 20 TABLET ORAL at 09:43

## 2025-01-28 RX ADMIN — HYDROMORPHONE HYDROCHLORIDE 0.2 MG: 0.2 INJECTION, SOLUTION INTRAMUSCULAR; INTRAVENOUS; SUBCUTANEOUS at 02:22

## 2025-01-28 RX ADMIN — QUETIAPINE 12.5 MG: 25 TABLET, FILM COATED ORAL at 07:15

## 2025-01-28 RX ADMIN — ACETAMINOPHEN 975 MG: 325 TABLET, FILM COATED ORAL at 15:18

## 2025-01-28 RX ADMIN — METHOCARBAMOL 500 MG: 500 TABLET ORAL at 21:10

## 2025-01-28 RX ADMIN — ERTAPENEM SODIUM 1 G: 1 INJECTION, POWDER, LYOPHILIZED, FOR SOLUTION INTRAMUSCULAR; INTRAVENOUS at 10:15

## 2025-01-28 RX ADMIN — FLUDROCORTISONE ACETATE 0.1 MG: 0.1 TABLET ORAL at 09:43

## 2025-01-28 ASSESSMENT — ACTIVITIES OF DAILY LIVING (ADL)
ADLS_ACUITY_SCORE: 73
ADLS_ACUITY_SCORE: 73
ADLS_ACUITY_SCORE: 79
ADLS_ACUITY_SCORE: 76
ADLS_ACUITY_SCORE: 73
ADLS_ACUITY_SCORE: 79
ADLS_ACUITY_SCORE: 76
ADLS_ACUITY_SCORE: 73
ADLS_ACUITY_SCORE: 79
ADLS_ACUITY_SCORE: 79
ADLS_ACUITY_SCORE: 76
ADLS_ACUITY_SCORE: 79
ADLS_ACUITY_SCORE: 73
ADLS_ACUITY_SCORE: 79
ADLS_ACUITY_SCORE: 75
ADLS_ACUITY_SCORE: 79
ADLS_ACUITY_SCORE: 76
ADLS_ACUITY_SCORE: 73
ADLS_ACUITY_SCORE: 76

## 2025-01-28 NOTE — PROGRESS NOTES
[facility]    Infectious Disease Progress Note    Date of Service : 01/28/2025     Assessment:  79 YM with metastatic renal cell ca on therapy until recently  Recent admission 1/10-1/17 with hypotension  Now readmitted with confusion, falls and found to have DVT/PE and  Gram negative bacteremia/sepsis with Enterobacter cloacae possibly of hepatobiliary source     Enterobacter bacteremia/sepsis  DVT / PE  Renal Cell CA metastatic  ROCKY  Encephalopathy     Recommendations:  S/p thrombectomy of IVC and L iliac clot  In view of US findings of cholelithiasis with gall bladder sludge and mildly distended CBD, consider GI evaluation and MRCP . However, at this time patient and family are contemplating goals of care  Meropenem to Ertapenem. Treat for 7-10 days. Transition to Bactrim DS 1 PO BID at discharge and treat until 2/4 with close monitoring of creatinine and potassium  Palliative care evaluation is planned for goals of care discussion    ID will sign off, please call us back as needed    Jazmyn Parks MD    Interval History   Patient was seen chart reviewed. Sedated, family at bedside giving history. Wife and daughter are meeting with palliative care to discuss goals of care  Remains afebrile, has no leukocytosis and is tolerating antibiotics without side effects  US shows cholelithiasis with gall bladder sludge and mildly distended CBD  Patient is s/p thrombectomy of IVC and L iliac clot.    Physical Exam   Temp: 98.1  F (36.7  C) Temp src: Axillary BP: 127/73 Pulse: 73   Resp: 17 SpO2: 94 % O2 Device: Nasal cannula Oxygen Delivery: 1 LPM  There were no vitals filed for this visit.  Vital Signs with Ranges  Temp:  [97.2  F (36.2  C)-98.2  F (36.8  C)] 98.1  F (36.7  C)  Pulse:  [73-86] 73  Resp:  [16-18] 17  BP: (126-136)/(73-92) 127/73  SpO2:  [94 %-100 %] 94 %    Constitutional: Asleep  Lungs: non labored breathing  Skin: No rash    Other:    Medications   Current Facility-Administered  Medications   Medication Dose Route Frequency Provider Last Rate Last Admin    heparin 25,000 units in 0.45% NaCl 250 mL ANTICOAGULANT infusion  0-5,000 Units/hr Intravenous Continuous Becky Hansen MD 18 mL/hr at 01/28/25 0440 1,800 Units/hr at 01/28/25 0440     Current Facility-Administered Medications   Medication Dose Route Frequency Provider Last Rate Last Admin    acetaminophen (TYLENOL) tablet 975 mg  975 mg Oral TID Burt Felton DO   975 mg at 01/27/25 1836    fludrocortisone (FLORINEF) tablet 0.1 mg  0.1 mg Oral Daily Becky Hansen MD   0.1 mg at 01/27/25 0939    HYDROmorphone (DILAUDID) injection 0.2 mg  0.2 mg Intravenous Once Burt Felton DO        meropenem (MERREM) 1 g vial to attach to  mL bag  1 g Intravenous Q8H Zev Shahid MD   1 g at 01/28/25 0131    methocarbamol (ROBAXIN) tablet 500 mg  500 mg Oral 4x Daily Ti Verde APRN CNP   500 mg at 01/27/25 1836    midodrine (PROAMATINE) tablet 10 mg  10 mg Oral TID w/meals Becky Hansen MD   10 mg at 01/27/25 1836    pantoprazole (PROTONIX) EC tablet 40 mg  40 mg Oral BID AC Becky Hansen MD   40 mg at 01/27/25 1836    pravastatin (PRAVACHOL) tablet 20 mg  20 mg Oral Daily Becky Hansen MD   20 mg at 01/27/25 0939    sodium chloride (PF) 0.9% PF flush 3 mL  3 mL Intracatheter Q8H Becky Hansen MD   3 mL at 01/28/25 0716       Data   All microbiology laboratory data reviewed.  Recent Labs   Lab Test 01/28/25  0647 01/27/25  0929 01/26/25  1016   WBC 4.5 4.7 5.1   HGB 10.7* 11.1* 10.5*   HCT 32.0* 33.5* 30.9*    102* 100   * 114* 98*     Recent Labs   Lab Test 01/28/25  0647 01/27/25  0929 01/26/25  1016   CR 1.10 1.22* 1.30*     Microbiology  01/26/2025 1828 01/27/2025 2031 Blood Culture Arm, Right [[id number]]   Blood from Arm, Right    Preliminary result Component Value   Culture No growth after 1 day P             01/26/2025 1824 01/27/2025 2031 Blood Culture Arm, Left  [[id number]]   Blood from Arm, Left    Preliminary result Component Value   Culture No growth after 1 day P             01/26/2025 1643 01/27/2025 1427 Urine Culture [[id number]]   Urine, Midstream    Final result Component Value   Culture No Growth             01/25/2025 0819 01/25/2025 1311 Respiratory Panel PCR [[id number]]    Swab from Nasopharyngeal    Final result Component Value   Adenovirus Not Detected   Coronavirus Not Detected   This test detects Coronavirus 229E, HKU1, NL63 and OC43 but does not distinguish between them. It does not detect MERS ( Respiratory Syndrome), SARS (Severe Acute Respiratory Syndrome) or 2019-nCoV (Novel 2019) Coronavirus.   Human Metapneumovirus Not Detected   Human Rhin/Enterovirus Not Detected   Influenza A Not Detected   Influenza A, H1 Not Detected   Influenza A 2009 H1N1 Not Detected   Influenza A, H3 Not Detected   Influenza B Not Detected   Parainfluenza Virus 1 Not Detected   Parainfluenza Virus 2 Not Detected   Parainfluenza Virus 3 Not Detected   Parainfluenza Virus 4 Not Detected   Respiratory Syncytial Virus A Not Detected   Respiratory Syncytial Virus B Not Detected   Chlamydia Pneumoniae Not Detected   Mycoplasma Pneumoniae Not Detected          01/25/2025 0508 01/27/2025 1016 Blood Culture Hand, Left [[id number]]    Blood from Hand, Left    Preliminary result Component Value   Culture No growth after 2 days P             01/25/2025 0504 01/27/2025 1038 Blood Culture Wrist, Right [[id number]]    (Abnormal)   Blood from Wrist, Right    Final result Component Value   Culture Positive on the 1st day of incubation Abnormal     Enterobacter cloacae complex Panic     1 of 2 bottles       Susceptibility     Enterobacter cloacae complex     MILLY     Ampicillin  Resistant 1     Ampicillin/ Sulbactam  Resistant 1     Cefazolin  Resistant *,1     Cefepime <=0.12 ug/mL Susceptible     Ceftazidime  Resistant     Ceftriaxone  Resistant     Ciprofloxacin  <=0.06 ug/mL Susceptible     Ertapenem <=0.12 ug/mL Susceptible *     Gentamicin <=1 ug/mL Susceptible     Levofloxacin <=0.12 ug/mL Susceptible     Meropenem <=0.25 ug/mL Susceptible     Nitrofurantoin 64 ug/mL Intermediate *     Piperacillin/Tazobactam  Resistant     Trimethoprim/Sulfamethoxazole <=1/19 ug/mL Susceptible                     Imaging  EXAM: US ABDOMEN LIMITED  LOCATION: [facility]  DATE: 1/27/2025     INDICATION: Enterobacter bacteremia and tender RUQ.  COMPARISON: None.  TECHNIQUE: Limited abdominal ultrasound.     FINDINGS:     GALLBLADDER: Negative sonographic Posada's sign. Distended gallbladder along with cholelithiasis and potential gallbladder sludge. Gallbladder wall is 3 mm.     BILE DUCTS: No intrahepatic visible biliary ductal dilatation. The common duct measures 7 mm.     LIVER: Partially obscured but no acute abnormality.     RIGHT KIDNEY: Absent.     PANCREAS: The visualized portions are normal.     No ascites.                                                                      IMPRESSION:  1.  Cholelithiasis and gallbladder sludge is suggested. Mild gallbladder wall thickening. Negative sonographic Posada's sign.  2.  Mildly distended common duct measuring 7 mm.      EXAM: CT THORACIC SPINE W/O CONTRAST, CT LUMBAR SPINE W/O CONTRAST  LOCATION: [facility]  DATE: 1/24/2025     INDICATION: trauma  COMPARISON: None.  TECHNIQUE:  1) Routine CT Thoracic Spine without IV contrast. Multiplanar reformats. Dose reduction techniques were used.   2) Routine CT Lumbar Spine without IV contrast. Multiplanar reformats. Dose reduction techniques were used.      FINDINGS:     THORACIC SPINE CT:  VERTEBRA: Osteopenia. Acute burst fracture of T12 with approximately 20% height loss. Minimal retropulsion of the fractured vertebral body without associated spinal canal stenosis. Unchanged Schmorl's node-like endplate indentation along the inferior    endplate of T9 with associated chronic vertebral body height loss. Stepwise mild anterolisthesis T2-T4. No fracture or posttraumatic subluxation.      CANAL/FORAMINA: No canal or neural foraminal stenosis.     PARASPINAL: No extraspinal abnormality.     LUMBAR SPINE CT:  VERTEBRA: Normal vertebral body heights. Left convex scoliosis with the apex at L3-L4 minimal retrolisthesis at L3-L4 and minimal anterolisthesis at L5-S1. No fracture or posttraumatic subluxation.      CANAL/FORAMINA: Multilevel disc height loss, osteophyte formation and facet arthropathy without high-grade spinal or neural foraminal stenosis. Moderate right neural foraminal stenosis at L2-L3 and L3-L4 and mild to left neural foraminal stenosis at   L4-L5 and L5-S1. Multilevel Schmorl's node-like endplate indentations.     PARASPINAL: No extraspinal abnormality.                                                                      IMPRESSION:  THORACIC SPINE CT:  1.  Acute burst fracture of T12 with approximately 20% height loss. Mild retropulsion of the fractured vertebral body without associated spinal canal stenosis.  2.  No high-grade spinal canal or neural foraminal stenosis.     LUMBAR SPINE CT:  1.  No fracture or posttraumatic subluxation.  2.  Multilevel lumbar spondylosis without high-grade spinal canal stenosis. Multilevel neural foraminal stenosis

## 2025-01-28 NOTE — PROGRESS NOTES
A&Ox1-2. Pt was able to have a meaningful conversation this morning and was answering most questions approprietly. Later no he became tired and slept off and on throughout the shift. Family at bedside providing input on cares. Palliative goals of care meeting occurred today. See palliative note.     Pt had a thrombectomy on 1/26. Pt continued on a heparin gtt @ 1800 units/hr.

## 2025-01-28 NOTE — PLAN OF CARE
Goal Outcome Evaluation:      Shift Summary     Admitting Diagnosis: Acute encephalopathy   T12 burst fracture   Deep vein thrombosis (DVT) of femoral vein, unspecified chronicity, unspecified laterality (H)  Unwitnessed fall   Pulmonary embolism, other, unspecified chronicity, unspecified whether acute cor pulmonale present   Vitals:  VSS on 2L O2 via NC  Pain: PRN Dilaudid  A&Ox:1-2  Voiding :Incontinent   Mobility: NOOB this shift  Dressing:Thrombectomy site CDI     Orders Placed This Encounter      Regular Diet Adult        Plan: Pain management, turn and repo. Continue with plan of care.

## 2025-01-28 NOTE — PROGRESS NOTES
[facility]    Medicine Progress Note - Hospitalist Service    Date of Admission:  1/24/2025    Assessment & Plan   Ti Nickerson is a 79 year old male with a complex history of metastatic renal cancer, atrial fibrillation, orthostatic hypotension on midodrine/Florinef, CKD stage IIIb, CAD, acute metabolic encephalopathy, and history of GI bleeding admitted 1/24/2025 after mechanical fall with new head laceration, PE, DVT, Burst fracture T12. Underwent IR thrombectomy on 1/26.       Goals of Care  Discussed patient's overall decline including multiple readmissions for orthostatic hypotension, GI bleeding, falls now with T12 fracture, known metastatic cancer, no needing anticoagulation.,  Diagnosed with a large venous thrombus and has had a history of significant GI bleeding in the setting of anticoagulation related to metastatic renal cancer to small bowel, now status post radiation.  Discussed at length prior to anticoagulation, family acknowledges the risks of anticoagulation and pursued thrombectomy.    *Palliative care evaluated patient on 1/28, goals of care ongoing but currently family would like to continue current goals and full code status until the patient is able to participate more in discussions.  Discussed possible need for MRCP with GI, patient's wife states that MRIs are very stressful for time and she does not think this would be feasible without sedation.   - Palliative care following, appreciate recommendations  - Continue to address goals of care daily    Iliac Vein thrombosis and IVC extension   Acute segmental Pulmonary Emboli (RLL, CELESTE)   Acute hypoxic respiratory failure   Thrombocytopenia   Chronic anemia  History of blood loss anemia 2/2 to cancer while on anti-coagulation previously  Suspected clot provoked in setting of metastatic cancer and overall deconditioned/immobile state. IR consulted on 1/24 to consider thrombectomy of clot in iliac vein extending to  IVC given overall size. After risk benefit discussion, patient and family elected to proceed with treatment of Iliac vein and IVC thrombus. Mildly hypoxic requiring 1-2L intermittently likely seconary to pulmonary emboli. Right ventricular dilation seen on repeat limited TTE which is only mildly worse from complete echo on 1/11. No CT imaging during 1/10 admission so the acuity of Iliac and IVC thrombus is unclear. Currently suspect most of his pain is related to new thoracic spine burst fracture.  - Continue scheduled tylenol, prn IV dilaudid to 0.2 q4h prn, start prn robaxin, start prn lidocaine patch, prn seroquel for anxiety/agitation  - S/p Thrombectomy on 1/26  - Per IR- given narrowing of IVC near nephrectomy - not IVC filter candidate   - Repeat hemoglobin in AM, closely monitor for signs of GI bleeding given history  - continue heparin gtt, plan to switch to eliquis per heme, tentatively 1/29    Sepsis secondary to Enterobacter Bacteremia   Hyperbilirubinemia   Patient febrile on admission with slight leukocytosis. Blood cultures grew Enterobacter species in one bottle. CT CAP on admission did not show any obvious sign of infection in lungs (not completely imaged) or abdomen. Urinalysis not consistent with infection. ID consulted, recommended RUQ US which showed cholelithiasis/sludge and mild thickening, negative sonographic Posada's and mildly distended CBD 7mm, normal Alkphos but mildly elevated bilirubin.   - ID consulted/signed off  - Continue   - Transition meropenem to ertapenem- treat for 7-10 days and Transition to Bactrim DS 1 PO BID at discharge and treat until 2/4 with close monitoring of creatinine and potassium   - GI consulted Re:further workup for possible biliary obstruction     Toxic Metabolic Encephalopathy  Suspect delirium with waxing/waning status, nonfocal exam, intermittently confused and disoriented.  Seroquel started as needed, with improvement.  Patient also with intermittent  somnolence, likely medication related, will try to limit sedating medications.  - Seroquel 12.5 mg BID prn for anxiety/agitation   - Treat infection, pain as above    Recurrent falls with orthostatics  Orthostatic hypotension    Patient with a known history of recurrent falls and orthostatic hypotension. His wife reports he has had a long history of orthostatic hypotension even prior to last admission. Recently hospitalized 1/10 through 1/17 on the medicine service for orthostatic hypotension.  He was started on midodrine 10 mg 3 times a day.  Decision to hold cabozanitinib for 2 weeks.  Liberalized salt.  Started on Florinef 0.1 mg po daily (patient was seen by cardiology, neurology, hematology).   - Continue midodrine and florinef   - When able, assess orthostatics    Imaging:  CT head no evidence for acute intracranial process.  Brain atrophy with microvascular changes.  Cervical spine no acute fracture posttraumatic subluxation.  Multilevel cervical spondylosis without high-grade spinal stenosis.  Multilevel neuroforaminal stenosis  Thoracic spine with acute burst T12 fracture is noted:  Mild retropulsion of the fractured vertebral body without associated spinal canal stenosis. No high-grade spinal canal or neural foraminal stenosis.  Lumbar spine no fracture or posttraumatic subluxation.Multilevel lumbar spondylosis without high-grade spinal canal stenosis. Multilevel neural foraminal stenosis      Atrial fibrillation:   EKG Atrial fibrillation, LAD. NSST changes. Stopped anticoagulation in past given bleeding and falls. Some discussion in past of potential Watchman      Head laceration   - Patient seen in the clinic on 1/22/2025 with a laceration 7 to 8 cm in length with staples placed in the office.  And sutures.  Tetanus booster within 10 years.  - Sutures placed 1/22 reported to remove in 8 days on 1/30     Acute Burst Fracture T12 with retropulsion  CT chest abdomen pelvis given falls showing an acute T12  fracture. T12 CT spine showing acute burst fracture of T12 with 20% height loss.  Mild retropulsion of the fracture vertebral body without associated spinal canal stenosis. MRI poor quality but reviewed with neurosurgery and lg to initiate heparin  - Neurosurgery follow-up, planning for brace and non-operative management of T12 fracture  - Upright xrays in brace pending- unable to obtain on 1/28 with confusion  - Pain management as above - consider pain team consult if needed     Metastatic Renal cell cancer diagnosed in 2019   S/p Right radical nephrecctomy  and IVC thrombectomy 2019   On surveillance with recurrence 1/2021 with liver lesion.    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 Biopsy with mets renal cell.   - 10/2023 partial duodenectomy at [facility]   - 1/2024 EUS and resection of gastric mass with complete endoscopic removal with met RCC    - 7/2024 admit recurrent GI bleed. Local tumor recurrence >> required transfusion, stopped anticoagulation  - 7/2024 Radiation for bleeding control.   - Daily CBC     CKD 3  Patient with creatinine elevated to 1.59. 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 Improved to 4.4 with 1g IV calcium gluconate 1/27.   - Monitor         Diet: Regular Diet Adult    DVT Prophylaxis: Heparin infusion    Dillard Catheter: Not present  Lines: None     Cardiac Monitoring: None  Code Status: Full Code      Clinically Significant Risk Factors          # Hyperchloremia: Highest Cl = 108 mmol/L in last 2 days, will monitor as  "appropriate      # Hypocalcemia: Lowest iCa = 4.1 mg/dL in last 2 days, will monitor and replace as appropriate     # Hypoalbuminemia: Lowest albumin = 2.4 g/dL at 1/27/2025  9:29 AM, will monitor as appropriate   # Thrombocytopenia: Lowest platelets = 98 in last 2 days, will monitor for bleeding   # Hypertension: Noted on problem list            # Overweight: Estimated body mass index is 28.89 kg/m  as calculated from the following:    Height as of 1/10/25: 1.93 m (6' 4\").    Weight as of 1/14/25: 107.6 kg (237 lb 4.8 oz)., PRESENT ON ADMISSION       # Financial/Environmental Concerns: none         Social Drivers of Health    Physical Activity: Insufficiently Active (5/28/2024)    Exercise Vital Sign     Days of Exercise per Week: 2 days     Minutes of Exercise per Session: 30 min   Social Connections: Unknown (5/28/2024)    Social Connection and Isolation Panel [NHANES]     Frequency of Social Gatherings with Friends and Family: Twice a week          Disposition Plan     Medically Ready for Discharge: Anticipated in 2-4 Days             John Rain MD  Hospitalist Service  [facility]  Securely message with [facility] (more info)  Text page via [facility] Paging/Directory   ______________________________________________________________________    Interval History   Patient remains hemodynamically stable, afebrile on 1 to 3 L nasal cannula.  Patient sleeping most of the day, however awoke to have bowel movement, reports mild lower back pain improved from yesterday, denies chest pain, shortness of breath or other concerns.    Several discussions held at bedside with family including wife, son-in-law, and brother discussing goals of care, his wife is wondering if we are putting him through undue stress, and what he would want.  She would like him to be more involved in this discussion, however she does think that another MRI would be very difficult for him and distressing.  She does not think he " would want to be full code given the current situation of things, however she wants him to decide this if he is able to in the coming days.  She asked about hospice, and is wondering more about this.    Physical Exam   Vital Signs: Temp: 98.1  F (36.7  C) Temp src: Axillary BP: 127/73 Pulse: 73   Resp: 17 SpO2: 94 % O2 Device: Nasal cannula Oxygen Delivery: 1 LPM  Weight: 0 lbs 0 oz    Constitutional: Intermittently sleeping, but does maintain alertness when awake, NAD  HEENT: normocephalic, anicteric sclerae, MMM   Pulmonary: no increased work of breathing on 1 L nasal cannula, lungs clear to auscultation bilaterally without wheezes or rales   Cardiovascular:  RRR, normal S1 and S2, no murmur appreciated   GI: BS+, soft, non-tender, non-distended, without guarding or rigidity  Skin: warm, dry  MSK: no peripheral edema bilaterally, left groin site soft, CDI  Neuro: AAOx person, place, year, 5/5 strength bilateral upper and lower extremities, CNII-XII grossly intact      Medical Decision Making       60 MINUTES SPENT BY ME on the date of service doing chart review, history, exam, documentation & further activities per the note.      Data   ------------------------- PAST 24 HR DATA REVIEWED -----------------------------------------------    I have personally reviewed the following data over the past 24 hrs:    4.5  \   10.7 (L)   / 120 (L)     139 108 (H) 32.8 (H) /  86   3.7 21 (L) 1.22 (H) \     ALT: 37 AST: 78 (H) AP: 118 TBILI: 1.4 (H)   ALB: 2.4 (L) TOT PROTEIN: 5.5 (L) LIPASE: N/A     Procal: N/A CRP: N/A Lactic Acid: 1.0         Imaging results reviewed over the past 24 hrs:   Recent Results (from the past 24 hours)   US Abdomen Limited    Narrative    EXAM: US ABDOMEN LIMITED  LOCATION: [facility]  DATE: 1/27/2025    INDICATION: Enterobacter bacteremia and tender RUQ.  COMPARISON: None.  TECHNIQUE: Limited abdominal ultrasound.    FINDINGS:    GALLBLADDER: Negative sonographic  Posada's sign. Distended gallbladder along with cholelithiasis and potential gallbladder sludge. Gallbladder wall is 3 mm.    BILE DUCTS: No intrahepatic visible biliary ductal dilatation. The common duct measures 7 mm.    LIVER: Partially obscured but no acute abnormality.    RIGHT KIDNEY: Absent.    PANCREAS: The visualized portions are normal.    No ascites.      Impression    IMPRESSION:  1.  Cholelithiasis and gallbladder sludge is suggested. Mild gallbladder wall thickening. Negative sonographic Posada's sign.  2.  Mildly distended common duct measuring 7 mm.

## 2025-01-28 NOTE — PROGRESS NOTES
1/27/25; [id number]    A&O X1; bedrest w/ T&R; pain managed with scheduled and PRN; Hep running @ 18mL/hr w/ Hep Xa recheck in AM; plan for goals of care discussion to determine discharge disposition; wounds to back under mepi, look like bruising.

## 2025-01-28 NOTE — CONSULTS
Quick GI consult:    Consulted for:  Enterobacter bacteremia- ID rec GI input for possible biliary obstruction as source. Ongoing Goals of care but family wants full cares for now but is hesitant about any MRIs.    79 year old male with a complex history of metastatic renal cancer, atrial fibrillation, orthostatic hypotension on midodrine/Florinef, CKD stage IIIb, CAD, acute metabolic encephalopathy, and history of GI bleeding admitted 1/24/2025 after mechanical fall with new head laceration, PE, DVT, Burst fracture T12. Underwent IR thrombectomy on 1/26.     US abd:   IMPRESSION:  1.  Cholelithiasis and gallbladder sludge is suggested. Mild gallbladder wall thickening. Negative sonographic Posada's sign.  2.  Mildly distended common duct measuring 7 mm.    Liver Function Studies -   Recent Labs   Lab Test 01/28/25  0647   PROTTOTAL 5.1*   ALBUMIN 2.3*   BILITOTAL 1.3*   ALKPHOS 114   AST 67*   ALT 38     Recs:  Continue with supportive cares; follow ID recs for antibiotics.  Full consult tomorrow.    Devin Coburn MD  [facility]  Cell: [phone]  Office Phone: [phone]  Office Fax: [phone]

## 2025-01-28 NOTE — PROGRESS NOTES
Neurosurgery quick note:    Wife does not want patient to get standing x-ray.  Patient believed to be too confused to tolerate sitting x-ray.  Patient having discussions with palliative care.  And reevaluate tomorrow.    Remington Greene, DNP

## 2025-01-28 NOTE — PROGRESS NOTES
Paged NS  Pt has been sleeping off and on throughout the shift. He just woke up and is more confused. Wife states that pt has bad orthostatic hypotension and passes out whenever he tried to stand. She does not feel it will be safe for him to do a standing Xray.     She also is against another MRI.

## 2025-01-28 NOTE — PROGRESS NOTES
[facility]ology Progress Note          Assessment and Plan:     Primary Oncologist: Dr. Glover     Pulmonary embolism / DVT   - Acute segmental pulmonary emboli in the right lower lobe and left upper lobe.  There is dilatation of right sided heart chambers which appeared chronic.  Acute nonocclusive DVT left common iliac vein which extends into the lower inferior vena cava.  - per IR, given narrowing of IVC near nephrectomy - not IVC filter candidate   - thrombectomy 1/26/25 with successful clearing out the IVC and L Iliac clot.      2. Recurrent syncope with hypotension   - On midodrine  - No improvement since holding the cabozantanib   - Also taking florinef      3. Clear cell renal cell carcinoma diagnosed in 10/2019  - pT3b N0 M0 disease at presentation  - followed on surveillance until 1/2021 at which time liver metastasis identified and treated with cryoablation  - disease recurrence in 9/2023 presenting with GI bleeding and treated with partial duodenectomy  -disease progression in 1/2024 treated with partia gastrectomy  - Progressed on Ipi/Nivo 6/25/2024   - s/p radiation due to duodenal bleeding ending 8/06/2024   - Started Cabozantanib 20mg 9/26/24.   - Most recent CT 12/30/24 with decreased retroperitoneal lymphadenopathy, stable R hepatic lobe mass, duodenal mass measuring up to 2.3cm was not well seen on the non contrast study  - Cabozantanib held since 1/15/25   - Continue to hold Cabozantinib     4. 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 Acute burst fracture T12 with retropulsion   - Acute T12 fracture 2/2 fall   - MRI thoracic spine 1/24/2025 showed T12 burst fracture with no evidence of canal compromise  - Neurosurgery team onboard and noted plan for bracing     PLAN  - Continue to hold Cabozantinib   - on heparin gtt since 1/24 and tolerating anticoagulation well so far with  no bleeding issues   - H&H stable   - continue heparin gtt. Discussed with Dr. Esclaante. Would recommend Eliquis when ready to transition to oral.      Family at the bedside and participated in our discussion. Edgar is currently sleepy and a bit confused. Today, we briefly touched on Edgar's performance status.  His wife is trying to balance recommended medical interventions and his quality of life.  They are meeting with palliative care today.  With his declining performance status, Edgar would certainly be a candidate for hospice if they wish to transition away from a restorative pathway.   Pts primary Oncologist, Dr. Glover was updated and agrees with this plan.       NANCY Flores, AOCNP  Nurse Practitioner  [facility]  [phone]          Interval History:     Comfortable at rest but painful with any significant movement. Unable to try TLSO brace yet.  Sleepy today.               Review of Systems:     The 5 point Review of Systems is negative other than noted in the HPI                Medications:   Scheduled Medications  Current Facility-Administered Medications   Medication Dose Route Frequency Provider Last Rate Last Admin    acetaminophen (TYLENOL) tablet 975 mg  975 mg Oral TID Burt Felton DO   975 mg at 01/28/25 0943    ertapenem (INVanz) 1 g vial to attach to  mL bag  1 g Intravenous Q24H Jazmyn Parks MD   1 g at 01/28/25 1015    fludrocortisone (FLORINEF) tablet 0.1 mg  0.1 mg Oral Daily Becky Hansen MD   0.1 mg at 01/28/25 0943    HYDROmorphone (DILAUDID) injection 0.2 mg  0.2 mg Intravenous Once Burt Felton DO        methocarbamol (ROBAXIN) tablet 500 mg  500 mg Oral 4x Daily Ti Verde APRN CNP   500 mg at 01/28/25 0943    midodrine (PROAMATINE) tablet 10 mg  10 mg Oral TID w/meals Becky Hansen MD   10 mg at 01/28/25 0943    pantoprazole (PROTONIX) EC tablet 40 mg  40 mg Oral BID AC Becky Hansen MD   40 mg at 01/27/25 1836    pravastatin  (PRAVACHOL) tablet 20 mg  20 mg Oral Daily Becky Hansen MD   20 mg at 01/28/25 0943    sodium chloride (PF) 0.9% PF flush 3 mL  3 mL Intracatheter Q8H Becky Hansen MD   3 mL at 01/28/25 0716     PRN Medications  Current Facility-Administered Medications   Medication Dose Route Frequency Provider Last Rate Last Admin    acetaminophen (TYLENOL) tablet 650 mg  650 mg Oral Q4H PRN Zev Kennedy MD   650 mg at 01/27/25 0651    Or    acetaminophen (TYLENOL) Suppository 650 mg  650 mg Rectal Q4H PRN Zev Kennedy MD        heparin 2 Units/mL 0.9% NaCl (1000 mL)  1 Bag TABLE SOLN Q5 Min PRN Nico Mendieta MD   4 Bag at 01/26/25 1351    HYDROmorphone (DILAUDID) injection 0.2-0.4 mg  0.2-0.4 mg Intravenous Q2H PRN Burt Felton DO   0.2 mg at 01/28/25 0222    Lidocaine (LIDOCARE) 4 % Patch 2 patch  2 patch Transdermal Q24H PRN Burt Felton DO        lidocaine (LMX4) cream   Topical Q1H PRN Becky Hansen MD        lidocaine 1 % 0.1-1 mL  0.1-1 mL Other Q1H PRN Becky Hansen MD        naloxone (NARCAN) injection 0.2 mg  0.2 mg Intravenous Q2 Min PRN Becky Hansen MD        Or    naloxone (NARCAN) injection 0.4 mg  0.4 mg Intravenous Q2 Min PRN Becky Hansen MD        Or    naloxone (NARCAN) injection 0.2 mg  0.2 mg Intramuscular Q2 Min PRN Becky Hansen MD        Or    naloxone (NARCAN) injection 0.4 mg  0.4 mg Intramuscular Q2 Min PRN Becky Hansen MD        ondansetron (ZOFRAN ODT) ODT tab 4 mg  4 mg Oral Q6H PRN Becky Hansen MD        Or    ondansetron (ZOFRAN) injection 4 mg  4 mg Intravenous Q6H PRN Becky Hansen MD        QUEtiapine (SEROquel) half-tab 12.5 mg  12.5 mg Oral TID PRN Burt Felton DO   12.5 mg at 01/28/25 0715    senna-docusate (SENOKOT-S/PERICOLACE) 8.6-50 MG per tablet 1 tablet  1 tablet Oral BID PRN Becky Hansen MD        Or    senna-docusate (SENOKOT-S/PERICOLACE) 8.6-50 MG per tablet 2 tablet  2 tablet Oral BID PRN  Becky Hansen MD        sodium chloride (PF) 0.9% PF flush 3 mL  3 mL Intracatheter q1 min prn Becky Hansen MD   3 mL at 01/27/25 1653                  Physical Exam:   Vitals were reviewed  Blood pressure 127/73, pulse 73, temperature 98.1  F (36.7  C), temperature source Axillary, resp. rate 17, SpO2 94%.  Wt Readings from Last 4 Encounters:   01/14/25 107.6 kg (237 lb 4.8 oz)   12/12/24 114.3 kg (252 lb)   09/05/24 117 kg (258 lb)   08/07/24 121.3 kg (267 lb 8 oz)       No intake/output data recorded.                 Data:   All laboratory data and imaging studies reviewed.    CMP  Recent Labs   Lab 01/28/25  0647 01/27/25  0929 01/26/25  2306 01/26/25  1016 01/25/25  1741 01/25/25  0506 01/24/25  1755 01/24/25  0904    139  --  136  --  134*  --  135   POTASSIUM 3.9 3.7  --  3.8  --  4.2  --  4.3   CHLORIDE 110* 108*  --  106  --  102  --  100   CO2 24 21*  --  19*  --  19*  --  22   ANIONGAP 6* 10  --  11  --  13  --  13   GLC 99 86 91 82   < > 110*   < > 109*   BUN 30.3* 32.8*  --  32.1*  --  31.1*  --  30.0*   CR 1.10 1.22*  --  1.30*  --  1.49*  --  1.59*   GFRESTIMATED 69 61  --  56*  --  48*  --  44*   DOMINICK 7.7* 7.6*  --  7.0*  --  7.6*  --  8.4*   PROTTOTAL  --  5.5*  --   --   --  5.6*  --  5.8*   ALBUMIN  --  2.4*  --   --   --  2.7*  --  3.1*   BILITOTAL  --  1.4*  --   --   --  2.2*  --  2.5*   ALKPHOS  --  118  --   --   --  125  --  121   AST  --  78*  --   --   --  30  --  28   ALT  --  37  --   --   --  20  --  22    < > = values in this interval not displayed.     CBC  Recent Labs   Lab 01/28/25  0647 01/27/25  0929 01/26/25  1016 01/25/25  0507   WBC 4.5 4.7 5.1 8.2   RBC 3.20* 3.30* 3.10* 3.29*   HGB 10.7* 11.1* 10.5* 11.2*   HCT 32.0* 33.5* 30.9* 32.3*    102* 100 98   MCH 33.4* 33.6* 33.9* 34.0*   MCHC 33.4 33.1 34.0 34.7   RDW 15.7* 15.7* 15.8* 15.5*   * 114* 98* 121*     INRNo lab results found in last 7 days.        Karan CRUZ, RADHAP  Nurse  Practitioner  [facility]  [phone]

## 2025-01-28 NOTE — CONSULTS
Palliative Care Consultation Note  [facility]      Patient: Ti Nickerson  Date of Admission:  1/24/2025    Requesting Clinician / Team: Dr Felton/Medicine  Reason for consult: Goals of care     Recommendations & Counseling     GOALS OF CARE:   Life-prolonging without limits   Reviewed goals of care today with wife and brother. Goal is to continue current cares at this time but understands that hospice focused care will likely be needed in the future. For the short term, much will depend on Edgar's mental status, strength and ability to work with PT/OT and wear his brace. Discussed benefits vs burdens of medical interventions.   Valery made the decision to hold off on an MRI to assess for identifying the location of possible infection. She understands that the infection could worsen and lead to his worsening condition.  Valery is looking into additional help at their assisted living -[facility] and also what support is available if Edgar should go on hospice.     ADVANCE CARE PLANNING:  No health care directive on file. Per system policy, Surrogate Decision-makers for Patients With Diminished Decision-making Capacity offers guidance on possible decision-makers. Wife Valery has been identified as a surrogate decision maker.   There is no POLST form on file, defer to patient and/or next of kin for decisions   Code status: Full Code    MEDICAL MANAGEMENT:   We are not actively managing symptoms at this time.    #Delirium- multifactorial, related to infections, medications, environment, disomfort/pain.  -Maintain day/night routines and orientation.  -Avoid medications such as steroids, benzodiazepines and anticholinergics.   -Optimize hydration/nutrition, and sleep.  -Utilize sensory aids to maintain orientation such as eye glasses, hearing aids or pocket talkers.  -Calm environment, quiet/reassuring voices, orienting cues, minimized noise/night disruptions, when possible.   -Maximize mobility and  "prevent/treat pain.    PSYCHOSOCIAL/SPIRITUAL SUPPORT:  Family wife Valery; two daughters and a son; 8 grandchildren.  [place] community: [employer]     Palliative Care will continue to follow. Thank you for the consult and allowing us to aid in the care of Ti Nickerson.    Reviewed patient case with hospitalist Dr Briseyda Cooley, CNS  Securely message with [email or web address] (more info)  Text page via [facility] Paging/Directory   \"Palliative [facility] [place]\"     Palliative Summary/HPI     Ti Nickerson is a 79 year old male with a past medical history of renal carcinoma diagnosed 2019 with mets to duodenum, liver, lungs, GI bleeds, orthostatic hypotension, a-fib, CDK3, CAD, metabolic encephalopathy, recurrent falls, who presented from his TCU on 1/24/25 after experiencing a fall with head laceration.  Upon further evaluation he was found to have an acute burst fracture of T12 with retropulsion, PE, DVT.     He underwent a thrombectomy on 1/26 with successful clearing out IVC and left iliac clot and placed back on blood thinners    Hospitalizations:  1/10-17: Orthostatic hypotension and recurrent syncope.    Today, the patient was seen for:  Goals of care    Palliative Care Summary:   Met with Edgar, his wife Valery, and his brother.  Egdar was very fatigued and unable to participate in a discussion with us today.  Introduced the role of palliative care as an interdisciplinary team that cares for patients with serious illness to help support symptom management, communication, coping for patients and their families as well as support with medical decision making.    Prognosis, Goals, & Planning:    Functional Status just prior to this current hospitalization:  has been hospitalized 1 times in the past 6 months for recurrent falls. Prior to admission patient has moved to TCU from home after last hospital stay for higher level of daily care needs. There has been a decline in daily function including repeat falls, " weakness.   Outpatient Palliative Performance Score (PPS) 50%  Extensive disease. Normal or reduced intake; normal LOC or confusion; little ambulation (mainly sit/lie), ADLs w/much assistance, unable to do any work.    ECOG3 (Capable of only limited self-care; needs help with ADLs; in bed/chair >50% of waking hours)    Prognosis, Goals, and/or Advance Care Planning:  We discussed general treatment options (full/restorative, selective/conservatives, and comfort only/hospice). We then discussed how these specifically apply to Edgar and his medical conditions Discussed what continuing restorative/life-prolonging care entails, including continued (re)admissions to the hospital, continuing with preventative and primary care, seeing disease/organ specific specialty consultations for medical treatments in hopes to prolong life for as long as possible.    Valery stated that Edgar has been through a lot over the last several years with his cancer diagnosis and treatments.  She turned down having an MRI for Edgar today due to he becomes uncomfortable during the process and she felt like he was already on antibiotics.  He understands that an infection could worsen and take his life and this is a risk she is willing to take.  We discussed the risk versus benefits of medical treatments and she is starting to look at Edgar's medical care through this lens.  Understands that Edgar's medical condition with his cancer, PE/DVT and anticoagulation is tenuous and there is a high risk for complications.   Education provided on transition to comfort-focused goals of care when the burdens of treatments become too great or are not in the patient's best interest.  Comfort care focus includes transition from restorative to priority of symptom management such as pain, anxiety, nausea, and shortness of breath.  There would be discontinuation of labs, diagnostic testing, hospitalizations and other interventions that do not directly promote comfort.   Discussed that this process is very purposeful in terms of ensuring patient is as comfortable as possible and that family wishes are honored.  Education provided regarding hospice philosophy, prognostic,and eligibility criteria. Discussed what services are provided and those that are not,  Discussed common misconceptions. We explored the various disposition options where they can receive hospice care (home, residential hospice homes, LTC with hospice) including subsequent  familial implications pending and how much assistance the patient needs with activities of daily living.   Valery wants to continue current cares for now but is considering what they would want to do if Edgar's condition should worsen.  She understands that wearing the brace for his back fracture could be uncomfortable and is concerned that Edgar would not want to wear the brace.  Much will depend on his ability to tolerate these treatments.  She wants to give him a chance to see how he does but is also going to speak with their assisted living to see what kind of support is available if he were to be on hospice.  Knows at some point that he will transition to hospice but not sure at what point.  Code Status was addressed today:   Yes, We discussed potential risks and rationale of attempting cardiac resuscitation, intubation, and mechanical ventilation.  We also discussed probability of survival as well as quality of life implications.  Based on this discussion, patient or surrogate response/decision: Valery would like to leave patient at full CODE STATUS at this time and when he is more alert we will have the discussion with him      Patient's decision making preferences: unable to assess        Patient has decision-making capacity today for complex decisions:Questionable          Coping, Meaning, & Spirituality:   Mood, coping, and/or meaning in the context of serious illness were addressed today: Yes    Social:   Living situation:lives with  significant other/spouse  Important relationships/caregivers:  Family wife Valery; two daughters and a son; 8 grandchildren.    Medications:  I have reviewed this patient's medication profile and medications from this hospitalization. Notable medications:     Noted scheduled meds are:  Current Facility-Administered Medications   Medication Dose Route Frequency Provider Last Rate Last Admin    acetaminophen (TYLENOL) tablet 975 mg  975 mg Oral TID Burt Felton DO   975 mg at 01/28/25 0943    ertapenem (INVanz) 1 g vial to attach to  mL bag  1 g Intravenous Q24H Jazmyn Parks MD   1 g at 01/28/25 1015    fludrocortisone (FLORINEF) tablet 0.1 mg  0.1 mg Oral Daily Becky Hansen MD   0.1 mg at 01/28/25 0943    HYDROmorphone (DILAUDID) injection 0.2 mg  0.2 mg Intravenous Once Burt Felton DO        methocarbamol (ROBAXIN) tablet 500 mg  500 mg Oral 4x Daily Ti Verde APRN CNP   500 mg at 01/28/25 0943    midodrine (PROAMATINE) tablet 10 mg  10 mg Oral TID w/meals Becky Hansen MD   10 mg at 01/28/25 0943    pantoprazole (PROTONIX) EC tablet 40 mg  40 mg Oral BID AC Becky Hansen MD   40 mg at 01/27/25 1836    pravastatin (PRAVACHOL) tablet 20 mg  20 mg Oral Daily Becky Hansen MD   20 mg at 01/28/25 0943    sodium chloride (PF) 0.9% PF flush 3 mL  3 mL Intracatheter Q8H Becky Hansen MD   3 mL at 01/28/25 0716       Noted PRN meds are:  Current Facility-Administered Medications   Medication Dose Route Frequency Provider Last Rate Last Admin    acetaminophen (TYLENOL) tablet 650 mg  650 mg Oral Q4H PRN Zev Kennedy MD   650 mg at 01/27/25 0651    Or    acetaminophen (TYLENOL) Suppository 650 mg  650 mg Rectal Q4H PRN Zev Kennedy MD        heparin 2 Units/mL 0.9% NaCl (1000 mL)  1 Bag TABLE SOLN Q5 Min PRN Nico Mendieta MD   4 Bag at 01/26/25 1353    HYDROmorphone (DILAUDID) injection 0.2-0.4 mg  0.2-0.4 mg Intravenous Q2H PRN Burt Felton,    0.2  mg at 01/28/25 0222    Lidocaine (LIDOCARE) 4 % Patch 2 patch  2 patch Transdermal Q24H PRN Burt Felton DO        lidocaine (LMX4) cream   Topical Q1H PRN Becky Hansen MD        lidocaine 1 % 0.1-1 mL  0.1-1 mL Other Q1H PRN Becky Hansen MD        naloxone (NARCAN) injection 0.2 mg  0.2 mg Intravenous Q2 Min PRN Becky Hansen MD        Or    naloxone (NARCAN) injection 0.4 mg  0.4 mg Intravenous Q2 Min PRN Becky Hansen MD        Or    naloxone (NARCAN) injection 0.2 mg  0.2 mg Intramuscular Q2 Min PRBecky Cedillo MD        Or    naloxone (NARCAN) injection 0.4 mg  0.4 mg Intramuscular Q2 Min PRBecky Cedillo MD        ondansetron (ZOFRAN ODT) ODT tab 4 mg  4 mg Oral Q6H PRBecky Cedillo MD        Or    ondansetron (ZOFRAN) injection 4 mg  4 mg Intravenous Q6H PRBecky Cedillo MD        QUEtiapine (SEROquel) half-tab 12.5 mg  12.5 mg Oral TID PRN Burt Felton DO   12.5 mg at 01/28/25 0715    senna-docusate (SENOKOT-S/PERICOLACE) 8.6-50 MG per tablet 1 tablet  1 tablet Oral BID PRBecky Cedillo MD        Or    senna-docusate (SENOKOT-S/PERICOLACE) 8.6-50 MG per tablet 2 tablet  2 tablet Oral BID PRN Becky Hansen MD        sodium chloride (PF) 0.9% PF flush 3 mL  3 mL Intracatheter q1 min prBecky Cedillo MD   3 mL at 01/27/25 1653     ROS:  Comprehensive ROS is unobtainable due to mental status.    PHYSICAL EXAM:  Vital Signs: Temp: 98.1  F (36.7  C) Temp src: Axillary BP: 127/73 Pulse: 73   Resp: 17 SpO2: 94 % O2 Device: Nasal cannula Oxygen Delivery: 1 LPM  Weight: 0 lbs 0 oz  Wt Readings from Last 4 Encounters:   01/14/25 107.6 kg (237 lb 4.8 oz)   12/12/24 114.3 kg (252 lb)   09/05/24 117 kg (258 lb)   08/07/24 121.3 kg (267 lb 8 oz)     Lab Results   Component Value Date    ALBUMIN 2.4 01/27/2025    ALBUMIN 3.3 09/21/2022    ALBUMIN 3.3 06/10/2021     GENERAL:  Somnolent, eyes closed, restless, mumbling, startle responses during sleep.  HEAD:  Normocephalic atraumatic  SCLERA: Anicteric  ABDOMEN:  Non distended  RESPIRATORY: Breathing non labored  NEUROLOGIC: Lethargic    Data reviewed:  Lab Results   Component Value Date    WBC 4.5 01/28/2025    WBC 4.7 01/27/2025    WBC 5.1 01/26/2025    HGB 10.7 (L) 01/28/2025    HGB 11.1 (L) 01/27/2025    HGB 10.5 (L) 01/26/2025    HCT 32.0 (L) 01/28/2025    HCT 33.5 (L) 01/27/2025    HCT 30.9 (L) 01/26/2025     (L) 01/28/2025     (L) 01/27/2025    PLT 98 (L) 01/26/2025     01/28/2025     01/27/2025     01/26/2025    POTASSIUM 3.9 01/28/2025    POTASSIUM 3.7 01/27/2025    POTASSIUM 3.8 01/26/2025    CHLORIDE 110 (H) 01/28/2025    CHLORIDE 108 (H) 01/27/2025    CHLORIDE 106 01/26/2025    CO2 24 01/28/2025    CO2 21 (L) 01/27/2025    CO2 19 (L) 01/26/2025    BUN 30.3 (H) 01/28/2025    BUN 32.8 (H) 01/27/2025    BUN 32.1 (H) 01/26/2025    CR 1.10 01/28/2025    CR 1.22 (H) 01/27/2025    CR 1.30 (H) 01/26/2025    GLC 99 01/28/2025    GLC 86 01/27/2025    GLC 91 01/26/2025    AST 78 (H) 01/27/2025    AST 30 01/25/2025    AST 28 01/24/2025    ALT 37 01/27/2025    ALT 20 01/25/2025    ALT 22 01/24/2025    ALKPHOS 118 01/27/2025    ALKPHOS 125 01/25/2025    ALKPHOS 121 01/24/2025    BILITOTAL 1.4 (H) 01/27/2025    BILITOTAL 2.2 (H) 01/25/2025    BILITOTAL 2.5 (H) 01/24/2025    LUZ MARIA <10 (L) 01/24/2025    INR 1.57 (H) 07/14/2024    INR 2.29 (H) 10/25/2023    INR 1.22 (H) 09/13/2023        Results for orders placed or performed during the hospital encounter of 01/24/25   CT Head w/o Contrast    Impression    IMPRESSION:  HEAD CT:  1.  No CT evidence for acute intracranial process.  2.  Brain atrophy and presumed chronic microvascular ischemic changes as above.  3.  Incidental dilatation with possible aneurysms at the basilar tip. Recommend CTA or mass effect bases.    CERVICAL SPINE CT:  1.  No CT evidence for acute fracture or post traumatic subluxation.  2.  Multilevel cervical spondylosis  without high-grade spinal canal stenosis. Multilevel neural foraminal stenosis as detailed above.   CT Chest/Abdomen/Pelvis w Contrast   Result Value Ref Range    Radiologist flags New diagnosis of pulmonary embolism (AA)     Impression    IMPRESSION:    Note, a portion of the upper chest is incompletely imaged. The following findings are made within these limitations.    1.  Acute T12 fracture as described on same-day CT thoracolumbar spine reformats. Otherwise, no acute traumatic findings within the chest, abdomen, or pelvis.  2.  Acute segmental pulmonary emboli in the right lower lobe and left upper lobe. There is dilatation of right-sided heart chambers which appears chronic.  3.  Acute nonocclusive deep venous thrombosis of the left common iliac vein which extends into the lower inferior vena cava.  4.  Stable postsurgical changes of partial duodenectomy with mild soft tissue thickening at the surgical site. No discrete duodenal mass is identified.  5.  Stable hypoattenuating lesion of the posterior right hepatic lobe.      [Critical Result: New diagnosis of pulmonary embolism]    Finding was identified on 1/24/2025 11:48 AM CST.     Dr. Segovia was contacted by me on 1/24/2025 12:09 PM CST and verbalized understanding of the critical result.   CT Cervical Spine w/o Contrast    Impression    IMPRESSION:  HEAD CT:  1.  No CT evidence for acute intracranial process.  2.  Brain atrophy and presumed chronic microvascular ischemic changes as above.  3.  Incidental dilatation with possible aneurysms at the basilar tip. Recommend CTA or mass effect bases.    CERVICAL SPINE CT:  1.  No CT evidence for acute fracture or post traumatic subluxation.  2.  Multilevel cervical spondylosis without high-grade spinal canal stenosis. Multilevel neural foraminal stenosis as detailed above.   CT Thoracic Spine w/o Contrast    Impression    IMPRESSION:  THORACIC SPINE CT:  1.  Acute burst fracture of T12 with approximately 20%  height loss. Mild retropulsion of the fractured vertebral body without associated spinal canal stenosis.  2.  No high-grade spinal canal or neural foraminal stenosis.    LUMBAR SPINE CT:  1.  No fracture or posttraumatic subluxation.  2.  Multilevel lumbar spondylosis without high-grade spinal canal stenosis. Multilevel neural foraminal stenosis     CT Lumbar Spine w/o Contrast    Impression    IMPRESSION:  THORACIC SPINE CT:  1.  Acute burst fracture of T12 with approximately 20% height loss. Mild retropulsion of the fractured vertebral body without associated spinal canal stenosis.  2.  No high-grade spinal canal or neural foraminal stenosis.    LUMBAR SPINE CT:  1.  No fracture or posttraumatic subluxation.  2.  Multilevel lumbar spondylosis without high-grade spinal canal stenosis. Multilevel neural foraminal stenosis     US Lower Extremity Venous Duplex Bilateral    Impression    IMPRESSION:  1.  No deep venous thrombosis in the bilateral lower extremities.   MR Thoracic Spine w/o Contrast    Impression    FINDINGS/IMPRESSION:     Examination aborted secondary to patient agitation. Only severely motion degraded sagittal T1 and T2 sequences obtained. The T12 burst fracture is identified but poorly evaluated. No evidence for canal compromise. No evidence for ligamentous injury,   although evaluation for ligamentous injury is severely degraded.   IR Lower Extremity Venogram Left    Impression    IMPRESSION:   1. Successful mechanical thrombectomy of the distal IVC and left common iliac vein. No residual thrombus at completion. Continue heparin and anticoagulation.  2. Moderate irregularity of the more cranial infrarenal IVC, thought to relate to previous surgery given surrounding surgical clips. This is unrelated to the thrombus.   US Abdomen Limited    Impression    IMPRESSION:  1.  Cholelithiasis and gallbladder sludge is suggested. Mild gallbladder wall thickening. Negative sonographic Posada's sign.  2.   Mildly distended common duct measuring 7 mm.       Echo Limited   Result Value Ref Range    LVEF  60-65%        Medical Decision Making       Please see A&P for additional details of medical decision making.  MANAGEMENT DISCUSSED with the following over the past 24 hours: Dr Rain   NOTE(S)/MEDICAL RECORDS REVIEWED over the past 24 hours: Neurosurgery, Medicine, ID, Ortho  Tests REVIEWED in the past 24 hours:  - See lab/imaging results included in the data section of the note  SUPPLEMENTAL HISTORY, in addition to the patient's history, over the past 24 hours obtained from:   - Spouse or significant other  - Sibling      Much or all of the text in this note was generated through the use of Dragon dictation, voice to text software. Errors in spelling or words which appear to be out of context are unintentional and may be present due to having escaped editing.

## 2025-01-29 PROCEDURE — 250N000013 HC RX MED GY IP 250 OP 250 PS 637: Performed by: STUDENT IN AN ORGANIZED HEALTH CARE EDUCATION/TRAINING PROGRAM

## 2025-01-29 PROCEDURE — 120N000001 HC R&B MED SURG/OB

## 2025-01-29 PROCEDURE — 250N000011 HC RX IP 250 OP 636: Performed by: SPECIALIST

## 2025-01-29 PROCEDURE — 250N000013 HC RX MED GY IP 250 OP 250 PS 637

## 2025-01-29 PROCEDURE — 99233 SBSQ HOSP IP/OBS HIGH 50: CPT | Mod: GV

## 2025-01-29 PROCEDURE — 250N000013 HC RX MED GY IP 250 OP 250 PS 637: Performed by: INTERNAL MEDICINE

## 2025-01-29 RX ORDER — GLYCOPYRROLATE 0.2 MG/ML
0.2 INJECTION, SOLUTION INTRAMUSCULAR; INTRAVENOUS EVERY 4 HOURS PRN
Status: ACTIVE | OUTPATIENT
Start: 2025-01-29

## 2025-01-29 RX ORDER — HYDROMORPHONE HYDROCHLORIDE 1 MG/ML
0.5 INJECTION, SOLUTION INTRAMUSCULAR; INTRAVENOUS; SUBCUTANEOUS
Status: ACTIVE | OUTPATIENT
Start: 2025-01-29

## 2025-01-29 RX ORDER — BISACODYL 10 MG
10 SUPPOSITORY, RECTAL RECTAL
Status: ACTIVE | OUTPATIENT
Start: 2025-02-01

## 2025-01-29 RX ORDER — NALOXONE HYDROCHLORIDE 0.4 MG/ML
0.2 INJECTION, SOLUTION INTRAMUSCULAR; INTRAVENOUS; SUBCUTANEOUS
Status: ACTIVE | OUTPATIENT
Start: 2025-01-29

## 2025-01-29 RX ORDER — SENNOSIDES 8.6 MG
1 TABLET ORAL 2 TIMES DAILY PRN
Status: DISCONTINUED | OUTPATIENT
Start: 2025-01-29 | End: 2025-01-29

## 2025-01-29 RX ORDER — SULFAMETHOXAZOLE AND TRIMETHOPRIM 800; 160 MG/1; MG/1
1 TABLET ORAL 2 TIMES DAILY
Status: DISCONTINUED | OUTPATIENT
Start: 2025-01-30 | End: 2025-01-29

## 2025-01-29 RX ORDER — HYDROMORPHONE HYDROCHLORIDE 1 MG/ML
0.3 INJECTION, SOLUTION INTRAMUSCULAR; INTRAVENOUS; SUBCUTANEOUS
Status: ACTIVE | OUTPATIENT
Start: 2025-01-29

## 2025-01-29 RX ORDER — METHOCARBAMOL 500 MG/1
500 TABLET, FILM COATED ORAL 4 TIMES DAILY PRN
Status: DISPENSED | OUTPATIENT
Start: 2025-01-29

## 2025-01-29 RX ORDER — SULFAMETHOXAZOLE AND TRIMETHOPRIM 800; 160 MG/1; MG/1
1 TABLET ORAL 2 TIMES DAILY
Status: DISPENSED | OUTPATIENT
Start: 2025-01-30 | End: 2025-02-05

## 2025-01-29 RX ORDER — NALOXONE HYDROCHLORIDE 0.4 MG/ML
0.1 INJECTION, SOLUTION INTRAMUSCULAR; INTRAVENOUS; SUBCUTANEOUS
Status: ACTIVE | OUTPATIENT
Start: 2025-01-29

## 2025-01-29 RX ORDER — HYDROMORPHONE HYDROCHLORIDE 1 MG/ML
1 SOLUTION ORAL
Status: ACTIVE | OUTPATIENT
Start: 2025-01-29

## 2025-01-29 RX ORDER — CARBOXYMETHYLCELLULOSE SODIUM 5 MG/ML
1-2 SOLUTION/ DROPS OPHTHALMIC
Status: ACTIVE | OUTPATIENT
Start: 2025-01-29

## 2025-01-29 RX ORDER — OLANZAPINE 5 MG/1
5 TABLET, ORALLY DISINTEGRATING ORAL EVERY 6 HOURS PRN
Status: DISPENSED | OUTPATIENT
Start: 2025-01-29

## 2025-01-29 RX ORDER — SALIVA STIMULANT COMB. NO.3
2 SPRAY, NON-AEROSOL (ML) MUCOUS MEMBRANE
Status: ACTIVE | OUTPATIENT
Start: 2025-01-29

## 2025-01-29 RX ORDER — MINERAL OIL/HYDROPHIL PETROLAT
OINTMENT (GRAM) TOPICAL
Status: ACTIVE | OUTPATIENT
Start: 2025-01-29

## 2025-01-29 RX ORDER — ATROPINE SULFATE 10 MG/ML
2 SOLUTION/ DROPS OPHTHALMIC EVERY 4 HOURS PRN
Status: ACTIVE | OUTPATIENT
Start: 2025-01-29

## 2025-01-29 RX ADMIN — PRAVASTATIN SODIUM 20 MG: 20 TABLET ORAL at 09:20

## 2025-01-29 RX ADMIN — PANTOPRAZOLE SODIUM 40 MG: 40 TABLET, DELAYED RELEASE ORAL at 17:42

## 2025-01-29 RX ADMIN — ACETAMINOPHEN 975 MG: 325 TABLET, FILM COATED ORAL at 09:19

## 2025-01-29 RX ADMIN — QUETIAPINE 12.5 MG: 25 TABLET, FILM COATED ORAL at 06:33

## 2025-01-29 RX ADMIN — PANTOPRAZOLE SODIUM 40 MG: 40 TABLET, DELAYED RELEASE ORAL at 06:33

## 2025-01-29 RX ADMIN — FLUDROCORTISONE ACETATE 0.1 MG: 0.1 TABLET ORAL at 09:20

## 2025-01-29 RX ADMIN — ACETAMINOPHEN 975 MG: 325 TABLET, FILM COATED ORAL at 17:42

## 2025-01-29 RX ADMIN — MIDODRINE HYDROCHLORIDE 10 MG: 5 TABLET ORAL at 09:20

## 2025-01-29 RX ADMIN — MIDODRINE HYDROCHLORIDE 10 MG: 5 TABLET ORAL at 14:49

## 2025-01-29 RX ADMIN — ERTAPENEM SODIUM 1 G: 1 INJECTION, POWDER, LYOPHILIZED, FOR SOLUTION INTRAMUSCULAR; INTRAVENOUS at 09:25

## 2025-01-29 RX ADMIN — ACETAMINOPHEN 975 MG: 325 TABLET, FILM COATED ORAL at 21:58

## 2025-01-29 RX ADMIN — METHOCARBAMOL 500 MG: 500 TABLET ORAL at 09:19

## 2025-01-29 ASSESSMENT — ACTIVITIES OF DAILY LIVING (ADL)
ADLS_ACUITY_SCORE: 84
ADLS_ACUITY_SCORE: 79
ADLS_ACUITY_SCORE: 84
ADLS_ACUITY_SCORE: 79
ADLS_ACUITY_SCORE: 86
ADLS_ACUITY_SCORE: 79
ADLS_ACUITY_SCORE: 84
ADLS_ACUITY_SCORE: 79
ADLS_ACUITY_SCORE: 84
ADLS_ACUITY_SCORE: 84
ADLS_ACUITY_SCORE: 86
ADLS_ACUITY_SCORE: 84
ADLS_ACUITY_SCORE: 79
ADLS_ACUITY_SCORE: 79
ADLS_ACUITY_SCORE: 83
ADLS_ACUITY_SCORE: 86
ADLS_ACUITY_SCORE: 79

## 2025-01-29 NOTE — PROGRESS NOTES
[facility]    Medicine Progress Note - Hospitalist Service    Date of Admission:  1/24/2025    Assessment & Plan   Ti Nickerson is a 79 year old male with a complex history of metastatic renal cancer, atrial fibrillation, orthostatic hypotension on midodrine/Florinef, CKD stage IIIb, CAD, acute metabolic encephalopathy, and history of GI bleeding admitted 1/24/2025 after mechanical fall with new head laceration, PE, DVT, Burst fracture T12. Underwent IR thrombectomy on 1/26.       Goals of Care  Comfort cares  Discussed patient's overall decline including multiple readmissions for orthostatic hypotension, GI bleeding, falls now with T12 fracture, known metastatic cancer, now needing anticoagulation.  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  Discussed at length prior to anticoagulation, family acknowledges the risks of anticoagulation and pursued thrombectomy.  *Palliative care evaluated patient on 1/28, goals of care ongoing but currently family would like to continue current goals and full code status until the patient is able to participate more in discussions.  Discussed possible need for MRCP with GI, patient's wife shares that MRIs are very stressful for time and she does not think this would be feasible without sedation.     1/29/2025: Discussion with Edgar who was much more lucid, wife, Valery and daughter at bedside- Edgar does not wish to spend more time in the hospital and would like to focus on comfort and go home with hospice if possible. We reviewed restorative care versus comfort focused care. Previously discussed with heme/onc who supports patient in whatever decision he makes. It was explained  that comfort care is a good option when the burdens of aggressive treatments outweigh the benefits or there are no good  treatment options, and in this case less likely to restore normal mobility for Edgar with his significant orthostasis, falls, and now T12 fracture. Comfort care focuses on optimizing quality of life and relief from distressing symptoms such as pain, shortness of breath, nausea, and anxiety while allowing a natural dying process. Edgar and his family are understanding and they wish to pursue comfort cares at this time. We will finish the PO course of antibiotics as recommended by ID, but will discontinue the blood thinner as his risk for bleeding is high and he and family would rather accept the risks of clot than bleeding in the setting of comfort-focused care. We will discuss the TLSO brace and if that is still recommended with neurosurgery.     - DNR/DNI   - Initiate comfort-focused cares  - continue PO bactrim through 2/4  - Palliative care following, appreciate recommendations  - Stop anticoagulation   - discuss TLSO brace with neurosurgery- goals to get to wheelchair   -Social work consulted for hospice referrals, appreciate assistance      Iliac Vein thrombosis and IVC extension   Acute segmental Pulmonary Emboli (RLL, CELESTE)   Acute hypoxic respiratory failure   Thrombocytopenia   Chronic anemia  History of blood loss anemia 2/2 to cancer while on anti-coagulation previously  Suspected clot provoked in setting of metastatic cancer and overall deconditioned/immobile state. IR consulted on 1/24 to consider thrombectomy of clot in iliac vein extending to IVC given overall size. After risk benefit discussion, patient and family elected to proceed with treatment of Iliac vein and IVC thrombus. Mildly hypoxic requiring 1-2L intermittently likely seconary to pulmonary emboli. Right ventricular dilation seen on repeat limited TTE which is only mildly worse from complete echo on 1/11. No CT imaging during 1/10 admission so the acuity of Iliac and IVC thrombus is unclear. Currently suspect most of his pain is related to new  thoracic spine burst fracture.  - Continue scheduled tylenol, prn IV dilaudid to 0.2 q4h prn, start prn robaxin, start prn lidocaine patch, prn seroquel for anxiety/agitation  - S/p Thrombectomy on 1/26, Per IR- given narrowing of IVC near nephrectomy - not IVC filter candidate   - Was on heparin, will discontinue given above GOC conversation and comfort cares measures     Sepsis secondary to Enterobacter Bacteremia   Hyperbilirubinemia   Patient febrile on admission with slight leukocytosis. Blood cultures grew Enterobacter species in one bottle. CT CAP on admission did not show any obvious sign of infection in lungs (not completely imaged) or abdomen. Urinalysis not consistent with infection. ID consulted, recommended RUQ US which showed cholelithiasis/sludge and mild thickening, negative sonographic Posada's and mildly distended CBD 7mm, normal Alkphos but mildly elevated bilirubin. GI consulted Re:further workup for possible biliary obstruction - given goals of care no role for MRCP/ERCP.  - ID consulted/signed off  - Transitioned meropenem to ertapenem- treat for 7-10 days - transitioned to bactrim DS 1 PO BID until 2/4 per ID recommendations and patient request as above    Toxic Metabolic Encephalopathy  Suspect delirium with waxing/waning status, nonfocal exam, intermittently confused and disoriented.  Seroquel started as needed, with improvement.  Patient also with intermittent somnolence, likely medication related, will try to limit sedating medications.  - Comfort cares order set  - Treat infection, pain as above    Recurrent falls with orthostatics  Orthostatic hypotension    Patient with a known history of recurrent falls and orthostatic hypotension. His wife reports he has had a long history of orthostatic hypotension even prior to last admission. Recently hospitalized 1/10 through 1/17 on the medicine service for orthostatic hypotension.  He was started on midodrine 10 mg 3 times a day.  Decision to  hold cabozanitinib for 2 weeks.  Liberalized salt.  Started on Florinef 0.1 mg po daily (patient was seen by cardiology, neurology, hematology).   - Continue midodrine and florinef     Imaging:  CT head no evidence for acute intracranial process.  Brain atrophy with microvascular changes.  Cervical spine no acute fracture posttraumatic subluxation.  Multilevel cervical spondylosis without high-grade spinal stenosis.  Multilevel neuroforaminal stenosis  Thoracic spine with acute burst T12 fracture is noted:  Mild retropulsion of the fractured vertebral body without associated spinal canal stenosis. No high-grade spinal canal or neural foraminal stenosis.  Lumbar spine no fracture or posttraumatic subluxation.Multilevel lumbar spondylosis without high-grade spinal canal stenosis. Multilevel neural foraminal stenosis      Atrial fibrillation:   EKG Atrial fibrillation, LAD. NSST changes. Stopped anticoagulation in past given bleeding and falls. Some discussion in past of potential Watchman      Head laceration   - Patient seen in the clinic on 1/22/2025 with a laceration 7 to 8 cm in length with staples placed in the office.  And sutures.  Tetanus booster within 10 years.  - Sutures placed 1/22 reported to remove in 8 days on 1/30     Acute Burst Fracture T12 with retropulsion  CT chest abdomen pelvis given falls showing an acute T12 fracture. T12 CT spine showing acute burst fracture of T12 with 20% height loss.  Mild retropulsion of the fracture vertebral body without associated spinal canal stenosis. MRI poor quality but reviewed with neurosurgery and lg to initiate heparin  - Neurosurgery follow-up, planning for brace and non-operative management of T12 fracture  - Upright xrays in brace pending- unable to obtain on 1/28 with confusion  - Pain management as above - consider pain team consult if needed     Metastatic Renal cell cancer diagnosed in 2019   S/p Right radical nephrecctomy  and IVC thrombectomy 2019  "  On surveillance with recurrence 1/2021 with liver lesion.    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 Biopsy with mets renal cell.   - 10/2023 partial duodenectomy at [place] [facility]   - 1/2024 EUS and resection of gastric mass with complete endoscopic removal with met [facility]    - 7/2024 admit recurrent GI bleed. Local tumor recurrence >> required transfusion, stopped anticoagulation  - 7/2024 Radiation for bleeding control.      CKD 3  Patient with creatinine elevated to 1.59. 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     Hypocalcemia   Serum calcium down to 7.0 on 1/26, iCal 4.1, Likely asymptomatic. Improved to 4.4 with 1g IV calcium gluconate 1/27.         Diet: Regular Diet Adult    DVT Prophylaxis: Discontinue heparin gtt   Dillard Catheter: Not present  Lines: None     Cardiac Monitoring: None  Code Status: Full Code      Clinically Significant Risk Factors          # Hyperchloremia: Highest Cl = 110 mmol/L in last 2 days, will monitor as appropriate      # Hypocalcemia: Lowest iCa = 4.1 mg/dL in last 2 days, will monitor and replace as appropriate     # Hypoalbuminemia: Lowest albumin = 2.3 g/dL at 1/28/2025  6:47 AM, will monitor as appropriate   # Thrombocytopenia: Lowest platelets = 114 in last 2 days, will monitor for bleeding   # Hypertension: Noted on problem list            # Overweight: Estimated body mass index is 28.89 kg/m  as calculated from the following:    Height as of 1/10/25: 1.93 m (6' 4\").    Weight as of 1/14/25: 107.6 kg (237 lb 4.8 oz).        # Financial/Environmental Concerns: none         Social Drivers of Health    Physical " Activity: Insufficiently Active (5/28/2024)    Exercise Vital Sign     Days of Exercise per Week: 2 days     Minutes of Exercise per Session: 30 min   Social Connections: Unknown (5/28/2024)    Social Connection and Isolation Panel [NHANES]     Frequency of Social Gatherings with Friends and Family: Twice a week          Disposition Plan     Medically Ready for Discharge: Ready Now home with hospice, social work consult pending              John Rain MD  Hospitalist Service  [facility]  Securely message with [employer] (more info)  Text page via [employer] Paging/Directory   ______________________________________________________________________    Interval History   Discussion held at bedside with Edgar, wife Valery and his daughter.  Edgar is much more lucid today and is able to express his feelings about being hospitalized, not being able to walk without falling, and overall decline.  He reports he is sick of hospital stays would rather focus on comfort, he would rather be home with hospice, does not want any more blood draws for test to work things up.  Discussed blood thinners and recent clot as he had a risk for both clotting and bleeding, he would rather not have any more GI bleeding episodes and prefers that the blood thinner be discontinued.  He would like to continue antibiotics as recommended by infectious disease in pill form.  He reports his back pain is better today, and his overall goal is to be able to get to the wheelchair.  He is unsure how the TLSO brace will feel, but he is willing to try to wear it if that is what is deemed safe.      Physical Exam   Vital Signs: Temp: 97.8  F (36.6  C) Temp src: Axillary BP: 132/82 Pulse: 86   Resp: 18 SpO2: 94 % O2 Device: Nasal cannula Oxygen Delivery: 2 LPM  Weight: 0 lbs 0 oz    Constitutional: alert, awake, NAD, intermittently tearful during our discussion  HEENT: normocephalic, anicteric sclerae, MMM   Pulmonary: no increased work of  breathing on 1 L nasal cannula   Cardiovascular:  RRR, normal S1 and S2, no murmur appreciated   GI: BS+, soft, non-tender, non-distended, without guarding or rigidity  Skin: warm, dry  MSK: no peripheral edema bilaterally, left groin site soft, CDI  Neuro: AAOx person, place, year, 5/5 strength bilateral upper and lower extremities, CNII-XII grossly intact      Medical Decision Making       60 MINUTES SPENT BY ME on the date of service doing chart review, history, exam, documentation & further activities per the note.      Data   ------------------------- PAST 24 HR DATA REVIEWED -----------------------------------------------    I have personally reviewed the following data over the past 24 hrs:    N/A  \   N/A   / N/A     N/A N/A N/A /  113 (H)   N/A N/A N/A \     ALT: N/A AST: N/A AP: N/A TBILI: N/A   ALB: N/A TOT PROTEIN: N/A LIPASE: N/A     Procal: N/A CRP: N/A Lactic Acid: 1.0         Imaging results reviewed over the past 24 hrs:   No results found for this or any previous visit (from the past 24 hours).

## 2025-01-29 NOTE — CONSULTS
Care Management Follow Up    Length of Stay (days): 5    Expected Discharge Date: 01/31/2025     Concerns to be Addressed: discharge planning     Patient plan of care discussed at interdisciplinary rounds: Yes    Anticipated Discharge Disposition: Hospice              Anticipated Discharge Services:    Anticipated Discharge DME: Oxygen    Patient/family educated on Medicare website which has current facility and service quality ratings: yes  Education Provided on the Discharge Plan: Yes  Patient/Family in Agreement with the Plan: yes    Referrals Placed by CM/SW: Hospice  Private pay costs discussed: insurance costs out of pocket expenses    Discussed  Partnership in Safe Discharge Planning  document with patient/family: Yes: Verbally given     Handoff Completed: No, handoff not indicated or clinically appropriate    Additional Information:  Writer was made aware from the provider of family finalizing decision to move forward with comfort care/hospice.  Care management reconsulted.      Writer met the patient at bedside with spouse, Valery, and son, Jamal, present.  Valery stated their goal was to return back to their independent living apartment at [facility].  Valery informed the writer of their apartment having 2 bathrooms.  The 1 in the master bedroom has a small step to get into the shower versus, the guest bedroom has a full tub.  Valery expressed her concern of patient being able to be brought into the shower for bathing.  Additionally, Valery stated that she was aware of needing to seek outside services for private duty home care.  Valery stated paying out-of-pocket for those services was not a concern.  Valery stated that she was referred to look into [facility] by her friend.  Lastly, Valery informed the writer of the patient being on a sitter for the first couple days of his hospitalization due to being confused from receiving pain meds.  However, due to the patient's pain improving pain medications have been  reduced.    Writer stated he will contact [facility] to discuss if they are able to accept the patient with outside services into the [facility] with hospice.  If they are able to accept the patient, abrahamr will then discuss with the treatment team about what steps need to be taken to properly assess the patient's ambulation.  Once we have an idea of what steps need to be taken and what the patient's ambulation is,  will then reach out to [facility] to send them the information and have them discuss with Valery about billing for the appropriate level of services.  Lastly, abrahamr discussed hospice agencies with Valery.  Abrahamr stated he will contact [facility] to see what agencies they most often work with to narrow down options for Valery to look into.  Writer will then send referrals to those hospice agencies and discussed with them about DME that needs to be ordered.    Valery consented to the discharge plan.    Per nursing notes, abrahamr was made aware of patient not OOB due to not being fitted with the brace and needing an x-ray.  However, physical therapy has not assessed the patient due to not having the above completed yet.  Due to the comfort care orders, physical therapy would not follow-up with the patient to do a proper assessment of what his level of assist is currently.    Writer discussed with the nurse who confirmed charting.  Writer informed bedside nurse of updating the provider about potentially having the comfort care orders lifted in order to have the patient properly evaluated for the level of assistance for appropriate services to be provided by [facility] once the patient discharges to [facility].    Abrahamr contacted [facility] and spoke with admissions liaison, More Duron ([phone]).  More stated 1 of 2 options can occur; patient can return back to the [facility], however, we will need to be set up with outside services, or, the patient could admit back into their care center and be billed  privately for their long-term care stay.  More stated they most commonly used [facility] as it is part of their [facility] branch however they also use [facility] and [facility] hospice agencies.    More stated she will be in office until Thursday and will be out of office Friday and all of next week.  More stated care management can contact Kathi who will be taking over from More.  Kathi's phone number is [phone].    Writer contacted and left a voicemail for the director of nursing, Trinidad ([phone]).  Trinidad responded with a voicemail stating they can accept the patient back into the independent living, however, similar to More, stated patient will need to be set up with outside services.    Teresar followed up with Valery.  Writer informed Valery the patient can be accepted back at [facility] with additional services.  However, writer expressed his concern over not being able to accurately know what the patient's assistance level is.  Writer stated due to the patient's comfort orders, he would not be seen by physical therapy in order to have that proper assessment.  Writer stated that it would most likely benefit to have the patient moved back to from comfort cares to restorative cares in order to have physical therapy assess the patient properly.  Writer stated the patient could return back to [facility] and receive comfort care orders at discharge.  Writer stated the purpose of this is to properly assess the patient and be properly billed for that level of assistance by [facility].  Writer informed Valery of More's recommendations of using [facility], [facility], or [facility].  Writer provided an example of the services that can be provided by both [facility] and [facility].    Next Steps: Writer will discuss with the hospitalist and physical therapy to see if they would be able to see the patient without switching him from comfort cares to restorative cares.  Otherwise provider can have that conversation with  Valery.  This is needed in order to properly assess patient's level of assist.    Care Management will continue to follow for discharge planning.      BELINDA Acuna  [facility]  Social Work

## 2025-01-29 NOTE — PROGRESS NOTES
[facility] Progress Note            Assessment and Plan:     Primary Oncologist: Dr. Glover     Pulmonary embolism / DVT   - Acute segmental pulmonary emboli in the right lower lobe and left upper lobe.  There is dilatation of right sided heart chambers which appeared chronic.  Acute nonocclusive DVT left common iliac vein which extends into the lower inferior vena cava.  - per IR, given narrowing of IVC near nephrectomy - not IVC filter candidate   - thrombectomy 1/26/25 with successful clearing out the IVC and L Iliac clot.   - on heparin which is now discontinued given transition to comfort (see below)     2. Recurrent syncope with hypotension   - On midodrine  - No improvement since holding the cabozantanib   - Also taking florinef      3. Clear cell renal cell carcinoma diagnosed in 10/2019  - pT3b N0 M0 disease at presentation  - followed on surveillance until 1/2021 at which time liver metastasis identified and treated with cryoablation  - disease recurrence in 9/2023 presenting with GI bleeding and treated with partial duodenectomy  -disease progression in 1/2024 treated with partia gastrectomy  - Progressed on Ipi/Nivo 6/25/2024   - s/p radiation due to duodenal bleeding ending 8/06/2024   - Started Cabozantanib 20mg 9/26/24.   - Most recent CT 12/30/24 with decreased retroperitoneal lymphadenopathy, stable R hepatic lobe mass, duodenal mass measuring up to 2.3cm was not well seen on the non contrast study  - Cabozantanib held since 1/15/25   - Continue to hold Cabozantinib     4. 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 Acute burst fracture T12 with retropulsion   - Acute T12 fracture 2/2 fall   - MRI thoracic spine 1/24/2025 showed T12 burst fracture with no evidence of canal compromise  - Neurosurgery team onboard and noted plan for bracing. Not yet able to wear brace    6.  Goals of care.   - today Edgar and his family (son and daughter) have elected to move away from an restorative path and transition to comfort cares with plans to discharge with hospice. They are hopeful Edgar can return home with hospice support.      PLAN  - transition to comfort measures  - social work eval for home hospice.   - all questions answered.      Pts primary Oncologist, Dr. Glover was updated and agrees with this plan.       Karan Valle, APRN, AOCNP  Nurse Practitioner  [facility]  [phone]          Interval History:     More awake today.               Review of Systems:     The 5 point Review of Systems is negative other than noted in the HPI                Medications:   Scheduled Medications  Current Facility-Administered Medications   Medication Dose Route Frequency Provider Last Rate Last Admin    acetaminophen (TYLENOL) tablet 975 mg  975 mg Oral TID Burt Felton DO   975 mg at 01/29/25 0919    fludrocortisone (FLORINEF) tablet 0.1 mg  0.1 mg Oral Daily Becky Hansen MD   0.1 mg at 01/29/25 0920    midodrine (PROAMATINE) tablet 10 mg  10 mg Oral TID w/meals Becky Hansen MD   10 mg at 01/29/25 1449    pantoprazole (PROTONIX) EC tablet 40 mg  40 mg Oral BID AC Becky Hansen MD   40 mg at 01/29/25 0633    sodium chloride (PF) 0.9% PF flush 3 mL  3 mL Intracatheter Q8H Becky Hanesn MD   3 mL at 01/29/25 1451    [START ON 1/30/2025] sulfamethoxazole-trimethoprim (BACTRIM DS) 800-160 MG per tablet 1 tablet  1 tablet Oral BID John Rain MD         PRN Medications  Current Facility-Administered Medications   Medication Dose Route Frequency Provider Last Rate Last Admin    acetaminophen (TYLENOL) tablet 650 mg  650 mg Oral Q4H PRN Zev Kennedy MD   650 mg at 01/27/25 0651    Or    acetaminophen (TYLENOL) Suppository 650 mg  650 mg Rectal Q4H PRN Zev Kennedy MD        artificial saliva (BIOTENE MT) solution 2 spray  2 spray Mouth/Throat Q1H PRN  John Rain MD        atropine 1 % ophthalmic solution 2 drop  2 drop Sublingual Q4H PRN John Rain MD        [START ON 2/1/2025] bisacodyl (DULCOLAX) suppository 10 mg  10 mg Rectal Once PRN John Rain MD        carboxymethylcellulose PF (REFRESH PLUS) 0.5 % ophthalmic solution 1-2 drop  1-2 drop Both Eyes Q1H PRN John Rain MD        glycopyrrolate (ROBINUL) injection 0.2 mg  0.2 mg Intravenous Q4H PRN John Rain MD        HYDROmorphone (STANDARD CONC) (DILAUDID) oral solution 1 mg  1 mg Oral Q2H PRN John Rain MD        Or    HYDROmorphone (DILAUDID) half-tab 1 mg  1 mg Oral Q2H PRN John Rain MD        HYDROmorphone (PF) (DILAUDID) injection 0.3 mg  0.3 mg Intravenous Q15 Min PRN John Rain MD        HYDROmorphone (PF) (DILAUDID) injection 0.5 mg  0.5 mg Intravenous Q15 Min PRN John Rain MD        Lidocaine (LIDOCARE) 4 % Patch 2 patch  2 patch Transdermal Q24H PRN Burt Felton DO        lidocaine (LMX4) cream   Topical Q1H PRN Becky Hansen MD        lidocaine 1 % 0.1-1 mL  0.1-1 mL Other Q1H PRN Becky Hansen MD        methocarbamol (ROBAXIN) tablet 500 mg  500 mg Oral 4x Daily PRN John Rain MD        mineral oil-hydrophilic petrolatum (AQUAPHOR)   Topical Q1H PRN John Rain MD        naloxone (NARCAN) injection 0.1 mg  0.1 mg Intravenous Q2 Min PRN John Rain MD        naloxone (NARCAN) injection 0.2 mg  0.2 mg Intravenous Q2 Min PRN John Rain MD        OLANZapine zydis (zyPREXA) ODT tab 5 mg  5 mg Oral Q6H PRN John Rain MD        ondansetron (ZOFRAN ODT) ODT tab 4 mg  4 mg Oral Q6H PRBecky Jason MD        Or    ondansetron (ZOFRAN) injection 4 mg  4 mg Intravenous Q6H PRBecky Jason MD senna-docusate (SENOKOT-S/PERICOLACE) 8.6-50 MG per tablet 1 tablet  1 tablet Oral BID Becky Lopez MD        Or    senna-docusate (SENOKOT-S/PERICOLACE) 8.6-50 MG per tablet 2 tablet  2 tablet Oral BID Becky Lopez  MD        sodium chloride (PF) 0.9% PF flush 3 mL  3 mL Intracatheter q1 min Becky Mullen MD   3 mL at 01/27/25 6730                  Physical Exam:   Vitals were reviewed  Blood pressure 120/82, pulse 69, temperature 97.5  F (36.4  C), temperature source Oral, resp. rate 18, SpO2 97%.  Wt Readings from Last 4 Encounters:   01/14/25 107.6 kg (237 lb 4.8 oz)   12/12/24 114.3 kg (252 lb)   09/05/24 117 kg (258 lb)   08/07/24 121.3 kg (267 lb 8 oz)       No intake/output data recorded.                 Data:   All laboratory data and imaging studies reviewed.    CMP  Recent Labs   Lab 01/28/25  2108 01/28/25  1700 01/28/25  0647 01/27/25  0929 01/26/25  2306 01/26/25  1016 01/25/25  1741 01/25/25  0506 01/24/25  1755 01/24/25  0904   NA  --   --  140 139  --  136  --  134*  --  135   POTASSIUM  --   --  3.9 3.7  --  3.8  --  4.2  --  4.3   CHLORIDE  --   --  110* 108*  --  106  --  102  --  100   CO2  --   --  24 21*  --  19*  --  19*  --  22   ANIONGAP  --   --  6* 10  --  11  --  13  --  13   * 104* 99 86   < > 82   < > 110*   < > 109*   BUN  --   --  30.3* 32.8*  --  32.1*  --  31.1*  --  30.0*   CR  --   --  1.10 1.22*  --  1.30*  --  1.49*  --  1.59*   GFRESTIMATED  --   --  69 61  --  56*  --  48*  --  44*   DOMINICK  --   --  7.7* 7.6*  --  7.0*  --  7.6*  --  8.4*   PROTTOTAL  --   --  5.1* 5.5*  --   --   --  5.6*  --  5.8*   ALBUMIN  --   --  2.3* 2.4*  --   --   --  2.7*  --  3.1*   BILITOTAL  --   --  1.3* 1.4*  --   --   --  2.2*  --  2.5*   ALKPHOS  --   --  114 118  --   --   --  125  --  121   AST  --   --  67* 78*  --   --   --  30  --  28   ALT  --   --  38 37  --   --   --  20  --  22    < > = values in this interval not displayed.     CBC  Recent Labs   Lab 01/28/25  0647 01/27/25  0929 01/26/25  1016 01/25/25  0507   WBC 4.5 4.7 5.1 8.2   RBC 3.20* 3.30* 3.10* 3.29*   HGB 10.7* 11.1* 10.5* 11.2*   HCT 32.0* 33.5* 30.9* 32.3*    102* 100 98   MCH 33.4* 33.6* 33.9* 34.0*   MCHC 33.4  33.1 34.0 34.7   RDW 15.7* 15.7* 15.8* 15.5*   * 114* 98* 121*     INRNo lab results found in last 7 days.        ESTEFANIA Shepard  Nurse Practitioner  [facility]  [phone]

## 2025-01-29 NOTE — CONSULTS
[facility]  Gastroenterology Consultation         Ti Nickerson  [address]   [address]  [place] [address]  79 year old male    Admission Date/Time: 1/24/2025  Primary Care Provider: Nico Retana  Referring / Attending Physician:  Dr. John Rain    We were asked to see the patient in consultation by Dr. John Rain for evaluation of biliary obstruction as source.      CC: falls at time of admission on 1/24/25    HPI:  Ti Nickerson is a 79 year old male who has a complex history of metastatic renal cancer, atrial fibrillation, orthostatic hypotension on midodrine/Florinef, CKD stage IIIb, CAD, acute metabolic encephalopathy, history of GI bleeding s/p duodenectomy and presented to the emergency room after falls on 1/24/25 and found to have DVT- s/p thrombectomy on heparin, fracture in T12. Found to be septic with bacteremia, cultures positive for enterobacter. ID following and started on antibiotics, requested GI consult as concern for biliary source.    ROS: A comprehensive ten point review of systems was negative aside from those in mentioned in the HPI.      PAST MED HX:  I have reviewed this patient's medical history and updated it with pertinent information if needed.   Past Medical History:   Diagnosis Date    Acute encephalopathy 1/24/2025    Atrial fibrillation, unspecified 9/18/2015    CAD (coronary artery disease) 09/18/2015    minimal by angio 2007, fu nuc est nl 2018    Elevated TSH 2018    Esophageal reflux 9/18/2015    Essential hypertension     History of cardioversion     [id number]    Idiopathic cardiomyopathy (H) 09/18/2015    fu echo nl ef, nl nuclear est 11/18, Dr. Quarles    Mixed hyperlipidemia 9/18/2015    Mumps     Sleep apnea 09/18/2015    does not use cpap as of 2019    Sleep apnea     Thoracic aortic aneurysm     sinus of valsalva 4.5 and asc aorta 4.5 in 2017    Tubular adenoma of colon 01/2017    fu 3 years        MEDICATIONS:   Prior to Admission Medications   Prescriptions Last Dose Informant Patient Reported? Taking?   acetaminophen (TYLENOL) 500 MG tablet  Nursing Home No Yes   Sig: Take 1 tablet (500 mg) by mouth daily as needed for mild pain.   Patient taking differently: Take 500-1,000 mg by mouth daily as needed for mild pain.   fludrocortisone (FLORINEF) 0.1 MG tablet 1/23/2025 Morning Nursing Home No Yes   Sig: Take 1 tablet (0.1 mg) by mouth daily.   midodrine (PROAMATINE) 10 MG tablet 1/23/2025 Nursing Home No Yes   Sig: Take 1 tablet (10 mg) by mouth 3 times daily (with meals).   pantoprazole (PROTONIX) 40 MG EC tablet 1/23/2025 Nursing Home No Yes   Sig: TAKE 1 TABLET BY MOUTH TWICE A DAY BEFORE MEALS   pravastatin (PRAVACHOL) 20 MG tablet 1/23/2025 Evening Nursing Home No Yes   Sig: Take 1 tablet (20 mg) by mouth daily   tamsulosin (FLOMAX) 0.4 MG capsule 1/23/2025 Morning Nursing Home No Yes   Sig: Take 1 capsule (0.4 mg) by mouth daily      Facility-Administered Medications: None       ALLERGIES:   Allergies   Allergen Reactions    Fentanyl Confusion and Other (See Comments)     Hallucinations    Oxycodone Other (See Comments)     Hallucinations when given after surgery    Other reaction(s): *Unknown, Other (See Comments), Other (see comments)   Hallucinations when given after surgery    Hallucinations when given after surgery    Hallucinations when given after surgery       SOCIAL HISTORY:  Social History     Tobacco Use    Smoking status: Never    Smokeless tobacco: Never   Vaping Use    Vaping status: Never Used   Substance Use Topics    Alcohol use: Not Currently    Drug use: No       FAMILY HISTORY:  Family History   Problem Relation Age of Onset    Arrhythmia Mother         a-fib    Cerebrovascular Disease Mother     Arrhythmia Father         a-fib    Colon Cancer Father     Diabetes Father     Cerebrovascular Disease Father     No Known Problems Brother        PHYSICAL EXAM:   General  sleeping  and comfortable  Vital Signs with Ranges  Temp: 97.5  F (36.4  C) Temp src: Oral BP: 120/82 Pulse: 69   Resp: 18 SpO2: 97 % O2 Device: Nasal cannula Oxygen Delivery: 2 LPM  No intake/output data recorded.    Constitutional: Alert, oriented to person, place, date, situation.  Cooperative, lying in bed in NAD.   Respiratory:  Lungs CTAB.  No crackles, wheezes, or rhonchi, no labored breathing.  Cardiovascular:  Heart RRR, no MRG, no edema.  GI:  Abdomen soft, NT/ND and with normoactive BS  Skin/Integumen:  Warm, dry, non-diaphoretic.  MSK: CMS x4 intact.          ADDITIONAL COMMENTS:   I reviewed the patient's new clinical lab test results.   Recent Labs   Lab Test 01/28/25  0647 01/27/25  0929 01/26/25  1016 07/14/24  0505 07/14/24  0500 10/25/23  1002 10/25/23  0915 09/14/23  0623 09/13/23 2057   WBC 4.5 4.7 5.1   < > 8.8   < >  --    < >  --    HGB 10.7* 11.1* 10.5*   < > 8.3*   < >  --    < >  --     102* 100   < > 87   < >  --    < >  --    * 114* 98*   < > 229   < >  --    < >  --    INR  --   --   --   --  1.57*  --  2.29*  --  1.22*    < > = values in this interval not displayed.     Recent Labs   Lab Test 01/28/25  0647 01/27/25  0929 01/26/25  1016   POTASSIUM 3.9 3.7 3.8   CHLORIDE 110* 108* 106   CO2 24 21* 19*   BUN 30.3* 32.8* 32.1*   ANIONGAP 6* 10 11     Recent Labs   Lab Test 01/28/25  0647 01/27/25  0929 01/25/25  0506 01/24/25  0905 01/24/25  0904 01/13/25  1613 07/19/24  0948 07/14/24  0500 03/26/24  1355 10/31/23  0142 10/09/23  0820 09/13/23 2051   ALBUMIN 2.3* 2.4* 2.7*  --    < >  --    < > 2.9*  2.9*   < >  --    < > 3.8   BILITOTAL 1.3* 1.4* 2.2*  --    < >  --    < > 0.3  0.3   < >  --    < > 0.7   ALT 38 37 20  --    < >  --    < > 14  14   < >  --    < > 17   AST 67* 78* 30  --    < >  --    < > 27  27   < >  --    < > 21   PROTEIN  --   --   --  10*  --  Negative  --   --   --  Negative  --   --    LIPASE  --   --   --   --   --   --   --  30  --   --   --  34    < > =  values in this interval not displayed.       I reviewed the patient's new imaging results.        CONSULTATION ASSESSMENT AND PLAN:      Metastatic renal cell carcinoma- to liver  S/p duodenectomy and gastrectomy  Duodenal mass  DVT- s/p thrombectomy on 1/26  Cholelithiasis with gallbladder sludge  Enterobacter bacteremia  Blood culture gre enterobacter, CT without obvious sing os infection  ID consulted and recommend RUQ US noting cholelithiasis/sludge and mild thickening with CBD measuring 7  ALP normal and TBili 1.3  Remains on heparin for DVT- if endoscopic procedures planned would need held for 6 hours  H/o GI bleed with EGD in 9/2023 noted ulcerated mass in third part of duodenum with biopsied noting metastatic renal cell carcinoma.  10/2023 [facility]   1/2024 EUS and resection of gastric mass with complete endoscopic removal with met [facility]    7/2024 admit recurrent GI bleed. Local tumor recurrence >> required transfusion, stopped anticoagulation   7/2024 Radiation for bleeding control.   - Discussed case with wife and explained that there is potential/likely source of infection from bile duct, possibly cholangitis. Given his anatomy and prior surgery including duodenectomy may be difficult or impossible to access CBD. Could offer EGD and if compatible with ERCP could perform during same procedure, alternatively could consider IR involvement with biliary tube placement. Patients wife states she is realistic and believes her  is dying and at this time likely would like to consider changing code to DNR/DNI and also considering hospice care. Therefore additional procedures are not desired at this time    - Gi will follow along until definitive plan for hospice/comfort cares  - daily labs until comfort cares  - continue antibiotics  - Continue BID PPI          [facility].  Office: [phone]  Cell : [phone] (Dr. Ash)  Cell:  [phone] (Lyssa Cota PA-C)

## 2025-01-29 NOTE — PLAN OF CARE
Goal Outcome Evaluation:      Plan of Care Reviewed With: patient    Overall Patient Progress: no changeOverall Patient Progress: no change         A&Ox1-2, VSS on 2L O2 via NC. Heparin infusing. Family at bedside. Scheduled Tylenol and scheduled Robaxin given for pain. Continue with plan of care.

## 2025-01-29 NOTE — PROGRESS NOTES
Goals of care discussion occurred today. Pt transitioned to Comfort care. Patient's wristband changed. Heparin was discontinued this shift. Pt was able to take all of his his medication with water. Pt did not require any PRN medications. Back and coccyx dressings changed. Turn repo. Pt remains on bedrest. Needs to have Xray with TLSO brace in order to determine if it is safe to upgrade his activity level. Family was opposed to attempting this earlier today. They may be open to X ray with the need to clarify his activity level in order to determine his needs for placement.

## 2025-01-29 NOTE — PROGRESS NOTES
Neurosurgery quick note:    Spoke with family at bedside.  Plan will be for patient to wear brace when out of bed.  We will obtain seated x-rays today.  Patient to follow-up in 4 to 6 weeks with repeat x-ray prior patient's wife verbalized understanding that depending on goals of care for outpatient progresses 6-week appointment can be canceled.  We will sign off after reviewing x-rays and they appear stable in nature.    Remington Greene, BONG  Neurosurgery

## 2025-01-29 NOTE — PLAN OF CARE
Goal Outcome Evaluation:      Plan of Care Reviewed With: patient, spouse         A&Ox1-2, VSS on 2L O2 via NC. Heparin infusing. Family at bedside. Scheduled Tylenol and scheduled Robaxin given for pain. PRN Seroquel given x1. No acute changes overnight. Continue with plan of care.

## 2025-01-30 VITALS
DIASTOLIC BLOOD PRESSURE: 90 MMHG | TEMPERATURE: 98.3 F | RESPIRATION RATE: 18 BRPM | HEART RATE: 72 BPM | OXYGEN SATURATION: 96 % | SYSTOLIC BLOOD PRESSURE: 133 MMHG

## 2025-01-30 LAB
BACTERIA BLD CULT: NO GROWTH
BACTERIA BLD CULT: NORMAL
BACTERIA BLD CULT: NORMAL

## 2025-01-30 PROCEDURE — 97163 PT EVAL HIGH COMPLEX 45 MIN: CPT | Mod: GP | Performed by: PHYSICAL THERAPIST

## 2025-01-30 PROCEDURE — 99232 SBSQ HOSP IP/OBS MODERATE 35: CPT | Mod: GV

## 2025-01-30 PROCEDURE — 250N000013 HC RX MED GY IP 250 OP 250 PS 637

## 2025-01-30 PROCEDURE — 120N000001 HC R&B MED SURG/OB

## 2025-01-30 PROCEDURE — 250N000013 HC RX MED GY IP 250 OP 250 PS 637: Performed by: INTERNAL MEDICINE

## 2025-01-30 PROCEDURE — 97530 THERAPEUTIC ACTIVITIES: CPT | Mod: GP | Performed by: PHYSICAL THERAPIST

## 2025-01-30 PROCEDURE — 250N000013 HC RX MED GY IP 250 OP 250 PS 637: Performed by: STUDENT IN AN ORGANIZED HEALTH CARE EDUCATION/TRAINING PROGRAM

## 2025-01-30 RX ADMIN — ACETAMINOPHEN 975 MG: 325 TABLET, FILM COATED ORAL at 18:05

## 2025-01-30 RX ADMIN — SULFAMETHOXAZOLE AND TRIMETHOPRIM 1 TABLET: 800; 160 TABLET ORAL at 21:41

## 2025-01-30 RX ADMIN — FLUDROCORTISONE ACETATE 0.1 MG: 0.1 TABLET ORAL at 08:52

## 2025-01-30 RX ADMIN — MIDODRINE HYDROCHLORIDE 10 MG: 5 TABLET ORAL at 18:05

## 2025-01-30 RX ADMIN — QUETIAPINE 12.5 MG: 25 TABLET, FILM COATED ORAL at 08:52

## 2025-01-30 RX ADMIN — PANTOPRAZOLE SODIUM 40 MG: 40 TABLET, DELAYED RELEASE ORAL at 18:05

## 2025-01-30 RX ADMIN — METHOCARBAMOL 500 MG: 500 TABLET ORAL at 19:53

## 2025-01-30 RX ADMIN — ACETAMINOPHEN 975 MG: 325 TABLET, FILM COATED ORAL at 08:52

## 2025-01-30 RX ADMIN — QUETIAPINE 12.5 MG: 25 TABLET, FILM COATED ORAL at 19:53

## 2025-01-30 RX ADMIN — MIDODRINE HYDROCHLORIDE 10 MG: 5 TABLET ORAL at 08:52

## 2025-01-30 RX ADMIN — PANTOPRAZOLE SODIUM 40 MG: 40 TABLET, DELAYED RELEASE ORAL at 06:44

## 2025-01-30 RX ADMIN — METHOCARBAMOL 500 MG: 500 TABLET ORAL at 08:52

## 2025-01-30 RX ADMIN — SULFAMETHOXAZOLE AND TRIMETHOPRIM 1 TABLET: 800; 160 TABLET ORAL at 08:51

## 2025-01-30 RX ADMIN — OLANZAPINE 5 MG: 5 TABLET, ORALLY DISINTEGRATING ORAL at 21:41

## 2025-01-30 ASSESSMENT — ACTIVITIES OF DAILY LIVING (ADL)
ADLS_ACUITY_SCORE: 86
ADLS_ACUITY_SCORE: 88
ADLS_ACUITY_SCORE: 86
ADLS_ACUITY_SCORE: 88
ADLS_ACUITY_SCORE: 86
ADLS_ACUITY_SCORE: 88
ADLS_ACUITY_SCORE: 88
ADLS_ACUITY_SCORE: 86
ADLS_ACUITY_SCORE: 88
ADLS_ACUITY_SCORE: 86
ADLS_ACUITY_SCORE: 88
ADLS_ACUITY_SCORE: 88
ADLS_ACUITY_SCORE: 86

## 2025-01-30 NOTE — PROGRESS NOTES
Of note patient is DNR/DNI and now on hospice  GI will sign off    [facility]  [phone]

## 2025-01-30 NOTE — PROGRESS NOTES
01/30/25 1634   Appointment Info   Signing Clinician's Name / Credentials (PT) Brittany Elena PT   Living Environment   People in Home spouse   Current Living Arrangements apartment  (ILF at [facility])   Home Accessibility no concerns   Transportation Anticipated family or friend will provide;health plan transportation   Self-Care   Usual Activity Tolerance poor   Equipment Currently Used at Home grab bar, toilet;walker, rolling   Fall history within last six months yes   Number of times patient has fallen within last six months 5   Activity/Exercise/Self-Care Comment Patient has been in TCU since 1/17. Unsure how much he was walking there but he amb 190 feet with ww with close w/c follow on 1/16 when he was here in the hospital. He had a fall at TCU and now has a compression fx. Also has a clam shell brace now.   General Information   Onset of Illness/Injury or Date of Surgery 01/24/25   Referring Physician John Rain MD   Patient/Family Therapy Goals Statement (PT) to go home with hospice   Pertinent History of Current Problem (include personal factors and/or comorbidities that impact the POC) Ti Nickerson is a 79 year old male with a complex history of metastatic renal cancer, atrial fibrillation, orthostatic hypotension on midodrine/Florinef, CKD stage IIIb, CAD, acute metabolic encephalopathy, history of GI bleeding,  presented to the emergency room after falls.     Patient had recently been hospitalized 1/10 through 1/17/2025 for orthostatic hypotension and recurrent syncope.  He had been losing consciousness and presented to the hospital.  He had an extensive evaluation including an echo with normal EF and moderate RV dysfunction.  Negative stim test for adrenal insufficiency, evaluated by cardiology and neurology.  Recommended compression stockings.  Stopped on his Cabozanitinib for 2 weeks to see if he had improved symptoms.  He was discharged on midodrine 10 mg 3 times daily with Florinef.       Now readmitted with falls and head laceration from 2 days ago, PE, DVT, Burst fracture T12 admitted to the hospital.   Existing Precautions/Restrictions fall   Cognition   Affect/Mental Status (Cognition) confused   Orientation Status (Cognition) oriented to;person;place   Cognitive Status Comments Patient at times rambling about things unrelated to task.   Pain Assessment   Patient Currently in Pain Yes, see Vital Sign flowsheet   Posture    Posture Not impaired   Range of Motion (ROM)   ROM Comment Trunk limited by spine precautions from compression fx.   Strength (Manual Muscle Testing)   Strength Comments Needed A to move LE's off the edge of the bed. Did have the strength to roll on to his side with just min A.   Bed Mobility   Comment, (Bed Mobility) Neded Max A of 2 to don the brace. Min A to roll. Max A to move LE's off the bed and max A of second person to transition trunk to a sitting position.   Transfers   Comment, (Transfers) Unable to transfer as unable to get to full sitting position due to clam shell brace digging into his groin.   Balance   Balance Comments Needed mod A for sitting balance due to brace keeping him leaning backwards.   Sensory Examination   Sensory Perception patient reports no sensory changes   Clinical Impression   Criteria for Skilled Therapeutic Intervention Yes, treatment indicated   PT Diagnosis (PT) Unable to get to sitting position or tolerate transfer attempt due to clamshell brace digging into his groin.   Influenced by the following impairments Decrease ability to sit due to brace digging into his groin   Functional limitations due to impairments Needs A for all functional mobility   Clinical Presentation (PT Evaluation Complexity) evolving   Clinical Presentation Rationale Multiple comorbidities affecting inability to be mobile   Clinical Decision Making (Complexity) high complexity   Planned Therapy Interventions (PT)   (Assessment of transfers in order to determine  equipment patient needs to go home with hospice.)   Risk & Benefits of therapy have been explained evaluation/treatment results reviewed;care plan/treatment goals reviewed;risks/benefits reviewed;current/potential barriers reviewed;participants voiced agreement with care plan;participants included;patient;spouse/significant other   PT Total Evaluation Time   PT Eval, High Complexity Minutes (66105) 20   Physical Therapy Goals   PT Frequency   (One more session)   PT Predicted Duration/Target Date for Goal Attainment 01/31/25   PT Goals PT Goal 1   PT: Goal 1 Patient will be able to tolerate transferring bed to chair in order to determine what type of equipment patient will need at home with hospice.   Therapeutic Activity   Therapeutic Activities: dynamic activities to improve functional performance Minutes (21146) 12   Symptoms Noted During/After Treatment Increased pain   Treatment Detail/Skilled Intervention Discussion with wife as patient sitting on edge of bed. Brace limiting patient ability to sit fully upright, therefore unable to attempt transfer to chair. Wife reports neurosurgery had stated that if brace was too uncomfortable pateint may not need to wear it. Took brace off in sitting but did not attempt transfer as want neurosurgery opinion as to whether OK to do transfers without brace. Patient transferred sit to sup with max A of 2. Discussed with wife that getting a w/c, hospital bed and lift for home would be most appropriate. I anticipate doing a bed bath will make the most sense since patient has had so many falls due to orthostatic hypotension.   PT Discharge Planning   PT Plan Check with neurosurgery to see if brace needs to be worn. Try a stand pivot transfer. Make recommendation for equipment needs at home.   PT Discharge Recommendation (DC Rec) other (see comments)  (Plan is for home with hospice.)   PT Rationale for DC Rec Unable to fully assess needs for home today as patient unable to  tolerate sitting fully upright due to clam shell brace digging into his groin. Will need to get clearance from neurosurgery as to whether brace needs to be altered or if patient can go without brace. Currently anticipate patient will need A of 2 for all mobility, Will need a hospital bed, a w/c with removable leg rests,and  a bedside commode. He may need a lift if unable to transfer tomorrow. Anticipate it will make the most sense to do bed baths at home but if he can stand and pivot tomorrow may benefit from a tub bench.   PT Brief overview of current status  Goals of therapy will be to address safe mobility and make recs for d/c to next level of care. Pt and RN will continue to follow all falls risk precautions as documented by RN staff while hospitalized  Use overhead lift.   PT Total Distance Amb During Session (feet) 0   PT Equipment Needed at Discharge hospital bed;lift device;commode chair;wheelchair

## 2025-01-30 NOTE — PROGRESS NOTES
Spoke with wife and patient at bedside. Patient is now on hospice, they will wear the brace if it helps his pain though he rarely gets out of bed. We will attempt the xray today to assess for stability. Once completed and if stable we will sign off. We will keep the 6 week appointment in clinic with xray if they so choose. Gave explanation of the fracture and healing process along with general pain management of fractures.    We will sign off at this time

## 2025-01-30 NOTE — PROGRESS NOTES
Care Management Follow Up    Length of Stay (days): 6    Expected Discharge Date: 01/31/2025     Concerns to be Addressed: discharge planning     Patient plan of care discussed at interdisciplinary rounds: Yes    Anticipated Discharge Disposition: Hospice              Anticipated Discharge Services:    Anticipated Discharge DME: Oxygen    Patient/family educated on Medicare website which has current facility and service quality ratings: yes  Education Provided on the Discharge Plan: Yes  Patient/Family in Agreement with the Plan: yes    Referrals Placed by CM/SW: Hospice  Private pay costs discussed: Not applicable    Discussed  Partnership in Safe Discharge Planning  document with patient/family: No     Handoff Completed: No, handoff not indicated or clinically appropriate    Additional Information:  Writer spoke with PT lead and confirmed they would be able to meet with the patient for a one time assessment. Consult would need to be placed.     Writer updated the provider. Provider informed the writer Xray would potentially happen today.     Writer stopped by the patient's room and updated. Patient's spouse, Valery was in agreement.     Next Steps: Patient would need to have xray taken in order to update bedrest orders. PT to see afterwards.     BELINDA Acuna  [facility]  Social Work

## 2025-01-30 NOTE — PROGRESS NOTES
[facility]    Medicine Progress Note - Hospitalist Service    Date of Admission:  1/24/2025    Assessment & Plan   Ti Nickerson is a 79 year old male with a complex history of metastatic renal cancer, atrial fibrillation, orthostatic hypotension on midodrine/Florinef, CKD stage IIIb, CAD, acute metabolic encephalopathy, and history of GI bleeding admitted 1/24/2025 after mechanical fall with new head laceration, PE, DVT, Burst fracture T12. Underwent IR thrombectomy on 1/26.       Goals of Care  Comfort cares  Discussed patient's overall decline including multiple readmissions for orthostatic hypotension, GI bleeding, falls now with T12 fracture, known metastatic cancer, now needing anticoagulation.  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  Discussed at length prior to anticoagulation, family acknowledges the risks of anticoagulation and pursued thrombectomy.  *Palliative care evaluated patient on 1/28, goals of care ongoing but currently family would like to continue current goals and full code status until the patient is able to participate more in discussions.  Discussed possible need for MRCP with GI, patient's wife shares that MRIs are very stressful for time and she does not think this would be feasible without sedation.     1/29/2025: Discussion with Edgar who was much more lucid, wife, Valery and daughter at bedside- Edgar does not wish to spend more time in the hospital and would like to focus on comfort and go home with hospice if possible. We reviewed restorative care versus comfort focused care. Previously discussed with heme/onc who supports patient in whatever decision he makes. It was explained  that comfort care is a good option when the burdens of aggressive treatments outweigh the benefits or there are no good  treatment options, and in this case less likely to restore normal mobility for Edgar with his significant orthostasis, falls, and now T12 fracture. Comfort care focuses on optimizing quality of life and relief from distressing symptoms such as pain, shortness of breath, nausea, and anxiety while allowing a natural dying process. Edgar and his family are understanding and they wish to pursue comfort cares at this time. We will finish the PO course of antibiotics as recommended by ID, but will discontinue the blood thinner as his risk for bleeding is high and he and family would rather accept the risks of clot than bleeding in the setting of comfort-focused care. We will discuss the TLSO brace and if that is still recommended with neurosurgery.     - DNR/DNI   - Initiate comfort-focused cares  - continue PO bactrim through 2/4  - Palliative care following, appreciate recommendations  - Stop anticoagulation   - discuss TLSO brace with neurosurgery- goals to get to wheelchair   -Social work consulted for hospice referrals, appreciate assistance  -One-time physical therapy consults to evaluate mobility for home hospice needs      Iliac Vein thrombosis and IVC extension   Acute segmental Pulmonary Emboli (RLL, CELESTE)   Acute hypoxic respiratory failure   Thrombocytopenia   Chronic anemia  History of blood loss anemia 2/2 to cancer while on anti-coagulation previously  Suspected clot provoked in setting of metastatic cancer and overall deconditioned/immobile state. IR consulted on 1/24 to consider thrombectomy of clot in iliac vein extending to IVC given overall size. After risk benefit discussion, patient and family elected to proceed with treatment of Iliac vein and IVC thrombus. Mildly hypoxic requiring 1-2L intermittently likely seconary to pulmonary emboli. Right ventricular dilation seen on repeat limited TTE which is only mildly worse from complete echo on 1/11. No CT imaging during 1/10 admission so the acuity of Iliac  and IVC thrombus is unclear. Currently suspect most of his pain is related to new thoracic spine burst fracture.  - Continue scheduled tylenol, prn IV dilaudid to 0.2 q4h prn, start prn robaxin, start prn lidocaine patch, prn seroquel for anxiety/agitation  - S/p Thrombectomy on 1/26, Per IR- given narrowing of IVC near nephrectomy - not IVC filter candidate   - Was on heparin, will discontinue given above GOC conversation and comfort cares measures     Sepsis secondary to Enterobacter Bacteremia   Hyperbilirubinemia   Patient febrile on admission with slight leukocytosis. Blood cultures grew Enterobacter species in one bottle. CT CAP on admission did not show any obvious sign of infection in lungs (not completely imaged) or abdomen. Urinalysis not consistent with infection. ID consulted, recommended RUQ US which showed cholelithiasis/sludge and mild thickening, negative sonographic Posada's and mildly distended CBD 7mm, normal Alkphos but mildly elevated bilirubin. GI consulted Re:further workup for possible biliary obstruction - given goals of care no role for MRCP/ERCP.  - ID consulted/signed off  - Transitioned meropenem to ertapenem- treat for 7-10 days - transitioned to bactrim DS 1 PO BID until 2/4 per ID recommendations and patient request as above    Toxic Metabolic Encephalopathy  Suspect delirium with waxing/waning status, nonfocal exam, intermittently confused and disoriented.  Seroquel started as needed, with improvement.  Patient also with intermittent somnolence, likely medication related, will try to limit sedating medications.  - Comfort cares order set  - Treat infection, pain as above    Recurrent falls with orthostatics  Orthostatic hypotension    Patient with a known history of recurrent falls and orthostatic hypotension. His wife reports he has had a long history of orthostatic hypotension even prior to last admission. Recently hospitalized 1/10 through 1/17 on the medicine service for  orthostatic hypotension.  He was started on midodrine 10 mg 3 times a day.  Decision to hold cabozanitinib for 2 weeks.  Liberalized salt.  Started on Florinef 0.1 mg po daily (patient was seen by cardiology, neurology, hematology).   - Continue midodrine and florinef     Imaging:  CT head no evidence for acute intracranial process.  Brain atrophy with microvascular changes.  Cervical spine no acute fracture posttraumatic subluxation.  Multilevel cervical spondylosis without high-grade spinal stenosis.  Multilevel neuroforaminal stenosis  Thoracic spine with acute burst T12 fracture is noted:  Mild retropulsion of the fractured vertebral body without associated spinal canal stenosis. No high-grade spinal canal or neural foraminal stenosis.  Lumbar spine no fracture or posttraumatic subluxation.Multilevel lumbar spondylosis without high-grade spinal canal stenosis. Multilevel neural foraminal stenosis      Atrial fibrillation:   EKG Atrial fibrillation, LAD. NSST changes. Stopped anticoagulation in past given bleeding and falls. Some discussion in past of potential Watchman      Head laceration   - Patient seen in the clinic on 1/22/2025 with a laceration 7 to 8 cm in length with staples placed in the office.  And sutures.  Tetanus booster within 10 years.  - Sutures placed 1/22 reported to remove on 1/30 or 1/31     Acute Burst Fracture T12 with retropulsion  CT chest abdomen pelvis given falls showing an acute T12 fracture. T12 CT spine showing acute burst fracture of T12 with 20% height loss.  Mild retropulsion of the fracture vertebral body without associated spinal canal stenosis. MRI poor quality but reviewed with neurosurgery and okay to initiate heparin  - Neurosurgery follow-up, planning for brace and non-operative management of T12 fracture  - Upright xrays in brace pending- unable to obtain on 1/28 with confusion  - Pain management as above - consider pain team consult if needed     Metastatic Renal cell  "cancer diagnosed in 2019   S/p Right radical nephrecctomy  and IVC thrombectomy 2019   On surveillance with recurrence 1/2021 with liver lesion.    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 Biopsy with mets renal cell.   - 10/2023 partial duodenectomy at [place] [facility]   - 1/2024 EUS and resection of gastric mass with complete endoscopic removal with met [facility]    - 7/2024 admit recurrent GI bleed. Local tumor recurrence >> required transfusion, stopped anticoagulation  - 7/2024 Radiation for bleeding control.      CKD 3  Patient with creatinine elevated to 1.59. 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     Hypocalcemia   Serum calcium down to 7.0 on 1/26, iCal 4.1, Likely asymptomatic. Improved to 4.4 with 1g IV calcium gluconate 1/27.         Diet: Regular Diet Adult    DVT Prophylaxis: Discontinue heparin gtt   Dillard Catheter: Not present  Lines: None     Cardiac Monitoring: None  Code Status: No CPR- Do NOT Intubate      Clinically Significant Risk Factors               # Hypoalbuminemia: Lowest albumin = 2.3 g/dL at 1/28/2025  6:47 AM, will monitor as appropriate     # Hypertension: Noted on problem list            # Overweight: Estimated body mass index is 28.89 kg/m  as calculated from the following:    Height as of 1/10/25: 1.93 m (6' 4\").    Weight as of 1/14/25: 107.6 kg (237 lb 4.8 oz).        # Financial/Environmental Concerns: none         Social Drivers of Health    Physical Activity: Insufficiently Active (5/28/2024)    Exercise Vital Sign     Days of Exercise per Week: 2 days     Minutes of Exercise per Session: 30 min   Social Connections: " Unknown (5/28/2024)    Social Connection and Isolation Panel [NHANES]     Frequency of Social Gatherings with Friends and Family: Twice a week          Disposition Plan     Medically Ready for Discharge: Ready Now home with hospice, social work consult pending              John Rain MD  Hospitalist Service  [facility]  Securely message with [employer] (more info)  Text page via [employer] Paging/Directory   ______________________________________________________________________    Interval History   Doing fairly well this morning, pain is minimal, Tums alert and without distress.  They looking forward to getting home with the help of home hospice, but yet to determine mobility abilities.  Denies any acute concerns.    Physical Exam   Vital Signs: Temp: 98.3  F (36.8  C) Temp src: Oral BP: 133/90 Pulse: 72   Resp: 18 SpO2: 96 % O2 Device: Nasal cannula Oxygen Delivery: 2 LPM  Weight: 0 lbs 0 oz    Constitutional: alert, awake, NAD  HEENT: normocephalic, anicteric sclerae, MMM   Pulmonary: no increased work of breathing on 2 L nasal cannula   Skin: warm, dry  Neuro: AAO      Medical Decision Making       40 MINUTES SPENT BY ME on the date of service doing chart review, history, exam, documentation & further activities per the note.      Data   ------------------------- PAST 24 HR DATA REVIEWED -----------------------------------------------        Imaging results reviewed over the past 24 hrs:   No results found for this or any previous visit (from the past 24 hours).

## 2025-01-30 NOTE — PROGRESS NOTES
Pt went down with brace on for xray today. Plan for PT eval. See SW note regarding discharge planning.

## 2025-01-30 NOTE — PROGRESS NOTES
"Brief Palliative Care Sign Off Note  [facility]      Palliative Care was consulted for goals of care. The plan of care is now comfort care and hospice at discharge. Reviewed with Dr Rain, and at this time the patient does not have any needs we are actively addressing so we will sign off.     However, we know a patient condition/situation may change -- please re-consult us if we can be helpful at any time in the future.      Thank you for the opportunity to be involved in the care of this patient.     Maisha CRUZ, CNS  Palliative Medicine   [facility]  Securely message with [employer]  \"Palliative Care [facility]\"       "

## 2025-01-30 NOTE — PLAN OF CARE
Goal Outcome Evaluation:    Shift Summary 7pm-7am    Admitting Diagnosis: Acute encephalopathy [G93.40]  T12 burst fracture (H) [S22.081A]  Deep vein thrombosis (DVT) of femoral vein, unspecified chronicity, unspecified laterality (H) [I82.419]  Unwitnessed fall [R29.6]  Pulmonary embolism, other, unspecified chronicity, unspecified whether acute cor pulmonale present (H) [I26.99]   Vitals N/A comfort cares  Pain pablo tylenol  A&Ox2; disoriented to time, situation; intermittent confusion  Voiding Incontinent of B&B  Mobility bedrest  Dressing head laceration w/ sutures CDI    Orders Placed This Encounter      Regular Diet Adult       Pt. Takes pills one by one with water; no straw. 1 loose stool overnight. On 2L NC; pulse ox spot check; 98%. Brace when OOB; x-rays pending with TLSO brace on. Denies pain. Denies SOB, CP, nausea. Plan to discharge to home with hospice. SW and care management following.

## 2025-01-30 NOTE — PROGRESS NOTES
[id number]    Patient is alert and oriented x3, disoriented to time. Calm and cooperative, very soft spoken. Transitioned to comfort care. On 2L NC. On bedrest with q2 turn and repo. Incontinent of bowel and bladder, 1 BM during shift. Tolerating regular diet, poor appetite. Takes pills one at a time with jello or water, prefers no straw. 2 PIV SL. Denies pain/SOB/CP. Xray pending with TLSO brace on. Plan to discharge on hospice. Plan of care on going.

## 2025-01-30 NOTE — PROGRESS NOTES
Chart Check:    Pt has transitioned to comfort cares with plans to discharge tomorrow with hospice.    Our team will sign off at this point. Please reach out if any question arise.   It has been our honor to care for Edgar and his family.       Karan CRUZ, [facility]  [phone] (office) or [phone] (cell)

## 2025-01-31 LAB
BACTERIA BLD CULT: NO GROWTH
BACTERIA BLD CULT: NO GROWTH

## 2025-01-31 PROCEDURE — 120N000001 HC R&B MED SURG/OB

## 2025-01-31 PROCEDURE — 250N000013 HC RX MED GY IP 250 OP 250 PS 637

## 2025-01-31 PROCEDURE — 250N000013 HC RX MED GY IP 250 OP 250 PS 637: Performed by: STUDENT IN AN ORGANIZED HEALTH CARE EDUCATION/TRAINING PROGRAM

## 2025-01-31 PROCEDURE — 99233 SBSQ HOSP IP/OBS HIGH 50: CPT

## 2025-01-31 PROCEDURE — 250N000013 HC RX MED GY IP 250 OP 250 PS 637: Performed by: INTERNAL MEDICINE

## 2025-01-31 RX ORDER — LORAZEPAM 2 MG/ML
1 INJECTION INTRAMUSCULAR
Status: DISCONTINUED | OUTPATIENT
Start: 2025-01-31 | End: 2025-02-02

## 2025-01-31 RX ORDER — LORAZEPAM 1 MG/1
1 TABLET ORAL
Status: DISCONTINUED | OUTPATIENT
Start: 2025-01-31 | End: 2025-02-02

## 2025-01-31 RX ADMIN — METHOCARBAMOL 500 MG: 500 TABLET ORAL at 13:38

## 2025-01-31 RX ADMIN — QUETIAPINE 12.5 MG: 25 TABLET, FILM COATED ORAL at 00:31

## 2025-01-31 RX ADMIN — PANTOPRAZOLE SODIUM 40 MG: 40 TABLET, DELAYED RELEASE ORAL at 17:58

## 2025-01-31 RX ADMIN — QUETIAPINE 12.5 MG: 25 TABLET, FILM COATED ORAL at 03:49

## 2025-01-31 RX ADMIN — ACETAMINOPHEN 975 MG: 325 TABLET, FILM COATED ORAL at 00:31

## 2025-01-31 RX ADMIN — ACETAMINOPHEN 975 MG: 325 TABLET, FILM COATED ORAL at 13:20

## 2025-01-31 RX ADMIN — ACETAMINOPHEN 975 MG: 325 TABLET, FILM COATED ORAL at 22:17

## 2025-01-31 RX ADMIN — LORAZEPAM 1 MG: 1 TABLET ORAL at 13:39

## 2025-01-31 RX ADMIN — SULFAMETHOXAZOLE AND TRIMETHOPRIM 1 TABLET: 800; 160 TABLET ORAL at 20:43

## 2025-01-31 RX ADMIN — ACETAMINOPHEN 650 MG: 325 TABLET, FILM COATED ORAL at 03:51

## 2025-01-31 RX ADMIN — METHOCARBAMOL 500 MG: 500 TABLET ORAL at 00:31

## 2025-01-31 RX ADMIN — METHOCARBAMOL 500 MG: 500 TABLET ORAL at 20:43

## 2025-01-31 ASSESSMENT — ACTIVITIES OF DAILY LIVING (ADL)
ADLS_ACUITY_SCORE: 86
ADLS_ACUITY_SCORE: 84
ADLS_ACUITY_SCORE: 86
ADLS_ACUITY_SCORE: 84
ADLS_ACUITY_SCORE: 86

## 2025-01-31 NOTE — TELEPHONE ENCOUNTER
Rajani RN, [facility]:    Requesting Hospice Orders    -Patient is currently in-patient @ [facility].    -Patient would like to discharge on Home Hospice.

## 2025-01-31 NOTE — PROGRESS NOTES
[facility]    Medicine Progress Note - Hospitalist Service    Date of Admission:  1/24/2025    Assessment & Plan   Ti Nickerson is a 79 year old male with a complex history of metastatic renal cancer, atrial fibrillation, orthostatic hypotension on midodrine/Florinef, CKD stage IIIb, CAD, acute metabolic encephalopathy, and history of GI bleeding admitted 1/24/2025 after mechanical fall with new head laceration, PE, DVT, Burst fracture T12. Underwent IR thrombectomy on 1/26.       Goals of Care  Comfort cares  Discussed patient's overall decline including multiple readmissions for orthostatic hypotension, GI bleeding, falls now with T12 fracture, known metastatic cancer, now needing anticoagulation.  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  Initially discussed anticoagulation at length, patient and family wanted to pursue thrombectomy, given continued decline and goals of care conversations continued.      1/29/2025: Discussion with Edgar who was much more lucid, wife, Valery and daughter at bedside- Edgar clearly states he does not wish to spend more time in the hospital and would like to focus on comfort and go home with hospice if possible. We reviewed restorative care versus comfort focused care. Previously discussed with heme/onc who supports patient in whatever decision he makes. It was explained  that comfort care is a good option when the burdens of aggressive treatments outweigh the benefits or there are no good treatment options, and in this case less likely to restore normal mobility for Edgar with his significant orthostasis, falls, and now T12 fracture. Comfort care focuses on optimizing quality of life and relief from distressing symptoms such as pain, shortness of breath, nausea, and anxiety while allowing a natural  dying process. Edgar and his family are understanding and they wish to pursue comfort cares at this time. We will finish the PO course of antibiotics as recommended by ID, but will discontinue the blood thinner as his risk for bleeding is high and he and family would rather accept the risks of clot than bleeding in the setting of comfort-focused care.  TLSO brace as recommended by neurosurgery, however given patient's limited mobility and discomfort with the brace, it is reasonable to not wear this.  He would not want to emergent neurosurgery in case of fracture instability.    - DNR/DNI   - Initiate comfort-focused cares  - continue PO bactrim through 2/4  - Palliative care signed off  - Stop anticoagulation   -TLSO brace if tolerated  -Social work consulted for hospice referrals, appreciate assistance  -PT consult to evaluate mobility for home hospice needs      Iliac Vein thrombosis and IVC extension   Acute segmental Pulmonary Emboli (RLL, CELESTE)   Acute hypoxic respiratory failure   Thrombocytopenia   Chronic anemia  History of blood loss anemia 2/2 to cancer while on anti-coagulation previously  Suspected clot provoked in setting of metastatic cancer and overall deconditioned/immobile state. IR consulted on 1/24 to consider thrombectomy of clot in iliac vein extending to IVC given overall size. After risk benefit discussion, patient and family elected to proceed with treatment of Iliac vein and IVC thrombus. Mildly hypoxic requiring 1-2L intermittently likely seconary to pulmonary emboli. Right ventricular dilation seen on repeat limited TTE which is only mildly worse from complete echo on 1/11. No CT imaging during 1/10 admission so the acuity of Iliac and IVC thrombus is unclear. Currently suspect most of his pain is related to new thoracic spine burst fracture.  - Continue scheduled tylenol, prn IV dilaudid to 0.2 q4h prn, start prn robaxin, start prn lidocaine patch, prn seroquel for anxiety/agitation  - S/p  Thrombectomy on 1/26, Per IR- given narrowing of IVC near nephrectomy - not IVC filter candidate   - Was on heparin, will discontinue given above GOC conversation and comfort cares measures   - New LE edema on 1/31 suspect clot, no associated pain    Sepsis secondary to Enterobacter Bacteremia   Hyperbilirubinemia   Patient febrile on admission with slight leukocytosis. Blood cultures grew Enterobacter species in one bottle. CT CAP on admission did not show any obvious sign of infection in lungs (not completely imaged) or abdomen. Urinalysis not consistent with infection. ID consulted, recommended RUQ US which showed cholelithiasis/sludge and mild thickening, negative sonographic Posada's and mildly distended CBD 7mm, normal Alkphos but mildly elevated bilirubin. GI consulted Re:further workup for possible biliary obstruction - given goals of care no role for MRCP/ERCP.  - ID consulted/signed off  - Transitioned meropenem to ertapenem- treat for 7-10 days - transitioned to bactrim DS 1 PO BID until 2/4 per ID recommendations and patient request as above    Toxic Metabolic Encephalopathy  Suspect delirium with waxing/waning status, nonfocal exam, intermittently confused and disoriented.  Seroquel started as needed, with improvement.  Patient also with intermittent somnolence, likely medication related, will try to limit sedating medications.  - Comfort cares order set  - Treat infection, pain as above    Recurrent falls with orthostatics  Orthostatic hypotension    Patient with a known history of recurrent falls and orthostatic hypotension. His wife reports he has had a long history of orthostatic hypotension even prior to last admission. Recently hospitalized 1/10 through 1/17 on the medicine service for orthostatic hypotension.  He was started on midodrine 10 mg 3 times a day.  Decision to hold cabozanitinib for 2 weeks.  Liberalized salt.  Started on Florinef 0.1 mg po daily (patient was seen by cardiology,  neurology, hematology).   - Continue midodrine and florinef     Imaging:  CT head no evidence for acute intracranial process.  Brain atrophy with microvascular changes.  Cervical spine no acute fracture posttraumatic subluxation.  Multilevel cervical spondylosis without high-grade spinal stenosis.  Multilevel neuroforaminal stenosis  Thoracic spine with acute burst T12 fracture is noted:  Mild retropulsion of the fractured vertebral body without associated spinal canal stenosis. No high-grade spinal canal or neural foraminal stenosis.  Lumbar spine no fracture or posttraumatic subluxation.Multilevel lumbar spondylosis without high-grade spinal canal stenosis. Multilevel neural foraminal stenosis      Atrial fibrillation:   EKG Atrial fibrillation, LAD. NSST changes. Stopped anticoagulation in past given bleeding and falls. Some discussion in past of potential Watchman      Head laceration   - Patient seen in the clinic on 1/22/2025 with a laceration 7 to 8 cm in length with staples placed in the office.  And sutures.  Tetanus booster within 10 years.  - Sutures placed 1/22 RN to remove 1/31     Acute Burst Fracture T12 with retropulsion  CT chest abdomen pelvis given falls showing an acute T12 fracture. T12 CT spine showing acute burst fracture of T12 with 20% height loss.  Mild retropulsion of the fracture vertebral body without associated spinal canal stenosis. MRI poor quality but reviewed with neurosurgery and okay to initiate heparin  - Neurosurgery follow-up, planning for brace and non-operative management of T12 fracture  - Upright xrays in brace pending- unable to obtain on 1/28 with confusion  - Pain management as above - consider pain team consult if needed     Metastatic Renal cell cancer diagnosed in 2019   S/p Right radical nephrecctomy  and IVC thrombectomy 2019   On surveillance with recurrence 1/2021 with liver lesion.    - Follows at [facility]     History of GI bleeding due to metastatic disease  "in duodenum   History of GI bleeding 9/2023 with EGD showing ulcerated mass in the third portion of the duodenum. Biopsy with mets renal cell.   - 10/2023 partial duodenectomy at [place] [facility]s [facility]   - 1/2024 EUS and resection of gastric mass with complete endoscopic removal with met RCC    - 7/2024 admit recurrent GI bleed. Local tumor recurrence >> required transfusion, stopped anticoagulation  - 7/2024 Radiation for bleeding control.      CKD 3  Patient with creatinine elevated to 1.59. Received IV fluids on admission and creatinine continues to improve 1.1 on 1/28.  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     Hypocalcemia   Serum calcium down to 7.0 on 1/26, iCal 4.1, Likely asymptomatic. Improved to 4.4 with 1g IV calcium gluconate 1/27.         Diet: Regular Diet Adult    DVT Prophylaxis: Discontinue heparin gtt   Dillard Catheter: Not present  Lines: None     Cardiac Monitoring: None  Code Status: No CPR- Do NOT Intubate      Clinically Significant Risk Factors               # Hypoalbuminemia: Lowest albumin = 2.3 g/dL at 1/28/2025  6:47 AM, will monitor as appropriate     # Hypertension: Noted on problem list            # Overweight: Estimated body mass index is 28.89 kg/m  as calculated from the following:    Height as of 1/10/25: 1.93 m (6' 4\").    Weight as of 1/14/25: 107.6 kg (237 lb 4.8 oz).        # Financial/Environmental Concerns: none         Social Drivers of Health    Physical Activity: Insufficiently Active (5/28/2024)    Exercise Vital Sign     Days of Exercise per Week: 2 days     Minutes of Exercise per Session: 30 min   Social Connections: Unknown (5/28/2024)    Social Connection and Isolation Panel [NHANES]     Frequency of Social Gatherings with Friends and Family: Twice a week          Disposition Plan "     Medically Ready for Discharge: Ready Now home with hospice, social work consult pending              John Rain MD  Hospitalist Service  [facility]  Securely message with [employer] (more info)  Text page via [employer] Paging/Directory   ______________________________________________________________________    Interval History   No acute overnight events, but was resting peacefully.  His wife Valery reports that she is concerned that he is having more anxiety and that is not well-managed.  Would like to try Ativan instead of  Seroquel as previously requested.  Patient had seated radiographs 1/30 which appear worse, however given goals of care will defer to patient if they would like to wear the brace or not.        Physical Exam   Vital Signs:                    Weight: 0 lbs 0 oz    Constitutional: alert, awake, NAD  HEENT: normocephalic, anicteric sclerae, MMM   Pulmonary: no increased work of breathing on 2 L nasal cannula   Skin: warm, dry, LE edema bilaterally  Neuro: AAO      Medical Decision Making       35 MINUTES SPENT BY ME on the date of service doing chart review, history, exam, documentation & further activities per the note.      Data   ------------------------- PAST 24 HR DATA REVIEWED -----------------------------------------------        Imaging results reviewed over the past 24 hrs:   Recent Results (from the past 24 hours)   XR Thoracic Spine 2 Views    Narrative    EXAM: XR THORACIC SPINE 2 VIEWS  LOCATION: [facility]  DATE: 1/30/2025    INDICATION: T12 fx, upright XR in brace, if unable sitting is ok.  COMPARISON: Thoracic spine CT 1/24/2025.      Impression    IMPRESSION: Acute T12 compression fracture demonstrates increased 45-55% loss of vertebral body height and associated 33 degrees segmental kyphotic deformity, previously approximately 15-25% height loss on 1/24/2025. No new fracture. Mild multilevel   thoracic spondylosis with degenerative  kyphoscoliotic deformity.

## 2025-01-31 NOTE — PLAN OF CARE
Goal Outcome Evaluation:                 Outcome Evaluation: On comfort Care.  Summary: 01/30/ 25. [id number].     Comfort care. Patient is alert and oriented X1-2, intermittent confusion and redirected multiple times.  Up with assist of 2 with lift. On regular diet with fair appetite. Takes medications whole with thin liquids. PIV saline locked. Patient reports pain , schedule and prn robaxin was given with effective results. Patient was restless , agitated and hallucination, prn Seroquel  was given with no effective results. Zyprexa was given 2140 with effective results. Turn and repo, patient independently log -roll himself in bed  incontinent of B&B, Brief wet and changed. Discharge home tomorrow with hospice cares .

## 2025-01-31 NOTE — PLAN OF CARE
Physical Therapy Discharge Summary    Reason for therapy discharge:    PT eval was to see for equipment needs in order to go home with hospice. Per SW all equipment has been ordered including a lift.     Progress towards therapy goal(s). See goals on Care Plan in [facility] electronic health record for goal details.  Not applicable. Order was to assess for equipment needs.     Therapy recommendation(s):    No further therapy is recommended.    Goal Outcome Evaluation:

## 2025-01-31 NOTE — PROGRESS NOTES
CLINICAL NUTRITION SERVICES - BRIEF NOTE    - Per chart review, pt has transitioned to comfort cares  - Nutrition will not sign on at this time  - Please consult if any further nutrition interventions arise    Alejandra Davis RD, LD  Clinical Dietitian - [facility]

## 2025-01-31 NOTE — PROGRESS NOTES
Shift Summery [id number]    Comfort cares. Pt A&Ox1-2, intermittent confusion. Daughter at bedside. PRN seroquel given for restlessness and agitation was effective. Incontinent. Discharge home today with hospice cares.

## 2025-01-31 NOTE — PROGRESS NOTES
[facility]  Neurosurgery Daily Progress Note    Assessment and Plan   79-year-old male with history of metastatic renal cell carcinoma, hypertension, hyperlipidemia, CAD, CKD, and A-fib, admitted on 1/24/2025 after a fall. Neurosurgery was consulted for acute T12 burst fracture.  Orthotics fitted TLSO brace.  Upright thoracic spine x-ray was completed as stated below:     EXAM: XR THORACIC SPINE 2 VIEWS  LOCATION: [facility]  DATE: 1/30/2025                                                         IMPRESSION: Acute T12 compression fracture demonstrates increased 45-55% loss of vertebral body height and associated 33 degrees segmental kyphotic deformity, previously approximately 15-25% height loss on 1/24/2025. No new fracture. Mild multilevel thoracic spondylosis with degenerative kyphoscoliotic deformity.    Called patient's wife and discussed thoracic spine x-ray results.  Patient transitioned to hospice care, plan to discharge on Monday.  Per discussion with wife, patient does not want surgery for T12 fracture.  Wife will help patient utilize TLSO brace when upright and out of bed.  Wife is requesting to meet with the orthotics team for brace teaching and possible adjustment.  Ordered orthotics consult.  Patient is scheduled to follow-up with our neurosurgery clinic in 6 weeks with repeat x-ray prior to appointment.  Wife voiced agreement with plan.    Discussed with Dr. Cuco Gustafson, CNP  [facility]  Clinic phone number: [phone]  Securely message or page via [employer] Secure [employer], [employer], or [employer]

## 2025-01-31 NOTE — PROGRESS NOTES
Care Management Follow Up    Length of Stay (days): 7    Expected Discharge Date: 02/03/2025     Concerns to be Addressed: discharge planning     Patient plan of care discussed at interdisciplinary rounds: Yes    Anticipated Discharge Disposition: Hospice              Anticipated Discharge Services:    Anticipated Discharge DME: Oxygen    Patient/family educated on Medicare website which has current facility and service quality ratings: yes  Education Provided on the Discharge Plan: Yes  Patient/Family in Agreement with the Plan: yes    Referrals Placed by CM/SW: Hospice  Private pay costs discussed: Not applicable    Discussed  Partnership in Safe Discharge Planning  document with patient/family: Yes: Verbally given     Handoff Completed: No, handoff not indicated or clinically appropriate    Additional Information:  Per PT,        received a message from Ekaterina requesting writer to plan for a discharge on Monday due to the patient's change in care.      received a call from Yessenia with [facility] ([phone]).  contacted her back and was informed they would be able to admit the patient to hospice services on Monday @ 1200. Teresar informed her of the DME that was recommended by PT to be ordered and mentioned Panfilo lift and Shower chair was tentative patient's ability to stand and pivot. Yessenia stated they are going to meet with Valery to visit the apartment and assess the environment today at 1300.     Teresar stopped by the patient's room and spoke with Valery, patient's spouse. Writer updated her on the above. Valery consented to the discharge plan for Monday and did request to have the panfilo lift ordered. Writer stated he will contact [facility] to have the private duty home care set up. Writer stated he will have them contact her to arrange once referral is sent.     Teresar contacted [facility], Ash Calderon ([phone]) and faxed him over a referral via the [employer]  tab. Ash contacted the writer back after reviewing and informed the writer he will contact the patient's spouse to confirm the services. Writer informed Ash of the discharge plan for Monday.    Writer contacted Chantel with [facility] ([phone]) and confirmed the ann-marie lift to be ordered.      Writer followed up with Valery ( [phone]). Valery confirmed they were able to meet with Jordana with [facility] at 1300. Additionally, Valery confirmed they spoke with Ash with [facility] and got private duty arranged for Monday. Valery consented with the discharge plan for Monday at 11am back to [facility] with [facility] and [facility] for private duty.     Writer contacted [facility] transport and scheduled a stretcher ride for Monday at [phone]. Writer completed PCS form.     Next Steps: Discharge Monday.     BELINDA Acuna  [facility]  Social Work

## 2025-02-01 PROCEDURE — 250N000011 HC RX IP 250 OP 636

## 2025-02-01 PROCEDURE — 120N000001 HC R&B MED SURG/OB

## 2025-02-01 PROCEDURE — 99233 SBSQ HOSP IP/OBS HIGH 50: CPT

## 2025-02-01 PROCEDURE — 250N000013 HC RX MED GY IP 250 OP 250 PS 637: Performed by: INTERNAL MEDICINE

## 2025-02-01 PROCEDURE — 250N000013 HC RX MED GY IP 250 OP 250 PS 637

## 2025-02-01 RX ADMIN — METHOCARBAMOL 500 MG: 500 TABLET ORAL at 03:23

## 2025-02-01 RX ADMIN — METHOCARBAMOL 500 MG: 500 TABLET ORAL at 09:22

## 2025-02-01 RX ADMIN — HYDROMORPHONE HYDROCHLORIDE 1 MG: 1 SOLUTION ORAL at 16:05

## 2025-02-01 RX ADMIN — SULFAMETHOXAZOLE AND TRIMETHOPRIM 1 TABLET: 800; 160 TABLET ORAL at 09:24

## 2025-02-01 RX ADMIN — LORAZEPAM 1 MG: 1 TABLET ORAL at 07:01

## 2025-02-01 RX ADMIN — HYDROMORPHONE HYDROCHLORIDE 0.5 MG: 1 INJECTION, SOLUTION INTRAMUSCULAR; INTRAVENOUS; SUBCUTANEOUS at 20:04

## 2025-02-01 RX ADMIN — LORAZEPAM 1 MG: 1 TABLET ORAL at 17:14

## 2025-02-01 RX ADMIN — LORAZEPAM 1 MG: 1 TABLET ORAL at 12:29

## 2025-02-01 RX ADMIN — LORAZEPAM 1 MG: 1 TABLET ORAL at 00:05

## 2025-02-01 RX ADMIN — LORAZEPAM 1 MG: 2 INJECTION INTRAMUSCULAR; INTRAVENOUS at 22:54

## 2025-02-01 RX ADMIN — PANTOPRAZOLE SODIUM 40 MG: 40 TABLET, DELAYED RELEASE ORAL at 09:28

## 2025-02-01 ASSESSMENT — ACTIVITIES OF DAILY LIVING (ADL)
ADLS_ACUITY_SCORE: 89
ADLS_ACUITY_SCORE: 84
ADLS_ACUITY_SCORE: 89
ADLS_ACUITY_SCORE: 84
ADLS_ACUITY_SCORE: 84
ADLS_ACUITY_SCORE: 89
ADLS_ACUITY_SCORE: 84

## 2025-02-01 NOTE — PROGRESS NOTES
rientation: self    Vitals/Tele: deferred    Pain: scheduled Tylenol. Had Robaxin x2 ativan x1. PNAID 3. Family declined other    IV Access/drains: RL forearm SL    Diet: regular    Mobility: turn/repo/lift but helps. Declined this a.m..     GI/: incontinent, declined check and change this a.m.    Wound/Skin: scattered scabs/bruising. Mepi    Other: pills one at a time with water/straw    Consults: Hospice    Discharge Plan: Monday home w Hospice      See Flow sheets for assessment

## 2025-02-01 NOTE — PROGRESS NOTES
Comfort care. Drowsy and restless. PRN Ativan given x2 . Dilaudid given x1 for pain. T&R. BM x1. Family at bedside.

## 2025-02-01 NOTE — PROGRESS NOTES
Comfort care. Sleeping most of the time. Takes schedule Tyl, PRN Robaxin for general pain. PRN Ativan for restless. T&R. Pt will discharge home with hospice on Monday.

## 2025-02-01 NOTE — PROGRESS NOTES
[facility]    Medicine Progress Note - Hospitalist Service    Date of Admission:  1/24/2025    Assessment & Plan   Ti Nickerson is a 79 year old male with a complex history of metastatic renal cancer, atrial fibrillation, orthostatic hypotension on midodrine/Florinef, CKD stage IIIb, CAD, acute metabolic encephalopathy, and history of GI bleeding admitted 1/24/2025 after mechanical fall with new head laceration, PE, DVT, Burst fracture T12. Underwent IR thrombectomy on 1/26.       Goals of Care  Comfort cares  Discussed patient's overall decline including multiple readmissions for orthostatic hypotension, GI bleeding, falls now with T12 fracture, known metastatic cancer, now needing anticoagulation.  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  Initially discussed anticoagulation at length, patient and family wanted to pursue thrombectomy, given continued decline and goals of care conversations continued.      1/29/2025: Discussion with Edgar who was much more lucid, wife, Valery and daughter at bedside- Edgar clearly states he does not wish to spend more time in the hospital and would like to focus on comfort and go home with hospice if possible. We reviewed restorative care versus comfort focused care. Previously discussed with heme/onc who supports patient in whatever decision he makes. It was explained  that comfort care is a good option when the burdens of aggressive treatments outweigh the benefits or there are no good treatment options, and in this case less likely to restore normal mobility for Edgar with his significant orthostasis, falls, and now T12 fracture. Comfort care focuses on optimizing quality of life and relief from distressing symptoms such as pain, shortness of breath, nausea, and anxiety while allowing a natural  dying process. Edgar and his family are understanding and they wish to pursue comfort cares at this time. We will finish the PO course of antibiotics as recommended by ID, but will discontinue the blood thinner as his risk for bleeding is high and he and family would rather accept the risks of clot than bleeding in the setting of comfort-focused care.  TLSO brace as recommended by neurosurgery, however given patient's limited mobility and discomfort with the brace, it is reasonable to not wear this.  He would not want to emergent neurosurgery in case of fracture instability.    - DNR/DNI   - Initiate comfort-focused cares  - continue PO bactrim through 2/4  - Palliative care signed off  - Stop anticoagulation   -TLSO brace if tolerated  -Social work consulted for hospice referrals, appreciate assistance  -PT consult to evaluate mobility for home hospice needs      Iliac Vein thrombosis and IVC extension   Acute segmental Pulmonary Emboli (RLL, CELESTE)   Acute hypoxic respiratory failure   Thrombocytopenia   Chronic anemia  History of blood loss anemia 2/2 to cancer while on anti-coagulation previously  Suspected clot provoked in setting of metastatic cancer and overall deconditioned/immobile state. IR consulted on 1/24 to consider thrombectomy of clot in iliac vein extending to IVC given overall size. After risk benefit discussion, patient and family elected to proceed with treatment of Iliac vein and IVC thrombus. Mildly hypoxic requiring 1-2L intermittently likely seconary to pulmonary emboli. Right ventricular dilation seen on repeat limited TTE which is only mildly worse from complete echo on 1/11. No CT imaging during 1/10 admission so the acuity of Iliac and IVC thrombus is unclear. Currently suspect most of his pain is related to new thoracic spine burst fracture.  - Continue scheduled tylenol, prn IV dilaudid to 0.2 q4h prn, start prn robaxin, start prn lidocaine patch, prn seroquel for anxiety/agitation  - S/p  Thrombectomy on 1/26, Per IR- given narrowing of IVC near nephrectomy - not IVC filter candidate   - Was on heparin, will discontinue given above GOC conversation and comfort cares measures   - New LE edema on 1/31 suspect clot, no associated pain    Sepsis secondary to Enterobacter Bacteremia   Hyperbilirubinemia   Patient febrile on admission with slight leukocytosis. Blood cultures grew Enterobacter species in one bottle. CT CAP on admission did not show any obvious sign of infection in lungs (not completely imaged) or abdomen. Urinalysis not consistent with infection. ID consulted, recommended RUQ US which showed cholelithiasis/sludge and mild thickening, negative sonographic Posada's and mildly distended CBD 7mm, normal Alkphos but mildly elevated bilirubin. GI consulted Re:further workup for possible biliary obstruction - given goals of care no role for MRCP/ERCP.  - ID consulted/signed off  - Transitioned meropenem to ertapenem- treat for 7-10 days - transitioned to bactrim DS 1 PO BID until 2/4 per ID recommendations and patient request as above    Toxic Metabolic Encephalopathy  Suspect delirium with waxing/waning status, nonfocal exam, intermittently confused and disoriented.  Seroquel started as needed, with improvement.  Patient also with intermittent somnolence, likely medication related, will try to limit sedating medications.  - Comfort cares order set  - Treat infection, pain as above    Recurrent falls with orthostatics  Orthostatic hypotension    Patient with a known history of recurrent falls and orthostatic hypotension. His wife reports he has had a long history of orthostatic hypotension even prior to last admission. Recently hospitalized 1/10 through 1/17 on the medicine service for orthostatic hypotension.  He was started on midodrine 10 mg 3 times a day.  Decision to hold cabozanitinib for 2 weeks.  Liberalized salt.  Started on Florinef 0.1 mg po daily (patient was seen by cardiology,  neurology, hematology).   - Continue midodrine and florinef     Imaging:  CT head no evidence for acute intracranial process.  Brain atrophy with microvascular changes.  Cervical spine no acute fracture posttraumatic subluxation.  Multilevel cervical spondylosis without high-grade spinal stenosis.  Multilevel neuroforaminal stenosis  Thoracic spine with acute burst T12 fracture is noted:  Mild retropulsion of the fractured vertebral body without associated spinal canal stenosis. No high-grade spinal canal or neural foraminal stenosis.  Lumbar spine no fracture or posttraumatic subluxation.Multilevel lumbar spondylosis without high-grade spinal canal stenosis. Multilevel neural foraminal stenosis      Atrial fibrillation:   EKG Atrial fibrillation, LAD. NSST changes. Stopped anticoagulation in past given bleeding and falls. Some discussion in past of potential Watchman      Head laceration   - Patient seen in the clinic on 1/22/2025 with a laceration 7 to 8 cm in length with staples placed in the office.  And sutures.  Tetanus booster within 10 years.  - Sutures placed 1/22 RN removed 1/31     Acute Burst Fracture T12 with retropulsion  CT chest abdomen pelvis given falls showing an acute T12 fracture. T12 CT spine showing acute burst fracture of T12 with 20% height loss.  Mild retropulsion of the fracture vertebral body without associated spinal canal stenosis. MRI poor quality but reviewed with neurosurgery and okay to initiate heparin  - Neurosurgery follow-up, planning for brace and non-operative management of T12 fracture  - Upright xrays in brace pending- unable to obtain on 1/28 with confusion  - Pain management as above - consider pain team consult if needed     Metastatic Renal cell cancer diagnosed in 2019   S/p Right radical nephrecctomy  and IVC thrombectomy 2019   On surveillance with recurrence 1/2021 with liver lesion.    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 Biopsy with mets renal cell.   - 10/2023 partial duodenectomy at [facility]   - 1/2024 EUS and resection of gastric mass with complete endoscopic removal with [facility]    - 7/2024 admit recurrent GI bleed. Local tumor recurrence >> required transfusion, stopped anticoagulation  - 7/2024 Radiation for bleeding control.      CKD 3  Patient with creatinine elevated to 1.59. Received IV fluids on admission and creatinine continues to improve 1.1 on 1/28.  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     Hypocalcemia   Serum calcium down to 7.0 on 1/26, iCal 4.1, Likely asymptomatic. Improved to 4.4 with 1g IV calcium gluconate 1/27.         Diet: Regular Diet Adult    DVT Prophylaxis: Discontinue heparin gtt   Dillard Catheter: Not present  Lines: None     Cardiac Monitoring: None  Code Status: No CPR- Do NOT Intubate      Clinically Significant Risk Factors               # Hypoalbuminemia: Lowest albumin = 2.3 g/dL at 1/28/2025  6:47 AM, will monitor as appropriate     # Hypertension: Noted on problem list            # Overweight: Estimated body mass index is 28.89 kg/m  as calculated from the following:    Height as of 1/10/25: 1.93 m (6' 4\").    Weight as of 1/14/25: 107.6 kg (237 lb 4.8 oz).        # Financial/Environmental Concerns: none         Social Drivers of Health    Physical Activity: Insufficiently Active (5/28/2024)    Exercise Vital Sign     Days of Exercise per Week: 2 days     Minutes of Exercise per Session: 30 min   Social Connections: Unknown (5/28/2024)    Social Connection and Isolation Panel [NHANES]     Frequency of Social Gatherings with Friends and Family: Twice a week          Disposition Plan     Medically " Ready for Discharge: Ready Now home with hospice, social work consult pending              John Rain MD  Hospitalist Service  [facility]  Securely message with [employer] (more info)  Text page via [employer] Paging/Directory   ______________________________________________________________________    Interval History   More restless, more withdrawn without good interaction with family.  Intermittently receiving Dilaudid and lorazepam.  Has increased swelling in his bilateral legs with occasional signs of pain in legs.  Valery reports they will have 24/7 support at home for hospice.      Physical Exam   Vital Signs:                    Weight: 0 lbs 0 oz    Constitutional: alert, awake, NAD  HEENT: normocephalic, anicteric sclerae, MMM   Pulmonary: no increased work of breathing on 2 L nasal cannula   Skin: warm, dry, LE edema bilaterally  Neuro: AAO      Medical Decision Making       30 MINUTES SPENT BY ME on the date of service doing chart review, history, exam, documentation & further activities per the note.      Data   ------------------------- PAST 24 HR DATA REVIEWED -----------------------------------------------        Imaging results reviewed over the past 24 hrs:   No results found for this or any previous visit (from the past 24 hours).

## 2025-02-02 VITALS
DIASTOLIC BLOOD PRESSURE: 90 MMHG | TEMPERATURE: 98.3 F | HEART RATE: 72 BPM | SYSTOLIC BLOOD PRESSURE: 133 MMHG | RESPIRATION RATE: 18 BRPM | OXYGEN SATURATION: 96 %

## 2025-02-02 PROCEDURE — 120N000001 HC R&B MED SURG/OB

## 2025-02-02 PROCEDURE — 250N000011 HC RX IP 250 OP 636

## 2025-02-02 PROCEDURE — 99232 SBSQ HOSP IP/OBS MODERATE 35: CPT

## 2025-02-02 RX ORDER — LORAZEPAM 1 MG/1
1 TABLET ORAL
Status: DISCONTINUED | OUTPATIENT
Start: 2025-02-02 | End: 2025-02-03

## 2025-02-02 RX ORDER — LORAZEPAM 2 MG/ML
1 INJECTION INTRAMUSCULAR
Status: DISCONTINUED | OUTPATIENT
Start: 2025-02-02 | End: 2025-02-03

## 2025-02-02 RX ADMIN — LORAZEPAM 1 MG: 2 INJECTION INTRAMUSCULAR; INTRAVENOUS at 02:25

## 2025-02-02 RX ADMIN — HYDROMORPHONE HYDROCHLORIDE 0.3 MG: 1 INJECTION, SOLUTION INTRAMUSCULAR; INTRAVENOUS; SUBCUTANEOUS at 07:02

## 2025-02-02 RX ADMIN — HYDROMORPHONE HYDROCHLORIDE 0.5 MG: 1 INJECTION, SOLUTION INTRAMUSCULAR; INTRAVENOUS; SUBCUTANEOUS at 21:41

## 2025-02-02 RX ADMIN — LORAZEPAM 1 MG: 2 INJECTION INTRAMUSCULAR; INTRAVENOUS at 14:58

## 2025-02-02 RX ADMIN — HYDROMORPHONE HYDROCHLORIDE 0.5 MG: 1 INJECTION, SOLUTION INTRAMUSCULAR; INTRAVENOUS; SUBCUTANEOUS at 04:23

## 2025-02-02 RX ADMIN — HYDROMORPHONE HYDROCHLORIDE 0.3 MG: 1 INJECTION, SOLUTION INTRAMUSCULAR; INTRAVENOUS; SUBCUTANEOUS at 15:51

## 2025-02-02 RX ADMIN — LORAZEPAM 1 MG: 2 INJECTION INTRAMUSCULAR; INTRAVENOUS at 22:54

## 2025-02-02 RX ADMIN — HYDROMORPHONE HYDROCHLORIDE 0.3 MG: 1 INJECTION, SOLUTION INTRAMUSCULAR; INTRAVENOUS; SUBCUTANEOUS at 18:08

## 2025-02-02 RX ADMIN — HYDROMORPHONE HYDROCHLORIDE 0.3 MG: 1 INJECTION, SOLUTION INTRAMUSCULAR; INTRAVENOUS; SUBCUTANEOUS at 12:48

## 2025-02-02 ASSESSMENT — ACTIVITIES OF DAILY LIVING (ADL)
ADLS_ACUITY_SCORE: 84

## 2025-02-02 NOTE — PLAN OF CARE
Goal Outcome Evaluation:         Shift: 2-1-25, 1384-1240  Orientation: self  Vitals/Tele: deferred  Pain: Had dilaudid x 1 and ativan x1. PNAID 5. Not with it enough for oral meds.  IV Access/drains: R and L forearm SL  Diet: regular  Mobility: turn/repo/lift .   GI/: incontinent  Wound/Skin: scattered scabs/bruising. Mepi  Consults: Hospice  Discharge Plan: Monday home w Hospice

## 2025-02-02 NOTE — PROGRESS NOTES
Care Management Follow Up    Length of Stay (days): 9    Expected Discharge Date: 02/03/2025     Concerns to be Addressed: discharge planning     Patient plan of care discussed at interdisciplinary rounds: Yes    Anticipated Discharge Disposition: Hospice         Anticipated Discharge Services:    Anticipated Discharge DME: Oxygen    Patient/family educated on Medicare website which has current facility and service quality ratings: yes  Education Provided on the Discharge Plan: Yes  Patient/Family in Agreement with the Plan: yes    Referrals Placed by CM/SW: Hospice  Private pay costs discussed:  N/A today    Discussed  Partnership in Safe Discharge Planning  document with patient/family: No     Handoff Completed: No, handoff not indicated or clinically appropriate    Additional Information:  SW spoke to hospitalist who says that pt has had a precipitous decline, and is non-responsive and MD and family have decided to consult [facility].  Hospitalist asks SW to cancel transportation for tomorrow, as it will likely be after scheduled transport time that pt will be evaluated by [facility].  Family aware.   Transport cancelled for 2/2/25.  SW spoke to [facility] [phone] and let them know we need to cancel hospice services.      Next Steps: Follow for acceptance with [facility], or other discharge plan if appropriate.     Thalia Kohler, HARMANSW

## 2025-02-02 NOTE — PROGRESS NOTES
[facility]    Medicine Progress Note - Hospitalist Service    Date of Admission:  1/24/2025    Assessment & Plan   Ti Nickerson is a 79 year old male with a complex history of metastatic renal cancer, atrial fibrillation, orthostatic hypotension on midodrine/Florinef, CKD stage IIIb, CAD, acute metabolic encephalopathy, and history of GI bleeding admitted 1/24/2025 after mechanical fall with new head laceration, PE, DVT, Burst fracture T12. Underwent IR thrombectomy on 1/26.       Goals of Care  Comfort cares  Discussed patient's overall decline including multiple readmissions for orthostatic hypotension, GI bleeding, falls now with T12 fracture, known metastatic cancer, now needing anticoagulation.  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  Initially discussed anticoagulation at length, patient and family wanted to pursue thrombectomy, given continued decline and goals of care conversations continued.      1/29/2025: Discussion with Edgar who was much more lucid, wife, Valery and daughter at bedside- Edgar clearly states he does not wish to spend more time in the hospital and would like to focus on comfort and go home with hospice if possible. We reviewed restorative care versus comfort focused care. Previously discussed with heme/onc who supports patient in whatever decision he makes. It was explained  that comfort care is a good option when the burdens of aggressive treatments outweigh the benefits or there are no good treatment options, and in this case less likely to restore normal mobility for Edgar with his significant orthostasis, falls, and now T12 fracture. Comfort care focuses on optimizing quality of life and relief from distressing symptoms such as pain, shortness of breath, nausea, and anxiety while allowing a natural  dying process. Edgar and his family are understanding and they wish to pursue comfort cares at this time. We will finish the PO course of antibiotics as recommended by ID, but will discontinue the blood thinner as his risk for bleeding is high and he and family would rather accept the risks of clot than bleeding in the setting of comfort-focused care.  TLSO brace as recommended by neurosurgery, however given patient's limited mobility and discomfort with the brace, it is reasonable to not wear this.  He would not want to emergent neurosurgery in case of fracture instability. Had home hospice arranged with 24 hour care for discharge on 2/3.    2/2: patient has continued to decline over the weekend, is not longer responsive. Suspect he may pass in the coming days.  Discussed with patient's wife Valery and his son at bedside, given the use of IV Ativan, and overall decline we will plan to keep inpatient, consult [facility] hospice.  Social work updated.    - [facility] hospice consulted 2/2  - DNR/DNI   - Initiate comfort-focused cares  - Discontinue p.o. Bactrim 2/2, plan to continue through 2/4 per ID, however unable to tolerate pills   - Palliative care signed off  - Stopped anticoagulation   - TLSO brace if tolerated when upright/out of bed- orthotics consulted by neurosurgery on 1/31 to see if adjustments could be made to make it more comfortable if sitting in wheelchair- do no suspect patient will be able to get out of bed given change in clinical status   - Social work consulted    Iliac Vein thrombosis and IVC extension   Acute segmental Pulmonary Emboli (RLL, CELESTE)   Acute hypoxic respiratory failure   Thrombocytopenia   Chronic anemia  History of blood loss anemia 2/2 to cancer while on anti-coagulation previously  Suspected clot provoked in setting of metastatic cancer and overall deconditioned/immobile state. IR consulted on 1/24 to consider thrombectomy of clot in iliac vein extending to IVC given overall size. After risk  benefit discussion, patient and family elected to proceed with treatment of Iliac vein and IVC thrombus. Mildly hypoxic requiring 1-2L intermittently likely seconary to pulmonary emboli. Right ventricular dilation seen on repeat limited TTE which is only mildly worse from complete echo on 1/11. No CT imaging during 1/10 admission so the acuity of Iliac and IVC thrombus is unclear. Currently suspect most of his pain is related to new thoracic spine burst fracture.  - S/p Thrombectomy on 1/26, Per IR- given narrowing of IVC near nephrectomy - not IVC filter candidate   - Was on heparin, will discontinue given above GOC conversation and comfort cares measures   - New LE edema on 1/31 suspect recurrent clot, no obvious associated pain    Sepsis secondary to Enterobacter Bacteremia   Hyperbilirubinemia   Patient febrile on admission with slight leukocytosis. Blood cultures grew Enterobacter species in one bottle. CT CAP on admission did not show any obvious sign of infection in lungs (not completely imaged) or abdomen. Urinalysis not consistent with infection. ID consulted, recommended RUQ US which showed cholelithiasis/sludge and mild thickening, negative sonographic Posada's and mildly distended CBD 7mm, normal Alkphos but mildly elevated bilirubin. GI consulted Re:further workup for possible biliary obstruction - given goals of care no role for MRCP/ERCP.  - ID consulted/signed off  - Transitioned meropenem to ertapenem- treat for 7-10 days - transitioned to bactrim DS 1 PO BID until 2/4 per ID recommendations and patient request as above    Toxic Metabolic Encephalopathy  Suspect delirium with waxing/waning status, nonfocal exam, intermittently confused and disoriented.  Seroquel started as needed, with improvement.  Patient also with intermittent somnolence, likely medication related, will try to limit sedating medications.  - Comfort cares order set  - Treat infection, pain as above    Recurrent falls with  orthostatics  Orthostatic hypotension    Patient with a known history of recurrent falls and orthostatic hypotension. His wife reports he has had a long history of orthostatic hypotension even prior to last admission. Recently hospitalized 1/10 through 1/17 on the medicine service for orthostatic hypotension.  He was started on midodrine 10 mg 3 times a day.  Decision to hold cabozanitinib for 2 weeks.  Liberalized salt.  Started on Florinef 0.1 mg po daily (patient was seen by cardiology, neurology, hematology).   - Continue midodrine and florinef     Imaging:  CT head no evidence for acute intracranial process.  Brain atrophy with microvascular changes.  Cervical spine no acute fracture posttraumatic subluxation.  Multilevel cervical spondylosis without high-grade spinal stenosis.  Multilevel neuroforaminal stenosis  Thoracic spine with acute burst T12 fracture is noted:  Mild retropulsion of the fractured vertebral body without associated spinal canal stenosis. No high-grade spinal canal or neural foraminal stenosis.  Lumbar spine no fracture or posttraumatic subluxation.Multilevel lumbar spondylosis without high-grade spinal canal stenosis. Multilevel neural foraminal stenosis      Atrial fibrillation:   EKG Atrial fibrillation, LAD. NSST changes. Stopped anticoagulation in past given bleeding and falls. Some discussion in past of potential Watchman      Head laceration   - Patient seen in the clinic on 1/22/2025 with a laceration 7 to 8 cm in length with staples placed in the office.  And sutures.  Tetanus booster within 10 years.  - Sutures placed 1/22 RN removed 1/31     Acute Burst Fracture T12 with retropulsion  CT chest abdomen pelvis given falls showing an acute T12 fracture. T12 CT spine showing acute burst fracture of T12 with 20% height loss.  Mild retropulsion of the fracture vertebral body without associated spinal canal stenosis. MRI poor quality but reviewed with neurosurgery and lg to initiate  "heparin  - Neurosurgery follow-up, planning for brace and non-operative management of T12 fracture  - Upright xrays in brace pending- unable to obtain on 1/28 with confusion  - Pain management as above - consider pain team consult if needed     Metastatic Renal cell cancer diagnosed in 2019   S/p Right radical nephrecctomy  and IVC thrombectomy 2019   On surveillance with recurrence 1/2021 with liver lesion.    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 Biopsy with mets renal cell.   - 10/2023 partial duodenectomy at [place] [facility]   - 1/2024 EUS and resection of gastric mass with complete endoscopic removal with [facility]    - 7/2024 admit recurrent GI bleed. Local tumor recurrence >> required transfusion, stopped anticoagulation  - 7/2024 Radiation for bleeding control.      CKD 3  Patient with creatinine elevated to 1.59. Received IV fluids on admission and creatinine continues to improve 1.1 on 1/28.  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     Hypocalcemia   Serum calcium down to 7.0 on 1/26, iCal 4.1, Likely asymptomatic. Improved to 4.4 with 1g IV calcium gluconate 1/27.         Diet: Regular Diet Adult    DVT Prophylaxis: Discontinue heparin gtt   Dillard Catheter: Not present  Lines: None     Cardiac Monitoring: None  Code Status: No CPR- Do NOT Intubate      Clinically Significant Risk Factors               # Hypoalbuminemia: Lowest albumin = 2.3 g/dL at 1/28/2025  6:47 AM, will monitor as appropriate     # Hypertension: Noted on problem list            # Overweight: Estimated body mass index is 28.89 kg/m  as calculated from the following:    Height as of 1/10/25: 1.93 m (6' 4\").    " Weight as of 1/14/25: 107.6 kg (237 lb 4.8 oz).        # Financial/Environmental Concerns: none         Social Drivers of Health    Physical Activity: Insufficiently Active (5/28/2024)    Exercise Vital Sign     Days of Exercise per Week: 2 days     Minutes of Exercise per Session: 30 min   Social Connections: Unknown (5/28/2024)    Social Connection and Isolation Panel [NHANES]     Frequency of Social Gatherings with Friends and Family: Twice a week          Disposition Plan     Medically Ready for Discharge: Ready Now home with hospice, social work consult pending              John Rain MD  Hospitalist Service  [facility]  Securely message with [employer] (more info)  Text page via [facility] Paging/Directory   ______________________________________________________________________    Interval History   Edgar has not been interactive for the last 2 days, intermittently needing IV Ativan for symptom control.  Not tolerating pills orally, will discontinue.  Wife and son at bedside, would prefer patient to stay in the hospital given his overall decline and concern about his transport home.  [facility] hospice consulted    Physical Exam   Vital Signs:                    Weight: 0 lbs 0 oz    Constitutional: alert, awake, NAD  HEENT: normocephalic, anicteric sclerae, MMM   Pulmonary: no increased work of breathing on 2 L nasal cannula   Skin: warm, dry, LE edema bilaterally  Neuro: AAO      Medical Decision Making       29 MINUTES SPENT BY ME on the date of service doing chart review, history, exam, documentation & further activities per the note.      Data   ------------------------- PAST 24 HR DATA REVIEWED -----------------------------------------------        Imaging results reviewed over the past 24 hrs:   No results found for this or any previous visit (from the past 24 hours).

## 2025-02-02 NOTE — PROGRESS NOTES
Pt is on Comfort cares, Son in room. Pt is drowsy and occasionally restless. No spontaneous verbalization, no response when asked questions. , Occasional labored breathing. Ativan given for restlessness and Dilaudid IV given for pain. Turn and reposition every 2 hours. Incontinent of urine. 2 PIV/saline [place]ocks. Plan to be discharged home with Hospice care.   103

## 2025-02-03 PROCEDURE — 99239 HOSP IP/OBS DSCHRG MGMT >30: CPT | Performed by: STUDENT IN AN ORGANIZED HEALTH CARE EDUCATION/TRAINING PROGRAM

## 2025-02-03 PROCEDURE — 250N000011 HC RX IP 250 OP 636

## 2025-02-03 RX ORDER — NALOXONE HYDROCHLORIDE 0.4 MG/ML
0.2 INJECTION, SOLUTION INTRAMUSCULAR; INTRAVENOUS; SUBCUTANEOUS
Status: CANCELLED | OUTPATIENT
Start: 2025-02-03

## 2025-02-03 RX ORDER — LIDOCAINE 40 MG/G
CREAM TOPICAL
Status: CANCELLED | OUTPATIENT
Start: 2025-02-03

## 2025-02-03 RX ORDER — ONDANSETRON 4 MG/1
4 TABLET, ORALLY DISINTEGRATING ORAL EVERY 6 HOURS PRN
Status: CANCELLED | OUTPATIENT
Start: 2025-02-03

## 2025-02-03 RX ORDER — CARBOXYMETHYLCELLULOSE SODIUM 5 MG/ML
1-2 SOLUTION/ DROPS OPHTHALMIC
Status: CANCELLED | OUTPATIENT
Start: 2025-02-03

## 2025-02-03 RX ORDER — HYDROMORPHONE HYDROCHLORIDE 1 MG/ML
0.5 INJECTION, SOLUTION INTRAMUSCULAR; INTRAVENOUS; SUBCUTANEOUS 4 TIMES DAILY
Status: DISCONTINUED | OUTPATIENT
Start: 2025-02-03 | End: 2025-02-03 | Stop reason: HOSPADM

## 2025-02-03 RX ORDER — BISACODYL 10 MG
10 SUPPOSITORY, RECTAL RECTAL
Status: CANCELLED | OUTPATIENT
Start: 2025-02-03

## 2025-02-03 RX ORDER — LORAZEPAM 2 MG/ML
1 INJECTION INTRAMUSCULAR 4 TIMES DAILY
Status: DISCONTINUED | OUTPATIENT
Start: 2025-02-03 | End: 2025-02-03 | Stop reason: HOSPADM

## 2025-02-03 RX ORDER — LORAZEPAM 2 MG/ML
1 INJECTION INTRAMUSCULAR EVERY 4 HOURS PRN
Status: CANCELLED | OUTPATIENT
Start: 2025-02-03

## 2025-02-03 RX ORDER — METHOCARBAMOL 500 MG/1
500 TABLET, FILM COATED ORAL 4 TIMES DAILY PRN
Status: CANCELLED | OUTPATIENT
Start: 2025-02-03

## 2025-02-03 RX ORDER — GLYCOPYRROLATE 0.2 MG/ML
0.2 INJECTION, SOLUTION INTRAMUSCULAR; INTRAVENOUS EVERY 4 HOURS PRN
Status: CANCELLED | OUTPATIENT
Start: 2025-02-03

## 2025-02-03 RX ORDER — SALIVA STIMULANT COMB. NO.3
2 SPRAY, NON-AEROSOL (ML) MUCOUS MEMBRANE
Status: CANCELLED | OUTPATIENT
Start: 2025-02-03

## 2025-02-03 RX ORDER — ACETAMINOPHEN 650 MG/1
650 SUPPOSITORY RECTAL EVERY 4 HOURS PRN
Status: CANCELLED | OUTPATIENT
Start: 2025-02-03

## 2025-02-03 RX ORDER — LORAZEPAM 1 MG/1
1 TABLET ORAL EVERY 4 HOURS PRN
Status: CANCELLED | OUTPATIENT
Start: 2025-02-03

## 2025-02-03 RX ORDER — LORAZEPAM 1 MG/1
1 TABLET ORAL EVERY 4 HOURS PRN
Status: DISCONTINUED | OUTPATIENT
Start: 2025-02-03 | End: 2025-02-03 | Stop reason: HOSPADM

## 2025-02-03 RX ORDER — ACETAMINOPHEN 325 MG/1
650 TABLET ORAL EVERY 4 HOURS PRN
Status: CANCELLED | OUTPATIENT
Start: 2025-02-03

## 2025-02-03 RX ORDER — LORAZEPAM 2 MG/ML
1 INJECTION INTRAMUSCULAR EVERY 4 HOURS PRN
Status: DISCONTINUED | OUTPATIENT
Start: 2025-02-03 | End: 2025-02-03 | Stop reason: HOSPADM

## 2025-02-03 RX ORDER — ATROPINE SULFATE 10 MG/ML
2 SOLUTION/ DROPS OPHTHALMIC EVERY 4 HOURS PRN
Status: CANCELLED | OUTPATIENT
Start: 2025-02-03

## 2025-02-03 RX ORDER — HYDROMORPHONE HYDROCHLORIDE 1 MG/ML
0.5 INJECTION, SOLUTION INTRAMUSCULAR; INTRAVENOUS; SUBCUTANEOUS
Status: DISCONTINUED | OUTPATIENT
Start: 2025-02-03 | End: 2025-02-03 | Stop reason: HOSPADM

## 2025-02-03 RX ORDER — AMOXICILLIN 250 MG
2 CAPSULE ORAL 2 TIMES DAILY PRN
Status: CANCELLED | OUTPATIENT
Start: 2025-02-03

## 2025-02-03 RX ORDER — NALOXONE HYDROCHLORIDE 0.4 MG/ML
0.1 INJECTION, SOLUTION INTRAMUSCULAR; INTRAVENOUS; SUBCUTANEOUS
Status: CANCELLED | OUTPATIENT
Start: 2025-02-03

## 2025-02-03 RX ORDER — LIDOCAINE 4 G/G
2 PATCH TOPICAL
Status: CANCELLED | OUTPATIENT
Start: 2025-02-03

## 2025-02-03 RX ORDER — AMOXICILLIN 250 MG
1 CAPSULE ORAL 2 TIMES DAILY PRN
Status: CANCELLED | OUTPATIENT
Start: 2025-02-03

## 2025-02-03 RX ORDER — MINERAL OIL/HYDROPHIL PETROLAT
OINTMENT (GRAM) TOPICAL
Status: CANCELLED | OUTPATIENT
Start: 2025-02-03

## 2025-02-03 RX ORDER — ONDANSETRON 2 MG/ML
4 INJECTION INTRAMUSCULAR; INTRAVENOUS EVERY 6 HOURS PRN
Status: CANCELLED | OUTPATIENT
Start: 2025-02-03

## 2025-02-03 RX ADMIN — LORAZEPAM 1 MG: 2 INJECTION INTRAMUSCULAR; INTRAVENOUS at 11:01

## 2025-02-03 RX ADMIN — HYDROMORPHONE HYDROCHLORIDE 0.5 MG: 1 INJECTION, SOLUTION INTRAMUSCULAR; INTRAVENOUS; SUBCUTANEOUS at 01:08

## 2025-02-03 RX ADMIN — HYDROMORPHONE HYDROCHLORIDE 0.5 MG: 1 INJECTION, SOLUTION INTRAMUSCULAR; INTRAVENOUS; SUBCUTANEOUS at 07:08

## 2025-02-03 RX ADMIN — HYDROMORPHONE HYDROCHLORIDE 0.5 MG: 1 INJECTION, SOLUTION INTRAMUSCULAR; INTRAVENOUS; SUBCUTANEOUS at 12:03

## 2025-02-03 ASSESSMENT — ACTIVITIES OF DAILY LIVING (ADL)
ADLS_ACUITY_SCORE: 86
ADLS_ACUITY_SCORE: 86
ADLS_ACUITY_SCORE: 82
ADLS_ACUITY_SCORE: 86
ADLS_ACUITY_SCORE: 82
ADLS_ACUITY_SCORE: 86
ADLS_ACUITY_SCORE: 84
ADLS_ACUITY_SCORE: 86

## 2025-02-03 NOTE — H&P
"[facility]    History and Physical - Hospitalist Service       Date of Admission:  2/3/2025    Assessment & Plan      Ti Nickerson is a 79 year old male admitted on 2/3/2025. He Ti Nickerson is a 79 year old male with a complex history of metastatic renal cancer, atrial fibrillation, orthostatic hypotension on midodrine/Florinef, CKD stage IIIb, CAD, acute metabolic encephalopathy, and history of GI bleeding admitted 1/24/2025 after mechanical fall with new head laceration, PE, DVT, Burst fracture T12. Underwent IR thrombectomy on 1/26.     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  -Continue TSL O brace for comfort if patient can tolerate          Diet: Regular Diet Adult    DVT Prophylaxis: On comfort care  Dillard Catheter: Not present  Lines: None     Cardiac Monitoring: None  Code Status: No CPR- Do NOT Intubate      Clinically Significant Risk Factors Present on Admission                   # Hypertension: Noted on problem list           # Overweight: Estimated body mass index is 28.89 kg/m  as calculated from the following:    Height as of 1/10/25: 1.93 m (6' 4\").    Weight as " of 1/14/25: 107.6 kg (237 lb 4.8 oz).       # Financial/Environmental Concerns:           Disposition Plan     Medically Ready for Discharge:            Dilip Morfin MD  Hospitalist Service  [facility]  Securely message with [employer] (more info)  Text page via [employer] Paging/Directory     ______________________________________________________________________      History of Present Illness   Ti Nickerson is a 79 year old male who   /3/2025. He Ti Nickerson is a 79 year old male with a complex history of metastatic renal cancer, atrial fibrillation, orthostatic hypotension on midodrine/Florinef, CKD stage IIIb, CAD, acute metabolic encephalopathy, and history of GI bleeding admitted 1/24/2025 after mechanical fall with new head laceration, PE, DVT, Burst fracture T12. Underwent IR thrombectomy on 1/26.     Patient's overall decline was discussed with the family including multiple readmissions for orthostatic hypotension, GI bleeding falls now with a T12 fracture, known metastatic cancer, pulmonary embolism needing anticoagulation.  Patient also had significant GI bleeding in the setting of anticoagulation related to metastatic renal cancer.    Patient's goals of care were held and patient started on comfort cares and [facility] hospice consulted    Patient admitted under [facility] care hospice    Discussed with patient's daughter and wife     Past Medical History    Past Medical History:   Diagnosis Date    Acute encephalopathy 1/24/2025    Atrial fibrillation, unspecified 9/18/2015    CAD (coronary artery disease) 09/18/2015    minimal by angio 2007, fu nuc est nl 2018    Elevated TSH 2018    Esophageal reflux 9/18/2015    Essential hypertension     History of cardioversion     [id number]    Idiopathic cardiomyopathy (H) 09/18/2015    fu echo nl ef, nl nuclear est 11/18, Dr. Quarles    Mixed hyperlipidemia 9/18/2015    Mumps     Sleep apnea 09/18/2015    does not use cpap as of 2019     Sleep apnea     Thoracic aortic aneurysm     sinus of valsalva 4.5 and asc aorta 4.5 in 2017    Tubular adenoma of colon 01/2017    fu 3 years       Past Surgical History   Past Surgical History:   Procedure Laterality Date    APPENDECTOMY  1964    ESOPHAGOSCOPY, GASTROSCOPY, DUODENOSCOPY (EGD), COMBINED N/A 9/14/2023    Procedure: Esophagoscopy, gastroscopy, duodenoscopy (EGD), combined;  Surgeon: Chele Ash MD;  Location:  GI    IR LOWER EXTREMITY VENOGRAM LEFT  1/26/2025       Prior to Admission Medications   Prior to Admission Medications   Prescriptions Last Dose Informant Patient Reported? Taking?   acetaminophen (TYLENOL) 500 MG tablet  Nursing Home No No   Sig: Take 1 tablet (500 mg) by mouth daily as needed for mild pain.   Patient taking differently: Take 500-1,000 mg by mouth daily as needed for mild pain.   fludrocortisone (FLORINEF) 0.1 MG tablet  Nursing Home No No   Sig: Take 1 tablet (0.1 mg) by mouth daily.   midodrine (PROAMATINE) 10 MG tablet  Nursing Home No No   Sig: Take 1 tablet (10 mg) by mouth 3 times daily (with meals).   pantoprazole (PROTONIX) 40 MG EC tablet  Nursing Home No No   Sig: TAKE 1 TABLET BY MOUTH TWICE A DAY BEFORE MEALS   pravastatin (PRAVACHOL) 20 MG tablet  Nursing Home No No   Sig: Take 1 tablet (20 mg) by mouth daily   tamsulosin (FLOMAX) 0.4 MG capsule  Nursing Home No No   Sig: Take 1 capsule (0.4 mg) by mouth daily      Facility-Administered Medications: None           Physical Exam   Vital Signs:                    Weight: 0 lbs 0 oz    Physical Exam  Constitutional:       Comments: Patient unresponsive does not appear in pain or distress   Cardiovascular:      Rate and Rhythm: Normal rate.   Pulmonary:      Effort: No respiratory distress.          Medical Decision Making             Data         Imaging results reviewed over the past 24 hrs:   No results found for this or any previous visit (from the past 24 hours).

## 2025-02-03 NOTE — TELEPHONE ENCOUNTER
Called home care to inform them of approved home care orders.     Nancie CABRERA RN-BSN   Health Triage Nurse

## 2025-02-03 NOTE — PROGRESS NOTES
Anesthesia Post-op Note    Patient: Francisco Kline  Procedure(s) Performed: Left distal biceps tendon repair (Left: Arm Upper)  Anesthesia type: General    Vitals Value Taken Time   Temp 36.7 °C (98 °F) 11/18/24 1420   Pulse 66 11/18/24 1430   Resp 14 11/18/24 1430   SpO2 98 % 11/18/24 1430   /69 11/18/24 1430         Patient Location: PACU Phase 1  Post-op Vital Signs:stable  Level of Consciousness: awake and participates in exam  Respiratory Status: spontaneous ventilation  Cardiovascular stable  Hydration: euvolemic  Pain Management: adequately controlled  Handoff: Handoff to receiving clinician was performed and questions were answered  Vomiting: none  Nausea: None  Airway Patency:patent  Post-op Assessment: awake, alert, appropriately conversant, or baseline, no complications, patient tolerated procedure well and no evidence of recall      There were no known notable events for this encounter.                       IP CM team aware of patient signing on to [facility]. Care Management will follow peripherally.     BELINDA Acuna  [facility]  Social Work

## 2025-02-03 NOTE — PROGRESS NOTES
Comfort care. No verbal response. T&R. PRN IV Ativan and Dilaudid given for comfort. Family at bedside. [facility] consult and follow up.

## 2025-02-03 NOTE — PROGRESS NOTES
[facility]    Consult Note - [facility]  _________________________________________________________________    [facility] 24/7 Contact Number: [phone]    - Providers: Please contact [facility] with changes in orders or clinical plan of care   - Nursing: Please contact [facility] with significant changes in patient condition    Hospice will notify the care team (including the hospitalist) to confirm date of inpatient hospice ([facility]) admission.    New Epic encounter will not be created until hospice completes admission.   ______________________________________________________________________        Summary of Visit (includes assessment, medications and any new orders):   [facility] met with family to discuss [facility] Hospice. Family signed consents and a Hospice RN will assess patient tomorrow to finish the sign on process.    Thank you for the referral!       Rodolfo Lay RN

## 2025-02-03 NOTE — DISCHARGE SUMMARY
"[facility]  Hospitalist Discharge Summary      Date of Admission:  1/24/2025  Date of Discharge:  2/3/2025  Discharging Provider: Dilip Morfin MD  Discharge Service: Hospitalist Service    Discharge Diagnoses   Ti Nickerson is a 79 year old male admitted on 2/3/2025. He Ti Nickerson is a 79 year old male with a complex history of metastatic renal cancer, atrial fibrillation, orthostatic hypotension on midodrine/Florinef, CKD stage IIIb, CAD, acute metabolic encephalopathy, and history of GI bleeding admitted 1/24/2025 after mechanical fall with new head laceration, PE, DVT, Burst fracture T12. Underwent IR thrombectomy on 1/26.      # liac Vein thrombosis and IVC extension   Acute segmental Pulmonary Emboli (RLL, CELESTE)   Acute hypoxic respiratory failure   Thrombocytopenia   Chronic anemia  History of blood loss anemia 2/2 to cancer while on anti-coagulation previously  Sepsis secondary to Enterobacter Bacteremia   Hyperbilirubinemia   Toxic Metabolic Encephalopathy   Recurrent falls with orthostatics  Orthostatic hypotension      Atrial fibrillation:   Head laceration     Metastatic Renal cell cancer diagnosed in 2019   S/p Right radical nephrecctomy  and IVC thrombectomy 2019   History of GI bleeding due to metastatic disease in duodenum   CKD stage 3           Goals of Care  -Goals of care were held with the patient and family and patient was transitioned to comfort cares  Patient was admitted under [facility] hospice     -[facility] hospice nurse following   Continue comfort care meds.  -Continue TSL O brace for comfort if patient can tolerate       Clinically Significant Risk Factors     # Overweight: Estimated body mass index is 28.89 kg/m  as calculated from the following:    Height as of 1/10/25: 1.93 m (6' 4\").    Weight as of 1/14/25: 107.6 kg (237 lb 4.8 oz).       Follow-ups Needed After Discharge   Follow-up Appointments       Follow Up      We will arrange " follow-up in 4 to 6 weeks with x-ray prior to further evaluate your T12 fracture            {  Unresulted Labs Ordered in the Past 30 Days of this Admission       No orders found from 12/25/2024 to 1/25/2025.          Discharge Disposition   Discharged to [facility] hospice  Condition at discharge: Guarded    Hospital Course   Ti Nickerson is a 79 year old male with a complex history of metastatic renal cancer, atrial fibrillation, orthostatic hypotension on midodrine/Florinef, CKD stage IIIb, CAD, acute metabolic encephalopathy, and history of GI bleeding admitted 1/24/2025 after mechanical fall with new head laceration, PE, DVT, Burst fracture T12. Underwent IR thrombectomy on 1/26.     patient's overall decline including multiple readmissions for orthostatic hypotension, GI bleeding, falls now with T12 fracture, known metastatic cancer, now needing anticoagulation. 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     Patient was started on comfort cares and [facility] hospice consulted and being admittted under hospice care    Consultations This Hospital Stay   HEMATOLOGY & ONCOLOGY IP CONSULT  INTERVENTIONAL RADIOLOGY ADULT/PEDS IP CONSULT  NEUROSURGERY IP CONSULT  CARE MANAGEMENT / SOCIAL WORK IP CONSULT  PHARMACY IP CONSULT  VASCULAR ACCESS ADULT IP CONSULT  CARE MANAGEMENT / SOCIAL WORK IP CONSULT  PHARMACY TO DOSE VANCO  PALLIATIVE CARE ADULT IP CONSULT  INFECTIOUS DISEASES IP CONSULT  PALLIATIVE CARE ADULT IP CONSULT  GASTROENTEROLOGY IP CONSULT  CARE MANAGEMENT / SOCIAL WORK IP CONSULT  PHYSICAL THERAPY ADULT IP CONSULT  [facility] ADULT CONSULT  [facility] INPAT[facility] ADULT CONSULT    Code Status   No CPR- Do NOT Intubate    Time Spent on this Encounter   I, Dilip Morfin MD, personally saw the patient today and spent greater than 30 minutes  discharging this patient.       Dilip Morfin MD  [facility]  [address] [place] [address]  Phone: [phone]  Fax: [phone]  ______________________________________________________________________    Physical Exam   Vital Signs:                    Weight: 0 lbs 0 oz  Physical Exam  Constitutional:       Comments: Unresponsive does not appear in pain or distress   Cardiovascular:      Rate and Rhythm: Normal rate and regular rhythm.             Primary Care Physician   iNco Retana MD    Discharge Orders      XR Thoracic Spine 2 Views     Primary Care - Care Coordination Referral      Follow Up    We will arrange follow-up in 4 to 6 weeks with x-ray prior to further evaluate your T12 fracture     Activity    Wear brace when out of bed       Significant Results and Procedures   Most Recent 3 CBC's:  Recent Labs   Lab Test 01/28/25  0647 01/27/25  0929 01/26/25  1016   WBC 4.5 4.7 5.1   HGB 10.7* 11.1* 10.5*    102* 100   * 114* 98*     Most Recent 3 BMP's:  Recent Labs   Lab Test 01/28/25  2108 01/28/25  1700 01/28/25  0647 01/27/25  0929 01/26/25  2306 01/26/25  1016   NA  --   --  140 139  --  136   POTASSIUM  --   --  3.9 3.7  --  3.8   CHLORIDE  --   --  110* 108*  --  106   CO2  --   --  24 21*  --  19*   BUN  --   --  30.3* 32.8*  --  32.1*   CR  --   --  1.10 1.22*  --  1.30*   ANIONGAP  --   --  6* 10  --  11   DOMINICK  --   --  7.7* 7.6*  --  7.0*   * 104* 99 86   < > 82    < > = values in this interval not displayed.   ,   Results for orders placed or performed during the hospital encounter of 01/24/25   CT Head w/o Contrast    Narrative    EXAM: CT HEAD W/O CONTRAST, CT CERVICAL SPINE W/O CONTRAST  LOCATION: [facility]  DATE: 1/24/2025    INDICATION: Fall, confusion  COMPARISON: Brain MRI 05/17/2020.  TECHNIQUE:   1) Routine CT Head without IV contrast. Multiplanar reformats. Dose  reduction techniques were used.  2) Routine CT Cervical Spine without IV contrast. Multiplanar reformats. Dose reduction techniques were used.    FINDINGS:   HEAD CT:   INTRACRANIAL CONTENTS: No intracranial hemorrhage, extraaxial collection, or mass effect.  No CT evidence of acute infarct. Mild presumed chronic small vessel ischemic changes. Mild generalized cerebral volume loss. Normal ventricles and sulci.     VISUALIZED ORBITS/SINUSES/MASTOIDS: No intraorbital abnormality. No paranasal sinus mucosal disease. No middle ear or mastoid effusion.    BONES/SOFT TISSUES: No acute abnormality.    CERVICAL SPINE CT:   VERTEBRA: Normal vertebral body heights. Right convex scoliosis of the lower cervical spine. No fracture or posttraumatic subluxation.     CANAL/FORAMINA: Multilevel disc height loss, osteophyte formation and facet arthropathy. No high-grade spinal canal stenosis. Moderate left and mild right neural foraminal stenosis at C2-C3. Severe left and moderate to severe right neural foraminal   stenosis at C3-C4, moderate to severe left and moderate right neural foraminal stenosis at C4-C5, mild right and mild-to-moderate left neural foraminal stenosis at C5-C6 and mild left and mild to moderate right neural foraminal stenosis at C6-C7.    PARASPINAL: No extraspinal abnormality. Visualized lung fields are clear.      Impression    IMPRESSION:  HEAD CT:  1.  No CT evidence for acute intracranial process.  2.  Brain atrophy and presumed chronic microvascular ischemic changes as above.  3.  Incidental dilatation with possible aneurysms at the basilar tip. Recommend CTA or mass effect bases.    CERVICAL SPINE CT:  1.  No CT evidence for acute fracture or post traumatic subluxation.  2.  Multilevel cervical spondylosis without high-grade spinal canal stenosis. Multilevel neural foraminal stenosis as detailed above.   CT Chest/Abdomen/Pelvis w Contrast     Value    Radiologist flags New diagnosis of pulmonary embolism  (AA)    Narrative    EXAM: CT CHEST/ABDOMEN/PELVIS W CONTRAST  LOCATION: [facility]  DATE: 1/24/2025    INDICATION: Trauma, right shoulder blader abrasion, bruising to bilateral flanks, fall 2 days ago, concern for traumatic injuries.  COMPARISON: CT CAP 12/30/2024.  TECHNIQUE: CT scan of the chest, abdomen, and pelvis was performed following injection of IV contrast. Multiplanar reformats were obtained. Dose reduction techniques were used.   CONTRAST: 120mL Isovue 370    FINDINGS:   LUNGS AND PLEURA: Lung apices are incompletely imaged including the region of the 2 mm subpleural nodule described on prior report. Additional pulmonary nodules along the right major fissure are stable. Mild fibrotic changes of the lung bases. No pleural   effusion or pneumothorax within the limitations of this exam.    MEDIASTINUM/AXILLAE: There is mild chronic dilatation of right heart chambers. No thoracic aneurysm. Tortuous descending thoracic aorta. Right lower lobe (series 4, image 77) and left upper lobe (series 4, image 42) segmental pulmonary emboli are evident   on this non-CTA examination. No thoracic adenopathy. Small hiatal hernia.    CORONARY ARTERY CALCIFICATION: Severe.    HEPATOBILIARY: Stable 2.7 cm hypoattenuating lesion of the posterior right hepatic lobe (series 4, image 153). No new hepatic lesion. Similar gallbladder distention without radiopaque gallstone.    PANCREAS: Similar mild pancreatic atrophy.    SPLEEN: Normal.    ADRENAL GLANDS: Normal.    KIDNEYS/BLADDER: Right nephrectomy. Left renal cyst does not require follow-up. No left hydronephrosis. Chronic mild diffuse bladder wall thickening with multiple small bladder diverticula.    BOWEL: Diverticulosis of the distal colon. Metallic clip within the stomach at the gastric body. No evidence of bowel obstruction. Postsurgical changes of partial duodenectomy with stable mild soft tissue thickening at the surgical site. A discrete    duodenal mass is not identified.    LYMPH NODES: Normal.    VASCULATURE: Mild to moderate burden of vascular calcifications without abdominal aortic aneurysm. Left common iliac vein and nonocclusive deep venous thrombosis which extends into the lower inferior vena cava at the level of the bifurcation. There is   mass effect on the inferior vena cava at the partial duodenectomy site, possibly secondary to postsurgical scarring.    PELVIC ORGANS: Prostatomegaly.    MUSCULOSKELETAL: No acute findings. Note, the bilateral scapulae, bilateral proximal humeri, bilateral clavicles, and upper ribs are incompletely imaged. See dedicated CT thoracolumbar spine reformats for details regarding the spine including acute T12   fracture.      Impression    IMPRESSION:    Note, a portion of the upper chest is incompletely imaged. The following findings are made within these limitations.    1.  Acute T12 fracture as described on same-day CT thoracolumbar spine reformats. Otherwise, no acute traumatic findings within the chest, abdomen, or pelvis.  2.  Acute segmental pulmonary emboli in the right lower lobe and left upper lobe. There is dilatation of right-sided heart chambers which appears chronic.  3.  Acute nonocclusive deep venous thrombosis of the left common iliac vein which extends into the lower inferior vena cava.  4.  Stable postsurgical changes of partial duodenectomy with mild soft tissue thickening at the surgical site. No discrete duodenal mass is identified.  5.  Stable hypoattenuating lesion of the posterior right hepatic lobe.      [Critical Result: New diagnosis of pulmonary embolism]    Finding was identified on 1/24/2025 11:48 AM CST.     Dr. Segovia was contacted by me on 1/24/2025 12:09 PM CST and verbalized understanding of the critical result.   CT Cervical Spine w/o Contrast    Narrative    EXAM: CT HEAD W/O CONTRAST, CT CERVICAL SPINE W/O CONTRAST  LOCATION: [facility]  DATE:  1/24/2025    INDICATION: Fall, confusion  COMPARISON: Brain MRI 05/17/2020.  TECHNIQUE:   1) Routine CT Head without IV contrast. Multiplanar reformats. Dose reduction techniques were used.  2) Routine CT Cervical Spine without IV contrast. Multiplanar reformats. Dose reduction techniques were used.    FINDINGS:   HEAD CT:   INTRACRANIAL CONTENTS: No intracranial hemorrhage, extraaxial collection, or mass effect.  No CT evidence of acute infarct. Mild presumed chronic small vessel ischemic changes. Mild generalized cerebral volume loss. Normal ventricles and sulci.     VISUALIZED ORBITS/SINUSES/MASTOIDS: No intraorbital abnormality. No paranasal sinus mucosal disease. No middle ear or mastoid effusion.    BONES/SOFT TISSUES: No acute abnormality.    CERVICAL SPINE CT:   VERTEBRA: Normal vertebral body heights. Right convex scoliosis of the lower cervical spine. No fracture or posttraumatic subluxation.     CANAL/FORAMINA: Multilevel disc height loss, osteophyte formation and facet arthropathy. No high-grade spinal canal stenosis. Moderate left and mild right neural foraminal stenosis at C2-C3. Severe left and moderate to severe right neural foraminal   stenosis at C3-C4, moderate to severe left and moderate right neural foraminal stenosis at C4-C5, mild right and mild-to-moderate left neural foraminal stenosis at C5-C6 and mild left and mild to moderate right neural foraminal stenosis at C6-C7.    PARASPINAL: No extraspinal abnormality. Visualized lung fields are clear.      Impression    IMPRESSION:  HEAD CT:  1.  No CT evidence for acute intracranial process.  2.  Brain atrophy and presumed chronic microvascular ischemic changes as above.  3.  Incidental dilatation with possible aneurysms at the basilar tip. Recommend CTA or mass effect bases.    CERVICAL SPINE CT:  1.  No CT evidence for acute fracture or post traumatic subluxation.  2.  Multilevel cervical spondylosis without high-grade spinal canal stenosis.  Multilevel neural foraminal stenosis as detailed above.   CT Thoracic Spine w/o Contrast    Narrative    EXAM: CT THORACIC SPINE W/O CONTRAST, CT LUMBAR SPINE W/O CONTRAST  LOCATION: [facility]  DATE: 1/24/2025    INDICATION: trauma  COMPARISON: None.  TECHNIQUE:  1) Routine CT Thoracic Spine without IV contrast. Multiplanar reformats. Dose reduction techniques were used.   2) Routine CT Lumbar Spine without IV contrast. Multiplanar reformats. Dose reduction techniques were used.     FINDINGS:    THORACIC SPINE CT:  VERTEBRA: Osteopenia. Acute burst fracture of T12 with approximately 20% height loss. Minimal retropulsion of the fractured vertebral body without associated spinal canal stenosis. Unchanged Schmorl's node-like endplate indentation along the inferior   endplate of T9 with associated chronic vertebral body height loss. Stepwise mild anterolisthesis T2-T4. No fracture or posttraumatic subluxation.     CANAL/FORAMINA: No canal or neural foraminal stenosis.    PARASPINAL: No extraspinal abnormality.    LUMBAR SPINE CT:  VERTEBRA: Normal vertebral body heights. Left convex scoliosis with the apex at L3-L4 minimal retrolisthesis at L3-L4 and minimal anterolisthesis at L5-S1. No fracture or posttraumatic subluxation.     CANAL/FORAMINA: Multilevel disc height loss, osteophyte formation and facet arthropathy without high-grade spinal or neural foraminal stenosis. Moderate right neural foraminal stenosis at L2-L3 and L3-L4 and mild to left neural foraminal stenosis at   L4-L5 and L5-S1. Multilevel Schmorl's node-like endplate indentations.    PARASPINAL: No extraspinal abnormality.      Impression    IMPRESSION:  THORACIC SPINE CT:  1.  Acute burst fracture of T12 with approximately 20% height loss. Mild retropulsion of the fractured vertebral body without associated spinal canal stenosis.  2.  No high-grade spinal canal or neural foraminal stenosis.    LUMBAR SPINE CT:  1.  No fracture  or posttraumatic subluxation.  2.  Multilevel lumbar spondylosis without high-grade spinal canal stenosis. Multilevel neural foraminal stenosis     CT Lumbar Spine w/o Contrast    Narrative    EXAM: CT THORACIC SPINE W/O CONTRAST, CT LUMBAR SPINE W/O CONTRAST  LOCATION: [facility]  DATE: 1/24/2025    INDICATION: trauma  COMPARISON: None.  TECHNIQUE:  1) Routine CT Thoracic Spine without IV contrast. Multiplanar reformats. Dose reduction techniques were used.   2) Routine CT Lumbar Spine without IV contrast. Multiplanar reformats. Dose reduction techniques were used.     FINDINGS:    THORACIC SPINE CT:  VERTEBRA: Osteopenia. Acute burst fracture of T12 with approximately 20% height loss. Minimal retropulsion of the fractured vertebral body without associated spinal canal stenosis. Unchanged Schmorl's node-like endplate indentation along the inferior   endplate of T9 with associated chronic vertebral body height loss. Stepwise mild anterolisthesis T2-T4. No fracture or posttraumatic subluxation.     CANAL/FORAMINA: No canal or neural foraminal stenosis.    PARASPINAL: No extraspinal abnormality.    LUMBAR SPINE CT:  VERTEBRA: Normal vertebral body heights. Left convex scoliosis with the apex at L3-L4 minimal retrolisthesis at L3-L4 and minimal anterolisthesis at L5-S1. No fracture or posttraumatic subluxation.     CANAL/FORAMINA: Multilevel disc height loss, osteophyte formation and facet arthropathy without high-grade spinal or neural foraminal stenosis. Moderate right neural foraminal stenosis at L2-L3 and L3-L4 and mild to left neural foraminal stenosis at   L4-L5 and L5-S1. Multilevel Schmorl's node-like endplate indentations.    PARASPINAL: No extraspinal abnormality.      Impression    IMPRESSION:  THORACIC SPINE CT:  1.  Acute burst fracture of T12 with approximately 20% height loss. Mild retropulsion of the fractured vertebral body without associated spinal canal stenosis.  2.  No  high-grade spinal canal or neural foraminal stenosis.    LUMBAR SPINE CT:  1.  No fracture or posttraumatic subluxation.  2.  Multilevel lumbar spondylosis without high-grade spinal canal stenosis. Multilevel neural foraminal stenosis     US Lower Extremity Venous Duplex Bilateral    Narrative    EXAM: US LOWER EXTREMITY VENOUS DUPLEX BILATERAL  LOCATION: [facility]  DATE: 1/24/2025    INDICATION: eval for dvt  COMPARISON: None.  TECHNIQUE: Venous Duplex ultrasound of bilateral lower extremities with and without compression, augmentation and duplex. Color flow and spectral Doppler with waveform analysis performed.    FINDINGS: Exam includes the common femoral, femoral, popliteal veins as well as segmentally visualized deep calf veins and greater saphenous vein.     RIGHT: No deep vein thrombosis. No superficial thrombophlebitis. No popliteal cyst.    LEFT: No deep vein thrombosis. No superficial thrombophlebitis. No popliteal cyst.      Impression    IMPRESSION:  1.  No deep venous thrombosis in the bilateral lower extremities.   MR Thoracic Spine w/o Contrast    Narrative    EXAM: MR THORACIC SPINE W/O CONTRAST  LOCATION: [facility]  DATE: 1/24/2025    INDICATION: fall wtih burst fradture  COMPARISON:  CTA thoracic spine January 24, 2025.  TECHNIQUE: Routine Thoracic Spine MRI without IV contrast. Exam ordered secondary to patient agitation.      Impression    FINDINGS/IMPRESSION:     Examination aborted secondary to patient agitation. Only severely motion degraded sagittal T1 and T2 sequences obtained. The T12 burst fracture is identified but poorly evaluated. No evidence for canal compromise. No evidence for ligamentous injury,   although evaluation for ligamentous injury is severely degraded.   IR Lower Extremity Venogram Left    Narrative    79-year-old patient with small bilateral pulmonary emboli and venous thrombus incidentally noted in the distal IVC and  left common iliac veins. Patient also has vertebral fracture causing significant pain. The thrombus in the left common iliac vein   thought to be nidus for PEs. Patient has an irregular and narrow IVC, by my interpretation related to previous nephrectomy. Blood flow remains through this area, though fear that if thrombus were to propagate, would become more complicated in an   anatomically abnormal IVC. Plan is for mechanical thrombectomy.    TECHNIQUE: Patient was brought to Interventional Radiology department after informed consent obtained with patient's family members. Patient was placed in a supine position. Skin overlying the left groin was prepped and draped in standard sterile   fashion. 1% lidocaine used for local anesthetic. Ultrasound was used to visualize the left common femoral vein, patency confirmed, and image stored for documentation. With continuous ultrasound guidance, a micropuncture kit was used to access the left   common femoral vein. Over series of maneuvers, 16 Nepali vascular sheath was placed. Over a Bentson wire, Super Stiff Amplatz wire was advanced through the IVC, right atrium, heart, and into the subclavian vein on the right. An XL ClotTriever was used   for mechanical thrombectomy of the distal IVC and left common iliac vein after multiple passes. Thrombus was removed and appeared relatively chronic in appearance. Completion venograms performed in multiple projections including left external iliac vein,   common iliac vein, contralateral right common iliac vein, and IVC. Venogram was performed also to begin the procedure upon initial access in the left common femoral vein, external iliac vein, common iliac vein, and IVC. No residual thrombus noted. IVUS   was also used demonstrating wide patency through these vein segments. Images were stored for documentation. There is moderate irregularity of the more cranial infrarenal IVC, though no thrombus formation. The sheath was removed and  purse string suture   applied with a flow stasis.    Sedation: A moderate level of sedation was achieved with 2 mg IV Versed and 0.2 mg IV Dilaudid.  Sedation time: 42 minutes.  Please note the above medications were administered by the interventional radiology staff under my direct supervision. Patient's vital signs were monitored and remained stable throughout the procedure.    Fluoroscopic time: 5.7 minutes  Air Kerma: 335 mGy  Contrast: 50 mL of Isovue-300 administered intravenously without complication.  Local anesthetic: 10 mL of 1% lidocaine.    FINDINGS: Initial venogram is from the left common femoral vein demonstrating patency of the common femoral, and external iliac veins. Thrombus is noted in the left common iliac vein with approximately 60% narrowing. Thrombus extends into the IVC. IVC   remains patent. There is moderate irregularity of the IVC surrounded by surgical clips, related to prior surgery. However, no thrombus identified. At completion, venogram performed demonstrating wide patency in the common iliac, external iliac, and   inferior vena cava venous segments. Contralateral right common iliac vein is widely patent. Patient tolerated the procedure well.      Impression    IMPRESSION:   1. Successful mechanical thrombectomy of the distal IVC and left common iliac vein. No residual thrombus at completion. Continue heparin and anticoagulation.  2. Moderate irregularity of the more cranial infrarenal IVC, thought to relate to previous surgery given surrounding surgical clips. This is unrelated to the thrombus.   US Abdomen Limited    Narrative    EXAM: US ABDOMEN LIMITED  LOCATION: [facility]  DATE: 1/27/2025    INDICATION: Enterobacter bacteremia and tender RUQ.  COMPARISON: None.  TECHNIQUE: Limited abdominal ultrasound.    FINDINGS:    GALLBLADDER: Negative sonographic Posada's sign. Distended gallbladder along with cholelithiasis and potential gallbladder sludge.  Gallbladder wall is 3 mm.    BILE DUCTS: No intrahepatic visible biliary ductal dilatation. The common duct measures 7 mm.    LIVER: Partially obscured but no acute abnormality.    RIGHT KIDNEY: Absent.    PANCREAS: The visualized portions are normal.    No ascites.      Impression    IMPRESSION:  1.  Cholelithiasis and gallbladder sludge is suggested. Mild gallbladder wall thickening. Negative sonographic Posada's sign.  2.  Mildly distended common duct measuring 7 mm.       XR Thoracic Spine 2 Views    Narrative    EXAM: XR THORACIC SPINE 2 VIEWS  LOCATION: [facility]  DATE: 2025    INDICATION: T12 fx, upright XR in brace, if unable sitting is ok.  COMPARISON: Thoracic spine CT 2025.      Impression    IMPRESSION: Acute T12 compression fracture demonstrates increased 45-55% loss of vertebral body height and associated 33 degrees segmental kyphotic deformity, previously approximately 15-25% height loss on 2025. No new fracture. Mild multilevel   thoracic spondylosis with degenerative kyphoscoliotic deformity.   Echo Limited     Value    LVEF  60-65%    Narrative    [id number]  [id number]  [id number]^KRISTEL^LONNY^L     [facility]  Echocardiography Laboratory  [address] [place] [address]     Name: JOHN ALLEN  MRN: [id number]  : 1945  Study Date: 2025 02:32 PM  Age: 79 yrs  Gender: Male  Patient Location: [facility]  Reason For Study: Pulmonary Emboli  Ordering Physician: LONNY HUMPHRIES  Referring Physician: Nico Retana  Performed By: Herson Ibarra RDCS     BSA: 2.4 m2  Height: 76 in  Weight: 237 lb  HR: 111  BP: 100/65 mmHg  ______________________________________________________________________________  Procedure  Limited Echocardiogram with two-dimensional, color and spectral Doppler.  Definity (NDC #33881-802) given  intravenously.  ______________________________________________________________________________  Interpretation Summary     Limited study as ordered.     Left ventricular systolic function is normal. The visual ejection fraction is  60-65%.  The right ventricle is moderate to severely dilated. The right ventricular  systolic function is normal.     This study was compared to a TTE from 1/11/2025. RV chamber size is mildly  larger on this present study.  ______________________________________________________________________________  Left Ventricle  Left ventricular systolic function is normal. The visual ejection fraction is  60-65%. Regional wall motion abnormalities cannot be excluded due to limited  visualization.     Right Ventricle  The right ventricle is moderate to severely dilated. The right ventricular  systolic function is normal.     Mitral Valve  Grossly normal function on limited assessment.     Tricuspid Valve  The tricuspid valve is not well visualized, but is grossly normal. The  tricuspid valve is not well visualized.     Aortic Valve  There is trivial trileaflet aortic sclerosis. No aortic regurgitation is  present.     Pulmonic Valve  The pulmonic valve is not well seen, but is grossly normal. The pulmonic valve  is not well visualized.  ______________________________________________________________________________  Report approved by: Ronnie Baum MD on 01/25/2025 03:36 PM     ______________________________________________________________________________          Discharge Medications   Current Discharge Medication List        CONTINUE these medications which have NOT CHANGED    Details   acetaminophen (TYLENOL) 500 MG tablet Take 1 tablet (500 mg) by mouth daily as needed for mild pain.    Associated Diagnoses: Malignant neoplasm metastatic to lung, unspecified laterality (H)      fludrocortisone (FLORINEF) 0.1 MG tablet Take 1 tablet (0.1 mg) by mouth daily.    Associated Diagnoses: Orthostatic  hypotension      midodrine (PROAMATINE) 10 MG tablet Take 1 tablet (10 mg) by mouth 3 times daily (with meals).    Associated Diagnoses: Orthostatic hypotension      pantoprazole (PROTONIX) 40 MG EC tablet TAKE 1 TABLET BY MOUTH TWICE A DAY BEFORE MEALS  Qty: 180 tablet, Refills: 3    Comments: DX Code Needed  PATIENT REQUESTS MORE REFILLS FOR PANTOPRAZOLE.  Associated Diagnoses: Melena      pravastatin (PRAVACHOL) 20 MG tablet Take 1 tablet (20 mg) by mouth daily  Qty: 90 tablet, Refills: 3    Comments: For Profile Only - patient will call to fill  Associated Diagnoses: Hyperlipidemia LDL goal <100      tamsulosin (FLOMAX) 0.4 MG capsule Take 1 capsule (0.4 mg) by mouth daily  Qty: 90 capsule, Refills: 3    Comments: For Profile Only - patient will call to fill  Associated Diagnoses: Benign prostatic hyperplasia with nocturia           Allergies   Allergies   Allergen Reactions    Fentanyl Confusion and Other (See Comments)     Hallucinations    Oxycodone Other (See Comments)     Hallucinations when given after surgery    Other reaction(s): *Unknown, Other (See Comments), Other (see comments)   Hallucinations when given after surgery    Hallucinations when given after surgery    Hallucinations when given after surgery

## 2025-02-03 NOTE — PROGRESS NOTES
On Comfort cares. Pt is not verbally responsive. Pt has occasional restlessness  with arms, Ativan given.Pain with turning in bed and occasional labored breathing, IV Dilaudid  given. Incontinent of urine. Bladder scan of 189. 2 PIV/saline locks. RN Hospice Nurse to see pt today.

## 2025-02-03 NOTE — CONSULTS
[facility]    Consult Note - [facility]  _________________________________________________________________    [facility] 24/7 Contact Number: [phone]    - Providers: Please contact [facility] with changes in orders or clinical plan of care   - Nursing: Please contact [facility] with significant changes in patient condition    Hospice will notify the care team (including the hospitalist) to confirm date of inpatient hospice (GIP) admission.    New Epic encounter will not be created until hospice completes admission.   ______________________________________________________________________        Hospice Diagnosis: Metastatic Renal Cancer     Indication for Inpatient Hospice: Pain    Goals for Hospital Discharge: Pain will be managed at a tolerable level as evidenced by patient requiring <3 PRN's in the next 24 hours.     Plan of Care Discussed with the Following:   - Nurse: Janna Fernández RN   - Hospitalist/Rounding Provider: Dilip Morfin MD   - Ti's Family/Preferred Contact: Valery Vaz  - Hospice Provider: Rupal Valdovinos DO     Summary of Visit (includes assessment, medications and any new orders):   Writer met with patient this morning with wifeValery at bedside.     Wife shared that patient was quite uncomfortable this morning, but ULISES Saldivar came in and administered PRN medication, washed patients hair with shower cap and repositioned patient onto his left side which seems to be much more comfortable.     Wife states that she is very thankful for the hospital staff and that her kids have been doing a great job of coming by and helping to support her and see their dad (the patient).    Writer witnessed 1 episode of patient reaching out while at bedside. Writer had conversation with wife Valery about the need for better medication management to help keep patient more comfortable. Discussed use of scheduled medications vs PRN and wife  understood.     Recommendations:  Schedule Dilaudid 0.5mg q4 hours IV and q1hr PRN for pain  Schedule ativan 1mg QID IV and q4hrs PRN for agitation/pain    Hospice will see patient daily while admitted GIP.       Fabiola Guidry RN

## 2025-02-04 NOTE — PLAN OF CARE
Goal Outcome Evaluation:      Plan of Care Reviewed With: patient      Shift: 2-3-25, 9457-1310  Orientation: nonverbal   Vitals/Tele: deferred  Pain: Had dilaudid x 1 and ativan x1.IV Access/drains: R and L forearm SL  Diet: regular  Mobility: turn/repo/lift .   GI/: incontinent  Wound/Skin: scattered scabs/bruising. Mepi  Consults: Hospice  Discharge Plan: home w Hospice?      See Flow sheets for assessment

## 2025-02-04 NOTE — PROGRESS NOTES
Clinic Care Coordination Contact  Care Coordination Clinician Chart Review    Situation: Patient chart reviewed by care coordinator.    Background:  Enrolled Primary Care Coordination patient.     Assessment: Upon chart review, patient is not a candidate for Primary Care Clinic Care Coordination enrollment due to reason stated below:  Patient enrolled into hospice.    Plan/Recommendations: Clinic Care Coordination Referral/order cancelled. RN/FISH CC will perform no further monitoring/outreaches at this time and will remain available as needed. If new needs arise, a new Care Coordination Referral may be placed.    Klaudia Brewer RN Clinic Care Coordinator  [facility] Clinics: [place], [place] (on-site Wednesdays), [facility] & [facility].  [email or web address]  Phone: [phone]

## 2025-02-04 NOTE — PLAN OF CARE
"Pt non-verbal, whispers a few things here and there. Restless and grabbing at times. Keeping eyes closed. External cath in place. LLE edematous. Repositioned per family request. Family at the bedside. IV replaced. Ativan and dilaudid scheduled at this time. Pt now outpt hospice.      Problem: Adult Inpatient Plan of Care  Goal: Plan of Care Review  Description: The Plan of Care Review/Shift note should be completed every shift.  The Outcome Evaluation is a brief statement about your assessment that the patient is improving, declining, or no change.  This information will be displayed automatically on your shift  note.  2/3/2025 1853 by Janna Fernández RN  Outcome: Not Progressing  2/3/2025 1852 by Janna Fernández RN  Outcome: Not Progressing  Goal: Patient-Specific Goal (Individualized)  Description: You can add care plan individualizations to a care plan. Examples of Individualization might be:  \"Parent requests to be called daily at 9am for status\", \"I have a hard time hearing out of my right ear\", or \"Do not touch me to wake me up as it startles  me\".  2/3/2025 1853 by Janna Fernández RN  Outcome: Not Progressing  2/3/2025 1852 by Janna Fernández RN  Outcome: Not Progressing  Goal: Absence of Hospital-Acquired Illness or Injury  2/3/2025 1853 by Janna Fernández RN  Outcome: Not Progressing  2/3/2025 1852 by Janna Fernández RN  Outcome: Not Progressing  Intervention: Prevent Skin Injury  Recent Flowsheet Documentation  Taken 2/3/2025 1800 by Janna Fernández RN  Body Position:   turned   left  Taken 2/3/2025 1500 by Janna Fernández RN  Body Position: refuses positioning  Taken 2/3/2025 1300 by Janna Fernández RN  Body Position:   turned   right  Goal: Optimal Comfort and Wellbeing  2/3/2025 1853 by Janna Fernández RN  Outcome: Not Progressing  2/3/2025 1852 by Janna Fernández RN  Outcome: Not Progressing  Goal: Readiness for Transition of Care  2/3/2025 1853 by Janna Fernández RN  Outcome: Not Progressing  2/3/2025 1852 by Janna Fernández, " RN  Outcome: Not Progressing

## 2025-02-04 NOTE — PROVIDER NOTIFICATION
MD Notification    Notified Person: MD    Notified Person Name: Pedro Jauregui    Notification Date/Time: 2/4/25 0146    Notification Interaction: vocera    Purpose of Notification: Pt on hospice and family member requesting pain medication for pt. According to hospice's note pt is supposed to have 0.5mg of IV dilaudid available Q1hrs PRN, but has nothing ordered PRN aside from ativan which he already got. Are you able to prescribe dilaudid PRN for the pt? Thanks so much    Orders Received: PRN dilaudid orders    Comments:

## 2025-02-04 NOTE — PROGRESS NOTES
S:  We received 2 orders today.  1 order is for a custom TLSO which the patient already has.  2nd order states wife requesting to meet with orthotics team to discuss brace teaching and possible adjustment.    O:  I met with the patients family in room 3545-03 and explained I received an order to meet with the family to discuss brace teaching and possible adjustment.  The patient's family explained he will not be getting up out of bed, and that he is on hospice.  Pt family explained brace was attempted to be fit with the patient laying down and sitting on the edge of the bed both of which were to difficult.  Pt family said his condition is beyond the brace so he will not be using it.  A:  N/A  P:  N/A  Mik KAPLAN

## 2025-02-04 NOTE — PROGRESS NOTES
"[facility]    Consult Note - [facility]  _________________________________________________________________    [facility] 24/7 Contact Number: [phone]    - Providers: Please contact [facility] with changes in orders or clinical plan of care   - Nursing: Please contact [facility] with significant changes in patient condition    Hospice will notify the care team (including the hospitalist) to confirm date of inpatient hospice (GIP) admission.    New Epic encounter will not be created until hospice completes admission.   ______________________________________________________________________        Hospice Diagnosis: Metastatic Renal Cancer    Indication for Inpatient Hospice: Pain    Goals for Hospital Discharge: Pain will be managed at a tolerable level as evidenced by patient requiring <3 PRN's in the next 24 hours.     Plan of Care Discussed with the Following:   - Nurse: Holstein, Andrea R, RN  - Hospitalist/Rounding Provider:  Dilip Morfin MD  - Ti's Family/Preferred Contact: Valery Vaz  - Hospice Provider: Rupal Valdovinos DO    Summary of Visit (includes assessment, medications and any new orders):   Hospice nurse visits pt today. Pt has daughter Celia and sister Chava are at bedside today throughout visit. Family report that they do not have questions at this time. Daughter reports that pt had music therapy come to see him yesterday and they played folk music for pt and daugther reports that pt smiled during this. Daughter requests for music therapy to come again to see pt. This request has been put into [facility]osp[facility] team. Daugther reports that patient has had periods of time where it appears that pt's legs are \"very restless without ceasing\", and notes that this is usually occurring when another dose of scheduled medication is almost due. Daughter also reports that pt appears most comfortable when not laying flat on back and " requests that patient not be t/r to lay flat on back but instead to always be t/r to be on either side (stating that pt is most comfortable on left side); this has been communicated to pt's floor nurse. Patient presents comfortable throughout visit today, though has received PRN doses of medication in addition to scheduled medications in past 24 hour to keep pt comfortable. Medication recommendations have been made and are noted below. Please see for details.     The following interdisciplinary team members have been communicated with today: family, floor nurse, attending provider, hospice director.     Thank you for continuing to care so well for pt and family during this time.     Hospice Recommendations:  - Increase frequency of both scheduled Ativan and Dilaudid to q4hrs  - Keep PRN medications as is    Edith Lovell RN ([place])

## 2025-02-04 NOTE — PROGRESS NOTES
2/4/25; [id number]    Hospice    A&O X1; pain managed w/ scheduled medications; pt slept through the shift; appeared comfortable; family at bedside.

## 2025-02-04 NOTE — PLAN OF CARE
Goal Outcome Evaluation:    Q2 turns and oral cares completed. PRN dilaudid given. All patient needs met at this time.

## 2025-02-04 NOTE — PROGRESS NOTES
[facility]    Medicine Progress Note - Hospitalist Service    Date of Admission:  2/3/2025    Assessment & Plan       Ti Nickerson is a 79 year old male admitted on 2/3/2025. He Ti Nickerson is a 79 year old male with a complex history of metastatic renal cancer, atrial fibrillation, orthostatic hypotension on midodrine/Florinef, CKD stage IIIb, CAD, acute metabolic encephalopathy, and history of GI bleeding admitted 1/24/2025 after mechanical fall with new head laceration, PE, DVT, Burst fracture T12. Underwent IR thrombectomy on 1/26.      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  -Continue TSL O brace for comfort if patient can tolerate  -Discussed with hospice nurse recommended changing Dilaudid to 0.5 mg every 4 and also changing Ativan to every 4 hours along with as needed Ativan and Dilaudid             Diet: Regular Diet Adult    DVT Prophylaxis: On hospice  Dillard Catheter: Not present  Lines: None     Cardiac Monitoring: None  Code Status: No CPR- Do NOT Intubate      Clinically Significant Risk Factors                   # Hypertension: Noted on problem list           "  # Overweight: Estimated body mass index is 28.89 kg/m  as calculated from the following:    Height as of 1/10/25: 1.93 m (6' 4\").    Weight as of 1/14/25: 107.6 kg (237 lb 4.8 oz)., PRESENT ON ADMISSION     # Financial/Environmental Concerns:           Social Drivers of Health    Physical Activity: Insufficiently Active (5/28/2024)    Exercise Vital Sign     Days of Exercise per Week: 2 days     Minutes of Exercise per Session: 30 min   Social Connections: Unknown (5/28/2024)    Social Connection and Isolation Panel [NHANES]     Frequency of Social Gatherings with Friends and Family: Twice a week          Disposition Plan     Medically Ready for Discharge: Anticipated in 2-4 Days             Dilip Morfin MD  Hospitalist Service  [facility]  Securely message with [employer] (more info)  Text page via [employer] Paging/Directory   ______________________________________________________________________    Interval History     Patient appeared comfortable.  Needed as needed meds in addition to scheduled meds    Physical Exam   Vital Signs:                    Weight: 0 lbs 0 oz    Physical Exam  Constitutional:       General: He is sleeping.      Comments: Does not appear to be in pain or distress          Medical Decision Making       35 MINUTES SPENT BY ME on the date of service doing chart review, history, exam, documentation & further activities per the note.      Data         "

## 2025-02-05 NOTE — PROGRESS NOTES
"[facility]    Medicine Progress Note - Hospitalist Service    Date of Admission:  2/3/2025    Assessment & Plan   Ti Nickerson is a 79 year old male admitted on 2/3/2025. He Ti Nickerson is a 79 year old male with a complex history of metastatic renal cancer, atrial fibrillation, orthostatic hypotension on midodrine/Florinef, CKD stage IIIb, CAD, acute metabolic encephalopathy, and history of GI bleeding admitted 1/24/2025 after mechanical fall with new head laceration, PE, DVT, Burst fracture T12. Underwent IR thrombectomy on 1/26.      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  -Continue TSL O brace for comfort if patient can tolerate  -        Diet: Regular Diet Adult   DVT Prophylaxis: On comfort care  Dillard Catheter: Not present  Lines: None     Cardiac Monitoring: None  Code Status: No CPR- Do NOT Intubate      Clinically Significant Risk Factors                   # Hypertension: Noted on problem list            # Overweight: Estimated body mass index is 28.89 kg/m  as calculated from the following:    Height as of 1/10/25: 1.93 m (6' 4\").    Weight as of 1/14/25: 107.6 " kg (237 lb 4.8 oz)., PRESENT ON ADMISSION     # Financial/Environmental Concerns:           Social Drivers of Health    Physical Activity: Insufficiently Active (5/28/2024)    Exercise Vital Sign     Days of Exercise per Week: 2 days     Minutes of Exercise per Session: 30 min   Social Connections: Unknown (5/28/2024)    Social Connection and Isolation Panel [NHANES]     Frequency of Social Gatherings with Friends and Family: Twice a week          Disposition Plan     Medically Ready for Discharge: Anticipated in 2-4 Days             Dilip Morfin MD  Hospitalist Service  [facility]  Securely message with [employer] (more info)  Text page via [facility] Paging/Directory   ______________________________________________________________________    Interval History     Patient appears comfortable  Updated patient's wife Valery  Physical Exam   Vital Signs:             SpO2: 94 % O2 Device: Nasal cannula Oxygen Delivery: 1 LPM  Weight: 0 lbs 0 oz    Physical Exam  Constitutional:       General: He is sleeping. He is not in acute distress.         Medical Decision Making       30 MINUTES SPENT BY ME on the date of service doing chart review, history, exam, documentation & further activities per the note.      Data         Imaging results reviewed over the past 24 hrs:   No results found for this or any previous visit (from the past 24 hours).

## 2025-02-05 NOTE — PROGRESS NOTES
Comfort care patient, Turned and repositioned 3 to 4 times, breathing is regular, snoring at time, scheduled comfort medications given, continue to monitor.

## 2025-02-05 NOTE — PLAN OF CARE
Goal Outcome Evaluation:    Q2 turns, repos, and oral cares done. On 1L of O2 for comfort. Family at bedside.

## 2025-02-05 NOTE — PROGRESS NOTES
[facility]    Consult Note - [facility] Inpatient Hospice  _________________________________________________________________    [facility] Contact Number: [phone]    - Providers: Please contact [facility] with changes in orders or clinical plan of care   - Nursing: Please contact [facility] with significant changes in patient condition    Hospice will notify the care team (including the hospitalist) to confirm date of inpatient hospice (GIP) admission.    New Epic encounter will not be created until hospice completes admission.   ______________________________________________________________________        Hospice Diagnosis: Metastatic Renal Cancer     Indication for Inpatient Hospice: Pain    Goals for Hospital Discharge: Pain will be managed at a tolerable level as evidenced by patient requiring <3 PRN's in the next 24 hours.     Plan of Care Discussed with the Following:   - Nurse: Pedro Solano RN  - Hospitalist/Rounding Provider: Dilip Morfin MD    - Ti's Family/Preferred Contact: Valery Vaz  - Hospice Provider: Rupal Valdovinos DO    Summary of Visit (includes assessment, medications and any new orders):   Hospice nurse visits patient this morning. Patient appears comfortable throughout visit today. Patient is laying on right side, as family report that patient is most comfortable when laying on right side of left side instead of flat on back. Floor nursing team has been attentive to ensure this is happening when patient is being turned and rotated. It is reported to hospice nurse that pt has been grimacing with being turned and rotated. Please see hospice team's recommendation below for details. Pt's spouse is present at bedside today and reports that their children were present on overnight. Pt is currently on 1L NC for comfort. Pt remains not HDS for transport. Patient is presenting more imminent today with a BP reading of 56/32.  Expected for pt to pass in hospital.     The following interdisciplinary team members have been communicated with today: floor nurse, social work, attending provider, hospice provider, pt's family, hospice chaplain.     Thank you for the wonderful care pt and family are receiving during this difficult time.       Hospice Recommendations:  - Increase frequency of scheduled Dilaudid to 0.5mg IV q3hrs      Edith Lovell ([place]), RN

## 2025-02-05 NOTE — PROGRESS NOTES
Patient appears to be comfortable at rest, grimacing during repositioning. Can use PRN meds for repositioning purposes as per recommendation of hospice team.

## 2025-02-05 NOTE — PROGRESS NOTES
Patient appears to be comfortable. Breathing regular, no signs of distress. Position maintained as requested by family.

## 2025-02-06 NOTE — PROGRESS NOTES
Pt is on Inpatient Hospice/ Comfort cares. Family in room. No verbalization this shift. Pain managed with IV Ativan and IV Dilaudid. Turn and reposition every 2 hours. PIV/saline lock. Oxygen on at 2 liter per NC for comfort.

## 2025-02-06 NOTE — PROGRESS NOTES
[facility]    Medicine Progress Note - Hospitalist Service    Date of Admission:  2/3/2025    Assessment & Plan   Ti Nickerson is a 79 year old male admitted on 2/3/2025. He Ti Nickerson is a 79 year old male with a complex history of metastatic renal cancer, atrial fibrillation, orthostatic hypotension on midodrine/Florinef, CKD stage IIIb, CAD, acute metabolic encephalopathy, and history of GI bleeding admitted 1/24/2025 after mechanical fall with new head laceration, PE, DVT, Burst fracture T12. Underwent IR thrombectomy on 1/26.      # liac Vein thrombosis and IVC extension   Acute segmental Pulmonary Emboli (RLL, CELESTE)   Acute hypoxic respiratory failure   Thrombocytopenia   Chronic anemia  History of blood loss anemia 2/2 to cancer while on anti-coagulation previously  Sepsis secondary to Enterobacter Bacteremia   Hyperbilirubinemia   Toxic Metabolic Encephalopathy   Recurrent falls with orthostatics  Orthostatic hypotension      Atrial fibrillation:   Head laceration     Metastatic Renal cell cancer diagnosed in 2019   S/p Right radical nephrecctomy  and IVC thrombectomy 2019   History of GI bleeding due to metastatic disease in duodenum   CKD stage 3           Goals of Care  -Goals of care were held with the patient and family and patient was transitioned to comfort cares  Patient was admitted under [facility] hospice     -[facility] hospice nurse following and discussed with Hospice nurse    Continue comfort care meds.  -Continue TSL O brace for comfort if patient can tolerate          Diet: Regular Diet Adult   DVT Prophylaxis: On comfort care  Dillard Catheter: Not present  Lines: None     Cardiac Monitoring: None  Code Status: No CPR- Do NOT Intubate      Clinically Significant Risk Factors                   # Hypertension: Noted on problem list            # Overweight: Estimated body mass index is 28.89 kg/m  as calculated from the following:    Height as of 1/10/25: 1.93 m (6'  "4\").    Weight as of 1/14/25: 107.6 kg (237 lb 4.8 oz)., PRESENT ON ADMISSION       # Financial/Environmental Concerns:           Social Drivers of Health    Physical Activity: Insufficiently Active (5/28/2024)    Exercise Vital Sign     Days of Exercise per Week: 2 days     Minutes of Exercise per Session: 30 min   Social Connections: Unknown (5/28/2024)    Social Connection and Isolation Panel [NHANES]     Frequency of Social Gatherings with Friends and Family: Twice a week          Disposition Plan     Medically Ready for Discharge: Anticipated in 2-4 Days             Dilip Morfin MD  Hospitalist Service  [facility]  Securely message with orderTalk (more info)  Text page via [facility] Paging/Directory   ______________________________________________________________________    Interval History     Patient appears comfortable  Updated patient's wife Valery and daughter   Physical Exam   Vital Signs:                    Weight: 0 lbs 0 oz    Physical Exam  Constitutional:       General: He is sleeping. He is not in acute distress.         Medical Decision Making       32 MINUTES SPENT BY ME on the date of service doing chart review, history, exam, documentation & further activities per the note.      Data         Imaging results reviewed over the past 24 hrs:   No results found for this or any previous visit (from the past 24 hours).  "

## 2025-02-06 NOTE — PROGRESS NOTES
"[facility]    Consult Note - [facility] Inpatient Hospice  _________________________________________________________________    [facility] Contact Number: [phone]    - Providers: Please contact [facility] with changes in orders or clinical plan of care   - Nursing: Please contact [facility] with significant changes in patient condition    Hospice will notify the care team (including the hospitalist) to confirm date of inpatient hospice (GIP) admission.    New Epic encounter will not be created until hospice completes admission.   ______________________________________________________________________        Hospice Diagnosis: Metastatic Renal Cancer     Indication for Inpatient Hospice: Pain     Goals for Hospital Discharge:  Pain will be managed at a tolerable level as evidenced by patient requiring <3 PRN's in the next 24 hours.     Plan of Care Discussed with the Following:   - Nurse: Moses Anderson RN  - Hospitalist/Rounding Provider: Dilip Morfin MD    - Ti's Family/Preferred Contact: Valery Vaz  - Hospice Provider: Rupal Valdovinos DO    Summary of Visit (includes assessment, medications and any new orders):   Hospice nurse visit's pt today. Pt presents comfortable at time of visit. Wife and daughter are at bedside. Music therapist is due to see patient today at 11am, as requested by family. Wife reports that a few of pt's friends will be coming by this morning as well. Wife reports, \"maybe he wants them to have a chance to say goodbye\". Wife and daughter report to hospice nurse that they would like for pt to be rotated onto his right side completely noting that they feel he breathes easier when completely on right side or left side. Floor nurse accomodates this for family and pt. After floor nurse repositions pt, pt's breaths are noted to be more quiet. Pt's oxygen saturation continues to decline and is currently at 68-67%. Apneic breathing noted " and 8 RR per minute during assessment noted. BP cuff was not successful in obtaining reading  that hospice nurse attempted to get today. BP reading on 2/5/2025 was low noted to be 56/32. Pt remains not HDS for transport and it is expected for pt to pass in hospital. No new recommendations at this time. Pt has not required administration of prn medication in past 24 hours.     The following interdisciplinary team members have been communicated with today: pt's family, floor nurse, hospitalist, hospice medical director, hospice music therapist    Thank you for the wonderful care you are providing to pt and family during this time.     Hospice Recommendations:   - No new recommendations at this time.    Edith Lovell ([place]) RN

## 2025-02-06 NOTE — DEATH PRONOUNCEMENT
[facility] DEATH PRONOUNCEMENT    Called to pronounce Ti Nickerson dead.    Physical Exam: Unresponsive to noxious stimuli, Spontaneous respirations absent, Breath sounds absent, Carotid pulse absent, and Heart sounds absent    Patient was pronounced dead at 15:55 PM, 2025.    Preliminary Cause of Death: Acute hypoxic respiratory failure with a h/o metastatic renal cell cancer in the setting of hospice care    Active Problems:    * No active hospital problems. *       Infectious disease present?: NO    Communicable disease present? (examples: HIV, chicken pox, TB, Ebola, CJD) :  NO    Multi-drug resistant organism present? (example: MRSA): NO    Please consider an autopsy if any of the following exist:  NO Unexpected or unexplained death during or following any dental, medical, or surgical diagnostic treatment procedures.   NO Death of mother at or up to seven days after delivery.     NO All  and pediatric deaths.     NO Death where the cause is sufficiently obscure to delay completion of the death certificate.   NO Deaths in which autopsy would confirm a suspected illness/condition that would affect surviving family members or recipients of transplanted organs.     The following deaths must be reported to the 's Office:  NO A death that may be due entirely or in part to any factors other than natural disease (recent surgery, recent trauma, suspected abuse/neglect).   NO A death that may be an accident, suicide, or homicide.     NO Any sudden, unexpected death in which there is no prior history of significant heart disease or any other condition associated with sudden death.   NO A death under suspicious, unusual, or unexpected circumstances.    NO Any death which is apparently due to natural causes but in which the  does not have a personal physician familiar with the patient s medical history, social, or environmental situation or the circumstances of the terminal event.   NO Any  death apparently due to Sudden Infant Death Syndrome.     NO Deaths that occur during, in association with, or as consequences of a diagnostic, therapeutic, or anesthetic procedure.   NO Any death in which a fracture of a major bone has occurred within the past (6) six months.   NO A death of persons note seen by their physician within 120 days of demise.     NO Any death in which the  was an inmate of a public institution or was in the custody of Law Enforcement personnel.   NO  All unexpected deaths of children   NO Solid organ donors   NO Unidentified bodies   YES Deaths of persons whose bodies are to be cremated or otherwise disposed of so that the bodies will later be unavailable for examination;   NO Deaths unattended by a physician outside of a licensed healthcare facility or licensed residential hospice program   NO Deaths occurring within 24 hours of arrival to a health care facility if death is unexpected.    NO Deaths associated with the decedent s employment.   NO Deaths attributed to acts of terrorism.   NO Any death in which there is uncertainty as to whether it is a medical examiner s care should be discussed with the medical investigator.        Body disposition: Autopsy was discussed with family member:  Family members in person.  Permission for autopsy was declined. Attending hospitalist updated on patient's death.     Body released to the morgue.    NANCY Thompson [facility]  Securely message with the Vocera Web Console (learn more here)  Text page via [employer] Paging/Directory

## 2025-02-06 NOTE — PLAN OF CARE
Goal Outcome Evaluation:   Summary: 02/05/25. [id number]  Patient alert to self. Pain managed well with schedule meds. Turn and Repo. Patient slept through the shift oral cares done and brief changed. Family att bedside.

## 2025-02-10 NOTE — DISCHARGE SUMMARY
[facility]    Death Summary - Hospitalist Service     Date of Admission:  2/3/2025  Date of Death: 2/6/2025   atient was pronounced dead at 15:55 PM, February 6, 2025.     Discharging Provider: Dilip Morfin MD    Discharge Diagnoses       Cause of death:  Acute hypoxic respiratory failure with a h/o metastatic renal cell cancer in the setting of hospice care       Hospital Course    Ti Nickerson is a 79 year old male admitted on 2/3/2025. He Ti Nickerson is a 79 year old male with a complex history of metastatic renal cancer, atrial fibrillation, orthostatic hypotension on midodrine/Florinef, CKD stage IIIb, CAD, acute metabolic encephalopathy, and history of GI bleeding admitted 1/24/2025 after mechanical fall with new head laceration, PE, DVT, Burst fracture T12. Underwent IR thrombectomy on 1/26.      # liac Vein thrombosis and IVC extension   Acute segmental Pulmonary Emboli (RLL, CELESTE)   Acute hypoxic respiratory failure   Thrombocytopenia   Chronic anemia  History of blood loss anemia 2/2 to cancer while on anti-coagulation previously  Sepsis secondary to Enterobacter Bacteremia   Hyperbilirubinemia   Toxic Metabolic Encephalopathy   Recurrent falls with orthostatics  Orthostatic hypotension      Atrial fibrillation:   Head laceration     Metastatic Renal cell cancer diagnosed in 2019   S/p Right radical nephrecctomy  and IVC thrombectomy 2019   History of GI bleeding due to metastatic disease in duodenum   CKD stage 3      Goals of care were held with the patient and family and patient was transitioned to comfort cares  Patient was admitted under [facility] hospice         Dilip Morfin MD  [facility]  ______________________________________________________________________      Significant Results and Procedures   Most Recent 3 CBC's:  Recent Labs   Lab Test 01/28/25  0647 01/27/25  0929 01/26/25  1016   WBC 4.5 4.7 5.1   HGB  10.7* 11.1* 10.5*    102* 100   * 114* 98*     Most Recent 3 BMP's:  Recent Labs   Lab Test 01/28/25  2108 01/28/25  1700 01/28/25  0647 01/27/25  0929 01/26/25  2306 01/26/25  1016   NA  --   --  140 139  --  136   POTASSIUM  --   --  3.9 3.7  --  3.8   CHLORIDE  --   --  110* 108*  --  106   CO2  --   --  24 21*  --  19*   BUN  --   --  30.3* 32.8*  --  32.1*   CR  --   --  1.10 1.22*  --  1.30*   ANIONGAP  --   --  6* 10  --  11   DOMINICK  --   --  7.7* 7.6*  --  7.0*   * 104* 99 86   < > 82    < > = values in this interval not displayed.   ,   Results for orders placed or performed during the hospital encounter of 01/24/25   CT Head w/o Contrast    Narrative    EXAM: CT HEAD W/O CONTRAST, CT CERVICAL SPINE W/O CONTRAST  LOCATION: [facility]  DATE: 1/24/2025    INDICATION: Fall, confusion  COMPARISON: Brain MRI 05/17/2020.  TECHNIQUE:   1) Routine CT Head without IV contrast. Multiplanar reformats. Dose reduction techniques were used.  2) Routine CT Cervical Spine without IV contrast. Multiplanar reformats. Dose reduction techniques were used.    FINDINGS:   HEAD CT:   INTRACRANIAL CONTENTS: No intracranial hemorrhage, extraaxial collection, or mass effect.  No CT evidence of acute infarct. Mild presumed chronic small vessel ischemic changes. Mild generalized cerebral volume loss. Normal ventricles and sulci.     VISUALIZED ORBITS/SINUSES/MASTOIDS: No intraorbital abnormality. No paranasal sinus mucosal disease. No middle ear or mastoid effusion.    BONES/SOFT TISSUES: No acute abnormality.    CERVICAL SPINE CT:   VERTEBRA: Normal vertebral body heights. Right convex scoliosis of the lower cervical spine. No fracture or posttraumatic subluxation.     CANAL/FORAMINA: Multilevel disc height loss, osteophyte formation and facet arthropathy. No high-grade spinal canal stenosis. Moderate left and mild right neural foraminal stenosis at C2-C3. Severe left and moderate to  severe right neural foraminal   stenosis at C3-C4, moderate to severe left and moderate right neural foraminal stenosis at C4-C5, mild right and mild-to-moderate left neural foraminal stenosis at C5-C6 and mild left and mild to moderate right neural foraminal stenosis at C6-C7.    PARASPINAL: No extraspinal abnormality. Visualized lung fields are clear.      Impression    IMPRESSION:  HEAD CT:  1.  No CT evidence for acute intracranial process.  2.  Brain atrophy and presumed chronic microvascular ischemic changes as above.  3.  Incidental dilatation with possible aneurysms at the basilar tip. Recommend CTA or mass effect bases.    CERVICAL SPINE CT:  1.  No CT evidence for acute fracture or post traumatic subluxation.  2.  Multilevel cervical spondylosis without high-grade spinal canal stenosis. Multilevel neural foraminal stenosis as detailed above.   CT Chest/Abdomen/Pelvis w Contrast     Value    Radiologist flags New diagnosis of pulmonary embolism (AA)    Narrative    EXAM: CT CHEST/ABDOMEN/PELVIS W CONTRAST  LOCATION: [facility]  DATE: 1/24/2025    INDICATION: Trauma, right shoulder blader abrasion, bruising to bilateral flanks, fall 2 days ago, concern for traumatic injuries.  COMPARISON: CT CAP 12/30/2024.  TECHNIQUE: CT scan of the chest, abdomen, and pelvis was performed following injection of IV contrast. Multiplanar reformats were obtained. Dose reduction techniques were used.   CONTRAST: 120mL Isovue 370    FINDINGS:   LUNGS AND PLEURA: Lung apices are incompletely imaged including the region of the 2 mm subpleural nodule described on prior report. Additional pulmonary nodules along the right major fissure are stable. Mild fibrotic changes of the lung bases. No pleural   effusion or pneumothorax within the limitations of this exam.    MEDIASTINUM/AXILLAE: There is mild chronic dilatation of right heart chambers. No thoracic aneurysm. Tortuous descending thoracic aorta. Right  lower lobe (series 4, image 77) and left upper lobe (series 4, image 42) segmental pulmonary emboli are evident   on this non-CTA examination. No thoracic adenopathy. Small hiatal hernia.    CORONARY ARTERY CALCIFICATION: Severe.    HEPATOBILIARY: Stable 2.7 cm hypoattenuating lesion of the posterior right hepatic lobe (series 4, image 153). No new hepatic lesion. Similar gallbladder distention without radiopaque gallstone.    PANCREAS: Similar mild pancreatic atrophy.    SPLEEN: Normal.    ADRENAL GLANDS: Normal.    KIDNEYS/BLADDER: Right nephrectomy. Left renal cyst does not require follow-up. No left hydronephrosis. Chronic mild diffuse bladder wall thickening with multiple small bladder diverticula.    BOWEL: Diverticulosis of the distal colon. Metallic clip within the stomach at the gastric body. No evidence of bowel obstruction. Postsurgical changes of partial duodenectomy with stable mild soft tissue thickening at the surgical site. A discrete   duodenal mass is not identified.    LYMPH NODES: Normal.    VASCULATURE: Mild to moderate burden of vascular calcifications without abdominal aortic aneurysm. Left common iliac vein and nonocclusive deep venous thrombosis which extends into the lower inferior vena cava at the level of the bifurcation. There is   mass effect on the inferior vena cava at the partial duodenectomy site, possibly secondary to postsurgical scarring.    PELVIC ORGANS: Prostatomegaly.    MUSCULOSKELETAL: No acute findings. Note, the bilateral scapulae, bilateral proximal humeri, bilateral clavicles, and upper ribs are incompletely imaged. See dedicated CT thoracolumbar spine reformats for details regarding the spine including acute T12   fracture.      Impression    IMPRESSION:    Note, a portion of the upper chest is incompletely imaged. The following findings are made within these limitations.    1.  Acute T12 fracture as described on same-day CT thoracolumbar spine reformats. Otherwise, no  acute traumatic findings within the chest, abdomen, or pelvis.  2.  Acute segmental pulmonary emboli in the right lower lobe and left upper lobe. There is dilatation of right-sided heart chambers which appears chronic.  3.  Acute nonocclusive deep venous thrombosis of the left common iliac vein which extends into the lower inferior vena cava.  4.  Stable postsurgical changes of partial duodenectomy with mild soft tissue thickening at the surgical site. No discrete duodenal mass is identified.  5.  Stable hypoattenuating lesion of the posterior right hepatic lobe.      [Critical Result: New diagnosis of pulmonary embolism]    Finding was identified on 1/24/2025 11:48 AM CST.     Dr. Segovia was contacted by me on 1/24/2025 12:09 PM CST and verbalized understanding of the critical result.   CT Cervical Spine w/o Contrast    Narrative    EXAM: CT HEAD W/O CONTRAST, CT CERVICAL SPINE W/O CONTRAST  LOCATION: [facility]  DATE: 1/24/2025    INDICATION: Fall, confusion  COMPARISON: Brain MRI 05/17/2020.  TECHNIQUE:   1) Routine CT Head without IV contrast. Multiplanar reformats. Dose reduction techniques were used.  2) Routine CT Cervical Spine without IV contrast. Multiplanar reformats. Dose reduction techniques were used.    FINDINGS:   HEAD CT:   INTRACRANIAL CONTENTS: No intracranial hemorrhage, extraaxial collection, or mass effect.  No CT evidence of acute infarct. Mild presumed chronic small vessel ischemic changes. Mild generalized cerebral volume loss. Normal ventricles and sulci.     VISUALIZED ORBITS/SINUSES/MASTOIDS: No intraorbital abnormality. No paranasal sinus mucosal disease. No middle ear or mastoid effusion.    BONES/SOFT TISSUES: No acute abnormality.    CERVICAL SPINE CT:   VERTEBRA: Normal vertebral body heights. Right convex scoliosis of the lower cervical spine. No fracture or posttraumatic subluxation.     CANAL/FORAMINA: Multilevel disc height loss, osteophyte formation and  facet arthropathy. No high-grade spinal canal stenosis. Moderate left and mild right neural foraminal stenosis at C2-C3. Severe left and moderate to severe right neural foraminal   stenosis at C3-C4, moderate to severe left and moderate right neural foraminal stenosis at C4-C5, mild right and mild-to-moderate left neural foraminal stenosis at C5-C6 and mild left and mild to moderate right neural foraminal stenosis at C6-C7.    PARASPINAL: No extraspinal abnormality. Visualized lung fields are clear.      Impression    IMPRESSION:  HEAD CT:  1.  No CT evidence for acute intracranial process.  2.  Brain atrophy and presumed chronic microvascular ischemic changes as above.  3.  Incidental dilatation with possible aneurysms at the basilar tip. Recommend CTA or mass effect bases.    CERVICAL SPINE CT:  1.  No CT evidence for acute fracture or post traumatic subluxation.  2.  Multilevel cervical spondylosis without high-grade spinal canal stenosis. Multilevel neural foraminal stenosis as detailed above.   CT Thoracic Spine w/o Contrast    Narrative    EXAM: CT THORACIC SPINE W/O CONTRAST, CT LUMBAR SPINE W/O CONTRAST  LOCATION: [facility]  DATE: 1/24/2025    INDICATION: trauma  COMPARISON: None.  TECHNIQUE:  1) Routine CT Thoracic Spine without IV contrast. Multiplanar reformats. Dose reduction techniques were used.   2) Routine CT Lumbar Spine without IV contrast. Multiplanar reformats. Dose reduction techniques were used.     FINDINGS:    THORACIC SPINE CT:  VERTEBRA: Osteopenia. Acute burst fracture of T12 with approximately 20% height loss. Minimal retropulsion of the fractured vertebral body without associated spinal canal stenosis. Unchanged Schmorl's node-like endplate indentation along the inferior   endplate of T9 with associated chronic vertebral body height loss. Stepwise mild anterolisthesis T2-T4. No fracture or posttraumatic subluxation.     CANAL/FORAMINA: No canal or neural  foraminal stenosis.    PARASPINAL: No extraspinal abnormality.    LUMBAR SPINE CT:  VERTEBRA: Normal vertebral body heights. Left convex scoliosis with the apex at L3-L4 minimal retrolisthesis at L3-L4 and minimal anterolisthesis at L5-S1. No fracture or posttraumatic subluxation.     CANAL/FORAMINA: Multilevel disc height loss, osteophyte formation and facet arthropathy without high-grade spinal or neural foraminal stenosis. Moderate right neural foraminal stenosis at L2-L3 and L3-L4 and mild to left neural foraminal stenosis at   L4-L5 and L5-S1. Multilevel Schmorl's node-like endplate indentations.    PARASPINAL: No extraspinal abnormality.      Impression    IMPRESSION:  THORACIC SPINE CT:  1.  Acute burst fracture of T12 with approximately 20% height loss. Mild retropulsion of the fractured vertebral body without associated spinal canal stenosis.  2.  No high-grade spinal canal or neural foraminal stenosis.    LUMBAR SPINE CT:  1.  No fracture or posttraumatic subluxation.  2.  Multilevel lumbar spondylosis without high-grade spinal canal stenosis. Multilevel neural foraminal stenosis     CT Lumbar Spine w/o Contrast    Narrative    EXAM: CT THORACIC SPINE W/O CONTRAST, CT LUMBAR SPINE W/O CONTRAST  LOCATION: [facility]  DATE: 1/24/2025    INDICATION: trauma  COMPARISON: None.  TECHNIQUE:  1) Routine CT Thoracic Spine without IV contrast. Multiplanar reformats. Dose reduction techniques were used.   2) Routine CT Lumbar Spine without IV contrast. Multiplanar reformats. Dose reduction techniques were used.     FINDINGS:    THORACIC SPINE CT:  VERTEBRA: Osteopenia. Acute burst fracture of T12 with approximately 20% height loss. Minimal retropulsion of the fractured vertebral body without associated spinal canal stenosis. Unchanged Schmorl's node-like endplate indentation along the inferior   endplate of T9 with associated chronic vertebral body height loss. Stepwise mild anterolisthesis  T2-T4. No fracture or posttraumatic subluxation.     CANAL/FORAMINA: No canal or neural foraminal stenosis.    PARASPINAL: No extraspinal abnormality.    LUMBAR SPINE CT:  VERTEBRA: Normal vertebral body heights. Left convex scoliosis with the apex at L3-L4 minimal retrolisthesis at L3-L4 and minimal anterolisthesis at L5-S1. No fracture or posttraumatic subluxation.     CANAL/FORAMINA: Multilevel disc height loss, osteophyte formation and facet arthropathy without high-grade spinal or neural foraminal stenosis. Moderate right neural foraminal stenosis at L2-L3 and L3-L4 and mild to left neural foraminal stenosis at   L4-L5 and L5-S1. Multilevel Schmorl's node-like endplate indentations.    PARASPINAL: No extraspinal abnormality.      Impression    IMPRESSION:  THORACIC SPINE CT:  1.  Acute burst fracture of T12 with approximately 20% height loss. Mild retropulsion of the fractured vertebral body without associated spinal canal stenosis.  2.  No high-grade spinal canal or neural foraminal stenosis.    LUMBAR SPINE CT:  1.  No fracture or posttraumatic subluxation.  2.  Multilevel lumbar spondylosis without high-grade spinal canal stenosis. Multilevel neural foraminal stenosis     US Lower Extremity Venous Duplex Bilateral    Narrative    EXAM: US LOWER EXTREMITY VENOUS DUPLEX BILATERAL  LOCATION: [facility]  DATE: 1/24/2025    INDICATION: eval for dvt  COMPARISON: None.  TECHNIQUE: Venous Duplex ultrasound of bilateral lower extremities with and without compression, augmentation and duplex. Color flow and spectral Doppler with waveform analysis performed.    FINDINGS: Exam includes the common femoral, femoral, popliteal veins as well as segmentally visualized deep calf veins and greater saphenous vein.     RIGHT: No deep vein thrombosis. No superficial thrombophlebitis. No popliteal cyst.    LEFT: No deep vein thrombosis. No superficial thrombophlebitis. No popliteal cyst.      Impression     IMPRESSION:  1.  No deep venous thrombosis in the bilateral lower extremities.   MR Thoracic Spine w/o Contrast    Narrative    EXAM: MR THORACIC SPINE W/O CONTRAST  LOCATION: [facility]  DATE: 1/24/2025    INDICATION: fall wtih burst fradture  COMPARISON:  CTA thoracic spine January 24, 2025.  TECHNIQUE: Routine Thoracic Spine MRI without IV contrast. Exam ordered secondary to patient agitation.      Impression    FINDINGS/IMPRESSION:     Examination aborted secondary to patient agitation. Only severely motion degraded sagittal T1 and T2 sequences obtained. The T12 burst fracture is identified but poorly evaluated. No evidence for canal compromise. No evidence for ligamentous injury,   although evaluation for ligamentous injury is severely degraded.   IR Lower Extremity Venogram Left    Narrative    79-year-old patient with small bilateral pulmonary emboli and venous thrombus incidentally noted in the distal IVC and left common iliac veins. Patient also has vertebral fracture causing significant pain. The thrombus in the left common iliac vein   thought to be nidus for PEs. Patient has an irregular and narrow IVC, by my interpretation related to previous nephrectomy. Blood flow remains through this area, though fear that if thrombus were to propagate, would become more complicated in an   anatomically abnormal IVC. Plan is for mechanical thrombectomy.    TECHNIQUE: Patient was brought to Interventional Radiology department after informed consent obtained with patient's family members. Patient was placed in a supine position. Skin overlying the left groin was prepped and draped in standard sterile   fashion. 1% lidocaine used for local anesthetic. Ultrasound was used to visualize the left common femoral vein, patency confirmed, and image stored for documentation. With continuous ultrasound guidance, a micropuncture kit was used to access the left   common femoral vein. Over series of  maneuvers, 16 Japanese vascular sheath was placed. Over a Bentson wire, Super Stiff Amplatz wire was advanced through the IVC, right atrium, heart, and into the subclavian vein on the right. An XL ClotTriever was used   for mechanical thrombectomy of the distal IVC and left common iliac vein after multiple passes. Thrombus was removed and appeared relatively chronic in appearance. Completion venograms performed in multiple projections including left external iliac vein,   common iliac vein, contralateral right common iliac vein, and IVC. Venogram was performed also to begin the procedure upon initial access in the left common femoral vein, external iliac vein, common iliac vein, and IVC. No residual thrombus noted. IVUS   was also used demonstrating wide patency through these vein segments. Images were stored for documentation. There is moderate irregularity of the more cranial infrarenal IVC, though no thrombus formation. The sheath was removed and purse string suture   applied with a flow stasis.    Sedation: A moderate level of sedation was achieved with 2 mg IV Versed and 0.2 mg IV Dilaudid.  Sedation time: 42 minutes.  Please note the above medications were administered by the interventional radiology staff under my direct supervision. Patient's vital signs were monitored and remained stable throughout the procedure.    Fluoroscopic time: 5.7 minutes  Air Kerma: 335 mGy  Contrast: 50 mL of Isovue-300 administered intravenously without complication.  Local anesthetic: 10 mL of 1% lidocaine.    FINDINGS: Initial venogram is from the left common femoral vein demonstrating patency of the common femoral, and external iliac veins. Thrombus is noted in the left common iliac vein with approximately 60% narrowing. Thrombus extends into the IVC. IVC   remains patent. There is moderate irregularity of the IVC surrounded by surgical clips, related to prior surgery. However, no thrombus identified. At completion, venogram  performed demonstrating wide patency in the common iliac, external iliac, and   inferior vena cava venous segments. Contralateral right common iliac vein is widely patent. Patient tolerated the procedure well.      Impression    IMPRESSION:   1. Successful mechanical thrombectomy of the distal IVC and left common iliac vein. No residual thrombus at completion. Continue heparin and anticoagulation.  2. Moderate irregularity of the more cranial infrarenal IVC, thought to relate to previous surgery given surrounding surgical clips. This is unrelated to the thrombus.   US Abdomen Limited    Narrative    EXAM: US ABDOMEN LIMITED  LOCATION: [facility]  DATE: 1/27/2025    INDICATION: Enterobacter bacteremia and tender RUQ.  COMPARISON: None.  TECHNIQUE: Limited abdominal ultrasound.    FINDINGS:    GALLBLADDER: Negative sonographic Posada's sign. Distended gallbladder along with cholelithiasis and potential gallbladder sludge. Gallbladder wall is 3 mm.    BILE DUCTS: No intrahepatic visible biliary ductal dilatation. The common duct measures 7 mm.    LIVER: Partially obscured but no acute abnormality.    RIGHT KIDNEY: Absent.    PANCREAS: The visualized portions are normal.    No ascites.      Impression    IMPRESSION:  1.  Cholelithiasis and gallbladder sludge is suggested. Mild gallbladder wall thickening. Negative sonographic Posada's sign.  2.  Mildly distended common duct measuring 7 mm.       XR Thoracic Spine 2 Views    Narrative    EXAM: XR THORACIC SPINE 2 VIEWS  LOCATION: [facility]  DATE: 1/30/2025    INDICATION: T12 fx, upright XR in brace, if unable sitting is ok.  COMPARISON: Thoracic spine CT 1/24/2025.      Impression    IMPRESSION: Acute T12 compression fracture demonstrates increased 45-55% loss of vertebral body height and associated 33 degrees segmental kyphotic deformity, previously approximately 15-25% height loss on 1/24/2025. No new fracture. Mild  multilevel   thoracic spondylosis with degenerative kyphoscoliotic deformity.   Echo Limited     Value    LVEF  60-65%    [facility]    [id number]  [id number]  [id number]  [id number]^KRISTEL^LONNY^L     [facility]  Echocardiography Laboratory  [address] [place] [address], [place] [address]     Name: JOHN ALLEN  MRN: [id number]  : 1945  Study Date: 2025 02:32 PM  Age: 79 yrs  Gender: Male  Patient Location: [facility]  Reason For Study: Pulmonary Emboli  Ordering Physician: LONNY HUMPHRIES  Referring Physician: Nico Retana  Performed By: Herson Ibarra RDCS     BSA: 2.4 m2  Height: 76 in  Weight: 237 lb  HR: 111  BP: 100/65 mmHg  ______________________________________________________________________________  Procedure  Limited Echocardiogram with two-dimensional, color and spectral Doppler.  Definity (NDC #25008-052) given intravenously.  ______________________________________________________________________________  Interpretation Summary     Limited study as ordered.     Left ventricular systolic function is normal. The visual ejection fraction is  60-65%.  The right ventricle is moderate to severely dilated. The right ventricular  systolic function is normal.     This study was compared to a TTE from 2025. RV chamber size is mildly  larger on this present study.  ______________________________________________________________________________  Left Ventricle  Left ventricular systolic function is normal. The visual ejection fraction is  60-65%. Regional wall motion abnormalities cannot be excluded due to limited  visualization.     Right Ventricle  The right ventricle is moderate to severely dilated. The right ventricular  systolic function is normal.     Mitral Valve  Grossly normal function on limited assessment.     Tricuspid Valve  The tricuspid valve is not well visualized, but is grossly normal. The  tricuspid valve is not well visualized.     Aortic Valve  There is trivial  trileaflet aortic sclerosis. No aortic regurgitation is  present.     Pulmonic Valve  The pulmonic valve is not well seen, but is grossly normal. The pulmonic valve  is not well visualized.  ______________________________________________________________________________  Report approved by: Ronnie Baum MD on 01/25/2025 03:36 PM     ______________________________________________________________________________          Consultations This Hospital Stay   [facility] ADULT CONSULT    Primary Care Physician   Nico Retana MD    Time Spent on this Encounter

## 2025-07-12 NOTE — NURSING NOTE
Is the patient currently in the state of [place]? YES    Visit mode:TELEPHONE    If the visit is dropped, the patient can be reconnected by: TELEPHONE VISIT: Phone number: [phone]    Will anyone else be joining the visit? NO  (If patient encounters technical issues they should call [phone]    How would you like to obtain your AVS? MyChart    Are changes needed to the allergy or medication list? No    Reason for visit: EMANUEL LORENZ    
Chart(s)/Physician/Provider

## (undated) RX ORDER — HYDROMORPHONE HCL IN WATER/PF 6 MG/30 ML
PATIENT CONTROLLED ANALGESIA SYRINGE INTRAVENOUS
Status: DISPENSED
Start: 2025-01-26

## (undated) RX ORDER — HEPARIN SODIUM 200 [USP'U]/100ML
INJECTION, SOLUTION INTRAVENOUS
Status: DISPENSED
Start: 2025-01-26

## (undated) RX ORDER — REGADENOSON 0.08 MG/ML
INJECTION, SOLUTION INTRAVENOUS
Status: DISPENSED
Start: 2018-12-13

## (undated) RX ORDER — EPINEPHRINE 0.1 MG/ML
INJECTION INTRAVENOUS
Status: DISPENSED
Start: 2023-09-14

## (undated) RX ORDER — LIDOCAINE HYDROCHLORIDE 10 MG/ML
INJECTION, SOLUTION INFILTRATION; PERINEURAL
Status: DISPENSED
Start: 2025-01-26

## (undated) RX ORDER — EPINEPHRINE 0.1 MG/ML
INJECTION INTRAVENOUS
Status: DISPENSED
Start: 2024-07-14

## (undated) RX ORDER — FENTANYL CITRATE 50 UG/ML
INJECTION, SOLUTION INTRAMUSCULAR; INTRAVENOUS
Status: DISPENSED
Start: 2023-09-14